# Patient Record
Sex: MALE | Race: WHITE | NOT HISPANIC OR LATINO | Employment: OTHER | RURAL
[De-identification: names, ages, dates, MRNs, and addresses within clinical notes are randomized per-mention and may not be internally consistent; named-entity substitution may affect disease eponyms.]

---

## 2019-06-25 ENCOUNTER — CLINICAL SUPPORT (OUTPATIENT)
Dept: FAMILY MEDICINE CLINIC | Facility: CLINIC | Age: 64
End: 2019-06-25

## 2019-06-25 DIAGNOSIS — J30.2 SEASONAL ALLERGIES: Primary | ICD-10-CM

## 2019-06-25 PROCEDURE — 95117 IMMUNOTHERAPY INJECTIONS: CPT | Performed by: FAMILY MEDICINE

## 2019-07-08 RX ORDER — LISINOPRIL AND HYDROCHLOROTHIAZIDE 20; 12.5 MG/1; MG/1
1 TABLET ORAL DAILY
Qty: 30 TABLET | Refills: 3 | Status: SHIPPED | OUTPATIENT
Start: 2019-07-08 | End: 2019-11-22 | Stop reason: SDUPTHER

## 2019-07-09 ENCOUNTER — CLINICAL SUPPORT (OUTPATIENT)
Dept: FAMILY MEDICINE CLINIC | Facility: CLINIC | Age: 64
End: 2019-07-09

## 2019-07-09 DIAGNOSIS — J30.2 SEASONAL ALLERGIES: Primary | ICD-10-CM

## 2019-07-09 PROCEDURE — 95117 IMMUNOTHERAPY INJECTIONS: CPT | Performed by: FAMILY MEDICINE

## 2019-07-23 ENCOUNTER — CLINICAL SUPPORT (OUTPATIENT)
Dept: FAMILY MEDICINE CLINIC | Facility: CLINIC | Age: 64
End: 2019-07-23

## 2019-07-23 DIAGNOSIS — J30.2 SEASONAL ALLERGIES: Primary | ICD-10-CM

## 2019-07-23 PROCEDURE — 95117 IMMUNOTHERAPY INJECTIONS: CPT | Performed by: FAMILY MEDICINE

## 2019-07-25 RX ORDER — CITALOPRAM 20 MG/1
20 TABLET ORAL DAILY
Qty: 30 TABLET | Refills: 12 | Status: SHIPPED | OUTPATIENT
Start: 2019-07-25 | End: 2020-08-07

## 2019-08-07 ENCOUNTER — CLINICAL SUPPORT (OUTPATIENT)
Dept: FAMILY MEDICINE CLINIC | Facility: CLINIC | Age: 64
End: 2019-08-07

## 2019-08-07 DIAGNOSIS — J30.2 SEASONAL ALLERGIES: Primary | ICD-10-CM

## 2019-08-07 PROCEDURE — 95117 IMMUNOTHERAPY INJECTIONS: CPT | Performed by: FAMILY MEDICINE

## 2019-08-19 PROBLEM — Z82.49 FAMILY HISTORY OF ISCHEMIC HEART DISEASE: Status: ACTIVE | Noted: 2019-08-19

## 2019-08-19 PROBLEM — E66.9 OBESITY: Status: ACTIVE | Noted: 2019-08-19

## 2019-08-19 PROBLEM — Z12.5 ENCOUNTER FOR PROSTATE CANCER SCREENING: Status: ACTIVE | Noted: 2019-08-19

## 2019-08-19 PROBLEM — Z12.12 SCREENING FOR MALIGNANT NEOPLASM OF THE RECTUM: Status: ACTIVE | Noted: 2019-08-19

## 2019-08-19 RX ORDER — AZELASTINE 1 MG/ML
1 SPRAY, METERED NASAL 2 TIMES DAILY
COMMUNITY
Start: 2019-05-29 | End: 2021-03-24

## 2019-08-19 RX ORDER — ASPIRIN 81 MG/1
1 TABLET ORAL DAILY
COMMUNITY
Start: 2019-04-30

## 2019-08-19 RX ORDER — FLUTICASONE PROPIONATE 50 MCG
2 SPRAY, SUSPENSION (ML) NASAL DAILY
COMMUNITY
Start: 2019-04-30

## 2019-08-19 RX ORDER — MONTELUKAST SODIUM 10 MG/1
1 TABLET ORAL DAILY
COMMUNITY
Start: 2019-04-30 | End: 2020-09-16

## 2019-08-19 RX ORDER — FENOFIBRATE 160 MG/1
1 TABLET ORAL DAILY
COMMUNITY
Start: 2019-04-30 | End: 2019-10-14 | Stop reason: SDUPTHER

## 2019-08-19 RX ORDER — OMEGA-3 FATTY ACIDS CAP DELAYED RELEASE 1000 MG 1000 MG
2 CAPSULE DELAYED RELEASE ORAL 2 TIMES DAILY
COMMUNITY

## 2019-08-20 ENCOUNTER — OFFICE VISIT (OUTPATIENT)
Dept: FAMILY MEDICINE CLINIC | Facility: CLINIC | Age: 64
End: 2019-08-20

## 2019-08-20 VITALS
HEIGHT: 66 IN | TEMPERATURE: 97.7 F | OXYGEN SATURATION: 97 % | BODY MASS INDEX: 31.6 KG/M2 | SYSTOLIC BLOOD PRESSURE: 136 MMHG | RESPIRATION RATE: 16 BRPM | HEART RATE: 82 BPM | DIASTOLIC BLOOD PRESSURE: 78 MMHG | WEIGHT: 196.6 LBS

## 2019-08-20 DIAGNOSIS — J30.1 SEASONAL ALLERGIC RHINITIS DUE TO POLLEN: ICD-10-CM

## 2019-08-20 DIAGNOSIS — E66.09 CLASS 1 OBESITY DUE TO EXCESS CALORIES WITH SERIOUS COMORBIDITY AND BODY MASS INDEX (BMI) OF 31.0 TO 31.9 IN ADULT: ICD-10-CM

## 2019-08-20 DIAGNOSIS — E11.9 TYPE 2 DIABETES MELLITUS WITHOUT COMPLICATION, WITHOUT LONG-TERM CURRENT USE OF INSULIN (HCC): Primary | ICD-10-CM

## 2019-08-20 DIAGNOSIS — E78.2 MIXED HYPERLIPIDEMIA: ICD-10-CM

## 2019-08-20 DIAGNOSIS — I10 HYPERTENSION, BENIGN: ICD-10-CM

## 2019-08-20 PROBLEM — G47.33 OBSTRUCTIVE SLEEP APNEA: Status: ACTIVE | Noted: 2019-08-20

## 2019-08-20 LAB
BILIRUB BLD-MCNC: NEGATIVE MG/DL
CLARITY, POC: CLEAR
COLOR UR: YELLOW
GLUCOSE BLDC GLUCOMTR-MCNC: 94 MG/DL (ref 70–130)
GLUCOSE UR STRIP-MCNC: NEGATIVE MG/DL
HBA1C MFR BLD: 6.4 %
KETONES UR QL: NEGATIVE
LEUKOCYTE EST, POC: NEGATIVE
NITRITE UR-MCNC: NEGATIVE MG/ML
PH UR: 5 [PH] (ref 5–8)
PROT UR STRIP-MCNC: NEGATIVE MG/DL
RBC # UR STRIP: NEGATIVE /UL
SP GR UR: 1.01 (ref 1–1.03)
UROBILINOGEN UR QL: NORMAL

## 2019-08-20 PROCEDURE — 83036 HEMOGLOBIN GLYCOSYLATED A1C: CPT | Performed by: FAMILY MEDICINE

## 2019-08-20 PROCEDURE — 99214 OFFICE O/P EST MOD 30 MIN: CPT | Performed by: FAMILY MEDICINE

## 2019-08-20 PROCEDURE — 82962 GLUCOSE BLOOD TEST: CPT | Performed by: FAMILY MEDICINE

## 2019-08-20 PROCEDURE — 95117 IMMUNOTHERAPY INJECTIONS: CPT | Performed by: FAMILY MEDICINE

## 2019-08-20 PROCEDURE — 81002 URINALYSIS NONAUTO W/O SCOPE: CPT | Performed by: FAMILY MEDICINE

## 2019-08-20 NOTE — PROGRESS NOTES
Subjective   Fransico Cuenca is a 64 y.o. male.     Fransico was seen in 06/2019 for evaluation of snoring. He had a sleep study performed and was diagnosed with sleep apnea.       Diabetes   He presents for his follow-up diabetic visit. He has type 2 diabetes mellitus. Pertinent negatives for hypoglycemia include no dizziness or headaches. Pertinent negatives for diabetes include no blurred vision, no chest pain, no fatigue, no polydipsia, no polyuria and no weakness. Risk factors for coronary artery disease include diabetes mellitus, hypertension, dyslipidemia, male sex, obesity and family history. Current diabetic treatment includes oral agent (monotherapy). Meal planning includes avoidance of concentrated sweets. He participates in exercise daily. An ACE inhibitor/angiotensin II receptor blocker is being taken. He does not see a podiatrist.Eye exam is not current.   Hypertension   This is a chronic problem. The current episode started more than 1 year ago. The problem is controlled. Pertinent negatives include no blurred vision, chest pain, headaches, palpitations, peripheral edema or shortness of breath. Risk factors for coronary artery disease include diabetes mellitus, dyslipidemia, family history, male gender and obesity. Current antihypertension treatment includes ACE inhibitors and diuretics. The current treatment provides significant improvement.   Hyperlipidemia   This is a chronic problem. The current episode started more than 1 year ago. The problem is uncontrolled. Recent lipid tests were reviewed and are high. Exacerbating diseases include diabetes and obesity. Pertinent negatives include no chest pain, myalgias or shortness of breath. Current antihyperlipidemic treatment includes statins. The current treatment provides moderate improvement of lipids. Risk factors for coronary artery disease include diabetes mellitus, dyslipidemia, family history, hypertension, male sex and obesity.        The following  portions of the patient's history were reviewed and updated as appropriate: allergies, current medications, past family history, past medical history, past social history, past surgical history and problem list.    Family History   Problem Relation Age of Onset   • Heart disease Mother    • Uterine cancer Mother    • Thyroid disease Mother    • Heart disease Father    • Stroke Father    • COPD Brother    • Diabetes Brother    • Breast cancer Maternal Aunt    • Breast cancer Maternal Grandmother        Social History     Tobacco Use   • Smoking status: Never Smoker   • Smokeless tobacco: Never Used   Substance Use Topics   • Alcohol use: No     Frequency: Never   • Drug use: No       Past Surgical History:   Procedure Laterality Date   • INGUINAL HERNIA REPAIR Left 10/2009   • NOSE SURGERY      x2   • UMBILICAL HERNIA REPAIR  10/2009    Dr. Napier       Patient Active Problem List   Diagnosis   • Anxiety   • Gastroesophageal reflux disease without esophagitis   • Hypertension, benign   • Mixed hyperlipidemia   • Allergic rhinitis due to pollen   • Type 2 diabetes mellitus without complication, without long-term current use of insulin (CMS/[facility])   • Encounter for prostate cancer screening   • Screening for malignant neoplasm of the rectum   • Family history of ischemic heart disease   • Obesity   • Obstructive sleep apnea       Current Outpatient Medications on File Prior to Visit   Medication Sig Dispense Refill   • aspirin (ASPIRIN ADULT LOW DOSE) 81 MG EC tablet Take 1 tablet by mouth Daily.     • azelastine (ASTELIN) 0.1 % nasal spray 1 spray into the nostril(s) as directed by provider Every Night.     • Carboxymethylcellul-Glycerin (LUBRICATING EYE DROPS) 0.5-0.9 % solution Apply 1 drop to eye(s) as directed by provider Daily.     • citalopram (CeleXA) 20 MG tablet Take 1 tablet by mouth Daily. 30 tablet 12   • fenofibrate 160 MG tablet Take 1 tablet by mouth Daily.     • fluticasone (FLONASE ALLERGY RELIEF) 50  "MCG/ACT nasal spray 2 sprays into the nostril(s) as directed by provider Daily.     • lisinopril-hydrochlorothiazide (PRINZIDE,ZESTORETIC) 20-12.5 MG per tablet Take 1 tablet by mouth Daily. 30 tablet 3   • montelukast (SINGULAIR) 10 MG tablet Take 1 tablet by mouth Daily.     • Multiple Vitamins-Minerals (MULTIVITAMIN ADULT PO) Take 1 tablet by mouth Daily.     • Omega-3 Fatty Acids (FISH OIL) 1200 MG capsule capsule Take 2 capsules by mouth 2 (Two) Times a Day.       Current Facility-Administered Medications on File Prior to Visit   Medication Dose Route Frequency Provider Last Rate Last Dose   • Allergy Serum Injection  0.1 mL Subcutaneous Once Brad, Camryn GOLD MD           No Known Allergies    Review of Systems   Constitutional: Negative for appetite change, fatigue and fever.   HENT: Positive for rhinorrhea. Negative for congestion, ear pain, sinus pressure and sore throat.    Eyes: Negative for blurred vision and visual disturbance.   Respiratory: Negative for cough, shortness of breath and wheezing.    Cardiovascular: Negative for chest pain, palpitations and leg swelling.   Gastrointestinal: Negative for abdominal pain, constipation, diarrhea, nausea and vomiting.   Endocrine: Negative.  Negative for cold intolerance, heat intolerance, polydipsia and polyuria.   Genitourinary: Negative for dysuria, frequency and hematuria.   Musculoskeletal: Negative for arthralgias, joint swelling and myalgias.   Skin: Negative for rash and bruise.   Allergic/Immunologic: Positive for environmental allergies ( he is on immunoRx and  improved. ).   Neurological: Negative for dizziness, syncope, weakness and headache.   Hematological: Negative for adenopathy. Does not bruise/bleed easily.   Psychiatric/Behavioral: Negative for depressed mood.       Objective   Visit Vitals  /78 (BP Location: Right arm, Patient Position: Sitting, Cuff Size: Adult)   Pulse 82   Temp 97.7 °F (36.5 °C) (Oral)   Resp 16   Ht 167.6 cm (66\") "   Wt 89.2 kg (196 lb 9.6 oz)   SpO2 97% Comment: Room air   BMI 31.73 kg/m²     Physical Exam   Constitutional: He is oriented to person, place, and time. He appears well-developed and well-nourished. No distress.   HENT:   Right Ear: Tympanic membrane and external ear normal.   Left Ear: Tympanic membrane and external ear normal.   Mouth/Throat: Oropharynx is clear and moist.   Eyes: Conjunctivae are normal.   Neck: Neck supple. No JVD present. No thyromegaly present.   Cardiovascular: Normal rate, regular rhythm, normal heart sounds and intact distal pulses. Exam reveals no gallop and no friction rub.   No murmur heard.  Pulmonary/Chest: Effort normal and breath sounds normal. No respiratory distress. He has no wheezes. He has no rales.   Abdominal: Soft. Bowel sounds are normal. He exhibits no distension and no mass. There is no tenderness.   Musculoskeletal: He exhibits no edema.   Lymphadenopathy:     He has no cervical adenopathy.   Neurological: He is alert and oriented to person, place, and time. He displays normal reflexes. Coordination normal.   Skin: Skin is warm. No rash noted. No erythema.   Psychiatric: He has a normal mood and affect.   Vitals reviewed.        Assessment/Plan .  Problem List Items Addressed This Visit        Cardiovascular and Mediastinum    Hypertension, benign    Overview     Controlled marginally  He will resume low salt diet and exercise wt loss encouraged and follow  He will monitor his pressures at home and return with a log.         Mixed hyperlipidemia    Overview     Stable, compliant with meds         Current Assessment & Plan     Lipid abnormalities are improving with treatment.  Pharmacotherapy as ordered.  Lipids will be reassessed in 3 months.            Respiratory    Allergic rhinitis due to pollen    Overview     on Immuno Rx. Stable         Relevant Medications    Allergy Serum Injection (Completed)    Allergy Serum Injection (Completed)       Digestive    Obesity        Endocrine    Type 2 diabetes mellitus without complication, without long-term current use of insulin (CMS/[facility]) - Primary    Overview     A1c 6.2 08/20/2019, diet controlled and doing well.   New onset 04/29/2019, A1c 7.2  Diet and exercise discussed.   Education continued. HE is doing well BS are running 150 on average  He will continue diet and exercise.         Relevant Orders    POCT urinalysis dipstick, manual (Completed)    POCT Glucose (Completed)    POC Glycosylated Hemoglobin (Hb A1C) (Completed)

## 2019-09-03 ENCOUNTER — CLINICAL SUPPORT (OUTPATIENT)
Dept: FAMILY MEDICINE CLINIC | Facility: CLINIC | Age: 64
End: 2019-09-03

## 2019-09-03 DIAGNOSIS — J30.1 SEASONAL ALLERGIC RHINITIS DUE TO POLLEN: Primary | ICD-10-CM

## 2019-09-03 PROCEDURE — 95117 IMMUNOTHERAPY INJECTIONS: CPT | Performed by: FAMILY MEDICINE

## 2019-09-18 ENCOUNTER — CLINICAL SUPPORT (OUTPATIENT)
Dept: FAMILY MEDICINE CLINIC | Facility: CLINIC | Age: 64
End: 2019-09-18

## 2019-09-18 DIAGNOSIS — J30.2 SEASONAL ALLERGIES: Primary | ICD-10-CM

## 2019-09-18 PROCEDURE — 95117 IMMUNOTHERAPY INJECTIONS: CPT | Performed by: FAMILY MEDICINE

## 2019-10-01 ENCOUNTER — CLINICAL SUPPORT (OUTPATIENT)
Dept: FAMILY MEDICINE CLINIC | Facility: CLINIC | Age: 64
End: 2019-10-01

## 2019-10-01 DIAGNOSIS — J30.1 SEASONAL ALLERGIC RHINITIS DUE TO POLLEN: Primary | ICD-10-CM

## 2019-10-01 PROCEDURE — 95117 IMMUNOTHERAPY INJECTIONS: CPT | Performed by: FAMILY MEDICINE

## 2019-10-08 ENCOUNTER — FLU SHOT (OUTPATIENT)
Dept: FAMILY MEDICINE CLINIC | Facility: CLINIC | Age: 64
End: 2019-10-08

## 2019-10-08 DIAGNOSIS — Z23 IMMUNIZATION, PNEUMOCOCCUS AND INFLUENZA: ICD-10-CM

## 2019-10-08 PROCEDURE — 90653 IIV ADJUVANT VACCINE IM: CPT | Performed by: FAMILY MEDICINE

## 2019-10-08 PROCEDURE — G0008 ADMIN INFLUENZA VIRUS VAC: HCPCS | Performed by: FAMILY MEDICINE

## 2019-10-14 RX ORDER — FENOFIBRATE 160 MG/1
160 TABLET ORAL DAILY
Qty: 30 TABLET | Refills: 12 | Status: SHIPPED | OUTPATIENT
Start: 2019-10-14 | End: 2020-10-20

## 2019-10-15 ENCOUNTER — CLINICAL SUPPORT (OUTPATIENT)
Dept: FAMILY MEDICINE CLINIC | Facility: CLINIC | Age: 64
End: 2019-10-15

## 2019-10-15 DIAGNOSIS — J30.1 SEASONAL ALLERGIC RHINITIS DUE TO POLLEN: Primary | ICD-10-CM

## 2019-10-15 PROCEDURE — 95117 IMMUNOTHERAPY INJECTIONS: CPT | Performed by: FAMILY MEDICINE

## 2019-10-30 ENCOUNTER — CLINICAL SUPPORT (OUTPATIENT)
Dept: FAMILY MEDICINE CLINIC | Facility: CLINIC | Age: 64
End: 2019-10-30

## 2019-10-30 DIAGNOSIS — J30.1 SEASONAL ALLERGIC RHINITIS DUE TO POLLEN: Primary | ICD-10-CM

## 2019-10-30 PROCEDURE — 95117 IMMUNOTHERAPY INJECTIONS: CPT | Performed by: FAMILY MEDICINE

## 2019-11-13 ENCOUNTER — CLINICAL SUPPORT (OUTPATIENT)
Dept: FAMILY MEDICINE CLINIC | Facility: CLINIC | Age: 64
End: 2019-11-13

## 2019-11-13 DIAGNOSIS — J30.1 SEASONAL ALLERGIC RHINITIS DUE TO POLLEN: Primary | ICD-10-CM

## 2019-11-13 PROCEDURE — 95117 IMMUNOTHERAPY INJECTIONS: CPT | Performed by: FAMILY MEDICINE

## 2019-11-22 DIAGNOSIS — I10 HYPERTENSION, BENIGN: Primary | ICD-10-CM

## 2019-11-22 RX ORDER — LISINOPRIL AND HYDROCHLOROTHIAZIDE 20; 12.5 MG/1; MG/1
1 TABLET ORAL DAILY
Qty: 90 TABLET | Refills: 3 | Status: SHIPPED | OUTPATIENT
Start: 2019-11-22 | End: 2021-01-19

## 2019-11-26 ENCOUNTER — OFFICE VISIT (OUTPATIENT)
Dept: FAMILY MEDICINE CLINIC | Facility: CLINIC | Age: 64
End: 2019-11-26

## 2019-11-26 VITALS
HEART RATE: 66 BPM | TEMPERATURE: 98.2 F | RESPIRATION RATE: 18 BRPM | SYSTOLIC BLOOD PRESSURE: 140 MMHG | DIASTOLIC BLOOD PRESSURE: 70 MMHG | WEIGHT: 206.8 LBS | HEIGHT: 66 IN | BODY MASS INDEX: 33.23 KG/M2 | OXYGEN SATURATION: 95 %

## 2019-11-26 DIAGNOSIS — I10 HYPERTENSION, BENIGN: ICD-10-CM

## 2019-11-26 DIAGNOSIS — E78.2 MIXED HYPERLIPIDEMIA: ICD-10-CM

## 2019-11-26 DIAGNOSIS — F41.9 ANXIETY: ICD-10-CM

## 2019-11-26 DIAGNOSIS — E11.9 TYPE 2 DIABETES MELLITUS WITHOUT COMPLICATION, WITHOUT LONG-TERM CURRENT USE OF INSULIN (HCC): Primary | ICD-10-CM

## 2019-11-26 DIAGNOSIS — E66.09 CLASS 1 OBESITY DUE TO EXCESS CALORIES WITH SERIOUS COMORBIDITY AND BODY MASS INDEX (BMI) OF 31.0 TO 31.9 IN ADULT: ICD-10-CM

## 2019-11-26 DIAGNOSIS — J30.1 SEASONAL ALLERGIC RHINITIS DUE TO POLLEN: ICD-10-CM

## 2019-11-26 LAB
BILIRUB BLD-MCNC: NEGATIVE MG/DL
CLARITY, POC: CLEAR
COLOR UR: YELLOW
GLUCOSE BLDC GLUCOMTR-MCNC: 158 MG/DL (ref 70–130)
GLUCOSE UR STRIP-MCNC: NEGATIVE MG/DL
HBA1C MFR BLD: 7.2 %
KETONES UR QL: NEGATIVE
LEUKOCYTE EST, POC: NEGATIVE
NITRITE UR-MCNC: NEGATIVE MG/ML
PH UR: 6 [PH] (ref 5–8)
PROT UR STRIP-MCNC: NEGATIVE MG/DL
RBC # UR STRIP: NEGATIVE /UL
SP GR UR: 1.01 (ref 1–1.03)
UROBILINOGEN UR QL: NORMAL

## 2019-11-26 PROCEDURE — 99214 OFFICE O/P EST MOD 30 MIN: CPT | Performed by: FAMILY MEDICINE

## 2019-11-26 PROCEDURE — 83036 HEMOGLOBIN GLYCOSYLATED A1C: CPT | Performed by: FAMILY MEDICINE

## 2019-11-26 PROCEDURE — 81002 URINALYSIS NONAUTO W/O SCOPE: CPT | Performed by: FAMILY MEDICINE

## 2019-11-26 PROCEDURE — 82962 GLUCOSE BLOOD TEST: CPT | Performed by: FAMILY MEDICINE

## 2019-12-10 ENCOUNTER — CLINICAL SUPPORT (OUTPATIENT)
Dept: FAMILY MEDICINE CLINIC | Facility: CLINIC | Age: 64
End: 2019-12-10

## 2019-12-10 DIAGNOSIS — J30.1 SEASONAL ALLERGIC RHINITIS DUE TO POLLEN: Primary | ICD-10-CM

## 2019-12-10 PROCEDURE — 95117 IMMUNOTHERAPY INJECTIONS: CPT | Performed by: FAMILY MEDICINE

## 2019-12-26 ENCOUNTER — CLINICAL SUPPORT (OUTPATIENT)
Dept: FAMILY MEDICINE CLINIC | Facility: CLINIC | Age: 64
End: 2019-12-26

## 2019-12-26 DIAGNOSIS — J30.1 SEASONAL ALLERGIC RHINITIS DUE TO POLLEN: Primary | ICD-10-CM

## 2019-12-26 PROCEDURE — 95117 IMMUNOTHERAPY INJECTIONS: CPT | Performed by: FAMILY MEDICINE

## 2020-01-07 ENCOUNTER — CLINICAL SUPPORT (OUTPATIENT)
Dept: FAMILY MEDICINE CLINIC | Facility: CLINIC | Age: 65
End: 2020-01-07

## 2020-01-07 DIAGNOSIS — J30.1 SEASONAL ALLERGIC RHINITIS DUE TO POLLEN: Primary | ICD-10-CM

## 2020-01-07 PROCEDURE — 95117 IMMUNOTHERAPY INJECTIONS: CPT | Performed by: FAMILY MEDICINE

## 2020-01-20 ENCOUNTER — CLINICAL SUPPORT (OUTPATIENT)
Dept: FAMILY MEDICINE CLINIC | Facility: CLINIC | Age: 65
End: 2020-01-20

## 2020-01-20 DIAGNOSIS — J30.1 SEASONAL ALLERGIC RHINITIS DUE TO POLLEN: Primary | ICD-10-CM

## 2020-01-20 PROCEDURE — 95117 IMMUNOTHERAPY INJECTIONS: CPT | Performed by: FAMILY MEDICINE

## 2020-01-27 ENCOUNTER — OFFICE VISIT (OUTPATIENT)
Dept: FAMILY MEDICINE CLINIC | Facility: CLINIC | Age: 65
End: 2020-01-27

## 2020-01-27 VITALS
WEIGHT: 205.8 LBS | SYSTOLIC BLOOD PRESSURE: 140 MMHG | DIASTOLIC BLOOD PRESSURE: 80 MMHG | OXYGEN SATURATION: 93 % | RESPIRATION RATE: 18 BRPM | HEIGHT: 66 IN | HEART RATE: 79 BPM | BODY MASS INDEX: 33.07 KG/M2 | TEMPERATURE: 98.8 F

## 2020-01-27 DIAGNOSIS — J30.1 SEASONAL ALLERGIC RHINITIS DUE TO POLLEN: ICD-10-CM

## 2020-01-27 DIAGNOSIS — E66.09 CLASS 1 OBESITY DUE TO EXCESS CALORIES WITH SERIOUS COMORBIDITY AND BODY MASS INDEX (BMI) OF 31.0 TO 31.9 IN ADULT: ICD-10-CM

## 2020-01-27 DIAGNOSIS — I10 HYPERTENSION, BENIGN: ICD-10-CM

## 2020-01-27 DIAGNOSIS — J01.10 ACUTE NON-RECURRENT FRONTAL SINUSITIS: Primary | ICD-10-CM

## 2020-01-27 DIAGNOSIS — E11.9 TYPE 2 DIABETES MELLITUS WITHOUT COMPLICATION, WITHOUT LONG-TERM CURRENT USE OF INSULIN (HCC): ICD-10-CM

## 2020-01-27 PROBLEM — R05.9 COUGH: Status: ACTIVE | Noted: 2020-01-27

## 2020-01-27 PROCEDURE — 99213 OFFICE O/P EST LOW 20 MIN: CPT | Performed by: FAMILY MEDICINE

## 2020-01-27 RX ORDER — PREDNISONE 20 MG/1
1 TABLET ORAL 2 TIMES DAILY
COMMUNITY
Start: 2020-01-24 | End: 2020-03-03

## 2020-01-27 RX ORDER — ALBUTEROL SULFATE 90 UG/1
2 AEROSOL, METERED RESPIRATORY (INHALATION) EVERY 4 HOURS PRN
Qty: 1 INHALER | Refills: 12 | Status: SHIPPED | OUTPATIENT
Start: 2020-01-27 | End: 2020-03-03

## 2020-01-27 RX ORDER — AZITHROMYCIN 500 MG/1
1 TABLET, FILM COATED ORAL DAILY
COMMUNITY
Start: 2020-01-24 | End: 2020-03-03

## 2020-02-20 ENCOUNTER — CLINICAL SUPPORT (OUTPATIENT)
Dept: FAMILY MEDICINE CLINIC | Facility: CLINIC | Age: 65
End: 2020-02-20

## 2020-02-20 DIAGNOSIS — J30.1 SEASONAL ALLERGIC RHINITIS DUE TO POLLEN: Primary | ICD-10-CM

## 2020-02-20 PROCEDURE — 95117 IMMUNOTHERAPY INJECTIONS: CPT | Performed by: FAMILY MEDICINE

## 2020-03-03 ENCOUNTER — OFFICE VISIT (OUTPATIENT)
Dept: FAMILY MEDICINE CLINIC | Facility: CLINIC | Age: 65
End: 2020-03-03

## 2020-03-03 VITALS
BODY MASS INDEX: 33.07 KG/M2 | DIASTOLIC BLOOD PRESSURE: 92 MMHG | HEART RATE: 80 BPM | SYSTOLIC BLOOD PRESSURE: 172 MMHG | RESPIRATION RATE: 18 BRPM | TEMPERATURE: 98.3 F | OXYGEN SATURATION: 96 % | HEIGHT: 66 IN | WEIGHT: 205.8 LBS

## 2020-03-03 DIAGNOSIS — E78.2 MIXED HYPERLIPIDEMIA: ICD-10-CM

## 2020-03-03 DIAGNOSIS — E11.9 TYPE 2 DIABETES MELLITUS WITHOUT COMPLICATION, WITHOUT LONG-TERM CURRENT USE OF INSULIN (HCC): Primary | ICD-10-CM

## 2020-03-03 DIAGNOSIS — J30.1 SEASONAL ALLERGIC RHINITIS DUE TO POLLEN: ICD-10-CM

## 2020-03-03 DIAGNOSIS — R35.0 URINARY FREQUENCY: ICD-10-CM

## 2020-03-03 DIAGNOSIS — I10 HYPERTENSION, BENIGN: ICD-10-CM

## 2020-03-03 DIAGNOSIS — E66.09 CLASS 1 OBESITY DUE TO EXCESS CALORIES WITH SERIOUS COMORBIDITY AND BODY MASS INDEX (BMI) OF 33.0 TO 33.9 IN ADULT: ICD-10-CM

## 2020-03-03 DIAGNOSIS — G47.33 OBSTRUCTIVE SLEEP APNEA: ICD-10-CM

## 2020-03-03 LAB
BILIRUB BLD-MCNC: NEGATIVE MG/DL
CLARITY, POC: CLEAR
COLOR UR: YELLOW
GLUCOSE BLDC GLUCOMTR-MCNC: 147 MG/DL (ref 70–130)
GLUCOSE UR STRIP-MCNC: NEGATIVE MG/DL
HBA1C MFR BLD: 8.4 %
KETONES UR QL: NEGATIVE
LEUKOCYTE EST, POC: NEGATIVE
NITRITE UR-MCNC: NEGATIVE MG/ML
PH UR: 5 [PH] (ref 5–8)
PROT UR STRIP-MCNC: NEGATIVE MG/DL
RBC # UR STRIP: ABNORMAL /UL
SP GR UR: 1.03 (ref 1–1.03)
UROBILINOGEN UR QL: NORMAL

## 2020-03-03 PROCEDURE — 83036 HEMOGLOBIN GLYCOSYLATED A1C: CPT | Performed by: FAMILY MEDICINE

## 2020-03-03 PROCEDURE — 95117 IMMUNOTHERAPY INJECTIONS: CPT | Performed by: FAMILY MEDICINE

## 2020-03-03 PROCEDURE — 99214 OFFICE O/P EST MOD 30 MIN: CPT | Performed by: FAMILY MEDICINE

## 2020-03-03 PROCEDURE — 81002 URINALYSIS NONAUTO W/O SCOPE: CPT | Performed by: FAMILY MEDICINE

## 2020-03-03 PROCEDURE — 82962 GLUCOSE BLOOD TEST: CPT | Performed by: FAMILY MEDICINE

## 2020-03-03 RX ORDER — AMLODIPINE BESYLATE 5 MG/1
5 TABLET ORAL DAILY
Qty: 30 TABLET | Refills: 12 | Status: SHIPPED | OUTPATIENT
Start: 2020-03-03 | End: 2021-03-15

## 2020-03-03 NOTE — ASSESSMENT & PLAN NOTE
Hypertension is worsening.  Continue current treatment regimen.  Weight loss.  Regular aerobic exercise.  Blood pressure will be reassessed in 3 months.

## 2020-03-04 LAB
APPEARANCE UR: CLEAR
BACTERIA #/AREA URNS HPF: ABNORMAL /[HPF]
BILIRUB UR QL STRIP: NEGATIVE
CASTS URNS MICRO: ABNORMAL
CASTS URNS QL MICRO: PRESENT /LPF
COLOR UR: YELLOW
EPI CELLS #/AREA URNS HPF: ABNORMAL /HPF (ref 0–10)
GLUCOSE UR QL: NEGATIVE
HGB UR QL STRIP: NEGATIVE
KETONES UR QL STRIP: NEGATIVE
LEUKOCYTE ESTERASE UR QL STRIP: NEGATIVE
MICRO URNS: ABNORMAL
MUCOUS THREADS URNS QL MICRO: PRESENT
NITRITE UR QL STRIP: POSITIVE
PH UR STRIP: 5 [PH] (ref 5–7.5)
PROT UR QL STRIP: ABNORMAL
RBC #/AREA URNS HPF: ABNORMAL /HPF (ref 0–2)
SP GR UR: >=1.03 (ref 1–1.03)
UROBILINOGEN UR STRIP-MCNC: 0.2 MG/DL (ref 0.2–1)
WBC #/AREA URNS HPF: ABNORMAL /HPF (ref 0–5)

## 2020-03-12 PROBLEM — R05.9 COUGH: Status: RESOLVED | Noted: 2020-01-27 | Resolved: 2020-03-12

## 2020-03-12 PROBLEM — E66.09 CLASS 1 OBESITY DUE TO EXCESS CALORIES WITH SERIOUS COMORBIDITY AND BODY MASS INDEX (BMI) OF 33.0 TO 33.9 IN ADULT: Status: ACTIVE | Noted: 2019-08-19

## 2020-03-12 PROBLEM — E66.811 CLASS 1 OBESITY DUE TO EXCESS CALORIES WITH SERIOUS COMORBIDITY AND BODY MASS INDEX (BMI) OF 33.0 TO 33.9 IN ADULT: Status: ACTIVE | Noted: 2019-08-19

## 2020-03-17 ENCOUNTER — CLINICAL SUPPORT (OUTPATIENT)
Dept: FAMILY MEDICINE CLINIC | Facility: CLINIC | Age: 65
End: 2020-03-17

## 2020-03-17 DIAGNOSIS — J30.1 SEASONAL ALLERGIC RHINITIS DUE TO POLLEN: Primary | ICD-10-CM

## 2020-03-17 PROCEDURE — 95117 IMMUNOTHERAPY INJECTIONS: CPT | Performed by: FAMILY MEDICINE

## 2020-04-14 ENCOUNTER — CLINICAL SUPPORT (OUTPATIENT)
Dept: FAMILY MEDICINE CLINIC | Facility: CLINIC | Age: 65
End: 2020-04-14

## 2020-04-14 DIAGNOSIS — J30.1 SEASONAL ALLERGIC RHINITIS DUE TO POLLEN: Primary | ICD-10-CM

## 2020-04-14 PROCEDURE — 95117 IMMUNOTHERAPY INJECTIONS: CPT | Performed by: FAMILY MEDICINE

## 2020-04-27 ENCOUNTER — CLINICAL SUPPORT (OUTPATIENT)
Dept: FAMILY MEDICINE CLINIC | Facility: CLINIC | Age: 65
End: 2020-04-27

## 2020-04-27 DIAGNOSIS — J30.1 SEASONAL ALLERGIC RHINITIS DUE TO POLLEN: Primary | ICD-10-CM

## 2020-04-27 PROCEDURE — 95117 IMMUNOTHERAPY INJECTIONS: CPT | Performed by: FAMILY MEDICINE

## 2020-05-12 ENCOUNTER — CLINICAL SUPPORT (OUTPATIENT)
Dept: FAMILY MEDICINE CLINIC | Facility: CLINIC | Age: 65
End: 2020-05-12

## 2020-05-12 DIAGNOSIS — J30.1 SEASONAL ALLERGIC RHINITIS DUE TO POLLEN: Primary | ICD-10-CM

## 2020-05-12 PROCEDURE — 95117 IMMUNOTHERAPY INJECTIONS: CPT | Performed by: FAMILY MEDICINE

## 2020-05-26 ENCOUNTER — CLINICAL SUPPORT (OUTPATIENT)
Dept: FAMILY MEDICINE CLINIC | Facility: CLINIC | Age: 65
End: 2020-05-26

## 2020-05-26 DIAGNOSIS — J30.1 SEASONAL ALLERGIC RHINITIS DUE TO POLLEN: Primary | ICD-10-CM

## 2020-05-26 PROCEDURE — 95117 IMMUNOTHERAPY INJECTIONS: CPT | Performed by: FAMILY MEDICINE

## 2020-06-10 ENCOUNTER — OFFICE VISIT (OUTPATIENT)
Dept: FAMILY MEDICINE CLINIC | Facility: CLINIC | Age: 65
End: 2020-06-10

## 2020-06-10 VITALS
TEMPERATURE: 99.5 F | BODY MASS INDEX: 33.68 KG/M2 | WEIGHT: 209.6 LBS | DIASTOLIC BLOOD PRESSURE: 80 MMHG | SYSTOLIC BLOOD PRESSURE: 140 MMHG | OXYGEN SATURATION: 95 % | HEIGHT: 66 IN | HEART RATE: 51 BPM | RESPIRATION RATE: 18 BRPM

## 2020-06-10 DIAGNOSIS — Z00.00 MEDICARE ANNUAL WELLNESS VISIT, INITIAL: Primary | ICD-10-CM

## 2020-06-10 DIAGNOSIS — E78.2 MIXED HYPERLIPIDEMIA: ICD-10-CM

## 2020-06-10 DIAGNOSIS — J30.1 SEASONAL ALLERGIC RHINITIS DUE TO POLLEN: ICD-10-CM

## 2020-06-10 DIAGNOSIS — I10 HYPERTENSION, BENIGN: ICD-10-CM

## 2020-06-10 DIAGNOSIS — Z12.12 SCREENING FOR MALIGNANT NEOPLASM OF THE RECTUM: ICD-10-CM

## 2020-06-10 DIAGNOSIS — G47.33 OBSTRUCTIVE SLEEP APNEA: ICD-10-CM

## 2020-06-10 DIAGNOSIS — Z11.59 NEED FOR HEPATITIS C SCREENING TEST: ICD-10-CM

## 2020-06-10 DIAGNOSIS — K21.9 GASTROESOPHAGEAL REFLUX DISEASE WITHOUT ESOPHAGITIS: ICD-10-CM

## 2020-06-10 DIAGNOSIS — Z12.5 ENCOUNTER FOR PROSTATE CANCER SCREENING: ICD-10-CM

## 2020-06-10 DIAGNOSIS — E11.9 TYPE 2 DIABETES MELLITUS WITHOUT COMPLICATION, WITHOUT LONG-TERM CURRENT USE OF INSULIN (HCC): ICD-10-CM

## 2020-06-10 LAB
BILIRUB BLD-MCNC: NEGATIVE MG/DL
CLARITY, POC: CLEAR
COLOR UR: YELLOW
GLUCOSE BLDC GLUCOMTR-MCNC: 187 MG/DL (ref 70–130)
GLUCOSE UR STRIP-MCNC: NEGATIVE MG/DL
HBA1C MFR BLD: 8.2 %
KETONES UR QL: NEGATIVE
LEUKOCYTE EST, POC: NEGATIVE
NITRITE UR-MCNC: NEGATIVE MG/ML
PH UR: 6 [PH] (ref 5–8)
PROT UR STRIP-MCNC: NEGATIVE MG/DL
RBC # UR STRIP: NEGATIVE /UL
SP GR UR: 1.01 (ref 1–1.03)
UROBILINOGEN UR QL: NORMAL

## 2020-06-10 PROCEDURE — G0438 PPPS, INITIAL VISIT: HCPCS | Performed by: FAMILY MEDICINE

## 2020-06-10 PROCEDURE — 99214 OFFICE O/P EST MOD 30 MIN: CPT | Performed by: FAMILY MEDICINE

## 2020-06-10 PROCEDURE — 95117 IMMUNOTHERAPY INJECTIONS: CPT | Performed by: FAMILY MEDICINE

## 2020-06-10 PROCEDURE — 82962 GLUCOSE BLOOD TEST: CPT | Performed by: FAMILY MEDICINE

## 2020-06-10 PROCEDURE — 83036 HEMOGLOBIN GLYCOSYLATED A1C: CPT | Performed by: FAMILY MEDICINE

## 2020-06-10 PROCEDURE — 81002 URINALYSIS NONAUTO W/O SCOPE: CPT | Performed by: FAMILY MEDICINE

## 2020-06-10 PROCEDURE — 36415 COLL VENOUS BLD VENIPUNCTURE: CPT | Performed by: FAMILY MEDICINE

## 2020-06-10 PROCEDURE — G0444 DEPRESSION SCREEN ANNUAL: HCPCS | Performed by: FAMILY MEDICINE

## 2020-06-10 RX ORDER — DOXAZOSIN MESYLATE 1 MG/1
1 TABLET ORAL DAILY
Qty: 30 TABLET | Refills: 12 | Status: SHIPPED | OUTPATIENT
Start: 2020-06-10 | End: 2021-06-14

## 2020-06-10 NOTE — ASSESSMENT & PLAN NOTE
Diabetes is unchanged.   Dietary recommendations for ADA diet.  Regular aerobic exercise.  Discussed foot care.  Medication changes per orders.  Diabetes will be reassessed in 3 months.

## 2020-06-10 NOTE — PROGRESS NOTES
The ABCs of the Annual Wellness Visit  Initial Medicare Wellness Visit    Chief Complaint   Patient presents with   • Medicare Wellness-Initial Visit   • Diabetes   • Hypertension   • Hyperlipidemia       Subjective   History of Present Illness:  Fransico Cuenca is a 65 y.o. male who presents for an Inital Medicare Wellness Visit. The patient is here: for coordination of medical care to discuss health maintenance and disease prevention to follow up on multiple medical problems. Overall has: moderate activity with work/home activities, exercises 2 - 3 times per week, good appetite, feels well with no complaints, good energy level and is sleeping well. Nutrition: balanced diet. Last tetanus shot was 2007.     Diabetes   He presents for his follow-up diabetic visit. He has type 2 diabetes mellitus. There are no hypoglycemic associated symptoms. Pertinent negatives for hypoglycemia include no dizziness, headaches, nervousness/anxiousness or sweats. Pertinent negatives for diabetes include no blurred vision, no chest pain, no fatigue, no polydipsia, no polyuria and no weakness. There are no hypoglycemic complications. Risk factors for coronary artery disease include diabetes mellitus, dyslipidemia, hypertension, male sex, obesity and family history. Current diabetic treatment includes oral agent (monotherapy). Meal planning includes avoidance of concentrated sweets. He participates in exercise daily. (Does not check his blood sugars) An ACE inhibitor/angiotensin II receptor blocker is being taken. He does not see a podiatrist.Eye exam is current.   Hypertension   This is a chronic problem. The current episode started more than 1 year ago. The problem is uncontrolled. Pertinent negatives include no blurred vision, chest pain, headaches, orthopnea, palpitations, peripheral edema, PND, shortness of breath or sweats. Risk factors for coronary artery disease include diabetes mellitus, dyslipidemia, family history, obesity and  male gender. Current antihypertension treatment includes ACE inhibitors, diuretics and calcium channel blockers. The current treatment provides moderate improvement.   Hyperlipidemia   This is a chronic problem. The current episode started more than 1 year ago. The problem is uncontrolled. Recent lipid tests were reviewed and are high. Exacerbating diseases include diabetes and obesity. Pertinent negatives include no chest pain, myalgias or shortness of breath. Current antihyperlipidemic treatment includes statins. The current treatment provides moderate improvement of lipids. Risk factors for coronary artery disease include diabetes mellitus, dyslipidemia, family history, hypertension, male sex and obesity.       HEALTH RISK ASSESSMENT    Recent Hospitalizations:  No hospitalization(s) within the last year.    Current Medical Providers:  Patient Care Team:  Camryn Salinas MD as PCP - General (Family Medicine)  Camryn Salinas MD as PCP - Claims Attributed    Smoking Status:  Social History     Tobacco Use   Smoking Status Never Smoker   Smokeless Tobacco Never Used       Alcohol Consumption:  Social History     Substance and Sexual Activity   Alcohol Use No   • Frequency: Never       Depression Screen:   PHQ-2/PHQ-9 Depression Screening 6/10/2020   Little interest or pleasure in doing things 0   Feeling down, depressed, or hopeless 1   Trouble falling or staying asleep, or sleeping too much 0   Feeling tired or having little energy 0   Poor appetite or overeating 0   Feeling bad about yourself - or that you are a failure or have let yourself or your family down 0   Trouble concentrating on things, such as reading the newspaper or watching television 0   Moving or speaking so slowly that other people could have noticed. Or the opposite - being so fidgety or restless that you have been moving around a lot more than usual 0   Thoughts that you would be better off dead, or of hurting yourself in some way 0   Total  Score 1   If you checked off any problems, how difficult have these problems made it for you to do your work, take care of things at home, or get along with other people? Not difficult at all     I spent more than 16 minutes asking patient questions, counseling and documenting in the chart    Fall Risk Screen:  [place] Fall Risk Assessment was completed, and patient is at LOW risk for falls.Assessment completed on:6/10/2020    Health Habits and Functional and Cognitive Screening:  Functional & Cognitive Status 6/10/2020   Do you have difficulty preparing food and eating? No   Do you have difficulty bathing yourself, getting dressed or grooming yourself? No   Do you have difficulty using the toilet? No   Do you have difficulty moving around from place to place? No   Do you have trouble with steps or getting out of a bed or a chair? No   Current Diet Well Balanced Diet   Dental Exam Up to date   Eye Exam Up to date   Exercise (times per week) 7 times per week   Current Exercise Activities Include Walking   Do you need help using the phone?  No   Are you deaf or do you have serious difficulty hearing?  No   Do you need help with transportation? No   Do you need help shopping? No   Do you need help preparing meals?  No   Do you need help with housework?  No   Do you need help with laundry? No   Do you need help taking your medications? No   Do you need help managing money? No   Do you ever drive or ride in a car without wearing a seat belt? No   Have you felt unusual stress, anger or loneliness in the last month? No   Who do you live with? Alone   If you need help, do you have trouble finding someone available to you? No   Have you been bothered in the last four weeks by sexual problems? No   Do you have difficulty concentrating, remembering or making decisions? No       Does the patient have evidence of cognitive impairment? No    Asprin use counseling:Taking ASA appropriately as indicated    Recent Lab Results:  Lab  Results   Component Value Date    HGBA1C 8.2 06/10/2020        Age-appropriate Screening Schedule:  Refer to the list below for future screening recommendations based on patient's age, sex and/or medical conditions. Orders for these recommended tests are listed in the plan section. The patient has been provided with a written plan.    Health Maintenance   Topic Date Due   • ZOSTER VACCINE (1 of 2) 03/26/2004   • TDAP/TD VACCINES (2 - Td) 06/25/2017   • COLONOSCOPY  10/26/2019   • LIPID PANEL  04/30/2020   • DIABETIC FOOT EXAM  05/14/2020   • URINE MICROALBUMIN  05/14/2020   • INFLUENZA VACCINE  08/01/2020   • HEMOGLOBIN A1C  09/03/2020   • DIABETIC EYE EXAM  10/01/2020          The following portions of the patient's history were reviewed and updated as appropriate: allergies, current medications, past family history, past medical history, past social history, past surgical history and problem list.    Outpatient Medications Prior to Visit   Medication Sig Dispense Refill   • amLODIPine (NORVASC) 5 MG tablet Take 1 tablet by mouth Daily. 30 tablet 12   • aspirin (ASPIRIN ADULT LOW DOSE) 81 MG EC tablet Take 1 tablet by mouth Daily.     • azelastine (ASTELIN) 0.1 % nasal spray 1 spray into the nostril(s) as directed by provider 2 (Two) Times a Day.     • Carboxymethylcellul-Glycerin (LUBRICATING EYE DROPS) 0.5-0.9 % solution Apply 1 drop to eye(s) as directed by provider Daily.     • citalopram (CeleXA) 20 MG tablet Take 1 tablet by mouth Daily. 30 tablet 12   • fenofibrate 160 MG tablet Take 1 tablet by mouth Daily. 30 tablet 12   • fluticasone (FLONASE ALLERGY RELIEF) 50 MCG/ACT nasal spray 2 sprays into the nostril(s) as directed by provider Daily.     • lisinopril-hydrochlorothiazide (PRINZIDE,ZESTORETIC) 20-12.5 MG per tablet Take 1 tablet by mouth Daily. 90 tablet 3   • montelukast (SINGULAIR) 10 MG tablet Take 1 tablet by mouth Daily.     • Multiple Vitamins-Minerals (MULTIVITAMIN ADULT PO) Take 1 tablet by  mouth Daily.     • Omega-3 Fatty Acids (FISH OIL) 1000 MG capsule delayed-release Take 2 capsules by mouth 2 (Two) Times a Day.     • metFORMIN (GLUCOPHAGE) 500 MG tablet Take 1 tablet by mouth Daily With Breakfast. For diabetes 30 tablet 12     Facility-Administered Medications Prior to Visit   Medication Dose Route Frequency Provider Last Rate Last Dose   • Allergy Serum Injection  0.1 mL Subcutaneous Once Brad, Camryn GOLD MD           Patient Active Problem List   Diagnosis   • Anxiety   • Gastroesophageal reflux disease without esophagitis   • Hypertension, benign   • Mixed hyperlipidemia   • Allergic rhinitis due to pollen   • Type 2 diabetes mellitus without complication, without long-term current use of insulin (CMS/[facility])   • Encounter for prostate cancer screening   • Screening for malignant neoplasm of the rectum   • Family history of ischemic heart disease   • Class 1 obesity due to excess calories with serious comorbidity and body mass index (BMI) of 33.0 to 33.9 in adult   • Obstructive sleep apnea   • Medicare annual wellness visit, initial       Advanced Care Planning:  ACP discussion was held with the patient during this visit. Patient has an advance directive in EMR which is still valid.     A face-to-face visit was completed today with patient.  Counseling explanation, and discussion of advanced directives was performed.   The last advanced care visit was performed in 2019.  In a near life ending situation, from which he is not expected to recover functionally, and he is not able to speak for himself, he does not want life sustaining measures. We discussed feeding tubes, ventilators and cardiac support as well as dialysis.    I spent less than 16 minutes discussing Advanced Care Planning information and documenting in the chart.    Review of Systems   Constitutional: Negative for activity change, appetite change, fatigue, fever and unexpected weight change.   HENT: Negative for congestion, hearing  "loss, rhinorrhea, sinus pain, sore throat, tinnitus, trouble swallowing and voice change.    Eyes: Negative for blurred vision and visual disturbance.   Respiratory: Negative for apnea, cough, shortness of breath and wheezing.    Cardiovascular: Negative for chest pain, palpitations, orthopnea and PND.   Gastrointestinal: Negative for abdominal pain, blood in stool, constipation, diarrhea, nausea and vomiting.   Endocrine: Negative for cold intolerance, heat intolerance, polydipsia and polyuria.   Genitourinary: Negative for difficulty urinating, dysuria, flank pain, frequency and hematuria.   Musculoskeletal: Negative for arthralgias, joint swelling and myalgias.   Skin: Negative for rash.   Allergic/Immunologic: Negative for environmental allergies and immunocompromised state.   Neurological: Negative for dizziness, syncope, weakness, numbness and headaches.   Hematological: Negative for adenopathy. Does not bruise/bleed easily.   Psychiatric/Behavioral: Negative for dysphoric mood and suicidal ideas. The patient is not nervous/anxious.        I have reviewed and confirmed the accuracy of the ROS as documented by the MA/LPN/RN Camryn Salinas MD    Compared to one year ago, the patient feels his physical health is the same.  Compared to one year ago, the patient feels his mental health is the same.    Reviewed chart for potential of high risk medication in the elderly: yes  Reviewed chart for potential of harmful drug interactions in the elderly:yes    Objective      Vitals:    06/10/20 0954 06/10/20 1032   BP: 158/90 140/80   BP Location: Right arm    Patient Position: Sitting    Cuff Size: Large Adult    Pulse: 51    Resp: 18    Temp: 99.5 °F (37.5 °C)    TempSrc: Oral    SpO2: 95%    Weight: 95.1 kg (209 lb 9.6 oz)    Height: 167.6 cm (66\")        Body mass index is 33.83 kg/m².  Discussed the patient's BMI with him. The BMI is above average; BMI management plan is completed.    Physical Exam "   Constitutional: He is oriented to person, place, and time. He appears well-developed and well-nourished. No distress.   HENT:   Head: Normocephalic.   Right Ear: Tympanic membrane and external ear normal.   Left Ear: Tympanic membrane and external ear normal.   Nose: No mucosal edema.   Mouth/Throat: No oral lesions. No oropharyngeal exudate.   Eyes: Pupils are equal, round, and reactive to light. Conjunctivae and EOM are normal. No scleral icterus.   Neck: Normal range of motion. Neck supple. No JVD present. No edema present. No thyromegaly present.   Cardiovascular: Normal rate, regular rhythm, normal heart sounds and intact distal pulses.  No extrasystoles are present. Exam reveals no gallop and no friction rub.   No murmur heard.  Pulses:       Carotid pulses are 2+ on the right side, and 2+ on the left side.       Femoral pulses are 2+ on the right side, and 2+ on the left side.       Dorsalis pedis pulses are 2+ on the right side, and 2+ on the left side.   Pulmonary/Chest: Effort normal. He has no decreased breath sounds.   Abdominal: Soft. Normal appearance and bowel sounds are normal. He exhibits no distension and no mass. There is no hepatosplenomegaly. There is no tenderness. There is no CVA tenderness. No hernia. Hernia confirmed negative in the right inguinal area and confirmed negative in the left inguinal area.   Genitourinary: Testes normal.   Musculoskeletal: Normal range of motion.    Fransico had a diabetic foot exam performed today.   During the foot exam he had a monofilament test performed.  Lymphadenopathy:        Head (right side): No submandibular adenopathy present.        Head (left side): No submandibular adenopathy present.     He has no cervical adenopathy.     He has no axillary adenopathy.        Right: No inguinal and no supraclavicular adenopathy present.        Left: No inguinal and no supraclavicular adenopathy present.   Neurological: He is alert and oriented to person, place, and  time. He has normal strength and normal reflexes. He is not disoriented. No cranial nerve deficit or sensory deficit. He exhibits normal muscle tone. Coordination normal.   Skin: Skin is warm, dry and intact. Turgor is normal. No ecchymosis and no rash noted. No cyanosis or erythema.   Psychiatric: He has a normal mood and affect. His speech is normal. Judgment and thought content normal. Cognition and memory are normal.       Assessment/Plan   Medicare Risks and Personalized Health Plan  CMS Preventative Services Quick Reference  Cardiovascular risk  Obesity/Overweight     The above risks/problems have been discussed with the patient.  Pertinent information has been shared with the patient in the After Visit Summary.  Follow up plans and orders are seen below in the Assessment/Plan Section.    Problem List Items Addressed This Visit        High    Hypertension, benign    Overview     Marginal control, will add, Cardura and follow.          Current Assessment & Plan     Hypertension is unchanged.  Continue current treatment regimen.  Blood pressure will be reassessed in 3 months.         Relevant Medications    doxazosin (CARDURA) 1 MG tablet    Other Relevant Orders    CBC & Differential    Comprehensive Metabolic Panel    TSH    Type 2 diabetes mellitus without complication, without long-term current use of insulin (CMS/[facility])    Overview     A1c 8.2 06/10/2020 improved marginally increase metformin to bid.   A1c 8.4 03/03/2020 begin metformin and follow.   A1c 7.2 10/11/2020  A1c 6.2 08/20/2019,   New onset 04/29/2019, A1c 7.2             Current Assessment & Plan     Diabetes is unchanged.   Dietary recommendations for ADA diet.  Regular aerobic exercise.  Discussed foot care.  Medication changes per orders.  Diabetes will be reassessed in 3 months.         Relevant Medications    metFORMIN (GLUCOPHAGE) 500 MG tablet    Other Relevant Orders    Microalbumin / Creatinine Urine Ratio - Urine, Clean Catch    POCT  Glucose (Completed)    POC Glycosylated Hemoglobin (Hb A1C) (Completed)       Medium    Gastroesophageal reflux disease without esophagitis    Overview     Stable on prn meds, with slight improvement with wt loss         Mixed hyperlipidemia    Overview     Stable, compliant with meds         Current Assessment & Plan     Lipid abnormalities are improving with treatment.  Pharmacotherapy as ordered.  Lipids will be reassessed in 3 months.         Relevant Orders    Lipid Panel With / Chol / HDL Ratio    Allergic rhinitis due to pollen    Overview     on Immuno Rx. No Recent exacerbation  His sxs are chronic and significant   He has an ENT apt for consideration of more sinus surgery.            Relevant Medications    Allergy Serum Injection (Completed)    Allergy Serum Injection (Completed)       Low    Obstructive sleep apnea    Overview     Improved on CPAP            Unprioritized    Encounter for prostate cancer screening    Relevant Orders    PSA Screen    Screening for malignant neoplasm of the rectum    Overview     Last Colonoscopy 10/26/2009. Accepted Cologuard Medicare annual wellness visit, initial - Primary    Overview     Diet exercise and wt loss encouraged. Methods and diets discussed. Seat belts, sunscreens, and general safety discussed.   Previous lab discussed, we will notify him of today's lab.          Relevant Orders    POCT urinalysis dipstick, manual (Completed)      Other Visit Diagnoses     Need for hepatitis C screening test        Relevant Orders    Hepatitis C antibody        Follow Up:  No follow-ups on file.     An After Visit Summary and PPPS were given to the patient.

## 2020-06-11 DIAGNOSIS — E78.2 MIXED HYPERLIPIDEMIA: Primary | ICD-10-CM

## 2020-06-11 LAB
ALBUMIN SERPL-MCNC: 4.3 G/DL (ref 3.8–4.8)
ALBUMIN/CREAT UR: 60 MG/G CREAT (ref 0–29)
ALBUMIN/GLOB SERPL: 1.5 {RATIO} (ref 1.2–2.2)
ALP SERPL-CCNC: 58 IU/L (ref 39–117)
ALT SERPL-CCNC: 52 IU/L (ref 0–44)
AST SERPL-CCNC: 38 IU/L (ref 0–40)
BASOPHILS # BLD AUTO: 0 X10E3/UL (ref 0–0.2)
BASOPHILS NFR BLD AUTO: 1 %
BILIRUB SERPL-MCNC: 0.3 MG/DL (ref 0–1.2)
BUN SERPL-MCNC: 18 MG/DL (ref 8–27)
BUN/CREAT SERPL: 20 (ref 10–24)
CALCIUM SERPL-MCNC: 9.9 MG/DL (ref 8.6–10.2)
CHLORIDE SERPL-SCNC: 102 MMOL/L (ref 96–106)
CHOLEST SERPL-MCNC: 206 MG/DL (ref 100–199)
CHOLEST/HDLC SERPL: 4.8 RATIO (ref 0–5)
CO2 SERPL-SCNC: 23 MMOL/L (ref 20–29)
CREAT SERPL-MCNC: 0.9 MG/DL (ref 0.76–1.27)
CREAT UR-MCNC: 111.5 MG/DL
EOSINOPHIL # BLD AUTO: 0.2 X10E3/UL (ref 0–0.4)
EOSINOPHIL NFR BLD AUTO: 4 %
ERYTHROCYTE [DISTWIDTH] IN BLOOD BY AUTOMATED COUNT: 13.2 % (ref 11.6–15.4)
GLOBULIN SER CALC-MCNC: 2.9 G/DL (ref 1.5–4.5)
GLUCOSE SERPL-MCNC: 178 MG/DL (ref 65–99)
HCT VFR BLD AUTO: 42 % (ref 37.5–51)
HCV AB S/CO SERPL IA: <0.1 S/CO RATIO (ref 0–0.9)
HDLC SERPL-MCNC: 43 MG/DL
HGB BLD-MCNC: 13.7 G/DL (ref 13–17.7)
IMM GRANULOCYTES # BLD AUTO: 0 X10E3/UL (ref 0–0.1)
IMM GRANULOCYTES NFR BLD AUTO: 1 %
LDLC SERPL CALC-MCNC: 126 MG/DL (ref 0–99)
LYMPHOCYTES # BLD AUTO: 1.3 X10E3/UL (ref 0.7–3.1)
LYMPHOCYTES NFR BLD AUTO: 26 %
MCH RBC QN AUTO: 27.2 PG (ref 26.6–33)
MCHC RBC AUTO-ENTMCNC: 32.6 G/DL (ref 31.5–35.7)
MCV RBC AUTO: 84 FL (ref 79–97)
MICROALBUMIN UR-MCNC: 67.4 UG/ML
MONOCYTES # BLD AUTO: 0.5 X10E3/UL (ref 0.1–0.9)
MONOCYTES NFR BLD AUTO: 9 %
NEUTROPHILS # BLD AUTO: 3.1 X10E3/UL (ref 1.4–7)
NEUTROPHILS NFR BLD AUTO: 59 %
PLATELET # BLD AUTO: 243 X10E3/UL (ref 150–450)
POTASSIUM SERPL-SCNC: 4.3 MMOL/L (ref 3.5–5.2)
PROT SERPL-MCNC: 7.2 G/DL (ref 6–8.5)
PSA SERPL-MCNC: 1.2 NG/ML (ref 0–4)
RBC # BLD AUTO: 5.03 X10E6/UL (ref 4.14–5.8)
SODIUM SERPL-SCNC: 138 MMOL/L (ref 134–144)
TRIGL SERPL-MCNC: 184 MG/DL (ref 0–149)
TSH SERPL DL<=0.005 MIU/L-ACNC: 2.5 UIU/ML (ref 0.45–4.5)
VLDLC SERPL CALC-MCNC: 37 MG/DL (ref 5–40)
WBC # BLD AUTO: 5.2 X10E3/UL (ref 3.4–10.8)

## 2020-06-11 RX ORDER — ATORVASTATIN CALCIUM 10 MG/1
10 TABLET, FILM COATED ORAL DAILY
Qty: 30 TABLET | Refills: 0 | Status: SHIPPED | OUTPATIENT
Start: 2020-06-11 | End: 2020-07-13

## 2020-06-23 ENCOUNTER — CLINICAL SUPPORT (OUTPATIENT)
Dept: FAMILY MEDICINE CLINIC | Facility: CLINIC | Age: 65
End: 2020-06-23

## 2020-06-23 DIAGNOSIS — J30.1 SEASONAL ALLERGIC RHINITIS DUE TO POLLEN: Primary | ICD-10-CM

## 2020-06-23 PROCEDURE — 95117 IMMUNOTHERAPY INJECTIONS: CPT | Performed by: FAMILY MEDICINE

## 2020-07-07 ENCOUNTER — CLINICAL SUPPORT (OUTPATIENT)
Dept: FAMILY MEDICINE CLINIC | Facility: CLINIC | Age: 65
End: 2020-07-07

## 2020-07-07 DIAGNOSIS — J30.1 SEASONAL ALLERGIC RHINITIS DUE TO POLLEN: Primary | ICD-10-CM

## 2020-07-07 PROCEDURE — 95117 IMMUNOTHERAPY INJECTIONS: CPT | Performed by: FAMILY MEDICINE

## 2020-07-13 DIAGNOSIS — E78.2 MIXED HYPERLIPIDEMIA: ICD-10-CM

## 2020-07-13 RX ORDER — ATORVASTATIN CALCIUM 10 MG/1
10 TABLET, FILM COATED ORAL DAILY
Qty: 30 TABLET | Refills: 12 | Status: SHIPPED | OUTPATIENT
Start: 2020-07-13 | End: 2021-08-13

## 2020-07-21 ENCOUNTER — CLINICAL SUPPORT (OUTPATIENT)
Dept: FAMILY MEDICINE CLINIC | Facility: CLINIC | Age: 65
End: 2020-07-21

## 2020-07-21 DIAGNOSIS — J30.1 SEASONAL ALLERGIC RHINITIS DUE TO POLLEN: Primary | ICD-10-CM

## 2020-07-21 PROCEDURE — 95117 IMMUNOTHERAPY INJECTIONS: CPT | Performed by: FAMILY MEDICINE

## 2020-07-26 ENCOUNTER — APPOINTMENT (OUTPATIENT)
Dept: CT IMAGING | Facility: HOSPITAL | Age: 65
End: 2020-07-26

## 2020-07-26 ENCOUNTER — HOSPITAL ENCOUNTER (EMERGENCY)
Facility: HOSPITAL | Age: 65
Discharge: HOME | End: 2020-07-26
Admitting: EMERGENCY MEDICINE

## 2020-07-26 VITALS
HEART RATE: 63 BPM | RESPIRATION RATE: 16 BRPM | DIASTOLIC BLOOD PRESSURE: 94 MMHG | HEIGHT: 66 IN | TEMPERATURE: 98.9 F | BODY MASS INDEX: 33.16 KG/M2 | WEIGHT: 206.35 LBS | SYSTOLIC BLOOD PRESSURE: 177 MMHG | OXYGEN SATURATION: 95 %

## 2020-07-26 DIAGNOSIS — N13.2 HYDRONEPHROSIS WITH RENAL CALCULOUS OBSTRUCTION: Primary | ICD-10-CM

## 2020-07-26 LAB
ALBUMIN SERPL-MCNC: 4.2 G/DL (ref 3.5–5.2)
ALBUMIN/GLOB SERPL: 1.6 G/DL
ALP SERPL-CCNC: 49 U/L (ref 39–117)
ALT SERPL W P-5'-P-CCNC: 49 U/L (ref 1–41)
ANION GAP SERPL CALCULATED.3IONS-SCNC: 16 MMOL/L (ref 5–15)
AST SERPL-CCNC: 41 U/L (ref 1–40)
BACTERIA UR QL AUTO: ABNORMAL /HPF
BASOPHILS # BLD AUTO: 0 10*3/MM3 (ref 0–0.2)
BASOPHILS NFR BLD AUTO: 0.4 % (ref 0–1.5)
BILIRUB SERPL-MCNC: 0.3 MG/DL (ref 0–1.2)
BILIRUB UR QL STRIP: NEGATIVE
BUN SERPL-MCNC: 23 MG/DL (ref 8–23)
BUN SERPL-MCNC: ABNORMAL MG/DL
BUN/CREAT SERPL: ABNORMAL
CALCIUM SPEC-SCNC: 9.3 MG/DL (ref 8.6–10.5)
CHLORIDE SERPL-SCNC: 100 MMOL/L (ref 98–107)
CLARITY UR: CLEAR
CO2 SERPL-SCNC: 22 MMOL/L (ref 22–29)
COLOR UR: YELLOW
CREAT SERPL-MCNC: 1.29 MG/DL (ref 0.76–1.27)
DEPRECATED RDW RBC AUTO: 40.3 FL (ref 37–54)
EOSINOPHIL # BLD AUTO: 0 10*3/MM3 (ref 0–0.4)
EOSINOPHIL NFR BLD AUTO: 0.4 % (ref 0.3–6.2)
ERYTHROCYTE [DISTWIDTH] IN BLOOD BY AUTOMATED COUNT: 13.8 % (ref 12.3–15.4)
GFR SERPL CREATININE-BSD FRML MDRD: 56 ML/MIN/1.73
GLOBULIN UR ELPH-MCNC: 2.7 GM/DL
GLUCOSE SERPL-MCNC: 197 MG/DL (ref 65–99)
GLUCOSE UR STRIP-MCNC: NEGATIVE MG/DL
HCT VFR BLD AUTO: 37.9 % (ref 37.5–51)
HGB BLD-MCNC: 12.7 G/DL (ref 13–17.7)
HGB UR QL STRIP.AUTO: ABNORMAL
HOLD SPECIMEN: NORMAL
HYALINE CASTS UR QL AUTO: ABNORMAL /LPF
KETONES UR QL STRIP: NEGATIVE
LEUKOCYTE ESTERASE UR QL STRIP.AUTO: NEGATIVE
LIPASE SERPL-CCNC: 33 U/L (ref 13–60)
LYMPHOCYTES # BLD AUTO: 0.6 10*3/MM3 (ref 0.7–3.1)
LYMPHOCYTES NFR BLD AUTO: 5.6 % (ref 19.6–45.3)
MCH RBC QN AUTO: 27.2 PG (ref 26.6–33)
MCHC RBC AUTO-ENTMCNC: 33.4 G/DL (ref 31.5–35.7)
MCV RBC AUTO: 81.3 FL (ref 79–97)
MONOCYTES # BLD AUTO: 0.7 10*3/MM3 (ref 0.1–0.9)
MONOCYTES NFR BLD AUTO: 7.4 % (ref 5–12)
NEUTROPHILS NFR BLD AUTO: 8.5 10*3/MM3 (ref 1.7–7)
NEUTROPHILS NFR BLD AUTO: 86.2 % (ref 42.7–76)
NITRITE UR QL STRIP: NEGATIVE
NRBC BLD AUTO-RTO: 0 /100 WBC (ref 0–0.2)
PH UR STRIP.AUTO: 6 [PH] (ref 5–8)
PLATELET # BLD AUTO: 241 10*3/MM3 (ref 140–450)
PMV BLD AUTO: 8 FL (ref 6–12)
POTASSIUM SERPL-SCNC: 4.4 MMOL/L (ref 3.5–5.2)
PROT SERPL-MCNC: 6.9 G/DL (ref 6–8.5)
PROT UR QL STRIP: NEGATIVE
RBC # BLD AUTO: 4.66 10*6/MM3 (ref 4.14–5.8)
RBC # UR: ABNORMAL /HPF
REF LAB TEST METHOD: ABNORMAL
SODIUM SERPL-SCNC: 138 MMOL/L (ref 136–145)
SP GR UR STRIP: 1.02 (ref 1–1.03)
SQUAMOUS #/AREA URNS HPF: ABNORMAL /HPF
UROBILINOGEN UR QL STRIP: ABNORMAL
WBC # BLD AUTO: 9.9 10*3/MM3 (ref 3.4–10.8)
WBC UR QL AUTO: ABNORMAL /HPF
WHOLE BLOOD HOLD SPECIMEN: NORMAL

## 2020-07-26 PROCEDURE — 85025 COMPLETE CBC W/AUTO DIFF WBC: CPT | Performed by: NURSE PRACTITIONER

## 2020-07-26 PROCEDURE — 96374 THER/PROPH/DIAG INJ IV PUSH: CPT

## 2020-07-26 PROCEDURE — 99283 EMERGENCY DEPT VISIT LOW MDM: CPT

## 2020-07-26 PROCEDURE — 81001 URINALYSIS AUTO W/SCOPE: CPT | Performed by: NURSE PRACTITIONER

## 2020-07-26 PROCEDURE — 83690 ASSAY OF LIPASE: CPT | Performed by: NURSE PRACTITIONER

## 2020-07-26 PROCEDURE — 80053 COMPREHEN METABOLIC PANEL: CPT | Performed by: NURSE PRACTITIONER

## 2020-07-26 PROCEDURE — 25010000002 KETOROLAC TROMETHAMINE PER 15 MG: Performed by: NURSE PRACTITIONER

## 2020-07-26 PROCEDURE — 74176 CT ABD & PELVIS W/O CONTRAST: CPT

## 2020-07-26 RX ORDER — SODIUM CHLORIDE 0.9 % (FLUSH) 0.9 %
10 SYRINGE (ML) INJECTION AS NEEDED
Status: DISCONTINUED | OUTPATIENT
Start: 2020-07-26 | End: 2020-07-26 | Stop reason: HOSPADM

## 2020-07-26 RX ORDER — HYDROCODONE BITARTRATE AND ACETAMINOPHEN 5; 325 MG/1; MG/1
1 TABLET ORAL EVERY 6 HOURS PRN
Qty: 12 TABLET | Refills: 0 | Status: SHIPPED | OUTPATIENT
Start: 2020-07-26 | End: 2020-07-29

## 2020-07-26 RX ORDER — KETOROLAC TROMETHAMINE 15 MG/ML
15 INJECTION, SOLUTION INTRAMUSCULAR; INTRAVENOUS ONCE
Status: COMPLETED | OUTPATIENT
Start: 2020-07-26 | End: 2020-07-26

## 2020-07-26 RX ORDER — ONDANSETRON HYDROCHLORIDE 8 MG/1
8 TABLET, FILM COATED ORAL EVERY 8 HOURS PRN
Qty: 9 TABLET | Refills: 0 | Status: SHIPPED | OUTPATIENT
Start: 2020-07-26 | End: 2020-07-29

## 2020-07-26 RX ADMIN — SODIUM CHLORIDE 1000 ML: 0.9 INJECTION, SOLUTION INTRAVENOUS at 13:58

## 2020-07-26 RX ADMIN — KETOROLAC TROMETHAMINE 15 MG: 15 INJECTION, SOLUTION INTRAMUSCULAR; INTRAVENOUS at 11:58

## 2020-07-26 NOTE — DISCHARGE INSTRUCTIONS
You may use the Norco and ondansetron for pain and nausea.  Increase your water intake over the next 24 hours.  Call first thing in the morning to Dr. Borrego's office to schedule an appointment for follow-up of your kidney stone.  Return to the emergency department for any new or worsening symptoms.

## 2020-07-26 NOTE — ED PROVIDER NOTES
Subjective   History:  65-year-old male presents emergency department today with complaints of abdominal pain and left flank pain present since last night.  Patient reports he feels like he has to have a bowel movement but cannot, he did have a large bowel movement this morning that was normal and since then has felt like he needed to go.  Patient does have a history of hernia surgery in the remote past.  Denies dysuria, hematuria, nausea vomiting, diarrhea.  Reports he did have some blood in his stool this morning.  Does have a history of hemorrhoids.    Onset: Yesterday  Location: Abdominal, left flank  Duration: Continuous  Character: Sharp  Aggravating/Alleviating factors: Exacerbated with movement, no identified alleviating factors  Radiation: Left flank radiates to the left pelvis  Severity: Moderate            Review of Systems   Constitutional: Negative for appetite change, chills, fatigue and fever.   HENT: Negative for congestion, facial swelling, sinus pain and sore throat.    Eyes: Negative for pain and visual disturbance.   Respiratory: Negative for cough, chest tightness and shortness of breath.    Cardiovascular: Negative for chest pain and palpitations.   Gastrointestinal: Positive for abdominal pain and blood in stool. Negative for constipation, diarrhea, nausea and vomiting.   Genitourinary: Negative for dysuria, flank pain, frequency and urgency.   Musculoskeletal: Negative for arthralgias, joint swelling and neck pain.   Skin: Negative for color change and rash.   Neurological: Negative for dizziness, seizures, syncope, weakness, light-headedness and headaches.       Past Medical History:   Diagnosis Date   • Anxiety    • Essential hypertension, benign    • Gastroesophageal reflux disease without esophagitis    • Mixed hyperlipidemia    • Seasonal allergic rhinitis due to pollen    • Type 2 diabetes mellitus without complication, without long-term current use of insulin (CMS/[facility])        No Known  Allergies    Past Surgical History:   Procedure Laterality Date   • INGUINAL HERNIA REPAIR Left 10/2009   • NOSE SURGERY      x2   • UMBILICAL HERNIA REPAIR  10/2009    Dr. Napier       Family History   Problem Relation Age of Onset   • Heart disease Mother    • Uterine cancer Mother    • Thyroid disease Mother    • Heart disease Father    • Stroke Father    • COPD Brother    • Diabetes Brother    • Breast cancer Maternal Aunt    • Breast cancer Maternal Grandmother        Social History     Socioeconomic History   • Marital status: Single     Spouse name: Not on file   • Number of children: Not on file   • Years of education: Not on file   • Highest education level: Not on file   Tobacco Use   • Smoking status: Never Smoker   • Smokeless tobacco: Never Used   Substance and Sexual Activity   • Alcohol use: No     Frequency: Never   • Drug use: No   • Sexual activity: Defer           Objective   Physical Exam   Constitutional: He is oriented to person, place, and time. He appears well-developed and well-nourished.   HENT:   Head: Normocephalic and atraumatic.   Eyes: Pupils are equal, round, and reactive to light. EOM are normal.   Neck: Normal range of motion. Neck supple.   Cardiovascular: Normal rate, regular rhythm, normal heart sounds and intact distal pulses.   Pulmonary/Chest: Effort normal and breath sounds normal.   Abdominal: Soft. Bowel sounds are normal. He exhibits distension. He exhibits no mass. There is generalized tenderness. There is guarding. There is no rebound. A hernia is present.   Genitourinary: Rectal exam shows external hemorrhoid and guaiac positive stool. Rectal exam shows anal tone normal.   Musculoskeletal: Normal range of motion.   Neurological: He is alert and oriented to person, place, and time.   Skin: Skin is warm and dry. Capillary refill takes less than 2 seconds. No rash noted.   Psychiatric: He has a normal mood and affect. His behavior is normal. Judgment and thought content  normal.       Procedures           ED Course      Medications   sodium chloride 0.9 % flush 10 mL (has no administration in time range)   ketorolac (TORADOL) injection 15 mg (15 mg Intravenous Given 7/26/20 1158)   sodium chloride 0.9 % bolus 1,000 mL (1,000 mL Intravenous New Bag 7/26/20 1358)     Labs Reviewed   COMPREHENSIVE METABOLIC PANEL - Abnormal; Notable for the following components:       Result Value    Glucose 197 (*)     Creatinine 1.29 (*)     ALT (SGPT) 49 (*)     AST (SGOT) 41 (*)     eGFR Non  Amer 56 (*)     Anion Gap 16.0 (*)     All other components within normal limits    Narrative:     GFR Normal >60  Chronic Kidney Disease <60  Kidney Failure <15     URINALYSIS W/ CULTURE IF INDICATED - Abnormal; Notable for the following components:    Blood, UA Small (1+) (*)     All other components within normal limits   CBC WITH AUTO DIFFERENTIAL - Abnormal; Notable for the following components:    Hemoglobin 12.7 (*)     Neutrophil % 86.2 (*)     Lymphocyte % 5.6 (*)     Neutrophils, Absolute 8.50 (*)     Lymphocytes, Absolute 0.60 (*)     All other components within normal limits   URINALYSIS, MICROSCOPIC ONLY - Abnormal; Notable for the following components:    RBC, UA 3-5 (*)     All other components within normal limits   LIPASE - Normal   BUN - Normal   CBC AND DIFFERENTIAL    Narrative:     The following orders were created for panel order CBC & Differential.  Procedure                               Abnormality         Status                     ---------                               -----------         ------                     CBC Auto Differential[[id number]]        Abnormal            Final result                 Please view results for these tests on the individual orders.   EXTRA TUBES    Narrative:     The following orders were created for panel order Extra Tubes.  Procedure                               Abnormality         Status                     ---------                                -----------         ------                     Light Blue Top[[id number]]                                   Final result               Gold Top - [facility][[id number]]                                   Final result                 Please view results for these tests on the individual orders.   LIGHT BLUE TOP   [facility] [place] [facility]     CT Abdomen Pelvis Without Contrast   Final Result       1. 4 mm obstructing stone distal left ureter 1 cm proximal to the left   UVJ. This causes mild to moderate obstructing uropathy.   2. Gallstones without biliary dilatation.   3. Other incidental findings are detailed above.       Electronically Signed By-Hector Ponce DO. On:7/26/2020 1:17 PM   This report was finalized on [id number] by  Hector Ponce DO..                                                MDM  Number of Diagnoses or Management Options  Hydronephrosis with renal calculous obstruction:   Diagnosis management comments: I examined the patient using the appropriate personal protective equipment.      DISPOSITION:   Chart Review:  Comorbidity:  has a past medical history of Anxiety, Essential hypertension, benign, Gastroesophageal reflux disease without esophagitis, Mixed hyperlipidemia, Seasonal allergic rhinitis due to pollen, and Type 2 diabetes mellitus without complication, without long-term current use of insulin (CMS/[facility]).  Labs: UA positive for small amount of blood, creatinine 1.29, hemoglobin 12.7, WBC 9.9    Imaging: Was interpreted by physician and reviewed by myself:  Ct Abdomen Pelvis Without Contrast    Result Date: 7/26/2020   1. 4 mm obstructing stone distal left ureter 1 cm proximal to the left UVJ. This causes mild to moderate obstructing uropathy. 2. Gallstones without biliary dilatation. 3. Other incidental findings are detailed above.  Electronically Signed By-Hector Ponce DO. On:7/26/2020 1:17 PM This report was finalized on [id number] by  Hector Ponce  DO..      Disposition/Treatment:  65-year-old male presented to emergency department today with complaints of flank pain and abdominal pain that has been present since yesterday.  IV was established and labs were obtained and creatinine was found to be mildly elevated, otherwise unremarkable.  Patient did have a small amount of blood in the urine.  CT abdomen pelvis was positive for 4 mm obstructive stone proximal to the left UP junction.  Rectal exam was performed and patient was found to have a bleeding hemorrhoid which likely is accounting for his sensation of need for defecation.  Patient was given Toradol in the emergency department and this largely relieved his pain.  Discussed patient findings with urology and they would like for him to follow-up with them tomorrow.  Patient will be discharged home in stable condition with prescription for pain medication and ondansetron to use as needed and instructions to follow-up with urology tomorrow.  Patient verbalized understanding and is in agreement with plan.  Will return to the emergency department for any new or worsening symptoms.         Amount and/or Complexity of Data Reviewed  Clinical lab tests: reviewed        Final diagnoses:   Hydronephrosis with renal calculous obstruction            Malina Esparza, APRN  07/26/20 1537

## 2020-08-04 ENCOUNTER — CLINICAL SUPPORT (OUTPATIENT)
Dept: FAMILY MEDICINE CLINIC | Facility: CLINIC | Age: 65
End: 2020-08-04

## 2020-08-04 DIAGNOSIS — J30.1 SEASONAL ALLERGIC RHINITIS DUE TO POLLEN: Primary | ICD-10-CM

## 2020-08-04 PROCEDURE — 95117 IMMUNOTHERAPY INJECTIONS: CPT | Performed by: FAMILY MEDICINE

## 2020-08-07 RX ORDER — CITALOPRAM 20 MG/1
20 TABLET ORAL DAILY
Qty: 30 TABLET | Refills: 12 | Status: SHIPPED | OUTPATIENT
Start: 2020-08-07 | End: 2021-08-13

## 2020-08-08 ENCOUNTER — LAB (OUTPATIENT)
Dept: LAB | Facility: HOSPITAL | Age: 65
End: 2020-08-08

## 2020-08-08 ENCOUNTER — TRANSCRIBE ORDERS (OUTPATIENT)
Dept: ADMINISTRATIVE | Facility: HOSPITAL | Age: 65
End: 2020-08-08

## 2020-08-08 ENCOUNTER — HOSPITAL ENCOUNTER (OUTPATIENT)
Dept: CARDIOLOGY | Facility: HOSPITAL | Age: 65
Discharge: HOME | End: 2020-08-08
Admitting: UROLOGY

## 2020-08-08 DIAGNOSIS — N20.0 RENAL STONES: ICD-10-CM

## 2020-08-08 DIAGNOSIS — N20.0 RENAL STONES: Primary | ICD-10-CM

## 2020-08-08 LAB
ANION GAP SERPL CALCULATED.3IONS-SCNC: 11.6 MMOL/L (ref 5–15)
BASOPHILS # BLD AUTO: 0.07 10*3/MM3 (ref 0–0.2)
BASOPHILS NFR BLD AUTO: 1 % (ref 0–1.5)
BUN SERPL-MCNC: 22 MG/DL (ref 8–23)
BUN/CREAT SERPL: 19.6 (ref 7–25)
CALCIUM SPEC-SCNC: 9.8 MG/DL (ref 8.6–10.5)
CHLORIDE SERPL-SCNC: 100 MMOL/L (ref 98–107)
CO2 SERPL-SCNC: 25.4 MMOL/L (ref 22–29)
CREAT SERPL-MCNC: 1.12 MG/DL (ref 0.76–1.27)
DEPRECATED RDW RBC AUTO: 37.6 FL (ref 37–54)
EOSINOPHIL # BLD AUTO: 0.4 10*3/MM3 (ref 0–0.4)
EOSINOPHIL NFR BLD AUTO: 5.7 % (ref 0.3–6.2)
ERYTHROCYTE [DISTWIDTH] IN BLOOD BY AUTOMATED COUNT: 12.6 % (ref 12.3–15.4)
GFR SERPL CREATININE-BSD FRML MDRD: 66 ML/MIN/1.73
GLUCOSE SERPL-MCNC: 151 MG/DL (ref 65–99)
HCT VFR BLD AUTO: 35.7 % (ref 37.5–51)
HGB BLD-MCNC: 11.4 G/DL (ref 13–17.7)
IMM GRANULOCYTES # BLD AUTO: 0.06 10*3/MM3 (ref 0–0.05)
IMM GRANULOCYTES NFR BLD AUTO: 0.9 % (ref 0–0.5)
LYMPHOCYTES # BLD AUTO: 1.2 10*3/MM3 (ref 0.7–3.1)
LYMPHOCYTES NFR BLD AUTO: 17.2 % (ref 19.6–45.3)
MCH RBC QN AUTO: 26.1 PG (ref 26.6–33)
MCHC RBC AUTO-ENTMCNC: 31.9 G/DL (ref 31.5–35.7)
MCV RBC AUTO: 81.9 FL (ref 79–97)
MONOCYTES # BLD AUTO: 0.63 10*3/MM3 (ref 0.1–0.9)
MONOCYTES NFR BLD AUTO: 9 % (ref 5–12)
NEUTROPHILS NFR BLD AUTO: 4.63 10*3/MM3 (ref 1.7–7)
NEUTROPHILS NFR BLD AUTO: 66.2 % (ref 42.7–76)
NRBC BLD AUTO-RTO: 0 /100 WBC (ref 0–0.2)
PLATELET # BLD AUTO: 399 10*3/MM3 (ref 140–450)
PMV BLD AUTO: 10 FL (ref 6–12)
POTASSIUM SERPL-SCNC: 3.8 MMOL/L (ref 3.5–5.2)
RBC # BLD AUTO: 4.36 10*6/MM3 (ref 4.14–5.8)
SODIUM SERPL-SCNC: 137 MMOL/L (ref 136–145)
WBC # BLD AUTO: 6.99 10*3/MM3 (ref 3.4–10.8)

## 2020-08-08 PROCEDURE — 93005 ELECTROCARDIOGRAM TRACING: CPT | Performed by: UROLOGY

## 2020-08-08 PROCEDURE — 80048 BASIC METABOLIC PNL TOTAL CA: CPT

## 2020-08-08 PROCEDURE — 36415 COLL VENOUS BLD VENIPUNCTURE: CPT

## 2020-08-08 PROCEDURE — 85025 COMPLETE CBC W/AUTO DIFF WBC: CPT

## 2020-08-09 PROCEDURE — 93010 ELECTROCARDIOGRAM REPORT: CPT | Performed by: INTERNAL MEDICINE

## 2020-08-18 ENCOUNTER — CLINICAL SUPPORT (OUTPATIENT)
Dept: FAMILY MEDICINE CLINIC | Facility: CLINIC | Age: 65
End: 2020-08-18

## 2020-08-18 DIAGNOSIS — J30.1 SEASONAL ALLERGIC RHINITIS DUE TO POLLEN: Primary | ICD-10-CM

## 2020-08-18 PROCEDURE — 95117 IMMUNOTHERAPY INJECTIONS: CPT | Performed by: FAMILY MEDICINE

## 2020-09-01 ENCOUNTER — CLINICAL SUPPORT (OUTPATIENT)
Dept: FAMILY MEDICINE CLINIC | Facility: CLINIC | Age: 65
End: 2020-09-01

## 2020-09-01 DIAGNOSIS — J30.1 SEASONAL ALLERGIC RHINITIS DUE TO POLLEN: Primary | ICD-10-CM

## 2020-09-01 PROCEDURE — 95117 IMMUNOTHERAPY INJECTIONS: CPT | Performed by: FAMILY MEDICINE

## 2020-09-15 NOTE — PROGRESS NOTES
Subjective   Fransico Cuenca is a 65 y.o. male.     Chief Complaint   Patient presents with   • Diabetes   • Hyperlipidemia   • Hypertension   • skin tags       Lesion:   Fransico Cuenca is here today for a lesion. The lesion appeared gradual and has been occurring for 6 months. The course has been increasing in size and are very pruritic . The lesion is characterized as no sign of infection. The lesions are located over left lateral  neck and right eybrow. . The symptoms have been associated with recent enlargement and recurrent physical trama due to location.     Fransico is scheduled to see Dr. Black- ENT for nasal passageway obstruction on 09/28/2020.    He was hospitalized in 07.31/2020 for a left kidney stone. It required lithotripsy and stone removal. He has done well since.     Diabetes  He presents for his follow-up diabetic visit. He has type 2 diabetes mellitus. His disease course has been stable. There are no hypoglycemic associated symptoms. Pertinent negatives for hypoglycemia include no dizziness, headaches or sweats. Pertinent negatives for diabetes include no chest pain, no fatigue, no polydipsia, no polyuria, no weakness and no weight loss. There are no hypoglycemic complications. Risk factors for coronary artery disease include diabetes mellitus, dyslipidemia, hypertension, male sex, obesity and family history. Current diabetic treatment includes oral agent (monotherapy). Meal planning includes avoidance of concentrated sweets. He participates in exercise daily. (Does not check blood sugars at home.) An ACE inhibitor/angiotensin II receptor blocker is being taken. He does not see a podiatrist.Eye exam is current (08/2020 [facility]).   Hypertension  This is a chronic problem. The current episode started more than 1 year ago. The problem is uncontrolled. Pertinent negatives include no chest pain, headaches, orthopnea, palpitations, peripheral edema, PND, shortness of breath or sweats. Risk factors for  coronary artery disease include diabetes mellitus, dyslipidemia, family history, male gender and obesity. Current antihypertension treatment includes calcium channel blockers, ACE inhibitors, diuretics and alpha 1 blockers. The current treatment provides moderate improvement.   Hyperlipidemia  This is a chronic problem. The current episode started more than 1 year ago. The problem is uncontrolled. Recent lipid tests were reviewed and are high. Exacerbating diseases include diabetes and obesity. Pertinent negatives include no chest pain, myalgias or shortness of breath. Current antihyperlipidemic treatment includes statins. The current treatment provides moderate improvement of lipids. Risk factors for coronary artery disease include diabetes mellitus, dyslipidemia, hypertension, male sex, obesity and family history.        The following portions of the patient's history were reviewed and updated as appropriate: allergies, current medications, past family history, past medical history, past social history, past surgical history and problem list.    Allergies:  No Known Allergies    Social History:  Social History     Socioeconomic History   • Marital status: Single     Spouse name: Not on file   • Number of children: Not on file   • Years of education: Not on file   • Highest education level: Not on file   Tobacco Use   • Smoking status: Never Smoker   • Smokeless tobacco: Never Used   Substance and Sexual Activity   • Alcohol use: No     Frequency: Never   • Drug use: No   • Sexual activity: Defer       Family History:  Family History   Problem Relation Age of Onset   • Heart disease Mother    • Uterine cancer Mother    • Thyroid disease Mother    • Heart disease Father    • Stroke Father    • COPD Brother    • Diabetes Brother    • Breast cancer Maternal Aunt    • Breast cancer Maternal Grandmother        Past Medical History :  Patient Active Problem List   Diagnosis   • Anxiety   • Gastroesophageal reflux disease  without esophagitis   • Hypertension, benign   • Mixed hyperlipidemia   • Allergic rhinitis due to pollen   • Type 2 diabetes mellitus without complication, without long-term current use of insulin (CMS/[facility])   • Encounter for prostate cancer screening   • Screening for malignant neoplasm of the rectum   • Family history of ischemic heart disease   • Class 1 obesity due to excess calories with serious comorbidity and body mass index (BMI) of 34.0 to 34.9 in adult   • Obstructive sleep apnea   • Medicare annual wellness visit, initial   • History of kidney stones       Medication List:  Outpatient Encounter Medications as of 9/16/2020   Medication Sig Dispense Refill   • amLODIPine (NORVASC) 5 MG tablet Take 1 tablet by mouth Daily. 30 tablet 12   • aspirin (ASPIRIN ADULT LOW DOSE) 81 MG EC tablet Take 1 tablet by mouth Daily.     • atorvastatin (LIPITOR) 10 MG tablet Take 1 tablet by mouth Daily. 30 tablet 12   • azelastine (ASTELIN) 0.1 % nasal spray 1 spray into the nostril(s) as directed by provider 2 (Two) Times a Day.     • Carboxymethylcellul-Glycerin (LUBRICATING EYE DROPS) 0.5-0.9 % solution Apply 1 drop to eye(s) as directed by provider Daily.     • citalopram (CeleXA) 20 MG tablet Take 1 tablet by mouth Daily. 30 tablet 12   • doxazosin (CARDURA) 1 MG tablet Take 1 tablet by mouth Daily. 30 tablet 12   • fenofibrate 160 MG tablet Take 1 tablet by mouth Daily. 30 tablet 12   • fluticasone (FLONASE ALLERGY RELIEF) 50 MCG/ACT nasal spray 2 sprays into the nostril(s) as directed by provider Daily.     • lisinopril-hydrochlorothiazide (PRINZIDE,ZESTORETIC) 20-12.5 MG per tablet Take 1 tablet by mouth Daily. 90 tablet 3   • metFORMIN (GLUCOPHAGE) 500 MG tablet Take 1 tablet by mouth 2 (Two) Times a Day With Meals. For diabetes 60 tablet 12   • Multiple Vitamins-Minerals (MULTIVITAMIN ADULT PO) Take 1 tablet by mouth Daily.     • Omega-3 Fatty Acids (FISH OIL) 1000 MG capsule delayed-release Take 2 capsules by  mouth 2 (Two) Times a Day.     • montelukast (SINGULAIR) 10 MG tablet Take 1 tablet by mouth Daily. 30 tablet 12   • [DISCONTINUED] montelukast (SINGULAIR) 10 MG tablet Take 1 tablet by mouth Daily.       Facility-Administered Encounter Medications as of 9/16/2020   Medication Dose Route Frequency Provider Last Rate Last Dose   • Allergy Serum Injection  0.1 mL Subcutaneous Once BradCamryn MD       • [COMPLETED] Allergy Serum Injection  0.5 mL Subcutaneous Once BradCamryn MD   0.5 mL at 09/16/20 0956   • [COMPLETED] Allergy Serum Injection  0.5 mL Subcutaneous Once Brad, Camryn GOLD MD   0.5 mL at 09/16/20 0955       Past Surgical History:  Past Surgical History:   Procedure Laterality Date   • INGUINAL HERNIA REPAIR Left 10/2009   • NOSE SURGERY      x2   • UMBILICAL HERNIA REPAIR  10/2009    Dr. Napier       Review of Systems:  Review of Systems   Constitutional: Negative for appetite change, fatigue, fever, unexpected weight gain and unexpected weight loss.   HENT: Positive for congestion and sinus pressure ( chronic). Negative for ear pain, sore throat and tinnitus.    Eyes: Negative for visual disturbance.   Respiratory: Negative for cough, shortness of breath and wheezing.    Cardiovascular: Negative for chest pain, palpitations, orthopnea, leg swelling and PND.   Gastrointestinal: Negative for abdominal pain, constipation, diarrhea, nausea and vomiting.   Endocrine: Negative.  Negative for cold intolerance, heat intolerance, polydipsia and polyuria.   Genitourinary: Negative for dysuria, frequency and hematuria.        No flank pain.    Musculoskeletal: Negative for arthralgias, joint swelling and myalgias.   Skin: Positive for skin lesions (as above ). Negative for rash and bruise.   Allergic/Immunologic: Negative for environmental allergies.   Neurological: Negative for dizziness, syncope, weakness and headache.   Hematological: Negative for adenopathy. Does not bruise/bleed easily.  "  Psychiatric/Behavioral: Negative for depressed mood.       I have reviewed and confirmed the accuracy of the HPI and ROS as documented by the MA/LPN/RN Camryn Salinas MD    Vital Signs:  Visit Vitals  /80   Pulse 68   Temp 97.5 °F (36.4 °C) (Temporal)   Resp 18   Ht 165.1 cm (65\")   Wt 93.5 kg (206 lb 3.2 oz)   SpO2 98% Comment: room air   BMI 34.31 kg/m²       Physical Exam  Vitals signs reviewed.   Constitutional:       General: He is not in acute distress.     Appearance: He is well-developed.   Eyes:      Conjunctiva/sclera: Conjunctivae normal.   Neck:      Musculoskeletal: Neck supple.      Thyroid: No thyromegaly.      Vascular: No JVD.   Cardiovascular:      Rate and Rhythm: Normal rate and regular rhythm.      Heart sounds: Normal heart sounds. No murmur. No friction rub. No gallop.    Pulmonary:      Effort: Pulmonary effort is normal. No respiratory distress.      Breath sounds: Normal breath sounds. No wheezing or rales.   Lymphadenopathy:      Cervical: No cervical adenopathy.   Skin:     General: Skin is warm.      Findings: Lesion (multiple see procedure note. ) present. No erythema or rash.      Comments: He has a numerous lesions see procedure note.    Neurological:      Mental Status: He is alert and oriented to person, place, and time.      Coordination: Coordination normal.      Deep Tendon Reflexes: Reflexes normal.     Skin Biopsy    Date/Time: 9/16/2020 8:52 PM  Performed by: Camryn Salinas MD  Authorized by: Camryn Salinas MD           Procedure:  A shave biopsy was performed. The total number of sites biopsied: 4.   Head/neck location: There is a 6 mm oval lesion over the distal right eye brow. Brown dry and irregular The lesion was cleansed with betadine anesthesized w2% lidocaine. Under sterile conditions the lesion was shaved. Specimen was sent for pathology. Bleeding was minimal.   Head/neck location: left anterior mid chest wall shows a 6 mm oval irregular dry scaly " lesion consisitent with a SK. The lesion was cleansed with betadine and anesthesized w 2% lidocaine. Understerile conditions the lesion was shaved and sent to pathology. Bleeding was minima.  Head/neck location: right mid anterior chest wall consistent with an SK. Lesion cleansed with betadine and anesthesized with 2% lidocaine. Lesion shaved and sent for pathology. Bleeding minimal.   Head/neck location: The mid back shows a 5 mm black irregular lesion  The lesion was cleased w betadine and anesthesized with 2% lidocaine. Lesion shaved and sent for pathology. Bleeding minimal. Cautery.   The procedure was tolerated well with no immediate complications.       Assessment and Plan:  Problem List Items Addressed This Visit        High    Hypertension, benign    Overview     Marginal control, will add, Cardura and follow.          Current Assessment & Plan     Hypertension is improving with treatment.  Continue current treatment regimen.  Dietary sodium restriction.  Weight loss.  Regular aerobic exercise.  Blood pressure will be reassessed in 3 months.         Type 2 diabetes mellitus without complication, without long-term current use of insulin (CMS/[facility]) - Primary    Overview     A1c 7.4 09/16/2020 improved with increased metformin  A1c 8.2 06/10/2020   A1c 8.4 03/03/2020   A1c 7.2 10/11/2020  A1c 6.2 08/20/2019,   New onset 04/29/2019, A1c 7.2             Current Assessment & Plan     Diabetes is improving with treatment.   Continue current treatment regimen.  Diabetes will be reassessed in 3 months.         Relevant Orders    POCT urinalysis dipstick, manual (Completed)    POCT Glucose (Completed)    POC Glycosylated Hemoglobin (Hb A1C) (Completed)       Medium    Mixed hyperlipidemia    Overview     Stable, compliant with meds         Current Assessment & Plan     Lipid abnormalities are improving with treatment.  Nutritional counseling was provided. and Pharmacotherapy as ordered.  Lipids will be reassessed in 3  months.         Allergic rhinitis due to pollen    Overview     on Immuno Rx.  He has an ENT apt for consideration of more sinus surgery.               Low    Class 1 obesity due to excess calories with serious comorbidity and body mass index (BMI) of 34.0 to 34.9 in adult    Overview     Diet and organized wt loss encouraged.          History of kidney stones    Overview     Hx of lithopripsy left kidney 07/31/2020 he has done well since           Other Visit Diagnoses     Seborrheic keratosis        x 5, right eyebrow, right and left chest wall and mid upper back.     Relevant Orders    Reference Histopathology (Completed)          An After Visit Summary and PPPS were given to the patient.       I wore protective equipment throughout this patient encounter to include mask and eye protection. Hand hygiene was performed before donning protective equipment and after removal when leaving the room.

## 2020-09-16 ENCOUNTER — OFFICE VISIT (OUTPATIENT)
Dept: FAMILY MEDICINE CLINIC | Facility: CLINIC | Age: 65
End: 2020-09-16

## 2020-09-16 VITALS
DIASTOLIC BLOOD PRESSURE: 80 MMHG | BODY MASS INDEX: 34.35 KG/M2 | RESPIRATION RATE: 18 BRPM | TEMPERATURE: 97.5 F | SYSTOLIC BLOOD PRESSURE: 140 MMHG | OXYGEN SATURATION: 98 % | WEIGHT: 206.2 LBS | HEIGHT: 65 IN | HEART RATE: 68 BPM

## 2020-09-16 DIAGNOSIS — L82.1 SEBORRHEIC KERATOSIS: ICD-10-CM

## 2020-09-16 DIAGNOSIS — E78.2 MIXED HYPERLIPIDEMIA: ICD-10-CM

## 2020-09-16 DIAGNOSIS — E11.9 TYPE 2 DIABETES MELLITUS WITHOUT COMPLICATION, WITHOUT LONG-TERM CURRENT USE OF INSULIN (HCC): Primary | ICD-10-CM

## 2020-09-16 DIAGNOSIS — J30.1 SEASONAL ALLERGIC RHINITIS DUE TO POLLEN: ICD-10-CM

## 2020-09-16 DIAGNOSIS — E66.09 CLASS 1 OBESITY DUE TO EXCESS CALORIES WITH SERIOUS COMORBIDITY AND BODY MASS INDEX (BMI) OF 34.0 TO 34.9 IN ADULT: ICD-10-CM

## 2020-09-16 DIAGNOSIS — I10 HYPERTENSION, BENIGN: ICD-10-CM

## 2020-09-16 DIAGNOSIS — Z87.442 HISTORY OF KIDNEY STONES: ICD-10-CM

## 2020-09-16 LAB
BILIRUB BLD-MCNC: NEGATIVE MG/DL
CLARITY, POC: CLEAR
COLOR UR: YELLOW
GLUCOSE BLDC GLUCOMTR-MCNC: 162 MG/DL (ref 70–130)
GLUCOSE UR STRIP-MCNC: NEGATIVE MG/DL
HBA1C MFR BLD: 7.4 %
KETONES UR QL: NEGATIVE
LEUKOCYTE EST, POC: NEGATIVE
NITRITE UR-MCNC: NEGATIVE MG/ML
PH UR: 7 [PH] (ref 5–8)
PROT UR STRIP-MCNC: NEGATIVE MG/DL
RBC # UR STRIP: NEGATIVE /UL
SP GR UR: 1.01 (ref 1–1.03)
UROBILINOGEN UR QL: NORMAL

## 2020-09-16 PROCEDURE — 11103 TANGNTL BX SKIN EA SEP/ADDL: CPT | Performed by: FAMILY MEDICINE

## 2020-09-16 PROCEDURE — 83036 HEMOGLOBIN GLYCOSYLATED A1C: CPT | Performed by: FAMILY MEDICINE

## 2020-09-16 PROCEDURE — 81002 URINALYSIS NONAUTO W/O SCOPE: CPT | Performed by: FAMILY MEDICINE

## 2020-09-16 PROCEDURE — 99214 OFFICE O/P EST MOD 30 MIN: CPT | Performed by: FAMILY MEDICINE

## 2020-09-16 PROCEDURE — 82962 GLUCOSE BLOOD TEST: CPT | Performed by: FAMILY MEDICINE

## 2020-09-16 PROCEDURE — 95117 IMMUNOTHERAPY INJECTIONS: CPT | Performed by: FAMILY MEDICINE

## 2020-09-16 PROCEDURE — 11102 TANGNTL BX SKIN SINGLE LES: CPT | Performed by: FAMILY MEDICINE

## 2020-09-16 RX ORDER — MONTELUKAST SODIUM 10 MG/1
10 TABLET ORAL DAILY
Qty: 30 TABLET | Refills: 12 | Status: SHIPPED | OUTPATIENT
Start: 2020-09-16 | End: 2021-10-07 | Stop reason: SDUPTHER

## 2020-09-18 LAB
DX ICD CODE: NORMAL
PATH REPORT.FINAL DX SPEC: NORMAL
PATH REPORT.GROSS SPEC: NORMAL
PATH REPORT.RELEVANT HX SPEC: NORMAL
PATH REPORT.SITE OF ORIGIN SPEC: NORMAL
PATHOLOGIST NAME: NORMAL
PAYMENT PROCEDURE: NORMAL

## 2020-09-24 ENCOUNTER — FLU SHOT (OUTPATIENT)
Dept: FAMILY MEDICINE CLINIC | Facility: CLINIC | Age: 65
End: 2020-09-24

## 2020-09-24 DIAGNOSIS — Z23 NEED FOR INFLUENZA VACCINATION: Primary | ICD-10-CM

## 2020-09-24 PROCEDURE — 90694 VACC AIIV4 NO PRSRV 0.5ML IM: CPT | Performed by: FAMILY MEDICINE

## 2020-09-24 PROCEDURE — G0008 ADMIN INFLUENZA VIRUS VAC: HCPCS | Performed by: FAMILY MEDICINE

## 2020-09-29 ENCOUNTER — CLINICAL SUPPORT (OUTPATIENT)
Dept: FAMILY MEDICINE CLINIC | Facility: CLINIC | Age: 65
End: 2020-09-29

## 2020-09-29 DIAGNOSIS — J30.1 SEASONAL ALLERGIC RHINITIS DUE TO POLLEN: Primary | ICD-10-CM

## 2020-09-29 PROCEDURE — 95117 IMMUNOTHERAPY INJECTIONS: CPT | Performed by: FAMILY MEDICINE

## 2020-10-14 ENCOUNTER — CLINICAL SUPPORT (OUTPATIENT)
Dept: FAMILY MEDICINE CLINIC | Facility: CLINIC | Age: 65
End: 2020-10-14

## 2020-10-14 DIAGNOSIS — J30.1 SEASONAL ALLERGIC RHINITIS DUE TO POLLEN: Primary | ICD-10-CM

## 2020-10-14 PROCEDURE — 95117 IMMUNOTHERAPY INJECTIONS: CPT | Performed by: FAMILY MEDICINE

## 2020-10-20 RX ORDER — FENOFIBRATE 160 MG/1
TABLET ORAL
Qty: 30 TABLET | Refills: 12 | Status: SHIPPED | OUTPATIENT
Start: 2020-10-20 | End: 2021-11-08 | Stop reason: SDUPTHER

## 2020-10-27 ENCOUNTER — CLINICAL SUPPORT (OUTPATIENT)
Dept: FAMILY MEDICINE CLINIC | Facility: CLINIC | Age: 65
End: 2020-10-27

## 2020-10-27 DIAGNOSIS — J30.1 SEASONAL ALLERGIC RHINITIS DUE TO POLLEN: Primary | ICD-10-CM

## 2020-10-27 PROCEDURE — 95117 IMMUNOTHERAPY INJECTIONS: CPT | Performed by: FAMILY MEDICINE

## 2020-11-10 ENCOUNTER — CLINICAL SUPPORT (OUTPATIENT)
Dept: FAMILY MEDICINE CLINIC | Facility: CLINIC | Age: 65
End: 2020-11-10

## 2020-11-10 DIAGNOSIS — J30.1 SEASONAL ALLERGIC RHINITIS DUE TO POLLEN: Primary | ICD-10-CM

## 2020-11-10 PROCEDURE — 95117 IMMUNOTHERAPY INJECTIONS: CPT | Performed by: FAMILY MEDICINE

## 2020-11-24 ENCOUNTER — CLINICAL SUPPORT (OUTPATIENT)
Dept: FAMILY MEDICINE CLINIC | Facility: CLINIC | Age: 65
End: 2020-11-24

## 2020-11-24 DIAGNOSIS — J30.1 SEASONAL ALLERGIC RHINITIS DUE TO POLLEN: Primary | ICD-10-CM

## 2020-11-24 PROCEDURE — 95117 IMMUNOTHERAPY INJECTIONS: CPT | Performed by: FAMILY MEDICINE

## 2020-12-21 ENCOUNTER — OFFICE VISIT (OUTPATIENT)
Dept: FAMILY MEDICINE CLINIC | Facility: CLINIC | Age: 65
End: 2020-12-21

## 2020-12-21 VITALS
TEMPERATURE: 98.6 F | RESPIRATION RATE: 18 BRPM | BODY MASS INDEX: 33.88 KG/M2 | WEIGHT: 210.8 LBS | SYSTOLIC BLOOD PRESSURE: 149 MMHG | OXYGEN SATURATION: 97 % | HEIGHT: 66 IN | DIASTOLIC BLOOD PRESSURE: 80 MMHG | HEART RATE: 89 BPM

## 2020-12-21 DIAGNOSIS — I10 HYPERTENSION, BENIGN: ICD-10-CM

## 2020-12-21 DIAGNOSIS — J30.1 SEASONAL ALLERGIC RHINITIS DUE TO POLLEN: ICD-10-CM

## 2020-12-21 DIAGNOSIS — G47.33 OBSTRUCTIVE SLEEP APNEA: ICD-10-CM

## 2020-12-21 DIAGNOSIS — E66.09 CLASS 1 OBESITY DUE TO EXCESS CALORIES WITH SERIOUS COMORBIDITY AND BODY MASS INDEX (BMI) OF 34.0 TO 34.9 IN ADULT: ICD-10-CM

## 2020-12-21 DIAGNOSIS — E11.9 TYPE 2 DIABETES MELLITUS WITHOUT COMPLICATION, WITHOUT LONG-TERM CURRENT USE OF INSULIN (HCC): Primary | ICD-10-CM

## 2020-12-21 DIAGNOSIS — E78.2 MIXED HYPERLIPIDEMIA: ICD-10-CM

## 2020-12-21 LAB
BILIRUB BLD-MCNC: NEGATIVE MG/DL
CLARITY, POC: CLEAR
COLOR UR: YELLOW
GLUCOSE BLDC GLUCOMTR-MCNC: 159 MG/DL (ref 70–130)
GLUCOSE UR STRIP-MCNC: NEGATIVE MG/DL
HBA1C MFR BLD: 7.5 %
KETONES UR QL: NEGATIVE
LEUKOCYTE EST, POC: NEGATIVE
NITRITE UR-MCNC: NEGATIVE MG/ML
PH UR: 5 [PH] (ref 5–8)
PROT UR STRIP-MCNC: NEGATIVE MG/DL
RBC # UR STRIP: NEGATIVE /UL
SP GR UR: 1.01 (ref 1–1.03)
UROBILINOGEN UR QL: NORMAL

## 2020-12-21 PROCEDURE — 81002 URINALYSIS NONAUTO W/O SCOPE: CPT | Performed by: FAMILY MEDICINE

## 2020-12-21 PROCEDURE — 82962 GLUCOSE BLOOD TEST: CPT | Performed by: FAMILY MEDICINE

## 2020-12-21 PROCEDURE — 99214 OFFICE O/P EST MOD 30 MIN: CPT | Performed by: FAMILY MEDICINE

## 2020-12-21 PROCEDURE — 83036 HEMOGLOBIN GLYCOSYLATED A1C: CPT | Performed by: FAMILY MEDICINE

## 2020-12-21 PROCEDURE — 95117 IMMUNOTHERAPY INJECTIONS: CPT | Performed by: FAMILY MEDICINE

## 2020-12-28 ENCOUNTER — OFFICE VISIT (OUTPATIENT)
Dept: FAMILY MEDICINE CLINIC | Facility: CLINIC | Age: 65
End: 2020-12-28

## 2020-12-28 VITALS
TEMPERATURE: 96.8 F | DIASTOLIC BLOOD PRESSURE: 68 MMHG | OXYGEN SATURATION: 98 % | WEIGHT: 204.8 LBS | BODY MASS INDEX: 32.92 KG/M2 | RESPIRATION RATE: 18 BRPM | SYSTOLIC BLOOD PRESSURE: 120 MMHG | HEART RATE: 92 BPM | HEIGHT: 66 IN

## 2020-12-28 DIAGNOSIS — B34.9 VIRAL SYNDROME: ICD-10-CM

## 2020-12-28 DIAGNOSIS — I10 HYPERTENSION, BENIGN: ICD-10-CM

## 2020-12-28 DIAGNOSIS — E66.09 CLASS 1 OBESITY DUE TO EXCESS CALORIES WITH SERIOUS COMORBIDITY AND BODY MASS INDEX (BMI) OF 34.0 TO 34.9 IN ADULT: ICD-10-CM

## 2020-12-28 DIAGNOSIS — E11.9 TYPE 2 DIABETES MELLITUS WITHOUT COMPLICATION, WITHOUT LONG-TERM CURRENT USE OF INSULIN (HCC): ICD-10-CM

## 2020-12-28 DIAGNOSIS — J20.9 ACUTE BRONCHITIS, UNSPECIFIED ORGANISM: Primary | ICD-10-CM

## 2020-12-28 LAB — GLUCOSE BLDC GLUCOMTR-MCNC: 165 MG/DL (ref 70–130)

## 2020-12-28 PROCEDURE — 99213 OFFICE O/P EST LOW 20 MIN: CPT | Performed by: FAMILY MEDICINE

## 2020-12-28 PROCEDURE — 82962 GLUCOSE BLOOD TEST: CPT | Performed by: FAMILY MEDICINE

## 2020-12-28 RX ORDER — AZITHROMYCIN 250 MG/1
TABLET, FILM COATED ORAL
Qty: 6 TABLET | Refills: 0 | Status: SHIPPED | OUTPATIENT
Start: 2020-12-28 | End: 2021-03-24

## 2021-01-04 DIAGNOSIS — U07.1 REAL TIME REVERSE TRANSCRIPTASE PCR POSITIVE FOR COVID-19 VIRUS: Primary | ICD-10-CM

## 2021-01-04 RX ORDER — PREDNISONE 10 MG/1
10 TABLET ORAL TAKE AS DIRECTED
Qty: 35 TABLET | Refills: 0 | Status: SHIPPED | OUTPATIENT
Start: 2021-01-04 | End: 2021-01-19

## 2021-01-04 NOTE — TELEPHONE ENCOUNTER
Fransico, called report he was notified he is covid positive on 12/28/2020. Fransico reports he is feeling bettter, except continue with cough and fatigue, he  has completes his z pack. Fransico requested that the steroids be sent to pharmacy, as discussed with Dr Salinas at visit. He will continue ASA, zinc, vitamin D as discussed,

## 2021-01-19 ENCOUNTER — CLINICAL SUPPORT (OUTPATIENT)
Dept: FAMILY MEDICINE CLINIC | Facility: CLINIC | Age: 66
End: 2021-01-19

## 2021-01-19 DIAGNOSIS — J30.1 SEASONAL ALLERGIC RHINITIS DUE TO POLLEN: Primary | ICD-10-CM

## 2021-01-19 DIAGNOSIS — I10 HYPERTENSION, BENIGN: ICD-10-CM

## 2021-01-19 PROCEDURE — 95117 IMMUNOTHERAPY INJECTIONS: CPT | Performed by: FAMILY MEDICINE

## 2021-01-19 RX ORDER — LISINOPRIL AND HYDROCHLOROTHIAZIDE 20; 12.5 MG/1; MG/1
TABLET ORAL
Qty: 90 TABLET | Refills: 3 | Status: SHIPPED | OUTPATIENT
Start: 2021-01-19 | End: 2021-12-06

## 2021-02-02 ENCOUNTER — CLINICAL SUPPORT (OUTPATIENT)
Dept: FAMILY MEDICINE CLINIC | Facility: CLINIC | Age: 66
End: 2021-02-02

## 2021-02-02 DIAGNOSIS — J30.1 SEASONAL ALLERGIC RHINITIS DUE TO POLLEN: Primary | ICD-10-CM

## 2021-02-02 PROCEDURE — 95117 IMMUNOTHERAPY INJECTIONS: CPT | Performed by: FAMILY MEDICINE

## 2021-02-18 ENCOUNTER — CLINICAL SUPPORT (OUTPATIENT)
Dept: FAMILY MEDICINE CLINIC | Facility: CLINIC | Age: 66
End: 2021-02-18

## 2021-02-18 DIAGNOSIS — J30.1 SEASONAL ALLERGIC RHINITIS DUE TO POLLEN: Primary | ICD-10-CM

## 2021-02-18 PROCEDURE — 95117 IMMUNOTHERAPY INJECTIONS: CPT | Performed by: FAMILY MEDICINE

## 2021-03-02 ENCOUNTER — CLINICAL SUPPORT (OUTPATIENT)
Dept: FAMILY MEDICINE CLINIC | Facility: CLINIC | Age: 66
End: 2021-03-02

## 2021-03-02 DIAGNOSIS — J30.1 SEASONAL ALLERGIC RHINITIS DUE TO POLLEN: Primary | ICD-10-CM

## 2021-03-02 PROCEDURE — 95117 IMMUNOTHERAPY INJECTIONS: CPT | Performed by: FAMILY MEDICINE

## 2021-03-15 DIAGNOSIS — I10 HYPERTENSION, BENIGN: ICD-10-CM

## 2021-03-15 RX ORDER — AMLODIPINE BESYLATE 5 MG/1
TABLET ORAL
Qty: 30 TABLET | Refills: 12 | Status: SHIPPED | OUTPATIENT
Start: 2021-03-15 | End: 2022-03-21

## 2021-03-16 ENCOUNTER — CLINICAL SUPPORT (OUTPATIENT)
Dept: FAMILY MEDICINE CLINIC | Facility: CLINIC | Age: 66
End: 2021-03-16

## 2021-03-16 DIAGNOSIS — J30.1 SEASONAL ALLERGIC RHINITIS DUE TO POLLEN: Primary | ICD-10-CM

## 2021-03-16 PROCEDURE — 95117 IMMUNOTHERAPY INJECTIONS: CPT | Performed by: FAMILY MEDICINE

## 2021-03-24 ENCOUNTER — OFFICE VISIT (OUTPATIENT)
Dept: FAMILY MEDICINE CLINIC | Facility: CLINIC | Age: 66
End: 2021-03-24

## 2021-03-24 VITALS
TEMPERATURE: 98.2 F | HEART RATE: 81 BPM | DIASTOLIC BLOOD PRESSURE: 88 MMHG | OXYGEN SATURATION: 99 % | WEIGHT: 210 LBS | SYSTOLIC BLOOD PRESSURE: 140 MMHG | HEIGHT: 66 IN | RESPIRATION RATE: 18 BRPM | BODY MASS INDEX: 33.75 KG/M2

## 2021-03-24 DIAGNOSIS — E66.09 CLASS 1 OBESITY DUE TO EXCESS CALORIES WITH SERIOUS COMORBIDITY AND BODY MASS INDEX (BMI) OF 33.0 TO 33.9 IN ADULT: ICD-10-CM

## 2021-03-24 DIAGNOSIS — E11.9 TYPE 2 DIABETES MELLITUS WITHOUT COMPLICATION, WITHOUT LONG-TERM CURRENT USE OF INSULIN (HCC): Primary | ICD-10-CM

## 2021-03-24 DIAGNOSIS — J30.1 SEASONAL ALLERGIC RHINITIS DUE TO POLLEN: ICD-10-CM

## 2021-03-24 DIAGNOSIS — E78.2 MIXED HYPERLIPIDEMIA: ICD-10-CM

## 2021-03-24 DIAGNOSIS — I10 HYPERTENSION, BENIGN: ICD-10-CM

## 2021-03-24 PROBLEM — Z86.16 HISTORY OF COVID-19: Status: ACTIVE | Noted: 2021-03-24

## 2021-03-24 LAB
BILIRUB BLD-MCNC: NEGATIVE MG/DL
CLARITY, POC: CLEAR
COLOR UR: YELLOW
GLUCOSE BLDC GLUCOMTR-MCNC: 194 MG/DL (ref 70–130)
GLUCOSE UR STRIP-MCNC: NEGATIVE MG/DL
HBA1C MFR BLD: 7.6 %
KETONES UR QL: NEGATIVE
LEUKOCYTE EST, POC: NEGATIVE
NITRITE UR-MCNC: NEGATIVE MG/ML
PH UR: 5 [PH] (ref 5–8)
PROT UR STRIP-MCNC: NEGATIVE MG/DL
RBC # UR STRIP: NEGATIVE /UL
SP GR UR: 1.01 (ref 1–1.03)
UROBILINOGEN UR QL: NORMAL

## 2021-03-24 PROCEDURE — 95117 IMMUNOTHERAPY INJECTIONS: CPT | Performed by: FAMILY MEDICINE

## 2021-03-24 PROCEDURE — 81002 URINALYSIS NONAUTO W/O SCOPE: CPT | Performed by: FAMILY MEDICINE

## 2021-03-24 PROCEDURE — 36415 COLL VENOUS BLD VENIPUNCTURE: CPT | Performed by: FAMILY MEDICINE

## 2021-03-24 PROCEDURE — 82962 GLUCOSE BLOOD TEST: CPT | Performed by: FAMILY MEDICINE

## 2021-03-24 PROCEDURE — 99214 OFFICE O/P EST MOD 30 MIN: CPT | Performed by: FAMILY MEDICINE

## 2021-03-24 PROCEDURE — 83036 HEMOGLOBIN GLYCOSYLATED A1C: CPT | Performed by: FAMILY MEDICINE

## 2021-03-24 NOTE — ASSESSMENT & PLAN NOTE
Patient's (Body mass index is 33.89 kg/m².) indicates that they are obese (BMI >30) with obesity-related health conditions that include hypertension, diabetes mellitus and dyslipidemias . Obesity is unchanged. BMI is is above average; BMI management plan is completed. We discussed portion control and increasing exercise.

## 2021-03-24 NOTE — PROGRESS NOTES
Chief Complaint  Diabetes, Hypertension, and Hyperlipidemia    Subjective     CC  Problem List  Visit Diagnosis   Encounters  Notes  Medications  Labs  Result Review Imaging  Media    Fransico Cuenca presents to [facility] for   Diabetes  He presents for his follow-up diabetic visit. He has type 2 diabetes mellitus. Pertinent negatives for hypoglycemia include no dizziness, headaches or sweats. Pertinent negatives for diabetes include no chest pain, no fatigue, no polydipsia, no polyuria and no weakness. There are no hypoglycemic complications. There are no diabetic complications. Risk factors for coronary artery disease include diabetes mellitus, dyslipidemia, hypertension, male sex, obesity and family history. Current diabetic treatment includes oral agent (monotherapy). Meal planning includes avoidance of concentrated sweets. (Does not check blood sugar at home) An ACE inhibitor/angiotensin II receptor blocker is being taken. He does not see a podiatrist.Eye exam is current ([facility] [employer] ).   Hypertension  This is a chronic problem. The current episode started more than 1 year ago. The problem is uncontrolled. Pertinent negatives include no chest pain, headaches, orthopnea, palpitations, peripheral edema, PND, shortness of breath or sweats. Risk factors for coronary artery disease include diabetes mellitus, dyslipidemia, family history, male gender and obesity. Current antihypertension treatment includes ACE inhibitors, calcium channel blockers and diuretics. The current treatment provides moderate improvement.   Hyperlipidemia  This is a chronic problem. The current episode started more than 1 year ago. The problem is uncontrolled. Recent lipid tests were reviewed and are high. Exacerbating diseases include diabetes and obesity. Pertinent negatives include no chest pain, myalgias or shortness of breath. Current antihyperlipidemic treatment includes statins. Risk factors  "for coronary artery disease include diabetes mellitus, dyslipidemia, family history, hypertension, male sex and obesity.       Review of Systems   Constitutional: Negative for appetite change, fatigue and fever.   HENT: Negative for congestion, sore throat and tinnitus.    Eyes: Negative for visual disturbance.   Respiratory: Negative for cough, shortness of breath and wheezing.    Cardiovascular: Negative for chest pain, palpitations, orthopnea, leg swelling and PND.   Gastrointestinal: Negative for abdominal pain, constipation, diarrhea, nausea and vomiting.   Endocrine: Negative.  Negative for cold intolerance, heat intolerance, polydipsia and polyuria.   Genitourinary: Negative for dysuria, frequency and hematuria.   Musculoskeletal: Negative for arthralgias, joint swelling and myalgias.   Skin: Negative for rash and bruise.   Neurological: Negative for dizziness, syncope, weakness and headache.   Hematological: Negative for adenopathy. Does not bruise/bleed easily.        Objective   Vital Signs:   /88 (BP Location: Right arm, Patient Position: Sitting, Cuff Size: Adult)   Pulse 81   Temp 98.2 °F (36.8 °C) (Temporal)   Resp 18   Ht 167.6 cm (66\")   Wt 95.3 kg (210 lb)   SpO2 99% Comment: room air  BMI 33.89 kg/m²     Physical Exam  Constitutional:       General: He is not in acute distress.     Appearance: He is well-developed.   Neck:      Thyroid: No thyromegaly.      Vascular: No JVD.   Cardiovascular:      Rate and Rhythm: Normal rate and regular rhythm.      Heart sounds: Normal heart sounds. No murmur heard.   No friction rub. No gallop.    Pulmonary:      Effort: Pulmonary effort is normal. No respiratory distress.      Breath sounds: Normal breath sounds. No wheezing or rales.   Musculoskeletal:      Cervical back: Neck supple.   Lymphadenopathy:      Cervical: No cervical adenopathy.   Skin:     Findings: No rash.   Neurological:      Mental Status: He is alert and oriented to person, " place, and time.      Coordination: Coordination normal.        Result Review :Labs  Result Review  Imaging  Med Tab  Media                 Assessment and Plan CC Problem List  Visit Diagnosis  ROS  Review (Popup)  Health Maintenance  Quality  BestPractice  Medications  SmartSets  SnapShot Encounters  Media  Problem List Items Addressed This Visit        High    Hypertension, benign    Overview     Controlled compliant.         Current Assessment & Plan     Hypertension is improving with treatment.  Continue current treatment regimen.  Dietary sodium restriction.  Weight loss.  Regular aerobic exercise.  Blood pressure will be reassessed in 3 months.         Relevant Orders    CBC & Differential    Comprehensive Metabolic Panel    TSH    Type 2 diabetes mellitus without complication, without long-term current use of insulin (CMS/[facility]) - Primary    Overview     A1c 7.6 03/24/2020 increase metformin to 1000 bid f.u 3 mos improve diet and exercise. k  A1c 7.5 12/21/2020 stable,    A1c 7.4 09/16/2020    A1c 7.2 10/11/2020  A1c 6.2 08/20/2019,   New onset 04/29/2019, A1c 7.2             Current Assessment & Plan     Diabetes is worsening.   Medication changes per orders.  Diabetes will be reassessed in 3 months.         Relevant Medications    metFORMIN (GLUCOPHAGE) 1000 MG tablet    Other Relevant Orders    POCT urinalysis dipstick, manual (Completed)    POCT Glucose (Completed)    POC Glycosylated Hemoglobin (Hb A1C) (Completed)       Medium    Mixed hyperlipidemia    Overview     Stable, compliant with meds.         Current Assessment & Plan     Lipid abnormalities are improving with treatment.  Pharmacotherapy as ordered.  Lipids will be reassessed in 3 months.         Relevant Orders    Lipid Panel With / Chol / HDL Ratio    Allergic rhinitis due to pollen    Overview     on Immuno Rx.  He has an ENT apt for consideration of more sinus surgery.   Mild exacerbation, continue meds           Relevant  Medications    Allergy Serum Injection (Completed)    Allergy Serum Injection (Completed)       Low    Class 1 obesity due to excess calories with serious comorbidity and body mass index (BMI) of 33.0 to 33.9 in adult    Overview     Diet and organized wt loss encouraged.          Current Assessment & Plan     Patient's (Body mass index is 33.89 kg/m².) indicates that they are obese (BMI >30) with obesity-related health conditions that include hypertension, diabetes mellitus and dyslipidemias . Obesity is unchanged. BMI is is above average; BMI management plan is completed. We discussed portion control and increasing exercise.                Follow Up Instructions Charge Capture  Follow-up Communications  No follow-ups on file.  Patient was given instructions and counseling regarding his condition or for health maintenance advice. Please see specific information pulled into the AVS if appropriate.      Site care done- cleaned with alcohol swab, procedure tolerated well, dressing applied. Venipuncture was obtained after 1 time(s). 2 tubes were drawn. Needle gauge used was 22.

## 2021-03-25 LAB
ALBUMIN SERPL-MCNC: 4.4 G/DL (ref 3.8–4.8)
ALBUMIN/GLOB SERPL: 1.6 {RATIO} (ref 1.2–2.2)
ALP SERPL-CCNC: 49 IU/L (ref 39–117)
ALT SERPL-CCNC: 40 IU/L (ref 0–44)
AST SERPL-CCNC: 34 IU/L (ref 0–40)
BASOPHILS # BLD AUTO: 0 X10E3/UL (ref 0–0.2)
BASOPHILS NFR BLD AUTO: 1 %
BILIRUB SERPL-MCNC: 0.3 MG/DL (ref 0–1.2)
BUN SERPL-MCNC: 21 MG/DL (ref 8–27)
BUN/CREAT SERPL: 22 (ref 10–24)
CALCIUM SERPL-MCNC: 9.8 MG/DL (ref 8.6–10.2)
CHLORIDE SERPL-SCNC: 104 MMOL/L (ref 96–106)
CHOLEST SERPL-MCNC: 143 MG/DL (ref 100–199)
CHOLEST/HDLC SERPL: 3.6 RATIO (ref 0–5)
CO2 SERPL-SCNC: 20 MMOL/L (ref 20–29)
CREAT SERPL-MCNC: 0.94 MG/DL (ref 0.76–1.27)
EOSINOPHIL # BLD AUTO: 0.2 X10E3/UL (ref 0–0.4)
EOSINOPHIL NFR BLD AUTO: 3 %
ERYTHROCYTE [DISTWIDTH] IN BLOOD BY AUTOMATED COUNT: 13.6 % (ref 11.6–15.4)
GLOBULIN SER CALC-MCNC: 2.7 G/DL (ref 1.5–4.5)
GLUCOSE SERPL-MCNC: 156 MG/DL (ref 65–99)
HCT VFR BLD AUTO: 38.6 % (ref 37.5–51)
HDLC SERPL-MCNC: 40 MG/DL
HGB BLD-MCNC: 12.5 G/DL (ref 13–17.7)
IMM GRANULOCYTES # BLD AUTO: 0 X10E3/UL (ref 0–0.1)
IMM GRANULOCYTES NFR BLD AUTO: 1 %
LDLC SERPL CALC-MCNC: 83 MG/DL (ref 0–99)
LYMPHOCYTES # BLD AUTO: 0.9 X10E3/UL (ref 0.7–3.1)
LYMPHOCYTES NFR BLD AUTO: 14 %
MCH RBC QN AUTO: 26.7 PG (ref 26.6–33)
MCHC RBC AUTO-ENTMCNC: 32.4 G/DL (ref 31.5–35.7)
MCV RBC AUTO: 82 FL (ref 79–97)
MONOCYTES # BLD AUTO: 0.5 X10E3/UL (ref 0.1–0.9)
MONOCYTES NFR BLD AUTO: 8 %
NEUTROPHILS # BLD AUTO: 4.6 X10E3/UL (ref 1.4–7)
NEUTROPHILS NFR BLD AUTO: 73 %
PLATELET # BLD AUTO: 269 X10E3/UL (ref 150–450)
POTASSIUM SERPL-SCNC: 4.4 MMOL/L (ref 3.5–5.2)
PROT SERPL-MCNC: 7.1 G/DL (ref 6–8.5)
RBC # BLD AUTO: 4.69 X10E6/UL (ref 4.14–5.8)
SODIUM SERPL-SCNC: 139 MMOL/L (ref 134–144)
TRIGL SERPL-MCNC: 112 MG/DL (ref 0–149)
TSH SERPL DL<=0.005 MIU/L-ACNC: 1.64 UIU/ML (ref 0.45–4.5)
VLDLC SERPL CALC-MCNC: 20 MG/DL (ref 5–40)
WBC # BLD AUTO: 6.2 X10E3/UL (ref 3.4–10.8)

## 2021-03-25 NOTE — PROGRESS NOTES
Cholesterol  143, triglycerides 112, HDL 40, LDL 83, and   cardiac ratio  3.6. Continue atorvastatin 10 mg daily and omega 3   two pills daily, with diet exercise and weight control, will  repeat in one year.

## 2021-03-30 ENCOUNTER — CLINICAL SUPPORT (OUTPATIENT)
Dept: FAMILY MEDICINE CLINIC | Facility: CLINIC | Age: 66
End: 2021-03-30

## 2021-03-30 DIAGNOSIS — J30.1 SEASONAL ALLERGIC RHINITIS DUE TO POLLEN: Primary | ICD-10-CM

## 2021-03-30 PROCEDURE — 95117 IMMUNOTHERAPY INJECTIONS: CPT | Performed by: FAMILY MEDICINE

## 2021-04-13 ENCOUNTER — CLINICAL SUPPORT (OUTPATIENT)
Dept: FAMILY MEDICINE CLINIC | Facility: CLINIC | Age: 66
End: 2021-04-13

## 2021-04-13 DIAGNOSIS — J30.1 SEASONAL ALLERGIC RHINITIS DUE TO POLLEN: Primary | ICD-10-CM

## 2021-04-13 PROCEDURE — 95117 IMMUNOTHERAPY INJECTIONS: CPT | Performed by: FAMILY MEDICINE

## 2021-04-26 ENCOUNTER — CLINICAL SUPPORT (OUTPATIENT)
Dept: FAMILY MEDICINE CLINIC | Facility: CLINIC | Age: 66
End: 2021-04-26

## 2021-04-26 DIAGNOSIS — J30.1 SEASONAL ALLERGIC RHINITIS DUE TO POLLEN: Primary | ICD-10-CM

## 2021-05-11 ENCOUNTER — CLINICAL SUPPORT (OUTPATIENT)
Dept: FAMILY MEDICINE CLINIC | Facility: CLINIC | Age: 66
End: 2021-05-11

## 2021-05-11 DIAGNOSIS — J30.1 SEASONAL ALLERGIC RHINITIS DUE TO POLLEN: Primary | ICD-10-CM

## 2021-05-11 PROCEDURE — 95117 IMMUNOTHERAPY INJECTIONS: CPT | Performed by: FAMILY MEDICINE

## 2021-05-26 ENCOUNTER — CLINICAL SUPPORT (OUTPATIENT)
Dept: FAMILY MEDICINE CLINIC | Facility: CLINIC | Age: 66
End: 2021-05-26

## 2021-05-26 DIAGNOSIS — J30.1 SEASONAL ALLERGIC RHINITIS DUE TO POLLEN: Primary | ICD-10-CM

## 2021-05-26 PROCEDURE — 95117 IMMUNOTHERAPY INJECTIONS: CPT | Performed by: FAMILY MEDICINE

## 2021-06-08 ENCOUNTER — CLINICAL SUPPORT (OUTPATIENT)
Dept: FAMILY MEDICINE CLINIC | Facility: CLINIC | Age: 66
End: 2021-06-08

## 2021-06-08 DIAGNOSIS — J30.1 SEASONAL ALLERGIC RHINITIS DUE TO POLLEN: Primary | ICD-10-CM

## 2021-06-08 PROCEDURE — 95117 IMMUNOTHERAPY INJECTIONS: CPT | Performed by: FAMILY MEDICINE

## 2021-06-11 DIAGNOSIS — I10 HYPERTENSION, BENIGN: ICD-10-CM

## 2021-06-14 RX ORDER — DOXAZOSIN MESYLATE 1 MG/1
TABLET ORAL
Qty: 30 TABLET | Refills: 12 | Status: SHIPPED | OUTPATIENT
Start: 2021-06-14 | End: 2022-05-03 | Stop reason: SDUPTHER

## 2021-06-22 ENCOUNTER — CLINICAL SUPPORT (OUTPATIENT)
Dept: FAMILY MEDICINE CLINIC | Facility: CLINIC | Age: 66
End: 2021-06-22

## 2021-06-22 DIAGNOSIS — J30.1 SEASONAL ALLERGIC RHINITIS DUE TO POLLEN: Primary | ICD-10-CM

## 2021-06-22 PROCEDURE — 95117 IMMUNOTHERAPY INJECTIONS: CPT | Performed by: FAMILY MEDICINE

## 2021-07-14 ENCOUNTER — OFFICE VISIT (OUTPATIENT)
Dept: FAMILY MEDICINE CLINIC | Facility: CLINIC | Age: 66
End: 2021-07-14

## 2021-07-14 VITALS
RESPIRATION RATE: 18 BRPM | WEIGHT: 205.4 LBS | TEMPERATURE: 98 F | SYSTOLIC BLOOD PRESSURE: 134 MMHG | BODY MASS INDEX: 34.22 KG/M2 | OXYGEN SATURATION: 95 % | DIASTOLIC BLOOD PRESSURE: 78 MMHG | HEIGHT: 65 IN | HEART RATE: 74 BPM

## 2021-07-14 DIAGNOSIS — E11.9 TYPE 2 DIABETES MELLITUS WITHOUT COMPLICATION, WITHOUT LONG-TERM CURRENT USE OF INSULIN (HCC): Primary | ICD-10-CM

## 2021-07-14 DIAGNOSIS — I10 HYPERTENSION, BENIGN: ICD-10-CM

## 2021-07-14 DIAGNOSIS — E66.09 CLASS 1 OBESITY DUE TO EXCESS CALORIES WITH SERIOUS COMORBIDITY AND BODY MASS INDEX (BMI) OF 33.0 TO 33.9 IN ADULT: ICD-10-CM

## 2021-07-14 DIAGNOSIS — E78.2 MIXED HYPERLIPIDEMIA: ICD-10-CM

## 2021-07-14 DIAGNOSIS — J30.1 SEASONAL ALLERGIC RHINITIS DUE TO POLLEN: ICD-10-CM

## 2021-07-14 LAB
BILIRUB BLD-MCNC: NEGATIVE MG/DL
CLARITY, POC: CLEAR
COLOR UR: YELLOW
GLUCOSE BLDC GLUCOMTR-MCNC: 137 MG/DL (ref 70–130)
GLUCOSE UR STRIP-MCNC: NEGATIVE MG/DL
HBA1C MFR BLD: 7.5 %
KETONES UR QL: NEGATIVE
LEUKOCYTE EST, POC: NEGATIVE
NITRITE UR-MCNC: NEGATIVE MG/ML
PH UR: 5 [PH] (ref 5–8)
PROT UR STRIP-MCNC: NEGATIVE MG/DL
RBC # UR STRIP: NEGATIVE /UL
SP GR UR: 1.01 (ref 1–1.03)
UROBILINOGEN UR QL: NORMAL

## 2021-07-14 PROCEDURE — 95117 IMMUNOTHERAPY INJECTIONS: CPT | Performed by: FAMILY MEDICINE

## 2021-07-14 PROCEDURE — 99214 OFFICE O/P EST MOD 30 MIN: CPT | Performed by: FAMILY MEDICINE

## 2021-07-14 PROCEDURE — 81002 URINALYSIS NONAUTO W/O SCOPE: CPT | Performed by: FAMILY MEDICINE

## 2021-07-14 PROCEDURE — 83036 HEMOGLOBIN GLYCOSYLATED A1C: CPT | Performed by: FAMILY MEDICINE

## 2021-07-14 NOTE — PROGRESS NOTES
Chief Complaint  Diabetes, Hypertension, and Hyperlipidemia    Subjective     CC  Problem List  Visit Diagnosis   Encounters  Notes  Medications  Labs  Result Review Imaging  Media    Fransico Cuenca presents to [facility] for   Diabetes  He presents for his follow-up diabetic visit. He has type 2 diabetes mellitus. Pertinent negatives for hypoglycemia include no headaches or sweats. Associated symptoms include weight loss (5 lbs). Pertinent negatives for diabetes include no chest pain, no polydipsia, no polyuria and no weakness. Risk factors for coronary artery disease include diabetes mellitus, dyslipidemia, hypertension, male sex, obesity and family history. Meal planning includes avoidance of concentrated sweets. He participates in exercise daily. (Do not check blood sugar at home.) An ACE inhibitor/angiotensin II receptor blocker is being taken. He does not see a podiatrist.Eye exam is current.   Hypertension  This is a chronic problem. The current episode started more than 1 year ago. The problem is controlled. Pertinent negatives include no chest pain, headaches, orthopnea, palpitations, peripheral edema, PND, shortness of breath or sweats. Risk factors for coronary artery disease include diabetes mellitus, dyslipidemia, male gender, obesity and family history. Current antihypertension treatment includes ACE inhibitors and calcium channel blockers. The current treatment provides moderate improvement.   Hyperlipidemia  This is a chronic problem. The current episode started more than 1 year ago. The problem is controlled. Recent lipid tests were reviewed and are normal. Exacerbating diseases include diabetes and obesity. Pertinent negatives include no chest pain or shortness of breath. Current antihyperlipidemic treatment includes herbal therapy. The current treatment provides moderate improvement of lipids. Risk factors for coronary artery disease include diabetes  "mellitus, dyslipidemia, family history, obesity, hypertension and male sex.       Review of Systems   Constitutional: Positive for unexpected weight loss (5 lbs). Negative for appetite change and unexpected weight gain.   HENT: Negative for congestion and swollen glands.    Respiratory: Negative for shortness of breath.    Cardiovascular: Negative for chest pain, palpitations, orthopnea, leg swelling and PND.   Gastrointestinal: Negative for abdominal pain, constipation, diarrhea, nausea and vomiting.   Endocrine: Negative for cold intolerance, heat intolerance, polydipsia and polyuria.   Skin: Negative for rash.   Neurological: Negative for weakness.   Hematological: Negative for adenopathy. Does not bruise/bleed easily.        Objective   Vital Signs:   /78 (BP Location: Right arm, Patient Position: Sitting, Cuff Size: Adult)   Pulse 74   Temp 98 °F (36.7 °C) (Temporal)   Resp 18   Ht 163.8 cm (64.5\")   Wt 93.2 kg (205 lb 6.4 oz)   SpO2 95% Comment: room air  BMI 34.71 kg/m²     Physical Exam  Constitutional:       General: He is not in acute distress.     Appearance: He is well-developed.   Eyes:      Conjunctiva/sclera: Conjunctivae normal.   Neck:      Thyroid: No thyromegaly.      Vascular: No JVD.   Cardiovascular:      Rate and Rhythm: Normal rate and regular rhythm.      Heart sounds: Normal heart sounds. No murmur heard.   No friction rub. No gallop.    Pulmonary:      Effort: Pulmonary effort is normal. No respiratory distress.      Breath sounds: Normal breath sounds. No wheezing or rales.   Musculoskeletal:      Cervical back: Neck supple.   Lymphadenopathy:      Cervical: No cervical adenopathy.   Skin:     General: Skin is warm.      Findings: No erythema or rash.   Neurological:      Mental Status: He is alert and oriented to person, place, and time.      Coordination: Coordination normal.        Result Review :Labs  Result Review  Imaging  Med Tab  Media                 Assessment " and Plan CC Problem List  Visit Diagnosis  ROS  Review (Popup)  [facility] Maintenance  Quality  BestPractice  Medications  SmartSets  SnapShot Encounters  Media  Problem List Items Addressed This Visit        High    Hypertension, benign    Overview     Controlled compliant         Current Assessment & Plan     Hypertension is improving with treatment.  Continue current treatment regimen.  Dietary sodium restriction.  Weight loss.  Regular aerobic exercise.  Blood pressure will be reassessed in 3 months.         Type 2 diabetes mellitus without complication, without long-term current use of insulin (CMS/[facility]) - Primary    Overview     A1c 7.5  07/13/2021 stable after increasing metformin. He will tighten diet more and increase exercise. Add meds next visit if no improvement  A1c 7.6 03/24/2020   A1c 7.5 12/21/2020 stable,    A1c 7.4 09/16/2020    A1c 7.2 10/11/2020  A1c 6.2 08/20/2019,   New onset 04/29/2019, A1c 7.2             Current Assessment & Plan     Diabetes is improving with treatment.   Continue current treatment regimen.  Reminded to bring in blood sugar diary at next visit.  Dietary recommendations for ADA diet.  Regular aerobic exercise.  Diabetes will be reassessed in 3 months.         Relevant Orders    Microalbumin / Creatinine Urine Ratio - Urine, Clean Catch    POC Glycosylated Hemoglobin (Hb A1C) (Completed)    POCT Glucose (Completed)    POCT urinalysis dipstick, manual (Completed)       Medium    Mixed hyperlipidemia    Overview     Stable, compliant with meds         Current Assessment & Plan     Lipid abnormalities are improving with treatment.  Pharmacotherapy as ordered.  Lipids will be reassessed in 3 months.         Allergic rhinitis due to pollen    Overview     Sxs stable on Immuno Rx. Injection given.              Relevant Medications    Allergy Serum Injection (Completed) (Start on 7/14/2021 11:37 AM)    Allergy Serum Injection (Completed) (Start on 7/14/2021 11:37 AM)        Low    Class 1 obesity due to excess calories with serious comorbidity and body mass index (BMI) of 33.0 to 33.9 in adult    Overview     Diet and organized wt loss encouraged.                Follow Up Instructions Charge Capture  Follow-up Communications  Return in about 3 months (around 10/14/2021).  Patient was given instructions and counseling regarding his condition or for health maintenance advice. Please see specific information pulled into the AVS if appropriate.

## 2021-07-15 LAB
ALBUMIN/CREAT UR: 6 MG/G CREAT (ref 0–29)
CREAT UR-MCNC: 92.3 MG/DL
MICROALBUMIN UR-MCNC: 5.2 UG/ML

## 2021-07-27 ENCOUNTER — CLINICAL SUPPORT (OUTPATIENT)
Dept: FAMILY MEDICINE CLINIC | Facility: CLINIC | Age: 66
End: 2021-07-27

## 2021-07-27 DIAGNOSIS — J30.1 ALLERGIC RHINITIS DUE TO POLLEN, UNSPECIFIED SEASONALITY: Primary | ICD-10-CM

## 2021-07-27 PROCEDURE — 95117 IMMUNOTHERAPY INJECTIONS: CPT | Performed by: FAMILY MEDICINE

## 2021-08-10 ENCOUNTER — CLINICAL SUPPORT (OUTPATIENT)
Dept: FAMILY MEDICINE CLINIC | Facility: CLINIC | Age: 66
End: 2021-08-10

## 2021-08-10 DIAGNOSIS — J30.1 SEASONAL ALLERGIC RHINITIS DUE TO POLLEN: Primary | ICD-10-CM

## 2021-08-10 PROCEDURE — 95117 IMMUNOTHERAPY INJECTIONS: CPT | Performed by: FAMILY MEDICINE

## 2021-08-12 DIAGNOSIS — E78.2 MIXED HYPERLIPIDEMIA: ICD-10-CM

## 2021-08-13 RX ORDER — ATORVASTATIN CALCIUM 10 MG/1
TABLET, FILM COATED ORAL
Qty: 30 TABLET | Refills: 12 | Status: SHIPPED | OUTPATIENT
Start: 2021-08-13 | End: 2022-04-01 | Stop reason: SDUPTHER

## 2021-08-13 RX ORDER — CITALOPRAM 20 MG/1
TABLET ORAL
Qty: 30 TABLET | Refills: 12 | Status: SHIPPED | OUTPATIENT
Start: 2021-08-13 | End: 2022-05-03 | Stop reason: SDUPTHER

## 2021-08-24 ENCOUNTER — CLINICAL SUPPORT (OUTPATIENT)
Dept: FAMILY MEDICINE CLINIC | Facility: CLINIC | Age: 66
End: 2021-08-24

## 2021-08-24 DIAGNOSIS — J30.1 SEASONAL ALLERGIC RHINITIS DUE TO POLLEN: Primary | ICD-10-CM

## 2021-08-24 PROCEDURE — 95117 IMMUNOTHERAPY INJECTIONS: CPT | Performed by: FAMILY MEDICINE

## 2021-09-07 ENCOUNTER — CLINICAL SUPPORT (OUTPATIENT)
Dept: FAMILY MEDICINE CLINIC | Facility: CLINIC | Age: 66
End: 2021-09-07

## 2021-09-07 DIAGNOSIS — J30.1 NON-SEASONAL ALLERGIC RHINITIS DUE TO POLLEN: Primary | ICD-10-CM

## 2021-09-07 PROCEDURE — 95117 IMMUNOTHERAPY INJECTIONS: CPT | Performed by: FAMILY MEDICINE

## 2021-09-21 ENCOUNTER — CLINICAL SUPPORT (OUTPATIENT)
Dept: FAMILY MEDICINE CLINIC | Facility: CLINIC | Age: 66
End: 2021-09-21

## 2021-09-21 DIAGNOSIS — J30.1 SEASONAL ALLERGIC RHINITIS DUE TO POLLEN: Primary | ICD-10-CM

## 2021-09-21 PROCEDURE — 95117 IMMUNOTHERAPY INJECTIONS: CPT | Performed by: FAMILY MEDICINE

## 2021-10-05 ENCOUNTER — CLINICAL SUPPORT (OUTPATIENT)
Dept: FAMILY MEDICINE CLINIC | Facility: CLINIC | Age: 66
End: 2021-10-05

## 2021-10-05 DIAGNOSIS — J30.1 ALLERGIC RHINITIS DUE TO POLLEN, UNSPECIFIED SEASONALITY: Primary | ICD-10-CM

## 2021-10-05 PROCEDURE — 95117 IMMUNOTHERAPY INJECTIONS: CPT | Performed by: FAMILY MEDICINE

## 2021-10-07 DIAGNOSIS — J30.1 ALLERGIC RHINITIS DUE TO POLLEN, UNSPECIFIED SEASONALITY: Primary | ICD-10-CM

## 2021-10-07 RX ORDER — MONTELUKAST SODIUM 10 MG/1
10 TABLET ORAL DAILY
Qty: 30 TABLET | Refills: 12 | Status: SHIPPED | OUTPATIENT
Start: 2021-10-07 | End: 2022-10-31

## 2021-10-19 ENCOUNTER — CLINICAL SUPPORT (OUTPATIENT)
Dept: FAMILY MEDICINE CLINIC | Facility: CLINIC | Age: 66
End: 2021-10-19

## 2021-10-19 DIAGNOSIS — J30.1 ALLERGIC RHINITIS DUE TO POLLEN, UNSPECIFIED SEASONALITY: Primary | ICD-10-CM

## 2021-10-19 PROCEDURE — 95117 IMMUNOTHERAPY INJECTIONS: CPT | Performed by: FAMILY MEDICINE

## 2021-10-22 ENCOUNTER — OFFICE VISIT (OUTPATIENT)
Dept: FAMILY MEDICINE CLINIC | Facility: CLINIC | Age: 66
End: 2021-10-22

## 2021-10-22 VITALS
RESPIRATION RATE: 18 BRPM | OXYGEN SATURATION: 97 % | HEIGHT: 65 IN | HEART RATE: 80 BPM | TEMPERATURE: 97.8 F | SYSTOLIC BLOOD PRESSURE: 132 MMHG | BODY MASS INDEX: 34.35 KG/M2 | WEIGHT: 206.2 LBS | DIASTOLIC BLOOD PRESSURE: 70 MMHG

## 2021-10-22 DIAGNOSIS — Z12.5 ENCOUNTER FOR PROSTATE CANCER SCREENING: ICD-10-CM

## 2021-10-22 DIAGNOSIS — E78.2 MIXED HYPERLIPIDEMIA: ICD-10-CM

## 2021-10-22 DIAGNOSIS — Z00.00 MEDICARE ANNUAL WELLNESS VISIT, SUBSEQUENT: Primary | ICD-10-CM

## 2021-10-22 DIAGNOSIS — E11.9 TYPE 2 DIABETES MELLITUS WITHOUT COMPLICATION, WITHOUT LONG-TERM CURRENT USE OF INSULIN (HCC): ICD-10-CM

## 2021-10-22 DIAGNOSIS — Z12.12 SCREENING FOR MALIGNANT NEOPLASM OF THE RECTUM: ICD-10-CM

## 2021-10-22 DIAGNOSIS — L84 PRE-ULCERATIVE CORN OR CALLOUS: ICD-10-CM

## 2021-10-22 DIAGNOSIS — J30.1 SEASONAL ALLERGIC RHINITIS DUE TO POLLEN: ICD-10-CM

## 2021-10-22 DIAGNOSIS — E66.09 CLASS 1 OBESITY DUE TO EXCESS CALORIES WITH SERIOUS COMORBIDITY AND BODY MASS INDEX (BMI) OF 34.0 TO 34.9 IN ADULT: ICD-10-CM

## 2021-10-22 DIAGNOSIS — Z23 NEED FOR INFLUENZA VACCINATION: ICD-10-CM

## 2021-10-22 DIAGNOSIS — K21.9 GASTROESOPHAGEAL REFLUX DISEASE WITHOUT ESOPHAGITIS: ICD-10-CM

## 2021-10-22 DIAGNOSIS — G47.33 OBSTRUCTIVE SLEEP APNEA: ICD-10-CM

## 2021-10-22 DIAGNOSIS — R35.0 URINARY FREQUENCY: ICD-10-CM

## 2021-10-22 DIAGNOSIS — F41.9 ANXIETY: ICD-10-CM

## 2021-10-22 DIAGNOSIS — I10 HYPERTENSION, BENIGN: ICD-10-CM

## 2021-10-22 LAB
BILIRUB BLD-MCNC: NEGATIVE MG/DL
CLARITY, POC: CLEAR
COLOR UR: YELLOW
EXPIRATION DATE: NORMAL
GLUCOSE BLDC GLUCOMTR-MCNC: 111 MG/DL (ref 70–130)
GLUCOSE UR STRIP-MCNC: NEGATIVE MG/DL
HBA1C MFR BLD: 7.2 %
KETONES UR QL: NEGATIVE
LEUKOCYTE EST, POC: NEGATIVE
Lab: NORMAL
NITRITE UR-MCNC: NEGATIVE MG/ML
PH UR: 5 [PH] (ref 5–8)
PROT UR STRIP-MCNC: NEGATIVE MG/DL
RBC # UR STRIP: ABNORMAL /UL
SP GR UR: 1.01 (ref 1–1.03)
UROBILINOGEN UR QL: NORMAL

## 2021-10-22 PROCEDURE — 99214 OFFICE O/P EST MOD 30 MIN: CPT | Performed by: FAMILY MEDICINE

## 2021-10-22 PROCEDURE — 1170F FXNL STATUS ASSESSED: CPT | Performed by: FAMILY MEDICINE

## 2021-10-22 PROCEDURE — 1160F RVW MEDS BY RX/DR IN RCRD: CPT | Performed by: FAMILY MEDICINE

## 2021-10-22 PROCEDURE — 3051F HG A1C>EQUAL 7.0%<8.0%: CPT | Performed by: FAMILY MEDICINE

## 2021-10-22 PROCEDURE — 83036 HEMOGLOBIN GLYCOSYLATED A1C: CPT | Performed by: FAMILY MEDICINE

## 2021-10-22 PROCEDURE — 82962 GLUCOSE BLOOD TEST: CPT | Performed by: FAMILY MEDICINE

## 2021-10-22 PROCEDURE — 90662 IIV NO PRSV INCREASED AG IM: CPT | Performed by: FAMILY MEDICINE

## 2021-10-22 PROCEDURE — 1126F AMNT PAIN NOTED NONE PRSNT: CPT | Performed by: FAMILY MEDICINE

## 2021-10-22 PROCEDURE — G0439 PPPS, SUBSEQ VISIT: HCPCS | Performed by: FAMILY MEDICINE

## 2021-10-22 PROCEDURE — G0008 ADMIN INFLUENZA VIRUS VAC: HCPCS | Performed by: FAMILY MEDICINE

## 2021-10-22 PROCEDURE — 36415 COLL VENOUS BLD VENIPUNCTURE: CPT | Performed by: FAMILY MEDICINE

## 2021-10-22 PROCEDURE — 81002 URINALYSIS NONAUTO W/O SCOPE: CPT | Performed by: FAMILY MEDICINE

## 2021-10-23 LAB
ALBUMIN SERPL-MCNC: 4.3 G/DL (ref 3.8–4.8)
ALBUMIN/GLOB SERPL: 1.7 {RATIO} (ref 1.2–2.2)
ALP SERPL-CCNC: 50 IU/L (ref 44–121)
ALT SERPL-CCNC: 42 IU/L (ref 0–44)
APPEARANCE UR: CLEAR
AST SERPL-CCNC: 29 IU/L (ref 0–40)
BACTERIA #/AREA URNS HPF: NORMAL /[HPF]
BASOPHILS # BLD AUTO: 0 X10E3/UL (ref 0–0.2)
BASOPHILS NFR BLD AUTO: 1 %
BILIRUB SERPL-MCNC: 0.3 MG/DL (ref 0–1.2)
BILIRUB UR QL STRIP: NEGATIVE
BUN SERPL-MCNC: 20 MG/DL (ref 8–27)
BUN/CREAT SERPL: 22 (ref 10–24)
CALCIUM SERPL-MCNC: 9.4 MG/DL (ref 8.6–10.2)
CASTS URNS QL MICRO: NORMAL /LPF
CHLORIDE SERPL-SCNC: 103 MMOL/L (ref 96–106)
CHOLEST SERPL-MCNC: 128 MG/DL (ref 100–199)
CHOLEST/HDLC SERPL: 3.4 RATIO (ref 0–5)
CO2 SERPL-SCNC: 20 MMOL/L (ref 20–29)
COLOR UR: YELLOW
CREAT SERPL-MCNC: 0.89 MG/DL (ref 0.76–1.27)
EOSINOPHIL # BLD AUTO: 0.2 X10E3/UL (ref 0–0.4)
EOSINOPHIL NFR BLD AUTO: 2 %
EPI CELLS #/AREA URNS HPF: NORMAL /HPF (ref 0–10)
ERYTHROCYTE [DISTWIDTH] IN BLOOD BY AUTOMATED COUNT: 13.1 % (ref 11.6–15.4)
GLOBULIN SER CALC-MCNC: 2.5 G/DL (ref 1.5–4.5)
GLUCOSE SERPL-MCNC: 123 MG/DL (ref 65–99)
GLUCOSE UR QL: NEGATIVE
HCT VFR BLD AUTO: 40.3 % (ref 37.5–51)
HDLC SERPL-MCNC: 38 MG/DL
HGB BLD-MCNC: 13.2 G/DL (ref 13–17.7)
HGB UR QL STRIP: NEGATIVE
IMM GRANULOCYTES # BLD AUTO: 0 X10E3/UL (ref 0–0.1)
IMM GRANULOCYTES NFR BLD AUTO: 1 %
KETONES UR QL STRIP: NEGATIVE
LDLC SERPL CALC-MCNC: 74 MG/DL (ref 0–99)
LEUKOCYTE ESTERASE UR QL STRIP: NEGATIVE
LYMPHOCYTES # BLD AUTO: 0.8 X10E3/UL (ref 0.7–3.1)
LYMPHOCYTES NFR BLD AUTO: 11 %
MCH RBC QN AUTO: 26.9 PG (ref 26.6–33)
MCHC RBC AUTO-ENTMCNC: 32.8 G/DL (ref 31.5–35.7)
MCV RBC AUTO: 82 FL (ref 79–97)
MICRO URNS: NORMAL
MICRO URNS: NORMAL
MONOCYTES # BLD AUTO: 0.5 X10E3/UL (ref 0.1–0.9)
MONOCYTES NFR BLD AUTO: 7 %
NEUTROPHILS # BLD AUTO: 5.2 X10E3/UL (ref 1.4–7)
NEUTROPHILS NFR BLD AUTO: 78 %
NITRITE UR QL STRIP: NEGATIVE
PH UR STRIP: 5.5 [PH] (ref 5–7.5)
PLATELET # BLD AUTO: 269 X10E3/UL (ref 150–450)
POTASSIUM SERPL-SCNC: 4.2 MMOL/L (ref 3.5–5.2)
PROT SERPL-MCNC: 6.8 G/DL (ref 6–8.5)
PROT UR QL STRIP: NORMAL
PSA SERPL-MCNC: 1.4 NG/ML (ref 0–4)
RBC # BLD AUTO: 4.9 X10E6/UL (ref 4.14–5.8)
RBC #/AREA URNS HPF: NORMAL /HPF (ref 0–2)
SODIUM SERPL-SCNC: 139 MMOL/L (ref 134–144)
SP GR UR: 1.02 (ref 1–1.03)
TRIGL SERPL-MCNC: 78 MG/DL (ref 0–149)
TSH SERPL DL<=0.005 MIU/L-ACNC: 2.17 UIU/ML (ref 0.45–4.5)
UROBILINOGEN UR STRIP-MCNC: 0.2 MG/DL (ref 0.2–1)
VLDLC SERPL CALC-MCNC: 16 MG/DL (ref 5–40)
WBC # BLD AUTO: 6.7 X10E3/UL (ref 3.4–10.8)
WBC #/AREA URNS HPF: NORMAL /HPF (ref 0–5)

## 2021-10-26 ENCOUNTER — TELEPHONE (OUTPATIENT)
Dept: FAMILY MEDICINE CLINIC | Facility: CLINIC | Age: 66
End: 2021-10-26

## 2021-10-26 NOTE — TELEPHONE ENCOUNTER
Spoke with Fransico, per Dr Salinas CBC shows no anemia, white count is normal. Glucose 123, continue medication as ordered, monitor glucose , keep log and follow up in 3 months with log. Kidneys, electrolytes, liver thyroid and PSA are normal. Cholesterol 128, triglycerides 78, HDL 38, LDL 74, cardiac ratio 3.4. Continue all medications as ordered, with diet, exercise and weight control. We will repeat lab I 6 months. Urinalysis was normal.

## 2021-11-07 NOTE — ASSESSMENT & PLAN NOTE
Patient's (Body mass index is 34.85 kg/m².) indicates that they are obese (BMI >30) with health conditions that include hypertension, diabetes mellitus, dyslipidemias and GERD . Weight is worsening. BMI is is above average; BMI management plan is completed. We discussed portion control and increasing exercise.

## 2021-11-08 RX ORDER — FENOFIBRATE 160 MG/1
160 TABLET ORAL DAILY
Qty: 30 TABLET | Refills: 12 | Status: SHIPPED | OUTPATIENT
Start: 2021-11-08 | End: 2022-11-30

## 2021-11-10 ENCOUNTER — PROCEDURE VISIT (OUTPATIENT)
Dept: FAMILY MEDICINE CLINIC | Facility: CLINIC | Age: 66
End: 2021-11-10

## 2021-11-10 VITALS
HEART RATE: 81 BPM | DIASTOLIC BLOOD PRESSURE: 70 MMHG | BODY MASS INDEX: 34.45 KG/M2 | TEMPERATURE: 97.8 F | SYSTOLIC BLOOD PRESSURE: 138 MMHG | RESPIRATION RATE: 18 BRPM | OXYGEN SATURATION: 98 % | WEIGHT: 206.8 LBS | HEIGHT: 65 IN

## 2021-11-10 DIAGNOSIS — D18.01 HEMANGIOMA OF SKIN: Primary | ICD-10-CM

## 2021-11-10 DIAGNOSIS — L91.8 SKIN TAG: ICD-10-CM

## 2021-11-10 DIAGNOSIS — L82.1 SEBORRHEIC KERATOSIS: ICD-10-CM

## 2021-11-10 PROCEDURE — 11200 RMVL SKIN TAGS UP TO&INC 15: CPT | Performed by: FAMILY MEDICINE

## 2021-11-10 PROCEDURE — 17110 DESTRUCTION B9 LES UP TO 14: CPT | Performed by: FAMILY MEDICINE

## 2021-11-10 NOTE — PROGRESS NOTES
"Chief Complaint  Lesion    Subjective     CC  Problem List  Visit Diagnosis   Encounters  Notes  Medications  Labs  Result Review Imaging  Media    Fransico Cuenca presents to [facility] for   Lesion:   Fransico Cuenca is here today for multiple lesions. The lesion appeared gradual and has been occurring for month(s) ago.. The course has been increasing in size. The lesion is characterized as no sign of infection. The lesions are located over back, face, neck. The symptoms have been associated with recent enlargement.         Review of Systems   Skin: Positive for skin lesions ( mutliple over back elbows and neck. ).        Objective   Vital Signs:   /70 (BP Location: Left arm, Patient Position: Sitting, Cuff Size: Adult)   Pulse 81   Temp 97.8 °F (36.6 °C) (Temporal)   Resp 18   Ht 163.8 cm (64.5\")   Wt 93.8 kg (206 lb 12.8 oz)   SpO2 98% Comment: Room air  BMI 34.95 kg/m²     Physical Exam  Skin:     Comments: There is a 4 mm round red lesion over the left flank consistent with a hemangioma There is a 3 mm lesion over the left lateral elbow consistent with a hemangioma.   There are 4 lesions across the back that are dark brown dry scaly superficial and consistent with SK   There are numerous skin tags over the neck bilaterally the largest measuring 2 mm.         Result Review :Labs  Result Review  Imaging  Med Tab  Media          Destruction of Lesion    Date/Time: 11/10/2021 9:45 AM  Performed by: Camryn Salinas MD  Authorized by: Camryn Salinas MD   Preparation: Patient was prepped and draped in the usual sterile fashion.  Local anesthesia used: yes  Anesthesia: local infiltration    Anesthesia:  Local anesthesia used: yes  Local Anesthetic: lidocaine 2% with epinephrine  Anesthetic total: 9 mL    Sedation:  Patient sedated: no    Patient tolerance: patient tolerated the procedure well with no immediate complications  Comments: Hemangioma on left flank " and left medial elbow. 4 seborrheic keratosis across posterior upper back.     All lesions were cleansed with betadine anesthestized with 2 % lidocaine with epinephrine. The lesions were all shaved and cauterized and destroyed. There was felt to be no need for pathology. He tolerated the procedures well. Bleeding was minimal. He left the office in stable condition. Wound care precautions were given.      Destruction of Lesion    Date/Time: 11/10/2021 4:35 PM  Performed by: aCmryn Salinas MD  Authorized by: Camryn Salinas MD   Preparation: Patient was prepped and draped in the usual sterile fashion.  Local anesthesia used: yes  Anesthesia: local infiltration    Anesthesia:  Local anesthesia used: yes  Local Anesthetic: lidocaine 2% with epinephrine    Sedation:  Patient sedated: no    Patient tolerance: patient tolerated the procedure well with no immediate complications  Comments: There are 10 scattered skin tags over the right and left lateral neck. They were cleansed with betadine anesthestized with 2 % lidocaine with epinephrine. The lesions were shaved and then cauterized and destroyed. Bleeding was minimal. He received wound care precautions and will notify us prn problems.             Assessment and Plan CC Problem List  Visit Diagnosis  ROS  Review (Popup)  Health Maintenance  Quality  BestPractice  Medications  SmartSets  SnapShot Encounters  Media  Problem List Items Addressed This Visit     None      Visit Diagnoses     Hemangioma of skin    -  Primary    Left flank, left medal elbow  Wound care precations notify us prn of healing problems or bleeding.     Relevant Orders    Destruction of Lesion    Seborrheic keratosis        4 across the posterior shoulders  Wound care precations notify us prn of healing problems or bleeding.     Relevant Orders    Destruction of Lesion    Skin tag        right medial elbow, anterior midline neck  Wound care precations notify us prn of healing problems  or bleeding.     Relevant Orders    Destruction of Lesion          Follow Up Instructions Charge Capture  Follow-up Communications  No follow-ups on file.  Patient was given instructions and counseling regarding his condition or for health maintenance advice. Please see specific information pulled into the AVS if appropriate.

## 2021-11-16 ENCOUNTER — CLINICAL SUPPORT (OUTPATIENT)
Dept: FAMILY MEDICINE CLINIC | Facility: CLINIC | Age: 66
End: 2021-11-16

## 2021-11-16 DIAGNOSIS — J30.1 SEASONAL ALLERGIC RHINITIS DUE TO POLLEN: Primary | ICD-10-CM

## 2021-11-16 PROCEDURE — 95117 IMMUNOTHERAPY INJECTIONS: CPT | Performed by: FAMILY MEDICINE

## 2021-11-30 ENCOUNTER — CLINICAL SUPPORT (OUTPATIENT)
Dept: FAMILY MEDICINE CLINIC | Facility: CLINIC | Age: 66
End: 2021-11-30

## 2021-11-30 DIAGNOSIS — J30.1 ALLERGIC RHINITIS DUE TO POLLEN, UNSPECIFIED SEASONALITY: Primary | ICD-10-CM

## 2021-11-30 PROCEDURE — 95117 IMMUNOTHERAPY INJECTIONS: CPT | Performed by: FAMILY MEDICINE

## 2021-12-06 DIAGNOSIS — I10 HYPERTENSION, BENIGN: ICD-10-CM

## 2021-12-06 RX ORDER — LISINOPRIL AND HYDROCHLOROTHIAZIDE 20; 12.5 MG/1; MG/1
TABLET ORAL
Qty: 90 TABLET | Refills: 3 | Status: SHIPPED | OUTPATIENT
Start: 2021-12-06 | End: 2022-09-28

## 2021-12-14 ENCOUNTER — CLINICAL SUPPORT (OUTPATIENT)
Dept: FAMILY MEDICINE CLINIC | Facility: CLINIC | Age: 66
End: 2021-12-14

## 2021-12-14 DIAGNOSIS — J30.1 NON-SEASONAL ALLERGIC RHINITIS DUE TO POLLEN: Primary | ICD-10-CM

## 2021-12-14 PROCEDURE — 95117 IMMUNOTHERAPY INJECTIONS: CPT | Performed by: FAMILY MEDICINE

## 2021-12-29 ENCOUNTER — CLINICAL SUPPORT (OUTPATIENT)
Dept: FAMILY MEDICINE CLINIC | Facility: CLINIC | Age: 66
End: 2021-12-29

## 2021-12-29 DIAGNOSIS — J30.1 ALLERGIC RHINITIS DUE TO POLLEN, UNSPECIFIED SEASONALITY: Primary | ICD-10-CM

## 2021-12-29 PROCEDURE — 95117 IMMUNOTHERAPY INJECTIONS: CPT | Performed by: FAMILY MEDICINE

## 2022-01-11 ENCOUNTER — CLINICAL SUPPORT (OUTPATIENT)
Dept: FAMILY MEDICINE CLINIC | Facility: CLINIC | Age: 67
End: 2022-01-11

## 2022-01-11 DIAGNOSIS — J30.1 ALLERGIC RHINITIS DUE TO POLLEN, UNSPECIFIED SEASONALITY: Primary | ICD-10-CM

## 2022-01-11 PROCEDURE — 95117 IMMUNOTHERAPY INJECTIONS: CPT | Performed by: FAMILY MEDICINE

## 2022-01-24 ENCOUNTER — OFFICE VISIT (OUTPATIENT)
Dept: FAMILY MEDICINE CLINIC | Facility: CLINIC | Age: 67
End: 2022-01-24

## 2022-01-24 VITALS
RESPIRATION RATE: 16 BRPM | OXYGEN SATURATION: 96 % | HEIGHT: 65 IN | SYSTOLIC BLOOD PRESSURE: 138 MMHG | WEIGHT: 206.6 LBS | HEART RATE: 77 BPM | DIASTOLIC BLOOD PRESSURE: 86 MMHG | TEMPERATURE: 97.7 F | BODY MASS INDEX: 34.42 KG/M2

## 2022-01-24 DIAGNOSIS — E11.9 TYPE 2 DIABETES MELLITUS WITHOUT COMPLICATION, WITHOUT LONG-TERM CURRENT USE OF INSULIN: Primary | ICD-10-CM

## 2022-01-24 DIAGNOSIS — E78.2 MIXED HYPERLIPIDEMIA: ICD-10-CM

## 2022-01-24 DIAGNOSIS — E66.09 CLASS 1 OBESITY DUE TO EXCESS CALORIES WITH SERIOUS COMORBIDITY AND BODY MASS INDEX (BMI) OF 34.0 TO 34.9 IN ADULT: ICD-10-CM

## 2022-01-24 DIAGNOSIS — J30.1 SEASONAL ALLERGIC RHINITIS DUE TO POLLEN: ICD-10-CM

## 2022-01-24 DIAGNOSIS — I10 HYPERTENSION, BENIGN: ICD-10-CM

## 2022-01-24 DIAGNOSIS — L82.1 SEBORRHEIC KERATOSES: ICD-10-CM

## 2022-01-24 LAB
BILIRUB BLD-MCNC: NEGATIVE MG/DL
CLARITY, POC: CLEAR
COLOR UR: YELLOW
EXPIRATION DATE: NORMAL
GLUCOSE BLDC GLUCOMTR-MCNC: 168 MG/DL (ref 70–130)
GLUCOSE UR STRIP-MCNC: NEGATIVE MG/DL
HBA1C MFR BLD: 7.1 %
KETONES UR QL: NEGATIVE
LEUKOCYTE EST, POC: NEGATIVE
Lab: NORMAL
NITRITE UR-MCNC: NEGATIVE MG/ML
PH UR: 5 [PH] (ref 5–8)
PROT UR STRIP-MCNC: NEGATIVE MG/DL
RBC # UR STRIP: NEGATIVE /UL
SP GR UR: 1.01 (ref 1–1.03)
UROBILINOGEN UR QL: NORMAL

## 2022-01-24 PROCEDURE — 11305 SHAVE SKIN LESION 0.5 CM/<: CPT | Performed by: FAMILY MEDICINE

## 2022-01-24 PROCEDURE — 95117 IMMUNOTHERAPY INJECTIONS: CPT | Performed by: FAMILY MEDICINE

## 2022-01-24 PROCEDURE — 82962 GLUCOSE BLOOD TEST: CPT | Performed by: FAMILY MEDICINE

## 2022-01-24 PROCEDURE — 36416 COLLJ CAPILLARY BLOOD SPEC: CPT | Performed by: FAMILY MEDICINE

## 2022-01-24 PROCEDURE — 83036 HEMOGLOBIN GLYCOSYLATED A1C: CPT | Performed by: FAMILY MEDICINE

## 2022-01-24 PROCEDURE — 81002 URINALYSIS NONAUTO W/O SCOPE: CPT | Performed by: FAMILY MEDICINE

## 2022-01-24 PROCEDURE — 99214 OFFICE O/P EST MOD 30 MIN: CPT | Performed by: FAMILY MEDICINE

## 2022-01-24 PROCEDURE — 3051F HG A1C>EQUAL 7.0%<8.0%: CPT | Performed by: FAMILY MEDICINE

## 2022-01-26 LAB
DX ICD CODE: NORMAL
DX ICD CODE: NORMAL
PATH REPORT.FINAL DX SPEC: NORMAL
PATH REPORT.GROSS SPEC: NORMAL
PATH REPORT.SITE OF ORIGIN SPEC: NORMAL
PATHOLOGIST NAME: NORMAL
PAYMENT PROCEDURE: NORMAL

## 2022-02-08 ENCOUNTER — CLINICAL SUPPORT (OUTPATIENT)
Dept: FAMILY MEDICINE CLINIC | Facility: CLINIC | Age: 67
End: 2022-02-08

## 2022-02-08 DIAGNOSIS — J30.1 SEASONAL ALLERGIC RHINITIS DUE TO POLLEN: Primary | ICD-10-CM

## 2022-02-08 PROCEDURE — 95117 IMMUNOTHERAPY INJECTIONS: CPT | Performed by: FAMILY MEDICINE

## 2022-02-22 ENCOUNTER — CLINICAL SUPPORT (OUTPATIENT)
Dept: FAMILY MEDICINE CLINIC | Facility: CLINIC | Age: 67
End: 2022-02-22

## 2022-02-22 DIAGNOSIS — J30.1 SEASONAL ALLERGIC RHINITIS DUE TO POLLEN: Primary | ICD-10-CM

## 2022-02-22 PROCEDURE — 95117 IMMUNOTHERAPY INJECTIONS: CPT | Performed by: FAMILY MEDICINE

## 2022-02-23 DIAGNOSIS — E11.9 TYPE 2 DIABETES MELLITUS WITHOUT COMPLICATION, WITHOUT LONG-TERM CURRENT USE OF INSULIN: ICD-10-CM

## 2022-03-08 ENCOUNTER — CLINICAL SUPPORT (OUTPATIENT)
Dept: FAMILY MEDICINE CLINIC | Facility: CLINIC | Age: 67
End: 2022-03-08

## 2022-03-08 DIAGNOSIS — J30.1 SEASONAL ALLERGIC RHINITIS DUE TO POLLEN: Primary | ICD-10-CM

## 2022-03-08 PROCEDURE — 95117 IMMUNOTHERAPY INJECTIONS: CPT | Performed by: FAMILY MEDICINE

## 2022-03-15 ENCOUNTER — CLINICAL SUPPORT (OUTPATIENT)
Dept: FAMILY MEDICINE CLINIC | Facility: CLINIC | Age: 67
End: 2022-03-15

## 2022-03-15 DIAGNOSIS — J30.1 ALLERGIC RHINITIS DUE TO POLLEN, UNSPECIFIED SEASONALITY: Primary | ICD-10-CM

## 2022-03-15 PROCEDURE — 95117 IMMUNOTHERAPY INJECTIONS: CPT | Performed by: FAMILY MEDICINE

## 2022-03-19 DIAGNOSIS — I10 HYPERTENSION, BENIGN: ICD-10-CM

## 2022-03-21 RX ORDER — AMLODIPINE BESYLATE 5 MG/1
TABLET ORAL
Qty: 30 TABLET | Refills: 12 | Status: SHIPPED | OUTPATIENT
Start: 2022-03-21 | End: 2023-03-31

## 2022-03-22 ENCOUNTER — CLINICAL SUPPORT (OUTPATIENT)
Dept: FAMILY MEDICINE CLINIC | Facility: CLINIC | Age: 67
End: 2022-03-22

## 2022-03-22 DIAGNOSIS — J30.1 SEASONAL ALLERGIC RHINITIS DUE TO POLLEN: Primary | ICD-10-CM

## 2022-03-22 PROCEDURE — 95117 IMMUNOTHERAPY INJECTIONS: CPT | Performed by: FAMILY MEDICINE

## 2022-04-01 DIAGNOSIS — E78.2 MIXED HYPERLIPIDEMIA: ICD-10-CM

## 2022-04-01 DIAGNOSIS — E11.9 TYPE 2 DIABETES MELLITUS WITHOUT COMPLICATION, WITHOUT LONG-TERM CURRENT USE OF INSULIN: ICD-10-CM

## 2022-04-01 RX ORDER — ATORVASTATIN CALCIUM 10 MG/1
10 TABLET, FILM COATED ORAL DAILY
Qty: 30 TABLET | Refills: 12 | Status: SHIPPED | OUTPATIENT
Start: 2022-04-01 | End: 2022-09-14

## 2022-04-01 NOTE — TELEPHONE ENCOUNTER
Fransico came into the office stating for his Atorvastatin 10 mg he is needing this refilled and updated signature.  Please have this sent to [facility] in [place].  Please call and update patient when completed.

## 2022-04-05 ENCOUNTER — CLINICAL SUPPORT (OUTPATIENT)
Dept: FAMILY MEDICINE CLINIC | Facility: CLINIC | Age: 67
End: 2022-04-05

## 2022-04-05 DIAGNOSIS — J30.1 SEASONAL ALLERGIC RHINITIS DUE TO POLLEN: Primary | ICD-10-CM

## 2022-04-05 PROCEDURE — 95117 IMMUNOTHERAPY INJECTIONS: CPT | Performed by: FAMILY MEDICINE

## 2022-04-19 ENCOUNTER — CLINICAL SUPPORT (OUTPATIENT)
Dept: FAMILY MEDICINE CLINIC | Facility: CLINIC | Age: 67
End: 2022-04-19

## 2022-04-19 DIAGNOSIS — J30.1 SEASONAL ALLERGIC RHINITIS DUE TO POLLEN: Primary | ICD-10-CM

## 2022-04-19 PROCEDURE — 95117 IMMUNOTHERAPY INJECTIONS: CPT | Performed by: FAMILY MEDICINE

## 2022-05-03 ENCOUNTER — OFFICE VISIT (OUTPATIENT)
Dept: FAMILY MEDICINE CLINIC | Facility: CLINIC | Age: 67
End: 2022-05-03

## 2022-05-03 VITALS
SYSTOLIC BLOOD PRESSURE: 130 MMHG | BODY MASS INDEX: 34.72 KG/M2 | HEIGHT: 65 IN | TEMPERATURE: 97.8 F | HEART RATE: 73 BPM | WEIGHT: 208.4 LBS | OXYGEN SATURATION: 96 % | RESPIRATION RATE: 20 BRPM | DIASTOLIC BLOOD PRESSURE: 80 MMHG

## 2022-05-03 DIAGNOSIS — J30.1 SEASONAL ALLERGIC RHINITIS DUE TO POLLEN: ICD-10-CM

## 2022-05-03 DIAGNOSIS — I10 HYPERTENSION, BENIGN: ICD-10-CM

## 2022-05-03 DIAGNOSIS — E78.2 MIXED HYPERLIPIDEMIA: ICD-10-CM

## 2022-05-03 DIAGNOSIS — L82.0 SEBORRHEIC KERATOSIS, INFLAMED: ICD-10-CM

## 2022-05-03 DIAGNOSIS — E11.9 TYPE 2 DIABETES MELLITUS WITHOUT COMPLICATION, WITHOUT LONG-TERM CURRENT USE OF INSULIN: Primary | ICD-10-CM

## 2022-05-03 DIAGNOSIS — L29.9 PRURITUS: ICD-10-CM

## 2022-05-03 DIAGNOSIS — E66.09 CLASS 1 OBESITY DUE TO EXCESS CALORIES WITH SERIOUS COMORBIDITY AND BODY MASS INDEX (BMI) OF 34.0 TO 34.9 IN ADULT: ICD-10-CM

## 2022-05-03 LAB
BILIRUB BLD-MCNC: NEGATIVE MG/DL
CLARITY, POC: CLEAR
COLOR UR: YELLOW
EXPIRATION DATE: NORMAL
GLUCOSE BLDC GLUCOMTR-MCNC: 144 MG/DL (ref 70–130)
GLUCOSE UR STRIP-MCNC: NEGATIVE MG/DL
HBA1C MFR BLD: 7.8 %
KETONES UR QL: NEGATIVE
LEUKOCYTE EST, POC: NEGATIVE
Lab: NORMAL
NITRITE UR-MCNC: NEGATIVE MG/ML
PH UR: 7.5 [PH] (ref 5–8)
PROT UR STRIP-MCNC: ABNORMAL MG/DL
RBC # UR STRIP: NEGATIVE /UL
SP GR UR: 1.01 (ref 1–1.03)
UROBILINOGEN UR QL: NORMAL

## 2022-05-03 PROCEDURE — 11310 SHAVE SKIN LESION 0.5 CM/<: CPT | Performed by: FAMILY MEDICINE

## 2022-05-03 PROCEDURE — 81002 URINALYSIS NONAUTO W/O SCOPE: CPT | Performed by: FAMILY MEDICINE

## 2022-05-03 PROCEDURE — 3051F HG A1C>EQUAL 7.0%<8.0%: CPT | Performed by: FAMILY MEDICINE

## 2022-05-03 PROCEDURE — 82947 ASSAY GLUCOSE BLOOD QUANT: CPT | Performed by: FAMILY MEDICINE

## 2022-05-03 PROCEDURE — 95117 IMMUNOTHERAPY INJECTIONS: CPT | Performed by: FAMILY MEDICINE

## 2022-05-03 PROCEDURE — 83036 HEMOGLOBIN GLYCOSYLATED A1C: CPT | Performed by: FAMILY MEDICINE

## 2022-05-03 PROCEDURE — 99214 OFFICE O/P EST MOD 30 MIN: CPT | Performed by: FAMILY MEDICINE

## 2022-05-03 RX ORDER — DOXAZOSIN MESYLATE 1 MG/1
1 TABLET ORAL DAILY
Qty: 30 TABLET | Refills: 12 | Status: SHIPPED | OUTPATIENT
Start: 2022-05-03

## 2022-05-03 RX ORDER — CITALOPRAM 20 MG/1
20 TABLET ORAL DAILY
Qty: 30 TABLET | Refills: 12 | Status: SHIPPED | OUTPATIENT
Start: 2022-05-03

## 2022-05-03 RX ORDER — DULAGLUTIDE 0.75 MG/.5ML
0.75 INJECTION, SOLUTION SUBCUTANEOUS WEEKLY
Qty: 4 PEN | Refills: 12 | Status: SHIPPED | OUTPATIENT
Start: 2022-05-03 | End: 2022-05-17

## 2022-05-03 NOTE — PROGRESS NOTES
Chief Complaint  No chief complaint on file.    Subjective     CC  Problem List  Visit Diagnosis   Encounters  Notes  Medications  Labs  Result Review Imaging  Media    Fransico Cuenca presents to [facility] for   Lesion:   Fransico Cuenca is here today for a lesion. The lesion appeared gradual and has been occurring for 1 month(s) ago.. The course has been increasing in size. The lesion is characterized as no sign of infection. The lesion is located over the right mid cheek. The symptoms have been associated with recent enlargement and itching.      Diabetes  He presents for his follow-up diabetic visit. He has type 2 diabetes mellitus. Pertinent negatives for hypoglycemia include no confusion, dizziness, headaches or sweats. Pertinent negatives for diabetes include no blurred vision, no chest pain, no fatigue, no polydipsia, no polyuria, no weakness and no weight loss. There are no hypoglycemic complications. There are no diabetic complications. Risk factors for coronary artery disease include dyslipidemia, hypertension, obesity, family history, male sex and diabetes mellitus. He is following a generally healthy diet. Meal planning includes avoidance of concentrated sweets. He participates in exercise daily. (Does not check regularly at home) An ACE inhibitor/angiotensin II receptor blocker is being taken. He does not see a podiatrist.Eye exam is current ([facility]).   Hypertension  This is a chronic problem. The current episode started more than 1 year ago. The problem is controlled. Pertinent negatives include no blurred vision, chest pain, headaches, palpitations, shortness of breath or sweats. Risk factors for coronary artery disease include diabetes mellitus, dyslipidemia, obesity, male gender and family history. Current antihypertension treatment includes calcium channel blockers and ACE inhibitors. The current treatment provides moderate improvement.  "  Hyperlipidemia  This is a chronic problem. The current episode started more than 1 year ago. The problem is uncontrolled. Recent lipid tests were reviewed and are low. Exacerbating diseases include diabetes and obesity. He has no history of hypothyroidism. Pertinent negatives include no chest pain or shortness of breath. Current antihyperlipidemic treatment includes statins and herbal therapy. The current treatment provides moderate improvement of lipids. Risk factors for coronary artery disease include family history, dyslipidemia, diabetes mellitus, hypertension, male sex and obesity.       Review of Systems   Constitutional: Negative for fatigue, unexpected weight gain and unexpected weight loss.   Eyes: Negative for blurred vision.   Respiratory: Negative for shortness of breath and wheezing.    Cardiovascular: Negative for chest pain, palpitations and leg swelling.   Gastrointestinal: Negative for constipation and diarrhea.   Endocrine: Negative for cold intolerance, heat intolerance, polydipsia and polyuria.   Genitourinary: Negative for dysuria.   Skin: Positive for skin lesions.   Neurological: Negative for dizziness, weakness, numbness and confusion.        Objective   Vital Signs:   /80 (BP Location: Right arm, Patient Position: Sitting, Cuff Size: Adult)   Pulse 73   Temp 97.8 °F (36.6 °C) (Temporal)   Resp 20   Ht 163.8 cm (64.5\")   Wt 94.5 kg (208 lb 6.4 oz)   SpO2 96%   BMI 35.22 kg/m²     Physical Exam  Constitutional:       General: He is not in acute distress.  Cardiovascular:      Rate and Rhythm: Normal rate and regular rhythm.      Pulses: Normal pulses.      Heart sounds: Normal heart sounds. No murmur heard.  Pulmonary:      Effort: Pulmonary effort is normal.      Breath sounds: Normal breath sounds.   Musculoskeletal:      Cervical back: Neck supple.   Lymphadenopathy:      Cervical: No cervical adenopathy.   Skin:     Findings: Lesion (There is a 5 mm irregular dry scaly brown " lesion over the right proximal center cheek consistent with a SK ) present.   Neurological:      Mental Status: He is alert and oriented to person, place, and time.      Coordination: Coordination normal.        Result Review :Labs  Result Review  Imaging  Med Tab  Media          Skin Excision    Date/Time: 5/3/2022 5:41 PM  Performed by: Camryn Salinas MD  Authorized by: Camryn Salinas MD     Consent:     Consent obtained:  Written    Consent given by:  Patient    Risks discussed:  Bleeding, infection and poor cosmetic result    Alternatives discussed:  Referral  Pre-procedure details:     Preparation: Patient was prepped and draped in usual sterile fashion    Anesthesia:     Anesthesia method:  Local infiltration    Local anesthetic:  Lidocaine 2% WITH epi  Procedure details:     Body Area:  Face (right mid proximal cheek)    Malignancy: benign lesion      Initial Size:  5    Final defect size (mm):  5  Post-procedure details:     Patient tolerance of procedure:  Tolerated well, no immediate complications  Comments:      The 5 mm dry scaly appearing lesion was excised completely under sterile conditions by shaving. The specimen was sent for pathology. Bleeding was minimal and controlled with cautery. He received wound care precautions. We will notify him of pathology results.           Assessment and Plan CC Problem List  Visit Diagnosis  ROS  Review (Popup)  Health Maintenance  Quality  BestPractice  Medications  SmartSets  SnapShot Encounters  Media  Problem List Items Addressed This Visit        High    Hypertension, benign    Overview     Controlled compliant.           Current Assessment & Plan     Hypertension is improving with treatment.  Continue current treatment regimen.  Dietary sodium restriction.  Weight loss.  Regular aerobic exercise.  Blood pressure will be reassessed in 3 months.           Relevant Medications    doxazosin (CARDURA) 1 MG tablet    Type 2 diabetes mellitus  without complication, without long-term current use of insulin ([facility]) - Primary    Overview     A1c 7.8 05/03/2022 marginal control begin low dose trulicity   A1c 7.1 01/24/2022 improved  A1c 7.2 10/22/201    A1c 7.5  07/13/2021   A1c 7.6 03/24/2020   A1c 7.5 12/21/2020 stable,    A1c 7.4 09/16/2020    A1c 7.2 10/11/2020  A1c 6.2 08/20/2019,   New onset 04/29/2019, A1c 7.2               Current Assessment & Plan     Diabetes is worsening.   Dietary recommendations for ADA diet.  Regular aerobic exercise.  Medication changes per orders.  Diabetes will be reassessed in 3 months.           Relevant Medications    Dulaglutide (Trulicity) 0.75 MG/0.5ML solution pen-injector    Other Relevant Orders    POCT urinalysis dipstick, manual (Completed)    POCT Glucose (Completed)    POC Glycosylated Hemoglobin (Hb A1C) (Completed)       Medium    Mixed hyperlipidemia    Overview     Stable, compliant           Current Assessment & Plan     Lipid abnormalities are unchanged.  Nutritional counseling was provided. and Pharmacotherapy as ordered.  Lipids will be reassessed in 3 months.           Allergic rhinitis due to pollen    Overview     Sxs stable on Immuno Rx .                   Low    Class 1 obesity due to excess calories with serious comorbidity and body mass index (BMI) of 34.0 to 34.9 in adult    Overview     Diet and organized wt loss encouraged           Current Assessment & Plan     Patient's (Body mass index is 35.22 kg/m².) indicates that they are obese (BMI >30) with health conditions that include hypertension, diabetes mellitus and dyslipidemias . Weight is worsening. BMI is is above average; BMI management plan is completed. We discussed portion control and increasing exercise.              Other Visit Diagnoses     Seborrheic keratosis, inflamed        Local wound care, notify him of pathology     Relevant Orders    Reference Histopathology (Completed)    Skin Excision    Pruritus              Follow Up  Instructions Charge Capture  Follow-up Communications  Return in about 3 months (around 8/3/2022).  Patient was given instructions and counseling regarding his condition or for health maintenance advice. Please see specific information pulled into the AVS if appropriate.

## 2022-05-15 PROBLEM — G47.33 OBSTRUCTIVE SLEEP APNEA: Status: RESOLVED | Noted: 2019-08-20 | Resolved: 2022-05-15

## 2022-05-15 NOTE — ASSESSMENT & PLAN NOTE
Patient's (Body mass index is 35.22 kg/m².) indicates that they are obese (BMI >30) with health conditions that include hypertension, diabetes mellitus and dyslipidemias . Weight is worsening. BMI is is above average; BMI management plan is completed. We discussed portion control and increasing exercise.

## 2022-05-15 NOTE — ASSESSMENT & PLAN NOTE
Diabetes is worsening.   Dietary recommendations for ADA diet.  Regular aerobic exercise.  Medication changes per orders.  Diabetes will be reassessed in 3 months.

## 2022-05-17 ENCOUNTER — TELEPHONE (OUTPATIENT)
Dept: FAMILY MEDICINE CLINIC | Facility: CLINIC | Age: 67
End: 2022-05-17

## 2022-05-17 ENCOUNTER — CLINICAL SUPPORT (OUTPATIENT)
Dept: FAMILY MEDICINE CLINIC | Facility: CLINIC | Age: 67
End: 2022-05-17

## 2022-05-17 DIAGNOSIS — E11.9 TYPE 2 DIABETES MELLITUS WITHOUT COMPLICATION, WITHOUT LONG-TERM CURRENT USE OF INSULIN: Primary | ICD-10-CM

## 2022-05-17 DIAGNOSIS — J30.1 ALLERGIC RHINITIS DUE TO POLLEN, UNSPECIFIED SEASONALITY: Primary | ICD-10-CM

## 2022-05-17 PROCEDURE — 95117 IMMUNOTHERAPY INJECTIONS: CPT | Performed by: FAMILY MEDICINE

## 2022-05-17 RX ORDER — PIOGLITAZONEHYDROCHLORIDE 30 MG/1
30 TABLET ORAL DAILY
Qty: 30 TABLET | Refills: 12 | Status: SHIPPED | OUTPATIENT
Start: 2022-05-17

## 2022-05-17 NOTE — TELEPHONE ENCOUNTER
Fransico called will be unable to  Get trulicity due to cost, question if any thing else he may try..        Per Dr Brad garcia 30 mg daily was sent to the pharmacy.        .

## 2022-05-31 ENCOUNTER — CLINICAL SUPPORT (OUTPATIENT)
Dept: FAMILY MEDICINE CLINIC | Facility: CLINIC | Age: 67
End: 2022-05-31

## 2022-05-31 DIAGNOSIS — J30.1 ALLERGIC RHINITIS DUE TO POLLEN, UNSPECIFIED SEASONALITY: Primary | ICD-10-CM

## 2022-05-31 PROCEDURE — 95117 IMMUNOTHERAPY INJECTIONS: CPT | Performed by: FAMILY MEDICINE

## 2022-06-14 ENCOUNTER — CLINICAL SUPPORT (OUTPATIENT)
Dept: FAMILY MEDICINE CLINIC | Facility: CLINIC | Age: 67
End: 2022-06-14

## 2022-06-14 DIAGNOSIS — J30.1 SEASONAL ALLERGIC RHINITIS DUE TO POLLEN: Primary | ICD-10-CM

## 2022-06-14 PROCEDURE — 95117 IMMUNOTHERAPY INJECTIONS: CPT | Performed by: FAMILY MEDICINE

## 2022-06-28 ENCOUNTER — CLINICAL SUPPORT (OUTPATIENT)
Dept: FAMILY MEDICINE CLINIC | Facility: CLINIC | Age: 67
End: 2022-06-28

## 2022-06-28 DIAGNOSIS — J30.1 ALLERGIC RHINITIS DUE TO POLLEN, UNSPECIFIED SEASONALITY: Primary | ICD-10-CM

## 2022-06-28 PROCEDURE — 95117 IMMUNOTHERAPY INJECTIONS: CPT | Performed by: FAMILY MEDICINE

## 2022-07-12 ENCOUNTER — CLINICAL SUPPORT (OUTPATIENT)
Dept: FAMILY MEDICINE CLINIC | Facility: CLINIC | Age: 67
End: 2022-07-12

## 2022-07-12 DIAGNOSIS — J30.1 SEASONAL ALLERGIC RHINITIS DUE TO POLLEN: Primary | ICD-10-CM

## 2022-07-12 PROCEDURE — 95117 IMMUNOTHERAPY INJECTIONS: CPT | Performed by: FAMILY MEDICINE

## 2022-07-27 ENCOUNTER — CLINICAL SUPPORT (OUTPATIENT)
Dept: FAMILY MEDICINE CLINIC | Facility: CLINIC | Age: 67
End: 2022-07-27

## 2022-07-27 DIAGNOSIS — J30.1 SEASONAL ALLERGIC RHINITIS DUE TO POLLEN: Primary | ICD-10-CM

## 2022-07-27 PROCEDURE — 95117 IMMUNOTHERAPY INJECTIONS: CPT | Performed by: FAMILY MEDICINE

## 2022-08-02 ENCOUNTER — CLINICAL SUPPORT (OUTPATIENT)
Dept: FAMILY MEDICINE CLINIC | Facility: CLINIC | Age: 67
End: 2022-08-02

## 2022-08-02 DIAGNOSIS — J30.1 SEASONAL ALLERGIC RHINITIS DUE TO POLLEN: Primary | ICD-10-CM

## 2022-08-02 PROCEDURE — 95117 IMMUNOTHERAPY INJECTIONS: CPT | Performed by: FAMILY MEDICINE

## 2022-08-09 ENCOUNTER — OFFICE VISIT (OUTPATIENT)
Dept: FAMILY MEDICINE CLINIC | Facility: CLINIC | Age: 67
End: 2022-08-09

## 2022-08-09 VITALS
OXYGEN SATURATION: 97 % | DIASTOLIC BLOOD PRESSURE: 84 MMHG | HEART RATE: 65 BPM | WEIGHT: 206.2 LBS | SYSTOLIC BLOOD PRESSURE: 138 MMHG | RESPIRATION RATE: 18 BRPM | HEIGHT: 65 IN | BODY MASS INDEX: 34.35 KG/M2 | TEMPERATURE: 97.5 F

## 2022-08-09 DIAGNOSIS — L23.7 ALLERGIC CONTACT DERMATITIS DUE TO PLANTS, EXCEPT FOOD: ICD-10-CM

## 2022-08-09 DIAGNOSIS — E66.09 CLASS 1 OBESITY DUE TO EXCESS CALORIES WITH SERIOUS COMORBIDITY AND BODY MASS INDEX (BMI) OF 34.0 TO 34.9 IN ADULT: ICD-10-CM

## 2022-08-09 DIAGNOSIS — E11.9 TYPE 2 DIABETES MELLITUS WITHOUT COMPLICATION, WITHOUT LONG-TERM CURRENT USE OF INSULIN: Primary | ICD-10-CM

## 2022-08-09 DIAGNOSIS — E78.2 MIXED HYPERLIPIDEMIA: ICD-10-CM

## 2022-08-09 DIAGNOSIS — I10 HYPERTENSION, BENIGN: ICD-10-CM

## 2022-08-09 DIAGNOSIS — J30.1 SEASONAL ALLERGIC RHINITIS DUE TO POLLEN: ICD-10-CM

## 2022-08-09 LAB
BILIRUB BLD-MCNC: NEGATIVE MG/DL
CLARITY, POC: CLEAR
COLOR UR: YELLOW
EXPIRATION DATE: NORMAL
GLUCOSE BLDC GLUCOMTR-MCNC: 129 MG/DL (ref 70–130)
GLUCOSE UR STRIP-MCNC: NEGATIVE MG/DL
HBA1C MFR BLD: 6.4 %
KETONES UR QL: NEGATIVE
LEUKOCYTE EST, POC: NEGATIVE
Lab: NORMAL
NITRITE UR-MCNC: NEGATIVE MG/ML
PH UR: 6 [PH] (ref 5–8)
PROT UR STRIP-MCNC: NEGATIVE MG/DL
RBC # UR STRIP: NEGATIVE /UL
SP GR UR: 1.01 (ref 1–1.03)
UROBILINOGEN UR QL: NORMAL

## 2022-08-09 PROCEDURE — 99214 OFFICE O/P EST MOD 30 MIN: CPT | Performed by: FAMILY MEDICINE

## 2022-08-09 PROCEDURE — 82962 GLUCOSE BLOOD TEST: CPT | Performed by: FAMILY MEDICINE

## 2022-08-09 PROCEDURE — 3044F HG A1C LEVEL LT 7.0%: CPT | Performed by: FAMILY MEDICINE

## 2022-08-09 PROCEDURE — 83036 HEMOGLOBIN GLYCOSYLATED A1C: CPT | Performed by: FAMILY MEDICINE

## 2022-08-09 PROCEDURE — 81002 URINALYSIS NONAUTO W/O SCOPE: CPT | Performed by: FAMILY MEDICINE

## 2022-08-09 PROCEDURE — 95117 IMMUNOTHERAPY INJECTIONS: CPT | Performed by: FAMILY MEDICINE

## 2022-08-09 RX ORDER — PREDNISONE 10 MG/1
10 TABLET ORAL TAKE AS DIRECTED
Qty: 35 TABLET | Refills: 0 | Status: SHIPPED | OUTPATIENT
Start: 2022-08-09 | End: 2022-08-24

## 2022-08-09 NOTE — PROGRESS NOTES
Chief Complaint  Diabetes, Hypertension, and Hyperlipidemia    Subjective     CC  Problem List  Visit Diagnosis   Encounters  Notes  Medications  Labs  Result Review Imaging  Media    Fransico Cuenca presents to [facility] for   Diabetes  He presents for his follow-up diabetic visit. He has type 2 diabetes mellitus. Pertinent negatives for hypoglycemia include no confusion, dizziness or headaches. Pertinent negatives for diabetes include no blurred vision, no chest pain, no fatigue, no polydipsia, no polyuria, no weakness and no weight loss. There are no hypoglycemic complications. Symptoms are stable. There are no diabetic complications. Risk factors for coronary artery disease include dyslipidemia, diabetes mellitus, hypertension, male sex, obesity and family history. He is following a diabetic and generally healthy diet. Meal planning includes avoidance of concentrated sweets. He has not had a previous visit with a dietitian. He participates in exercise daily. An ACE inhibitor/angiotensin II receptor blocker is being taken. He does not see a podiatrist.Eye exam is current ([facility]).   Hypertension  This is a chronic problem. The current episode started more than 1 year ago. The problem has been waxing and waning since onset. The problem is controlled. Pertinent negatives include no blurred vision, chest pain, headaches, orthopnea, palpitations, PND or shortness of breath. Risk factors for coronary artery disease include dyslipidemia, diabetes mellitus, male gender, obesity and family history. Current antihypertension treatment includes ACE inhibitors and diuretics. The current treatment provides mild improvement. There are no compliance problems.    Hyperlipidemia  This is a chronic problem. The current episode started more than 1 year ago. The problem is controlled. Recent lipid tests were reviewed and are normal. Exacerbating diseases include diabetes and  "obesity. There are no known factors aggravating his hyperlipidemia. Pertinent negatives include no chest pain or shortness of breath. Current antihyperlipidemic treatment includes statins. The current treatment provides moderate improvement of lipids. There are no compliance problems.  Risk factors for coronary artery disease include dyslipidemia, diabetes mellitus, hypertension, family history, obesity and male sex.   Rash  This is a chronic problem. The current episode started in the past 7 days. The problem has been gradually worsening since onset. Location: arms and legs. The rash is characterized by blistering. Pertinent negatives include no diarrhea, fatigue, fever, shortness of breath or vomiting.       Review of Systems   Constitutional: Negative for fatigue, fever, unexpected weight gain and unexpected weight loss.   Eyes: Negative for blurred vision and visual disturbance.   Respiratory: Negative for shortness of breath and wheezing.    Cardiovascular: Negative for chest pain, palpitations, orthopnea, leg swelling and PND.   Gastrointestinal: Negative for abdominal pain, constipation, diarrhea, nausea and vomiting.   Endocrine: Negative for cold intolerance, heat intolerance, polydipsia and polyuria.   Genitourinary: Negative for dysuria.   Skin: Positive for rash.   Neurological: Negative for dizziness, weakness, numbness and confusion.   Hematological: Negative for adenopathy. Does not bruise/bleed easily.        Objective   Vital Signs:   /84 (BP Location: Right arm, Patient Position: Sitting, Cuff Size: Adult)   Pulse 65   Temp 97.5 °F (36.4 °C) (Temporal)   Resp 18   Ht 163.8 cm (64.5\")   Wt 93.5 kg (206 lb 3.2 oz)   SpO2 97% Comment: room air  BMI 34.85 kg/m²     Physical Exam  Constitutional:       General: He is not in acute distress.     Appearance: He is well-developed.   Neck:      Thyroid: No thyromegaly.      Vascular: No JVD.   Cardiovascular:      Rate and Rhythm: Normal rate " and regular rhythm.      Heart sounds: Normal heart sounds. No murmur heard.    No friction rub. No gallop.   Pulmonary:      Effort: Pulmonary effort is normal. No respiratory distress.      Breath sounds: Normal breath sounds. No wheezing or rales.   Musculoskeletal:      Cervical back: Neck supple.   Lymphadenopathy:      Cervical: No cervical adenopathy.   Skin:     Findings: Rash (There is a pink vesicular rash with tracks over the arms and legs consistent with contact dermitits ) present.   Neurological:      Mental Status: He is alert and oriented to person, place, and time.      Coordination: Coordination normal.        Result Review :Labs  Result Review  Imaging  Med Tab  Media                 Assessment and Plan CC Problem List  Visit Diagnosis  ROS  Review (Popup)  Health Maintenance  Quality  BestPractice  Medications  SmartSets  SnapShot Encounters  Media  Problem List Items Addressed This Visit        High    Hypertension, benign    Overview     Controlled compliant.         Current Assessment & Plan     Hypertension is improving with treatment.  Continue current treatment regimen.  Dietary sodium restriction.  Weight loss.  Regular aerobic exercise.  Blood pressure will be reassessed in 3 months.         Type 2 diabetes mellitus without complication, without long-term current use of insulin ([facility]) - Primary    Overview     A1c 6.4 08/09/2022, improved.   A1c 7.8 05/03/2022 marginal control Actos begin for insurance  A1c 7.1 01/24/2022 improved  A1c 7.2 10/22/201    A1c 7.5  07/13/2021   A1c 7.6 03/24/2020   A1c 7.5 12/21/2020 stable,    A1c 7.4 09/16/2020    A1c 7.2 10/11/2020  A1c 6.2 08/20/2019,   New onset 04/29/2019, A1c 7.2             Current Assessment & Plan     Diabetes is improving with treatment.   Continue current treatment regimen.  Diabetes will be reassessed in 3 months.         Relevant Orders    POCT urinalysis dipstick, manual (Completed)    POCT Glucose (Completed)     POC Glycosylated Hemoglobin (Hb A1C) (Completed)       Medium    Mixed hyperlipidemia    Overview     Stable, compliant         Current Assessment & Plan     Lipid abnormalities are improving with treatment.  Pharmacotherapy as ordered.  Lipids will be reassessed in 3 months.         Allergic rhinitis due to pollen    Overview     Sxs stable on Immuno Rx continue injection today             Relevant Medications    predniSONE (DELTASONE) 10 MG tablet       Low    Class 1 obesity due to excess calories with serious comorbidity and body mass index (BMI) of 34.0 to 34.9 in adult    Overview     Diet and organized wt loss encouraged         Current Assessment & Plan     Patient's (Body mass index is 34.85 kg/m².) indicates that they are obese (BMI >30) with health conditions that include hypertension, coronary heart disease, diabetes mellitus and dyslipidemias . Weight is improving with treatment. BMI is is above average; BMI management plan is completed. We discussed low calorie, low carb based diet program, portion control and increasing exercise.            Other Visit Diagnoses     Allergic contact dermatitis due to plants, except food        Relevant Medications    predniSONE (DELTASONE) 10 MG tablet    triamcinolone (KENALOG) 0.1 % ointment          Follow Up Instructions Charge Capture  Follow-up Communications  Return in about 3 months (around 11/9/2022).  Patient was given instructions and counseling regarding his condition or for health maintenance advice. Please see specific information pulled into the AVS if appropriate.

## 2022-08-09 NOTE — ASSESSMENT & PLAN NOTE
Patient's (Body mass index is 34.85 kg/m².) indicates that they are obese (BMI >30) with health conditions that include hypertension, coronary heart disease, diabetes mellitus and dyslipidemias . Weight is improving with treatment. BMI is is above average; BMI management plan is completed. We discussed low calorie, low carb based diet program, portion control and increasing exercise.

## 2022-08-24 ENCOUNTER — CLINICAL SUPPORT (OUTPATIENT)
Dept: FAMILY MEDICINE CLINIC | Facility: CLINIC | Age: 67
End: 2022-08-24

## 2022-08-24 DIAGNOSIS — J30.1 ALLERGIC RHINITIS DUE TO POLLEN, UNSPECIFIED SEASONALITY: Primary | ICD-10-CM

## 2022-08-24 PROCEDURE — 95117 IMMUNOTHERAPY INJECTIONS: CPT | Performed by: FAMILY MEDICINE

## 2022-09-06 ENCOUNTER — CLINICAL SUPPORT (OUTPATIENT)
Dept: FAMILY MEDICINE CLINIC | Facility: CLINIC | Age: 67
End: 2022-09-06

## 2022-09-06 DIAGNOSIS — J30.1 ALLERGIC RHINITIS DUE TO POLLEN, UNSPECIFIED SEASONALITY: Primary | ICD-10-CM

## 2022-09-06 PROCEDURE — 95117 IMMUNOTHERAPY INJECTIONS: CPT | Performed by: FAMILY MEDICINE

## 2022-09-13 DIAGNOSIS — E78.2 MIXED HYPERLIPIDEMIA: ICD-10-CM

## 2022-09-14 RX ORDER — ATORVASTATIN CALCIUM 10 MG/1
TABLET, FILM COATED ORAL
Qty: 90 TABLET | Refills: 2 | Status: SHIPPED | OUTPATIENT
Start: 2022-09-14 | End: 2022-09-19

## 2022-09-19 DIAGNOSIS — E78.2 MIXED HYPERLIPIDEMIA: ICD-10-CM

## 2022-09-19 RX ORDER — ATORVASTATIN CALCIUM 10 MG/1
TABLET, FILM COATED ORAL
Qty: 90 TABLET | Refills: 0 | Status: SHIPPED | OUTPATIENT
Start: 2022-09-19

## 2022-09-20 ENCOUNTER — CLINICAL SUPPORT (OUTPATIENT)
Dept: FAMILY MEDICINE CLINIC | Facility: CLINIC | Age: 67
End: 2022-09-20

## 2022-09-20 DIAGNOSIS — J30.1 SEASONAL ALLERGIC RHINITIS DUE TO POLLEN: Primary | ICD-10-CM

## 2022-09-20 PROCEDURE — 95117 IMMUNOTHERAPY INJECTIONS: CPT | Performed by: FAMILY MEDICINE

## 2022-09-26 ENCOUNTER — OFFICE VISIT (OUTPATIENT)
Dept: FAMILY MEDICINE CLINIC | Facility: CLINIC | Age: 67
End: 2022-09-26

## 2022-09-26 VITALS
SYSTOLIC BLOOD PRESSURE: 138 MMHG | BODY MASS INDEX: 34.85 KG/M2 | HEART RATE: 67 BPM | RESPIRATION RATE: 16 BRPM | HEIGHT: 65 IN | OXYGEN SATURATION: 97 % | TEMPERATURE: 97.1 F | DIASTOLIC BLOOD PRESSURE: 76 MMHG

## 2022-09-26 DIAGNOSIS — E66.09 CLASS 1 OBESITY DUE TO EXCESS CALORIES WITH SERIOUS COMORBIDITY AND BODY MASS INDEX (BMI) OF 34.0 TO 34.9 IN ADULT: ICD-10-CM

## 2022-09-26 DIAGNOSIS — J06.9 UPPER RESPIRATORY TRACT INFECTION, UNSPECIFIED TYPE: Primary | ICD-10-CM

## 2022-09-26 PROCEDURE — 99212 OFFICE O/P EST SF 10 MIN: CPT | Performed by: STUDENT IN AN ORGANIZED HEALTH CARE EDUCATION/TRAINING PROGRAM

## 2022-09-26 NOTE — PROGRESS NOTES
Subjective   Fransico Cuenca is a 67 y.o. male.   Chief Complaint   Patient presents with   • URI             URI:  -Started 1 week ago - 10 days ago.  Patient a little concerned he may have something that needed to be treated or have COVID  -Congested  - Cough no productive sputum  -Sore throat - resolved  -Upset stomach - resolved  -Pt denies any fever, chills  -Treatment includes OTC cough drops and cold/flu medication with improvement  - is retired doesn't need work excuse    - pt wanted to get checked for covid to follow precautions to make others around him safe         The following portions of the patient's history were reviewed and updated as appropriate: allergies, current medications, past family history, past medical history, past social history, past surgical history and problem list.    Patient Active Problem List   Diagnosis   • Anxiety   • Gastroesophageal reflux disease without esophagitis   • Hypertension, benign   • Mixed hyperlipidemia   • Allergic rhinitis due to pollen   • Type 2 diabetes mellitus without complication, without long-term current use of insulin ([facility])   • Encounter for prostate cancer screening   • Screening for malignant neoplasm of the rectum   • Family history of ischemic heart disease   • Class 1 obesity due to excess calories with serious comorbidity and body mass index (BMI) of 34.0 to 34.9 in adult   • Medicare annual wellness visit, subsequent   • History of kidney stones   • History of COVID-19       Current Outpatient Medications on File Prior to Visit   Medication Sig Dispense Refill   • amLODIPine (NORVASC) 5 MG tablet Take 1 tablet by mouth once daily 30 tablet 12   • aspirin (aspirin) 81 MG EC tablet Take 1 tablet by mouth Daily.     • atorvastatin (LIPITOR) 10 MG tablet Take 1 tablet by mouth once daily 90 tablet 0   • citalopram (CeleXA) 20 MG tablet Take 1 tablet by mouth Daily. 30 tablet 12   • doxazosin (CARDURA) 1 MG tablet Take 1 tablet by mouth Daily. 30 tablet  "12   • fenofibrate 160 MG tablet Take 1 tablet by mouth Daily. 30 tablet 12   • fluticasone (FLONASE) 50 MCG/ACT nasal spray 2 sprays into the nostril(s) as directed by provider Daily.     • lisinopril-hydrochlorothiazide (PRINZIDE,ZESTORETIC) 20-12.5 MG per tablet TAKE ONE TABLET BY MOUTH ONCE DAILY FOR BLOOD PRESSURE 90 tablet 3   • metFORMIN (GLUCOPHAGE) 1000 MG tablet TAKE 1 TABLET BY MOUTH TWICE DAILY WITH MEALS 60 tablet 12   • montelukast (SINGULAIR) 10 MG tablet Take 1 tablet by mouth Daily. 30 tablet 12   • Multiple Vitamins-Minerals (MULTIVITAMIN ADULT PO) Take 1 tablet by mouth Daily.     • Omega-3 Fatty Acids (FISH OIL) 1000 MG capsule delayed-release Take 2 capsules by mouth 2 (Two) Times a Day.     • pioglitazone (ACTOS) 30 MG tablet Take 1 tablet by mouth Daily. 30 tablet 12   • triamcinolone (KENALOG) 0.1 % ointment Apply 1 application topically to the appropriate area as directed 2 (Two) Times a Day. 80 g 2     Current Facility-Administered Medications on File Prior to Visit   Medication Dose Route Frequency Provider Last Rate Last Admin   • Allergy Serum Injection  0.1 mL Subcutaneous Once Brad, Camryn GOLD MD         Current outpatient and discharge medications have been reconciled for the patient.  Reviewed by: Bobbi Mae DO      No Known Allergies      Objective   Visit Vitals  /76 (BP Location: Right arm, Patient Position: Sitting, Cuff Size: Large Adult)   Pulse 67   Temp 97.1 °F (36.2 °C) (Skin)   Resp 16   Ht 163.8 cm (64.5\")   SpO2 97%   BMI 34.85 kg/m²       Physical Exam  HENT:      Head: Normocephalic.      Right Ear: Tympanic membrane normal.      Ears:      Comments: - left tympanic membrane had a scab on it     Mouth/Throat:      Mouth: Mucous membranes are moist.      Pharynx: Oropharynx is clear. No oropharyngeal exudate or posterior oropharyngeal erythema.      Comments: - no swollen tonsils  Eyes:      Conjunctiva/sclera: Conjunctivae normal.   Cardiovascular:      Rate " and Rhythm: Normal rate and regular rhythm.      Heart sounds: Normal heart sounds.   Pulmonary:      Effort: Pulmonary effort is normal. No respiratory distress.      Breath sounds: Normal breath sounds. No wheezing, rhonchi or rales.   Neurological:      Mental Status: He is alert.       Physical Exam   EENT     Ear comments: - left tympanic membrane had a scab on it      Diagnoses and all orders for this visit:    1. Upper respiratory tract infection, unspecified type (Primary)  -Patient's symptoms are resolving and patient feels much better  -Reassured patient that 7 to 10 days is common for many patients to start feeling better and that he is right on track for recovering from a viral infection  -Test for strep as patient does not fit [facility] criteria  -Patient negative for COVID and flu in office.  Patient alerted and sent home    -Patient states that he uses Q-tips to clean earwax out of his ears.  Notified him of the scab on his left tympanic membrane and recommended to use Debrox over-the-counter (added to after visit summary)      3. Class 1 obesity due to excess calories with serious comorbidity and body mass index (BMI) of 34.0 to 34.9 in adult  Overview:  Diet and organized wt loss encouraged         Expected course, medications, and adverse effects discussed as appropriate.  Call or return if worsening or persistent symptoms.  I wore protective equipment throughout this patient encounter to include mask and eye protection. Hand hygiene was performed before donning protective equipment and after removal when leaving the room.       This document is intended for medical expert use only. Reading of this document by patients and/or patient's family without participating medical staff guidance may result in misinterpretation and unintended morbidity. Any interpretation of such data is the responsibility of the patient and/or family member responsible for the patient in concert with their primary or  specialist providers, not to be left for sources of online searches such as [employer], [employer] or similar queries. Relying on these approaches to knowledge may result in misinterpretation, misguided goals of care and even death should patients or family members try recommendations outside of the realm of professional medical care.

## 2022-09-28 DIAGNOSIS — I10 HYPERTENSION, BENIGN: ICD-10-CM

## 2022-09-28 RX ORDER — LISINOPRIL AND HYDROCHLOROTHIAZIDE 20; 12.5 MG/1; MG/1
TABLET ORAL
Qty: 30 TABLET | Refills: 12 | Status: SHIPPED | OUTPATIENT
Start: 2022-09-28

## 2022-10-04 ENCOUNTER — CLINICAL SUPPORT (OUTPATIENT)
Dept: FAMILY MEDICINE CLINIC | Facility: CLINIC | Age: 67
End: 2022-10-04

## 2022-10-04 DIAGNOSIS — J30.1 SEASONAL ALLERGIC RHINITIS DUE TO POLLEN: Primary | ICD-10-CM

## 2022-10-04 PROCEDURE — 95117 IMMUNOTHERAPY INJECTIONS: CPT | Performed by: FAMILY MEDICINE

## 2022-10-18 ENCOUNTER — CLINICAL SUPPORT (OUTPATIENT)
Dept: FAMILY MEDICINE CLINIC | Facility: CLINIC | Age: 67
End: 2022-10-18

## 2022-10-18 DIAGNOSIS — J30.1 NON-SEASONAL ALLERGIC RHINITIS DUE TO POLLEN: Primary | ICD-10-CM

## 2022-10-18 PROCEDURE — 95117 IMMUNOTHERAPY INJECTIONS: CPT | Performed by: FAMILY MEDICINE

## 2022-10-29 DIAGNOSIS — J30.1 ALLERGIC RHINITIS DUE TO POLLEN, UNSPECIFIED SEASONALITY: ICD-10-CM

## 2022-10-31 RX ORDER — MONTELUKAST SODIUM 10 MG/1
TABLET ORAL
Qty: 30 TABLET | Refills: 12 | Status: SHIPPED | OUTPATIENT
Start: 2022-10-31

## 2022-11-08 ENCOUNTER — OFFICE VISIT (OUTPATIENT)
Dept: FAMILY MEDICINE CLINIC | Facility: CLINIC | Age: 67
End: 2022-11-08

## 2022-11-08 VITALS
RESPIRATION RATE: 18 BRPM | SYSTOLIC BLOOD PRESSURE: 142 MMHG | HEART RATE: 65 BPM | BODY MASS INDEX: 34.92 KG/M2 | TEMPERATURE: 97.7 F | WEIGHT: 209.6 LBS | OXYGEN SATURATION: 98 % | DIASTOLIC BLOOD PRESSURE: 84 MMHG | HEIGHT: 65 IN

## 2022-11-08 DIAGNOSIS — R35.0 FREQUENCY OF MICTURITION: ICD-10-CM

## 2022-11-08 DIAGNOSIS — I10 HYPERTENSION, BENIGN: ICD-10-CM

## 2022-11-08 DIAGNOSIS — Z23 NEED FOR INFLUENZA VACCINATION: ICD-10-CM

## 2022-11-08 DIAGNOSIS — E78.2 MIXED HYPERLIPIDEMIA: ICD-10-CM

## 2022-11-08 DIAGNOSIS — E66.01 CLASS 2 SEVERE OBESITY DUE TO EXCESS CALORIES WITH SERIOUS COMORBIDITY AND BODY MASS INDEX (BMI) OF 35.0 TO 35.9 IN ADULT: ICD-10-CM

## 2022-11-08 DIAGNOSIS — E11.9 TYPE 2 DIABETES MELLITUS WITHOUT COMPLICATION, WITHOUT LONG-TERM CURRENT USE OF INSULIN: Primary | ICD-10-CM

## 2022-11-08 DIAGNOSIS — Z23 NEED FOR PNEUMOCOCCAL VACCINATION: ICD-10-CM

## 2022-11-08 PROBLEM — E66.812 CLASS 2 SEVERE OBESITY DUE TO EXCESS CALORIES WITH SERIOUS COMORBIDITY AND BODY MASS INDEX (BMI) OF 35.0 TO 35.9 IN ADULT: Status: ACTIVE | Noted: 2019-08-19

## 2022-11-08 LAB
BILIRUB BLD-MCNC: NEGATIVE MG/DL
CLARITY, POC: CLEAR
COLOR UR: YELLOW
EXPIRATION DATE: NORMAL
GLUCOSE BLDC GLUCOMTR-MCNC: 124 MG/DL (ref 70–130)
GLUCOSE UR STRIP-MCNC: NEGATIVE MG/DL
HBA1C MFR BLD: 5.96 %
KETONES UR QL: NEGATIVE
LEUKOCYTE EST, POC: NEGATIVE
Lab: NORMAL
NITRITE UR-MCNC: NEGATIVE MG/ML
PH UR: 6 [PH] (ref 5–8)
PROT UR STRIP-MCNC: ABNORMAL MG/DL
RBC # UR STRIP: ABNORMAL /UL
SP GR UR: 1.02 (ref 1–1.03)
UROBILINOGEN UR QL: NORMAL

## 2022-11-08 PROCEDURE — 83036 HEMOGLOBIN GLYCOSYLATED A1C: CPT | Performed by: FAMILY MEDICINE

## 2022-11-08 PROCEDURE — G0009 ADMIN PNEUMOCOCCAL VACCINE: HCPCS | Performed by: FAMILY MEDICINE

## 2022-11-08 PROCEDURE — 81002 URINALYSIS NONAUTO W/O SCOPE: CPT | Performed by: FAMILY MEDICINE

## 2022-11-08 PROCEDURE — 3044F HG A1C LEVEL LT 7.0%: CPT | Performed by: FAMILY MEDICINE

## 2022-11-08 PROCEDURE — 99214 OFFICE O/P EST MOD 30 MIN: CPT | Performed by: FAMILY MEDICINE

## 2022-11-08 PROCEDURE — 90662 IIV NO PRSV INCREASED AG IM: CPT | Performed by: FAMILY MEDICINE

## 2022-11-08 PROCEDURE — G0008 ADMIN INFLUENZA VIRUS VAC: HCPCS | Performed by: FAMILY MEDICINE

## 2022-11-08 PROCEDURE — 90677 PCV20 VACCINE IM: CPT | Performed by: FAMILY MEDICINE

## 2022-11-08 PROCEDURE — 82962 GLUCOSE BLOOD TEST: CPT | Performed by: FAMILY MEDICINE

## 2022-11-08 NOTE — ASSESSMENT & PLAN NOTE
Patient's (Body mass index is 35.42 kg/m².) indicates that they are obese (BMI >30) with health conditions that include hypertension, diabetes mellitus and dyslipidemias . Weight is unchanged. BMI is is above average; BMI management plan is completed. We discussed portion control and increasing exercise.

## 2022-11-08 NOTE — PROGRESS NOTES
Chief Complaint  Diabetes, Hypertension, Hyperlipidemia, and allergy injections    Subjective     CC  Problem List  Visit Diagnosis   Encounters  Notes  Medications  Labs  Result Review Imaging  Media    Fransico Cuenca presents to [facility] for   History of Present Illness  Diabetic Foot Exam done today.  Diabetes  He presents for his follow-up diabetic visit. He has type 2 diabetes mellitus. Pertinent negatives for hypoglycemia include no confusion, dizziness or headaches. Pertinent negatives for diabetes include no blurred vision, no chest pain, no fatigue, no polydipsia, no polyuria, no weakness and no weight loss. There are no hypoglycemic complications. Symptoms are stable. There are no diabetic complications. Risk factors for coronary artery disease include dyslipidemia, diabetes mellitus, hypertension, male sex, obesity and family history. He is following a diabetic and generally healthy diet. Meal planning includes avoidance of concentrated sweets. He has not had a previous visit with a dietitian. He participates in exercise daily. (Patient's blood sugar was 124 in office. He does not check his sugars at home.) An ACE inhibitor/angiotensin II receptor blocker is being taken. He does not see a podiatrist.Eye exam is current ([facility]).   Hypertension  This is a chronic problem. The current episode started more than 1 year ago. The problem has been waxing and waning since onset. The problem is controlled. Pertinent negatives include no blurred vision, chest pain, headaches, orthopnea, palpitations, PND or shortness of breath. Risk factors for coronary artery disease include dyslipidemia, diabetes mellitus, male gender, obesity and family history. Current antihypertension treatment includes ACE inhibitors and diuretics. The current treatment provides mild improvement. There are no compliance problems.    Hyperlipidemia  This is a chronic problem. The current  "episode started more than 1 year ago. The problem is controlled. Recent lipid tests were reviewed and are normal. Exacerbating diseases include diabetes and obesity. There are no known factors aggravating his hyperlipidemia. Pertinent negatives include no chest pain or shortness of breath. Current antihyperlipidemic treatment includes statins. The current treatment provides moderate improvement of lipids. There are no compliance problems.  Risk factors for coronary artery disease include dyslipidemia, diabetes mellitus, hypertension, family history, obesity and male sex.       Review of Systems   Constitutional: Negative for fatigue, fever, unexpected weight gain and unexpected weight loss.   Eyes: Negative for blurred vision and visual disturbance.   Respiratory: Negative for shortness of breath and wheezing.    Cardiovascular: Negative for chest pain, palpitations, orthopnea, leg swelling and PND.   Gastrointestinal: Negative for abdominal pain, constipation, diarrhea, nausea and vomiting.   Endocrine: Negative for cold intolerance, heat intolerance, polydipsia and polyuria.   Genitourinary: Negative for dysuria.   Skin: Negative for rash.   Neurological: Negative for dizziness, weakness, numbness and confusion.   Hematological: Negative for adenopathy. Does not bruise/bleed easily.        Objective   Vital Signs:   /84 (BP Location: Right arm, Patient Position: Sitting, Cuff Size: Adult)   Pulse 65   Temp 97.7 °F (36.5 °C)   Resp 18   Ht 163.8 cm (64.5\")   Wt 95.1 kg (209 lb 9.6 oz)   SpO2 98%   BMI 35.42 kg/m²     Physical Exam  Constitutional:       General: He is not in acute distress.     Appearance: He is well-developed.   Neck:      Thyroid: No thyromegaly.      Vascular: No JVD.   Cardiovascular:      Rate and Rhythm: Normal rate and regular rhythm.      Heart sounds: Normal heart sounds. No murmur heard.    No friction rub. No gallop.   Pulmonary:      Effort: Pulmonary effort is normal. No " respiratory distress.      Breath sounds: Normal breath sounds. No wheezing or rales.   Musculoskeletal:      Cervical back: Neck supple.   Feet:      Right foot:      Protective Sensation: 10 sites tested. 10 sites sensed.      Skin integrity: Skin integrity normal.      Toenail Condition: Right toenails are normal.      Left foot:      Protective Sensation: 10 sites tested. 10 sites sensed.      Skin integrity: Skin integrity normal.      Toenail Condition: Left toenails are normal.   Lymphadenopathy:      Cervical: No cervical adenopathy.   Skin:     Findings: No rash (  ).   Neurological:      Mental Status: He is alert and oriented to person, place, and time.      Coordination: Coordination normal.        Result Review :Labs  Result Review  Imaging  Med Tab  Media                 Assessment and Plan CC Problem List  Visit Diagnosis  ROS  Review (Popup)  Health Maintenance  Quality  BestPractice  Medications  SmartSets  SnapShot Encounters  Media  Problem List Items Addressed This Visit        High    Hypertension, benign    Overview     Controlled compliant.         Current Assessment & Plan     Hypertension is improving with treatment.  Continue current treatment regimen.  Dietary sodium restriction.  Regular aerobic exercise.  Blood pressure will be reassessed in 3 months.         Type 2 diabetes mellitus without complication, without long-term current use of insulin ([facility]) - Primary    Overview     A1c 5.9 11/08/2022 improved  A1c 6.4 08/09/2022, improved.   A1c 7.8 05/03/2022 marginal control Actos begin for insurance  A1c 7.1 01/24/2022 improved  A1c 7.2 10/22/201    A1c 7.5  07/13/2021   A1c 7.6 03/24/2020   A1c 7.5 12/21/2020 stable,    A1c 7.4 09/16/2020    A1c 7.2 10/11/2020  A1c 6.2 08/20/2019,   New onset 04/29/2019, A1c 7.2             Current Assessment & Plan     Diabetes is improving with treatment.   Continue current treatment regimen.  Dietary recommendations for ADA  diet.  Regular aerobic exercise.  Diabetes will be reassessed in 3 months.            Medium    Mixed hyperlipidemia    Overview     Stable, compliant         Current Assessment & Plan     Lipid abnormalities are improving with treatment.  Pharmacotherapy as ordered.  Lipids will be reassessed in 3 months.            Low    Class 2 severe obesity due to excess calories with serious comorbidity and body mass index (BMI) of 35.0 to 35.9 in adult (HCC)    Overview     Diet and organized wt loss encouraged         Current Assessment & Plan     Patient's (Body mass index is 35.42 kg/m².) indicates that they are obese (BMI >30) with health conditions that include hypertension, diabetes mellitus and dyslipidemias . Weight is unchanged. BMI is is above average; BMI management plan is completed. We discussed portion control and increasing exercise.         Other Visit Diagnoses     Frequency of micturition        mally u/a notify him of results.     Relevant Orders    Urinalysis With Microscopic - Urine, Clean Catch    Need for influenza vaccination        Need for pneumococcal vaccination              Follow Up Instructions Charge Capture  Follow-up Communications  Return in about 3 months (around 2/8/2023).  Patient was given instructions and counseling regarding his condition or for health maintenance advice. Please see specific information pulled into the AVS if appropriate.

## 2022-11-10 LAB
BACTERIA UR CULT: NORMAL
BACTERIA UR CULT: NORMAL

## 2022-11-30 RX ORDER — FENOFIBRATE 160 MG/1
TABLET ORAL
Qty: 30 TABLET | Refills: 3 | Status: SHIPPED | OUTPATIENT
Start: 2022-11-30 | End: 2023-03-31

## 2023-02-14 ENCOUNTER — CLINICAL SUPPORT (OUTPATIENT)
Dept: FAMILY MEDICINE CLINIC | Facility: CLINIC | Age: 68
End: 2023-02-14
Payer: MEDICARE

## 2023-02-14 DIAGNOSIS — J30.1 SEASONAL ALLERGIC RHINITIS DUE TO POLLEN: Primary | ICD-10-CM

## 2023-02-14 PROCEDURE — 95117 IMMUNOTHERAPY INJECTIONS: CPT | Performed by: FAMILY MEDICINE

## 2023-02-14 NOTE — PROGRESS NOTES
Allergy Injection only   Patient supplied serum    Okayed per Lesley at Dr. Gastelum's office to give injection on 02/14/2023, then every 2 weeks following.

## 2023-02-24 ENCOUNTER — OFFICE VISIT (OUTPATIENT)
Dept: FAMILY MEDICINE CLINIC | Facility: CLINIC | Age: 68
End: 2023-02-24
Payer: MEDICARE

## 2023-02-24 VITALS
RESPIRATION RATE: 18 BRPM | SYSTOLIC BLOOD PRESSURE: 120 MMHG | WEIGHT: 210.6 LBS | OXYGEN SATURATION: 97 % | BODY MASS INDEX: 35.09 KG/M2 | HEART RATE: 72 BPM | DIASTOLIC BLOOD PRESSURE: 82 MMHG | TEMPERATURE: 97.1 F | HEIGHT: 65 IN

## 2023-02-24 DIAGNOSIS — Z12.5 ENCOUNTER FOR PROSTATE CANCER SCREENING: ICD-10-CM

## 2023-02-24 DIAGNOSIS — F41.9 ANXIETY: ICD-10-CM

## 2023-02-24 DIAGNOSIS — G47.33 OBSTRUCTIVE SLEEP APNEA SYNDROME: ICD-10-CM

## 2023-02-24 DIAGNOSIS — Z82.49 FAMILY HISTORY OF ISCHEMIC HEART DISEASE: ICD-10-CM

## 2023-02-24 DIAGNOSIS — J30.1 SEASONAL ALLERGIC RHINITIS DUE TO POLLEN: ICD-10-CM

## 2023-02-24 DIAGNOSIS — E66.01 CLASS 2 SEVERE OBESITY DUE TO EXCESS CALORIES WITH SERIOUS COMORBIDITY AND BODY MASS INDEX (BMI) OF 35.0 TO 35.9 IN ADULT: ICD-10-CM

## 2023-02-24 DIAGNOSIS — E78.2 MIXED HYPERLIPIDEMIA: ICD-10-CM

## 2023-02-24 DIAGNOSIS — Z12.12 SCREENING FOR MALIGNANT NEOPLASM OF THE RECTUM: ICD-10-CM

## 2023-02-24 DIAGNOSIS — Z00.00 MEDICARE ANNUAL WELLNESS VISIT, SUBSEQUENT: Primary | ICD-10-CM

## 2023-02-24 DIAGNOSIS — I10 HYPERTENSION, BENIGN: ICD-10-CM

## 2023-02-24 DIAGNOSIS — E11.9 TYPE 2 DIABETES MELLITUS WITHOUT COMPLICATION, WITHOUT LONG-TERM CURRENT USE OF INSULIN: ICD-10-CM

## 2023-02-24 PROBLEM — G47.30 SLEEP APNEA: Status: ACTIVE | Noted: 2019-08-20

## 2023-02-24 LAB
BILIRUB BLD-MCNC: NEGATIVE MG/DL
CLARITY, POC: CLEAR
COLOR UR: YELLOW
EXPIRATION DATE: NORMAL
GLUCOSE BLDC GLUCOMTR-MCNC: 138 MG/DL (ref 70–130)
GLUCOSE UR STRIP-MCNC: NEGATIVE MG/DL
HBA1C MFR BLD: 6.3 %
KETONES UR QL: NEGATIVE
LEUKOCYTE EST, POC: NEGATIVE
Lab: NORMAL
NITRITE UR-MCNC: NEGATIVE MG/ML
PH UR: 6 [PH] (ref 5–8)
PROT UR STRIP-MCNC: NEGATIVE MG/DL
RBC # UR STRIP: NEGATIVE /UL
SP GR UR: 1.01 (ref 1–1.03)
UROBILINOGEN UR QL: NORMAL

## 2023-02-24 PROCEDURE — 82962 GLUCOSE BLOOD TEST: CPT | Performed by: FAMILY MEDICINE

## 2023-02-24 PROCEDURE — 81002 URINALYSIS NONAUTO W/O SCOPE: CPT | Performed by: FAMILY MEDICINE

## 2023-02-24 PROCEDURE — 83036 HEMOGLOBIN GLYCOSYLATED A1C: CPT | Performed by: FAMILY MEDICINE

## 2023-02-24 PROCEDURE — 3044F HG A1C LEVEL LT 7.0%: CPT | Performed by: FAMILY MEDICINE

## 2023-02-24 PROCEDURE — 36415 COLL VENOUS BLD VENIPUNCTURE: CPT | Performed by: FAMILY MEDICINE

## 2023-02-24 PROCEDURE — 1159F MED LIST DOCD IN RCRD: CPT | Performed by: FAMILY MEDICINE

## 2023-02-24 PROCEDURE — G0444 DEPRESSION SCREEN ANNUAL: HCPCS | Performed by: FAMILY MEDICINE

## 2023-02-24 PROCEDURE — 99214 OFFICE O/P EST MOD 30 MIN: CPT | Performed by: FAMILY MEDICINE

## 2023-02-24 PROCEDURE — 1158F ADVNC CARE PLAN TLK DOCD: CPT | Performed by: FAMILY MEDICINE

## 2023-02-24 PROCEDURE — G0439 PPPS, SUBSEQ VISIT: HCPCS | Performed by: FAMILY MEDICINE

## 2023-02-24 NOTE — PROGRESS NOTES
Chief Complaint  Annual Exam, Diabetes, Hypertension, and Hyperlipidemia    Subjective     CC  Problem List  Visit Diagnosis   Encounters  Notes  Medications  Labs  Result Review Imaging  Media    Fransico Cuenca presents to [facility] for   History of Present Illness  Diabetic Foot Exam done today.  Diabetes  He presents for his follow-up diabetic visit. He has type 2 diabetes mellitus. Pertinent negatives for hypoglycemia include no confusion, dizziness, headaches or nervousness/anxiousness. Pertinent negatives for diabetes include no blurred vision, no chest pain, no fatigue, no polydipsia, no polyphagia, no polyuria, no weakness and no weight loss. There are no hypoglycemic complications. Symptoms are stable. There are no diabetic complications. Risk factors for coronary artery disease include dyslipidemia, diabetes mellitus, hypertension, male sex, obesity and family history. Current diabetic treatment includes oral agent (monotherapy). He is compliant with treatment most of the time. He is following a diabetic and generally healthy diet. Meal planning includes avoidance of concentrated sweets. He has not had a previous visit with a dietitian. He rarely participates in exercise. An ACE inhibitor/angiotensin II receptor blocker is being taken. He does not see a podiatrist.Eye exam is current ([facility]).   Hypertension  This is a chronic problem. The current episode started more than 1 year ago. The problem has been waxing and waning since onset. The problem is controlled. Pertinent negatives include no blurred vision, chest pain, headaches, orthopnea, palpitations, PND or shortness of breath. Risk factors for coronary artery disease include dyslipidemia, diabetes mellitus, male gender, obesity and family history. Current antihypertension treatment includes ACE inhibitors, diuretics and calcium channel blockers. The current treatment provides mild improvement.  There are no compliance problems.    Hyperlipidemia  This is a chronic problem. The current episode started more than 1 year ago. The problem is controlled. Recent lipid tests were reviewed and are normal. Exacerbating diseases include diabetes and obesity. There are no known factors aggravating his hyperlipidemia. Pertinent negatives include no chest pain, myalgias or shortness of breath. Current antihyperlipidemic treatment includes statins. The current treatment provides moderate improvement of lipids. There are no compliance problems.  Risk factors for coronary artery disease include dyslipidemia, diabetes mellitus, hypertension, family history, obesity and male sex.       Review of Systems   Constitutional: Negative for activity change, fatigue, fever, unexpected weight gain and unexpected weight loss.   HENT: Negative for congestion, ear pain, hearing loss, rhinorrhea, sinus pressure, sore throat, swollen glands, trouble swallowing and voice change.    Eyes: Negative for blurred vision and visual disturbance.   Respiratory: Negative for apnea, shortness of breath and wheezing.    Cardiovascular: Negative for chest pain, palpitations, orthopnea, leg swelling and PND.   Gastrointestinal: Negative for abdominal pain, blood in stool, constipation, diarrhea, nausea, vomiting and indigestion.   Endocrine: Negative for cold intolerance, heat intolerance, polydipsia, polyphagia and polyuria.   Genitourinary: Negative for dysuria, flank pain, frequency, hematuria, testicular pain, urgency and urinary incontinence.   Musculoskeletal: Negative for arthralgias, joint swelling and myalgias.   Skin: Negative for rash, skin lesions and wound.   Allergic/Immunologic: Negative for environmental allergies and immunocompromised state.   Neurological: Negative for dizziness, syncope, weakness, numbness, headache, memory problem and confusion.   Hematological: Negative for adenopathy. Does not bruise/bleed easily.  "  Psychiatric/Behavioral: Negative for suicidal ideas and depressed mood. The patient is not nervous/anxious.         Objective   Vital Signs:   /82 (BP Location: Right arm, Patient Position: Sitting, Cuff Size: Adult)   Pulse 72   Temp 97.1 °F (36.2 °C) (Temporal)   Resp 18   Ht 163.8 cm (64.5\")   Wt 95.5 kg (210 lb 9.6 oz)   SpO2 97% Comment: room air  BMI 35.59 kg/m²     Physical Exam  Constitutional:       General: He is not in acute distress.     Appearance: Normal appearance. He is well-developed.   HENT:      Head: Normocephalic.      Right Ear: Tympanic membrane and external ear normal.      Left Ear: Tympanic membrane and external ear normal.      Nose: No mucosal edema.      Mouth/Throat:      Mouth: No oral lesions.      Pharynx: No oropharyngeal exudate.   Eyes:      General: No scleral icterus.     Conjunctiva/sclera: Conjunctivae normal.      Pupils: Pupils are equal, round, and reactive to light.   Neck:      Thyroid: No thyromegaly.      Vascular: No JVD.   Cardiovascular:      Rate and Rhythm: Normal rate and regular rhythm.  No extrasystoles are present.     Pulses:           Carotid pulses are 2+ on the right side and 2+ on the left side.       Femoral pulses are 2+ on the right side and 2+ on the left side.       Dorsalis pedis pulses are 2+ on the right side and 2+ on the left side.      Heart sounds: Normal heart sounds. No murmur heard.    No friction rub. No gallop.   Pulmonary:      Effort: Pulmonary effort is normal.      Breath sounds: Normal breath sounds. No decreased breath sounds.   Abdominal:      General: Bowel sounds are normal. There is no distension.      Palpations: Abdomen is soft. There is no mass.      Tenderness: There is no abdominal tenderness.      Hernia: No hernia is present. There is no hernia in the left inguinal area.   Genitourinary:     Testes: Normal.   Musculoskeletal:         General: Normal range of motion.      Cervical back: Normal range of motion " and neck supple. No edema.   Lymphadenopathy:      Head:      Right side of head: No submandibular adenopathy.      Left side of head: No submandibular adenopathy.      Cervical: No cervical adenopathy.      Upper Body:      Right upper body: No supraclavicular adenopathy.      Left upper body: No supraclavicular adenopathy.   Skin:     General: Skin is warm and dry.      Findings: No ecchymosis, erythema or rash.   Neurological:      Mental Status: He is alert and oriented to person, place, and time. He is not disoriented.      Cranial Nerves: No cranial nerve deficit.      Sensory: No sensory deficit.      Motor: No abnormal muscle tone.      Coordination: Coordination normal.      Deep Tendon Reflexes: Reflexes are normal and symmetric.   Psychiatric:         Speech: Speech normal.         Thought Content: Thought content normal.         Judgment: Judgment normal.        Result Review :Labs  Result Review  Imaging  Med Tab  Media                 Assessment and Plan CC Problem List  Visit Diagnosis  ROS  Review (Popup)  Health Maintenance  Quality  BestPractice  Medications  SmartSets  SnapShot Encounters  Media  Problem List Items Addressed This Visit        High    Hypertension, benign    Overview     Controlled compliant.         Current Assessment & Plan     Hypertension is improving with treatment.  Continue current treatment regimen.  Dietary sodium restriction.  Weight loss.  Regular aerobic exercise.  Blood pressure will be reassessed in 3 months.         Relevant Orders    CBC & Differential    Comprehensive Metabolic Panel    TSH    Type 2 diabetes mellitus without complication, without long-term current use of insulin ([facility])    Overview     A1c 6.3 02/24/2023 slightly worse. But controlled.   A1c 5.9 11/08/2022 improved  A1c 6.4 08/09/2022, improved.   A1c 7.8 05/03/2022 marginal control Actos begin for insurance  A1c 7.1 01/24/2022 improved  A1c 7.2 10/22/201    A1c 7.5  07/13/2021   A1c  7.6 03/24/2020   A1c 7.5 12/21/2020 stable,    A1c 7.4 09/16/2020    A1c 7.2 10/11/2020  A1c 6.2 08/20/2019,   New onset 04/29/2019, A1c 7.2             Current Assessment & Plan     Diabetes is improving with treatment.   Continue current treatment regimen.  Dietary recommendations for ADA diet.  Regular aerobic exercise.  Diabetes will be reassessed in 3 months.         Relevant Orders    POC Glycosylated Hemoglobin (Hb A1C) (Completed)    POCT Glucose (Completed)    Microalbumin / Creatinine Urine Ratio - Urine, Clean Catch    POCT urinalysis dipstick, manual (Completed)       Medium    Mixed hyperlipidemia    Overview     Stable, compliant         Current Assessment & Plan     Lipid abnormalities are improving with treatment.  Pharmacotherapy as ordered.  Lipids will be reassessed in 3 months.         Relevant Orders    Lipid Panel With / Chol / HDL Ratio    Allergic rhinitis due to pollen    Overview     Sxs stable on Immuno Rx continue                 Low    Anxiety    Overview     Stable on meds which are continued.          Class 2 severe obesity due to excess calories with serious comorbidity and body mass index (BMI) of 35.0 to 35.9 in adult (HCC)    Overview     Diet and organized wt loss encouraged         Current Assessment & Plan     Patient's (Body mass index is 35.59 kg/m².) indicates that they are obese (BMI >30) with health conditions that include obstructive sleep apnea, coronary heart disease, diabetes mellitus and dyslipidemias . Weight is improving with treatment. BMI  is above average; BMI management plan is completed. We discussed low calorie, low carb based diet program, portion control and increasing exercise.          Obstructive sleep apnea syndrome    Overview     Improved on CPAP he is compliant.            Unprioritized    Encounter for prostate cancer screening    Overview     Lab Results   Component Value Date    PSA 1.4 10/22/2021    PSA 1.2 06/10/2020    PSA 0.8 04/30/2019               Relevant Orders    PSA Screen    Screening for malignant neoplasm of the rectum    Overview     Accepted Cologuard,   Cologuard 2020 negative .Last Colonoscopy 10/26/2009.           Relevant Orders    Cologuard - Stool, Per Rectum    Family history of ischemic heart disease    Overview     Mother CABG  at 57, Brother has had an MI at age 58  Risk factors modified.          Medicare annual wellness visit, subsequent - Primary    Overview     Diet exercise and wt loss encouraged. Seat belts, sunscreens, fall prevention and general safety discussed.   Previous lab discussed, we will notify him of today's lab            Follow Up Instructions Charge Capture  Follow-up Communications  Return in about 3 months (around 2023).  Patient was given instructions and counseling regarding his condition or for health maintenance advice. Please see specific information pulled into the AVS if appropriate.

## 2023-02-24 NOTE — ASSESSMENT & PLAN NOTE
Patient's (Body mass index is 35.59 kg/m².) indicates that they are obese (BMI >30) with health conditions that include obstructive sleep apnea, coronary heart disease, diabetes mellitus and dyslipidemias . Weight is improving with treatment. BMI  is above average; BMI management plan is completed. We discussed low calorie, low carb based diet program, portion control and increasing exercise.

## 2023-02-24 NOTE — PROGRESS NOTES
The ABCs of the Annual Wellness Visit  Subsequent Medicare Wellness Visit    Chief Complaint:  Medicare Wellness  Subjective      Fransico Cuenca is a 67 y.o. male who presents for a Subsequent Medicare Wellness Visit. The patient is here: for coordination of medical care to discuss health maintenance and disease prevention to follow up on multiple medical problems. Overall has: minimal activity with work/home activities, good appetite, feels well with minor complaints, good energy level and is sleeping well. Nutrition: appropriate balanced diet and balanced diet. Last tetanus shot was 2020.    The following portions of the patient's history were reviewed and   updated as appropriate: allergies, current medications, past family history, past medical history, past social history, past surgical history and problem list.     Compared to one year ago, the patient feels his physical   health is the same.    Compared to one year ago, the patient feels his mental   health is better.    Recent Hospitalizations:  He was not admitted to the hospital during the last year.       Current Medical Providers:  Patient Care Team:  Camryn Salinas MD as PCP - General (Family Medicine)    Outpatient Medications Prior to Visit   Medication Sig Dispense Refill   • amLODIPine (NORVASC) 5 MG tablet Take 1 tablet by mouth once daily 30 tablet 12   • aspirin (aspirin) 81 MG EC tablet Take 1 tablet by mouth Daily.     • atorvastatin (LIPITOR) 10 MG tablet Take 1 tablet by mouth once daily 90 tablet 0   • citalopram (CeleXA) 20 MG tablet Take 1 tablet by mouth Daily. 30 tablet 12   • doxazosin (CARDURA) 1 MG tablet Take 1 tablet by mouth Daily. 30 tablet 12   • fenofibrate 160 MG tablet Take 1 tablet by mouth once daily 30 tablet 3   • fluticasone (FLONASE) 50 MCG/ACT nasal spray 2 sprays into the nostril(s) as directed by provider Daily.     • lisinopril-hydrochlorothiazide (PRINZIDE,ZESTORETIC) 20-12.5 MG per tablet Take 1 tablet by mouth  once daily 30 tablet 12   • metFORMIN (GLUCOPHAGE) 1000 MG tablet TAKE 1 TABLET BY MOUTH TWICE DAILY WITH MEALS 60 tablet 12   • montelukast (SINGULAIR) 10 MG tablet Take 1 tablet by mouth once daily 30 tablet 12   • Multiple Vitamins-Minerals (MULTIVITAMIN ADULT PO) Take 1 tablet by mouth Daily.     • Omega-3 Fatty Acids (FISH OIL) 1000 MG capsule delayed-release Take 2 capsules by mouth 2 (Two) Times a Day.     • pioglitazone (ACTOS) 30 MG tablet Take 1 tablet by mouth Daily. 30 tablet 12   • triamcinolone (KENALOG) 0.1 % ointment Apply 1 application topically to the appropriate area as directed 2 (Two) Times a Day. 80 g 2     Facility-Administered Medications Prior to Visit   Medication Dose Route Frequency Provider Last Rate Last Admin   • Allergy Serum Injection  0.1 mL Subcutaneous Once Brad, Camryn GOLD MD           No opioid medication identified on active medication list. I have reviewed chart for other potential  high risk medication/s and harmful drug interactions in the elderly.          Aspirin is on active medication list. Aspirin use is indicated based on review of current medical condition/s. Pros and cons of this therapy have been discussed today. Benefits of this medication outweigh potential harm.  Patient has been encouraged to continue taking this medication.  .      Family History   Problem Relation Age of Onset   • Heart disease Mother    • Uterine cancer Mother    • Thyroid disease Mother    • Heart disease Father    • Stroke Father    • COPD Brother    • Diabetes Brother    • Breast cancer Maternal Aunt    • Breast cancer Maternal Grandmother    • Diabetes Brother        Advance Care Planning   Advance Directive is on file.  ACP discussion was held with the patient during this visit. Patient has an advance directive in EMR which is still valid.     A face-to-face visit was completed today with patient.  Counseling explanation, and discussion of advanced directives was performed.   The last  "advanced care visit was performed in 2022.  In a near life ending situation, from which he is not expected to recover functionally, and he is not able to speak for himself, he does not want life sustaining measures. We discussed feeding tubes, ventilators and cardiac support as well as dialysis.    I spent more than 16 minutes discussing Advanced Care Planning information and documenting in the chart.      I have reviewed and confirmed the accuracy of the HPI and ROS as documented by the MA/LPN/RN Camryn Salinas MD    Reviewed chart for potential of high risk medication in the elderly: yes  Reviewed chart for potential of harmful drug interactions in the elderly:yes       Objective       Vitals:    02/24/23 0827   BP: 120/82   BP Location: Right arm   Patient Position: Sitting   Cuff Size: Adult   Pulse: 72   Resp: 18   Temp: 97.1 °F (36.2 °C)   TempSrc: Temporal   SpO2: 97%  Comment: room air   Weight: 95.5 kg (210 lb 9.6 oz)   Height: 163.8 cm (64.5\")   PainSc: 0-No pain     BMI Readings from Last 1 Encounters:   02/24/23 35.59 kg/m²   BMI is above normal parameters. Recommendations include: exercise counseling and nutrition counseling    Does the patient have evidence of cognitive impairment? No    Lab Results   Component Value Date    HGBA1C 6.3 02/24/2023            HEALTH RISK ASSESSMENT    Smoking Status:  Social History     Tobacco Use   Smoking Status Never   Smokeless Tobacco Never     Alcohol Consumption:  Social History     Substance and Sexual Activity   Alcohol Use No     Fall Risk Screen:    STEADI Fall Risk Assessment was completed, and patient is at LOW risk for falls.Assessment completed on:2/24/2023    Depression Screening:  PHQ-2/PHQ-9 Depression Screening 2/24/2023   Retired PHQ-9 Total Score -   Retired Total Score -   Little Interest or Pleasure in Doing Things 0-->not at all   Feeling Down, Depressed or Hopeless 0-->not at all   PHQ-9: Brief Depression Severity Measure Score 0       Health " Habits and Functional and Cognitive Screening:  Functional & Cognitive Status 2/24/2023   Do you have difficulty preparing food and eating? No   Do you have difficulty bathing yourself, getting dressed or grooming yourself? No   Do you have difficulty using the toilet? No   Do you have difficulty moving around from place to place? No   Do you have trouble with steps or getting out of a bed or a chair? No   Current Diet Well Balanced Diet   Dental Exam Up to date        Dental Exam Comment -   Eye Exam Up to date        Eye Exam Comment -   Exercise (times per week) 0 times per week   Current Exercises Include No Regular Exercise   Current Exercise Activities Include -   Do you need help using the phone?  No   Are you deaf or do you have serious difficulty hearing?  No   Do you need help with transportation? No   Do you need help shopping? No   Do you need help preparing meals?  No   Do you need help with housework?  No   Do you need help with laundry? No   Do you need help taking your medications? No   Do you need help managing money? No   Do you ever drive or ride in a car without wearing a seat belt? No   Have you felt unusual stress, anger or loneliness in the last month? No   Who do you live with? Alone   If you need help, do you have trouble finding someone available to you? No   Have you been bothered in the last four weeks by sexual problems? No   Do you have difficulty concentrating, remembering or making decisions? No       Age-appropriate Screening Schedule:  Refer to the list below for future screening recommendations based on patient's age, sex and/or medical conditions. Orders for these recommended tests are listed in the plan section. The patient has been provided with a written plan.    Health Maintenance   Topic Date Due   • COVID-19 Vaccine (4 - Booster for Moderna series) 12/23/2021   • URINE MICROALBUMIN  07/14/2022   • LIPID PANEL  10/22/2022   • DIABETIC EYE EXAM  01/17/2023   • COLORECTAL  CANCER SCREENING  06/29/2023   • HEMOGLOBIN A1C  08/24/2023   • DIABETIC FOOT EXAM  11/08/2023   • ANNUAL WELLNESS VISIT  02/24/2024   • TDAP/TD VACCINES (3 - Td or Tdap) 08/03/2030   • HEPATITIS C SCREENING  Completed   • INFLUENZA VACCINE  Completed   • Pneumococcal Vaccine 65+  Completed   • ZOSTER VACCINE  Completed              Assessment & Plan     Medically necessary, significant, and separately identifiable medical problems identified during this visit are addressed on a separate visit note.    CMS Preventative Services Quick Reference  Risk Factors Identified During Encounter  Vision Screening Recommended  Cardiac risk his risk factors are modified.   The above risks/problems have been discussed with the patient.  Follow up actions/plans if indicated are seen below in the Assessment/Plan Section.  Pertinent information has been shared with the patient in the After Visit Summary.    Follow Up:   Return in about 3 months (around 5/24/2023).     An After Visit Summary and PPPS were given to the patient.    I spent 30 minutes caring for Fransico on this date of service. This time includes time spent by me in the following activities:preparing for the visit, obtaining and/or reviewing a separately obtained history, performing a medically appropriate examination and/or evaluation , counseling and educating the patient/family/caregiver, ordering medications, tests, or procedures, documenting information in the medical record and care coordination      Site care done- cleaned with alcohol swab, procedure tolerated well, dressing applied. Venipuncture was obtained after 1 time(s). 3 tubes were drawn. Needle gauge used was 23-butterfly.

## 2023-02-25 LAB
ALBUMIN SERPL-MCNC: 4.3 G/DL (ref 3.8–4.8)
ALBUMIN/CREAT UR: 18 MG/G CREAT (ref 0–29)
ALBUMIN/GLOB SERPL: 1.6 {RATIO} (ref 1.2–2.2)
ALP SERPL-CCNC: 43 IU/L (ref 44–121)
ALT SERPL-CCNC: 20 IU/L (ref 0–44)
AST SERPL-CCNC: 17 IU/L (ref 0–40)
BASOPHILS # BLD AUTO: 0 X10E3/UL (ref 0–0.2)
BASOPHILS NFR BLD AUTO: 1 %
BILIRUB SERPL-MCNC: <0.2 MG/DL (ref 0–1.2)
BUN SERPL-MCNC: 24 MG/DL (ref 8–27)
BUN/CREAT SERPL: 24 (ref 10–24)
CALCIUM SERPL-MCNC: 9.7 MG/DL (ref 8.6–10.2)
CHLORIDE SERPL-SCNC: 105 MMOL/L (ref 96–106)
CHOLEST SERPL-MCNC: 137 MG/DL (ref 100–199)
CHOLEST/HDLC SERPL: 3.3 RATIO (ref 0–5)
CO2 SERPL-SCNC: 24 MMOL/L (ref 20–29)
CREAT SERPL-MCNC: 0.98 MG/DL (ref 0.76–1.27)
CREAT UR-MCNC: 65.2 MG/DL
EGFRCR SERPLBLD CKD-EPI 2021: 84 ML/MIN/1.73
EOSINOPHIL # BLD AUTO: 0.2 X10E3/UL (ref 0–0.4)
EOSINOPHIL NFR BLD AUTO: 4 %
ERYTHROCYTE [DISTWIDTH] IN BLOOD BY AUTOMATED COUNT: 14 % (ref 11.6–15.4)
GLOBULIN SER CALC-MCNC: 2.7 G/DL (ref 1.5–4.5)
GLUCOSE SERPL-MCNC: 103 MG/DL (ref 70–99)
HCT VFR BLD AUTO: 38.4 % (ref 37.5–51)
HDLC SERPL-MCNC: 42 MG/DL
HGB BLD-MCNC: 12.2 G/DL (ref 13–17.7)
IMM GRANULOCYTES # BLD AUTO: 0 X10E3/UL (ref 0–0.1)
IMM GRANULOCYTES NFR BLD AUTO: 0 %
LDLC SERPL CALC-MCNC: 81 MG/DL (ref 0–99)
LYMPHOCYTES # BLD AUTO: 1 X10E3/UL (ref 0.7–3.1)
LYMPHOCYTES NFR BLD AUTO: 18 %
MCH RBC QN AUTO: 26.1 PG (ref 26.6–33)
MCHC RBC AUTO-ENTMCNC: 31.8 G/DL (ref 31.5–35.7)
MCV RBC AUTO: 82 FL (ref 79–97)
MICROALBUMIN UR-MCNC: 12 UG/ML
MONOCYTES # BLD AUTO: 0.5 X10E3/UL (ref 0.1–0.9)
MONOCYTES NFR BLD AUTO: 9 %
NEUTROPHILS # BLD AUTO: 3.5 X10E3/UL (ref 1.4–7)
NEUTROPHILS NFR BLD AUTO: 68 %
PLATELET # BLD AUTO: 287 X10E3/UL (ref 150–450)
POTASSIUM SERPL-SCNC: 4.5 MMOL/L (ref 3.5–5.2)
PROT SERPL-MCNC: 7 G/DL (ref 6–8.5)
PSA SERPL-MCNC: 1.7 NG/ML (ref 0–4)
RBC # BLD AUTO: 4.68 X10E6/UL (ref 4.14–5.8)
SODIUM SERPL-SCNC: 143 MMOL/L (ref 134–144)
TRIGL SERPL-MCNC: 66 MG/DL (ref 0–149)
TSH SERPL DL<=0.005 MIU/L-ACNC: 1.95 UIU/ML (ref 0.45–4.5)
VLDLC SERPL CALC-MCNC: 14 MG/DL (ref 5–40)
WBC # BLD AUTO: 5.2 X10E3/UL (ref 3.4–10.8)

## 2023-02-28 ENCOUNTER — CLINICAL SUPPORT (OUTPATIENT)
Dept: FAMILY MEDICINE CLINIC | Facility: CLINIC | Age: 68
End: 2023-02-28
Payer: MEDICARE

## 2023-02-28 DIAGNOSIS — J30.1 SEASONAL ALLERGIC RHINITIS DUE TO POLLEN: Primary | ICD-10-CM

## 2023-02-28 PROCEDURE — 95117 IMMUNOTHERAPY INJECTIONS: CPT | Performed by: FAMILY MEDICINE

## 2023-03-14 ENCOUNTER — CLINICAL SUPPORT (OUTPATIENT)
Dept: FAMILY MEDICINE CLINIC | Facility: CLINIC | Age: 68
End: 2023-03-14
Payer: MEDICARE

## 2023-03-14 DIAGNOSIS — J30.1 SEASONAL ALLERGIC RHINITIS DUE TO POLLEN: Primary | ICD-10-CM

## 2023-03-14 PROCEDURE — 95117 IMMUNOTHERAPY INJECTIONS: CPT | Performed by: FAMILY MEDICINE

## 2023-03-15 DIAGNOSIS — E11.9 TYPE 2 DIABETES MELLITUS WITHOUT COMPLICATION, WITHOUT LONG-TERM CURRENT USE OF INSULIN: ICD-10-CM

## 2023-03-28 ENCOUNTER — CLINICAL SUPPORT (OUTPATIENT)
Dept: FAMILY MEDICINE CLINIC | Facility: CLINIC | Age: 68
End: 2023-03-28
Payer: MEDICARE

## 2023-03-28 DIAGNOSIS — J30.1 SEASONAL ALLERGIC RHINITIS DUE TO POLLEN: Primary | ICD-10-CM

## 2023-03-28 PROCEDURE — 95117 IMMUNOTHERAPY INJECTIONS: CPT | Performed by: FAMILY MEDICINE

## 2023-03-30 DIAGNOSIS — I10 HYPERTENSION, BENIGN: ICD-10-CM

## 2023-03-31 RX ORDER — FENOFIBRATE 160 MG/1
TABLET ORAL
Qty: 90 TABLET | Refills: 3 | Status: SHIPPED | OUTPATIENT
Start: 2023-03-31

## 2023-03-31 RX ORDER — AMLODIPINE BESYLATE 5 MG/1
TABLET ORAL
Qty: 90 TABLET | Refills: 3 | Status: SHIPPED | OUTPATIENT
Start: 2023-03-31

## 2023-04-11 ENCOUNTER — CLINICAL SUPPORT (OUTPATIENT)
Dept: FAMILY MEDICINE CLINIC | Facility: CLINIC | Age: 68
End: 2023-04-11
Payer: MEDICARE

## 2023-04-11 DIAGNOSIS — J30.1 SEASONAL ALLERGIC RHINITIS DUE TO POLLEN: Primary | ICD-10-CM

## 2023-04-11 PROCEDURE — 95117 IMMUNOTHERAPY INJECTIONS: CPT | Performed by: FAMILY MEDICINE

## 2023-04-25 ENCOUNTER — CLINICAL SUPPORT (OUTPATIENT)
Dept: FAMILY MEDICINE CLINIC | Facility: CLINIC | Age: 68
End: 2023-04-25
Payer: MEDICARE

## 2023-04-25 DIAGNOSIS — J30.1 SEASONAL ALLERGIC RHINITIS DUE TO POLLEN: Primary | ICD-10-CM

## 2023-04-25 PROCEDURE — 95117 IMMUNOTHERAPY INJECTIONS: CPT | Performed by: FAMILY MEDICINE

## 2023-05-05 DIAGNOSIS — E11.9 TYPE 2 DIABETES MELLITUS WITHOUT COMPLICATION, WITHOUT LONG-TERM CURRENT USE OF INSULIN: ICD-10-CM

## 2023-05-05 RX ORDER — PIOGLITAZONEHYDROCHLORIDE 30 MG/1
30 TABLET ORAL DAILY
Qty: 30 TABLET | Refills: 12 | Status: SHIPPED | OUTPATIENT
Start: 2023-05-05

## 2023-05-09 ENCOUNTER — CLINICAL SUPPORT (OUTPATIENT)
Dept: FAMILY MEDICINE CLINIC | Facility: CLINIC | Age: 68
End: 2023-05-09
Payer: MEDICARE

## 2023-05-09 DIAGNOSIS — J30.1 SEASONAL ALLERGIC RHINITIS DUE TO POLLEN: Primary | ICD-10-CM

## 2023-05-09 PROCEDURE — 95117 IMMUNOTHERAPY INJECTIONS: CPT | Performed by: FAMILY MEDICINE

## 2023-05-25 DIAGNOSIS — I10 HYPERTENSION, BENIGN: ICD-10-CM

## 2023-05-25 NOTE — PROGRESS NOTES
Chief Complaint  Diabetes and Hyperlipidemia    Subjective     CC  Problem List  Visit Diagnosis   Encounters  Notes  Medications  Labs  Result Review Imaging  Media    Fransico Cuenca presents to [facility] for   Diabetes  He presents for his follow-up diabetic visit. He has type 2 diabetes mellitus. The initial diagnosis of diabetes was made  years ago. His disease course has been stable. Pertinent negatives for hypoglycemia include no headaches. Pertinent negatives for diabetes include no blurred vision, no chest pain, no fatigue, no foot ulcerations, no polydipsia, no polyuria, no visual change and no weakness. Symptoms are stable. Risk factors for coronary artery disease include diabetes mellitus, dyslipidemia, hypertension, male sex, obesity and family history. Current diabetic treatment includes oral agent (monotherapy). He is compliant with treatment most of the time. His weight is fluctuating minimally. He is following a generally healthy diet. Meal planning includes avoidance of concentrated sweets. He has not had a previous visit with a dietitian. He participates in exercise daily. (Patient reports he does not check blood sugars at home) An ACE inhibitor/angiotensin II receptor blocker is being taken. He does not see a podiatrist.Eye exam is current ([facility] in [place]).   Hypertension  This is a chronic problem. The current episode started more than 1 year ago. The problem has been gradually improving since onset. The problem is controlled. Pertinent negatives include no blurred vision, chest pain, headaches, orthopnea, palpitations, PND or shortness of breath. Risk factors for coronary artery disease include diabetes mellitus, family history, obesity, male gender, stress and dyslipidemia. Past treatments include ACE inhibitors. Current antihypertension treatment includes ACE inhibitors and diuretics. The current treatment provides mild improvement.  "There are no compliance problems.    Hyperlipidemia  This is a chronic problem. The current episode started more than 1 year ago. The problem is controlled. Recent lipid tests were reviewed and are variable. Exacerbating diseases include diabetes and obesity. He has no history of hypothyroidism, liver disease or nephrotic syndrome. Pertinent negatives include no chest pain or shortness of breath. Current antihyperlipidemic treatment includes herbal therapy. The current treatment provides mild improvement of lipids. There are no compliance problems.  Risk factors for coronary artery disease include family history, male sex, hypertension, diabetes mellitus, dyslipidemia and obesity.     Review of Systems   Constitutional:  Negative for fatigue.   HENT:  Positive for congestion.    Eyes:  Negative for blurred vision.   Respiratory:  Negative for shortness of breath and wheezing.    Cardiovascular:  Negative for chest pain, palpitations, orthopnea, leg swelling and PND.   Gastrointestinal:  Negative for abdominal pain, constipation, diarrhea, nausea and vomiting.   Endocrine: Negative for cold intolerance, heat intolerance, polydipsia and polyuria.   Skin:  Negative for rash.   Allergic/Immunologic: Positive for environmental allergies.   Neurological:  Negative for weakness and numbness.   Hematological:  Negative for adenopathy. Does not bruise/bleed easily.      Objective   Vital Signs:   /80 (BP Location: Right arm, Patient Position: Sitting, Cuff Size: Adult)   Pulse 61   Temp 97.3 °F (36.3 °C) (Temporal)   Resp 18   Ht 163.8 cm (64.5\")   Wt 94.1 kg (207 lb 6.4 oz)   SpO2 95% Comment: room air  BMI 35.05 kg/m²     Physical Exam  Constitutional:       General: He is not in acute distress.     Appearance: He is well-developed.   Neck:      Thyroid: No thyromegaly.      Vascular: No JVD.   Cardiovascular:      Rate and Rhythm: Normal rate and regular rhythm.      Heart sounds: Normal heart sounds. No " murmur heard.    No friction rub. No gallop.   Pulmonary:      Effort: Pulmonary effort is normal. No respiratory distress.      Breath sounds: Normal breath sounds. No wheezing or rales.   Musculoskeletal:      Cervical back: Neck supple.   Feet:      Right foot:      Protective Sensation: 10 sites tested.  10 sites sensed.      Skin integrity: Skin integrity normal.      Toenail Condition: Right toenails are normal.      Left foot:      Protective Sensation: 10 sites tested.  10 sites sensed.      Skin integrity: Skin integrity normal.      Toenail Condition: Left toenails are normal.   Lymphadenopathy:      Cervical: No cervical adenopathy.   Skin:     Findings: No rash (  ).   Neurological:      Mental Status: He is alert and oriented to person, place, and time.      Coordination: Coordination normal.      Result Review :Labs  Result Review  Imaging  Med Tab  Media                 Assessment and Plan CC Problem List  Visit Diagnosis  ROS  Review (Popup)  Health Maintenance  Quality  BestPractice  Medications  SmartSets  SnapShot Encounters  Media  Problem List Items Addressed This Visit          High    Hypertension, benign    Overview     Controlled compliant.         Current Assessment & Plan     Hypertension is improving with treatment.  Continue current treatment regimen.  Blood pressure will be reassessed in 3 months.         Relevant Medications    amLODIPine (NORVASC) 5 MG tablet    Type 2 diabetes mellitus without complication, without long-term current use of insulin ([facility]) - Primary    Overview     A1c 6.3 05/26/2026 stable  A1c 6.3 02/24/2023 slightly worse. But controlled.   A1c 5.9 11/08/2022 improved  A1c 6.4 08/09/2022, improved.   A1c 7.8 05/03/2022 marginal control Actos begin for insurance  A1c 7.1 01/24/2022 improved  A1c 7.2 10/22/201    A1c 7.5  07/13/2021   A1c 7.6 03/24/2020   A1c 7.5 12/21/2020 stable,    A1c 7.4 09/16/2020    A1c 7.2 10/11/2020  A1c 6.2 08/20/2019,   New  onset 04/29/2019, A1c 7.2             Current Assessment & Plan     Diabetes is unchanged.   Continue current treatment regimen.  Diabetes will be reassessed in 3 months.         Relevant Orders    POCT urinalysis dipstick, manual (Completed)    POCT Glucose (Completed)    Hemoglobin A1c (Completed)       Medium    Mixed hyperlipidemia    Overview     Stable, compliant         Allergic rhinitis due to pollen    Overview     Mild exacerbation continue Immuno Rx           Relevant Medications    azelastine (ASTELIN) 0.1 % nasal spray       Low    Class 2 severe obesity due to excess calories with serious comorbidity and body mass index (BMI) of 35.0 to 35.9 in adult (HCC)    Overview     Diet and organized wt loss encouraged         Current Assessment & Plan     Patient's (Body mass index is 35.05 kg/m².) indicates that they are obese (BMI >30) with health conditions that include hypertension, diabetes mellitus and dyslipidemias . Weight is improving with lifestyle modifications. BMI  is above average; BMI management plan is completed. We discussed portion control and increasing exercise.             Unprioritized    Screening for malignant neoplasm of the rectum    Overview     Accepted Cologuamendoza,   Cologuard 06/29/2020 negative .Last Colonoscopy 10/26/2009.           Relevant Orders    Cologuard - Stool, Per Rectum       Follow Up Instructions Charge Capture  Follow-up Communications  No follow-ups on file.  Patient was given instructions and counseling regarding his condition or for health maintenance advice. Please see specific information pulled into the AVS if appropriate.   Answers for HPI/ROS submitted by the patient on 5/19/2023  What is the primary reason for your visit?: Other  Please describe your symptoms.: Wellness check up  Have you had these symptoms before?: Yes  How long have you been having these symptoms?: Greater than 2 weeks  Please list any medications you are currently taking for this  condition.: Have 8 meds  Please describe any probable cause for these symptoms. : Diabetes

## 2023-05-26 ENCOUNTER — OFFICE VISIT (OUTPATIENT)
Dept: FAMILY MEDICINE CLINIC | Facility: CLINIC | Age: 68
End: 2023-05-26
Payer: MEDICARE

## 2023-05-26 VITALS
OXYGEN SATURATION: 95 % | RESPIRATION RATE: 18 BRPM | BODY MASS INDEX: 34.55 KG/M2 | WEIGHT: 207.4 LBS | DIASTOLIC BLOOD PRESSURE: 80 MMHG | SYSTOLIC BLOOD PRESSURE: 126 MMHG | TEMPERATURE: 97.3 F | HEART RATE: 61 BPM | HEIGHT: 65 IN

## 2023-05-26 DIAGNOSIS — I10 HYPERTENSION, BENIGN: ICD-10-CM

## 2023-05-26 DIAGNOSIS — J30.1 SEASONAL ALLERGIC RHINITIS DUE TO POLLEN: ICD-10-CM

## 2023-05-26 DIAGNOSIS — E66.01 CLASS 2 SEVERE OBESITY DUE TO EXCESS CALORIES WITH SERIOUS COMORBIDITY AND BODY MASS INDEX (BMI) OF 35.0 TO 35.9 IN ADULT: ICD-10-CM

## 2023-05-26 DIAGNOSIS — Z12.12 SCREENING FOR MALIGNANT NEOPLASM OF THE RECTUM: ICD-10-CM

## 2023-05-26 DIAGNOSIS — E78.2 MIXED HYPERLIPIDEMIA: ICD-10-CM

## 2023-05-26 DIAGNOSIS — E11.9 TYPE 2 DIABETES MELLITUS WITHOUT COMPLICATION, WITHOUT LONG-TERM CURRENT USE OF INSULIN: Primary | ICD-10-CM

## 2023-05-26 LAB
BILIRUB BLD-MCNC: NEGATIVE MG/DL
CLARITY, POC: CLEAR
COLOR UR: YELLOW
GLUCOSE BLDC GLUCOMTR-MCNC: 118 MG/DL (ref 70–130)
GLUCOSE UR STRIP-MCNC: NEGATIVE MG/DL
KETONES UR QL: NEGATIVE
LEUKOCYTE EST, POC: NEGATIVE
NITRITE UR-MCNC: NEGATIVE MG/ML
PH UR: 5 [PH] (ref 5–8)
PROT UR STRIP-MCNC: NEGATIVE MG/DL
RBC # UR STRIP: NEGATIVE /UL
SP GR UR: 1.01 (ref 1–1.03)
UROBILINOGEN UR QL: NORMAL

## 2023-05-26 RX ORDER — AMLODIPINE BESYLATE 5 MG/1
5 TABLET ORAL DAILY
Qty: 90 TABLET | Refills: 3 | Status: SHIPPED | OUTPATIENT
Start: 2023-05-26

## 2023-05-26 RX ORDER — AZELASTINE 1 MG/ML
2 SPRAY, METERED NASAL 2 TIMES DAILY
Qty: 30 ML | Refills: 5 | Status: SHIPPED | OUTPATIENT
Start: 2023-05-26

## 2023-05-26 RX ORDER — AZELASTINE 1 MG/ML
2 SPRAY, METERED NASAL 2 TIMES DAILY
COMMUNITY
End: 2023-05-26 | Stop reason: SDUPTHER

## 2023-05-26 RX ORDER — CITALOPRAM 20 MG/1
TABLET ORAL
Qty: 90 TABLET | Refills: 3 | Status: SHIPPED | OUTPATIENT
Start: 2023-05-26

## 2023-05-26 RX ORDER — DOXAZOSIN MESYLATE 1 MG/1
TABLET ORAL
Qty: 90 TABLET | Refills: 3 | Status: SHIPPED | OUTPATIENT
Start: 2023-05-26

## 2023-05-26 NOTE — ASSESSMENT & PLAN NOTE
Patient's (Body mass index is 35.05 kg/m².) indicates that they are obese (BMI >30) with health conditions that include hypertension, diabetes mellitus and dyslipidemias . Weight is improving with lifestyle modifications. BMI  is above average; BMI management plan is completed. We discussed portion control and increasing exercise.

## 2023-05-27 LAB — HBA1C MFR BLD: 6.3 % (ref 4.8–5.6)

## 2023-06-06 ENCOUNTER — CLINICAL SUPPORT (OUTPATIENT)
Dept: FAMILY MEDICINE CLINIC | Facility: CLINIC | Age: 68
End: 2023-06-06
Payer: MEDICARE

## 2023-06-06 DIAGNOSIS — J30.1 SEASONAL ALLERGIC RHINITIS DUE TO POLLEN: Primary | ICD-10-CM

## 2023-06-06 PROCEDURE — 95117 IMMUNOTHERAPY INJECTIONS: CPT | Performed by: FAMILY MEDICINE

## 2023-08-04 ENCOUNTER — HOSPITAL ENCOUNTER (OUTPATIENT)
Dept: GENERAL RADIOLOGY | Facility: HOSPITAL | Age: 68
Discharge: HOME | End: 2023-08-04
Admitting: FAMILY MEDICINE
Payer: MEDICARE

## 2023-08-04 ENCOUNTER — OFFICE VISIT (OUTPATIENT)
Dept: FAMILY MEDICINE CLINIC | Facility: CLINIC | Age: 68
End: 2023-08-04
Payer: MEDICARE

## 2023-08-04 VITALS
TEMPERATURE: 97.5 F | WEIGHT: 213.2 LBS | RESPIRATION RATE: 18 BRPM | BODY MASS INDEX: 35.52 KG/M2 | HEIGHT: 65 IN | OXYGEN SATURATION: 97 % | DIASTOLIC BLOOD PRESSURE: 76 MMHG | HEART RATE: 78 BPM | SYSTOLIC BLOOD PRESSURE: 138 MMHG

## 2023-08-04 DIAGNOSIS — M79.642 LEFT HAND PAIN: Primary | ICD-10-CM

## 2023-08-04 DIAGNOSIS — E66.01 CLASS 2 SEVERE OBESITY DUE TO EXCESS CALORIES WITH SERIOUS COMORBIDITY AND BODY MASS INDEX (BMI) OF 35.0 TO 35.9 IN ADULT: ICD-10-CM

## 2023-08-04 DIAGNOSIS — E11.9 TYPE 2 DIABETES MELLITUS WITHOUT COMPLICATION, WITHOUT LONG-TERM CURRENT USE OF INSULIN: ICD-10-CM

## 2023-08-04 DIAGNOSIS — S63.642A SPRAIN OF METACARPOPHALANGEAL (MCP) JOINT OF LEFT THUMB, INITIAL ENCOUNTER: ICD-10-CM

## 2023-08-04 DIAGNOSIS — I10 HYPERTENSION, BENIGN: ICD-10-CM

## 2023-08-04 DIAGNOSIS — M19.042 PRIMARY OSTEOARTHRITIS OF LEFT HAND: ICD-10-CM

## 2023-08-04 PROCEDURE — 73130 X-RAY EXAM OF HAND: CPT

## 2023-08-04 RX ORDER — ATORVASTATIN CALCIUM 10 MG/1
10 TABLET, FILM COATED ORAL DAILY
COMMUNITY
Start: 2023-06-06

## 2023-08-08 ENCOUNTER — CLINICAL SUPPORT (OUTPATIENT)
Dept: FAMILY MEDICINE CLINIC | Facility: CLINIC | Age: 68
End: 2023-08-08
Payer: MEDICARE

## 2023-08-08 DIAGNOSIS — J30.1 SEASONAL ALLERGIC RHINITIS DUE TO POLLEN: Primary | ICD-10-CM

## 2023-08-08 PROCEDURE — 95117 IMMUNOTHERAPY INJECTIONS: CPT | Performed by: FAMILY MEDICINE

## 2023-08-22 ENCOUNTER — CLINICAL SUPPORT (OUTPATIENT)
Dept: FAMILY MEDICINE CLINIC | Facility: CLINIC | Age: 68
End: 2023-08-22
Payer: MEDICARE

## 2023-08-22 DIAGNOSIS — J30.1 SEASONAL ALLERGIC RHINITIS DUE TO POLLEN: Primary | ICD-10-CM

## 2023-08-22 PROCEDURE — 95117 IMMUNOTHERAPY INJECTIONS: CPT | Performed by: FAMILY MEDICINE

## 2023-09-05 RX ORDER — ATORVASTATIN CALCIUM 10 MG/1
TABLET, FILM COATED ORAL
Qty: 90 TABLET | Refills: 3 | Status: SHIPPED | OUTPATIENT
Start: 2023-09-05

## 2023-09-11 ENCOUNTER — OFFICE VISIT (OUTPATIENT)
Dept: FAMILY MEDICINE CLINIC | Facility: CLINIC | Age: 68
End: 2023-09-11
Payer: MEDICARE

## 2023-09-11 VITALS
DIASTOLIC BLOOD PRESSURE: 80 MMHG | BODY MASS INDEX: 35.72 KG/M2 | SYSTOLIC BLOOD PRESSURE: 138 MMHG | HEIGHT: 65 IN | OXYGEN SATURATION: 97 % | HEART RATE: 80 BPM | WEIGHT: 214.4 LBS | TEMPERATURE: 97.3 F | RESPIRATION RATE: 17 BRPM

## 2023-09-11 DIAGNOSIS — Z23 NEED FOR INFLUENZA VACCINATION: ICD-10-CM

## 2023-09-11 DIAGNOSIS — I10 HYPERTENSION, BENIGN: ICD-10-CM

## 2023-09-11 DIAGNOSIS — E66.01 CLASS 2 SEVERE OBESITY DUE TO EXCESS CALORIES WITH SERIOUS COMORBIDITY AND BODY MASS INDEX (BMI) OF 35.0 TO 35.9 IN ADULT: ICD-10-CM

## 2023-09-11 DIAGNOSIS — E78.2 MIXED HYPERLIPIDEMIA: ICD-10-CM

## 2023-09-11 DIAGNOSIS — R35.0 URINARY FREQUENCY: ICD-10-CM

## 2023-09-11 DIAGNOSIS — E11.9 TYPE 2 DIABETES MELLITUS WITHOUT COMPLICATION, WITHOUT LONG-TERM CURRENT USE OF INSULIN: Primary | ICD-10-CM

## 2023-09-11 LAB
BILIRUB BLD-MCNC: NEGATIVE MG/DL
CLARITY, POC: CLEAR
COLOR UR: YELLOW
EXPIRATION DATE: NORMAL
GLUCOSE BLDC GLUCOMTR-MCNC: 146 MG/DL (ref 70–130)
GLUCOSE UR STRIP-MCNC: NEGATIVE MG/DL
HBA1C MFR BLD: 6.1 %
KETONES UR QL: ABNORMAL
LEUKOCYTE EST, POC: NEGATIVE
Lab: NORMAL
NITRITE UR-MCNC: NEGATIVE MG/ML
PH UR: 5 [PH] (ref 5–8)
PROT UR STRIP-MCNC: ABNORMAL MG/DL
RBC # UR STRIP: ABNORMAL /UL
SP GR UR: 1.01 (ref 1–1.03)
UROBILINOGEN UR QL: NORMAL

## 2023-09-11 NOTE — PROGRESS NOTES
Chief Complaint  Diabetes, Hyperlipidemia, and Hypertension    Subjective     CC  Problem List  Visit Diagnosis   Encounters  Notes  Medications  Labs  Result Review Imaging  Media    Fransico Cuenca presents to [facility] for   History of Present Illness  Mike is here today for his diabetes check.   Diabetes  He presents for his follow-up diabetic visit. He has type 2 diabetes mellitus. His disease course has been stable. There are no hypoglycemic associated symptoms. Pertinent negatives for hypoglycemia include no headaches or sweats. Pertinent negatives for diabetes include no chest pain, no fatigue, no foot ulcerations, no polydipsia, no polyuria, no visual change, no weakness and no weight loss. Symptoms are stable. Risk factors for coronary artery disease include diabetes mellitus, dyslipidemia, hypertension, male sex, obesity and family history. Current diabetic treatment includes oral agent (monotherapy). He is compliant with treatment most of the time. His weight is fluctuating minimally. He is following a generally healthy diet. Meal planning includes avoidance of concentrated sweets. He has not had a previous visit with a dietitian. He participates in exercise daily. (Patient reports he does not check blood sugars at home) An ACE inhibitor/angiotensin II receptor blocker is being taken. He does not see a podiatrist.Eye exam is current ([facility] in [place]).   Hypertension  This is a chronic problem. The current episode started more than 1 year ago. The problem has been gradually improving since onset. The problem is controlled. Pertinent negatives include no anxiety, chest pain, headaches, malaise/fatigue, orthopnea, palpitations, peripheral edema, PND, shortness of breath or sweats. Risk factors for coronary artery disease include diabetes mellitus, family history, obesity, male gender, stress and dyslipidemia. Past treatments include ACE inhibitors.  "Current antihypertension treatment includes ACE inhibitors and diuretics. The current treatment provides mild improvement. There are no compliance problems.    Hyperlipidemia  This is a chronic problem. The current episode started more than 1 year ago. The problem is controlled. Recent lipid tests were reviewed and are variable. Exacerbating diseases include diabetes and obesity. He has no history of hypothyroidism, liver disease or nephrotic syndrome. Pertinent negatives include no chest pain, focal sensory loss, focal weakness, leg pain or shortness of breath. Current antihyperlipidemic treatment includes herbal therapy. The current treatment provides mild improvement of lipids. There are no compliance problems.  Risk factors for coronary artery disease include family history, male sex, hypertension, diabetes mellitus, dyslipidemia and obesity.     Review of Systems   Constitutional:  Negative for fatigue, malaise/fatigue and unexpected weight loss.   Eyes:  Negative for visual disturbance.   Respiratory:  Negative for shortness of breath and wheezing.    Cardiovascular:  Negative for chest pain, palpitations, orthopnea, leg swelling and PND.   Gastrointestinal:  Negative for abdominal pain, constipation, diarrhea, nausea and vomiting.   Endocrine: Negative for cold intolerance, heat intolerance, polydipsia and polyuria.   Genitourinary:  Positive for frequency. Negative for dysuria.   Skin:  Negative for rash.   Neurological:  Negative for focal weakness and weakness.   Hematological:  Negative for adenopathy. Does not bruise/bleed easily.      Objective   Vital Signs:   /80   Pulse 80   Temp 97.3 °F (36.3 °C) (Infrared)   Resp 17   Ht 163.8 cm (64.5\")   Wt 97.3 kg (214 lb 6.4 oz)   SpO2 97% Comment: room air  BMI 36.23 kg/m²     Physical Exam  Constitutional:       General: He is not in acute distress.     Appearance: He is well-developed.   Neck:      Thyroid: No thyromegaly.      Vascular: No JVD. "   Cardiovascular:      Rate and Rhythm: Normal rate and regular rhythm.      Heart sounds: Normal heart sounds. No murmur heard.    No friction rub. No gallop.   Pulmonary:      Effort: Pulmonary effort is normal. No respiratory distress.      Breath sounds: Normal breath sounds. No wheezing or rales.   Musculoskeletal:      Cervical back: Neck supple.   Feet:      Right foot:      Protective Sensation: 10 sites tested.  10 sites sensed.      Skin integrity: Skin integrity normal.      Toenail Condition: Right toenails are normal.      Left foot:      Protective Sensation: 10 sites tested.  10 sites sensed.      Skin integrity: Skin integrity normal.      Toenail Condition: Left toenails are normal.   Lymphadenopathy:      Cervical: No cervical adenopathy.   Skin:     Findings: No rash (  ).   Neurological:      Mental Status: He is alert and oriented to person, place, and time.      Coordination: Coordination normal.      Result Review :Labs  Result Review  Imaging  Med Tab  Media                 Assessment and Plan CC Problem List  Visit Diagnosis  ROS  Review (Popup)  Health Maintenance  Quality  BestPractice  Medications  SmartSets  SnapShot Encounters  Media  Problem List Items Addressed This Visit          High    Hypertension, benign    Overview     Controlled compliant continue meds.          Current Assessment & Plan     Hypertension is improving with treatment.  Continue current treatment regimen.  Dietary sodium restriction.  Weight loss.  Regular aerobic exercise.  Blood pressure will be reassessed in 3 months.         Type 2 diabetes mellitus without complication, without long-term current use of insulin ([facility]) - Primary    Overview     A1c 6.1 09/11/2023 improved.   A1c 6.3 05/26/2026 stable he reports BS of 150  A1c 6.3 02/24/2023 slightly worse. But controlled.   A1c 5.9 11/08/2022 improved  A1c 7.8 05/03/2022 marginal control Actos begin for insurance  A1c 7.1 01/24/2022 [id number]  7.5  07/13/2021   A1c 7.5 12/21/2020 stable,    A1c 6.2 08/20/2019,   New onset 04/29/2019, A1c 7.2             Current Assessment & Plan     Diabetes is improving with treatment.   Continue current treatment regimen.  Diabetes will be reassessed in 3 months.         Relevant Orders    POCT Glucose (Completed)    POC Glycosylated Hemoglobin (Hb A1C) (Completed)    POCT urinalysis dipstick, manual (Completed)       Medium    Mixed hyperlipidemia    Overview     Stable, compliant         Current Assessment & Plan     Lipid abnormalities are improving with treatment.  Pharmacotherapy as ordered.  Lipids will be reassessed in 3 months.            Low    Class 2 severe obesity due to excess calories with serious comorbidity and body mass index (BMI) of 35.0 to 35.9 in adult (HCC)    Overview     Diet and organized wt loss encouraged         Current Assessment & Plan     Patient's (Body mass index is 36.23 kg/m².) indicates that they are obese (BMI >30) with health conditions that include diabetes mellitus . Weight is improving with lifestyle modifications. BMI  is above average; BMI management plan is completed. We discussed low calorie, low carb based diet program, portion control, and increasing exercise.           Other Visit Diagnoses       Urinary frequency        Relevant Orders    Urinalysis With Microscopic - Urine, Clean Catch    Need for influenza vaccination        Relevant Orders    Fluzone High-Dose 65+yrs (Completed)            Follow Up Instructions Charge Capture  Follow-up Communications  Return in about 3 months (around 12/11/2023).  Patient was given instructions and counseling regarding his condition or for health maintenance advice. Please see specific information pulled into the AVS if appropriate.

## 2023-09-11 NOTE — ASSESSMENT & PLAN NOTE
Patient's (Body mass index is 36.23 kg/m².) indicates that they are obese (BMI >30) with health conditions that include diabetes mellitus . Weight is improving with lifestyle modifications. BMI  is above average; BMI management plan is completed. We discussed low calorie, low carb based diet program, portion control, and increasing exercise.

## 2023-09-12 LAB
APPEARANCE UR: CLEAR
BACTERIA #/AREA URNS HPF: NORMAL /[HPF]
BILIRUB UR QL STRIP: NEGATIVE
CASTS URNS QL MICRO: NORMAL /LPF
COLOR UR: YELLOW
EPI CELLS #/AREA URNS HPF: NORMAL /HPF (ref 0–10)
GLUCOSE UR QL STRIP: NEGATIVE
HGB UR QL STRIP: NEGATIVE
KETONES UR QL STRIP: NEGATIVE
LEUKOCYTE ESTERASE UR QL STRIP: NEGATIVE
MICRO URNS: ABNORMAL
NITRITE UR QL STRIP: NEGATIVE
PH UR STRIP: 6 [PH] (ref 5–7.5)
PROT UR QL STRIP: ABNORMAL
RBC #/AREA URNS HPF: NORMAL /HPF (ref 0–2)
SP GR UR STRIP: 1.02 (ref 1–1.03)
UROBILINOGEN UR STRIP-MCNC: 0.2 MG/DL (ref 0.2–1)
WBC #/AREA URNS HPF: NORMAL /HPF (ref 0–5)

## 2023-09-12 NOTE — PROGRESS NOTES
Called patient about his lab   Good morning we have your urine microscopic back and per Dr. Salinas   Your urine microscopic exam has come back negative. This is good. It is more accurate than the dip that we do as to why we sent this lab our for confirmation.

## 2023-10-10 ENCOUNTER — CLINICAL SUPPORT (OUTPATIENT)
Dept: FAMILY MEDICINE CLINIC | Facility: CLINIC | Age: 68
End: 2023-10-10
Payer: MEDICARE

## 2023-10-10 DIAGNOSIS — J30.1 SEASONAL ALLERGIC RHINITIS DUE TO POLLEN: Primary | ICD-10-CM

## 2023-10-10 PROCEDURE — 95117 IMMUNOTHERAPY INJECTIONS: CPT | Performed by: FAMILY MEDICINE

## 2023-10-12 DIAGNOSIS — I10 HYPERTENSION, BENIGN: ICD-10-CM

## 2023-10-13 RX ORDER — LISINOPRIL AND HYDROCHLOROTHIAZIDE 20; 12.5 MG/1; MG/1
TABLET ORAL
Qty: 90 TABLET | Refills: 3 | Status: SHIPPED | OUTPATIENT
Start: 2023-10-13

## 2023-10-24 ENCOUNTER — CLINICAL SUPPORT (OUTPATIENT)
Dept: FAMILY MEDICINE CLINIC | Facility: CLINIC | Age: 68
End: 2023-10-24
Payer: MEDICARE

## 2023-10-24 DIAGNOSIS — J30.1 SEASONAL ALLERGIC RHINITIS DUE TO POLLEN: Primary | ICD-10-CM

## 2023-10-24 PROCEDURE — 95117 IMMUNOTHERAPY INJECTIONS: CPT | Performed by: FAMILY MEDICINE

## 2023-11-03 DIAGNOSIS — J30.1 ALLERGIC RHINITIS DUE TO POLLEN, UNSPECIFIED SEASONALITY: ICD-10-CM

## 2023-11-03 RX ORDER — MONTELUKAST SODIUM 10 MG/1
10 TABLET ORAL DAILY
Qty: 90 TABLET | Refills: 3 | Status: SHIPPED | OUTPATIENT
Start: 2023-11-03

## 2023-11-03 RX ORDER — MONTELUKAST SODIUM 10 MG/1
TABLET ORAL
Qty: 30 TABLET | Refills: 0 | Status: SHIPPED | OUTPATIENT
Start: 2023-11-03 | End: 2023-11-03 | Stop reason: SDUPTHER

## 2023-11-15 ENCOUNTER — CLINICAL SUPPORT (OUTPATIENT)
Dept: FAMILY MEDICINE CLINIC | Facility: CLINIC | Age: 68
End: 2023-11-15
Payer: MEDICARE

## 2023-11-15 DIAGNOSIS — J30.1 ALLERGIC RHINITIS DUE TO POLLEN, UNSPECIFIED SEASONALITY: Primary | ICD-10-CM

## 2023-11-15 PROCEDURE — 95117 IMMUNOTHERAPY INJECTIONS: CPT | Performed by: FAMILY MEDICINE

## 2023-11-29 ENCOUNTER — CLINICAL SUPPORT (OUTPATIENT)
Dept: FAMILY MEDICINE CLINIC | Facility: CLINIC | Age: 68
End: 2023-11-29
Payer: MEDICARE

## 2023-11-29 DIAGNOSIS — J30.1 ALLERGIC RHINITIS DUE TO POLLEN, UNSPECIFIED SEASONALITY: Primary | ICD-10-CM

## 2023-11-29 PROCEDURE — 95117 IMMUNOTHERAPY INJECTIONS: CPT | Performed by: FAMILY MEDICINE

## 2023-12-11 NOTE — PROGRESS NOTES
Chief Complaint  Diabetes, Hyperlipidemia, Hypertension, and Obesity    Subjective     CC  Problem List  Visit Diagnosis   Encounters  Notes  Medications  Labs  Result Review Imaging  Media    Fransico Cuenca presents to [facility] for   Diabetes  He presents for his follow-up diabetic visit. He has type 2 diabetes mellitus. His disease course has been stable. There are no hypoglycemic associated symptoms. Pertinent negatives for hypoglycemia include no headaches or sweats. Pertinent negatives for diabetes include no blurred vision, no chest pain, no fatigue, no foot ulcerations, no polydipsia, no polyuria, no visual change, no weakness and no weight loss. Symptoms are stable. Risk factors for coronary artery disease include diabetes mellitus, dyslipidemia, hypertension, male sex, obesity and family history. Current diabetic treatment includes oral agent (monotherapy). He is compliant with treatment most of the time. His weight is fluctuating minimally. He is following a generally healthy diet. Meal planning includes avoidance of concentrated sweets. He has not had a previous visit with a dietitian. He participates in exercise daily. (Patient reports he does not check blood sugars at home) An ACE inhibitor/angiotensin II receptor blocker is being taken. He does not see a podiatrist.Eye exam is current ([facility] in [place]).   Hypertension  This is a chronic problem. The current episode started more than 1 year ago. The problem has been gradually improving since onset. The problem is controlled. Pertinent negatives include no anxiety, blurred vision, chest pain, headaches, malaise/fatigue, neck pain, orthopnea, palpitations, peripheral edema, PND, shortness of breath or sweats. Risk factors for coronary artery disease include diabetes mellitus, family history, obesity, male gender, stress and dyslipidemia. Past treatments include ACE inhibitors. Current  "antihypertension treatment includes ACE inhibitors and diuretics. The current treatment provides mild improvement. There are no compliance problems.    Hyperlipidemia  This is a chronic problem. The current episode started more than 1 year ago. The problem is controlled. Recent lipid tests were reviewed and are variable. Exacerbating diseases include diabetes and obesity. He has no history of hypothyroidism, liver disease or nephrotic syndrome. Pertinent negatives include no chest pain, focal sensory loss, focal weakness, leg pain, myalgias or shortness of breath. Current antihyperlipidemic treatment includes herbal therapy. The current treatment provides mild improvement of lipids. There are no compliance problems.  Risk factors for coronary artery disease include family history, male sex, hypertension, diabetes mellitus, dyslipidemia and obesity.       Review of Systems   Constitutional:  Negative for fatigue, malaise/fatigue and unexpected weight loss.   Eyes:  Negative for blurred vision.   Respiratory:  Negative for shortness of breath.    Cardiovascular:  Negative for chest pain, palpitations, orthopnea, leg swelling and PND.   Endocrine: Negative for cold intolerance, heat intolerance, polydipsia and polyuria.   Genitourinary:  Negative for dysuria.   Musculoskeletal:  Negative for myalgias and neck pain.   Skin:  Positive for rash.        No foot lesions.    Neurological:  Negative for focal weakness, weakness and numbness.   Hematological:  Negative for adenopathy. Does not bruise/bleed easily.        Objective   Vital Signs:   /72 (BP Location: Left arm, Patient Position: Sitting, Cuff Size: Adult)   Pulse 73   Temp 97.7 °F (36.5 °C) (Infrared)   Resp 18   Ht 163.8 cm (64.5\")   Wt 97.4 kg (214 lb 12.8 oz)   SpO2 95% Comment: room air  BMI 36.30 kg/m²     Physical Exam  Constitutional:       General: He is not in acute distress.     Appearance: He is well-developed.   Neck:      Thyroid: No " thyromegaly.      Vascular: No JVD.   Cardiovascular:      Rate and Rhythm: Normal rate and regular rhythm.      Heart sounds: Normal heart sounds. No murmur heard.     No friction rub. No gallop.   Pulmonary:      Effort: Pulmonary effort is normal. No respiratory distress.      Breath sounds: Normal breath sounds. No wheezing or rales.   Musculoskeletal:      Cervical back: Neck supple.   Feet:      Right foot:      Protective Sensation: 10 sites tested.  10 sites sensed.      Skin integrity: Skin integrity normal.      Toenail Condition: Right toenails are normal.      Left foot:      Protective Sensation: 10 sites tested.  10 sites sensed.      Skin integrity: Skin integrity normal.      Toenail Condition: Left toenails are normal.   Lymphadenopathy:      Cervical: No cervical adenopathy.   Skin:     Findings: No rash (  ).   Neurological:      Mental Status: He is alert and oriented to person, place, and time.      Coordination: Coordination normal.        Result Review :Labs  Result Review  Imaging  Med Tab  Media                 Assessment and Plan CC Problem List  Visit Diagnosis  ROS  Review (Popup)  Health Maintenance  Quality  BestPractice  Medications  SmartSets  SnapShot Encounters  Media  Problem List Items Addressed This Visit          High    Hypertension, benign    Overview     Controlled compliant continue meds.          Current Assessment & Plan     Hypertension is improving with treatment.  Continue current treatment regimen.  Blood pressure will be reassessed in 3 months.         Type 2 diabetes mellitus without complication, without long-term current use of insulin ([facility]) - Primary    Overview     A1c 6.8 12.12.2023 worse less exercise.   A1c 6.1 09/11/2023 improved.   A1c 6.3 05/26/2026 stable he reports BS of 150  A1c 6.3 02/24/2023 slightly worse. But controlled.   A1c 5.9 11/08/2022 improved  A1c 7.8 05/03/2022 marginal control Actos begin for insurance  A1c 7.1 01/24/2022  [id number] 7.5  07/13/2021   A1c 7.5 12/21/2020 stable,    A1c 6.2 08/20/2019,   New onset 04/29/2019, A1c 7.2             Current Assessment & Plan     Diabetes is worsening.   Continue current treatment regimen.  Diabetes will be reassessed in 3 months.         Relevant Orders    POC Glycosylated Hemoglobin (Hb A1C) (Completed)    POCT Glucose (Completed)    POCT urinalysis dipstick, manual (Completed)       Medium    Mixed hyperlipidemia    Overview     Stable, compliant         Current Assessment & Plan     Lipid abnormalities are unchanged.  Nutritional counseling was provided. and Pharmacotherapy as ordered.  Lipids will be reassessed in 3 months.         Allergic rhinitis due to pollen    Overview     Sxs fairly well controlled. continue Immuno Rx           Relevant Medications    patient supplied allergy injection (Completed)    patient supplied allergy injection (Completed)       Low    Class 2 severe obesity due to excess calories with serious comorbidity and body mass index (BMI) of 36.0 to 36.9 in adult    Overview     Diet and organized wt loss encouraged         Current Assessment & Plan     Patient's (Body mass index is 36.3 kg/m².) indicates that they are obese (BMI >30) with health conditions that include hypertension, diabetes mellitus, and dyslipidemias . Weight is worsening. BMI  is above average; BMI management plan is completed. We discussed portion control and increasing exercise.             Follow Up Instructions Charge Capture  Follow-up Communications  Return in about 3 months (around 3/12/2024).  Patient was given instructions and counseling regarding his condition or for health maintenance advice. Please see specific information pulled into the AVS if appropriate.

## 2023-12-12 ENCOUNTER — OFFICE VISIT (OUTPATIENT)
Dept: FAMILY MEDICINE CLINIC | Facility: CLINIC | Age: 68
End: 2023-12-12
Payer: MEDICARE

## 2023-12-12 VITALS
TEMPERATURE: 97.7 F | OXYGEN SATURATION: 95 % | HEIGHT: 65 IN | HEART RATE: 73 BPM | RESPIRATION RATE: 18 BRPM | BODY MASS INDEX: 35.79 KG/M2 | SYSTOLIC BLOOD PRESSURE: 130 MMHG | DIASTOLIC BLOOD PRESSURE: 72 MMHG | WEIGHT: 214.8 LBS

## 2023-12-12 DIAGNOSIS — I10 HYPERTENSION, BENIGN: ICD-10-CM

## 2023-12-12 DIAGNOSIS — J30.1 SEASONAL ALLERGIC RHINITIS DUE TO POLLEN: ICD-10-CM

## 2023-12-12 DIAGNOSIS — E66.01 CLASS 2 SEVERE OBESITY DUE TO EXCESS CALORIES WITH SERIOUS COMORBIDITY AND BODY MASS INDEX (BMI) OF 36.0 TO 36.9 IN ADULT: ICD-10-CM

## 2023-12-12 DIAGNOSIS — E78.2 MIXED HYPERLIPIDEMIA: ICD-10-CM

## 2023-12-12 DIAGNOSIS — E11.9 TYPE 2 DIABETES MELLITUS WITHOUT COMPLICATION, WITHOUT LONG-TERM CURRENT USE OF INSULIN: Primary | ICD-10-CM

## 2023-12-12 LAB
BILIRUB BLD-MCNC: NEGATIVE MG/DL
CLARITY, POC: CLEAR
COLOR UR: YELLOW
EXPIRATION DATE: ABNORMAL
GLUCOSE BLDC GLUCOMTR-MCNC: 127 MG/DL (ref 70–130)
GLUCOSE UR STRIP-MCNC: NEGATIVE MG/DL
HBA1C MFR BLD: 6.8 % (ref 4.5–5.7)
KETONES UR QL: NEGATIVE
LEUKOCYTE EST, POC: ABNORMAL
Lab: ABNORMAL
NITRITE UR-MCNC: NEGATIVE MG/ML
PH UR: 5 [PH] (ref 5–8)
PROT UR STRIP-MCNC: ABNORMAL MG/DL
RBC # UR STRIP: ABNORMAL /UL
SP GR UR: 1.02 (ref 1–1.03)
UROBILINOGEN UR QL: NORMAL

## 2023-12-13 NOTE — ASSESSMENT & PLAN NOTE
Patient's (Body mass index is 36.3 kg/m².) indicates that they are obese (BMI >30) with health conditions that include hypertension, diabetes mellitus, and dyslipidemias . Weight is worsening. BMI  is above average; BMI management plan is completed. We discussed portion control and increasing exercise.

## 2023-12-26 ENCOUNTER — CLINICAL SUPPORT (OUTPATIENT)
Dept: FAMILY MEDICINE CLINIC | Facility: CLINIC | Age: 68
End: 2023-12-26
Payer: MEDICARE

## 2023-12-26 DIAGNOSIS — J30.1 SEASONAL ALLERGIC RHINITIS DUE TO POLLEN: ICD-10-CM

## 2023-12-26 DIAGNOSIS — E11.9 TYPE 2 DIABETES MELLITUS WITHOUT COMPLICATION, WITHOUT LONG-TERM CURRENT USE OF INSULIN: Primary | ICD-10-CM

## 2024-01-09 ENCOUNTER — CLINICAL SUPPORT (OUTPATIENT)
Dept: FAMILY MEDICINE CLINIC | Facility: CLINIC | Age: 69
End: 2024-01-09
Payer: MEDICARE

## 2024-01-09 DIAGNOSIS — J30.1 SEASONAL ALLERGIC RHINITIS DUE TO POLLEN: Primary | ICD-10-CM

## 2024-01-09 PROCEDURE — 95117 IMMUNOTHERAPY INJECTIONS: CPT | Performed by: FAMILY MEDICINE

## 2024-01-23 ENCOUNTER — CLINICAL SUPPORT (OUTPATIENT)
Dept: FAMILY MEDICINE CLINIC | Facility: CLINIC | Age: 69
End: 2024-01-23
Payer: MEDICARE

## 2024-01-23 DIAGNOSIS — J30.1 SEASONAL ALLERGIC RHINITIS DUE TO POLLEN: Primary | ICD-10-CM

## 2024-01-23 PROCEDURE — 95117 IMMUNOTHERAPY INJECTIONS: CPT | Performed by: FAMILY MEDICINE

## 2024-02-06 ENCOUNTER — CLINICAL SUPPORT (OUTPATIENT)
Dept: FAMILY MEDICINE CLINIC | Facility: CLINIC | Age: 69
End: 2024-02-06
Payer: MEDICARE

## 2024-02-06 DIAGNOSIS — J30.1 SEASONAL ALLERGIC RHINITIS DUE TO POLLEN: Primary | ICD-10-CM

## 2024-02-06 PROCEDURE — 95117 IMMUNOTHERAPY INJECTIONS: CPT | Performed by: FAMILY MEDICINE

## 2024-02-20 ENCOUNTER — CLINICAL SUPPORT (OUTPATIENT)
Dept: FAMILY MEDICINE CLINIC | Facility: CLINIC | Age: 69
End: 2024-02-20
Payer: MEDICARE

## 2024-02-20 DIAGNOSIS — J30.1 SEASONAL ALLERGIC RHINITIS DUE TO POLLEN: Primary | ICD-10-CM

## 2024-02-20 PROCEDURE — 95117 IMMUNOTHERAPY INJECTIONS: CPT | Performed by: FAMILY MEDICINE

## 2024-02-23 ENCOUNTER — OFFICE VISIT (OUTPATIENT)
Dept: ORTHOPEDIC SURGERY | Facility: CLINIC | Age: 69
End: 2024-02-23
Payer: MEDICARE

## 2024-02-23 VITALS — BODY MASS INDEX: 34.99 KG/M2 | WEIGHT: 210 LBS | OXYGEN SATURATION: 96 % | HEART RATE: 68 BPM | HEIGHT: 65 IN

## 2024-02-23 DIAGNOSIS — M19.011 PRIMARY OSTEOARTHRITIS OF RIGHT SHOULDER: ICD-10-CM

## 2024-02-23 DIAGNOSIS — M25.511 RIGHT SHOULDER PAIN, UNSPECIFIED CHRONICITY: Primary | ICD-10-CM

## 2024-02-23 RX ORDER — DIPHENOXYLATE HYDROCHLORIDE AND ATROPINE SULFATE 2.5; .025 MG/1; MG/1
1 TABLET ORAL DAILY
COMMUNITY

## 2024-02-23 RX ORDER — TRIAMCINOLONE ACETONIDE 40 MG/ML
80 INJECTION, SUSPENSION INTRA-ARTICULAR; INTRAMUSCULAR
Status: COMPLETED | OUTPATIENT
Start: 2024-02-23 | End: 2024-02-23

## 2024-02-23 RX ADMIN — TRIAMCINOLONE ACETONIDE 80 MG: 40 INJECTION, SUSPENSION INTRA-ARTICULAR; INTRAMUSCULAR at 16:04

## 2024-02-23 NOTE — PROGRESS NOTES
Procedure   Large Joint Arthrocentesis: R glenohumeral  Date/Time: 2/23/2024 4:04 PM  Consent given by: patient  Site marked: site marked  Timeout: Immediately prior to procedure a time out was called to verify the correct patient, procedure, equipment, support staff and site/side marked as required   Supporting Documentation  Indications: pain   Procedure Details  Location: shoulder - R glenohumeral  Preparation: Patient was prepped and draped in the usual sterile fashion  Needle size: 25 G  Approach: posterior (Ultrasound guidance was used to ensure intrarticular needle placement. Please see ultrasound machine for saved images.)  Medications administered: 80 mg triamcinolone acetonide 40 MG/ML  Patient tolerance: patient tolerated the procedure well with no immediate complications

## 2024-02-23 NOTE — PROGRESS NOTES
Primary Care Sports Medicine Office Visit Note     Patient ID: Fransico Cuenca is a 68 y.o. male.    Chief Complaint:  Chief Complaint   Patient presents with    Right Shoulder - Pain, Initial Evaluation     6 months on going      HPI:    Mr. Fransico Cuenca is a 68 y.o. male. The patient presents to the clinic today for evaluation of right shoulder injury.    His shoulder hurts. He tore his rotator cuff 7 to 8 years ago. He did not have surgery and just had physical therapy. He had 10 sessions of physical therapy. His family doctor is Dr. Jayleen Motley. He is involved with sports, and his , Estefany Meza, thought he needed a cortisone injection. It started bothering him again in the last 6 months. It comes and goes, especially with chilly weather. His  thought it might be arthritis. He denies any weakness. He had x-rays. He has stinging and tingling along with popping and cracking.      Past Medical History:   Diagnosis Date    Anxiety     Essential hypertension, benign     Gastroesophageal reflux disease without esophagitis     Kidney stones 05/31/2020    Dr. Guillory [facility]    Mixed hyperlipidemia     Rotator cuff syndrome     Torn rotator about seven years ago    Seasonal allergic rhinitis due to pollen     Type 2 diabetes mellitus without complication, without long-term current use of insulin        Past Surgical History:   Procedure Laterality Date    CATARACT EXTRACTION Left 08/24/2023    Dr Melendez    CATARACT EXTRACTION Right 09/07/2023    Dr Melendez    CYSTOSCOPY W/ LASER LITHOTRIPSY  01/2021    INGUINAL HERNIA REPAIR Left 10/2009    NOSE SURGERY      x2    UMBILICAL HERNIA REPAIR  10/2009    Dr. Napier       Family History   Problem Relation Age of Onset    Heart disease Mother     Uterine cancer Mother     Thyroid disease Mother     Cancer Mother         Uterus?    Heart disease Father     Stroke Father     COPD Brother     Diabetes Brother     Breast cancer Maternal Aunt     Breast cancer  "Maternal Grandmother     Diabetes Brother      Social History     Occupational History    Not on file   Tobacco Use    Smoking status: Never     Passive exposure: Never    Smokeless tobacco: Never    Tobacco comments:     Family members smoked   Vaping Use    Vaping Use: Never used   Substance and Sexual Activity    Alcohol use: Never    Drug use: Never    Sexual activity: Not Currently      Review of Systems   Constitutional:  Negative for activity change, fatigue and fever.   Musculoskeletal:  Positive for arthralgias.   Skin:  Negative for color change and rash.   Neurological:  Negative for numbness.     Objective:    Pulse 68   Ht 163.8 cm (64.5\")   Wt 95.3 kg (210 lb)   SpO2 96%   BMI 35.49 kg/m²     Physical Examination:  Physical Exam  Vitals and nursing note reviewed.   Constitutional:       General: He is not in acute distress.     Appearance: He is well-developed. He is not diaphoretic.   HENT:      Head: Normocephalic and atraumatic.   Eyes:      Conjunctiva/sclera: Conjunctivae normal.   Pulmonary:      Effort: Pulmonary effort is normal. No respiratory distress.   Skin:     General: Skin is warm.      Capillary Refill: Capillary refill takes less than 2 seconds.   Neurological:      Mental Status: He is alert.       Right Shoulder Exam     Tenderness   The patient is experiencing no tenderness.    Range of Motion   Active abduction:  normal   Passive abduction:  normal   Extension:  normal   External rotation:  normal   Forward flexion:  normal   Internal rotation 0 degrees:  Mid thoracic normal     Muscle Strength   Abduction: 5/5   Internal rotation: 5/5   External rotation: 5/5   Supraspinatus: 5/5   Subscapularis: 5/5   Biceps: 5/5     Tests   Apprehension: negative  Mirza test: positive  Impingement: negative  Sulcus: absent    Other   Erythema: absent  Sensation: normal  Pulse: present    Comments:  Negative Kristofer, negative resisted external rotation, negative liftoff.  Negative Vigo's, " negative scarf.  Positive Neer.  Negative speeds/Yergason's. There is mild crepitus in all ranges of motion.      Cervical range of motion is full with no tenderness to palpation to the bony midline or paraspinal cervical musculature.  Spurling's maneuver to the right is negative.        Imaging and other tests:  Three-view x-rays right shoulder shows moderate acromioclavicular and glenohumeral osteoarthritis.    Assessment and Plan:    1. Right shoulder pain, unspecified chronicity  - XR Shoulder 2+ View Right    2.  Primary osteoarthritis of the right shoulder    Though he did have previous rotator cuff pathology, this does not seem to be bothering him today. He has mild crepitation on examination consistent with his moderate osteoarthritic disease on XR. I discussed pathology and treatment options with the patient today. For that reason, he elected to proceed with intra-articular glenohumeral joint injection with ultrasound guidance. He tolerated this well without complaint or problem. The patient will follow up in 2 to 3 months or sooner if symptoms persist or worsen.    Transcribed from ambient dictation for Tiago Meade II,  by Padmini Kellogg.  02/23/24   13:27 EST    Patient or patient representative verbalized consent to the visit recording.  I have personally performed the services described in this document as transcribed by the above individual, and it is both accurate and complete.    Disclaimer: Please note that areas of this note were completed with computer voice recognition software.  Quite often unanticipated grammatical, syntax, homophones, and other interpretive errors are inadvertently transcribed by the computer software. Please excuse any errors that have escaped final proofreading.

## 2024-02-25 DIAGNOSIS — E11.9 TYPE 2 DIABETES MELLITUS WITHOUT COMPLICATION, WITHOUT LONG-TERM CURRENT USE OF INSULIN: ICD-10-CM

## 2024-03-11 NOTE — PROGRESS NOTES
The ABCs of the Annual Wellness Visit  Subsequent Medicare Wellness Visit    Chief Complaint   Patient presents with    Medicare Wellness-subsequent    Diabetes    Hypertension    Hyperlipidemia       Subjective   History of Present Illness:  Fransico Cuenca is a 68 y.o. male who presents for a Subsequent Medicare Wellness Visit. The patient is here: for coordination of medical care to discuss health maintenance and disease prevention to follow up on multiple medical problems. Overall has: minimal activity with work/home activities, exercises 2 - 3 times per week, good appetite, feels well with minor complaints, good energy level, and is sleeping well. Nutrition: balanced diet and eating a variety of foods. Last tetanus shot was  2020 .    HPI    HEALTH RISK ASSESSMENT    Recent Hospitalizations:  No hospitalization(s) within the last year.    Current Medical Providers:  Patient Care Team:  Camryn Salinas MD as PCP - General (Family Medicine)    Smoking Status:  Social History     Tobacco Use   Smoking Status Never    Passive exposure: Never   Smokeless Tobacco Never   Tobacco Comments    Family members smoked       Alcohol Consumption:  Social History     Substance and Sexual Activity   Alcohol Use Never       Depression Screen:       3/12/2024     9:11 AM   PHQ-2/PHQ-9 Depression Screening   Little Interest or Pleasure in Doing Things 0-->not at all   Feeling Down, Depressed or Hopeless 0-->not at all   PHQ-9: Brief Depression Severity Measure Score 0       We spent less than 5 minutes with the screen and discussion of depression and option    Fall Risk Screen:  STEADI Fall Risk Assessment was completed, and patient is at MODERATE risk for falls. Assessment completed on:3/12/2024    Health Habits and Functional and Cognitive Screening:      3/12/2024     9:00 AM   Functional & Cognitive Status   Do you have difficulty preparing food and eating? No   Do you have difficulty bathing yourself, getting dressed or  grooming yourself? No   Do you have difficulty using the toilet? No   Do you have difficulty moving around from place to place? No   Do you have trouble with steps or getting out of a bed or a chair? No   Current Diet Low Fat Diet   Dental Exam Unknown   Eye Exam Up to date   Exercise (times per week) 2 times per week   Current Exercises Include Walking   Do you need help using the phone?  No   Are you deaf or do you have serious difficulty hearing?  No   Do you need help to go to places out of walking distance? No   Do you need help shopping? No   Do you need help preparing meals?  No   Do you need help with housework?  No   Do you need help with laundry? No   Do you need help taking your medications? No   Do you need help managing money? No   Do you ever drive or ride in a car without wearing a seat belt? No   Have you felt unusual stress, anger or loneliness in the last month? Yes   Who do you live with? Alone   If you need help, do you have trouble finding someone available to you? No   Have you been bothered in the last four weeks by sexual problems? No   Do you have difficulty concentrating, remembering or making decisions? No       Does the patient have evidence of cognitive impairment? No    Asprin use counseling:Taking ASA appropriately as indicated    Recent Lab Results:  Lab Results   Component Value Date    CHLPL 136 03/12/2024    TRIG 52 03/12/2024    HDL 51 03/12/2024    LDL 74 03/12/2024    VLDL 11 03/12/2024    HGBA1C 6.2 (A) 03/12/2024        Age-appropriate Screening Schedule:  Refer to the list below for future screening recommendations based on patient's age, sex and/or medical conditions. Orders for these recommended tests are listed in the plan section. The patient has been provided with a written plan.    Health Maintenance   Topic Date Due    RSV Vaccine - Adults (1 - 1-dose 60+ series) Never done    COVID-19 Vaccine (4 - 2023-24 season) 09/01/2023    DIABETIC EYE EXAM  08/17/2024     HEMOGLOBIN A1C  09/12/2024    ANNUAL WELLNESS VISIT  03/12/2025    DIABETIC FOOT EXAM  03/12/2025    LIPID PANEL  03/12/2025    URINE MICROALBUMIN  03/12/2025    BMI FOLLOWUP  03/12/2025    COLORECTAL CANCER SCREENING  07/05/2026    TDAP/TD VACCINES (3 - Td or Tdap) 08/03/2030    HEPATITIS C SCREENING  Completed    INFLUENZA VACCINE  Completed    Pneumococcal Vaccine 65+  Completed    ZOSTER VACCINE  Completed          The following portions of the patient's history were reviewed and updated as appropriate: allergies, current medications, past family history, past medical history, past social history, past surgical history, and problem list.    Outpatient Medications Prior to Visit   Medication Sig Dispense Refill    amLODIPine (NORVASC) 5 MG tablet Take 1 tablet by mouth Daily. 90 tablet 3    aspirin (aspirin) 81 MG EC tablet Take 1 tablet by mouth Daily.      atorvastatin (LIPITOR) 10 MG tablet Take 1 tablet by mouth once daily 90 tablet 3    azelastine (ASTELIN) 0.1 % nasal spray 2 sprays into the nostril(s) as directed by provider 2 (Two) Times a Day. Use in each nostril as directed 30 mL 5    citalopram (CeleXA) 20 MG tablet Take 1 tablet by mouth once daily 90 tablet 3    doxazosin (CARDURA) 1 MG tablet Take 1 tablet by mouth once daily 90 tablet 3    fenofibrate 160 MG tablet Take 1 tablet by mouth once daily 90 tablet 3    lisinopril-hydrochlorothiazide (PRINZIDE,ZESTORETIC) 20-12.5 MG per tablet Take 1 tablet by mouth once daily 90 tablet 3    metFORMIN (GLUCOPHAGE) 1000 MG tablet TAKE 1 TABLET BY MOUTH TWICE DAILY WITH MEALS 60 tablet 12    montelukast (SINGULAIR) 10 MG tablet Take 1 tablet by mouth Daily. 90 tablet 3    Multiple Vitamins-Minerals (MULTIVITAMIN ADULT PO) Take 1 tablet by mouth Daily.      multivitamin (THERAGRAN) tablet tablet Take 1 tablet by mouth Daily.      Omega-3 Fatty Acids (FISH OIL) 1000 MG capsule delayed-release Take 2 capsules by mouth 2 (Two) Times a Day.      pioglitazone  (ACTOS) 30 MG tablet Take 1 tablet by mouth Daily. 30 tablet 12     Facility-Administered Medications Prior to Visit   Medication Dose Route Frequency Provider Last Rate Last Admin    Allergy Serum Injection  0.1 mL Subcutaneous Once Brad, Camryn GOLD MD           Patient Active Problem List   Diagnosis    Anxiety    Hypertension, benign    Mixed hyperlipidemia    Allergic rhinitis due to pollen    Type 2 diabetes mellitus without complication, without long-term current use of insulin    Encounter for prostate cancer screening    Screening for colon cancer    Family history of ischemic heart disease    Class 2 severe obesity due to excess calories with serious comorbidity and body mass index (BMI) of 35.0 to 35.9 in adult    Obstructive sleep apnea syndrome    Medicare annual wellness visit, subsequent    History of kidney stones    Primary osteoarthritis of left hand    Iron deficiency anemia due to chronic blood loss       Advanced Care Planning:  ACP discussion was held with the patient during this visit. Patient has an advance directive in EMR which is still valid.     A face-to-face visit was completed today with patient.  Counseling explanation, and discussion of advanced directives was performed.   The last advanced care visit was performed in 2023.  In a near life ending situation, from which he is not expected to recover functionally, and he is not able to speak for himself, he does not want life sustaining measures. We discussed feeding tubes, ventilators and cardiac support as well as dialysis.    I spent more than 16 minutes discussing Advanced Care Planning information and documenting in the chart.    Review of Systems    I have reviewed and confirmed the accuracy of the HPI and ROS as documented by the MA/LPN/RN Camryn Salinas MD    Compared to one year ago, the patient feels his physical health is better.  Compared to one year ago, the patient feels his mental health is better.    Reviewed chart  "for potential of high risk medication in the elderly: yes  Reviewed chart for potential of harmful drug interactions in the elderly:yes    Objective      Vitals:    03/12/24 0906   BP: 130/78   BP Location: Right arm   Patient Position: Sitting   Cuff Size: Adult   Pulse: 72   Resp: 18   Temp: 97.1 °F (36.2 °C)   TempSrc: Temporal   SpO2: 97%   Weight: 95.4 kg (210 lb 6.4 oz)   Height: 163.8 cm (64.5\")   PainSc: 0-No pain       Body mass index is 35.56 kg/m².  Discussed the patient's BMI with him. The BMI is above average; BMI management plan is completed.    Physical Exam  Feet:      Right foot:      Protective Sensation: 10 sites tested.  10 sites sensed.      Skin integrity: Callus present.      Left foot:      Protective Sensation: 10 sites tested.  10 sites sensed.      Skin integrity: Callus present.     Diabetic Foot Exam Performed and Monofilament Test Performed    Assessment    Medicare Risks and Personalized Health Plan  CMS Preventative Services Quick Reference  Cardiovascular risk    The above risks/problems have been discussed with the patient.  Pertinent information has been shared with the patient in the After Visit Summary.  Follow up plans and orders are seen below in the Assessment/Plan Section.    Problem List Items Addressed This Visit          High    Hypertension, benign    Overview     Controlled compliant continue meds.          Current Assessment & Plan     Hypertension is stable and controlled  Continue current treatment regimen.  Blood pressure will be reassessed in 3 months.         Relevant Orders    CBC & Differential (Completed)    Comprehensive Metabolic Panel (Completed)    Type 2 diabetes mellitus without complication, without long-term current use of insulin    Overview     A1c 6.2 03/12/2024 improved with lifestyle changes.   A1c 6.8 12.12.2023 worse less exercise.   A1c 6.1 09/11/2023 improved.   A1c 6.3 05/26/2026 stable he reports BS of 150  A1c 6.3 02/24/2023 slightly worse. " But controlled.   A1c 5.9 11/08/2022 improved  A1c 7.8 05/03/2022 marginal control Actos begin for insurance  A1c 7.1 01/24/2022 [id number] 7.5  07/13/2021   A1c 7.5 12/21/2020 stable,    A1c 6.2 08/20/2019,   New onset 04/29/2019, A1c 7.2             Current Assessment & Plan     Diabetes is stable.   Continue current treatment regimen.  Diabetes will be reassessed in 3 months         Relevant Orders    POCT urinalysis dipstick, manual (Completed)    Microalbumin / Creatinine Urine Ratio - Urine, Clean Catch (Completed)    POC Glycosylated Hemoglobin (Hb A1C) (Completed)       Medium    Mixed hyperlipidemia    Overview     Stable, compliant         Current Assessment & Plan      Lipid abnormalities are stable    Plan:  Continue same medication/s without change.      Discussed medication dosage, use, side effects, and goals of treatment in detail.    Counseled patient on lifestyle modifications to help control hyperlipidemia.     Patient Treatment Goals:   LDL goal is less than 70    Followup in 3 months.         Relevant Orders    Lipid Panel With / Chol / HDL Ratio (Completed)    TSH (Completed)    Allergic rhinitis due to pollen    Overview     Sxs fairly well controlled. continue Immuno Rx.              Low    Anxiety    Overview     Stable on meds which are continued. Pros and cons of Rx doscussed         Class 2 severe obesity due to excess calories with serious comorbidity and body mass index (BMI) of 35.0 to 35.9 in adult    Overview     Diet and organized wt loss encouraged         Current Assessment & Plan     Patient's (Body mass index is 35.56 kg/m².) indicates that they are obese (BMI >30) with health conditions that include obstructive sleep apnea, hypertension, diabetes mellitus, and dyslipidemias . Weight is improving with lifestyle modifications. BMI  is above average; BMI management plan is completed. We discussed portion control and increasing exercise.          Obstructive sleep apnea syndrome     Overview     Improved on CPAP he is compliant         History of kidney stones    Overview     Hx of lithopripsy left kidney 07/31/2020 he has done well since            Unprioritized    Encounter for prostate cancer screening    Overview     Lab Results   Component Value Date    PSA 1.4 10/22/2021    PSA 1.2 06/10/2020    PSA 0.8 04/30/2019              Relevant Orders    PSA Screen (Completed)    Screening for colon cancer    Overview     Cologuard negative 7/5/2023  Cologuard 06/29/2020 negative .Last Colonoscopy 10/26/2009.           Medicare annual wellness visit, subsequent - Primary    Overview     Diet exercise and wt loss encouraged. Seat belts, sunscreens, fall prevention and general safety discussed.   Previous lab discussed, we will notify him of today's lab         Iron deficiency anemia due to chronic blood loss    Overview     Ordered a GI referral for colonoscopy and EGD          Follow Up:  Return in about 3 months (around 6/12/2024).     An After Visit Summary and PPPS were given to the patient.     Site care done- cleaned with alcohol swab, procedure tolerated well, dressing applied. Venipuncture was obtained after 2 time(s). 3 tubes were drawn. Needle gauge used was 23-butterfly.

## 2024-03-11 NOTE — PROGRESS NOTES
Chief Complaint  Medicare Wellness-subsequent, Diabetes, Hypertension, and Hyperlipidemia    Subjective     CC  Problem List  Visit Diagnosis   Encounters  Notes  Medications  Labs  Result Review Imaging  Media    Fransico Cuenca presents to [facility] for   Diabetes  He presents for his follow-up diabetic visit. He has type 2 diabetes mellitus. His disease course has been stable. There are no hypoglycemic associated symptoms. Pertinent negatives for hypoglycemia include no confusion, dizziness, headaches, nervousness/anxiousness or sweats. Pertinent negatives for diabetes include no blurred vision, no chest pain, no fatigue, no foot ulcerations, no polydipsia, no polyuria, no visual change, no weakness and no weight loss. Symptoms are stable. Risk factors for coronary artery disease include diabetes mellitus, dyslipidemia, hypertension, male sex, obesity and family history. Current diabetic treatment includes oral agent (monotherapy). He is compliant with treatment most of the time. His weight is fluctuating minimally. He is following a generally healthy diet. Meal planning includes avoidance of concentrated sweets. He has not had a previous visit with a dietitian. He participates in exercise weekly. (Patient reports he does not check blood sugars at home) An ACE inhibitor/angiotensin II receptor blocker is being taken. He does not see a podiatrist.Eye exam is current ([facility] in [place]).   Hypertension  This is a chronic problem. The current episode started more than 1 year ago. The problem has been gradually improving since onset. The problem is controlled. Pertinent negatives include no anxiety, blurred vision, chest pain, headaches, malaise/fatigue, neck pain, orthopnea, palpitations, peripheral edema, PND, shortness of breath or sweats. Risk factors for coronary artery disease include diabetes mellitus, family history, obesity, male gender, stress and  dyslipidemia. Past treatments include ACE inhibitors. Current antihypertension treatment includes ACE inhibitors and diuretics. The current treatment provides mild improvement. There are no compliance problems.    Hyperlipidemia  This is a chronic problem. The current episode started more than 1 year ago. The problem is controlled. Recent lipid tests were reviewed and are variable. Exacerbating diseases include diabetes and obesity. He has no history of hypothyroidism, liver disease or nephrotic syndrome. Pertinent negatives include no chest pain, focal sensory loss, focal weakness, leg pain, myalgias or shortness of breath. Current antihyperlipidemic treatment includes herbal therapy. The current treatment provides mild improvement of lipids. There are no compliance problems.  Risk factors for coronary artery disease include family history, male sex, hypertension, diabetes mellitus, dyslipidemia and obesity.       Review of Systems   Constitutional:  Negative for appetite change, fatigue, malaise/fatigue and unexpected weight loss.   HENT:  Negative for congestion, sinus pressure, swollen glands and trouble swallowing.    Eyes:  Negative for blurred vision.   Respiratory:  Negative for shortness of breath.    Cardiovascular:  Negative for chest pain, palpitations, orthopnea, leg swelling and PND.   Gastrointestinal:  Negative for abdominal pain, constipation, diarrhea, nausea and vomiting.   Endocrine: Negative for cold intolerance, heat intolerance, polydipsia and polyuria.   Genitourinary:  Negative for dysuria and urgency.   Musculoskeletal:  Positive for arthralgias (multiple). Negative for myalgias and neck pain.   Skin:  Negative for rash.   Allergic/Immunologic: Negative for environmental allergies.   Neurological:  Negative for dizziness, focal weakness, weakness, headache and confusion.   Hematological:  Negative for adenopathy. Does not bruise/bleed easily.   Psychiatric/Behavioral:  Negative for suicidal  "ideas and depressed mood. The patient is not nervous/anxious.         Objective   Vital Signs:   /78 (BP Location: Right arm, Patient Position: Sitting, Cuff Size: Adult)   Pulse 72   Temp 97.1 °F (36.2 °C) (Temporal)   Resp 18   Ht 163.8 cm (64.5\")   Wt 95.4 kg (210 lb 6.4 oz)   SpO2 97% Comment: room air  BMI 35.56 kg/m²     Physical Exam  Constitutional:       General: He is not in acute distress.     Appearance: Normal appearance.   HENT:      Right Ear: Tympanic membrane normal.      Left Ear: Tympanic membrane normal.      Mouth/Throat:      Pharynx: No oropharyngeal exudate or posterior oropharyngeal erythema.   Eyes:      Extraocular Movements: Extraocular movements intact.      Conjunctiva/sclera: Conjunctivae normal.      Pupils: Pupils are equal, round, and reactive to light.      Comments: Fundi benign   Cardiovascular:      Rate and Rhythm: Normal rate and regular rhythm.      Heart sounds: No murmur heard.  Pulmonary:      Effort: Pulmonary effort is normal.      Breath sounds: Normal breath sounds. No wheezing.   Abdominal:      General: Abdomen is flat.      Palpations: Abdomen is soft. There is no mass.      Tenderness: There is no abdominal tenderness. There is no right CVA tenderness or left CVA tenderness.   Musculoskeletal:      Cervical back: Neck supple.      Right lower leg: No edema.      Left lower leg: No edema.   Lymphadenopathy:      Cervical: No cervical adenopathy.   Skin:     Findings: No rash.   Neurological:      General: No focal deficit present.      Mental Status: He is alert and oriented to person, place, and time.      Sensory: No sensory deficit.      Motor: No weakness.      Gait: Gait normal.      Deep Tendon Reflexes: Reflexes normal.   Psychiatric:         Mood and Affect: Mood normal.        Result Review :Labs  Result Review  Imaging  Med Tab  Media                 Assessment and Plan CC Problem List  Visit Diagnosis  ROS  Review (Popup)  Health " Maintenance  Quality  BestPractice  Medications  SmartSets  SnapShot Encounters  Media  Problem List Items Addressed This Visit          High    Hypertension, benign    Overview     Controlled compliant continue meds.          Current Assessment & Plan     Hypertension is stable and controlled  Continue current treatment regimen.  Blood pressure will be reassessed in 3 months.         Relevant Orders    CBC & Differential (Completed)    Comprehensive Metabolic Panel (Completed)    Type 2 diabetes mellitus without complication, without long-term current use of insulin    Overview     A1c 6.2 03/12/2024 improved with lifestyle changes.   A1c 6.8 12.12.2023 worse less exercise.   A1c 6.1 09/11/2023 improved.   A1c 6.3 05/26/2026 stable he reports BS of 150  A1c 6.3 02/24/2023 slightly worse. But controlled.   A1c 5.9 11/08/2022 improved  A1c 7.8 05/03/2022 marginal control Actos begin for insurance  A1c 7.1 01/24/2022 [id number] 7.5  07/13/2021   A1c 7.5 12/21/2020 stable,    A1c 6.2 08/20/2019,   New onset 04/29/2019, A1c 7.2             Current Assessment & Plan     Diabetes is stable.   Continue current treatment regimen.  Diabetes will be reassessed in 3 months         Relevant Orders    POCT urinalysis dipstick, manual (Completed)    Microalbumin / Creatinine Urine Ratio - Urine, Clean Catch (Completed)    POC Glycosylated Hemoglobin (Hb A1C) (Completed)       Medium    Mixed hyperlipidemia    Overview     Stable, compliant         Current Assessment & Plan      Lipid abnormalities are stable    Plan:  Continue same medication/s without change.      Discussed medication dosage, use, side effects, and goals of treatment in detail.    Counseled patient on lifestyle modifications to help control hyperlipidemia.     Patient Treatment Goals:   LDL goal is less than 70    Followup in 3 months.         Relevant Orders    Lipid Panel With / Chol / HDL Ratio (Completed)    TSH (Completed)    Allergic rhinitis due  to pollen    Overview     Sxs fairly well controlled. continue Immuno Rx.              Low    Anxiety    Overview     Stable on meds which are continued. Pros and cons of Rx doscussed         Class 2 severe obesity due to excess calories with serious comorbidity and body mass index (BMI) of 35.0 to 35.9 in adult    Overview     Diet and organized wt loss encouraged         Current Assessment & Plan     Patient's (Body mass index is 35.56 kg/m².) indicates that they are obese (BMI >30) with health conditions that include obstructive sleep apnea, hypertension, diabetes mellitus, and dyslipidemias . Weight is improving with lifestyle modifications. BMI  is above average; BMI management plan is completed. We discussed portion control and increasing exercise.          Obstructive sleep apnea syndrome    Overview     Improved on CPAP he is compliant         History of kidney stones    Overview     Hx of lithopripsy left kidney 07/31/2020 he has done well since            Unprioritized    Encounter for prostate cancer screening    Overview     Lab Results   Component Value Date    PSA 1.4 10/22/2021    PSA 1.2 06/10/2020    PSA 0.8 04/30/2019              Relevant Orders    PSA Screen (Completed)    Screening for colon cancer    Overview     Cologuard negative 7/5/2023  Cologuard 06/29/2020 negative .Last Colonoscopy 10/26/2009.           Medicare annual wellness visit, subsequent - Primary    Overview     Diet exercise and wt loss encouraged. Seat belts, sunscreens, fall prevention and general safety discussed.   Previous lab discussed, we will notify him of today's lab         Iron deficiency anemia due to chronic blood loss    Overview     Ordered a GI referral for colonoscopy and EGD            Follow Up Instructions Charge Capture  Follow-up Communications  Return in about 3 months (around 6/12/2024).  Patient was given instructions and counseling regarding his condition or for health maintenance advice. Please see  specific information pulled into the AVS if appropriate.

## 2024-03-12 ENCOUNTER — OFFICE VISIT (OUTPATIENT)
Dept: FAMILY MEDICINE CLINIC | Facility: CLINIC | Age: 69
End: 2024-03-12
Payer: MEDICARE

## 2024-03-12 VITALS
SYSTOLIC BLOOD PRESSURE: 130 MMHG | HEART RATE: 72 BPM | BODY MASS INDEX: 35.06 KG/M2 | TEMPERATURE: 97.1 F | OXYGEN SATURATION: 97 % | DIASTOLIC BLOOD PRESSURE: 78 MMHG | WEIGHT: 210.4 LBS | HEIGHT: 65 IN | RESPIRATION RATE: 18 BRPM

## 2024-03-12 DIAGNOSIS — D50.0 IRON DEFICIENCY ANEMIA DUE TO CHRONIC BLOOD LOSS: ICD-10-CM

## 2024-03-12 DIAGNOSIS — Z00.00 MEDICARE ANNUAL WELLNESS VISIT, SUBSEQUENT: Primary | ICD-10-CM

## 2024-03-12 DIAGNOSIS — J30.1 SEASONAL ALLERGIC RHINITIS DUE TO POLLEN: ICD-10-CM

## 2024-03-12 DIAGNOSIS — Z87.442 HISTORY OF KIDNEY STONES: ICD-10-CM

## 2024-03-12 DIAGNOSIS — E66.01 CLASS 2 SEVERE OBESITY DUE TO EXCESS CALORIES WITH SERIOUS COMORBIDITY AND BODY MASS INDEX (BMI) OF 35.0 TO 35.9 IN ADULT: ICD-10-CM

## 2024-03-12 DIAGNOSIS — Z12.11 SCREENING FOR COLON CANCER: ICD-10-CM

## 2024-03-12 DIAGNOSIS — Z12.5 ENCOUNTER FOR PROSTATE CANCER SCREENING: ICD-10-CM

## 2024-03-12 DIAGNOSIS — E11.9 TYPE 2 DIABETES MELLITUS WITHOUT COMPLICATION, WITHOUT LONG-TERM CURRENT USE OF INSULIN: ICD-10-CM

## 2024-03-12 DIAGNOSIS — I10 HYPERTENSION, BENIGN: ICD-10-CM

## 2024-03-12 DIAGNOSIS — F41.9 ANXIETY: ICD-10-CM

## 2024-03-12 DIAGNOSIS — E78.2 MIXED HYPERLIPIDEMIA: ICD-10-CM

## 2024-03-12 DIAGNOSIS — G47.33 OBSTRUCTIVE SLEEP APNEA SYNDROME: ICD-10-CM

## 2024-03-12 LAB
BILIRUB BLD-MCNC: NEGATIVE MG/DL
CLARITY, POC: ABNORMAL
COLOR UR: YELLOW
EXPIRATION DATE: ABNORMAL
GLUCOSE UR STRIP-MCNC: NEGATIVE MG/DL
HBA1C MFR BLD: 6.2 % (ref 4.5–5.7)
KETONES UR QL: ABNORMAL
LEUKOCYTE EST, POC: NEGATIVE
Lab: ABNORMAL
NITRITE UR-MCNC: NEGATIVE MG/ML
PH UR: 5 [PH] (ref 5–8)
PROT UR STRIP-MCNC: NEGATIVE MG/DL
RBC # UR STRIP: ABNORMAL /UL
SP GR UR: 1.01 (ref 1–1.03)
UROBILINOGEN UR QL: NORMAL

## 2024-03-12 NOTE — ASSESSMENT & PLAN NOTE
Lipid abnormalities are stable    Plan:  Continue same medication/s without change.      Discussed medication dosage, use, side effects, and goals of treatment in detail.    Counseled patient on lifestyle modifications to help control hyperlipidemia.     Patient Treatment Goals:   LDL goal is less than 70    Followup in 3 months.

## 2024-03-13 PROBLEM — D50.0 IRON DEFICIENCY ANEMIA DUE TO CHRONIC BLOOD LOSS: Status: ACTIVE | Noted: 2024-03-13

## 2024-03-13 LAB
ALBUMIN SERPL-MCNC: 4 G/DL (ref 3.9–4.9)
ALBUMIN/CREAT UR: 20 MG/G CREAT (ref 0–29)
ALBUMIN/GLOB SERPL: 1.5 {RATIO} (ref 1.2–2.2)
ALP SERPL-CCNC: 39 IU/L (ref 44–121)
ALT SERPL-CCNC: 20 IU/L (ref 0–44)
AST SERPL-CCNC: 17 IU/L (ref 0–40)
BASOPHILS # BLD AUTO: 0.1 X10E3/UL (ref 0–0.2)
BASOPHILS NFR BLD AUTO: 1 %
BILIRUB SERPL-MCNC: 0.3 MG/DL (ref 0–1.2)
BUN SERPL-MCNC: 25 MG/DL (ref 8–27)
BUN/CREAT SERPL: 27 (ref 10–24)
CALCIUM SERPL-MCNC: 9.6 MG/DL (ref 8.6–10.2)
CHLORIDE SERPL-SCNC: 102 MMOL/L (ref 96–106)
CHOLEST SERPL-MCNC: 136 MG/DL (ref 100–199)
CHOLEST/HDLC SERPL: 2.7 RATIO (ref 0–5)
CO2 SERPL-SCNC: 24 MMOL/L (ref 20–29)
CREAT SERPL-MCNC: 0.94 MG/DL (ref 0.76–1.27)
CREAT UR-MCNC: 111 MG/DL
EGFRCR SERPLBLD CKD-EPI 2021: 88 ML/MIN/1.73
EOSINOPHIL # BLD AUTO: 0.1 X10E3/UL (ref 0–0.4)
EOSINOPHIL NFR BLD AUTO: 2 %
ERYTHROCYTE [DISTWIDTH] IN BLOOD BY AUTOMATED COUNT: 14.1 % (ref 11.6–15.4)
GLOBULIN SER CALC-MCNC: 2.7 G/DL (ref 1.5–4.5)
GLUCOSE SERPL-MCNC: 81 MG/DL (ref 70–99)
HCT VFR BLD AUTO: 37.3 % (ref 37.5–51)
HDLC SERPL-MCNC: 51 MG/DL
HGB BLD-MCNC: 11.9 G/DL (ref 13–17.7)
IMM GRANULOCYTES # BLD AUTO: 0 X10E3/UL (ref 0–0.1)
IMM GRANULOCYTES NFR BLD AUTO: 0 %
LDLC SERPL CALC-MCNC: 74 MG/DL (ref 0–99)
LYMPHOCYTES # BLD AUTO: 0.7 X10E3/UL (ref 0.7–3.1)
LYMPHOCYTES NFR BLD AUTO: 14 %
MCH RBC QN AUTO: 25.8 PG (ref 26.6–33)
MCHC RBC AUTO-ENTMCNC: 31.9 G/DL (ref 31.5–35.7)
MCV RBC AUTO: 81 FL (ref 79–97)
MICROALBUMIN UR-MCNC: 22.7 UG/ML
MONOCYTES # BLD AUTO: 0.5 X10E3/UL (ref 0.1–0.9)
MONOCYTES NFR BLD AUTO: 8 %
NEUTROPHILS # BLD AUTO: 4 X10E3/UL (ref 1.4–7)
NEUTROPHILS NFR BLD AUTO: 75 %
PLATELET # BLD AUTO: 297 X10E3/UL (ref 150–450)
POTASSIUM SERPL-SCNC: 4.4 MMOL/L (ref 3.5–5.2)
PROT SERPL-MCNC: 6.7 G/DL (ref 6–8.5)
PSA SERPL-MCNC: 1.8 NG/ML (ref 0–4)
RBC # BLD AUTO: 4.61 X10E6/UL (ref 4.14–5.8)
SODIUM SERPL-SCNC: 139 MMOL/L (ref 134–144)
TRIGL SERPL-MCNC: 52 MG/DL (ref 0–149)
TSH SERPL DL<=0.005 MIU/L-ACNC: 2.29 UIU/ML (ref 0.45–4.5)
VLDLC SERPL CALC-MCNC: 11 MG/DL (ref 5–40)
WBC # BLD AUTO: 5.3 X10E3/UL (ref 3.4–10.8)

## 2024-03-13 NOTE — PROGRESS NOTES
Called patient about labs   Good afternoon we have your labs back and per Dr. Salinas   You have a normal white blood cell count. Your hemoglobin was low at 11.9 and hematocrit was at 37.3 and your MCH was at 25.8. this means there has been some blood loss from somewhere and this has made you anemic. We are going to send you to see the Gastroenterologist as we want to find out where it has come from and we are also going to send over a medication to start known as Omeprazole 40 mg and we want to check this again in 2-3 weeks to see if it has worsened and or remained the same or improved. We will order the gastro appointment and once it is scheduled someone will call you with the appointment.   You have normal liver, kidney and thyroid function, your electrolyte function is normal as well. Your glucose was 81 .Your total cholesterol was 136 this has decreased from the last time it was checked as it was 137, your triglycerides was at 52 this has decreased from the last time it was checked as it was 66, your HDL (healthy cholesterol) was 51 this has increased from the last time it was checked as it was 42( we want this to be as high as we can get it and we do this by exercise, exercise), your LDL ( lousy cholesterol) was 74 this has decreased from the last time it was checked as it was 81. Your total cholesterol/cardiac ratio was 2.7 this has decreased from the last time as it was 3.3.  Continue to work on diet along with exercise and taking your medications as prescribed, and we will check labs again in a year.  Your PSA has come back normal. Your yearly diabetic urine has come back normal as well and we will check this again in 1 year.

## 2024-03-15 DIAGNOSIS — K21.00 GASTROESOPHAGEAL REFLUX DISEASE WITH ESOPHAGITIS WITHOUT HEMORRHAGE: ICD-10-CM

## 2024-03-15 DIAGNOSIS — D50.0 IRON DEFICIENCY ANEMIA DUE TO CHRONIC BLOOD LOSS: Primary | ICD-10-CM

## 2024-03-15 RX ORDER — OMEPRAZOLE 40 MG/1
40 CAPSULE, DELAYED RELEASE ORAL DAILY
Qty: 30 CAPSULE | Refills: 12 | Status: SHIPPED | OUTPATIENT
Start: 2024-03-15

## 2024-03-17 PROBLEM — Z12.11 SCREENING FOR COLON CANCER: Status: ACTIVE | Noted: 2019-08-19

## 2024-03-17 PROBLEM — Z86.16 HISTORY OF COVID-19: Status: RESOLVED | Noted: 2021-03-24 | Resolved: 2024-03-17

## 2024-03-18 NOTE — ASSESSMENT & PLAN NOTE
Patient's (Body mass index is 35.56 kg/m².) indicates that they are obese (BMI >30) with health conditions that include obstructive sleep apnea, hypertension, diabetes mellitus, and dyslipidemias . Weight is improving with lifestyle modifications. BMI  is above average; BMI management plan is completed. We discussed portion control and increasing exercise.

## 2024-03-22 RX ORDER — FENOFIBRATE 160 MG/1
TABLET ORAL
Qty: 90 TABLET | Refills: 0 | Status: SHIPPED | OUTPATIENT
Start: 2024-03-22

## 2024-03-28 ENCOUNTER — CLINICAL SUPPORT (OUTPATIENT)
Dept: FAMILY MEDICINE CLINIC | Facility: CLINIC | Age: 69
End: 2024-03-28
Payer: MEDICARE

## 2024-03-28 DIAGNOSIS — J30.1 SEASONAL ALLERGIC RHINITIS DUE TO POLLEN: Primary | ICD-10-CM

## 2024-03-28 PROCEDURE — 95117 IMMUNOTHERAPY INJECTIONS: CPT | Performed by: FAMILY MEDICINE

## 2024-04-09 ENCOUNTER — CLINICAL SUPPORT (OUTPATIENT)
Dept: FAMILY MEDICINE CLINIC | Facility: CLINIC | Age: 69
End: 2024-04-09
Payer: MEDICARE

## 2024-04-09 DIAGNOSIS — J30.1 SEASONAL ALLERGIC RHINITIS DUE TO POLLEN: Primary | ICD-10-CM

## 2024-04-09 PROCEDURE — 95117 IMMUNOTHERAPY INJECTIONS: CPT | Performed by: FAMILY MEDICINE

## 2024-04-18 ENCOUNTER — APPOINTMENT (OUTPATIENT)
Dept: ULTRASOUND IMAGING | Facility: HOSPITAL | Age: 69
End: 2024-04-18
Payer: MEDICARE

## 2024-04-18 ENCOUNTER — HOSPITAL ENCOUNTER (INPATIENT)
Facility: HOSPITAL | Age: 69
LOS: 6 days | Discharge: HOME | End: 2024-04-24
Attending: EMERGENCY MEDICINE | Admitting: INTERNAL MEDICINE
Payer: MEDICARE

## 2024-04-18 ENCOUNTER — APPOINTMENT (OUTPATIENT)
Dept: GENERAL RADIOLOGY | Facility: HOSPITAL | Age: 69
End: 2024-04-18
Payer: MEDICARE

## 2024-04-18 ENCOUNTER — APPOINTMENT (OUTPATIENT)
Dept: CARDIOLOGY | Facility: HOSPITAL | Age: 69
End: 2024-04-18
Payer: MEDICARE

## 2024-04-18 DIAGNOSIS — I21.4 NSTEMI (NON-ST ELEVATED MYOCARDIAL INFARCTION): Primary | ICD-10-CM

## 2024-04-18 DIAGNOSIS — N18.30 STAGE 3 CHRONIC KIDNEY DISEASE, UNSPECIFIED WHETHER STAGE 3A OR 3B CKD: ICD-10-CM

## 2024-04-18 LAB
ANION GAP SERPL CALCULATED.3IONS-SCNC: 16 MMOL/L (ref 5–15)
APTT PPP: 32.7 SECONDS (ref 61–76.5)
APTT PPP: 80.4 SECONDS (ref 61–76.5)
APTT PPP: >139 SECONDS (ref 61–76.5)
ARTERIAL PATENCY WRIST A: POSITIVE
ATMOSPHERIC PRESS: ABNORMAL MM[HG]
B PARAPERT DNA SPEC QL NAA+PROBE: NOT DETECTED
B PERT DNA SPEC QL NAA+PROBE: NOT DETECTED
BACTERIA UR QL AUTO: ABNORMAL /HPF
BASE EXCESS BLDA CALC-SCNC: -4 MMOL/L (ref 0–3)
BASOPHILS # BLD AUTO: 0.01 10*3/MM3 (ref 0–0.2)
BASOPHILS NFR BLD AUTO: 0.1 % (ref 0–1.5)
BDY SITE: ABNORMAL
BH CV ECHO MEAS - ACS: 2 CM
BH CV ECHO MEAS - AI P1/2T: 321.1 MSEC
BH CV ECHO MEAS - AO MAX PG: 10.5 MMHG
BH CV ECHO MEAS - AO MEAN PG: 6 MMHG
BH CV ECHO MEAS - AO V2 MAX: 162 CM/SEC
BH CV ECHO MEAS - AO V2 VTI: 31 CM
BH CV ECHO MEAS - AVA(I,D): 3 CM2
BH CV ECHO MEAS - EDV(CUBED): 110.6 ML
BH CV ECHO MEAS - EDV(MOD-SP4): 128 ML
BH CV ECHO MEAS - EF(MOD-BP): 50 %
BH CV ECHO MEAS - EF(MOD-SP4): 50.2 %
BH CV ECHO MEAS - ESV(CUBED): 32.8 ML
BH CV ECHO MEAS - ESV(MOD-SP4): 63.8 ML
BH CV ECHO MEAS - FS: 33.3 %
BH CV ECHO MEAS - IVS/LVPW: 1 CM
BH CV ECHO MEAS - IVSD: 1.2 CM
BH CV ECHO MEAS - LA DIMENSION: 3.4 CM
BH CV ECHO MEAS - LV DIASTOLIC VOL/BSA (35-75): 62.7 CM2
BH CV ECHO MEAS - LV MASS(C)D: 219.1 GRAMS
BH CV ECHO MEAS - LV MAX PG: 5.7 MMHG
BH CV ECHO MEAS - LV MEAN PG: 3 MMHG
BH CV ECHO MEAS - LV SYSTOLIC VOL/BSA (12-30): 31.2 CM2
BH CV ECHO MEAS - LV V1 MAX: 119 CM/SEC
BH CV ECHO MEAS - LV V1 VTI: 26.6 CM
BH CV ECHO MEAS - LVIDD: 4.8 CM
BH CV ECHO MEAS - LVIDS: 3.2 CM
BH CV ECHO MEAS - LVOT AREA: 3.5 CM2
BH CV ECHO MEAS - LVOT DIAM: 2.1 CM
BH CV ECHO MEAS - LVPWD: 1.2 CM
BH CV ECHO MEAS - MV A MAX VEL: 113 CM/SEC
BH CV ECHO MEAS - MV DEC SLOPE: 361 CM/SEC2
BH CV ECHO MEAS - MV DEC TIME: 0.19 SEC
BH CV ECHO MEAS - MV E MAX VEL: 114 CM/SEC
BH CV ECHO MEAS - MV E/A: 1.01
BH CV ECHO MEAS - MV MAX PG: 5.6 MMHG
BH CV ECHO MEAS - MV MEAN PG: 2 MMHG
BH CV ECHO MEAS - MV P1/2T: 90.9 MSEC
BH CV ECHO MEAS - MV V2 VTI: 31.1 CM
BH CV ECHO MEAS - MVA(P1/2T): 2.42 CM2
BH CV ECHO MEAS - MVA(VTI): 3 CM2
BH CV ECHO MEAS - PA ACC TIME: 0.14 SEC
BH CV ECHO MEAS - PA V2 MAX: 124 CM/SEC
BH CV ECHO MEAS - RAP SYSTOLE: 3 MMHG
BH CV ECHO MEAS - RV MAX PG: 3.9 MMHG
BH CV ECHO MEAS - RV V1 MAX: 99 CM/SEC
BH CV ECHO MEAS - RV V1 VTI: 20.3 CM
BH CV ECHO MEAS - RVDD: 4.5 CM
BH CV ECHO MEAS - RVSP: 33.5 MMHG
BH CV ECHO MEAS - SV(LVOT): 92.1 ML
BH CV ECHO MEAS - SV(MOD-SP4): 64.2 ML
BH CV ECHO MEAS - SVI(MOD-SP4): 31.4 ML/M2
BH CV ECHO MEAS - TAPSE (>1.6): 2.24 CM
BH CV ECHO MEAS - TR MAX PG: 30.5 MMHG
BH CV ECHO MEAS - TR MAX VEL: 276 CM/SEC
BILIRUB UR QL STRIP: NEGATIVE
BUN SERPL-MCNC: 65 MG/DL (ref 8–23)
BUN/CREAT SERPL: 38.7 (ref 7–25)
C PNEUM DNA NPH QL NAA+NON-PROBE: NOT DETECTED
CALCIUM SPEC-SCNC: 9.4 MG/DL (ref 8.6–10.5)
CHLORIDE SERPL-SCNC: 107 MMOL/L (ref 98–107)
CLARITY UR: ABNORMAL
CO2 BLDA-SCNC: 22.1 MMOL/L (ref 22–29)
CO2 SERPL-SCNC: 20 MMOL/L (ref 22–29)
COLOR UR: YELLOW
CREAT SERPL-MCNC: 1.68 MG/DL (ref 0.76–1.27)
DEPRECATED RDW RBC AUTO: 49.6 FL (ref 37–54)
EGFRCR SERPLBLD CKD-EPI 2021: 43.4 ML/MIN/1.73
EOSINOPHIL # BLD AUTO: 0.01 10*3/MM3 (ref 0–0.4)
EOSINOPHIL NFR BLD AUTO: 0.1 % (ref 0.3–6.2)
ERYTHROCYTE [DISTWIDTH] IN BLOOD BY AUTOMATED COUNT: 15.9 % (ref 12.3–15.4)
FLUAV SUBTYP SPEC NAA+PROBE: NOT DETECTED
FLUBV RNA ISLT QL NAA+PROBE: NOT DETECTED
GLUCOSE BLDC GLUCOMTR-MCNC: 188 MG/DL (ref 70–105)
GLUCOSE BLDC GLUCOMTR-MCNC: 212 MG/DL (ref 70–105)
GLUCOSE SERPL-MCNC: 131 MG/DL (ref 65–99)
GLUCOSE UR STRIP-MCNC: NEGATIVE MG/DL
HADV DNA SPEC NAA+PROBE: NOT DETECTED
HCO3 BLDA-SCNC: 20.9 MMOL/L (ref 21–28)
HCOV 229E RNA SPEC QL NAA+PROBE: NOT DETECTED
HCOV HKU1 RNA SPEC QL NAA+PROBE: NOT DETECTED
HCOV NL63 RNA SPEC QL NAA+PROBE: NOT DETECTED
HCOV OC43 RNA SPEC QL NAA+PROBE: NOT DETECTED
HCT VFR BLD AUTO: 34 % (ref 37.5–51)
HEMODILUTION: NO
HGB BLD-MCNC: 10.4 G/DL (ref 13–17.7)
HGB UR QL STRIP.AUTO: ABNORMAL
HMPV RNA NPH QL NAA+NON-PROBE: DETECTED
HOLD SPECIMEN: NORMAL
HPIV1 RNA ISLT QL NAA+PROBE: NOT DETECTED
HPIV2 RNA SPEC QL NAA+PROBE: NOT DETECTED
HPIV3 RNA NPH QL NAA+PROBE: NOT DETECTED
HPIV4 P GENE NPH QL NAA+PROBE: NOT DETECTED
HYALINE CASTS UR QL AUTO: ABNORMAL /LPF
IMM GRANULOCYTES # BLD AUTO: 0.1 10*3/MM3 (ref 0–0.05)
IMM GRANULOCYTES NFR BLD AUTO: 1.3 % (ref 0–0.5)
INHALED O2 CONCENTRATION: 100 %
INR PPP: 1.1 (ref 0.93–1.1)
KETONES UR QL STRIP: NEGATIVE
LEFT ATRIUM VOLUME INDEX: 31.3 ML/M2
LEUKOCYTE ESTERASE UR QL STRIP.AUTO: ABNORMAL
LYMPHOCYTES # BLD AUTO: 0.36 10*3/MM3 (ref 0.7–3.1)
LYMPHOCYTES NFR BLD AUTO: 4.7 % (ref 19.6–45.3)
M PNEUMO IGG SER IA-ACNC: NOT DETECTED
MAGNESIUM SERPL-MCNC: 2 MG/DL (ref 1.6–2.4)
MCH RBC QN AUTO: 26.1 PG (ref 26.6–33)
MCHC RBC AUTO-ENTMCNC: 30.6 G/DL (ref 31.5–35.7)
MCV RBC AUTO: 85.4 FL (ref 79–97)
MODALITY: ABNORMAL
MONOCYTES # BLD AUTO: 0.22 10*3/MM3 (ref 0.1–0.9)
MONOCYTES NFR BLD AUTO: 2.9 % (ref 5–12)
NEUTROPHILS NFR BLD AUTO: 6.97 10*3/MM3 (ref 1.7–7)
NEUTROPHILS NFR BLD AUTO: 90.9 % (ref 42.7–76)
NITRITE UR QL STRIP: NEGATIVE
NRBC BLD AUTO-RTO: 0 /100 WBC (ref 0–0.2)
NT-PROBNP SERPL-MCNC: ABNORMAL PG/ML (ref 0–900)
PCO2 BLDA: 36.9 MM HG (ref 35–48)
PH BLDA: 7.36 PH UNITS (ref 7.35–7.45)
PH UR STRIP.AUTO: <=5 [PH] (ref 5–8)
PLATELET # BLD AUTO: 256 10*3/MM3 (ref 140–450)
PMV BLD AUTO: 11.7 FL (ref 6–12)
PO2 BLDA: 121.5 MM HG (ref 83–108)
POTASSIUM SERPL-SCNC: 4 MMOL/L (ref 3.5–5.2)
PROT UR QL STRIP: ABNORMAL
PROTHROMBIN TIME: 11.9 SECONDS (ref 9.6–11.7)
RBC # BLD AUTO: 3.98 10*6/MM3 (ref 4.14–5.8)
RBC # UR STRIP: ABNORMAL /HPF
REF LAB TEST METHOD: ABNORMAL
RHINOVIRUS RNA SPEC NAA+PROBE: NOT DETECTED
RSV RNA NPH QL NAA+NON-PROBE: NOT DETECTED
SAO2 % BLDCOA: 98.6 % (ref 94–98)
SARS-COV-2 RNA NPH QL NAA+NON-PROBE: NOT DETECTED
SINUS: 3.2 CM
SODIUM SERPL-SCNC: 143 MMOL/L (ref 136–145)
SP GR UR STRIP: 1.01 (ref 1–1.03)
SQUAMOUS #/AREA URNS HPF: ABNORMAL /HPF
STJ: 2.4 CM
TROPONIN T SERPL HS-MCNC: 503 NG/L
TROPONIN T SERPL HS-MCNC: 525 NG/L
UROBILINOGEN UR QL STRIP: ABNORMAL
WBC # UR STRIP: ABNORMAL /HPF
WBC NRBC COR # BLD AUTO: 7.67 10*3/MM3 (ref 3.4–10.8)

## 2024-04-18 PROCEDURE — 25010000002 HEPARIN (PORCINE) 25000-0.45 UT/250ML-% SOLUTION: Performed by: INTERNAL MEDICINE

## 2024-04-18 PROCEDURE — 93010 ELECTROCARDIOGRAM REPORT: CPT | Performed by: INTERNAL MEDICINE

## 2024-04-18 PROCEDURE — 85025 COMPLETE CBC W/AUTO DIFF WBC: CPT | Performed by: INTERNAL MEDICINE

## 2024-04-18 PROCEDURE — 63710000001 INSULIN LISPRO (HUMAN) PER 5 UNITS: Performed by: INTERNAL MEDICINE

## 2024-04-18 PROCEDURE — 84484 ASSAY OF TROPONIN QUANT: CPT | Performed by: NURSE PRACTITIONER

## 2024-04-18 PROCEDURE — 93005 ELECTROCARDIOGRAM TRACING: CPT | Performed by: INTERNAL MEDICINE

## 2024-04-18 PROCEDURE — 85730 THROMBOPLASTIN TIME PARTIAL: CPT | Performed by: INTERNAL MEDICINE

## 2024-04-18 PROCEDURE — 82948 REAGENT STRIP/BLOOD GLUCOSE: CPT

## 2024-04-18 PROCEDURE — 71045 X-RAY EXAM CHEST 1 VIEW: CPT

## 2024-04-18 PROCEDURE — 83880 ASSAY OF NATRIURETIC PEPTIDE: CPT | Performed by: NURSE PRACTITIONER

## 2024-04-18 PROCEDURE — 93306 TTE W/DOPPLER COMPLETE: CPT | Performed by: INTERNAL MEDICINE

## 2024-04-18 PROCEDURE — 94799 UNLISTED PULMONARY SVC/PX: CPT

## 2024-04-18 PROCEDURE — 84484 ASSAY OF TROPONIN QUANT: CPT | Performed by: INTERNAL MEDICINE

## 2024-04-18 PROCEDURE — 25010000002 SULFUR HEXAFLUORIDE MICROSPH 60.7-25 MG RECONSTITUTED SUSPENSION: Performed by: INTERNAL MEDICINE

## 2024-04-18 PROCEDURE — 81001 URINALYSIS AUTO W/SCOPE: CPT | Performed by: INTERNAL MEDICINE

## 2024-04-18 PROCEDURE — 93306 TTE W/DOPPLER COMPLETE: CPT

## 2024-04-18 PROCEDURE — 85610 PROTHROMBIN TIME: CPT | Performed by: INTERNAL MEDICINE

## 2024-04-18 PROCEDURE — 25010000002 FUROSEMIDE PER 20 MG: Performed by: INTERNAL MEDICINE

## 2024-04-18 PROCEDURE — 83735 ASSAY OF MAGNESIUM: CPT | Performed by: INTERNAL MEDICINE

## 2024-04-18 PROCEDURE — 76775 US EXAM ABDO BACK WALL LIM: CPT

## 2024-04-18 PROCEDURE — 80048 BASIC METABOLIC PNL TOTAL CA: CPT | Performed by: INTERNAL MEDICINE

## 2024-04-18 PROCEDURE — 94664 DEMO&/EVAL PT USE INHALER: CPT

## 2024-04-18 PROCEDURE — 94761 N-INVAS EAR/PLS OXIMETRY MLT: CPT

## 2024-04-18 PROCEDURE — 99222 1ST HOSP IP/OBS MODERATE 55: CPT | Performed by: INTERNAL MEDICINE

## 2024-04-18 PROCEDURE — 0202U NFCT DS 22 TRGT SARS-COV-2: CPT | Performed by: INTERNAL MEDICINE

## 2024-04-18 PROCEDURE — 36600 WITHDRAWAL OF ARTERIAL BLOOD: CPT | Performed by: INTERNAL MEDICINE

## 2024-04-18 PROCEDURE — 82803 BLOOD GASES ANY COMBINATION: CPT | Performed by: INTERNAL MEDICINE

## 2024-04-18 PROCEDURE — 25010000002 METHYLPREDNISOLONE PER 125 MG: Performed by: INTERNAL MEDICINE

## 2024-04-18 PROCEDURE — 94640 AIRWAY INHALATION TREATMENT: CPT

## 2024-04-18 RX ORDER — BISACODYL 5 MG/1
5 TABLET, DELAYED RELEASE ORAL DAILY PRN
Status: DISCONTINUED | OUTPATIENT
Start: 2024-04-18 | End: 2024-04-24 | Stop reason: HOSPADM

## 2024-04-18 RX ORDER — LOSARTAN POTASSIUM 50 MG/1
50 TABLET ORAL DAILY
Status: DISCONTINUED | OUTPATIENT
Start: 2024-04-18 | End: 2024-04-18 | Stop reason: ALTCHOICE

## 2024-04-18 RX ORDER — NITROGLYCERIN 0.4 MG/1
0.4 TABLET SUBLINGUAL
Status: DISCONTINUED | OUTPATIENT
Start: 2024-04-18 | End: 2024-04-24 | Stop reason: HOSPADM

## 2024-04-18 RX ORDER — FUROSEMIDE 10 MG/ML
40 INJECTION INTRAMUSCULAR; INTRAVENOUS EVERY 12 HOURS
Status: DISCONTINUED | OUTPATIENT
Start: 2024-04-18 | End: 2024-04-18

## 2024-04-18 RX ORDER — BUDESONIDE 0.5 MG/2ML
0.5 INHALANT ORAL
Status: DISCONTINUED | OUTPATIENT
Start: 2024-04-18 | End: 2024-04-24 | Stop reason: HOSPADM

## 2024-04-18 RX ORDER — LISINOPRIL 20 MG/1
20 TABLET ORAL
Status: DISCONTINUED | OUTPATIENT
Start: 2024-04-19 | End: 2024-04-18

## 2024-04-18 RX ORDER — ACETAMINOPHEN 325 MG/1
650 TABLET ORAL EVERY 4 HOURS PRN
Status: DISCONTINUED | OUTPATIENT
Start: 2024-04-18 | End: 2024-04-24 | Stop reason: HOSPADM

## 2024-04-18 RX ORDER — POLYETHYLENE GLYCOL 3350 17 G/17G
17 POWDER, FOR SOLUTION ORAL DAILY PRN
Status: DISCONTINUED | OUTPATIENT
Start: 2024-04-18 | End: 2024-04-24 | Stop reason: HOSPADM

## 2024-04-18 RX ORDER — CLOPIDOGREL BISULFATE 75 MG/1
75 TABLET ORAL DAILY
Status: DISCONTINUED | OUTPATIENT
Start: 2024-04-18 | End: 2024-04-24 | Stop reason: HOSPADM

## 2024-04-18 RX ORDER — METHYLPREDNISOLONE SODIUM SUCCINATE 125 MG/2ML
60 INJECTION, POWDER, LYOPHILIZED, FOR SOLUTION INTRAMUSCULAR; INTRAVENOUS EVERY 12 HOURS
Status: DISCONTINUED | OUTPATIENT
Start: 2024-04-18 | End: 2024-04-22

## 2024-04-18 RX ORDER — NICOTINE POLACRILEX 4 MG
15 LOZENGE BUCCAL
Status: DISCONTINUED | OUTPATIENT
Start: 2024-04-18 | End: 2024-04-24 | Stop reason: HOSPADM

## 2024-04-18 RX ORDER — ATORVASTATIN CALCIUM 40 MG/1
40 TABLET, FILM COATED ORAL NIGHTLY
Status: DISCONTINUED | OUTPATIENT
Start: 2024-04-18 | End: 2024-04-24 | Stop reason: HOSPADM

## 2024-04-18 RX ORDER — MONTELUKAST SODIUM 10 MG/1
10 TABLET ORAL NIGHTLY
Status: DISCONTINUED | OUTPATIENT
Start: 2024-04-18 | End: 2024-04-24 | Stop reason: HOSPADM

## 2024-04-18 RX ORDER — DEXTROSE MONOHYDRATE 25 G/50ML
25 INJECTION, SOLUTION INTRAVENOUS
Status: DISCONTINUED | OUTPATIENT
Start: 2024-04-18 | End: 2024-04-24 | Stop reason: HOSPADM

## 2024-04-18 RX ORDER — ECHINACEA PURPUREA EXTRACT 125 MG
1 TABLET ORAL AS NEEDED
Status: DISCONTINUED | OUTPATIENT
Start: 2024-04-18 | End: 2024-04-24 | Stop reason: HOSPADM

## 2024-04-18 RX ORDER — AMOXICILLIN 250 MG
2 CAPSULE ORAL 2 TIMES DAILY
Status: DISCONTINUED | OUTPATIENT
Start: 2024-04-18 | End: 2024-04-24 | Stop reason: HOSPADM

## 2024-04-18 RX ORDER — HYDROCHLOROTHIAZIDE 12.5 MG/1
12.5 TABLET ORAL DAILY
Status: DISCONTINUED | OUTPATIENT
Start: 2024-04-19 | End: 2024-04-18

## 2024-04-18 RX ORDER — AMLODIPINE BESYLATE 5 MG/1
5 TABLET ORAL
Status: DISCONTINUED | OUTPATIENT
Start: 2024-04-19 | End: 2024-04-24

## 2024-04-18 RX ORDER — SODIUM CHLORIDE 9 MG/ML
40 INJECTION, SOLUTION INTRAVENOUS AS NEEDED
Status: DISCONTINUED | OUTPATIENT
Start: 2024-04-18 | End: 2024-04-24 | Stop reason: HOSPADM

## 2024-04-18 RX ORDER — IPRATROPIUM BROMIDE AND ALBUTEROL SULFATE 2.5; .5 MG/3ML; MG/3ML
3 SOLUTION RESPIRATORY (INHALATION)
Status: DISCONTINUED | OUTPATIENT
Start: 2024-04-18 | End: 2024-04-24 | Stop reason: HOSPADM

## 2024-04-18 RX ORDER — FUROSEMIDE 10 MG/ML
40 INJECTION INTRAMUSCULAR; INTRAVENOUS EVERY 12 HOURS
Status: DISCONTINUED | OUTPATIENT
Start: 2024-04-19 | End: 2024-04-19

## 2024-04-18 RX ORDER — HYDROCODONE BITARTRATE AND ACETAMINOPHEN 5; 325 MG/1; MG/1
1 TABLET ORAL EVERY 4 HOURS PRN
Status: ACTIVE | OUTPATIENT
Start: 2024-04-18 | End: 2024-04-23

## 2024-04-18 RX ORDER — BISACODYL 10 MG
10 SUPPOSITORY, RECTAL RECTAL DAILY PRN
Status: DISCONTINUED | OUTPATIENT
Start: 2024-04-18 | End: 2024-04-24 | Stop reason: HOSPADM

## 2024-04-18 RX ORDER — CARVEDILOL 3.12 MG/1
3.12 TABLET ORAL 2 TIMES DAILY WITH MEALS
Status: DISCONTINUED | OUTPATIENT
Start: 2024-04-18 | End: 2024-04-23

## 2024-04-18 RX ORDER — IBUPROFEN 600 MG/1
1 TABLET ORAL
Status: DISCONTINUED | OUTPATIENT
Start: 2024-04-18 | End: 2024-04-24 | Stop reason: HOSPADM

## 2024-04-18 RX ORDER — SODIUM CHLORIDE 0.9 % (FLUSH) 0.9 %
10 SYRINGE (ML) INJECTION AS NEEDED
Status: DISCONTINUED | OUTPATIENT
Start: 2024-04-18 | End: 2024-04-24 | Stop reason: HOSPADM

## 2024-04-18 RX ORDER — INSULIN LISPRO 100 [IU]/ML
2-7 INJECTION, SOLUTION INTRAVENOUS; SUBCUTANEOUS
Status: DISCONTINUED | OUTPATIENT
Start: 2024-04-18 | End: 2024-04-24 | Stop reason: HOSPADM

## 2024-04-18 RX ORDER — SODIUM CHLORIDE 0.9 % (FLUSH) 0.9 %
10 SYRINGE (ML) INJECTION EVERY 12 HOURS SCHEDULED
Status: DISCONTINUED | OUTPATIENT
Start: 2024-04-18 | End: 2024-04-24 | Stop reason: HOSPADM

## 2024-04-18 RX ORDER — FUROSEMIDE 10 MG/ML
40 INJECTION INTRAMUSCULAR; INTRAVENOUS DAILY
Status: DISCONTINUED | OUTPATIENT
Start: 2024-04-19 | End: 2024-04-18

## 2024-04-18 RX ORDER — HEPARIN SODIUM 10000 [USP'U]/100ML
10.5 INJECTION, SOLUTION INTRAVENOUS
Status: DISCONTINUED | OUTPATIENT
Start: 2024-04-18 | End: 2024-04-18

## 2024-04-18 RX ORDER — HEPARIN SODIUM 10000 [USP'U]/100ML
12 INJECTION, SOLUTION INTRAVENOUS
Status: DISCONTINUED | OUTPATIENT
Start: 2024-04-18 | End: 2024-04-23

## 2024-04-18 RX ORDER — PANTOPRAZOLE SODIUM 40 MG/1
40 TABLET, DELAYED RELEASE ORAL
Status: DISCONTINUED | OUTPATIENT
Start: 2024-04-19 | End: 2024-04-24 | Stop reason: HOSPADM

## 2024-04-18 RX ADMIN — IPRATROPIUM BROMIDE AND ALBUTEROL SULFATE 3 ML: .5; 3 SOLUTION RESPIRATORY (INHALATION) at 16:03

## 2024-04-18 RX ADMIN — BUDESONIDE 0.5 MG: 0.5 INHALANT RESPIRATORY (INHALATION) at 18:50

## 2024-04-18 RX ADMIN — Medication 10 ML: at 21:38

## 2024-04-18 RX ADMIN — INSULIN LISPRO 3 UNITS: 100 INJECTION, SOLUTION INTRAVENOUS; SUBCUTANEOUS at 18:22

## 2024-04-18 RX ADMIN — DOXYCYCLINE 100 MG: 100 INJECTION, POWDER, LYOPHILIZED, FOR SOLUTION INTRAVENOUS at 18:21

## 2024-04-18 RX ADMIN — ATORVASTATIN CALCIUM 40 MG: 40 TABLET, FILM COATED ORAL at 21:25

## 2024-04-18 RX ADMIN — HEPARIN SODIUM 12 UNITS/KG/HR: 10000 INJECTION, SOLUTION INTRAVENOUS at 13:48

## 2024-04-18 RX ADMIN — INSULIN LISPRO 2 UNITS: 100 INJECTION, SOLUTION INTRAVENOUS; SUBCUTANEOUS at 21:25

## 2024-04-18 RX ADMIN — METHYLPREDNISOLONE SODIUM SUCCINATE 60 MG: 125 INJECTION, POWDER, FOR SOLUTION INTRAMUSCULAR; INTRAVENOUS at 18:22

## 2024-04-18 RX ADMIN — MONTELUKAST 10 MG: 10 TABLET, FILM COATED ORAL at 21:25

## 2024-04-18 RX ADMIN — SULFUR HEXAFLUORIDE 2 ML: KIT at 14:43

## 2024-04-18 RX ADMIN — IPRATROPIUM BROMIDE AND ALBUTEROL SULFATE 3 ML: .5; 3 SOLUTION RESPIRATORY (INHALATION) at 18:45

## 2024-04-18 RX ADMIN — DOCUSATE SODIUM AND SENNOSIDES 2 TABLET: 8.6; 5 TABLET, FILM COATED ORAL at 21:25

## 2024-04-18 RX ADMIN — FUROSEMIDE 40 MG: 10 INJECTION, SOLUTION INTRAMUSCULAR; INTRAVENOUS at 18:24

## 2024-04-18 RX ADMIN — CARVEDILOL 3.12 MG: 3.12 TABLET, FILM COATED ORAL at 18:23

## 2024-04-18 RX ADMIN — CLOPIDOGREL BISULFATE 75 MG: 75 TABLET ORAL at 18:23

## 2024-04-18 NOTE — CASE MANAGEMENT/SOCIAL WORK
Discharge Planning Assessment   Serge     Patient Name: Fransico Cuenca  MRN: [id number]  Today's Date: 4/18/2024    Admit Date: 4/18/2024    Plan: Anticipate routine home alone.   Discharge Needs Assessment       Row Name 04/18/24 1424       Living Environment    People in Home alone    Current Living Arrangements home    Potentially Unsafe Housing Conditions none    In the past 12 months has the electric, gas, oil, or water company threatened to shut off services in your home? No    Primary Care Provided by self    Provides Primary Care For no one    Family Caregiver if Needed friend(s)    Quality of Family Relationships helpful    Able to Return to Prior Arrangements yes       Resource/Environmental Concerns    Resource/Environmental Concerns none    Environment Concerns none    Transportation Concerns none       Transportation Needs    In the past 12 months, has lack of transportation kept you from medical appointments or from getting medications? no    In the past 12 months, has lack of transportation kept you from meetings, work, or from getting things needed for daily living? No       Food Insecurity    Within the past 12 months, you worried that your food would run out before you got the money to buy more. Never true    Within the past 12 months, the food you bought just didn't last and you didn't have money to get more. Never true       Transition Planning    Patient/Family Anticipates Transition to home    Patient/Family Anticipated Services at Transition none    Transportation Anticipated family or friend will provide       Discharge Needs Assessment    Readmission Within the Last 30 Days no previous admission in last 30 days    Equipment Currently Used at Home bipap    Concerns to be Addressed denies needs/concerns at this time    Anticipated Changes Related to Illness none    Equipment Needed After Discharge none                   Discharge Plan       Row Name 04/18/24 1421       Plan    Plan Anticipate  routine home alone.    Patient/Family in Agreement with Plan yes    Plan Comments CM met with patient and friends at bedside. Patient lives alone, is independent with ADLs and drives. PCP and pharmacy verified-denies any difficulty affording meds. [facility] letter reviewed, consent obtained and copy left at bedside. Patient and friends deny any d/c needs at this time and friends will transport at d/c. Barrier to D/C: cardiology and nephrology consults, heparin gtt, 6L O2.                      Expected Discharge Date and Time       Expected Discharge Date Expected Discharge Time    Apr 20, 2024            Demographic Summary       Row Name 04/18/24 1424       General Information    Admission Type inpatient    Arrived From emergency department    Referral Source admission list    Reason for Consult discharge planning    Preferred Language English       Contact Information    Permission Granted to Share Info With                    Functional Status       Row Name 04/18/24 1424       Functional Status    Usual Activity Tolerance good    Current Activity Tolerance good       Functional Status, IADL    Medications independent    Meal Preparation independent    Housekeeping independent    Laundry independent    Shopping independent       Mental Status    General Appearance WDL WDL       Mental Status Summary    Recent Changes in Mental Status/Cognitive Functioning no changes                       Patient Forms       Row Name 04/18/24 1427       Patient Forms    Important Message from Medicare ([facility]) Delivered  4/18/24    Delivered to Support person  friend Jayla    Method of delivery In person                  Met with patient in room.  Maintained distance greater than six feet and spent less than 15 minutes in the room.     CECILIA MarroquinN, RN    [address] [address]    Office: [phone]  Fax: [phone]

## 2024-04-18 NOTE — CONSULTS
PULMONARY CRITICAL CARE CONSULT NOTE      PATIENT IDENTIFICATION:  Name: Fransico Cuenca  MRN: [id number]  :  1955     Age: 69 y.o.  Sex: male        DATE OF CONSULTATION:  2024  PRIMARY CARE PHYSICIAN    Camryn Salinas MD                  CHIEF COMPLAINT: SOB    History of Present Illness:   Fransico Cuenca is a 69 y.o. male with a history of COPD, DM II, HTN, GERD, and Hyperlipidemia who came to the ER with complaints of chest pain and shortness of breath.  The patient is a transfer from [facility].  The patient presented with chest pain, wheezing and shortness of breath and he was found to have elevated BNP, troponin and D-dimer.  CTA chest was not done due to increased creatinine and BUN.  The patient was started on heparin drip.  Dr. Lorenzo cardiology was contacted and recommended transfer and to continue heparin drip and to give aspirin. The patient was admitted for further treatment.       Review of Systems:   Constitutional: As above   Eyes: negative   ENT/oropharynx: negative   Cardiovascular: As above    Respiratory: As above    Gastrointestinal: negative   Genitourinary: negative   Neurological: negative   Musculoskeletal: negative   Integument/breast: negative   Endocrine: negative   Allergic/Immunologic: negative     Past Medical History:  Past Medical History:   Diagnosis Date    Anxiety     Essential hypertension, benign     Gastroesophageal reflux disease without esophagitis     Kidney stones 2020    Dr. Kahlil Terrell    Mixed hyperlipidemia     Rotator cuff syndrome     Torn rotator about seven years ago    Seasonal allergic rhinitis due to pollen     Type 2 diabetes mellitus without complication, without long-term current use of insulin        Past Surgical History:  Past Surgical History:   Procedure Laterality Date    CATARACT EXTRACTION Left 2023    Dr Melendez    CATARACT EXTRACTION Right 2023    Dr Melendez    CYSTOSCOPY W/ LASER LITHOTRIPSY  2021    INGUINAL  HERNIA REPAIR Left 10/2009    NOSE SURGERY      x2    UMBILICAL HERNIA REPAIR  10/2009    Dr. Napier        Family History:  Family History   Problem Relation Age of Onset    Heart disease Mother     Uterine cancer Mother     Thyroid disease Mother     Cancer Mother         Uterus?    Heart disease Father     Stroke Father     COPD Brother     Diabetes Brother     Breast cancer Maternal Aunt     Breast cancer Maternal Grandmother     Diabetes Brother         Social History:   Social History     Tobacco Use    Smoking status: Never     Passive exposure: Never    Smokeless tobacco: Never    Tobacco comments:     Family members smoked   Substance Use Topics    Alcohol use: Never        Allergies:  No Known Allergies    Home Meds:  Facility-Administered Medications Prior to Admission   Medication Dose Route Frequency Provider Last Rate Last Admin    Allergy Serum Injection  0.1 mL Subcutaneous Once Brad, Camryn GOLD MD         Medications Prior to Admission   Medication Sig Dispense Refill Last Dose    amLODIPine (NORVASC) 5 MG tablet Take 1 tablet by mouth Daily. 90 tablet 3 4/18/2024    aspirin (aspirin) 81 MG EC tablet Take 1 tablet by mouth Daily.   4/18/2024    atorvastatin (LIPITOR) 10 MG tablet Take 1 tablet by mouth once daily 90 tablet 3 4/18/2024    citalopram (CeleXA) 20 MG tablet Take 1 tablet by mouth once daily 90 tablet 3 4/18/2024    doxazosin (CARDURA) 1 MG tablet Take 1 tablet by mouth once daily 90 tablet 3 4/18/2024    fenofibrate 160 MG tablet Take 1 tablet by mouth once daily 90 tablet 0 4/18/2024    lisinopril-hydrochlorothiazide (PRINZIDE,ZESTORETIC) 20-12.5 MG per tablet Take 1 tablet by mouth once daily 90 tablet 3 4/18/2024    metFORMIN (GLUCOPHAGE) 1000 MG tablet TAKE 1 TABLET BY MOUTH TWICE DAILY WITH MEALS 60 tablet 12 4/18/2024    montelukast (SINGULAIR) 10 MG tablet Take 1 tablet by mouth Daily. 90 tablet 3 4/18/2024    Multiple Vitamins-Minerals (MULTIVITAMIN ADULT PO) Take 1 tablet by  "mouth Daily.   2024    multivitamin (THERAGRAN) tablet tablet Take 1 tablet by mouth Daily.   2024    Omega-3 Fatty Acids (FISH OIL) 1000 MG capsule delayed-release Take 2 capsules by mouth 2 (Two) Times a Day.   2024    omeprazole (priLOSEC) 40 MG capsule Take 1 capsule by mouth Daily. 30 capsule 12 2024    pioglitazone (ACTOS) 30 MG tablet Take 1 tablet by mouth Daily. 30 tablet 12 2024    azelastine (ASTELIN) 0.1 % nasal spray 2 sprays into the nostril(s) as directed by provider 2 (Two) Times a Day. Use in each nostril as directed 30 mL 5 Unknown       Objective:  tMax 24 hrs: Temp (24hrs), Av.5 °F (36.4 °C), Min:97.5 °F (36.4 °C), Max:97.5 °F (36.4 °C)      Vitals Ranges:   Temp:  [97.5 °F (36.4 °C)] 97.5 °F (36.4 °C)  Heart Rate:  [88] 88  Resp:  [27] 27  BP: (158)/(86) 158/86    Intake and Output Last 3 Shifts:   No intake/output data recorded.    Exam:  /86 (BP Location: Left arm, Patient Position: Lying)   Pulse 88   Temp 97.5 °F (36.4 °C) (Axillary)   Resp 27   Ht 167.6 cm (66\")   Wt 95.3 kg (210 lb 1.6 oz)   SpO2 98%   BMI 33.91 kg/m²       24  1301   Weight: 95.3 kg (210 lb 1.6 oz)     General Appearance:      HEENT:  Normocephalic, without obvious abnormality, atraumatic. Conjunctivae/corneas clear.  Normal external ear canals. Nares normal, no drainage.  Neck:  Supple, symmetrical, trachea midline. No JVD.  Lungs /Chest wall:   Bilateral basal rhonchi, respirations unlabored, symmetrical wall movement.     Heart:  Regular rate and rhythm, systolic murmur PMI left sternal border  Abdomen: Soft, nontender, no masses, no organomegaly.    Extremities: Trace edema, no clubbing or cyanosis        Data Review:  All labs (24hrs):   Recent Results (from the past 24 hour(s))   Blood Gas, Arterial -    Collection Time: 24  1:29 PM    Specimen: Arterial Blood   Result Value Ref Range    Site Right Radial     Festus's Test Positive     pH, Arterial 7.362 7.350 - " 7.450 pH units    pCO2, Arterial 36.9 35.0 - 48.0 mm Hg    pO2, Arterial 121.5 (H) 83.0 - 108.0 mm Hg    HCO3, Arterial 20.9 (L) 21.0 - 28.0 mmol/L    Base Excess, Arterial -4.0 (L) 0.0 - 3.0 mmol/L    O2 Saturation, Arterial 98.6 (H) 94.0 - 98.0 %    CO2 Content 22.1 22 - 29 mmol/L    Barometric Pressure for Blood Gas      Modality NRB     FIO2 100 %    Hemodilution No    ECG 12 Lead Chest Pain    Collection Time: 04/18/24  1:40 PM   Result Value Ref Range    QT Interval 387 ms    QTC Interval 421 ms   CBC Auto Differential    Collection Time: 04/18/24  1:43 PM    Specimen: Blood   Result Value Ref Range    WBC 7.67 3.40 - 10.80 10*3/mm3    RBC 3.98 (L) 4.14 - 5.80 10*6/mm3    Hemoglobin 10.4 (L) 13.0 - 17.7 g/dL    Hematocrit 34.0 (L) 37.5 - 51.0 %    MCV 85.4 79.0 - 97.0 fL    MCH 26.1 (L) 26.6 - 33.0 pg    MCHC 30.6 (L) 31.5 - 35.7 g/dL    RDW 15.9 (H) 12.3 - 15.4 %    RDW-SD 49.6 37.0 - 54.0 fl    MPV 11.7 6.0 - 12.0 fL    Platelets 256 140 - 450 10*3/mm3    Neutrophil % 90.9 (H) 42.7 - 76.0 %    Lymphocyte % 4.7 (L) 19.6 - 45.3 %    Monocyte % 2.9 (L) 5.0 - 12.0 %    Eosinophil % 0.1 (L) 0.3 - 6.2 %    Basophil % 0.1 0.0 - 1.5 %    Immature Grans % 1.3 (H) 0.0 - 0.5 %    Neutrophils, Absolute 6.97 1.70 - 7.00 10*3/mm3    Lymphocytes, Absolute 0.36 (L) 0.70 - 3.10 10*3/mm3    Monocytes, Absolute 0.22 0.10 - 0.90 10*3/mm3    Eosinophils, Absolute 0.01 0.00 - 0.40 10*3/mm3    Basophils, Absolute 0.01 0.00 - 0.20 10*3/mm3    Immature Grans, Absolute 0.10 (H) 0.00 - 0.05 10*3/mm3    nRBC 0.0 0.0 - 0.2 /100 WBC   Protime-INR    Collection Time: 04/18/24  1:51 PM    Specimen: Blood   Result Value Ref Range    Protime 11.9 (H) 9.6 - 11.7 Seconds    INR 1.10 0.93 - 1.10   aPTT    Collection Time: 04/18/24  1:51 PM    Specimen: Blood   Result Value Ref Range    PTT 32.7 (L) 61.0 - 76.5 seconds   Respiratory Panel PCR w/COVID-19(SARS-CoV-2) BRIANNE/PATITO/JAMESON/PAD/COR/RONNY In-House, NP Swab in UTM/VTM, 2 HR TAT - Swab,  Nasopharynx    Collection Time: 04/18/24  2:12 PM    Specimen: Nasopharynx; Swab   Result Value Ref Range    ADENOVIRUS, PCR Not Detected Not Detected    Coronavirus 229E Not Detected Not Detected    Coronavirus HKU1 Not Detected Not Detected    Coronavirus NL63 Not Detected Not Detected    Coronavirus OC43 Not Detected Not Detected    COVID19 Not Detected Not Detected - Ref. Range    Human Metapneumovirus Detected (A) Not Detected    Human Rhinovirus/Enterovirus Not Detected Not Detected    Influenza A PCR Not Detected Not Detected    Influenza B PCR Not Detected Not Detected    Parainfluenza Virus 1 Not Detected Not Detected    Parainfluenza Virus 2 Not Detected Not Detected    Parainfluenza Virus 3 Not Detected Not Detected    Parainfluenza Virus 4 Not Detected Not Detected    RSV, PCR Not Detected Not Detected    Bordetella pertussis pcr Not Detected Not Detected    Bordetella parapertussis PCR Not Detected Not Detected    Chlamydophila pneumoniae PCR Not Detected Not Detected    Mycoplasma pneumo by PCR Not Detected Not Detected        Imaging:  XR Chest 1 View    Result Date: 4/18/2024  XR CHEST 1 VW Date of Exam: 4/18/2024 1:30 PM EDT Indication: Chest pain Comparison: AP chest x-ray 4/18/2024 at 9:00 a.m. Findings: Compared to today's earlier chest x-ray, there are lower lung volumes with increased airspace opacities in both lower lungs. No pneumothorax is seen. Cardiac silhouette is enlarged, but is exaggerated by portable AP technique.     Impression: Lower lung volumes with increased airspace opacities in both lower lungs, which may be due to atelectasis, pulmonary edema, or less likely pneumonia. Electronically Signed: Lisa Lan  4/18/2024 1:49 PM EDT  Workstation ID: [id number]       Current:  amLODIPine, 5 mg, Oral, Q24H  atorvastatin, 40 mg, Oral, Nightly  budesonide, 0.5 mg, Nebulization, BID - RT  carvedilol, 3.125 mg, Oral, BID With Meals  clopidogrel, 75 mg, Oral, Daily  furosemide, 40 mg,  Intravenous, Q12H  insulin lispro, 2-7 Units, Subcutaneous, 4x Daily AC & at Bedtime  ipratropium-albuterol, 3 mL, Nebulization, 4x Daily - RT  losartan, 50 mg, Oral, Daily  methylPREDNISolone sodium succinate, 60 mg, Intravenous, Q12H  montelukast, 10 mg, Oral, Nightly  [START ON 4/19/2024] pantoprazole, 40 mg, Oral, Q AM  senna-docusate sodium, 2 tablet, Oral, BID  sodium chloride, 10 mL, Intravenous, Q12H        Infusion:  heparin, 12 Units/kg/hr, Last Rate: 15 Units/kg/hr (04/18/24 1428)         PRN:    acetaminophen    senna-docusate sodium **AND** polyethylene glycol **AND** bisacodyl **AND** bisacodyl    Calcium Replacement - Follow Nurse / BPA Driven Protocol    dextrose    dextrose    glucagon (human recombinant)    heparin    heparin    HYDROcodone-acetaminophen    Magnesium Standard Dose Replacement - Follow Nurse / BPA Driven Protocol    nitroglycerin    Phosphorus Replacement - Follow Nurse / BPA Driven Protocol    Potassium Replacement - Follow Nurse / BPA Driven Protocol    sodium chloride    sodium chloride    ASSESSMENT:  COPD  NSTEMI  DM II  HTN  GERD  Hyperlipidemia  EN       PLAN:  Antibiotics  Bronchodilators  Inhaled corticosteroids  Incentive spirometer  Heparin drip per Cardiology   Echo ordered  Cardiology/Nephrology consulted   Electrolytes/ glycemic control  DVT and GI prophylaxis    Discussed with JAX Covington  4/18/2024  15:20 EDT      I personally have examined  and interviewed the patient. I have reviewed the history, data, problems, assessment and plan with our NP.  Total Critical care time in direct medical management (   ) minutes, This time specifically excludes time spent performing procedures.    Danay Velez MD   4/18/2024  22:27 EDT

## 2024-04-18 NOTE — CONSULTS
Referring Provider: Kush Sesay MD    Reason for Consultation:      Shortness of breath  Chest tightness  Elevated troponin      Patient Care Team:  Camryn Salinas MD as PCP - General (Family Medicine)      SUBJECTIVE     Chief Complaint: Shortness of breath, chest tightness    History of present illness:  Fransico Cuenca is a 69 y.o. male with a history of hypertension and dyslipidemia who presented to [facility] with complaint of shortness of breath and chest tightness.    Patient reports over the last week he has had progressive shortness of breath and activity intolerance.  He reports things he normally could do without difficulty he was having issues.  He went to a ball game last night and was having problems walking from the stands back to his car.  He said he felt so short of breath that he thought he was going to pass out.    The patient has continued to have progressive dyspnea associate with periods of chest tightness productive cough he denies fevers but does report some chills.  He tried to see his primary care physician and was directed to the emergency room and went to [facility].  There there he was evaluated.  His troponin was 0.33 proBNP 1300 chest x-ray was consistent with pulmonary edema or possibly multifocal pneumonia D-dimer was elevated at 1.6 patient was started on IV heparin drip.  He was given DuoNebs and given IV Lasix.    Patient was transferred to [facility] for further evaluation and treatment.    Respiratory virus panel is positive for human metapneumovirus      EKG shows sinus rhythm with right bundle branch block        Review of systems:    Constitutional: C/o weakness, fatigue,chills   Eyes: No vision changes, eye pain   ENT/oropharynx: No difficulty swallowing, sore throat, epistaxis, changes in hearing   Cardiovascular: C/o  chest pain, chest tightness, palpitations, paroxysmal nocturnal dyspnea, orthopnea, diaphoresis, dizziness /  syncopal episode   Respiratory: C/o  shortness of breath, dyspnea on exertion, cough, wheezing,    Gastrointestinal: No abdominal pain, nausea, vomiting, diarrhea, bloody stools   Genitourinary: No hematuria, dysuria   Neurological: No headache, tremors, numbness, one-sided weakness    Musculoskeletal: No cramps, myalgias, joint pain, joint swelling   Integument: No rash, edema        Personal History:      Past Medical History:   Diagnosis Date    Anxiety     Essential hypertension, benign     Gastroesophageal reflux disease without esophagitis     Kidney stones 05/31/2020    Dr. Kahlil Terrell    Mixed hyperlipidemia     Rotator cuff syndrome     Torn rotator about seven years ago    Seasonal allergic rhinitis due to pollen     Type 2 diabetes mellitus without complication, without long-term current use of insulin        Past Surgical History:   Procedure Laterality Date    CATARACT EXTRACTION Left 08/24/2023    Dr Melendez    CATARACT EXTRACTION Right 09/07/2023    Dr Melendez    CYSTOSCOPY W/ LASER LITHOTRIPSY  01/2021    INGUINAL HERNIA REPAIR Left 10/2009    NOSE SURGERY      x2    UMBILICAL HERNIA REPAIR  10/2009    Dr. Napier       Family History   Problem Relation Age of Onset    Heart disease Mother     Uterine cancer Mother     Thyroid disease Mother     Cancer Mother         Uterus?    Heart disease Father     Stroke Father     COPD Brother     Diabetes Brother     Breast cancer Maternal Aunt     Breast cancer Maternal Grandmother     Diabetes Brother        Social History     Tobacco Use    Smoking status: Never     Passive exposure: Never    Smokeless tobacco: Never    Tobacco comments:     Family members smoked   Vaping Use    Vaping status: Never Used   Substance Use Topics    Alcohol use: Never    Drug use: Never        Home meds:  Prior to Admission medications    Medication Sig Start Date End Date Taking? Authorizing Provider   amLODIPine (NORVASC) 5 MG tablet Take 1 tablet by mouth Daily. 5/26/23    Camryn Salinas MD   aspirin (aspirin) 81 MG EC tablet Take 1 tablet by mouth Daily. 4/30/19   Milly Harris MD   atorvastatin (LIPITOR) 10 MG tablet Take 1 tablet by mouth once daily 9/5/23   Camryn Salinas MD   azelastine (ASTELIN) 0.1 % nasal spray 2 sprays into the nostril(s) as directed by provider 2 (Two) Times a Day. Use in each nostril as directed 5/26/23   Camryn Salinas MD   citalopram (CeleXA) 20 MG tablet Take 1 tablet by mouth once daily 5/26/23   Camryn Salinas MD   doxazosin (CARDURA) 1 MG tablet Take 1 tablet by mouth once daily 5/26/23   Camryn Salinas MD   fenofibrate 160 MG tablet Take 1 tablet by mouth once daily 3/22/24   Camryn Salinas MD   lisinopril-hydrochlorothiazide (PRINZIDE,ZESTORETIC) 20-12.5 MG per tablet Take 1 tablet by mouth once daily 10/13/23   Camryn Salinas MD   metFORMIN (GLUCOPHAGE) 1000 MG tablet TAKE 1 TABLET BY MOUTH TWICE DAILY WITH MEALS 2/26/24   Camryn Salinas MD   montelukast (SINGULAIR) 10 MG tablet Take 1 tablet by mouth Daily. 11/3/23   Camryn Salinas MD   Multiple Vitamins-Minerals (MULTIVITAMIN ADULT PO) Take 1 tablet by mouth Daily.    Milly Harris MD   multivitamin (THERAGRAN) tablet tablet Take 1 tablet by mouth Daily.    Milly Harris MD   Omega-3 Fatty Acids (FISH OIL) 1000 MG capsule delayed-release Take 2 capsules by mouth 2 (Two) Times a Day.    Milly Harris MD   omeprazole (priLOSEC) 40 MG capsule Take 1 capsule by mouth Daily. 3/15/24   Camryn Salinas MD   pioglitazone (ACTOS) 30 MG tablet Take 1 tablet by mouth Daily. 5/5/23   Camryn Salinas MD       Allergies:     Patient has no known allergies.    Scheduled Meds:amLODIPine, 5 mg, Oral, Q24H  atorvastatin, 40 mg, Oral, Nightly  carvedilol, 3.125 mg, Oral, BID With Meals  clopidogrel, 75 mg, Oral, Daily  furosemide, 40 mg, Intravenous, Q12H  insulin lispro, 2-7 Units, Subcutaneous, 4x Daily AC & at Bedtime  ipratropium-albuterol, 3 mL,  "Nebulization, 4x Daily - RT  losartan, 50 mg, Oral, Daily  methylPREDNISolone sodium succinate, 60 mg, Intravenous, Q12H  montelukast, 10 mg, Oral, Nightly  [START ON 4/19/2024] pantoprazole, 40 mg, Oral, Q AM  senna-docusate sodium, 2 tablet, Oral, BID  sodium chloride, 10 mL, Intravenous, Q12H      Continuous Infusions:heparin, 12 Units/kg/hr, Last Rate: 12 Units/kg/hr (04/18/24 1352)      PRN Meds:  acetaminophen    senna-docusate sodium **AND** polyethylene glycol **AND** bisacodyl **AND** bisacodyl    Calcium Replacement - Follow Nurse / BPA Driven Protocol    dextrose    dextrose    glucagon (human recombinant)    heparin    heparin    HYDROcodone-acetaminophen    Magnesium Standard Dose Replacement - Follow Nurse / BPA Driven Protocol    nitroglycerin    Phosphorus Replacement - Follow Nurse / BPA Driven Protocol    Potassium Replacement - Follow Nurse / BPA Driven Protocol    sodium chloride    sodium chloride      OBJECTIVE    Vital Signs  Vitals:    04/18/24 1301   BP: 158/86   BP Location: Left arm   Patient Position: Lying   Pulse: 88   Resp: 27   Temp: 97.5 °F (36.4 °C)   TempSrc: Axillary   SpO2: 98%   Weight: 95.3 kg (210 lb 1.6 oz)   Height: 167.6 cm (66\")       Flowsheet Rows      Flowsheet Row First Filed Value   Admission Height 167.6 cm (66\") Documented at 04/18/2024 1301   Admission Weight 95.3 kg (210 lb 1.6 oz) Documented at 04/18/2024 1301            No intake or output data in the 24 hours ending 04/18/24 1401     Telemetry:  SR    Physical Exam:  The patient is alert, oriented and in no distress.  Vital signs as noted above.  Head and neck revealed no carotid bruits or jugular venous distention.  No thyromegaly or lymphadenopathy is present  Lungs clear. Diffuse wheezing  Heart: Normal first and second heart sounds. No murmur.  No precordial rub is present.  No gallop is present.  Abdomen: Soft and nontender.  No organomegaly is present.  Extremities with good peripheral pulses with trace " edema  Skin: Warm and dry.  Musculoskeletal system is grossly normal.  CNS grossly normal.       Results Review:  I have personally reviewed the results from the time of this admission to 4/18/2024 14:01 EDT and agree with these findings:  []  Laboratory  []  Microbiology  []  Radiology  []  EKG/Telemetry   []  Cardiology/Vascular   []  Pathology  []  Old records  []  Other:    Most notable findings include:     Lab Results (last 24 hours)       Procedure Component Value Units Date/Time    Magnesium [[id number]] Updated: 04/18/24 1359    Specimen: Blood     High Sensitivity Troponin T [[id number]] Updated: 04/18/24 1359    Specimen: Blood     Basic Metabolic Panel [[id number]] Updated: 04/18/24 1358    Specimen: Blood     CBC & Differential [[id number]]  (Abnormal) Collected: 04/18/24 1343    Specimen: Blood Updated: 04/18/24 1354    Narrative:      The following orders were created for panel order CBC & Differential.  Procedure                               Abnormality         Status                     ---------                               -----------         ------                     CBC Auto Differential[[id number]]        Abnormal            Final result                 Please view results for these tests on the individual orders.    CBC Auto Differential [[id number]]  (Abnormal) Collected: 04/18/24 1343    Specimen: Blood Updated: 04/18/24 1354     WBC 7.67 10*3/mm3      RBC 3.98 10*6/mm3      Hemoglobin 10.4 g/dL      Hematocrit 34.0 %      MCV 85.4 fL      MCH 26.1 pg      MCHC 30.6 g/dL      RDW 15.9 %      RDW-SD 49.6 fl      MPV 11.7 fL      Platelets 256 10*3/mm3      Neutrophil % 90.9 %      Lymphocyte % 4.7 %      Monocyte % 2.9 %      Eosinophil % 0.1 %      Basophil % 0.1 %      Immature Grans % 1.3 %      Neutrophils, Absolute 6.97 10*3/mm3      Lymphocytes, Absolute 0.36 10*3/mm3      Monocytes, Absolute 0.22 10*3/mm3      Eosinophils, Absolute 0.01 10*3/mm3      Basophils, Absolute 0.01 10*3/mm3       Immature Grans, Absolute 0.10 10*3/mm3      nRBC 0.0 /100 WBC     Protime-INR [[id number]] Collected: 04/18/24 1351    Specimen: Blood Updated: 04/18/24 1353    aPTT [[id number]] Collected: 04/18/24 1351    Specimen: Blood Updated: 04/18/24 1353    Blood Gas, Arterial - [[id number]]  (Abnormal) Collected: 04/18/24 1329    Specimen: Arterial Blood Updated: 04/18/24 1332     Site Right Radial     Festus's Test Positive     pH, Arterial 7.362 pH units      pCO2, Arterial 36.9 mm Hg      pO2, Arterial 121.5 mm Hg      HCO3, Arterial 20.9 mmol/L      Base Excess, Arterial -4.0 mmol/L      Comment: Serial Number: [id number]:  [id number]        O2 Saturation, Arterial 98.6 %      CO2 Content 22.1 mmol/L      Barometric Pressure for Blood Gas --     Comment: N/A        Modality NRB     FIO2 100 %      Hemodilution No            Imaging Results (Last 24 Hours)       Procedure Component Value Units Date/Time    XR Chest 1 View [[id number]] Collected: 04/18/24 1346     Updated: 04/18/24 1351    Narrative:      XR CHEST 1 VW    Date of Exam: 4/18/2024 1:30 PM EDT    Indication: Chest pain    Comparison: AP chest x-ray 4/18/2024 at 9:00 a.m.    Findings:  Compared to today's earlier chest x-ray, there are lower lung volumes with increased airspace opacities in both lower lungs. No pneumothorax is seen. Cardiac silhouette is enlarged, but is exaggerated by portable AP technique.      Impression:      Impression:  Lower lung volumes with increased airspace opacities in both lower lungs, which may be due to atelectasis, pulmonary edema, or less likely pneumonia.      Electronically Signed: Lisa Lan    4/18/2024 1:49 PM EDT    Workstation ID: [id number]            LAB RESULTS (LAST 7 DAYS)    CBC  Results from last 7 days   Lab Units 04/18/24  1343   WBC 10*3/mm3 7.67   RBC 10*6/mm3 3.98*   HEMOGLOBIN g/dL 10.4*   HEMATOCRIT % 34.0*   MCV fL 85.4   PLATELETS 10*3/mm3 256       BMP        CMP         BNP        TROPONIN        CoAg         Creatinine Clearance  CrCl cannot be calculated (Patient's most recent lab result is older than the maximum 30 days allowed.).    ABG  Results from last 7 days   Lab Units 04/18/24  1329   PH, ARTERIAL pH units 7.362   PCO2, ARTERIAL mm Hg 36.9   PO2 ART mm Hg 121.5*   O2 SATURATION ART % 98.6*   BASE EXCESS ART mmol/L -4.0*         Radiology  XR Chest 1 View    Result Date: 4/18/2024  Impression: Lower lung volumes with increased airspace opacities in both lower lungs, which may be due to atelectasis, pulmonary edema, or less likely pneumonia. Electronically Signed: Lisa Lan  4/18/2024 1:49 PM EDT  Workstation ID: [id number]       EKG  I personally viewed and interpreted the patient's EKG/Telemetry data:  ECG 12 Lead Chest Pain   Preliminary Result   HEART RATE= 71  bpm   RR Interval= 844  ms   MD Interval= 255  ms   P Horizontal Axis= -27  deg   P Front Axis= 61  deg   QRSD Interval= 100  ms   QT Interval= 387  ms   QTcB= 421  ms   QRS Axis= -42  deg   T Wave Axis= 107  deg   - ABNORMAL ECG -   Sinus arrhythmia   Prolonged MD interval   Incomplete RBBB and LAFB   Anterior infarct, age indeterminate   When compared with ECG of 08-Aug-2020 12:07:23,   New or worsened ischemia or infarction   Electronically Signed By:    Date and Time of Study: 2024-04-18 13:40:52            Echocardiogram:          Stress Test:        Cardiac Catheterization:  No results found for this or any previous visit.        Other:      ASSESSMENT & PLAN:    Principal Problem:    NSTEMI (non-ST elevated myocardial infarction)    Indeterminate troponin elevation  Periods of chest tightness/mostly with cough  Repeat troponin pending  Serial troponins to be obtained  Started on IV heparin at outlying  facility  Currently pain free    SOA  Likely multifactorial  Respiratory Insufficiency/AECOPD  Respiratory virus panel positive for human metapneumovirus  Pulmonary consulted  Continue Antbiotics, steroids, bronchodilators  CXR with some  pulmonary edema  Elevated pro BNP  Echo pending  IV diuretics ordered    HTN  Continue home medications  Will monitor    Dyslipidemia  Continue statin    Type 2 DM  Continue Accu checks SSI    EN  Cr 1.6  Nephrology consulted          Dr. Lorenzo to review echocardiogram  Recommend aggressive treatment for AECOPD  Consider ischemic evaluation in future when respiratory status stabilizes  Additional recommendations per Dr. Lorenzo     Patient is seen and examined and findings are verified.  All data is reviewed by me personally.  Assessment and plan formulated by APC was done after discussion with attending.  I spent more than 50% of time in taking care of the patient.    Patient is presented with shortness of breath and bilateral leg edema.  Patient also had cough and expectoration    Hemodynamics are stable    Normal S1 and S2.  No pericardial rub or murmur.  Expiratory rhonchi noted bilateral leg edema present    MDM:    1.  Shortness of breath:    Patient has shortness of breath.  I suspect this is probably due to primarily upper respiratory tract infection and bronchitis and possible pneumonia.  However patient is definitely volume overloaded.  I would recommend to continue gentle diuresis.    2.  Congestive heart failure acute diastolic:    Patient definitely has volume overload and congestive heart failure I would continue diuresis.  I would increase Lasix to 40 mg twice daily.    3.  Chest discomfort:    Patient did have some chest discomfort but no significant pain prior to this illness.  Patient echocardiogram showed wall motion abnormality.  Patient would need cardiac catheterization.  However his creatinine is 1.6.  I would recommend that patient should have renal consultation.  Mucomyst can be started    4.  Elevated troponin:    Patient will need cardiac catheterization once patient is stabilized.    5.  Cardiomyopathy:    This may be ischemic.  I would start carvedilol.  I would consider vasodilator  subsequently.  Patient will need cardiac catheterization.    Electronically signed by Santiago Lorenzo MD, 04/18/24, 7:03 PM EDT.            JAX العراقي  04/18/24  14:01 EDT
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
## 2024-04-18 NOTE — SIGNIFICANT NOTE
04/18/24 1549   OTHER   Discipline physical therapist   Rehab Time/Intention   Session Not Performed other (see comments)  (RN requests to hold therapy eval, cardiolgy consult pending and possible heart cath tomorrow)   Recommendation   PT - Next Appointment 04/19/24

## 2024-04-18 NOTE — CONSULTS
NEPHROLOGY CONSULTATION-----[facility]    [facility]  [phone]  Ashwin Butcher MD    Patient Care Team:  Camryn Salinas MD as PCP - General (Family Medicine)  Ashwin Butcher MD as Consulting Physician (Nephrology)    CC/REASON FOR CONSULTATION: Elevated creatinine    PHYSICIAN REQUESTING CONSULTATION: Dr. Sesay    History of Present Illness  69-year-old male with past medical history of COPD, hypertension, nephrolithiasis, hyperlipidemia, diabetes presented to hospital on 4/18/2024 with complaints of shortness of breath.  He was transferred from [facility].  Patient had chest pain, wheezing and shortness of breath.  His creatinine is elevated 1.6.  Previously creatinine normal.  He had left-sided obstructing kidney stone several years ago.  No vomiting or diarrhea.  Currently has a Rasmussen catheter in place.  He was on lisinopril/hydrochlorothiazide/omeprazole prior to admission.  He was also on metformin prior to admission.  BNP elevated at 10,000.  He received IV diuretics.    Review of Systems   As noted above otherwise 10 systems reviewed and were negative.    Past Medical History:   Diagnosis Date    Anxiety     Essential hypertension, benign     Gastroesophageal reflux disease without esophagitis     Kidney stones 05/31/2020    Dr. Kahlil Terrell    Mixed hyperlipidemia     Rotator cuff syndrome     Torn rotator about seven years ago    Seasonal allergic rhinitis due to pollen     Type 2 diabetes mellitus without complication, without long-term current use of insulin        Past Surgical History:   Procedure Laterality Date    CATARACT EXTRACTION Left 08/24/2023    Dr Melendez    CATARACT EXTRACTION Right 09/07/2023    Dr Melendez    CYSTOSCOPY W/ LASER LITHOTRIPSY  01/2021    INGUINAL HERNIA REPAIR Left 10/2009    NOSE SURGERY      x2    UMBILICAL HERNIA REPAIR  10/2009    Dr. Napier       Family History   Problem Relation Age of  Onset    Heart disease Mother     Uterine cancer Mother     Thyroid disease Mother     Cancer Mother         Uterus?    Heart disease Father     Stroke Father     COPD Brother     Diabetes Brother     Breast cancer Maternal Aunt     Breast cancer Maternal Grandmother     Diabetes Brother        Social History     Tobacco Use    Smoking status: Never     Passive exposure: Never    Smokeless tobacco: Never    Tobacco comments:     Family members smoked   Vaping Use    Vaping status: Never Used   Substance Use Topics    Alcohol use: Never    Drug use: Never       Home Meds:   Facility-Administered Medications Prior to Admission   Medication Dose Route Frequency Provider Last Rate Last Admin    Allergy Serum Injection  0.1 mL Subcutaneous Once Brad, Camryn GOLD MD         Medications Prior to Admission   Medication Sig Dispense Refill Last Dose    amLODIPine (NORVASC) 5 MG tablet Take 1 tablet by mouth Daily. 90 tablet 3 4/18/2024    aspirin (aspirin) 81 MG EC tablet Take 1 tablet by mouth Daily.   4/18/2024    atorvastatin (LIPITOR) 10 MG tablet Take 1 tablet by mouth once daily 90 tablet 3 4/18/2024    citalopram (CeleXA) 20 MG tablet Take 1 tablet by mouth once daily 90 tablet 3 4/18/2024    doxazosin (CARDURA) 1 MG tablet Take 1 tablet by mouth once daily 90 tablet 3 4/18/2024    fenofibrate 160 MG tablet Take 1 tablet by mouth once daily 90 tablet 0 4/18/2024    lisinopril-hydrochlorothiazide (PRINZIDE,ZESTORETIC) 20-12.5 MG per tablet Take 1 tablet by mouth once daily 90 tablet 3 4/18/2024    metFORMIN (GLUCOPHAGE) 1000 MG tablet TAKE 1 TABLET BY MOUTH TWICE DAILY WITH MEALS 60 tablet 12 4/18/2024    montelukast (SINGULAIR) 10 MG tablet Take 1 tablet by mouth Daily. 90 tablet 3 4/18/2024    Multiple Vitamins-Minerals (MULTIVITAMIN ADULT PO) Take 1 tablet by mouth Daily.   4/18/2024    multivitamin (THERAGRAN) tablet tablet Take 1 tablet by mouth Daily.   4/18/2024    Omega-3 Fatty Acids (FISH OIL) 1000 MG capsule  delayed-release Take 2 capsules by mouth 2 (Two) Times a Day.   4/18/2024    omeprazole (priLOSEC) 40 MG capsule Take 1 capsule by mouth Daily. 30 capsule 12 4/18/2024    pioglitazone (ACTOS) 30 MG tablet Take 1 tablet by mouth Daily. 30 tablet 12 4/18/2024    azelastine (ASTELIN) 0.1 % nasal spray 2 sprays into the nostril(s) as directed by provider 2 (Two) Times a Day. Use in each nostril as directed 30 mL 5 Unknown       Scheduled Meds:  [START ON 4/19/2024] amLODIPine, 5 mg, Oral, Q24H  atorvastatin, 40 mg, Oral, Nightly  budesonide, 0.5 mg, Nebulization, BID - RT  carvedilol, 3.125 mg, Oral, BID With Meals  clopidogrel, 75 mg, Oral, Daily  doxycycline, 100 mg, Intravenous, Q12H  furosemide, 40 mg, Intravenous, Q12H  [START ON 4/19/2024] lisinopril, 20 mg, Oral, Q24H   And  [START ON 4/19/2024] hydroCHLOROthiazide, 12.5 mg, Oral, Daily  insulin lispro, 2-7 Units, Subcutaneous, 4x Daily AC & at Bedtime  ipratropium-albuterol, 3 mL, Nebulization, 4x Daily - RT  methylPREDNISolone sodium succinate, 60 mg, Intravenous, Q12H  montelukast, 10 mg, Oral, Nightly  [START ON 4/19/2024] pantoprazole, 40 mg, Oral, Q AM  senna-docusate sodium, 2 tablet, Oral, BID  sodium chloride, 10 mL, Intravenous, Q12H        Continuous Infusions:  heparin, 12 Units/kg/hr, Last Rate: 15 Units/kg/hr (04/18/24 4508)        PRN Meds:    acetaminophen    senna-docusate sodium **AND** polyethylene glycol **AND** bisacodyl **AND** bisacodyl    Calcium Replacement - Follow Nurse / BPA Driven Protocol    dextrose    dextrose    glucagon (human recombinant)    heparin    heparin    HYDROcodone-acetaminophen    Magnesium Standard Dose Replacement - Follow Nurse / BPA Driven Protocol    nitroglycerin    Phosphorus Replacement - Follow Nurse / BPA Driven Protocol    Potassium Replacement - Follow Nurse / BPA Driven Protocol    sodium chloride    sodium chloride    Allergies:  Patient has no known allergies.    OBJECTIVE    Vital Signs  Temp:  [97.5  "°F (36.4 °C)-98.2 °F (36.8 °C)] 98.2 °F (36.8 °C)  Heart Rate:  [74-88] 74  Resp:  [22-27] 24  BP: (140-158)/(75-86) 140/75    I/O this shift:  In: 20 [Other:20]  Out: -   No intake/output data recorded.    Physical Exam:  General Appearance: alert, appears stated age and cooperative  Head: normocephalic, without obvious abnormality and atraumatic  Eyes: conjunctivae and sclerae normal and no icterus  Neck: supple and no JVD  Lungs: Bilateral expiratory wheezing present.  Heart: regular rhythm & normal rate and normal S1, S2  Chest Wall: no abnormalities observed  Abdomen: normal bowel sounds and soft, nontender  Extremities: moves extremities well, no edema, no cyanosis  Skin: no bleeding, bruising or rash  Neurologic: Alert, and oriented. No focal deficits    Results Review:    I reviewed the patient's new clinical results.    WBC WBC   Date Value Ref Range Status   04/18/2024 7.67 3.40 - 10.80 10*3/mm3 Final      HGB Hemoglobin   Date Value Ref Range Status   04/18/2024 10.4 (L) 13.0 - 17.7 g/dL Final      HCT Hematocrit   Date Value Ref Range Status   04/18/2024 34.0 (L) 37.5 - 51.0 % Final      Platelets No results found for: \"LABPLAT\"   MCV MCV   Date Value Ref Range Status   04/18/2024 85.4 79.0 - 97.0 fL Final          Sodium Sodium   Date Value Ref Range Status   04/18/2024 143 136 - 145 mmol/L Final      Potassium Potassium   Date Value Ref Range Status   04/18/2024 4.0 3.5 - 5.2 mmol/L Final      Chloride Chloride   Date Value Ref Range Status   04/18/2024 107 98 - 107 mmol/L Final      CO2 CO2   Date Value Ref Range Status   04/18/2024 20.0 (L) 22.0 - 29.0 mmol/L Final      BUN BUN   Date Value Ref Range Status   04/18/2024 65 (H) 8 - 23 mg/dL Final      Creatinine Creatinine   Date Value Ref Range Status   04/18/2024 1.68 (H) 0.76 - 1.27 mg/dL Final      Calcium Calcium   Date Value Ref Range Status   04/18/2024 9.4 8.6 - 10.5 mg/dL Final      PO4 No results found for: \"CAPO4\"   Albumin No results found " "for: \"ALBUMIN\"   Magnesium Magnesium   Date Value Ref Range Status   04/18/2024 2.0 1.6 - 2.4 mg/dL Final      Uric Acid No results found for: \"URICACID\"       Imaging Results (Last 72 Hours)       Procedure Component Value Units Date/Time    XR Chest 1 View [[id number]] Collected: 04/18/24 1346     Updated: 04/18/24 1351    Narrative:      XR CHEST 1 VW    Date of Exam: 4/18/2024 1:30 PM EDT    Indication: Chest pain    Comparison: AP chest x-ray 4/18/2024 at 9:00 a.m.    Findings:  Compared to today's earlier chest x-ray, there are lower lung volumes with increased airspace opacities in both lower lungs. No pneumothorax is seen. Cardiac silhouette is enlarged, but is exaggerated by portable AP technique.      Impression:      Impression:  Lower lung volumes with increased airspace opacities in both lower lungs, which may be due to atelectasis, pulmonary edema, or less likely pneumonia.      Electronically Signed: Lisa Lan    4/18/2024 1:49 PM EDT    Workstation ID: [id number]              Results for orders placed during the hospital encounter of 04/18/24    XR Chest 1 View    Narrative  XR CHEST 1 VW    Date of Exam: 4/18/2024 1:30 PM EDT    Indication: Chest pain    Comparison: AP chest x-ray 4/18/2024 at 9:00 a.m.    Findings:  Compared to today's earlier chest x-ray, there are lower lung volumes with increased airspace opacities in both lower lungs. No pneumothorax is seen. Cardiac silhouette is enlarged, but is exaggerated by portable AP technique.    Impression  Impression:  Lower lung volumes with increased airspace opacities in both lower lungs, which may be due to atelectasis, pulmonary edema, or less likely pneumonia.      Electronically Signed: Lisa Lan  4/18/2024 1:49 PM EDT  Workstation ID: [id number]      Results for orders placed in visit on 02/23/24    XR Shoulder 2+ View Right    Narrative  DATE OF EXAM:  2/23/2024    PROCEDURE:  XR shoulder 2+ view right    INDICATIONS:  Chronic right " shoulder pain    COMPARISON:  No Comparisons Available    FINDINGS:  No acute fracture is seen. Mineralization is within normal limits.  The humeral head appears to be well-seated within the glenoid fossa.  There is mild glenohumeral and acromioclavicular joint space narrowing consistent with mild/early osteoarthritic change.  No radiopaque foreign bodies.  Lung spaces are clear.    Impression  Mild acromioclavicular and glenohumeral osteoarthritic disease, otherwise no acute bony abnormality of the right shoulder.      Results for orders placed in visit on 08/04/23    XR Hand 3+ View Left    Narrative  XR HAND 3+ VW LEFT    Date of Exam: 8/4/2023 1:57 PM EDT    Indication: Left hand pain    Comparison: None available.    Findings:  There is no acute fracture. There is marked narrowing with joint space loss and bone-on-bone contact of the first metacarpal/carpal joint. There is subchondral sclerosis and spurring and slight radial subluxation of the base of the first metacarpal bone  with respect to the adjacent trapezium. There is narrowing and spurring of the IP joint of the left thumb, the first MCP joint, and several of the inner-phalangeal joints of the digits consistent with moderate and severe osteoarthritis. There are no  periarticular erosions. There is soft tissue swelling involving the digits in the area of prominent degenerative changes.    Impression  Impression:    1. No acute fracture.  2. Moderate and severe osteoarthritis as described.  3. Areas of soft tissue swelling involving the digits where the patient has prominent degenerative changes.      Electronically Signed: Lucio Pedraza  8/4/2023 3:11 PM EDT  Workstation ID: [id number]            ASSESSMENT / PLAN      NSTEMI (non-ST elevated myocardial infarction)      EN-undetermined etiology.  This might be cardiorenal in setting of fluid overload/ACE inhibitor and thiazide use.  Will check UA, renal ultrasound to exclude obstructive uropathy.  History  of nephrolithiasis-CT several years ago showed left-sided obstructive kidney stone  Hypertension  Hyperlipidemia  Type 2 diabetes  Hold metformin  COPD with exacerbation  Hypervolemia-on IV Lasix  NSTEMI-Per cardiology.    Check UA/renal ultrasound  Check urine eosinophils  Reduce Lasix to once a day  Hold metformin/lisinopril/thiazides  Further renal workup as deemed necessary      I discussed the patient's findings and my recommendations with patient, family, and consulting provider    Will follow along closely. Thank you for allowing us to see this patient in renal consultation.    [facility]  [phone]  MD Ashwin Horn MD  04/18/24  18:09 EDT

## 2024-04-18 NOTE — H&P
[facility]  History & Physical    Patient Name: Fransico Cuenca  : 1955  MRN: [id number]  Primary Care Physician:  Camryn Salinas MD  Date of admission: 2024  Date and Time of Service: 2024 at 1: 30 PM    Subjective      Chief Complaint: Chest pain and shortness of breath    History of Present Illness: Fransico Cuenca is a 69 y.o. male with a CMH of COPD, diabetes who presented to [facility] on 2024 with chest pain and shortness of breath.  The patient is a transfer from [facility].  The patient presented with chest pain, wheezing and shortness of breath and he was found to have elevated BNP, troponin and D-dimer.  CTA chest was not done due to increased creatinine and BUN.  The patient was started on heparin drip.  Dr. Lorenzo cardiology was contacted and recommended transfer and to continue heparin drip and to give aspirin  The patient was seen and evaluated today at bedside.  The patient said that his chest pain much improved.  The patient was on a nonrebreather.  Vitals were stable.  Family at bedside.  I answered all the patient's and the family questions    Review of Systems  Ten point ROS: reviewed negative, unless as noted in HPI.    Personal History     Past Medical History:   Diagnosis Date    Anxiety     Essential hypertension, benign     Gastroesophageal reflux disease without esophagitis     Kidney stones 2020    Dr. Kahlil Terrell    Mixed hyperlipidemia     Rotator cuff syndrome     Torn rotator about seven years ago    Seasonal allergic rhinitis due to pollen     Type 2 diabetes mellitus without complication, without long-term current use of insulin        Past Surgical History:   Procedure Laterality Date    CATARACT EXTRACTION Left 2023    Dr Melendez    CATARACT EXTRACTION Right 2023    Dr Melendez    CYSTOSCOPY W/ LASER LITHOTRIPSY  2021    INGUINAL HERNIA REPAIR Left 10/2009    NOSE SURGERY      x2    UMBILICAL HERNIA  REPAIR  10/2009    Dr. Napier       Family History: family history includes Breast cancer in his maternal aunt and maternal grandmother; COPD in his brother; Cancer in his mother; Diabetes in his brother and brother; Heart disease in his father and mother; Stroke in his father; Thyroid disease in his mother; Uterine cancer in his mother. Otherwise pertinent FHx was reviewed and not pertinent to current issue.    Social History:  reports that he has never smoked. He has never been exposed to tobacco smoke. He has never used smokeless tobacco. He reports that he does not drink alcohol and does not use drugs.    Home Medications:  Prior to Admission Medications       Prescriptions Last Dose Informant Patient Reported? Taking?    Allergy Serum Injection   No No    amLODIPine (NORVASC) 5 MG tablet   No No    Take 1 tablet by mouth Daily.    aspirin (aspirin) 81 MG EC tablet   Yes No    Take 1 tablet by mouth Daily.    atorvastatin (LIPITOR) 10 MG tablet   No No    Take 1 tablet by mouth once daily    azelastine (ASTELIN) 0.1 % nasal spray   No No    2 sprays into the nostril(s) as directed by provider 2 (Two) Times a Day. Use in each nostril as directed    citalopram (CeleXA) 20 MG tablet   No No    Take 1 tablet by mouth once daily    doxazosin (CARDURA) 1 MG tablet   No No    Take 1 tablet by mouth once daily    fenofibrate 160 MG tablet   No No    Take 1 tablet by mouth once daily    lisinopril-hydrochlorothiazide (PRINZIDE,ZESTORETIC) 20-12.5 MG per tablet   No No    Take 1 tablet by mouth once daily    metFORMIN (GLUCOPHAGE) 1000 MG tablet   No No    TAKE 1 TABLET BY MOUTH TWICE DAILY WITH MEALS    montelukast (SINGULAIR) 10 MG tablet   No No    Take 1 tablet by mouth Daily.    Multiple Vitamins-Minerals (MULTIVITAMIN ADULT PO)   Yes No    Take 1 tablet by mouth Daily.    multivitamin (THERAGRAN) tablet tablet   Yes No    Take 1 tablet by mouth Daily.    Omega-3 Fatty Acids (FISH OIL) 1000 MG capsule delayed-release    Yes No    Take 2 capsules by mouth 2 (Two) Times a Day.    omeprazole (priLOSEC) 40 MG capsule   No No    Take 1 capsule by mouth Daily.    pioglitazone (ACTOS) 30 MG tablet   No No    Take 1 tablet by mouth Daily.              Allergies:  No Known Allergies    Objective      Vitals:   Temp:  [97.5 °F (36.4 °C)] 97.5 °F (36.4 °C)  Heart Rate:  [88] 88  Resp:  [27] 27  BP: (158)/(86) 158/86  Body mass index is 33.91 kg/m².  Physical Exam    General: Well appearing, and in no distress..  Cardiovascular:  normal rate, , normal S1, S2, no murmurs.    +1 peripheral edema  Respiratory:  dimished breath sounds,  no wheezes, rales or rhonchi  Gastrointestinal: Soft, nontender, nondistended   Skin: Warm, Dry,  Neurologic: No focal findings or movement disorder noted.  Mental status: Alert, Oriented x3, Coherent,   Appropriate Affect, No agitation.     Diagnostic Data:  Lab Results (last 24 hours)       Procedure Component Value Units Date/Time    Magnesium [[id number]] Updated: 04/18/24 1359    Specimen: Blood     High Sensitivity Troponin T [[id number]] Updated: 04/18/24 1359    Specimen: Blood     Basic Metabolic Panel [[id number]] Updated: 04/18/24 1358    Specimen: Blood     CBC & Differential [[id number]]  (Abnormal) Collected: 04/18/24 1343    Specimen: Blood Updated: 04/18/24 1354    Narrative:      The following orders were created for panel order CBC & Differential.  Procedure                               Abnormality         Status                     ---------                               -----------         ------                     CBC Auto Differential[[id number]]        Abnormal            Final result                 Please view results for these tests on the individual orders.    CBC Auto Differential [[id number]]  (Abnormal) Collected: 04/18/24 1343    Specimen: Blood Updated: 04/18/24 1354     WBC 7.67 10*3/mm3      RBC 3.98 10*6/mm3      Hemoglobin 10.4 g/dL      Hematocrit 34.0 %      MCV 85.4 fL      MCH  26.1 pg      MCHC 30.6 g/dL      RDW 15.9 %      RDW-SD 49.6 fl      MPV 11.7 fL      Platelets 256 10*3/mm3      Neutrophil % 90.9 %      Lymphocyte % 4.7 %      Monocyte % 2.9 %      Eosinophil % 0.1 %      Basophil % 0.1 %      Immature Grans % 1.3 %      Neutrophils, Absolute 6.97 10*3/mm3      Lymphocytes, Absolute 0.36 10*3/mm3      Monocytes, Absolute 0.22 10*3/mm3      Eosinophils, Absolute 0.01 10*3/mm3      Basophils, Absolute 0.01 10*3/mm3      Immature Grans, Absolute 0.10 10*3/mm3      nRBC 0.0 /100 WBC     Protime-INR [[id number]] Collected: 04/18/24 1351    Specimen: Blood Updated: 04/18/24 1353    aPTT [[id number]] Collected: 04/18/24 1351    Specimen: Blood Updated: 04/18/24 1353    Blood Gas, Arterial - [[id number]]  (Abnormal) Collected: 04/18/24 1329    Specimen: Arterial Blood Updated: 04/18/24 1332     Site Right Radial     Festus's Test Positive     pH, Arterial 7.362 pH units      pCO2, Arterial 36.9 mm Hg      pO2, Arterial 121.5 mm Hg      HCO3, Arterial 20.9 mmol/L      Base Excess, Arterial -4.0 mmol/L      Comment: Serial Number: [id number]:  [id number]        O2 Saturation, Arterial 98.6 %      CO2 Content 22.1 mmol/L      Barometric Pressure for Blood Gas --     Comment: N/A        Modality NRB     FIO2 100 %      Hemodilution No             Imaging Results (Last 24 Hours)       Procedure Component Value Units Date/Time    XR Chest 1 View [[id number]] Collected: 04/18/24 1346     Updated: 04/18/24 1351    Narrative:      XR CHEST 1 VW    Date of Exam: 4/18/2024 1:30 PM EDT    Indication: Chest pain    Comparison: AP chest x-ray 4/18/2024 at 9:00 a.m.    Findings:  Compared to today's earlier chest x-ray, there are lower lung volumes with increased airspace opacities in both lower lungs. No pneumothorax is seen. Cardiac silhouette is enlarged, but is exaggerated by portable AP technique.      Impression:      Impression:  Lower lung volumes with increased airspace opacities in both lower  lungs, which may be due to atelectasis, pulmonary edema, or less likely pneumonia.      Electronically Signed: Lisa Lan    4/18/2024 1:49 PM EDT    Workstation ID: [id number]              Assessment & Plan        This is a 69 y.o. male with:    Active and Resolved Problems  Active Hospital Problems    Diagnosis  POA    **NSTEMI (non-ST elevated myocardial infarction) [I21.4]  Yes      Resolved Hospital Problems   No resolved problems to display.       NSTEMI  Initial troponin 0.33.  Echo was ordered  Patient was started on heparin drip   Telemetry monitoring    Serial troponin level and repeat EKG    Cardiology consult. Follows with Dr. Lorenzo   Pending labs for further risk stratification    Antiplatelet/BB/Statin/sl Nitro on medication regimen.    Shortness of breath  Elevated D-dimer  CTA could not be done due to worsening kidney function  Chest x-ray with cardiomegaly and diffuse interstitial thickening and scattered low-grade airspace opacity suggestive of mild to moderate pulmonary edema, multifocal pneumonia is possible  The patient is on nonrebreather  Solu-Medrol, DuoNeb  Oxygen as needed  Pulmonary consulted    EN  Creatinine 1.6  BUN 62  Unknown baseline  IV hydration and nephrology consult    HTN  continue to monitor  continue home medication    Hyperlipidemia  continue home meds    DMII    Monitor FSBS and cover with  SSI  Hold PO medications while inpatient          DVT prophylaxis:  Medical DVT prophylaxis orders are present.        The patient desires to be as follows:    CODE STATUS:    Level Of Support Discussed With: Patient  Code Status (Patient has no pulse and is not breathing): CPR (Attempt to Resuscitate)  Medical Interventions (Patient has pulse or is breathing): Full Support            Admission Status:  I believe this patient meets inpatient status.    Expected Length of Stay: 2 to 3 days    PDMP and Medication Dispenses via Sidebar reviewed and consistent with patient reported  medications.    I discussed the patient's findings and my recommendations with patient and family.      Signature:     This document has been electronically signed by Kush Sesay MD on April 18, 2024 14:05 EDT   [facility] Team

## 2024-04-19 LAB
ALBUMIN SERPL-MCNC: 3.2 G/DL (ref 3.5–5.2)
ANION GAP SERPL CALCULATED.3IONS-SCNC: 13 MMOL/L (ref 5–15)
APTT PPP: 32.4 SECONDS (ref 61–76.5)
APTT PPP: 35 SECONDS (ref 61–76.5)
ARTERIAL PATENCY WRIST A: POSITIVE
ATMOSPHERIC PRESS: ABNORMAL MM[HG]
BASE EXCESS BLDA CALC-SCNC: -3.9 MMOL/L (ref 0–3)
BDY SITE: ABNORMAL
BUN SERPL-MCNC: 72 MG/DL (ref 8–23)
BUN/CREAT SERPL: 39.3 (ref 7–25)
CALCIUM SPEC-SCNC: 8 MG/DL (ref 8.6–10.5)
CHLORIDE SERPL-SCNC: 110 MMOL/L (ref 98–107)
CK SERPL-CCNC: 277 U/L (ref 20–200)
CO2 BLDA-SCNC: 21.6 MMOL/L (ref 22–29)
CO2 SERPL-SCNC: 20 MMOL/L (ref 22–29)
CREAT SERPL-MCNC: 1.83 MG/DL (ref 0.76–1.27)
EGFRCR SERPLBLD CKD-EPI 2021: 39.2 ML/MIN/1.73
GLUCOSE BLDC GLUCOMTR-MCNC: 135 MG/DL (ref 70–105)
GLUCOSE BLDC GLUCOMTR-MCNC: 177 MG/DL (ref 70–105)
GLUCOSE BLDC GLUCOMTR-MCNC: 184 MG/DL (ref 70–105)
GLUCOSE BLDC GLUCOMTR-MCNC: 209 MG/DL (ref 70–105)
GLUCOSE SERPL-MCNC: 158 MG/DL (ref 65–99)
HCO3 BLDA-SCNC: 20.6 MMOL/L (ref 21–28)
HEMODILUTION: NO
INHALED O2 CONCENTRATION: 36 %
MAGNESIUM SERPL-MCNC: 2.3 MG/DL (ref 1.6–2.4)
MODALITY: ABNORMAL
PCO2 BLDA: 34.4 MM HG (ref 35–48)
PH BLDA: 7.38 PH UNITS (ref 7.35–7.45)
PHOSPHATE SERPL-MCNC: 3.8 MG/DL (ref 2.5–4.5)
PO2 BLDA: 74.7 MM HG (ref 83–108)
POTASSIUM SERPL-SCNC: 3.9 MMOL/L (ref 3.5–5.2)
SAO2 % BLDCOA: 94.8 % (ref 94–98)
SODIUM SERPL-SCNC: 143 MMOL/L (ref 136–145)
URATE SERPL-MCNC: 11.4 MG/DL (ref 3.4–7)

## 2024-04-19 PROCEDURE — 94799 UNLISTED PULMONARY SVC/PX: CPT

## 2024-04-19 PROCEDURE — 36600 WITHDRAWAL OF ARTERIAL BLOOD: CPT

## 2024-04-19 PROCEDURE — 85730 THROMBOPLASTIN TIME PARTIAL: CPT | Performed by: INTERNAL MEDICINE

## 2024-04-19 PROCEDURE — 82803 BLOOD GASES ANY COMBINATION: CPT

## 2024-04-19 PROCEDURE — 94664 DEMO&/EVAL PT USE INHALER: CPT

## 2024-04-19 PROCEDURE — 63710000001 INSULIN LISPRO (HUMAN) PER 5 UNITS: Performed by: INTERNAL MEDICINE

## 2024-04-19 PROCEDURE — 82948 REAGENT STRIP/BLOOD GLUCOSE: CPT

## 2024-04-19 PROCEDURE — 84550 ASSAY OF BLOOD/URIC ACID: CPT | Performed by: INTERNAL MEDICINE

## 2024-04-19 PROCEDURE — 82550 ASSAY OF CK (CPK): CPT | Performed by: INTERNAL MEDICINE

## 2024-04-19 PROCEDURE — 82948 REAGENT STRIP/BLOOD GLUCOSE: CPT | Performed by: INTERNAL MEDICINE

## 2024-04-19 PROCEDURE — 99232 SBSQ HOSP IP/OBS MODERATE 35: CPT | Performed by: NURSE PRACTITIONER

## 2024-04-19 PROCEDURE — 25010000002 METHYLPREDNISOLONE PER 125 MG: Performed by: INTERNAL MEDICINE

## 2024-04-19 PROCEDURE — 83735 ASSAY OF MAGNESIUM: CPT | Performed by: INTERNAL MEDICINE

## 2024-04-19 PROCEDURE — 25010000002 HEPARIN (PORCINE) 25000-0.45 UT/250ML-% SOLUTION: Performed by: INTERNAL MEDICINE

## 2024-04-19 PROCEDURE — 25010000002 FUROSEMIDE PER 20 MG: Performed by: INTERNAL MEDICINE

## 2024-04-19 PROCEDURE — 80069 RENAL FUNCTION PANEL: CPT | Performed by: INTERNAL MEDICINE

## 2024-04-19 PROCEDURE — 94761 N-INVAS EAR/PLS OXIMETRY MLT: CPT

## 2024-04-19 RX ADMIN — HEPARIN SODIUM 12 UNITS/KG/HR: 10000 INJECTION, SOLUTION INTRAVENOUS at 06:23

## 2024-04-19 RX ADMIN — Medication 10 ML: at 20:55

## 2024-04-19 RX ADMIN — IPRATROPIUM BROMIDE AND ALBUTEROL SULFATE 3 ML: .5; 3 SOLUTION RESPIRATORY (INHALATION) at 19:52

## 2024-04-19 RX ADMIN — INSULIN LISPRO 3 UNITS: 100 INJECTION, SOLUTION INTRAVENOUS; SUBCUTANEOUS at 18:01

## 2024-04-19 RX ADMIN — IPRATROPIUM BROMIDE AND ALBUTEROL SULFATE 3 ML: .5; 3 SOLUTION RESPIRATORY (INHALATION) at 11:43

## 2024-04-19 RX ADMIN — IPRATROPIUM BROMIDE AND ALBUTEROL SULFATE 3 ML: .5; 3 SOLUTION RESPIRATORY (INHALATION) at 08:02

## 2024-04-19 RX ADMIN — PANTOPRAZOLE SODIUM 40 MG: 40 TABLET, DELAYED RELEASE ORAL at 05:40

## 2024-04-19 RX ADMIN — INSULIN LISPRO 2 UNITS: 100 INJECTION, SOLUTION INTRAVENOUS; SUBCUTANEOUS at 20:54

## 2024-04-19 RX ADMIN — SALINE NASAL SPRAY 1 SPRAY: 1.5 SOLUTION NASAL at 01:22

## 2024-04-19 RX ADMIN — CARVEDILOL 3.12 MG: 3.12 TABLET, FILM COATED ORAL at 18:01

## 2024-04-19 RX ADMIN — DOXYCYCLINE 100 MG: 100 INJECTION, POWDER, LYOPHILIZED, FOR SOLUTION INTRAVENOUS at 05:41

## 2024-04-19 RX ADMIN — METHYLPREDNISOLONE SODIUM SUCCINATE 60 MG: 125 INJECTION, POWDER, FOR SOLUTION INTRAMUSCULAR; INTRAVENOUS at 18:02

## 2024-04-19 RX ADMIN — HEPARIN SODIUM 18 UNITS/KG/HR: 10000 INJECTION, SOLUTION INTRAVENOUS at 17:43

## 2024-04-19 RX ADMIN — Medication 10 ML: at 09:06

## 2024-04-19 RX ADMIN — CARVEDILOL 3.12 MG: 3.12 TABLET, FILM COATED ORAL at 09:05

## 2024-04-19 RX ADMIN — DOCUSATE SODIUM AND SENNOSIDES 2 TABLET: 8.6; 5 TABLET, FILM COATED ORAL at 20:54

## 2024-04-19 RX ADMIN — BUDESONIDE 0.5 MG: 0.5 INHALANT RESPIRATORY (INHALATION) at 19:57

## 2024-04-19 RX ADMIN — BUDESONIDE 0.5 MG: 0.5 INHALANT RESPIRATORY (INHALATION) at 08:12

## 2024-04-19 RX ADMIN — SALINE NASAL SPRAY 1 SPRAY: 1.5 SOLUTION NASAL at 20:54

## 2024-04-19 RX ADMIN — ATORVASTATIN CALCIUM 40 MG: 40 TABLET, FILM COATED ORAL at 20:54

## 2024-04-19 RX ADMIN — CLOPIDOGREL BISULFATE 75 MG: 75 TABLET ORAL at 09:06

## 2024-04-19 RX ADMIN — METHYLPREDNISOLONE SODIUM SUCCINATE 60 MG: 125 INJECTION, POWDER, FOR SOLUTION INTRAMUSCULAR; INTRAVENOUS at 05:40

## 2024-04-19 RX ADMIN — DOCUSATE SODIUM AND SENNOSIDES 2 TABLET: 8.6; 5 TABLET, FILM COATED ORAL at 09:06

## 2024-04-19 RX ADMIN — AMLODIPINE BESYLATE 5 MG: 5 TABLET ORAL at 09:06

## 2024-04-19 RX ADMIN — FUROSEMIDE 40 MG: 10 INJECTION, SOLUTION INTRAMUSCULAR; INTRAVENOUS at 05:40

## 2024-04-19 RX ADMIN — MONTELUKAST 10 MG: 10 TABLET, FILM COATED ORAL at 20:54

## 2024-04-19 NOTE — SIGNIFICANT NOTE
Nursing requests PT hold at this time - pt on heparin drip and awaiting further cardio consult. Will follow-up at later time/date as able/appropriate.

## 2024-04-19 NOTE — PROGRESS NOTES
[facility] MEDICINE SERVICE  DAILY PROGRESS NOTE    NAME: Fransico Cuenca  : 1955  MRN: [id number]      LOS: 1 day     PROVIDER OF SERVICE: Aristeo German MD    Chief Complaint: NSTEMI (non-ST elevated myocardial infarction)    Subjective:     Interval History: Patient doing better today, states his breathing is improved.  He does have some cough with mild congestion.  Denies any further chest pain.    Review of Systems:   Review of Systems    Objective:     Vital Signs  Temp:  [97.2 °F (36.2 °C)-98.2 °F (36.8 °C)] 97.2 °F (36.2 °C)  Heart Rate:  [53-84] 84  Resp:  [18-25] 23  BP: (121-150)/(70-79) 140/70  Flow (L/min):  [1.5-6] 1.5   Body mass index is 35.05 kg/m².    Physical Exam  Physical Exam  General Appearance:  Alert, cooperative, no distress, appears stated age  Head:  Normocephalic, without obvious abnormality, atraumatic  Eyes:  PERRL, conjunctiva/corneas clear, EOM's intact, fundi benign, both eyes  Ears:  Normal TM's and external ear canals, both ears  Nose: Nares normal, septum midline, mucosa normal, no drainage or sinus tenderness  Throat: Lips, mucosa, and tongue normal; teeth and gums normal  Neck: Supple, symmetrical, trachea midline, no adenopathy, thyroid: not enlarged, symmetric, no tenderness/mass/nodules, no carotid bruit or JVD  Lungs:   Clear to auscultation bilaterally, respirations unlabored  Heart:  Regular rate and rhythm, S1, S2 normal, no murmur, rub or gallop  Abdomen:  Soft, non-tender, bowel sounds active all four quadrants,  no masses, no organomegaly  Extremities: Extremities normal, atraumatic, no cyanosis or edema  Pulses: 2+ and symmetric  Skin: Skin color, texture, turgor normal, no rashes or lesions  Neurologic: Normal      Scheduled Meds   amLODIPine, 5 mg, Oral, Q24H  atorvastatin, 40 mg, Oral, Nightly  budesonide, 0.5 mg, Nebulization, BID - RT  carvedilol, 3.125 mg, Oral, BID With Meals  clopidogrel, 75 mg, Oral, Daily  insulin lispro, 2-7 Units,  Subcutaneous, 4x Daily AC & at Bedtime  ipratropium-albuterol, 3 mL, Nebulization, 4x Daily - RT  methylPREDNISolone sodium succinate, 60 mg, Intravenous, Q12H  montelukast, 10 mg, Oral, Nightly  pantoprazole, 40 mg, Oral, Q AM  senna-docusate sodium, 2 tablet, Oral, BID  sodium chloride, 10 mL, Intravenous, Q12H       PRN Meds     acetaminophen    senna-docusate sodium **AND** polyethylene glycol **AND** bisacodyl **AND** bisacodyl    Calcium Replacement - Follow Nurse / BPA Driven Protocol    dextrose    dextrose    glucagon (human recombinant)    heparin    heparin    HYDROcodone-acetaminophen    Magnesium Standard Dose Replacement - Follow Nurse / BPA Driven Protocol    nitroglycerin    Phosphorus Replacement - Follow Nurse / BPA Driven Protocol    Potassium Replacement - Follow Nurse / BPA Driven Protocol    sodium chloride    sodium chloride    sodium chloride   Infusions  heparin, 12 Units/kg/hr, Last Rate: 15 Units/kg/hr (04/19/24 0700)          Diagnostic Data    Results from last 7 days   Lab Units 04/19/24  0137 04/18/24  1513 04/18/24  1343   WBC 10*3/mm3  --   --  7.67   HEMOGLOBIN g/dL  --   --  10.4*   HEMATOCRIT %  --   --  34.0*   PLATELETS 10*3/mm3  --   --  256   GLUCOSE mg/dL 158*   < >  --    CREATININE mg/dL 1.83*   < >  --    BUN mg/dL 72*   < >  --    SODIUM mmol/L 143   < >  --    POTASSIUM mmol/L 3.9   < >  --    ANION GAP mmol/L 13.0   < >  --     < > = values in this interval not displayed.       US Renal Bilateral    Result Date: 4/19/2024  Impression: Unremarkable bilateral renal ultrasound. Electronically Signed: Sujatha Montoya MD  4/19/2024 7:23 AM EDT  Workstation ID: [id number]    Adult Transthoracic Echo Complete w/ Color, Spectral and Contrast if necessary per protocol    Addendum Date: 4/18/2024      Left ventricular systolic function is mildly decreased. Left ventricular ejection fraction appears to be 51 - 55%.   Left ventricular diastolic function was normal.   Estimated right  ventricular systolic pressure from tricuspid regurgitation is normal (<35 mmHg).   Hypokinesis of distal septum and apex noted     XR Chest 1 View    Result Date: 4/18/2024  Impression: Lower lung volumes with increased airspace opacities in both lower lungs, which may be due to atelectasis, pulmonary edema, or less likely pneumonia. Electronically Signed: Lisa Lan  4/18/2024 1:49 PM EDT  Workstation ID: [id number]       I reviewed the patient's new clinical results.    Assessment/Plan:     Active and Resolved Problems  Active Hospital Problems    Diagnosis  POA    **NSTEMI (non-ST elevated myocardial infarction) [I21.4]  Yes      Resolved Hospital Problems   No resolved problems to display.       NSTEMI  -Troponin is elevated over 500  -Continue IV heparin  -Plan for C likely after the weekend once patient is more hemodynamically stable and improved from a heart failure standpoint    Probable acute decompensated heart failure  -Patient had elevated BNP with pulmonary edema on imaging  -Continue diuresis  -RVP positive for human metapneumovirus which is likely also contributing to his dyspnea and may have also triggered heart failure exacerbation  -Echo pending  -Continue steroids, inhalers but discontinue doxycycline    Hypertension  -Continue home medications for BP control    Dyslipidemia  Continue statin therapy    DM type II, controlled  -Continue sliding scale insulin    EN  -Creatinine worse today  -Monitor in the setting of diuresis  -Nephrology consultation appreciated      DVT prophylaxis:  Medical DVT prophylaxis orders are present.         Code status is   Code Status and Medical Interventions:   Ordered at: 04/18/24 4681     Level Of Support Discussed With:    Patient     Code Status (Patient has no pulse and is not breathing):    CPR (Attempt to Resuscitate)     Medical Interventions (Patient has pulse or is breathing):    Full Support       Plan for disposition: Probable discharge on  4/23    Time: 30 minutes    Signature: Electronically signed by Aristeo German MD, 04/19/24, 13:39 EDT.  [facility] Hospitalist Team

## 2024-04-19 NOTE — PROGRESS NOTES
"PULMONARY CRITICAL CARE PROGRESS  NOTE      PATIENT IDENTIFICATION:  Name: Fransico Cuenca  MRN: [id number]  :  1955     Age: 69 y.o.  Sex: male    DATE OF Note:  2024   Referring Physician: Kush Sesay MD                  Subjective:   Feeling better, on O2, no SOB, no chest or abd pain, no bowel or bladder issues     Objective:  tMax 24 hrs: Temp (24hrs), Av.7 °F (36.5 °C), Min:97.2 °F (36.2 °C), Max:98.1 °F (36.7 °C)      Vitals Ranges:   Temp:  [97.2 °F (36.2 °C)-98.1 °F (36.7 °C)] 97.2 °F (36.2 °C)  Heart Rate:  [53-84] 84  Resp:  [18-25] 23  BP: (121-150)/(70-79) 140/70    Intake and Output Last 3 Shifts:   I/O last 3 completed shifts:  In: 260 [P.O.:240; Other:20]  Out: -     Exam:  /70 (BP Location: Left arm, Patient Position: Sitting)   Pulse 84   Temp 97.2 °F (36.2 °C) (Oral)   Resp 23   Ht 167.6 cm (66\")   Wt 98.5 kg (217 lb 2.5 oz) Comment: bed zeroed prior to weighing pt  SpO2 98%   BMI 35.05 kg/m²     General Appearance:     HEENT:  Normocephalic, without obvious abnormality. Conjunctivae/corneas clear.  Normal external ear canals. Nares normal, no drainage     Neck:  Supple, symmetrical, trachea midline. No JVD.  Lungs /Chest wall:   Bilateral basal rhonchi, respirations unlabored, symmetrical wall movement.     Heart:  Regular rate and rhythm, systolic murmur PMI left sternal border  Abdomen: Soft, nontender, no masses, no organomegaly.    Extremities: Trace edema, no clubbing or cyanosis        Medications:  amLODIPine, 5 mg, Oral, Q24H  atorvastatin, 40 mg, Oral, Nightly  budesonide, 0.5 mg, Nebulization, BID - RT  carvedilol, 3.125 mg, Oral, BID With Meals  clopidogrel, 75 mg, Oral, Daily  insulin lispro, 2-7 Units, Subcutaneous, 4x Daily AC & at Bedtime  ipratropium-albuterol, 3 mL, Nebulization, 4x Daily - RT  methylPREDNISolone sodium succinate, 60 mg, Intravenous, Q12H  montelukast, 10 mg, Oral, Nightly  pantoprazole, 40 mg, Oral, Q AM  senna-docusate sodium, " 2 tablet, Oral, BID  sodium chloride, 10 mL, Intravenous, Q12H        Infusion:  heparin, 12 Units/kg/hr, Last Rate: 18 Units/kg/hr (04/19/24 5778)         PRN:    acetaminophen    senna-docusate sodium **AND** polyethylene glycol **AND** bisacodyl **AND** bisacodyl    Calcium Replacement - Follow Nurse / BPA Driven Protocol    dextrose    dextrose    glucagon (human recombinant)    heparin    heparin    HYDROcodone-acetaminophen    Magnesium Standard Dose Replacement - Follow Nurse / BPA Driven Protocol    nitroglycerin    Phosphorus Replacement - Follow Nurse / BPA Driven Protocol    Potassium Replacement - Follow Nurse / BPA Driven Protocol    sodium chloride    sodium chloride    sodium chloride  Data Review:  All labs (24hrs):   Recent Results (from the past 24 hour(s))   Urinalysis With Microscopic If Indicated (No Culture) - Indwelling Urethral Catheter    Collection Time: 04/18/24  6:39 PM    Specimen: Indwelling Urethral Catheter; Urine   Result Value Ref Range    Color, UA Yellow Yellow, Straw    Appearance, UA Cloudy (A) Clear    pH, UA <=5.0 5.0 - 8.0    Specific Gravity, UA 1.011 1.005 - 1.030    Glucose, UA Negative Negative    Ketones, UA Negative Negative    Bilirubin, UA Negative Negative    Blood, UA Large (3+) (A) Negative    Protein,  mg/dL (2+) (A) Negative    Leuk Esterase, UA Small (1+) (A) Negative    Nitrite, UA Negative Negative    Urobilinogen, UA 0.2 E.U./dL 0.2 - 1.0 E.U./dL   Urinalysis, Microscopic Only - Indwelling Urethral Catheter    Collection Time: 04/18/24  6:39 PM    Specimen: Indwelling Urethral Catheter; Urine   Result Value Ref Range    RBC, UA Too Numerous to Count (A) None Seen, 0-2 /HPF    WBC, UA 6-10 (A) None Seen, 0-2 /HPF    Bacteria, UA 2+ (A) None Seen /HPF    Squamous Epithelial Cells, UA 3-6 (A) None Seen, 0-2 /HPF    Hyaline Casts, UA None Seen None Seen /LPF    Methodology Manual Light Microscopy    POC Glucose Once    Collection Time: 04/18/24  8:08 PM     Specimen: Blood   Result Value Ref Range    Glucose 188 (H) 70 - 105 mg/dL   High Sensitivity Troponin T    Collection Time: 04/18/24  8:48 PM    Specimen: Blood   Result Value Ref Range    HS Troponin T 503 (C) <22 ng/L   aPTT    Collection Time: 04/18/24 10:32 PM    Specimen: Arm, Left; Blood   Result Value Ref Range    PTT >139.0 (C) 61.0 - 76.5 seconds   Magnesium    Collection Time: 04/19/24  1:37 AM    Specimen: Arm, Left; Blood   Result Value Ref Range    Magnesium 2.3 1.6 - 2.4 mg/dL   Uric Acid    Collection Time: 04/19/24  1:37 AM    Specimen: Arm, Left; Blood   Result Value Ref Range    Uric Acid 11.4 (H) 3.4 - 7.0 mg/dL   Renal Function Panel    Collection Time: 04/19/24  1:37 AM    Specimen: Arm, Left; Blood   Result Value Ref Range    Glucose 158 (H) 65 - 99 mg/dL    BUN 72 (H) 8 - 23 mg/dL    Creatinine 1.83 (H) 0.76 - 1.27 mg/dL    Sodium 143 136 - 145 mmol/L    Potassium 3.9 3.5 - 5.2 mmol/L    Chloride 110 (H) 98 - 107 mmol/L    CO2 20.0 (L) 22.0 - 29.0 mmol/L    Calcium 8.0 (L) 8.6 - 10.5 mg/dL    Albumin 3.2 (L) 3.5 - 5.2 g/dL    Phosphorus 3.8 2.5 - 4.5 mg/dL    Anion Gap 13.0 5.0 - 15.0 mmol/L    BUN/Creatinine Ratio 39.3 (H) 7.0 - 25.0    eGFR 39.2 (L) >60.0 mL/min/1.73   CK    Collection Time: 04/19/24  1:37 AM    Specimen: Arm, Left; Blood   Result Value Ref Range    Creatine Kinase 277 (H) 20 - 200 U/L   Blood Gas, Arterial -    Collection Time: 04/19/24  4:14 AM    Specimen: Arterial Blood   Result Value Ref Range    Site Left Radial     Festus's Test Positive     pH, Arterial 7.384 7.350 - 7.450 pH units    pCO2, Arterial 34.4 (L) 35.0 - 48.0 mm Hg    pO2, Arterial 74.7 (L) 83.0 - 108.0 mm Hg    HCO3, Arterial 20.6 (L) 21.0 - 28.0 mmol/L    Base Excess, Arterial -3.9 (L) 0.0 - 3.0 mmol/L    O2 Saturation, Arterial 94.8 94.0 - 98.0 %    CO2 Content 21.6 (L) 22 - 29 mmol/L    Barometric Pressure for Blood Gas      Modality Cannula     FIO2 36 %    Hemodilution No    aPTT    Collection Time:  04/19/24  6:19 AM    Specimen: Blood   Result Value Ref Range    PTT 32.4 (L) 61.0 - 76.5 seconds   POC Glucose 4x Daily Before Meals & at Bedtime    Collection Time: 04/19/24  7:02 AM    Specimen: Blood   Result Value Ref Range    Glucose 135 (H) 70 - 105 mg/dL   POC Glucose 4x Daily Before Meals & at Bedtime    Collection Time: 04/19/24 11:00 AM    Specimen: Blood   Result Value Ref Range    Glucose 184 (H) 70 - 105 mg/dL   aPTT    Collection Time: 04/19/24  3:13 PM    Specimen: Blood   Result Value Ref Range    PTT 35.0 (L) 61.0 - 76.5 seconds   POC Glucose Once    Collection Time: 04/19/24  4:21 PM    Specimen: Blood   Result Value Ref Range    Glucose 209 (H) 70 - 105 mg/dL        Imaging:  US Renal Bilateral  Narrative: US RENAL BILATERAL    Date of Exam: 4/18/2024 10:12 PM EDT    Indication: Kidney failure, acute  EN.    Comparison: CT abdomen and pelvis 7/26/2020    Technique: Grayscale and color Doppler ultrasound evaluation of the kidneys and urinary bladder was performed.    Findings:  RIGHT kidney measures 12.2 cm.  Kidney echogenicity, size, and vascularity appear within normal limits. There is no solid kidney mass.  No echogenic shadowing stone.  No hydronephrosis.    LEFT kidney measures 12.6 cm. Kidney echogenicity, size, and vascularity appear within normal limits. There is no solid kidney mass.  No echogenic shadowing stone.  No hydronephrosis.    Limited visualization of the urinary bladder is unremarkable. Total volume of 44 mL.  Impression: Impression:  Unremarkable bilateral renal ultrasound.    Electronically Signed: Sujatha Montoya MD    4/19/2024 7:23 AM EDT    Workstation ID: [id number]       ASSESSMENT:  COPD  NSTEMI  DM II  HTN  GERD  Hyperlipidemia  EN        PLAN:  Encourage OOB daily   PT/OT  Antibiotics  Bronchodilators  Inhaled corticosteroids  Incentive spirometer  Heparin drip per Cardiology   Echo ordered  Cardiology/Nephrology consulted   Electrolytes/ glycemic control  DVT and  GI prophylaxis     Discussed with Dr Agustin Grubbs, [facility]   4/19/2024  17:42 EDT    I personally have examined  and interviewed the patient. I have reviewed the history, data, problems, assessment and plan with our NP.  Total Critical care time in direct medical management (   ) minutes, This time specifically excludes time spent performing procedures.    Danay Velez MD   4/19/2024  22:09 EDT

## 2024-04-19 NOTE — PLAN OF CARE
Goal Outcome Evaluation:              Outcome Evaluation: Pt is currently on a heparin drip, pts PTT drawn at 2200 on 4/18 was greater than 139, heparin drip stopped at 2330 for an hour and decreased by 3, repeat PTT will be collected at 0630 this am to consider changing the rate of the heparin, pts tolerated drip well. Pt has been sinus jodie while resting with a heartrate in the low 40's. Pts on call provider notified and stated to hold am coreg until cardiology can see the pt. Pts trop trended down. Pt complained of nasal congestion, on call provider notified, saline nasal spray PRN ordered. Pt had no complaints of pain, rested between care, and has been pleasent.

## 2024-04-19 NOTE — PROGRESS NOTES
CARDIOLOGY FOLLOW-UP PROGRESS NOTE      Reason for follow-up:    SOA  Chest Pain  Elevated Troponin     Attending: Aristeo German MD      Subjective .     No chest pain overnight     ROS  Pertinent items are noted in HPI, all other systems reviewed and negative  Allergies: Patient has no known allergies.    Scheduled Meds:amLODIPine, 5 mg, Oral, Q24H  atorvastatin, 40 mg, Oral, Nightly  budesonide, 0.5 mg, Nebulization, BID - RT  carvedilol, 3.125 mg, Oral, BID With Meals  clopidogrel, 75 mg, Oral, Daily  doxycycline, 100 mg, Intravenous, Q12H  insulin lispro, 2-7 Units, Subcutaneous, 4x Daily AC & at Bedtime  ipratropium-albuterol, 3 mL, Nebulization, 4x Daily - RT  methylPREDNISolone sodium succinate, 60 mg, Intravenous, Q12H  montelukast, 10 mg, Oral, Nightly  pantoprazole, 40 mg, Oral, Q AM  senna-docusate sodium, 2 tablet, Oral, BID  sodium chloride, 10 mL, Intravenous, Q12H        Continuous Infusions:heparin, 12 Units/kg/hr, Last Rate: 15 Units/kg/hr (04/19/24 0700)        PRN Meds:.  acetaminophen    senna-docusate sodium **AND** polyethylene glycol **AND** bisacodyl **AND** bisacodyl    Calcium Replacement - Follow Nurse / BPA Driven Protocol    dextrose    dextrose    glucagon (human recombinant)    heparin    heparin    HYDROcodone-acetaminophen    Magnesium Standard Dose Replacement - Follow Nurse / BPA Driven Protocol    nitroglycerin    Phosphorus Replacement - Follow Nurse / BPA Driven Protocol    Potassium Replacement - Follow Nurse / BPA Driven Protocol    sodium chloride    sodium chloride    sodium chloride    Objective     VITAL SIGNS  Patient Vitals for the past 24 hrs:   BP Temp Temp src Pulse Resp SpO2 Height Weight   04/19/24 1012 -- -- -- 74 -- 95 % -- --   04/19/24 0905 -- 98 °F (36.7 °C) -- -- -- -- -- --   04/19/24 0900 150/74 -- -- -- 20 -- -- --   04/19/24 0815 -- -- -- 76 25 92 % -- --   04/19/24 0812 -- -- -- 53 20 91 % -- --   04/19/24 0805 -- -- -- 75 22 93 % -- --   04/19/24 0802  "-- -- -- 56 18 92 % -- --   04/19/24 0548 137/79 97.4 °F (36.3 °C) Oral 68 20 92 % -- 98.5 kg (217 lb 2.5 oz)   04/19/24 0125 121/76 97.6 °F (36.4 °C) Oral 58 20 92 % -- --   04/18/24 2126 123/78 98.1 °F (36.7 °C) Oral 64 19 93 % -- --   04/18/24 1855 -- -- -- 65 20 93 % -- --   04/18/24 1850 -- -- -- 63 20 95 % -- --   04/18/24 1849 -- -- -- 63 20 91 % -- --   04/18/24 1845 -- -- -- 65 20 94 % -- --   04/18/24 1710 140/75 98.2 °F (36.8 °C) Oral 74 24 94 % -- --   04/18/24 1609 -- -- -- -- 22 -- -- --   04/18/24 1603 -- -- -- -- 22 -- -- --   04/18/24 1301 158/86 97.5 °F (36.4 °C) Axillary 88 27 98 % 167.6 cm (66\") 95.3 kg (210 lb 1.6 oz)        Flowsheet Rows      Flowsheet Row First Filed Value   Admission Height 167.6 cm (66\") Documented at 04/18/2024 1301   Admission Weight 95.3 kg (210 lb 1.6 oz) Documented at 04/18/2024 1301            Body mass index is 35.05 kg/m².      Intake/Output Summary (Last 24 hours) at 4/19/2024 1054  Last data filed at 4/19/2024 1012  Gross per 24 hour   Intake 700 ml   Output --   Net 700 ml        TELEMETRY:     SR    Physical Exam:  Vitals reviewed.   Constitutional:       General: Not in acute distress.     Appearance: Normal appearance. Well-developed.   Eyes:      Pupils: Pupils are equal, round, and reactive to light.   HENT:      Head: Normocephalic and atraumatic.   Neck:      Vascular: No JVD.   Pulmonary:      Effort: Pulmonary effort is normal.      Breath sounds: Wheezing present.   Cardiovascular:      Normal rate. Regular rhythm.   Pulses:     Intact distal pulses.   Edema:     Peripheral edema absent.   Abdominal:      General: There is no distension.      Palpations: Abdomen is soft.      Tenderness: There is no abdominal tenderness.   Musculoskeletal: Normal range of motion.      Cervical back: Normal range of motion and neck supple. Skin:     General: Skin is warm and dry.   Neurological:      Mental Status: Alert and oriented to person, place, and time.      "       Results Review:   I reviewed the patient's new clinical results.    CBC    Results from last 7 days   Lab Units 04/18/24  1343   WBC 10*3/mm3 7.67   HEMOGLOBIN g/dL 10.4*   PLATELETS 10*3/mm3 256     BMP   Results from last 7 days   Lab Units 04/19/24  0137 04/18/24  1513   SODIUM mmol/L 143 143   POTASSIUM mmol/L 3.9 4.0   CHLORIDE mmol/L 110* 107   CO2 mmol/L 20.0* 20.0*   BUN mg/dL 72* 65*   CREATININE mg/dL 1.83* 1.68*   GLUCOSE mg/dL 158* 131*   MAGNESIUM mg/dL 2.3 2.0   PHOSPHORUS mg/dL 3.8  --      Cr Clearance Estimated Creatinine Clearance: 41.3 mL/min (A) (by C-G formula based on SCr of 1.83 mg/dL (H)).  Coag   Results from last 7 days   Lab Units 04/19/24  0619 04/18/24  2232 04/18/24  1513 04/18/24  1351   INR   --   --   --  1.10   APTT seconds 32.4* >139.0* 80.4* 32.7*     HbA1C   Lab Results   Component Value Date    HGBA1C 6.2 (A) 03/12/2024    HGBA1C 6.8 (A) 12/12/2023    HGBA1C 6.1 09/11/2023     Blood Glucose   Glucose   Date/Time Value Ref Range Status   04/19/2024 0702 135 (H) 70 - 105 mg/dL Final     Comment:     Serial Number: [id number]:  [id number]   04/18/2024 2008 188 (H) 70 - 105 mg/dL Final     Comment:     Serial Number: [id number]:  [id number]   04/18/2024 1643 212 (H) 70 - 105 mg/dL Final     Comment:     Serial Number: [id number]:  [id number]     Infection     CMP   Results from last 7 days   Lab Units 04/19/24  0137 04/18/24  1513   SODIUM mmol/L 143 143   POTASSIUM mmol/L 3.9 4.0   CHLORIDE mmol/L 110* 107   CO2 mmol/L 20.0* 20.0*   BUN mg/dL 72* 65*   CREATININE mg/dL 1.83* 1.68*   GLUCOSE mg/dL 158* 131*   ALBUMIN g/dL 3.2*  --      ABG    Results from last 7 days   Lab Units 04/19/24  0414 04/18/24  1329   PH, ARTERIAL pH units 7.384 7.362   PCO2, ARTERIAL mm Hg 34.4* 36.9   PO2 ART mm Hg 74.7* 121.5*   O2 SATURATION ART % 94.8 98.6*   BASE EXCESS ART mmol/L -3.9* -4.0*     UA    Results from last 7 days   Lab Units 04/18/24  1839   NITRITE UA  Negative  "  WBC UA /HPF 6-10*   BACTERIA UA /HPF 2+*   SQUAM EPITHEL UA /HPF 3-6*     RYAN  No results found for: \"POCMETH\", \"POCAMPHET\", \"POCBARBITUR\", \"POCBENZO\", \"POCCOCAINE\", \"POCOPIATES\", \"POCOXYCODO\", \"POCPHENCYC\", \"POCPROPOXY\", \"POCTHC\", \"POCTRICYC\"  Lysis Labs   Results from last 7 days   Lab Units 04/19/24  0619 04/19/24  0137 04/18/24  2232 04/18/24  1513 04/18/24  1351 04/18/24  1343   INR   --   --   --   --  1.10  --    APTT seconds 32.4*  --  >139.0* 80.4* 32.7*  --    HEMOGLOBIN g/dL  --   --   --   --   --  10.4*   PLATELETS 10*3/mm3  --   --   --   --   --  256   CREATININE mg/dL  --  1.83*  --  1.68*  --   --      Radiology(recent) US Renal Bilateral    Result Date: 4/19/2024  Impression: Unremarkable bilateral renal ultrasound. Electronically Signed: Sujatha Montoya MD  4/19/2024 7:23 AM EDT  Workstation ID: [id number]    Adult Transthoracic Echo Complete w/ Color, Spectral and Contrast if necessary per protocol    Addendum Date: 4/18/2024      Left ventricular systolic function is mildly decreased. Left ventricular ejection fraction appears to be 51 - 55%.   Left ventricular diastolic function was normal.   Estimated right ventricular systolic pressure from tricuspid regurgitation is normal (<35 mmHg).   Hypokinesis of distal septum and apex noted     XR Chest 1 View    Result Date: 4/18/2024  Impression: Lower lung volumes with increased airspace opacities in both lower lungs, which may be due to atelectasis, pulmonary edema, or less likely pneumonia. Electronically Signed: Lisa Lan  4/18/2024 1:49 PM EDT  Workstation ID: [id number]     Imaging Results (Last 24 Hours)       Procedure Component Value Units Date/Time    US Renal Bilateral [[id number]] Collected: 04/19/24 0722     Updated: 04/19/24 0725    Narrative:      US RENAL BILATERAL    Date of Exam: 4/18/2024 10:12 PM EDT    Indication: Kidney failure, acute  EN.    Comparison: CT abdomen and pelvis 7/26/2020    Technique: Grayscale and color " Doppler ultrasound evaluation of the kidneys and urinary bladder was performed.      Findings:  RIGHT kidney measures 12.2 cm.  Kidney echogenicity, size, and vascularity appear within normal limits. There is no solid kidney mass.  No echogenic shadowing stone.  No hydronephrosis.    LEFT kidney measures 12.6 cm. Kidney echogenicity, size, and vascularity appear within normal limits. There is no solid kidney mass.  No echogenic shadowing stone.  No hydronephrosis.    Limited visualization of the urinary bladder is unremarkable. Total volume of 44 mL.        Impression:      Impression:  Unremarkable bilateral renal ultrasound.        Electronically Signed: Sujatha Montoya MD    4/19/2024 7:23 AM EDT    Workstation ID: [id number]    XR Chest 1 View [[id number]] Collected: 04/18/24 1346     Updated: 04/18/24 1351    Narrative:      XR CHEST 1 VW    Date of Exam: 4/18/2024 1:30 PM EDT    Indication: Chest pain    Comparison: AP chest x-ray 4/18/2024 at 9:00 a.m.    Findings:  Compared to today's earlier chest x-ray, there are lower lung volumes with increased airspace opacities in both lower lungs. No pneumothorax is seen. Cardiac silhouette is enlarged, but is exaggerated by portable AP technique.      Impression:      Impression:  Lower lung volumes with increased airspace opacities in both lower lungs, which may be due to atelectasis, pulmonary edema, or less likely pneumonia.      Electronically Signed: Lisa Lan    4/18/2024 1:49 PM EDT    Workstation ID: [id number]            Results from last 7 days   Lab Units 04/19/24  0137 04/18/24 2048   CK TOTAL U/L 277*  --    HSTROP T ng/L  --  503*       EKG          I personally viewed and interpreted the patient's EKG/Telemetry data:        ECHOCARDIOGRAM:     Results for orders placed during the hospital encounter of 04/18/24    Adult Transthoracic Echo Complete w/ Color, Spectral and Contrast if necessary per protocol    Interpretation Summary    Left ventricular  systolic function is mildly decreased. Left ventricular ejection fraction appears to be 51 - 55%.    Left ventricular diastolic function was normal.    Estimated right ventricular systolic pressure from tricuspid regurgitation is normal (<35 mmHg).    Hypokinesis of distal septum and apex noted        STRESS MYOVIEW:      CARDIAC CATHETERIZATION:      OTHER:         Assessment & Plan            NSTEMI (non-ST elevated myocardial infarction)        ASSESSMENT:    Indeterminate troponin elevation  Periods of chest tightness/mostly with cough  Repeat troponin pending  Serial troponins to be obtained  Started on IV heparin at outlying  facility  Currently pain free  [phone]     SOA  Likely multifactorial  Respiratory Insufficiency/AECOPD  Respiratory virus panel positive for human metapneumovirus  Pulmonary consulted  Continue Antbiotics, steroids, bronchodilators  CXR with some pulmonary edema  Elevated pro BNP  Positive Human Metapneumovirus     HTN  Continue home medications  Will monitor     Dyslipidemia  Continue statin     Type 2 DM  Continue Accu checks SSI     EN  Cr 1.6  Nephrology consulted          PLAN:    Will discuss diuretics with nephrology  No chest pain ovenight  Will need cardiac catheterization when overall condition stabilizes        Additional recommendations per Dr. Lorenzo     I discussed the patient's findings and my recommendations with patient and RN                  Electronically signed by JAX العراقي, 04/19/24, 10:54 AM EDT.          JAX العراقي  04/19/24  10:54 EDT

## 2024-04-19 NOTE — PROGRESS NOTES
"NEPHROLOGY PROGRESS NOTE------[facility]    [facility]  [phone]  Ashwin Butcher MD      Patient Care Team:  Camryn Salinas MD as PCP - General (Family Medicine)  Ashwin Butcher MD as Consulting Physician (Nephrology)      Provider:  Ashwin Butcher MD  Patient Name: Fransico Cuenca  :  1955    SUBJECTIVE:  Follow-up EN  No chest pain or shortness of breath  Up in chair, feeling much better      Medication:  amLODIPine, 5 mg, Oral, Q24H  atorvastatin, 40 mg, Oral, Nightly  budesonide, 0.5 mg, Nebulization, BID - RT  carvedilol, 3.125 mg, Oral, BID With Meals  clopidogrel, 75 mg, Oral, Daily  doxycycline, 100 mg, Intravenous, Q12H  insulin lispro, 2-7 Units, Subcutaneous, 4x Daily AC & at Bedtime  ipratropium-albuterol, 3 mL, Nebulization, 4x Daily - RT  methylPREDNISolone sodium succinate, 60 mg, Intravenous, Q12H  montelukast, 10 mg, Oral, Nightly  pantoprazole, 40 mg, Oral, Q AM  senna-docusate sodium, 2 tablet, Oral, BID  sodium chloride, 10 mL, Intravenous, Q12H      heparin, 12 Units/kg/hr, Last Rate: 15 Units/kg/hr (24 0700)        OBJECTIVE    Vital Sign Min/Max for last 24 hours  Temp  Min: 97.4 °F (36.3 °C)  Max: 98.2 °F (36.8 °C)   BP  Min: 121/76  Max: 158/86   Pulse  Min: 58  Max: 88   Resp  Min: 19  Max: 27   SpO2  Min: 91 %  Max: 98 %   No data recorded   Weight  Min: 95.3 kg (210 lb 1.6 oz)  Max: 98.5 kg (217 lb 2.5 oz)     Flowsheet Rows      Flowsheet Row First Filed Value   Admission Height 167.6 cm (66\") Documented at 2024 1301   Admission Weight 95.3 kg (210 lb 1.6 oz) Documented at 2024 1301            No intake/output data recorded.  I/O last 3 completed shifts:  In: 260 [P.O.:240; Other:20]  Out: -     Physical Exam:  General Appearance: alert, appears stated age and cooperative  Head: normocephalic, without obvious abnormality and atraumatic  Eyes: conjunctivae and sclerae normal and no icterus  Neck: " "supple and no JVD  Lungs: Scattered rhonchi  Heart: regular rhythm & normal rate and normal S1, S2  Chest: Wall no abnormalities observed  Abdomen: normal bowel sounds and soft, nontender  Extremities: moves extremities well, trace edema, no cyanosis and no redness  Skin: no bleeding, bruising or rash, turgor normal, color normal and no lesions noted  Neurologic: Alert, and oriented. No focal deficits    Labs:    WBC WBC   Date Value Ref Range Status   04/18/2024 7.67 3.40 - 10.80 10*3/mm3 Final      HGB Hemoglobin   Date Value Ref Range Status   04/18/2024 10.4 (L) 13.0 - 17.7 g/dL Final      HCT Hematocrit   Date Value Ref Range Status   04/18/2024 34.0 (L) 37.5 - 51.0 % Final      Platelets No results found for: \"LABPLAT\"   MCV MCV   Date Value Ref Range Status   04/18/2024 85.4 79.0 - 97.0 fL Final          Sodium Sodium   Date Value Ref Range Status   04/19/2024 143 136 - 145 mmol/L Final   04/18/2024 143 136 - 145 mmol/L Final      Potassium Potassium   Date Value Ref Range Status   04/19/2024 3.9 3.5 - 5.2 mmol/L Final     Comment:     Slight hemolysis detected by analyzer. Result may be falsely elevated.   04/18/2024 4.0 3.5 - 5.2 mmol/L Final      Chloride Chloride   Date Value Ref Range Status   04/19/2024 110 (H) 98 - 107 mmol/L Final   04/18/2024 107 98 - 107 mmol/L Final      CO2 CO2   Date Value Ref Range Status   04/19/2024 20.0 (L) 22.0 - 29.0 mmol/L Final   04/18/2024 20.0 (L) 22.0 - 29.0 mmol/L Final      BUN BUN   Date Value Ref Range Status   04/19/2024 72 (H) 8 - 23 mg/dL Final   04/18/2024 65 (H) 8 - 23 mg/dL Final      Creatinine Creatinine   Date Value Ref Range Status   04/19/2024 1.83 (H) 0.76 - 1.27 mg/dL Final   04/18/2024 1.68 (H) 0.76 - 1.27 mg/dL Final      Calcium Calcium   Date Value Ref Range Status   04/19/2024 8.0 (L) 8.6 - 10.5 mg/dL Final   04/18/2024 9.4 8.6 - 10.5 mg/dL Final      PO4 No components found for: \"PO4\"   Albumin Albumin   Date Value Ref Range Status   04/19/2024 " "3.2 (L) 3.5 - 5.2 g/dL Final      Magnesium Magnesium   Date Value Ref Range Status   04/19/2024 2.3 1.6 - 2.4 mg/dL Final   04/18/2024 2.0 1.6 - 2.4 mg/dL Final      Uric Acid No components found for: \"URIC ACID\"     Imaging Results (Last 72 Hours)       Procedure Component Value Units Date/Time    US Renal Bilateral [[id number]] Collected: 04/19/24 0722     Updated: 04/19/24 0725    Narrative:      US RENAL BILATERAL    Date of Exam: 4/18/2024 10:12 PM EDT    Indication: Kidney failure, acute  EN.    Comparison: CT abdomen and pelvis 7/26/2020    Technique: Grayscale and color Doppler ultrasound evaluation of the kidneys and urinary bladder was performed.      Findings:  RIGHT kidney measures 12.2 cm.  Kidney echogenicity, size, and vascularity appear within normal limits. There is no solid kidney mass.  No echogenic shadowing stone.  No hydronephrosis.    LEFT kidney measures 12.6 cm. Kidney echogenicity, size, and vascularity appear within normal limits. There is no solid kidney mass.  No echogenic shadowing stone.  No hydronephrosis.    Limited visualization of the urinary bladder is unremarkable. Total volume of 44 mL.        Impression:      Impression:  Unremarkable bilateral renal ultrasound.        Electronically Signed: Sujatha Montoya MD    4/19/2024 7:23 AM EDT    Workstation ID: [id number]    XR Chest 1 View [[id number]] Collected: 04/18/24 1346     Updated: 04/18/24 1351    Narrative:      XR CHEST 1 VW    Date of Exam: 4/18/2024 1:30 PM EDT    Indication: Chest pain    Comparison: AP chest x-ray 4/18/2024 at 9:00 a.m.    Findings:  Compared to today's earlier chest x-ray, there are lower lung volumes with increased airspace opacities in both lower lungs. No pneumothorax is seen. Cardiac silhouette is enlarged, but is exaggerated by portable AP technique.      Impression:      Impression:  Lower lung volumes with increased airspace opacities in both lower lungs, which may be due to atelectasis, " pulmonary edema, or less likely pneumonia.      Electronically Signed: Lisa Edyta    4/18/2024 1:49 PM EDT    Workstation ID: [id number]            Results for orders placed during the hospital encounter of 04/18/24    XR Chest 1 View    Narrative  XR CHEST 1 VW    Date of Exam: 4/18/2024 1:30 PM EDT    Indication: Chest pain    Comparison: AP chest x-ray 4/18/2024 at 9:00 a.m.    Findings:  Compared to today's earlier chest x-ray, there are lower lung volumes with increased airspace opacities in both lower lungs. No pneumothorax is seen. Cardiac silhouette is enlarged, but is exaggerated by portable AP technique.    Impression  Impression:  Lower lung volumes with increased airspace opacities in both lower lungs, which may be due to atelectasis, pulmonary edema, or less likely pneumonia.      Electronically Signed: Lisa Lan  4/18/2024 1:49 PM EDT  Workstation ID: [id number]      Results for orders placed in visit on 02/23/24    XR Shoulder 2+ View Right    Narrative  DATE OF EXAM:  2/23/2024    PROCEDURE:  XR shoulder 2+ view right    INDICATIONS:  Chronic right shoulder pain    COMPARISON:  No Comparisons Available    FINDINGS:  No acute fracture is seen. Mineralization is within normal limits.  The humeral head appears to be well-seated within the glenoid fossa.  There is mild glenohumeral and acromioclavicular joint space narrowing consistent with mild/early osteoarthritic change.  No radiopaque foreign bodies.  Lung spaces are clear.    Impression  Mild acromioclavicular and glenohumeral osteoarthritic disease, otherwise no acute bony abnormality of the right shoulder.      Results for orders placed in visit on 08/04/23    XR Hand 3+ View Left    Narrative  XR HAND 3+ VW LEFT    Date of Exam: 8/4/2023 1:57 PM EDT    Indication: Left hand pain    Comparison: None available.    Findings:  There is no acute fracture. There is marked narrowing with joint space loss and bone-on-bone contact of the first  metacarpal/carpal joint. There is subchondral sclerosis and spurring and slight radial subluxation of the base of the first metacarpal bone  with respect to the adjacent trapezium. There is narrowing and spurring of the IP joint of the left thumb, the first MCP joint, and several of the inner-phalangeal joints of the digits consistent with moderate and severe osteoarthritis. There are no  periarticular erosions. There is soft tissue swelling involving the digits in the area of prominent degenerative changes.    Impression  Impression:    1. No acute fracture.  2. Moderate and severe osteoarthritis as described.  3. Areas of soft tissue swelling involving the digits where the patient has prominent degenerative changes.      Electronically Signed: Lucio Pedraza  8/4/2023 3:11 PM EDT  Workstation ID: [id number]            ASSESSMENT / PLAN      NSTEMI (non-ST elevated myocardial infarction)    EN-undetermined etiology.  This might be cardiorenal in setting of fluid overload/ACE inhibitor and thiazide use.  History of nephrolithiasis-CT several years ago showed left-sided obstructive kidney stone  Hypertension  Hyperlipidemia  Type 2 diabetes  Hold metformin  COPD with exacerbation  Hypervolemia-better.  Diuresed  NSTEMI-Per cardiology.     CR still rising, volume better, uric acid high at 11  Hold Lasix today  Renal US normal, UA with microhemtauria.  Holding metformin/lisinopril/thiazides  Heart cath planned for Monday  As needed diuretics        Ashwin Butcher MD  [facility]  [phone]  04/19/24  07:26 EDT

## 2024-04-19 NOTE — CASE MANAGEMENT/SOCIAL WORK
Continued Stay Note  [facility]     Patient Name: Fransico Cuenca  MRN: [id number]  Today's Date: 4/19/2024    Admit Date: 4/18/2024    Plan: Anticipate routine home alone.   Discharge Plan       Row Name 04/19/24 1327       Plan    Plan Anticipate routine home alone.    Plan Comments CM discussed HF clinic with MD in rounds, MD to place order. Barrier to D/C: heparin gtt, IV steroids, possible cardiac cath.                      Expected Discharge Date and Time       Expected Discharge Date Expected Discharge Time    Apr 22, 2024           Phone communication or documentation only - no physical contact with patient or family.     CECILIA MarroquinN, RN    [place] [id number]    Office: [phone]  Fax: [phone]

## 2024-04-19 NOTE — CONSULTS
Spoke to pt and his friend Suri Jimenes re: diet,exercise,phase 2 rehab,will come here for cardiac rehab

## 2024-04-20 LAB
ALBUMIN SERPL-MCNC: 3.6 G/DL (ref 3.5–5.2)
AMORPH URATE CRY URNS QL MICRO: ABNORMAL /HPF
ANION GAP SERPL CALCULATED.3IONS-SCNC: 11 MMOL/L (ref 5–15)
APTT PPP: 41.1 SECONDS (ref 61–76.5)
APTT PPP: 43.2 SECONDS (ref 61–76.5)
APTT PPP: 48.3 SECONDS (ref 61–76.5)
APTT PPP: 55 SECONDS (ref 61–76.5)
BACTERIA UR QL AUTO: ABNORMAL /HPF
BASOPHILS # BLD AUTO: 0.01 10*3/MM3 (ref 0–0.2)
BASOPHILS # BLD AUTO: 0.01 10*3/MM3 (ref 0–0.2)
BASOPHILS NFR BLD AUTO: 0.1 % (ref 0–1.5)
BASOPHILS NFR BLD AUTO: 0.1 % (ref 0–1.5)
BILIRUB UR QL STRIP: NEGATIVE
BUN SERPL-MCNC: 91 MG/DL (ref 8–23)
BUN/CREAT SERPL: 43.5 (ref 7–25)
CALCIUM SPEC-SCNC: 8.7 MG/DL (ref 8.6–10.5)
CHLORIDE SERPL-SCNC: 107 MMOL/L (ref 98–107)
CLARITY UR: ABNORMAL
CO2 SERPL-SCNC: 24 MMOL/L (ref 22–29)
COLOR UR: YELLOW
CREAT SERPL-MCNC: 2.09 MG/DL (ref 0.76–1.27)
CREAT UR-MCNC: 76.6 MG/DL
DEPRECATED RDW RBC AUTO: 46.5 FL (ref 37–54)
DEPRECATED RDW RBC AUTO: 47.4 FL (ref 37–54)
EGFRCR SERPLBLD CKD-EPI 2021: 33.4 ML/MIN/1.73
EOSINOPHIL # BLD AUTO: 0 10*3/MM3 (ref 0–0.4)
EOSINOPHIL # BLD AUTO: 0.01 10*3/MM3 (ref 0–0.4)
EOSINOPHIL NFR BLD AUTO: 0 % (ref 0.3–6.2)
EOSINOPHIL NFR BLD AUTO: 0.1 % (ref 0.3–6.2)
EOSINOPHIL SPEC QL MICRO: 0 % EOS/100 CELLS (ref 0–0)
ERYTHROCYTE [DISTWIDTH] IN BLOOD BY AUTOMATED COUNT: 15.5 % (ref 12.3–15.4)
ERYTHROCYTE [DISTWIDTH] IN BLOOD BY AUTOMATED COUNT: 15.7 % (ref 12.3–15.4)
GLUCOSE BLDC GLUCOMTR-MCNC: 126 MG/DL (ref 70–105)
GLUCOSE BLDC GLUCOMTR-MCNC: 129 MG/DL (ref 70–105)
GLUCOSE BLDC GLUCOMTR-MCNC: 132 MG/DL (ref 70–105)
GLUCOSE BLDC GLUCOMTR-MCNC: 177 MG/DL (ref 70–105)
GLUCOSE SERPL-MCNC: 160 MG/DL (ref 65–99)
GLUCOSE UR STRIP-MCNC: NEGATIVE MG/DL
HCT VFR BLD AUTO: 29.2 % (ref 37.5–51)
HCT VFR BLD AUTO: 30.9 % (ref 37.5–51)
HGB BLD-MCNC: 9.3 G/DL (ref 13–17.7)
HGB BLD-MCNC: 9.7 G/DL (ref 13–17.7)
HGB UR QL STRIP.AUTO: ABNORMAL
HYALINE CASTS UR QL AUTO: ABNORMAL /LPF
IMM GRANULOCYTES # BLD AUTO: 0.09 10*3/MM3 (ref 0–0.05)
IMM GRANULOCYTES # BLD AUTO: 0.1 10*3/MM3 (ref 0–0.05)
IMM GRANULOCYTES NFR BLD AUTO: 1.1 % (ref 0–0.5)
IMM GRANULOCYTES NFR BLD AUTO: 1.3 % (ref 0–0.5)
KETONES UR QL STRIP: NEGATIVE
LEUKOCYTE ESTERASE UR QL STRIP.AUTO: ABNORMAL
LYMPHOCYTES # BLD AUTO: 0.37 10*3/MM3 (ref 0.7–3.1)
LYMPHOCYTES # BLD AUTO: 0.5 10*3/MM3 (ref 0.7–3.1)
LYMPHOCYTES NFR BLD AUTO: 4.6 % (ref 19.6–45.3)
LYMPHOCYTES NFR BLD AUTO: 6.4 % (ref 19.6–45.3)
MAGNESIUM SERPL-MCNC: 2.2 MG/DL (ref 1.6–2.4)
MCH RBC QN AUTO: 26.2 PG (ref 26.6–33)
MCH RBC QN AUTO: 26.2 PG (ref 26.6–33)
MCHC RBC AUTO-ENTMCNC: 31.4 G/DL (ref 31.5–35.7)
MCHC RBC AUTO-ENTMCNC: 31.8 G/DL (ref 31.5–35.7)
MCV RBC AUTO: 82.3 FL (ref 79–97)
MCV RBC AUTO: 83.5 FL (ref 79–97)
MONOCYTES # BLD AUTO: 0.33 10*3/MM3 (ref 0.1–0.9)
MONOCYTES # BLD AUTO: 0.45 10*3/MM3 (ref 0.1–0.9)
MONOCYTES NFR BLD AUTO: 4.1 % (ref 5–12)
MONOCYTES NFR BLD AUTO: 5.7 % (ref 5–12)
NEUTROPHILS NFR BLD AUTO: 6.8 10*3/MM3 (ref 1.7–7)
NEUTROPHILS NFR BLD AUTO: 7.24 10*3/MM3 (ref 1.7–7)
NEUTROPHILS NFR BLD AUTO: 86.5 % (ref 42.7–76)
NEUTROPHILS NFR BLD AUTO: 90 % (ref 42.7–76)
NITRITE UR QL STRIP: NEGATIVE
NRBC BLD AUTO-RTO: 0 /100 WBC (ref 0–0.2)
NRBC BLD AUTO-RTO: 0 /100 WBC (ref 0–0.2)
PH UR STRIP.AUTO: <=5 [PH] (ref 5–8)
PHOSPHATE SERPL-MCNC: 4.4 MG/DL (ref 2.5–4.5)
PLATELET # BLD AUTO: 220 10*3/MM3 (ref 140–450)
PLATELET # BLD AUTO: 240 10*3/MM3 (ref 140–450)
PMV BLD AUTO: 10.4 FL (ref 6–12)
PMV BLD AUTO: 10.5 FL (ref 6–12)
POTASSIUM SERPL-SCNC: 3.9 MMOL/L (ref 3.5–5.2)
PROT ?TM UR-MCNC: 33.1 MG/DL
PROT UR QL STRIP: ABNORMAL
PROT/CREAT UR: 432.1 MG/G CREA (ref 0–200)
RBC # BLD AUTO: 3.55 10*6/MM3 (ref 4.14–5.8)
RBC # BLD AUTO: 3.7 10*6/MM3 (ref 4.14–5.8)
RBC # UR STRIP: ABNORMAL /HPF
REF LAB TEST METHOD: ABNORMAL
SODIUM SERPL-SCNC: 142 MMOL/L (ref 136–145)
SP GR UR STRIP: 1.02 (ref 1–1.03)
SQUAMOUS #/AREA URNS HPF: ABNORMAL /HPF
UROBILINOGEN UR QL STRIP: ABNORMAL
WBC # UR STRIP: ABNORMAL /HPF
WBC NRBC COR # BLD AUTO: 7.86 10*3/MM3 (ref 3.4–10.8)
WBC NRBC COR # BLD AUTO: 8.05 10*3/MM3 (ref 3.4–10.8)

## 2024-04-20 PROCEDURE — 94799 UNLISTED PULMONARY SVC/PX: CPT

## 2024-04-20 PROCEDURE — 83516 IMMUNOASSAY NONANTIBODY: CPT | Performed by: INTERNAL MEDICINE

## 2024-04-20 PROCEDURE — 80069 RENAL FUNCTION PANEL: CPT | Performed by: INTERNAL MEDICINE

## 2024-04-20 PROCEDURE — 83735 ASSAY OF MAGNESIUM: CPT | Performed by: INTERNAL MEDICINE

## 2024-04-20 PROCEDURE — 85730 THROMBOPLASTIN TIME PARTIAL: CPT | Performed by: INTERNAL MEDICINE

## 2024-04-20 PROCEDURE — 99232 SBSQ HOSP IP/OBS MODERATE 35: CPT | Performed by: INTERNAL MEDICINE

## 2024-04-20 PROCEDURE — 85025 COMPLETE CBC W/AUTO DIFF WBC: CPT | Performed by: INTERNAL MEDICINE

## 2024-04-20 PROCEDURE — 25010000002 METHYLPREDNISOLONE PER 125 MG: Performed by: INTERNAL MEDICINE

## 2024-04-20 PROCEDURE — 25810000003 SODIUM CHLORIDE 0.9 % SOLUTION: Performed by: INTERNAL MEDICINE

## 2024-04-20 PROCEDURE — 63710000001 INSULIN LISPRO (HUMAN) PER 5 UNITS: Performed by: INTERNAL MEDICINE

## 2024-04-20 PROCEDURE — 94664 DEMO&/EVAL PT USE INHALER: CPT

## 2024-04-20 PROCEDURE — 97162 PT EVAL MOD COMPLEX 30 MIN: CPT

## 2024-04-20 PROCEDURE — 86037 ANCA TITER EACH ANTIBODY: CPT | Performed by: INTERNAL MEDICINE

## 2024-04-20 PROCEDURE — 84550 ASSAY OF BLOOD/URIC ACID: CPT | Performed by: INTERNAL MEDICINE

## 2024-04-20 PROCEDURE — 81001 URINALYSIS AUTO W/SCOPE: CPT | Performed by: INTERNAL MEDICINE

## 2024-04-20 PROCEDURE — 25010000002 HEPARIN (PORCINE) 25000-0.45 UT/250ML-% SOLUTION: Performed by: INTERNAL MEDICINE

## 2024-04-20 PROCEDURE — 82948 REAGENT STRIP/BLOOD GLUCOSE: CPT

## 2024-04-20 PROCEDURE — 87205 SMEAR GRAM STAIN: CPT | Performed by: INTERNAL MEDICINE

## 2024-04-20 PROCEDURE — 85730 THROMBOPLASTIN TIME PARTIAL: CPT | Performed by: HOSPITALIST

## 2024-04-20 PROCEDURE — 84156 ASSAY OF PROTEIN URINE: CPT | Performed by: INTERNAL MEDICINE

## 2024-04-20 PROCEDURE — 86038 ANTINUCLEAR ANTIBODIES: CPT | Performed by: INTERNAL MEDICINE

## 2024-04-20 PROCEDURE — 85025 COMPLETE CBC W/AUTO DIFF WBC: CPT | Performed by: HOSPITALIST

## 2024-04-20 PROCEDURE — 82948 REAGENT STRIP/BLOOD GLUCOSE: CPT | Performed by: INTERNAL MEDICINE

## 2024-04-20 PROCEDURE — 94761 N-INVAS EAR/PLS OXIMETRY MLT: CPT

## 2024-04-20 PROCEDURE — 82570 ASSAY OF URINE CREATININE: CPT | Performed by: INTERNAL MEDICINE

## 2024-04-20 RX ORDER — SODIUM CHLORIDE 9 MG/ML
75 INJECTION, SOLUTION INTRAVENOUS CONTINUOUS
Status: DISCONTINUED | OUTPATIENT
Start: 2024-04-20 | End: 2024-04-24

## 2024-04-20 RX ADMIN — Medication 10 ML: at 09:36

## 2024-04-20 RX ADMIN — MONTELUKAST 10 MG: 10 TABLET, FILM COATED ORAL at 20:43

## 2024-04-20 RX ADMIN — SODIUM CHLORIDE 75 ML/HR: 9 INJECTION, SOLUTION INTRAVENOUS at 10:16

## 2024-04-20 RX ADMIN — BUDESONIDE 0.5 MG: 0.5 INHALANT RESPIRATORY (INHALATION) at 18:55

## 2024-04-20 RX ADMIN — HEPARIN SODIUM 21 UNITS/KG/HR: 10000 INJECTION, SOLUTION INTRAVENOUS at 18:02

## 2024-04-20 RX ADMIN — METHYLPREDNISOLONE SODIUM SUCCINATE 60 MG: 125 INJECTION, POWDER, FOR SOLUTION INTRAMUSCULAR; INTRAVENOUS at 17:12

## 2024-04-20 RX ADMIN — SALINE NASAL SPRAY 1 SPRAY: 1.5 SOLUTION NASAL at 20:44

## 2024-04-20 RX ADMIN — AMLODIPINE BESYLATE 5 MG: 5 TABLET ORAL at 09:36

## 2024-04-20 RX ADMIN — SODIUM CHLORIDE 75 ML/HR: 9 INJECTION, SOLUTION INTRAVENOUS at 23:38

## 2024-04-20 RX ADMIN — IPRATROPIUM BROMIDE AND ALBUTEROL SULFATE 3 ML: .5; 3 SOLUTION RESPIRATORY (INHALATION) at 18:50

## 2024-04-20 RX ADMIN — HEPARIN SODIUM 19 UNITS/KG/HR: 10000 INJECTION, SOLUTION INTRAVENOUS at 07:38

## 2024-04-20 RX ADMIN — INSULIN LISPRO 2 UNITS: 100 INJECTION, SOLUTION INTRAVENOUS; SUBCUTANEOUS at 17:12

## 2024-04-20 RX ADMIN — BUDESONIDE 0.5 MG: 0.5 INHALANT RESPIRATORY (INHALATION) at 07:22

## 2024-04-20 RX ADMIN — METHYLPREDNISOLONE SODIUM SUCCINATE 60 MG: 125 INJECTION, POWDER, FOR SOLUTION INTRAMUSCULAR; INTRAVENOUS at 05:51

## 2024-04-20 RX ADMIN — IPRATROPIUM BROMIDE AND ALBUTEROL SULFATE 3 ML: .5; 3 SOLUTION RESPIRATORY (INHALATION) at 11:22

## 2024-04-20 RX ADMIN — CARVEDILOL 3.12 MG: 3.12 TABLET, FILM COATED ORAL at 17:12

## 2024-04-20 RX ADMIN — PANTOPRAZOLE SODIUM 40 MG: 40 TABLET, DELAYED RELEASE ORAL at 05:51

## 2024-04-20 RX ADMIN — SALINE NASAL SPRAY 1 SPRAY: 1.5 SOLUTION NASAL at 09:36

## 2024-04-20 RX ADMIN — ATORVASTATIN CALCIUM 40 MG: 40 TABLET, FILM COATED ORAL at 20:43

## 2024-04-20 RX ADMIN — DOCUSATE SODIUM AND SENNOSIDES 2 TABLET: 8.6; 5 TABLET, FILM COATED ORAL at 20:43

## 2024-04-20 RX ADMIN — IPRATROPIUM BROMIDE AND ALBUTEROL SULFATE 3 ML: .5; 3 SOLUTION RESPIRATORY (INHALATION) at 07:22

## 2024-04-20 RX ADMIN — CARVEDILOL 3.12 MG: 3.12 TABLET, FILM COATED ORAL at 09:36

## 2024-04-20 RX ADMIN — Medication 10 ML: at 20:11

## 2024-04-20 RX ADMIN — CLOPIDOGREL BISULFATE 75 MG: 75 TABLET ORAL at 09:36

## 2024-04-20 NOTE — PLAN OF CARE
Problem: Adult Inpatient Plan of Care  Goal: Plan of Care Review  Outcome: Ongoing, Progressing  Flowsheets (Taken 4/20/2024 5897)  Outcome Evaluation: Pt up to chair today.  Pt started on NS at 75.  cont on heparin gtt. urine also sent and pending.  Pt to have cath on monday. denies pain and soa today. will cont to monitor   Goal Outcome Evaluation:              Outcome Evaluation: Pt up to chair today.  Pt started on NS at 75.  cont on heparin gtt. urine also sent and pending.  Pt to have cath on monday. denies pain and soa today. will cont to monitor

## 2024-04-20 NOTE — PROGRESS NOTES
[facility]     Progress Note    Patient Name: Fransico Cuenca  : 1955  MRN: [id number]  Primary Care Physician:  Camryn Salinas MD  Date of admission: 2024  Service date and time: 24 11:41 EDT  Subjective   Subjective     Chief Complaint: chest pain    HPI:  Patient Reports feeling better      Objective   Objective     Vitals:   Temp:  [97.2 °F (36.2 °C)-98.3 °F (36.8 °C)] 98 °F (36.7 °C)  Heart Rate:  [50-88] 81  Resp:  [16-23] 20  BP: (133-147)/() 145/86  Flow (L/min):  [1.5-2] 2  Physical Exam    Constitutional: Awake, alert   Eyes: PERRLA, sclerae anicteric, no conjunctival injection   HENT: NCAT, mucous membranes moist   Neck: Supple, no thyromegaly, no lymphadenopathy, trachea midline   Respiratory: + rhonchi   Cardiovascular: RRR, no murmurs, rubs, or gallops, palpable pedal pulses bilaterally   Gastrointestinal: Positive bowel sounds, soft, nontender, nondistended   Musculoskeletal: No bilateral ankle edema, no clubbing or cyanosis to extremities   Psychiatric: Appropriate affect, cooperative   Neurologic: Oriented x 3, strength symmetric in all extremities, Cranial Nerves grossly intact to confrontation, speech clear   Skin: No rashes     Result Review    Result Review:  I have personally reviewed the results from the time of this admission to 2024 11:41 EDT and agree with these findings:  [x]  Laboratory list / accordion  [x]  Microbiology  [x]  Radiology  [x]  EKG/Telemetry   [x]  Cardiology/Vascular   []  Pathology  []  Old records  []  Other:        Assessment & Plan   Assessment / Plan       Active Hospital Problems:  Active Hospital Problems    Diagnosis     **NSTEMI (non-ST elevated myocardial infarction)      Plan:      NSTEMI  -Troponin is elevated over 500  -Continue IV heparin gtt  -Plan for C likely on monday once patient is more hemodynamically stable and improved from a heart failure standpoint     Probable acute decompensated heart failure  -Patient had  elevated BNP with pulmonary edema on imaging  -Continue diuresis  -RVP positive for human metapneumovirus which is likely also contributing to his dyspnea and may have also triggered heart failure exacerbation  -Echo pending, f/u results  -Continue steroids, inhalers but discontinue doxycycline     COPD exacerbation  - pulm following, bronchodilators, abx  - supplemental oxygen    Hypertension  -Continue home medications for BP control     Dyslipidemia  Continue statin therapy     DM type II, controlled  -Continue sliding scale insulin     EN  -trend labs daily, avoid nephrotoxic meds  -Monitor in the setting of diuresis  -Nephrology following and managing    DVT prophylaxis:  Medical DVT prophylaxis orders are present.        CODE STATUS:   Level Of Support Discussed With: Patient  Code Status (Patient has no pulse and is not breathing): CPR (Attempt to Resuscitate)  Medical Interventions (Patient has pulse or is breathing): Full Support    Disposition:  I expect patient to be discharged 3-4 days.    Norman Grayson MD

## 2024-04-20 NOTE — PROGRESS NOTES
CC--- non-STEMI    Sub  Patient denies any active angina today        Past Medical History:   Diagnosis Date    Anxiety     Essential hypertension, benign     Gastroesophageal reflux disease without esophagitis     Kidney stones 05/31/2020    Dr. Kahlil Terrell    Mixed hyperlipidemia     Rotator cuff syndrome     Torn rotator about seven years ago    Seasonal allergic rhinitis due to pollen     Type 2 diabetes mellitus without complication, without long-term current use of insulin      Past Surgical History:   Procedure Laterality Date    CATARACT EXTRACTION Left 08/24/2023    Dr Melendez    CATARACT EXTRACTION Right 09/07/2023    Dr Melendez    CYSTOSCOPY W/ LASER LITHOTRIPSY  01/2021    INGUINAL HERNIA REPAIR Left 10/2009    NOSE SURGERY      x2    UMBILICAL HERNIA REPAIR  10/2009    Dr. Napier       Physical Exam    General:      well developed, well nourished, in no acute distress.    Head:      normocephalic and atraumatic.    Eyes:      PERRL/EOM intact, conjunctivae and sclerae clear without nystagmus.    Neck:      no  thyromegaly, trachea central with normal respiratory effort  Lungs:      clear bilaterally to auscultation.    Heart:       regular rate and rhythm, S1, S2 without murmurs, rubs, or gallops  Skin:      intact without lesions or rashes.    Psych:      alert and cooperative; normal mood and affect; normal attention span and concentration.            CBC    Results from last 7 days   Lab Units 04/20/24  0214 04/18/24  1343   WBC 10*3/mm3 7.86 7.67   HEMOGLOBIN g/dL 9.3* 10.4*   PLATELETS 10*3/mm3 220 256     BMP   Results from last 7 days   Lab Units 04/20/24  0214 04/19/24  0137 04/18/24  1513   SODIUM mmol/L 142 143 143   POTASSIUM mmol/L 3.9 3.9 4.0   CHLORIDE mmol/L 107 110* 107   CO2 mmol/L 24.0 20.0* 20.0*   BUN mg/dL 91* 72* 65*   CREATININE mg/dL 2.09* 1.83* 1.68*   GLUCOSE mg/dL 160* 158* 131*   MAGNESIUM mg/dL 2.2 2.3 2.0   PHOSPHORUS mg/dL 4.4 3.8  --      Infection     CMP   Results from  last 7 days   Lab Units 04/20/24  0214 04/19/24  0137 04/18/24  1513   SODIUM mmol/L 142 143 143   POTASSIUM mmol/L 3.9 3.9 4.0   CHLORIDE mmol/L 107 110* 107   CO2 mmol/L 24.0 20.0* 20.0*   BUN mg/dL 91* 72* 65*   CREATININE mg/dL 2.09* 1.83* 1.68*   GLUCOSE mg/dL 160* 158* 131*   ALBUMIN g/dL 3.6 3.2*  --      Radiology(recent) US Renal Bilateral    Result Date: 4/19/2024  Impression: Unremarkable bilateral renal ultrasound. Electronically Signed: Sujatha Montoya MD  4/19/2024 7:23 AM EDT  Workstation ID: [id number]       Assessment plan  Non-STEMI on IV heparin and plan for cardiac catheterization early next week  Respiratory virus positive for human metapneumovirus followed by pulmonary  Acute renal insufficiency and nephrology following the patient  Hypertension, dyslipidemia and diabetes    Continue current medications including aspirin and cardiac catheterization to be planned once renal functions improve      Electronically signed by Dimas Mendoza MD, 04/20/24, 3:03 PM EDT.

## 2024-04-20 NOTE — PLAN OF CARE
Goal Outcome Evaluation:              Outcome Evaluation: Pt is a 70 YO M admitted with chest pain, SOA, elevated d-dimer, found to have acute DVT. Pt has been on heparin and is approrpriate for mobility. Pt reports living home alone, typically is indepent with all ADL, ambulation without AD and no recent falls. Pt this date demosntrates good mobility, with slightly impaired strength and balance. Pt ambulated without AD requiring CGA with one minor LOB but able to self recover. Pt with ability to self recover. Pt likely safe to return home after d/c. PT to follow while admitted.      Anticipated Discharge Disposition (PT): home

## 2024-04-20 NOTE — PROGRESS NOTES
"PULMONARY CRITICAL CARE PROGRESS  NOTE      PATIENT IDENTIFICATION:  Name: Fransico Cuenca  MRN: [id number]  :  1955     Age: 69 y.o.  Sex: male    DATE OF Note:  2024   Referring Physician: Kush Sesay MD                  Subjective:   Feeling better, no SOB, no chest or abd pain, no bowel or bladder issues reported    Objective:  tMax 24 hrs: Temp (24hrs), Av.8 °F (36.6 °C), Min:97.2 °F (36.2 °C), Max:98.3 °F (36.8 °C)      Vitals Ranges:   Temp:  [97.2 °F (36.2 °C)-98.3 °F (36.8 °C)] 98 °F (36.7 °C)  Heart Rate:  [50-88] 81  Resp:  [16-23] 20  BP: (133-147)/() 145/86    Intake and Output Last 3 Shifts:   I/O last 3 completed shifts:  In: 920 [P.O.:920]  Out: -     Exam:  /86 (BP Location: Left arm, Patient Position: Sitting)   Pulse 81   Temp 98 °F (36.7 °C) (Oral)   Resp 20   Ht 167.6 cm (66\")   Wt 98.5 kg (217 lb 2.5 oz) Comment: bed zeroed prior to weighing pt  SpO2 93%   BMI 35.05 kg/m²     General Appearance: Alert  HEENT:  Normocephalic, without obvious abnormality. Conjunctivae/corneas clear.  Normal external ear canals. Nares normal, no drainage     Neck:  Supple, symmetrical, trachea midline. No JVD.  Lungs /Chest wall:   Bilateral basal rhonchi, respirations unlabored, symmetrical wall movement.     Heart:  Regular rate and rhythm, systolic murmur PMI left sternal border  Abdomen: Soft, nontender, no masses, no organomegaly.    Extremities: Trace edema, no clubbing or cyanosis        Medications:  amLODIPine, 5 mg, Oral, Q24H  atorvastatin, 40 mg, Oral, Nightly  budesonide, 0.5 mg, Nebulization, BID - RT  carvedilol, 3.125 mg, Oral, BID With Meals  clopidogrel, 75 mg, Oral, Daily  insulin lispro, 2-7 Units, Subcutaneous, 4x Daily AC & at Bedtime  ipratropium-albuterol, 3 mL, Nebulization, 4x Daily - RT  methylPREDNISolone sodium succinate, 60 mg, Intravenous, Q12H  montelukast, 10 mg, Oral, Nightly  pantoprazole, 40 mg, Oral, Q AM  senna-docusate sodium, 2 " tablet, Oral, BID  sodium chloride, 10 mL, Intravenous, Q12H        Infusion:  heparin, 12 Units/kg/hr, Last Rate: 20 Units/kg/hr (04/20/24 1057)  sodium chloride, 75 mL/hr, Last Rate: 75 mL/hr (04/20/24 1016)         PRN:    acetaminophen    senna-docusate sodium **AND** polyethylene glycol **AND** bisacodyl **AND** bisacodyl    Calcium Replacement - Follow Nurse / BPA Driven Protocol    dextrose    dextrose    glucagon (human recombinant)    heparin    heparin    HYDROcodone-acetaminophen    Magnesium Standard Dose Replacement - Follow Nurse / BPA Driven Protocol    nitroglycerin    Phosphorus Replacement - Follow Nurse / BPA Driven Protocol    Potassium Replacement - Follow Nurse / BPA Driven Protocol    sodium chloride    sodium chloride    sodium chloride  Data Review:  All labs (24hrs):   Recent Results (from the past 24 hour(s))   aPTT    Collection Time: 04/19/24  3:13 PM    Specimen: Blood   Result Value Ref Range    PTT 35.0 (L) 61.0 - 76.5 seconds   POC Glucose Once    Collection Time: 04/19/24  4:21 PM    Specimen: Blood   Result Value Ref Range    Glucose 209 (H) 70 - 105 mg/dL   POC Glucose Once    Collection Time: 04/19/24  8:41 PM    Specimen: Blood   Result Value Ref Range    Glucose 177 (H) 70 - 105 mg/dL   CBC Auto Differential    Collection Time: 04/20/24  2:14 AM    Specimen: Arm, Right; Blood   Result Value Ref Range    WBC 7.86 3.40 - 10.80 10*3/mm3    RBC 3.55 (L) 4.14 - 5.80 10*6/mm3    Hemoglobin 9.3 (L) 13.0 - 17.7 g/dL    Hematocrit 29.2 (L) 37.5 - 51.0 %    MCV 82.3 79.0 - 97.0 fL    MCH 26.2 (L) 26.6 - 33.0 pg    MCHC 31.8 31.5 - 35.7 g/dL    RDW 15.5 (H) 12.3 - 15.4 %    RDW-SD 46.5 37.0 - 54.0 fl    MPV 10.5 6.0 - 12.0 fL    Platelets 220 140 - 450 10*3/mm3    Neutrophil % 86.5 (H) 42.7 - 76.0 %    Lymphocyte % 6.4 (L) 19.6 - 45.3 %    Monocyte % 5.7 5.0 - 12.0 %    Eosinophil % 0.0 (L) 0.3 - 6.2 %    Basophil % 0.1 0.0 - 1.5 %    Immature Grans % 1.3 (H) 0.0 - 0.5 %    Neutrophils,  Absolute 6.80 1.70 - 7.00 10*3/mm3    Lymphocytes, Absolute 0.50 (L) 0.70 - 3.10 10*3/mm3    Monocytes, Absolute 0.45 0.10 - 0.90 10*3/mm3    Eosinophils, Absolute 0.00 0.00 - 0.40 10*3/mm3    Basophils, Absolute 0.01 0.00 - 0.20 10*3/mm3    Immature Grans, Absolute 0.10 (H) 0.00 - 0.05 10*3/mm3    nRBC 0.0 0.0 - 0.2 /100 WBC   Magnesium    Collection Time: 04/20/24  2:14 AM    Specimen: Arm, Right; Blood   Result Value Ref Range    Magnesium 2.2 1.6 - 2.4 mg/dL   aPTT    Collection Time: 04/20/24  2:14 AM    Specimen: Arm, Right; Blood   Result Value Ref Range    PTT 41.1 (L) 61.0 - 76.5 seconds   Renal Function Panel    Collection Time: 04/20/24  2:14 AM    Specimen: Arm, Right; Blood   Result Value Ref Range    Glucose 160 (H) 65 - 99 mg/dL    BUN 91 (H) 8 - 23 mg/dL    Creatinine 2.09 (H) 0.76 - 1.27 mg/dL    Sodium 142 136 - 145 mmol/L    Potassium 3.9 3.5 - 5.2 mmol/L    Chloride 107 98 - 107 mmol/L    CO2 24.0 22.0 - 29.0 mmol/L    Calcium 8.7 8.6 - 10.5 mg/dL    Albumin 3.6 3.5 - 5.2 g/dL    Phosphorus 4.4 2.5 - 4.5 mg/dL    Anion Gap 11.0 5.0 - 15.0 mmol/L    BUN/Creatinine Ratio 43.5 (H) 7.0 - 25.0    eGFR 33.4 (L) >60.0 mL/min/1.73   POC Glucose Once    Collection Time: 04/20/24  7:44 AM    Specimen: Blood   Result Value Ref Range    Glucose 129 (H) 70 - 105 mg/dL   aPTT    Collection Time: 04/20/24  9:56 AM    Specimen: Arm, Left; Blood   Result Value Ref Range    PTT 43.2 (L) 61.0 - 76.5 seconds   Urinalysis With Microscopic If Indicated (No Culture) - Urine, Clean Catch    Collection Time: 04/20/24 10:53 AM    Specimen: Urine, Clean Catch   Result Value Ref Range    Color, UA Yellow Yellow, Straw    Appearance, UA Hazy (A) Clear    pH, UA <=5.0 5.0 - 8.0    Specific Gravity, UA 1.016 1.005 - 1.030    Glucose, UA Negative Negative    Ketones, UA Negative Negative    Bilirubin, UA Negative Negative    Blood, UA Large (3+) (A) Negative    Protein, UA 30 mg/dL (1+) (A) Negative    Leuk Esterase, UA Trace  (A) Negative    Nitrite, UA Negative Negative    Urobilinogen, UA 0.2 E.U./dL 0.2 - 1.0 E.U./dL   Urinalysis, Microscopic Only - Urine, Clean Catch    Collection Time: 04/20/24 10:53 AM    Specimen: Urine, Clean Catch   Result Value Ref Range    RBC, UA Too Numerous to Count (A) None Seen, 0-2 /HPF    WBC, UA 0-2 None Seen, 0-2 /HPF    Bacteria, UA None Seen None Seen /HPF    Squamous Epithelial Cells, UA 0-2 None Seen, 0-2 /HPF    Hyaline Casts, UA None Seen None Seen /LPF    Amorphous Crystals, UA Small/1+ None Seen /HPF    Methodology Manual Light Microscopy    POC Glucose 4x Daily Before Meals & at Bedtime    Collection Time: 04/20/24 11:09 AM    Specimen: Blood   Result Value Ref Range    Glucose 132 (H) 70 - 105 mg/dL        Imaging:  US Renal Bilateral  Narrative: US RENAL BILATERAL    Date of Exam: 4/18/2024 10:12 PM EDT    Indication: Kidney failure, acute  EN.    Comparison: CT abdomen and pelvis 7/26/2020    Technique: Grayscale and color Doppler ultrasound evaluation of the kidneys and urinary bladder was performed.    Findings:  RIGHT kidney measures 12.2 cm.  Kidney echogenicity, size, and vascularity appear within normal limits. There is no solid kidney mass.  No echogenic shadowing stone.  No hydronephrosis.    LEFT kidney measures 12.6 cm. Kidney echogenicity, size, and vascularity appear within normal limits. There is no solid kidney mass.  No echogenic shadowing stone.  No hydronephrosis.    Limited visualization of the urinary bladder is unremarkable. Total volume of 44 mL.  Impression: Impression:  Unremarkable bilateral renal ultrasound.    Electronically Signed: Sujatha Montoya MD    4/19/2024 7:23 AM EDT    Workstation ID: [id number]       ASSESSMENT:  COPD  NSTEMI  DM II  HTN  GERD  Hyperlipidemia  EN        PLAN:  Encourage admit daily  Antibiotics  Bronchodilators  Inhaled corticosteroids  Incentive spirometer  Heparin drip per Cardiology   Echo ordered  Cardiology/Nephrology consulted    Electrolytes/ glycemic control  DVT and GI prophylaxis     Discussed with Dr Agustin Grubbs, [facility]   4/20/2024  11:34 EDT    I personally have examined  and interviewed the patient. I have reviewed the history, data, problems, assessment and plan with our NP.  Total Critical care time in direct medical management (   ) minutes, This time specifically excludes time spent performing procedures.    Danay Velez MD   4/20/2024  12:41 EDT

## 2024-04-20 NOTE — THERAPY EVALUATION
Patient Name: Fransico Cuenca  : 1955    MRN: [id number]                              Today's Date: 2024       Admit Date: 2024    Visit Dx:     ICD-10-CM ICD-9-CM   1. NSTEMI (non-ST elevated myocardial infarction)  I21.4 410.70     Patient Active Problem List   Diagnosis    Anxiety    Hypertension, benign    Mixed hyperlipidemia    Allergic rhinitis due to pollen    Type 2 diabetes mellitus without complication, without long-term current use of insulin    Encounter for prostate cancer screening    Screening for colon cancer    Family history of ischemic heart disease    Class 2 severe obesity due to excess calories with serious comorbidity and body mass index (BMI) of 35.0 to 35.9 in adult    Obstructive sleep apnea syndrome    Medicare annual wellness visit, subsequent    History of kidney stones    Primary osteoarthritis of left hand    Iron deficiency anemia due to chronic blood loss    NSTEMI (non-ST elevated myocardial infarction)     Past Medical History:   Diagnosis Date    Anxiety     Essential hypertension, benign     Gastroesophageal reflux disease without esophagitis     Kidney stones 2020    Dr. [facility]    Mixed hyperlipidemia     Rotator cuff syndrome     Torn rotator about seven years ago    Seasonal allergic rhinitis due to pollen     Type 2 diabetes mellitus without complication, without long-term current use of insulin      Past Surgical History:   Procedure Laterality Date    CATARACT EXTRACTION Left 2023    Dr Melendez    CATARACT EXTRACTION Right 2023    Dr Melendez    CYSTOSCOPY W/ LASER LITHOTRIPSY  2021    INGUINAL HERNIA REPAIR Left 10/2009    NOSE SURGERY      x2    UMBILICAL HERNIA REPAIR  10/2009    Dr. Napier      General Information       Row Name 24 1297          Physical Therapy Time and Intention    Document Type evaluation  -EL     Mode of Treatment individual therapy;physical therapy  -EL       Row Name 24 3887          General  Information    Prior Level of Function independent:;all household mobility  -EL       Row Name 04/20/24 1713          Living Environment    People in Home alone  -EL       Row Name 04/20/24 1713          Home Main Entrance    Number of Stairs, Main Entrance one  -EL       Row Name 04/20/24 1713          Cognition    Orientation Status (Cognition) oriented x 4  -EL       Row Name 04/20/24 1713          Safety Issues, Functional Mobility    Impairments Affecting Function (Mobility) balance;endurance/activity tolerance  -EL               User Key  (r) = Recorded By, (t) = Taken By, (c) = Cosigned By      Initials Name Provider Type    Rigoberto Rider, PT Physical Therapist                   Mobility       Row Name 04/20/24 1716          Bed Mobility    Comment, (Bed Mobility) in chair when PT entered  -EL       Row Name 04/20/24 1716          Gait/Stairs (Locomotion)    [place] Level (Gait) contact guard  -EL     Deviations/Abnormal Patterns (Gait) stride length decreased  -EL     Comment, (Gait/Stairs) one minor LOB but able to self correct  -EL               User Key  (r) = Recorded By, (t) = Taken By, (c) = Cosigned By      Initials Name Provider Type    Rigoberto Rider, PT Physical Therapist                   Obj/Interventions       Row Name 04/20/24 1716          Range of Motion Comprehensive    General Range of Motion bilateral lower extremity ROM WFL  -EL       Row Name 04/20/24 1716          Strength Comprehensive (MMT)    General Manual Muscle Testing (MMT) Assessment lower extremity strength deficits identified  -EL     Comment, General Manual Muscle Testing (MMT) Assessment BLE 4-/5 gross  -EL       Row Name 04/20/24 1716          Balance    Balance Assessment sitting static balance;standing static balance;sit to stand dynamic balance;standing dynamic balance  -EL     Static Sitting Balance independent  -EL     Static Standing Balance contact guard  -EL     Dynamic Standing Balance contact guard  -EL                User Key  (r) = Recorded By, (t) = Taken By, (c) = Cosigned By      Initials Name Provider Type    Rigoberto Rider, PT Physical Therapist                   Goals/Plan       Row Name 04/20/24 1724          Bed Mobility Goal 1 (PT)    Activity/Assistive Device (Bed Mobility Goal 1, PT) bed mobility activities, all  -EL     [place] Level/Cues Needed (Bed Mobility Goal 1, PT) modified independence  -EL     Time Frame (Bed Mobility Goal 1, PT) long term goal (LTG);2 weeks  -EL       [facility] Name 04/20/24 1724          Transfer Goal 1 (PT)    Activity/Assistive Device (Transfer Goal 1, PT) transfers, all  -EL     [place] Level/Cues Needed (Transfer Goal 1, PT) independent  -EL     Time Frame (Transfer Goal 1, PT) long term goal (LTG);2 weeks  -EL       Row Name 04/20/24 1724          Gait Training Goal 1 (PT)    Activity/Assistive Device (Gait Training Goal 1, PT) gait (walking locomotion)  -EL     [place] Level (Gait Training Goal 1, PT) independent  -EL     Distance (Gait Training Goal 1, PT) 300  -EL     Time Frame (Gait Training Goal 1, PT) long term goal (LTG);2 weeks  -EL       Row Name 04/20/24 1724          Therapy Assessment/Plan (PT)    Planned Therapy Interventions (PT) neuromuscular re-education;balance training;bed mobility training;transfer training;gait training;patient/family education;strengthening  -EL               User Key  (r) = Recorded By, (t) = Taken By, (c) = Cosigned By      Initials Name Provider Type    Rigoberto Rider, PT Physical Therapist                   Clinical Impression       Row Name 04/20/24 1717          Pain    Pretreatment Pain Rating 0/10 - no pain  -EL     Posttreatment Pain Rating 0/10 - no pain  -EL     Additional Documentation Pain Scale: FACES Pre/Post-Treatment (Group)  -EL       Row Name 04/20/24 1717          Pain Scale: FACES Pre/Post-Treatment    Pain: FACES Scale, Pretreatment 0-->no hurt  -EL     Posttreatment Pain Rating 0-->no hurt  -EL       Row Name  04/20/24 1717          Plan of Care Review    Outcome Evaluation Pt is a 68 YO M admitted with chest pain, SOA, elevated d-dimer, found to have acute DVT. Pt has been on heparin and is approrpriate for mobility. Pt reports living home alone, typically is indepent with all ADL, ambulation without AD and no recent falls. Pt this date demosntrates good mobility, with slightly impaired strength and balance. Pt ambulated without AD requiring CGA with one minor LOB but able to self recover. Pt with ability to self recover. Pt likely safe to return home after d/c. PT to follow while admitted.  -EL       Row Name 04/20/24 1717          Therapy Assessment/Plan (PT)    Rehab Potential (PT) good, to achieve stated therapy goals  -EL     Criteria for Skilled Interventions Met (PT) yes;meets criteria;skilled treatment is necessary  -EL     Therapy Frequency (PT) 3 times/wk  -EL     Predicted Duration of Therapy Intervention (PT) until dc  -EL       Row Name 04/20/24 1717          Vital Signs    O2 Delivery Pre Treatment room air  -EL     O2 Delivery Intra Treatment room air  -EL     O2 Delivery Post Treatment room air  -EL     Pre Patient Position Sitting  -EL     Intra Patient Position Standing  -EL     Post Patient Position Sitting  -EL       Row Name 04/20/24 1717          Positioning and Restraints    Pre-Treatment Position sitting in chair/recliner  -EL     Post Treatment Position bathroom  -EL     Bathroom sitting;call light within reach;encouraged to call for assist  -EL               User Key  (r) = Recorded By, (t) = Taken By, (c) = Cosigned By      Initials Name Provider Type    EL Rigoberto Berg, PT Physical Therapist                   Outcome Measures       Row Name 04/20/24 1735 04/20/24 0800       How much help from another person do you currently need...    Turning from your back to your side while in flat bed without using bedrails? 4  -EL 4  -SM    Moving from lying on back to sitting on the side of a flat bed  without bedrails? 4  -EL 4  -SM    Moving to and from a bed to a chair (including a wheelchair)? 4  -EL 4  -SM    Standing up from a chair using your arms (e.g., wheelchair, bedside chair)? 3  -EL 4  -SM    Climbing 3-5 steps with a railing? 3  -EL 4  -SM    To walk in hospital room? 3  -EL 4  -SM    AM-PAC 6 Clicks Score (PT) 21  -EL 24  -SM    Highest Level of Mobility Goal 6 --> Walk 10 steps or more  -EL 8 --> Walked 250 feet or more  -      Row Name 04/20/24 1735          Functional Assessment    Outcome Measure Options AM-PAC 6 Clicks Basic Mobility (PT)  -               User Key  (r) = Recorded By, (t) = Taken By, (c) = Cosigned By      Initials Name Provider Type    Rigoberto Rider, CLARKE Physical Therapist     Imelda Romero, RN Registered Nurse                                 Physical Therapy Education       Title: PT OT SLP Therapies (Done)       Topic: Physical Therapy (Done)       Point: Mobility training (Done)       Learning Progress Summary             Patient Acceptance, E,TB, VU by  at 4/20/2024 1743                         Point: Precautions (Done)       Learning Progress Summary             Patient Acceptance, E,TB, VU by  at 4/20/2024 1743                                         User Key       Initials Effective Dates Name Provider Type Discipline     06/23/20 -  Rigoberto Berg, PT Physical Therapist PT                  PT Recommendation and Plan  Planned Therapy Interventions (PT): neuromuscular re-education, balance training, bed mobility training, transfer training, gait training, patient/family education, strengthening  Outcome Evaluation: Pt is a 68 YO M admitted with chest pain, SOA, elevated d-dimer, found to have acute DVT. Pt has been on heparin and is approrpriate for mobility. Pt reports living home alone, typically is indepent with all ADL, ambulation without AD and no recent falls. Pt this date demosntrates good mobility, with slightly impaired strength and balance. Pt ambulated  without AD requiring CGA with one minor LOB but able to self recover. Pt with ability to self recover. Pt likely safe to return home after d/c. PT to follow while admitted.     Time Calculation:   PT Evaluation Complexity  History, PT Evaluation Complexity: 1-2 personal factors and/or comorbidities  Examination of Body Systems (PT Eval Complexity): total of 3 or more elements  Clinical Presentation (PT Evaluation Complexity): evolving  Clinical Decision Making (PT Evaluation Complexity): moderate complexity  Overall Complexity (PT Evaluation Complexity): moderate complexity     PT Charges       Row Name 04/20/24 1745             Time Calculation    Start Time 1316  -EL      Stop Time 1329  -EL      Time Calculation (min) 13 min  -EL      PT Received On 04/20/24  -EL      PT - Next Appointment 04/22/24  -EL      PT Goal Re-Cert Due Date 05/04/24  -EL                User Key  (r) = Recorded By, (t) = Taken By, (c) = Cosigned By      Initials Name Provider Type    EL Rigoberto Berg, PT Physical Therapist                  Therapy Charges for Today       Code Description Service Date Service Provider Modifiers Qty    [id number]  PT EVAL MOD COMPLEXITY 3 4/20/2024 Rigoberto Berg, PT GP 1            PT G-Codes  Outcome Measure Options: AM-PAC 6 Clicks Basic Mobility (PT)  AM-PAC 6 Clicks Score (PT): 21  PT Discharge Summary  Anticipated Discharge Disposition (PT): home    Rigoberto Berg PT  4/20/2024

## 2024-04-20 NOTE — PROGRESS NOTES
"NEPHROLOGY PROGRESS NOTE------[facility]    [facility]  [phone]  Ashwin Butcher MD      Patient Care Team:  Camryn Salinas MD as PCP - General (Family Medicine)  Ashwin Butcher MD as Consulting Physician (Nephrology)      Provider:  Ashwin Butcher MD  Patient Name: Fransico Cuenca  :  1955    SUBJECTIVE:  Follow-up EN  No chest pain or shortness of breath  Up in chair    Medication:  amLODIPine, 5 mg, Oral, Q24H  atorvastatin, 40 mg, Oral, Nightly  budesonide, 0.5 mg, Nebulization, BID - RT  carvedilol, 3.125 mg, Oral, BID With Meals  clopidogrel, 75 mg, Oral, Daily  insulin lispro, 2-7 Units, Subcutaneous, 4x Daily AC & at Bedtime  ipratropium-albuterol, 3 mL, Nebulization, 4x Daily - RT  methylPREDNISolone sodium succinate, 60 mg, Intravenous, Q12H  montelukast, 10 mg, Oral, Nightly  pantoprazole, 40 mg, Oral, Q AM  senna-docusate sodium, 2 tablet, Oral, BID  sodium chloride, 10 mL, Intravenous, Q12H      heparin, 12 Units/kg/hr, Last Rate: 19 Units/kg/hr (24 0738)        OBJECTIVE    Vital Sign Min/Max for last 24 hours  Temp  Min: 97.2 °F (36.2 °C)  Max: 98.3 °F (36.8 °C)   BP  Min: 133/116  Max: 147/88   Pulse  Min: 50  Max: 88   Resp  Min: 16  Max: 23   SpO2  Min: 90 %  Max: 100 %   No data recorded   No data recorded     Flowsheet Rows      Flowsheet Row First Filed Value   Admission Height 167.6 cm (66\") Documented at 2024 1301   Admission Weight 95.3 kg (210 lb 1.6 oz) Documented at 2024 1301            I/O this shift:  In: 400 [P.O.:400]  Out: -   I/O last 3 completed shifts:  In: 920 [P.O.:920]  Out: -     Physical Exam:  General Appearance: alert, appears stated age and cooperative  Head: normocephalic, without obvious abnormality and atraumatic  Eyes: conjunctivae and sclerae normal and no icterus  Neck: supple and no JVD  Lungs: Clear to auscultation bilaterally  Heart: regular rhythm & normal rate and normal " "S1, S2  Chest: Wall no abnormalities observed  Abdomen: normal bowel sounds and soft, nontender  Extremities: moves extremities well, trace edema, no cyanosis and no redness  Skin: no bleeding, bruising or rash, turgor normal, color normal and no lesions noted  Neurologic: Alert, and oriented. No focal deficits    Labs:    WBC WBC   Date Value Ref Range Status   04/20/2024 7.86 3.40 - 10.80 10*3/mm3 Final   04/18/2024 7.67 3.40 - 10.80 10*3/mm3 Final      HGB Hemoglobin   Date Value Ref Range Status   04/20/2024 9.3 (L) 13.0 - 17.7 g/dL Final   04/18/2024 10.4 (L) 13.0 - 17.7 g/dL Final      HCT Hematocrit   Date Value Ref Range Status   04/20/2024 29.2 (L) 37.5 - 51.0 % Final   04/18/2024 34.0 (L) 37.5 - 51.0 % Final      Platelets No results found for: \"LABPLAT\"   MCV MCV   Date Value Ref Range Status   04/20/2024 82.3 79.0 - 97.0 fL Final   04/18/2024 85.4 79.0 - 97.0 fL Final          Sodium Sodium   Date Value Ref Range Status   04/20/2024 142 136 - 145 mmol/L Final   04/19/2024 143 136 - 145 mmol/L Final   04/18/2024 143 136 - 145 mmol/L Final      Potassium Potassium   Date Value Ref Range Status   04/20/2024 3.9 3.5 - 5.2 mmol/L Final   04/19/2024 3.9 3.5 - 5.2 mmol/L Final     Comment:     Slight hemolysis detected by analyzer. Result may be falsely elevated.   04/18/2024 4.0 3.5 - 5.2 mmol/L Final      Chloride Chloride   Date Value Ref Range Status   04/20/2024 107 98 - 107 mmol/L Final   04/19/2024 110 (H) 98 - 107 mmol/L Final   04/18/2024 107 98 - 107 mmol/L Final      CO2 CO2   Date Value Ref Range Status   04/20/2024 24.0 22.0 - 29.0 mmol/L Final   04/19/2024 20.0 (L) 22.0 - 29.0 mmol/L Final   04/18/2024 20.0 (L) 22.0 - 29.0 mmol/L Final      BUN BUN   Date Value Ref Range Status   04/20/2024 91 (H) 8 - 23 mg/dL Final   04/19/2024 72 (H) 8 - 23 mg/dL Final   04/18/2024 65 (H) 8 - 23 mg/dL Final      Creatinine Creatinine   Date Value Ref Range Status   04/20/2024 2.09 (H) 0.76 - 1.27 mg/dL Final " "  04/19/2024 1.83 (H) 0.76 - 1.27 mg/dL Final   04/18/2024 1.68 (H) 0.76 - 1.27 mg/dL Final      Calcium Calcium   Date Value Ref Range Status   04/20/2024 8.7 8.6 - 10.5 mg/dL Final   04/19/2024 8.0 (L) 8.6 - 10.5 mg/dL Final   04/18/2024 9.4 8.6 - 10.5 mg/dL Final      PO4 No components found for: \"PO4\"   Albumin Albumin   Date Value Ref Range Status   04/20/2024 3.6 3.5 - 5.2 g/dL Final   04/19/2024 3.2 (L) 3.5 - 5.2 g/dL Final      Magnesium Magnesium   Date Value Ref Range Status   04/20/2024 2.2 1.6 - 2.4 mg/dL Final   04/19/2024 2.3 1.6 - 2.4 mg/dL Final   04/18/2024 2.0 1.6 - 2.4 mg/dL Final      Uric Acid No components found for: \"URIC ACID\"     Imaging Results (Last 72 Hours)       Procedure Component Value Units Date/Time    US Renal Bilateral [[id number]] Collected: 04/19/24 0722     Updated: 04/19/24 0725    Narrative:      US RENAL BILATERAL    Date of Exam: 4/18/2024 10:12 PM EDT    Indication: Kidney failure, acute  EN.    Comparison: CT abdomen and pelvis 7/26/2020    Technique: Grayscale and color Doppler ultrasound evaluation of the kidneys and urinary bladder was performed.      Findings:  RIGHT kidney measures 12.2 cm.  Kidney echogenicity, size, and vascularity appear within normal limits. There is no solid kidney mass.  No echogenic shadowing stone.  No hydronephrosis.    LEFT kidney measures 12.6 cm. Kidney echogenicity, size, and vascularity appear within normal limits. There is no solid kidney mass.  No echogenic shadowing stone.  No hydronephrosis.    Limited visualization of the urinary bladder is unremarkable. Total volume of 44 mL.        Impression:      Impression:  Unremarkable bilateral renal ultrasound.        Electronically Signed: Sujatha Montoya MD    4/19/2024 7:23 AM EDT    Workstation ID: [id number]    XR Chest 1 View [[id number]] Collected: 04/18/24 1346     Updated: 04/18/24 1351    Narrative:      XR CHEST 1 VW    Date of Exam: 4/18/2024 1:30 PM EDT    Indication: Chest " pain    Comparison: AP chest x-ray 4/18/2024 at 9:00 a.m.    Findings:  Compared to today's earlier chest x-ray, there are lower lung volumes with increased airspace opacities in both lower lungs. No pneumothorax is seen. Cardiac silhouette is enlarged, but is exaggerated by portable AP technique.      Impression:      Impression:  Lower lung volumes with increased airspace opacities in both lower lungs, which may be due to atelectasis, pulmonary edema, or less likely pneumonia.      Electronically Signed: Lisa Lan    4/18/2024 1:49 PM EDT    Workstation ID: [id number]            Results for orders placed during the hospital encounter of 04/18/24    XR Chest 1 View    Narrative  XR CHEST 1 VW    Date of Exam: 4/18/2024 1:30 PM EDT    Indication: Chest pain    Comparison: AP chest x-ray 4/18/2024 at 9:00 a.m.    Findings:  Compared to today's earlier chest x-ray, there are lower lung volumes with increased airspace opacities in both lower lungs. No pneumothorax is seen. Cardiac silhouette is enlarged, but is exaggerated by portable AP technique.    Impression  Impression:  Lower lung volumes with increased airspace opacities in both lower lungs, which may be due to atelectasis, pulmonary edema, or less likely pneumonia.      Electronically Signed: Lisa Lan  4/18/2024 1:49 PM EDT  Workstation ID: [id number]      Results for orders placed in visit on 02/23/24    XR Shoulder 2+ View Right    Narrative  DATE OF EXAM:  2/23/2024    PROCEDURE:  XR shoulder 2+ view right    INDICATIONS:  Chronic right shoulder pain    COMPARISON:  No Comparisons Available    FINDINGS:  No acute fracture is seen. Mineralization is within normal limits.  The humeral head appears to be well-seated within the glenoid fossa.  There is mild glenohumeral and acromioclavicular joint space narrowing consistent with mild/early osteoarthritic change.  No radiopaque foreign bodies.  Lung spaces are clear.    Impression  Mild acromioclavicular and  glenohumeral osteoarthritic disease, otherwise no acute bony abnormality of the right shoulder.      Results for orders placed in visit on 08/04/23    XR Hand 3+ View Left    Narrative  XR HAND 3+ VW LEFT    Date of Exam: 8/4/2023 1:57 PM EDT    Indication: Left hand pain    Comparison: None available.    Findings:  There is no acute fracture. There is marked narrowing with joint space loss and bone-on-bone contact of the first metacarpal/carpal joint. There is subchondral sclerosis and spurring and slight radial subluxation of the base of the first metacarpal bone  with respect to the adjacent trapezium. There is narrowing and spurring of the IP joint of the left thumb, the first MCP joint, and several of the inner-phalangeal joints of the digits consistent with moderate and severe osteoarthritis. There are no  periarticular erosions. There is soft tissue swelling involving the digits in the area of prominent degenerative changes.    Impression  Impression:    1. No acute fracture.  2. Moderate and severe osteoarthritis as described.  3. Areas of soft tissue swelling involving the digits where the patient has prominent degenerative changes.      Electronically Signed: Lucio Pedraza  8/4/2023 3:11 PM EDT  Workstation ID: [id number]            ASSESSMENT / PLAN      NSTEMI (non-ST elevated myocardial infarction)    EN-undetermined etiology.  This might be cardiorenal in setting of fluid overload/ACE inhibitor and thiazide use.  History of nephrolithiasis-CT several years ago showed left-sided obstructive kidney stone  Hypertension  Hyperlipidemia  Type 2 diabetes  Hold metformin  COPD with exacerbation  Hypervolemia-better.  Diuresed  NSTEMI-Per cardiology.  Hyperuricemia-likely overdiuresis.  Will monitor     CR still rising, volume better, uric acid high at 11  Holding lasix  Voiding by self.  Will give a little fluids  UA with large blood, will check serology to exclude vasculitis  Heart cath planned for  Monday        Ashwin Butcher MD  [facility]  [phone]  04/20/24  09:40 EDT

## 2024-04-21 LAB
ALBUMIN SERPL-MCNC: 3.5 G/DL (ref 3.5–5.2)
ANION GAP SERPL CALCULATED.3IONS-SCNC: 11 MMOL/L (ref 5–15)
APTT PPP: 59.3 SECONDS (ref 61–76.5)
APTT PPP: 66.9 SECONDS (ref 61–76.5)
BUN SERPL-MCNC: 80 MG/DL (ref 8–23)
BUN/CREAT SERPL: 40 (ref 7–25)
CALCIUM SPEC-SCNC: 8.7 MG/DL (ref 8.6–10.5)
CHLORIDE SERPL-SCNC: 108 MMOL/L (ref 98–107)
CO2 SERPL-SCNC: 22 MMOL/L (ref 22–29)
CREAT SERPL-MCNC: 2 MG/DL (ref 0.76–1.27)
EGFRCR SERPLBLD CKD-EPI 2021: 35.2 ML/MIN/1.73
GLUCOSE BLDC GLUCOMTR-MCNC: 120 MG/DL (ref 70–105)
GLUCOSE BLDC GLUCOMTR-MCNC: 124 MG/DL (ref 70–105)
GLUCOSE BLDC GLUCOMTR-MCNC: 139 MG/DL (ref 70–105)
GLUCOSE BLDC GLUCOMTR-MCNC: 152 MG/DL (ref 70–105)
GLUCOSE SERPL-MCNC: 188 MG/DL (ref 65–99)
PHOSPHATE SERPL-MCNC: 3.9 MG/DL (ref 2.5–4.5)
POTASSIUM SERPL-SCNC: 4.2 MMOL/L (ref 3.5–5.2)
SODIUM SERPL-SCNC: 141 MMOL/L (ref 136–145)
URATE SERPL-MCNC: 12.5 MG/DL (ref 3.4–7)

## 2024-04-21 PROCEDURE — 94799 UNLISTED PULMONARY SVC/PX: CPT

## 2024-04-21 PROCEDURE — 99232 SBSQ HOSP IP/OBS MODERATE 35: CPT | Performed by: INTERNAL MEDICINE

## 2024-04-21 PROCEDURE — 85730 THROMBOPLASTIN TIME PARTIAL: CPT | Performed by: HOSPITALIST

## 2024-04-21 PROCEDURE — 25010000002 HEPARIN (PORCINE) 25000-0.45 UT/250ML-% SOLUTION: Performed by: INTERNAL MEDICINE

## 2024-04-21 PROCEDURE — 63710000001 INSULIN LISPRO (HUMAN) PER 5 UNITS: Performed by: INTERNAL MEDICINE

## 2024-04-21 PROCEDURE — 25810000003 SODIUM CHLORIDE 0.9 % SOLUTION: Performed by: INTERNAL MEDICINE

## 2024-04-21 PROCEDURE — 25010000002 METHYLPREDNISOLONE PER 125 MG: Performed by: INTERNAL MEDICINE

## 2024-04-21 PROCEDURE — 94761 N-INVAS EAR/PLS OXIMETRY MLT: CPT

## 2024-04-21 PROCEDURE — 94664 DEMO&/EVAL PT USE INHALER: CPT

## 2024-04-21 PROCEDURE — 82948 REAGENT STRIP/BLOOD GLUCOSE: CPT

## 2024-04-21 PROCEDURE — 82948 REAGENT STRIP/BLOOD GLUCOSE: CPT | Performed by: INTERNAL MEDICINE

## 2024-04-21 RX ORDER — ALLOPURINOL 100 MG/1
100 TABLET ORAL DAILY
Status: DISCONTINUED | OUTPATIENT
Start: 2024-04-21 | End: 2024-04-24 | Stop reason: HOSPADM

## 2024-04-21 RX ADMIN — IPRATROPIUM BROMIDE AND ALBUTEROL SULFATE 3 ML: .5; 3 SOLUTION RESPIRATORY (INHALATION) at 08:12

## 2024-04-21 RX ADMIN — METHYLPREDNISOLONE SODIUM SUCCINATE 60 MG: 125 INJECTION, POWDER, FOR SOLUTION INTRAMUSCULAR; INTRAVENOUS at 05:55

## 2024-04-21 RX ADMIN — SODIUM CHLORIDE 75 ML/HR: 9 INJECTION, SOLUTION INTRAVENOUS at 14:18

## 2024-04-21 RX ADMIN — INSULIN LISPRO 2 UNITS: 100 INJECTION, SOLUTION INTRAVENOUS; SUBCUTANEOUS at 11:55

## 2024-04-21 RX ADMIN — Medication 600 MG: at 21:38

## 2024-04-21 RX ADMIN — BUDESONIDE 0.5 MG: 0.5 INHALANT RESPIRATORY (INHALATION) at 08:12

## 2024-04-21 RX ADMIN — HEPARIN SODIUM 12 UNITS/KG/HR: 10000 INJECTION, SOLUTION INTRAVENOUS at 06:18

## 2024-04-21 RX ADMIN — SALINE NASAL SPRAY 1 SPRAY: 1.5 SOLUTION NASAL at 08:10

## 2024-04-21 RX ADMIN — PANTOPRAZOLE SODIUM 40 MG: 40 TABLET, DELAYED RELEASE ORAL at 05:55

## 2024-04-21 RX ADMIN — BUDESONIDE 0.5 MG: 0.5 INHALANT RESPIRATORY (INHALATION) at 15:22

## 2024-04-21 RX ADMIN — CARVEDILOL 3.12 MG: 3.12 TABLET, FILM COATED ORAL at 08:08

## 2024-04-21 RX ADMIN — CARVEDILOL 3.12 MG: 3.12 TABLET, FILM COATED ORAL at 18:10

## 2024-04-21 RX ADMIN — ATORVASTATIN CALCIUM 40 MG: 40 TABLET, FILM COATED ORAL at 21:38

## 2024-04-21 RX ADMIN — METHYLPREDNISOLONE SODIUM SUCCINATE 60 MG: 125 INJECTION, POWDER, FOR SOLUTION INTRAMUSCULAR; INTRAVENOUS at 18:10

## 2024-04-21 RX ADMIN — Medication 10 ML: at 21:40

## 2024-04-21 RX ADMIN — ALLOPURINOL 100 MG: 100 TABLET ORAL at 18:10

## 2024-04-21 RX ADMIN — CLOPIDOGREL BISULFATE 75 MG: 75 TABLET ORAL at 08:08

## 2024-04-21 RX ADMIN — AMLODIPINE BESYLATE 5 MG: 5 TABLET ORAL at 08:08

## 2024-04-21 RX ADMIN — IPRATROPIUM BROMIDE AND ALBUTEROL SULFATE 3 ML: .5; 3 SOLUTION RESPIRATORY (INHALATION) at 18:47

## 2024-04-21 RX ADMIN — MONTELUKAST 10 MG: 10 TABLET, FILM COATED ORAL at 21:38

## 2024-04-21 RX ADMIN — Medication 10 ML: at 08:09

## 2024-04-21 RX ADMIN — HEPARIN SODIUM 23 UNITS/KG/HR: 10000 INJECTION, SOLUTION INTRAVENOUS at 18:13

## 2024-04-21 RX ADMIN — IPRATROPIUM BROMIDE AND ALBUTEROL SULFATE 3 ML: .5; 3 SOLUTION RESPIRATORY (INHALATION) at 15:22

## 2024-04-21 NOTE — PLAN OF CARE
Goal Outcome Evaluation:              Outcome Evaluation: Nightly blood glucose was 126, held insulin. At midnight the PTT was 55, raised heparin drip 1 unit to 22. Pt wore 2 liters NC when up and CPAP during rest and his O2 occasionally went into the upper 80's when resting, aroused him and sats ashia. Pts heart rate was jodie during rest in the 40's, like O2 this came back up with activity. Pt did not complain of pain. Pts been pleasent. Kidney labs showed improvement. Next PTT is scheduled to be obtained at 0600 and will adjust heparin drip accordingly.    Pts sbp has been elevated in the 160s, pt denied dizziness and headache that could be due to this.

## 2024-04-21 NOTE — PROGRESS NOTES
"NEPHROLOGY PROGRESS NOTE------[facility]    [facility]  [phone]  Ashwin Butcher MD      Patient Care Team:  Camryn Salinas MD as PCP - General (Family Medicine)  Ashwin Butcher MD as Consulting Physician (Nephrology)      Provider:  Ashwin Butcher MD  Patient Name: Fransico Cuenca  :  1955    SUBJECTIVE:  Follow-up EN  No chest pain or shortness of breath  Up in chair  Voiding by self    Medication:  amLODIPine, 5 mg, Oral, Q24H  atorvastatin, 40 mg, Oral, Nightly  budesonide, 0.5 mg, Nebulization, BID - RT  carvedilol, 3.125 mg, Oral, BID With Meals  clopidogrel, 75 mg, Oral, Daily  insulin lispro, 2-7 Units, Subcutaneous, 4x Daily AC & at Bedtime  ipratropium-albuterol, 3 mL, Nebulization, 4x Daily - RT  methylPREDNISolone sodium succinate, 60 mg, Intravenous, Q12H  montelukast, 10 mg, Oral, Nightly  pantoprazole, 40 mg, Oral, Q AM  senna-docusate sodium, 2 tablet, Oral, BID  sodium chloride, 10 mL, Intravenous, Q12H      heparin, 12 Units/kg/hr, Last Rate: 12 Units/kg/hr (24 0618)  sodium chloride, 75 mL/hr, Last Rate: 75 mL/hr (24 2338)        OBJECTIVE    Vital Sign Min/Max for last 24 hours  Temp  Min: 97.2 °F (36.2 °C)  Max: 99.1 °F (37.3 °C)   BP  Min: 145/86  Max: 179/100   Pulse  Min: 50  Max: 95   Resp  Min: 12  Max: 26   SpO2  Min: 90 %  Max: 100 %   No data recorded   Weight  Min: 99.8 kg (220 lb 0.3 oz)  Max: 99.8 kg (220 lb 0.3 oz)     Flowsheet Rows      Flowsheet Row First Filed Value   Admission Height 167.6 cm (66\") Documented at 2024 1301   Admission Weight 95.3 kg (210 lb 1.6 oz) Documented at 2024 1301            I/O this shift:  In: 240 [P.O.:240]  Out: 175 [Urine:175]  I/O last 3 completed shifts:  In: 880 [P.O.:880]  Out: 400 [Urine:400]    Physical Exam:  General Appearance: alert, appears stated age and cooperative  Head: normocephalic, without obvious abnormality and atraumatic  Eyes: " "conjunctivae and sclerae normal and no icterus  Neck: supple and no JVD  Lungs: Clear to auscultation bilaterally  Heart: regular rhythm & normal rate and normal S1, S2  Chest: Wall no abnormalities observed  Abdomen: normal bowel sounds and soft, nontender  Extremities: moves extremities well, trace edema, no cyanosis and no redness  Skin: no bleeding, bruising or rash, turgor normal, color normal and no lesions noted  Neurologic: Alert, and oriented. No focal deficits    Labs:    WBC WBC   Date Value Ref Range Status   04/20/2024 8.05 3.40 - 10.80 10*3/mm3 Final   04/20/2024 7.86 3.40 - 10.80 10*3/mm3 Final   04/18/2024 7.67 3.40 - 10.80 10*3/mm3 Final      HGB Hemoglobin   Date Value Ref Range Status   04/20/2024 9.7 (L) 13.0 - 17.7 g/dL Final   04/20/2024 9.3 (L) 13.0 - 17.7 g/dL Final   04/18/2024 10.4 (L) 13.0 - 17.7 g/dL Final      HCT Hematocrit   Date Value Ref Range Status   04/20/2024 30.9 (L) 37.5 - 51.0 % Final   04/20/2024 29.2 (L) 37.5 - 51.0 % Final   04/18/2024 34.0 (L) 37.5 - 51.0 % Final      Platelets No results found for: \"LABPLAT\"   MCV MCV   Date Value Ref Range Status   04/20/2024 83.5 79.0 - 97.0 fL Final   04/20/2024 82.3 79.0 - 97.0 fL Final   04/18/2024 85.4 79.0 - 97.0 fL Final          Sodium Sodium   Date Value Ref Range Status   04/20/2024 141 136 - 145 mmol/L Final   04/20/2024 142 136 - 145 mmol/L Final   04/19/2024 143 136 - 145 mmol/L Final   04/18/2024 143 136 - 145 mmol/L Final      Potassium Potassium   Date Value Ref Range Status   04/20/2024 4.2 3.5 - 5.2 mmol/L Final     Comment:     Slight hemolysis detected by analyzer. Result may be falsely elevated.   04/20/2024 3.9 3.5 - 5.2 mmol/L Final   04/19/2024 3.9 3.5 - 5.2 mmol/L Final     Comment:     Slight hemolysis detected by analyzer. Result may be falsely elevated.   04/18/2024 4.0 3.5 - 5.2 mmol/L Final      Chloride Chloride   Date Value Ref Range Status   04/20/2024 108 (H) 98 - 107 mmol/L Final   04/20/2024 107 98 - " "107 mmol/L Final   04/19/2024 110 (H) 98 - 107 mmol/L Final   04/18/2024 107 98 - 107 mmol/L Final      CO2 CO2   Date Value Ref Range Status   04/20/2024 22.0 22.0 - 29.0 mmol/L Final   04/20/2024 24.0 22.0 - 29.0 mmol/L Final   04/19/2024 20.0 (L) 22.0 - 29.0 mmol/L Final   04/18/2024 20.0 (L) 22.0 - 29.0 mmol/L Final      BUN BUN   Date Value Ref Range Status   04/20/2024 80 (H) 8 - 23 mg/dL Final   04/20/2024 91 (H) 8 - 23 mg/dL Final   04/19/2024 72 (H) 8 - 23 mg/dL Final   04/18/2024 65 (H) 8 - 23 mg/dL Final      Creatinine Creatinine   Date Value Ref Range Status   04/20/2024 2.00 (H) 0.76 - 1.27 mg/dL Final   04/20/2024 2.09 (H) 0.76 - 1.27 mg/dL Final   04/19/2024 1.83 (H) 0.76 - 1.27 mg/dL Final   04/18/2024 1.68 (H) 0.76 - 1.27 mg/dL Final      Calcium Calcium   Date Value Ref Range Status   04/20/2024 8.7 8.6 - 10.5 mg/dL Final   04/20/2024 8.7 8.6 - 10.5 mg/dL Final   04/19/2024 8.0 (L) 8.6 - 10.5 mg/dL Final   04/18/2024 9.4 8.6 - 10.5 mg/dL Final      PO4 No components found for: \"PO4\"   Albumin Albumin   Date Value Ref Range Status   04/20/2024 3.5 3.5 - 5.2 g/dL Final   04/20/2024 3.6 3.5 - 5.2 g/dL Final   04/19/2024 3.2 (L) 3.5 - 5.2 g/dL Final      Magnesium Magnesium   Date Value Ref Range Status   04/20/2024 2.2 1.6 - 2.4 mg/dL Final   04/19/2024 2.3 1.6 - 2.4 mg/dL Final   04/18/2024 2.0 1.6 - 2.4 mg/dL Final      Uric Acid No components found for: \"URIC ACID\"     Imaging Results (Last 72 Hours)       Procedure Component Value Units Date/Time    US Renal Bilateral [[id number]] Collected: 04/19/24 0722     Updated: 04/19/24 0725    Narrative:      US RENAL BILATERAL    Date of Exam: 4/18/2024 10:12 PM EDT    Indication: Kidney failure, acute  EN.    Comparison: CT abdomen and pelvis 7/26/2020    Technique: Grayscale and color Doppler ultrasound evaluation of the kidneys and urinary bladder was performed.      Findings:  RIGHT kidney measures 12.2 cm.  Kidney echogenicity, size, and vascularity " appear within normal limits. There is no solid kidney mass.  No echogenic shadowing stone.  No hydronephrosis.    LEFT kidney measures 12.6 cm. Kidney echogenicity, size, and vascularity appear within normal limits. There is no solid kidney mass.  No echogenic shadowing stone.  No hydronephrosis.    Limited visualization of the urinary bladder is unremarkable. Total volume of 44 mL.        Impression:      Impression:  Unremarkable bilateral renal ultrasound.        Electronically Signed: Sujatha Montoya MD    4/19/2024 7:23 AM EDT    Workstation ID: [id number]    XR Chest 1 View [[id number]] Collected: 04/18/24 1346     Updated: 04/18/24 1351    Narrative:      XR CHEST 1 VW    Date of Exam: 4/18/2024 1:30 PM EDT    Indication: Chest pain    Comparison: AP chest x-ray 4/18/2024 at 9:00 a.m.    Findings:  Compared to today's earlier chest x-ray, there are lower lung volumes with increased airspace opacities in both lower lungs. No pneumothorax is seen. Cardiac silhouette is enlarged, but is exaggerated by portable AP technique.      Impression:      Impression:  Lower lung volumes with increased airspace opacities in both lower lungs, which may be due to atelectasis, pulmonary edema, or less likely pneumonia.      Electronically Signed: Lisa Lan    4/18/2024 1:49 PM EDT    Workstation ID: [id number]            Results for orders placed during the hospital encounter of 04/18/24    XR Chest 1 View    Narrative  XR CHEST 1 VW    Date of Exam: 4/18/2024 1:30 PM EDT    Indication: Chest pain    Comparison: AP chest x-ray 4/18/2024 at 9:00 a.m.    Findings:  Compared to today's earlier chest x-ray, there are lower lung volumes with increased airspace opacities in both lower lungs. No pneumothorax is seen. Cardiac silhouette is enlarged, but is exaggerated by portable AP technique.    Impression  Impression:  Lower lung volumes with increased airspace opacities in both lower lungs, which may be due to atelectasis,  pulmonary edema, or less likely pneumonia.      Electronically Signed: Lisa Lan  4/18/2024 1:49 PM EDT  Workstation ID: [id number]      Results for orders placed in visit on 02/23/24    XR Shoulder 2+ View Right    Narrative  DATE OF EXAM:  2/23/2024    PROCEDURE:  XR shoulder 2+ view right    INDICATIONS:  Chronic right shoulder pain    COMPARISON:  No Comparisons Available    FINDINGS:  No acute fracture is seen. Mineralization is within normal limits.  The humeral head appears to be well-seated within the glenoid fossa.  There is mild glenohumeral and acromioclavicular joint space narrowing consistent with mild/early osteoarthritic change.  No radiopaque foreign bodies.  Lung spaces are clear.    Impression  Mild acromioclavicular and glenohumeral osteoarthritic disease, otherwise no acute bony abnormality of the right shoulder.      Results for orders placed in visit on 08/04/23    XR Hand 3+ View Left    Narrative  XR HAND 3+ VW LEFT    Date of Exam: 8/4/2023 1:57 PM EDT    Indication: Left hand pain    Comparison: None available.    Findings:  There is no acute fracture. There is marked narrowing with joint space loss and bone-on-bone contact of the first metacarpal/carpal joint. There is subchondral sclerosis and spurring and slight radial subluxation of the base of the first metacarpal bone  with respect to the adjacent trapezium. There is narrowing and spurring of the IP joint of the left thumb, the first MCP joint, and several of the inner-phalangeal joints of the digits consistent with moderate and severe osteoarthritis. There are no  periarticular erosions. There is soft tissue swelling involving the digits in the area of prominent degenerative changes.    Impression  Impression:    1. No acute fracture.  2. Moderate and severe osteoarthritis as described.  3. Areas of soft tissue swelling involving the digits where the patient has prominent degenerative changes.      Electronically Signed: Lucio  Worm  8/4/2023 3:11 PM EDT  Workstation ID: [id number]            ASSESSMENT / PLAN      NSTEMI (non-ST elevated myocardial infarction)    EN-undetermined etiology.  This might be cardiorenal in setting of fluid overload/ACE inhibitor and thiazide use.  History of nephrolithiasis-CT several years ago showed left-sided obstructive kidney stone  Hypertension  Hyperlipidemia  Type 2 diabetes  Hold metformin  COPD with exacerbation  Hypervolemia-better.  Diuresed  NSTEMI-Per cardiology.  Hyperuricemia-likely overdiuresis.  Will monitor     CR still up, but stable  Uric acid increasing to 12, add allopurinol, continue IV fluid  Holding lasix  UA with large blood, will check serology to exclude vasculitis  Heart cath planned for Monday/will hold if creatinine still up        Ashwin Butcher MD  [facility]  [phone]  04/21/24  08:46 EDT

## 2024-04-21 NOTE — PROGRESS NOTES
"PULMONARY CRITICAL CARE PROGRESS  NOTE      PATIENT IDENTIFICATION:  Name: Fransico Cuenca  MRN: [id number]  :  1955     Age: 69 y.o.  Sex: male    DATE OF Note:  2024   Referring Physician: Kush Sesay MD                  Subjective:   Feeling better, on 2 L O2, no SOB, no chest or abd pain, no bowel or bladder issues reported    Objective:  tMax 24 hrs: Temp (24hrs), Av.9 °F (36.6 °C), Min:97.2 °F (36.2 °C), Max:99.1 °F (37.3 °C)      Vitals Ranges:   Temp:  [97.2 °F (36.2 °C)-99.1 °F (37.3 °C)] 98.1 °F (36.7 °C)  Heart Rate:  [] 100  Resp:  [12-26] 21  BP: (147-179)/() 169/95    Intake and Output Last 3 Shifts:   I/O last 3 completed shifts:  In: 880 [P.O.:880]  Out: 400 [Urine:400]    Exam:  /95 (BP Location: Right arm, Patient Position: Sitting)   Pulse 100   Temp 98.1 °F (36.7 °C) (Oral)   Resp 21   Ht 167.6 cm (66\")   Wt 99.8 kg (220 lb 0.3 oz)   SpO2 93%   BMI 35.51 kg/m²     General Appearance: Alert  HEENT:  Normocephalic, without obvious abnormality. Conjunctivae/corneas clear.  Normal external ear canals. Nares normal, no drainage     Neck:  Supple, symmetrical, trachea midline. No JVD.  Lungs /Chest wall:   Bilateral basal rhonchi, respirations unlabored, symmetrical wall movement.     Heart:  Regular rate and rhythm, systolic murmur PMI left sternal border  Abdomen: Soft, nontender, no masses, no organomegaly.    Extremities: Trace edema, no clubbing or cyanosis        Medications:  amLODIPine, 5 mg, Oral, Q24H  atorvastatin, 40 mg, Oral, Nightly  budesonide, 0.5 mg, Nebulization, BID - RT  carvedilol, 3.125 mg, Oral, BID With Meals  clopidogrel, 75 mg, Oral, Daily  insulin lispro, 2-7 Units, Subcutaneous, 4x Daily AC & at Bedtime  ipratropium-albuterol, 3 mL, Nebulization, 4x Daily - RT  methylPREDNISolone sodium succinate, 60 mg, Intravenous, Q12H  montelukast, 10 mg, Oral, Nightly  pantoprazole, 40 mg, Oral, Q AM  senna-docusate sodium, 2 tablet, Oral, " BID  sodium chloride, 10 mL, Intravenous, Q12H        Infusion:  heparin, 12 Units/kg/hr, Last Rate: 12 Units/kg/hr (04/21/24 0618)  sodium chloride, 75 mL/hr, Last Rate: 75 mL/hr (04/20/24 5518)         PRN:    acetaminophen    senna-docusate sodium **AND** polyethylene glycol **AND** bisacodyl **AND** bisacodyl    Calcium Replacement - Follow Nurse / BPA Driven Protocol    dextrose    dextrose    glucagon (human recombinant)    heparin    heparin    HYDROcodone-acetaminophen    Magnesium Standard Dose Replacement - Follow Nurse / BPA Driven Protocol    nitroglycerin    Phosphorus Replacement - Follow Nurse / BPA Driven Protocol    Potassium Replacement - Follow Nurse / BPA Driven Protocol    sodium chloride    sodium chloride    sodium chloride  Data Review:  All labs (24hrs):   Recent Results (from the past 24 hour(s))   Urinalysis With Microscopic If Indicated (No Culture) - Urine, Clean Catch    Collection Time: 04/20/24 10:53 AM    Specimen: Urine, Clean Catch   Result Value Ref Range    Color, UA Yellow Yellow, Straw    Appearance, UA Hazy (A) Clear    pH, UA <=5.0 5.0 - 8.0    Specific Gravity, UA 1.016 1.005 - 1.030    Glucose, UA Negative Negative    Ketones, UA Negative Negative    Bilirubin, UA Negative Negative    Blood, UA Large (3+) (A) Negative    Protein, UA 30 mg/dL (1+) (A) Negative    Leuk Esterase, UA Trace (A) Negative    Nitrite, UA Negative Negative    Urobilinogen, UA 0.2 E.U./dL 0.2 - 1.0 E.U./dL   Eosinophil Smear - Urine, Urine, Clean Catch    Collection Time: 04/20/24 10:53 AM    Specimen: Urine, Clean Catch   Result Value Ref Range    Eosinophil Smear 0 0 - 0 % EOS/100 Cells   Protein / Creatinine Ratio, Urine - Urine, Clean Catch    Collection Time: 04/20/24 10:53 AM    Specimen: Urine, Clean Catch   Result Value Ref Range    Protein/Creatinine Ratio, Urine 432.1 (H) 0.0 - 200.0 mg/G Crea    Creatinine, Urine 76.6 mg/dL    Total Protein, Urine 33.1 mg/dL   Urinalysis, Microscopic Only  - Urine, Clean Catch    Collection Time: 04/20/24 10:53 AM    Specimen: Urine, Clean Catch   Result Value Ref Range    RBC, UA Too Numerous to Count (A) None Seen, 0-2 /HPF    WBC, UA 0-2 None Seen, 0-2 /HPF    Bacteria, UA None Seen None Seen /HPF    Squamous Epithelial Cells, UA 0-2 None Seen, 0-2 /HPF    Hyaline Casts, UA None Seen None Seen /LPF    Amorphous Crystals, UA Small/1+ None Seen /HPF    Methodology Manual Light Microscopy    POC Glucose 4x Daily Before Meals & at Bedtime    Collection Time: 04/20/24 11:09 AM    Specimen: Blood   Result Value Ref Range    Glucose 132 (H) 70 - 105 mg/dL   POC Glucose Once    Collection Time: 04/20/24  4:10 PM    Specimen: Blood   Result Value Ref Range    Glucose 177 (H) 70 - 105 mg/dL   aPTT    Collection Time: 04/20/24  5:32 PM    Specimen: Arm, Left; Blood   Result Value Ref Range    PTT 48.3 (L) 61.0 - 76.5 seconds   POC Glucose Once    Collection Time: 04/20/24  8:02 PM    Specimen: Blood   Result Value Ref Range    Glucose 126 (H) 70 - 105 mg/dL   aPTT    Collection Time: 04/20/24 11:35 PM    Specimen: Blood   Result Value Ref Range    PTT 55.0 (L) 61.0 - 76.5 seconds   Renal Function Panel    Collection Time: 04/20/24 11:35 PM    Specimen: Blood   Result Value Ref Range    Glucose 188 (H) 65 - 99 mg/dL    BUN 80 (H) 8 - 23 mg/dL    Creatinine 2.00 (H) 0.76 - 1.27 mg/dL    Sodium 141 136 - 145 mmol/L    Potassium 4.2 3.5 - 5.2 mmol/L    Chloride 108 (H) 98 - 107 mmol/L    CO2 22.0 22.0 - 29.0 mmol/L    Calcium 8.7 8.6 - 10.5 mg/dL    Albumin 3.5 3.5 - 5.2 g/dL    Phosphorus 3.9 2.5 - 4.5 mg/dL    Anion Gap 11.0 5.0 - 15.0 mmol/L    BUN/Creatinine Ratio 40.0 (H) 7.0 - 25.0    eGFR 35.2 (L) >60.0 mL/min/1.73   CBC Auto Differential    Collection Time: 04/20/24 11:35 PM    Specimen: Blood   Result Value Ref Range    WBC 8.05 3.40 - 10.80 10*3/mm3    RBC 3.70 (L) 4.14 - 5.80 10*6/mm3    Hemoglobin 9.7 (L) 13.0 - 17.7 g/dL    Hematocrit 30.9 (L) 37.5 - 51.0 %    MCV  83.5 79.0 - 97.0 fL    MCH 26.2 (L) 26.6 - 33.0 pg    MCHC 31.4 (L) 31.5 - 35.7 g/dL    RDW 15.7 (H) 12.3 - 15.4 %    RDW-SD 47.4 37.0 - 54.0 fl    MPV 10.4 6.0 - 12.0 fL    Platelets 240 140 - 450 10*3/mm3    Neutrophil % 90.0 (H) 42.7 - 76.0 %    Lymphocyte % 4.6 (L) 19.6 - 45.3 %    Monocyte % 4.1 (L) 5.0 - 12.0 %    Eosinophil % 0.1 (L) 0.3 - 6.2 %    Basophil % 0.1 0.0 - 1.5 %    Immature Grans % 1.1 (H) 0.0 - 0.5 %    Neutrophils, Absolute 7.24 (H) 1.70 - 7.00 10*3/mm3    Lymphocytes, Absolute 0.37 (L) 0.70 - 3.10 10*3/mm3    Monocytes, Absolute 0.33 0.10 - 0.90 10*3/mm3    Eosinophils, Absolute 0.01 0.00 - 0.40 10*3/mm3    Basophils, Absolute 0.01 0.00 - 0.20 10*3/mm3    Immature Grans, Absolute 0.09 (H) 0.00 - 0.05 10*3/mm3    nRBC 0.0 0.0 - 0.2 /100 WBC   Uric Acid    Collection Time: 04/20/24 11:35 PM    Specimen: Blood   Result Value Ref Range    Uric Acid 12.5 (H) 3.4 - 7.0 mg/dL   aPTT    Collection Time: 04/21/24  5:54 AM    Specimen: Blood   Result Value Ref Range    PTT 66.9 61.0 - 76.5 seconds   POC Glucose 4x Daily Before Meals & at Bedtime    Collection Time: 04/21/24  7:25 AM    Specimen: Blood   Result Value Ref Range    Glucose 120 (H) 70 - 105 mg/dL        Imaging:  US Renal Bilateral  Narrative: US RENAL BILATERAL    Date of Exam: 4/18/2024 10:12 PM EDT    Indication: Kidney failure, acute  EN.    Comparison: CT abdomen and pelvis 7/26/2020    Technique: Grayscale and color Doppler ultrasound evaluation of the kidneys and urinary bladder was performed.    Findings:  RIGHT kidney measures 12.2 cm.  Kidney echogenicity, size, and vascularity appear within normal limits. There is no solid kidney mass.  No echogenic shadowing stone.  No hydronephrosis.    LEFT kidney measures 12.6 cm. Kidney echogenicity, size, and vascularity appear within normal limits. There is no solid kidney mass.  No echogenic shadowing stone.  No hydronephrosis.    Limited visualization of the urinary bladder is  unremarkable. Total volume of 44 mL.  Impression: Impression:  Unremarkable bilateral renal ultrasound.    Electronically Signed: Sujatha Montoya MD    4/19/2024 7:23 AM EDT    Workstation ID: [id number]       ASSESSMENT:  COPD  NSTEMI  DM II  HTN  GERD  Hyperlipidemia  EN        PLAN:  Wean O2 to keep sats > 88%  Encourage admit daily  Antibiotics  Bronchodilators  Inhaled corticosteroids  Incentive spirometer  Heparin drip per Cardiology   Echo ordered  Cardiology/Nephrology consulted   Electrolytes/ glycemic control  DVT and GI prophylaxis     Discussed with JAX Covington   4/21/2024  10:45 EDT    I personally have examined  and interviewed the patient. I have reviewed the history, data, problems, assessment and plan with our NP.  Total Critical care time in direct medical management (   ) minutes, This time specifically excludes time spent performing procedures.    Danay Velez MD   4/21/2024  10:56 EDT

## 2024-04-21 NOTE — PROGRESS NOTES
CC--- non-STEMI    Sub  Patient denies any active angina today  Sitting in chair and comfortable        Past Medical History:   Diagnosis Date    Anxiety     Essential hypertension, benign     Gastroesophageal reflux disease without esophagitis     Kidney stones 05/31/2020    Dr. Kahlil Terrell    Mixed hyperlipidemia     Rotator cuff syndrome     Torn rotator about seven years ago    Seasonal allergic rhinitis due to pollen     Type 2 diabetes mellitus without complication, without long-term current use of insulin      Past Surgical History:   Procedure Laterality Date    CATARACT EXTRACTION Left 08/24/2023    Dr Melendez    CATARACT EXTRACTION Right 09/07/2023    Dr Melendez    CYSTOSCOPY W/ LASER LITHOTRIPSY  01/2021    INGUINAL HERNIA REPAIR Left 10/2009    NOSE SURGERY      x2    UMBILICAL HERNIA REPAIR  10/2009    Dr. Napier         Physical Exam    General:      well developed, well nourished, in no acute distress.    Head:      normocephalic and atraumatic.    Eyes:      PERRL/EOM intact, conjunctivae and sclerae clear without nystagmus.    Neck:      no  thyromegaly, trachea central with normal respiratory effort  Lungs:      clear bilaterally to auscultation.    Heart:       regular rate and rhythm, S1, S2 without murmurs, rubs, or gallops  Skin:      intact without lesions or rashes.    Psych:      alert and cooperative; normal mood and affect; normal attention span and concentration.              CBC    Results from last 7 days   Lab Units 04/20/24  2335 04/20/24  0214 04/18/24  1343   WBC 10*3/mm3 8.05 7.86 7.67   HEMOGLOBIN g/dL 9.7* 9.3* 10.4*   PLATELETS 10*3/mm3 240 220 256     BMP   Results from last 7 days   Lab Units 04/20/24  2335 04/20/24  0214 04/19/24  0137 04/18/24  1513   SODIUM mmol/L 141 142 143 143   POTASSIUM mmol/L 4.2 3.9 3.9 4.0   CHLORIDE mmol/L 108* 107 110* 107   CO2 mmol/L 22.0 24.0 20.0* 20.0*   BUN mg/dL 80* 91* 72* 65*   CREATININE mg/dL 2.00* 2.09* 1.83* 1.68*   GLUCOSE mg/dL 188*  160* 158* 131*   MAGNESIUM mg/dL  --  2.2 2.3 2.0   PHOSPHORUS mg/dL 3.9 4.4 3.8  --      Infection     CMP   Results from last 7 days   Lab Units 04/20/24  2335 04/20/24  0214 04/19/24  0137 04/18/24  1513   SODIUM mmol/L 141 142 143 143   POTASSIUM mmol/L 4.2 3.9 3.9 4.0   CHLORIDE mmol/L 108* 107 110* 107   CO2 mmol/L 22.0 24.0 20.0* 20.0*   BUN mg/dL 80* 91* 72* 65*   CREATININE mg/dL 2.00* 2.09* 1.83* 1.68*   GLUCOSE mg/dL 188* 160* 158* 131*   ALBUMIN g/dL 3.5 3.6 3.2*  --      Assessment plan  Non-STEMI on IV heparin and plan for cardiac catheterization early next week  Respiratory virus positive for human metapneumovirus followed by pulmonary  Acute renal insufficiency and nephrology following the patient  Hypertension, dyslipidemia and diabetes  Continue current medications including aspirin and cardiac catheterization to be planned once renal functions improve  Patient is clinically stable         Electronically signed by Dimas Mendoza MD, 04/21/24, 9:21 AM EDT.

## 2024-04-21 NOTE — PLAN OF CARE
Problem: Adult Inpatient Plan of Care  Goal: Plan of Care Review  Outcome: Ongoing, Progressing  Flowsheets (Taken 4/21/2024 1605)  Outcome Evaluation: pt denies/soa.  up to chair today.  cont on hepain. Pt to have cath on moday.  consent signed.  educated on procedure.  will cont to rell   Goal Outcome Evaluation:              Outcome Evaluation: pt denies/soa.  up to chair today.  cont on hepain. Pt to have cath on moday.  consent signed.  educated on procedure.  will cont to rell

## 2024-04-21 NOTE — PROGRESS NOTES
[facility]     Progress Note    Patient Name: Fransico Cuenca  : 1955  MRN: [id number]  Primary Care Physician:  Camryn Salinas MD  Date of admission: 2024  Service date and time: 24 11:24 EDT  Subjective   Subjective     Chief Complaint: chest pain    HPI:  Patient Reports feeling fine      Objective   Objective     Vitals:   Temp:  [97.2 °F (36.2 °C)-99.1 °F (37.3 °C)] 98.1 °F (36.7 °C)  Heart Rate:  [] 100  Resp:  [12-26] 21  BP: (147-179)/() 169/95  Flow (L/min):  [2] 2  Physical Exam    Constitutional: Awake, alert   Eyes: PERRLA, sclerae anicteric, no conjunctival injection   HENT: NCAT, mucous membranes moist   Neck: Supple, no thyromegaly, no lymphadenopathy, trachea midline   Respiratory: + rhonchi   Cardiovascular: RRR, no murmurs, rubs, or gallops, palpable pedal pulses bilaterally   Gastrointestinal: Positive bowel sounds, soft, nontender, nondistended   Musculoskeletal: No bilateral ankle edema, no clubbing or cyanosis to extremities   Psychiatric: Appropriate affect, cooperative   Neurologic: Oriented x 3, strength symmetric in all extremities, Cranial Nerves grossly intact to confrontation, speech clear   Skin: No rashes     Result Review    Result Review:  I have personally reviewed the results from the time of this admission to 2024 11:24 EDT and agree with these findings:  [x]  Laboratory list / accordion  [x]  Microbiology  [x]  Radiology  [x]  EKG/Telemetry   [x]  Cardiology/Vascular   []  Pathology  []  Old records  []  Other:        Assessment & Plan   Assessment / Plan       Active Hospital Problems:  Active Hospital Problems    Diagnosis     **NSTEMI (non-ST elevated myocardial infarction)      Plan:      NSTEMI  -Troponin is elevated over 500  -Continue IV heparin gtt  -Plan for [facility] tmrw, monitor kidney function     Probable acute decompensated heart failure  -Patient had elevated BNP with pulmonary edema on imaging  -Continue diuresis  -RVP positive  for human metapneumovirus which is likely also contributing to his dyspnea and may have also triggered heart failure exacerbation  -Echo pending, f/u results  -Continue steroids, inhalers but discontinue doxycycline     COPD exacerbation  - pulm following, bronchodilators, abx  - supplemental oxygen    Hypertension  -Continue home medications for BP control     Dyslipidemia  Continue statin therapy     DM type II, controlled  -Continue sliding scale insulin     EN  -trend labs daily, avoid nephrotoxic meds  -getting IVF  -Nephrology following and managing    DVT prophylaxis:  Medical DVT prophylaxis orders are present.        CODE STATUS:   Level Of Support Discussed With: Patient  Code Status (Patient has no pulse and is not breathing): CPR (Attempt to Resuscitate)  Medical Interventions (Patient has pulse or is breathing): Full Support    Disposition:  I expect patient to be discharged 2-3 days.    Norman Grayson MD

## 2024-04-22 LAB
ALBUMIN SERPL-MCNC: 3.4 G/DL (ref 3.5–5.2)
ANION GAP SERPL CALCULATED.3IONS-SCNC: 10 MMOL/L (ref 5–15)
APTT PPP: 67.5 SECONDS (ref 61–76.5)
APTT PPP: 74.1 SECONDS (ref 61–76.5)
BASOPHILS # BLD AUTO: 0.01 10*3/MM3 (ref 0–0.2)
BASOPHILS NFR BLD AUTO: 0.1 % (ref 0–1.5)
BUN SERPL-MCNC: 63 MG/DL (ref 8–23)
BUN/CREAT SERPL: 42.3 (ref 7–25)
CALCIUM SPEC-SCNC: 8.6 MG/DL (ref 8.6–10.5)
CHLORIDE SERPL-SCNC: 112 MMOL/L (ref 98–107)
CO2 SERPL-SCNC: 22 MMOL/L (ref 22–29)
CREAT SERPL-MCNC: 1.49 MG/DL (ref 0.76–1.27)
DEPRECATED RDW RBC AUTO: 47.3 FL (ref 37–54)
EGFRCR SERPLBLD CKD-EPI 2021: 50.2 ML/MIN/1.73
EOSINOPHIL # BLD AUTO: 0 10*3/MM3 (ref 0–0.4)
EOSINOPHIL NFR BLD AUTO: 0 % (ref 0.3–6.2)
ERYTHROCYTE [DISTWIDTH] IN BLOOD BY AUTOMATED COUNT: 15.5 % (ref 12.3–15.4)
GLUCOSE BLDC GLUCOMTR-MCNC: 115 MG/DL (ref 70–105)
GLUCOSE BLDC GLUCOMTR-MCNC: 150 MG/DL (ref 70–105)
GLUCOSE BLDC GLUCOMTR-MCNC: 195 MG/DL (ref 70–105)
GLUCOSE BLDC GLUCOMTR-MCNC: 212 MG/DL (ref 70–105)
GLUCOSE SERPL-MCNC: 156 MG/DL (ref 65–99)
HCT VFR BLD AUTO: 30.4 % (ref 37.5–51)
HGB BLD-MCNC: 9.5 G/DL (ref 13–17.7)
IMM GRANULOCYTES # BLD AUTO: 0.21 10*3/MM3 (ref 0–0.05)
IMM GRANULOCYTES NFR BLD AUTO: 3 % (ref 0–0.5)
LYMPHOCYTES # BLD AUTO: 0.42 10*3/MM3 (ref 0.7–3.1)
LYMPHOCYTES NFR BLD AUTO: 6 % (ref 19.6–45.3)
MCH RBC QN AUTO: 25.9 PG (ref 26.6–33)
MCHC RBC AUTO-ENTMCNC: 31.3 G/DL (ref 31.5–35.7)
MCV RBC AUTO: 82.8 FL (ref 79–97)
MONOCYTES # BLD AUTO: 0.4 10*3/MM3 (ref 0.1–0.9)
MONOCYTES NFR BLD AUTO: 5.7 % (ref 5–12)
NEUTROPHILS NFR BLD AUTO: 5.94 10*3/MM3 (ref 1.7–7)
NEUTROPHILS NFR BLD AUTO: 85.2 % (ref 42.7–76)
NRBC BLD AUTO-RTO: 0 /100 WBC (ref 0–0.2)
PHOSPHATE SERPL-MCNC: 3.1 MG/DL (ref 2.5–4.5)
PLATELET # BLD AUTO: 243 10*3/MM3 (ref 140–450)
PMV BLD AUTO: 10.3 FL (ref 6–12)
POTASSIUM SERPL-SCNC: 4.3 MMOL/L (ref 3.5–5.2)
RBC # BLD AUTO: 3.67 10*6/MM3 (ref 4.14–5.8)
SODIUM SERPL-SCNC: 144 MMOL/L (ref 136–145)
URATE SERPL-MCNC: 11.2 MG/DL (ref 3.4–7)
WBC NRBC COR # BLD AUTO: 6.98 10*3/MM3 (ref 3.4–10.8)

## 2024-04-22 PROCEDURE — 25010000002 METHYLPREDNISOLONE PER 125 MG: Performed by: INTERNAL MEDICINE

## 2024-04-22 PROCEDURE — 82948 REAGENT STRIP/BLOOD GLUCOSE: CPT

## 2024-04-22 PROCEDURE — 85025 COMPLETE CBC W/AUTO DIFF WBC: CPT | Performed by: HOSPITALIST

## 2024-04-22 PROCEDURE — 25810000003 SODIUM CHLORIDE 0.9 % SOLUTION: Performed by: INTERNAL MEDICINE

## 2024-04-22 PROCEDURE — 94799 UNLISTED PULMONARY SVC/PX: CPT

## 2024-04-22 PROCEDURE — 85730 THROMBOPLASTIN TIME PARTIAL: CPT | Performed by: INTERNAL MEDICINE

## 2024-04-22 PROCEDURE — 25010000002 HEPARIN (PORCINE) 25000-0.45 UT/250ML-% SOLUTION: Performed by: INTERNAL MEDICINE

## 2024-04-22 PROCEDURE — 84550 ASSAY OF BLOOD/URIC ACID: CPT | Performed by: INTERNAL MEDICINE

## 2024-04-22 PROCEDURE — 82948 REAGENT STRIP/BLOOD GLUCOSE: CPT | Performed by: INTERNAL MEDICINE

## 2024-04-22 PROCEDURE — 97116 GAIT TRAINING THERAPY: CPT

## 2024-04-22 PROCEDURE — 99232 SBSQ HOSP IP/OBS MODERATE 35: CPT | Performed by: INTERNAL MEDICINE

## 2024-04-22 PROCEDURE — 85730 THROMBOPLASTIN TIME PARTIAL: CPT | Performed by: HOSPITALIST

## 2024-04-22 PROCEDURE — 63710000001 INSULIN LISPRO (HUMAN) PER 5 UNITS: Performed by: INTERNAL MEDICINE

## 2024-04-22 PROCEDURE — 94761 N-INVAS EAR/PLS OXIMETRY MLT: CPT

## 2024-04-22 PROCEDURE — 80069 RENAL FUNCTION PANEL: CPT | Performed by: INTERNAL MEDICINE

## 2024-04-22 PROCEDURE — 94664 DEMO&/EVAL PT USE INHALER: CPT

## 2024-04-22 RX ORDER — ASPIRIN 81 MG/1
81 TABLET ORAL DAILY
Status: DISCONTINUED | OUTPATIENT
Start: 2024-04-22 | End: 2024-04-24 | Stop reason: HOSPADM

## 2024-04-22 RX ORDER — METHYLPREDNISOLONE SODIUM SUCCINATE 40 MG/ML
40 INJECTION, POWDER, LYOPHILIZED, FOR SOLUTION INTRAMUSCULAR; INTRAVENOUS EVERY 12 HOURS
Status: DISCONTINUED | OUTPATIENT
Start: 2024-04-22 | End: 2024-04-22

## 2024-04-22 RX ORDER — AZELASTINE 1 MG/ML
1 SPRAY, METERED NASAL 2 TIMES DAILY
Status: DISCONTINUED | OUTPATIENT
Start: 2024-04-22 | End: 2024-04-24 | Stop reason: HOSPADM

## 2024-04-22 RX ORDER — FENOFIBRATE 145 MG/1
145 TABLET, COATED ORAL DAILY
Status: DISCONTINUED | OUTPATIENT
Start: 2024-04-22 | End: 2024-04-24

## 2024-04-22 RX ORDER — TERAZOSIN 1 MG/1
1 CAPSULE ORAL NIGHTLY
Status: DISCONTINUED | OUTPATIENT
Start: 2024-04-22 | End: 2024-04-24 | Stop reason: HOSPADM

## 2024-04-22 RX ORDER — PREDNISONE 20 MG/1
20 TABLET ORAL
Status: DISCONTINUED | OUTPATIENT
Start: 2024-04-23 | End: 2024-04-24 | Stop reason: HOSPADM

## 2024-04-22 RX ORDER — CITALOPRAM 20 MG/1
20 TABLET ORAL DAILY
Status: DISCONTINUED | OUTPATIENT
Start: 2024-04-22 | End: 2024-04-24 | Stop reason: HOSPADM

## 2024-04-22 RX ADMIN — INSULIN LISPRO 2 UNITS: 100 INJECTION, SOLUTION INTRAVENOUS; SUBCUTANEOUS at 17:21

## 2024-04-22 RX ADMIN — Medication 600 MG: at 08:23

## 2024-04-22 RX ADMIN — Medication 10 ML: at 20:47

## 2024-04-22 RX ADMIN — IPRATROPIUM BROMIDE AND ALBUTEROL SULFATE 3 ML: .5; 3 SOLUTION RESPIRATORY (INHALATION) at 07:23

## 2024-04-22 RX ADMIN — BUDESONIDE 0.5 MG: 0.5 INHALANT RESPIRATORY (INHALATION) at 07:23

## 2024-04-22 RX ADMIN — Medication 600 MG: at 20:50

## 2024-04-22 RX ADMIN — SODIUM CHLORIDE 75 ML/HR: 9 INJECTION, SOLUTION INTRAVENOUS at 06:54

## 2024-04-22 RX ADMIN — CITALOPRAM HYDROBROMIDE 20 MG: 20 TABLET ORAL at 17:21

## 2024-04-22 RX ADMIN — PANTOPRAZOLE SODIUM 40 MG: 40 TABLET, DELAYED RELEASE ORAL at 05:05

## 2024-04-22 RX ADMIN — CARVEDILOL 3.12 MG: 3.12 TABLET, FILM COATED ORAL at 08:23

## 2024-04-22 RX ADMIN — ATORVASTATIN CALCIUM 40 MG: 40 TABLET, FILM COATED ORAL at 20:50

## 2024-04-22 RX ADMIN — AZELASTINE HYDROCHLORIDE 1 SPRAY: 137 SPRAY, METERED NASAL at 12:43

## 2024-04-22 RX ADMIN — FENOFIBRATE 145 MG: 145 TABLET ORAL at 17:21

## 2024-04-22 RX ADMIN — TERAZOSIN HYDROCHLORIDE 1 MG: 1 CAPSULE ORAL at 20:50

## 2024-04-22 RX ADMIN — IPRATROPIUM BROMIDE AND ALBUTEROL SULFATE 3 ML: .5; 3 SOLUTION RESPIRATORY (INHALATION) at 10:45

## 2024-04-22 RX ADMIN — ASPIRIN 81 MG: 81 TABLET, COATED ORAL at 17:21

## 2024-04-22 RX ADMIN — ALLOPURINOL 100 MG: 100 TABLET ORAL at 08:23

## 2024-04-22 RX ADMIN — CLOPIDOGREL BISULFATE 75 MG: 75 TABLET ORAL at 08:23

## 2024-04-22 RX ADMIN — AMLODIPINE BESYLATE 5 MG: 5 TABLET ORAL at 08:23

## 2024-04-22 RX ADMIN — INSULIN LISPRO 3 UNITS: 100 INJECTION, SOLUTION INTRAVENOUS; SUBCUTANEOUS at 20:51

## 2024-04-22 RX ADMIN — Medication 10 ML: at 08:34

## 2024-04-22 RX ADMIN — HEPARIN SODIUM 23 UNITS/KG/HR: 10000 INJECTION, SOLUTION INTRAVENOUS at 17:17

## 2024-04-22 RX ADMIN — HEPARIN SODIUM 23 UNITS/KG/HR: 10000 INJECTION, SOLUTION INTRAVENOUS at 05:04

## 2024-04-22 RX ADMIN — CARVEDILOL 3.12 MG: 3.12 TABLET, FILM COATED ORAL at 17:21

## 2024-04-22 RX ADMIN — IPRATROPIUM BROMIDE AND ALBUTEROL SULFATE 3 ML: .5; 3 SOLUTION RESPIRATORY (INHALATION) at 15:05

## 2024-04-22 RX ADMIN — SODIUM CHLORIDE 75 ML/HR: 9 INJECTION, SOLUTION INTRAVENOUS at 20:46

## 2024-04-22 RX ADMIN — MONTELUKAST 10 MG: 10 TABLET, FILM COATED ORAL at 20:51

## 2024-04-22 RX ADMIN — METHYLPREDNISOLONE SODIUM SUCCINATE 60 MG: 125 INJECTION, POWDER, FOR SOLUTION INTRAMUSCULAR; INTRAVENOUS at 05:05

## 2024-04-22 RX ADMIN — BUDESONIDE 0.5 MG: 0.5 INHALANT RESPIRATORY (INHALATION) at 18:40

## 2024-04-22 RX ADMIN — AZELASTINE HYDROCHLORIDE 1 SPRAY: 137 SPRAY, METERED NASAL at 20:51

## 2024-04-22 RX ADMIN — IPRATROPIUM BROMIDE AND ALBUTEROL SULFATE 3 ML: .5; 3 SOLUTION RESPIRATORY (INHALATION) at 18:46

## 2024-04-22 NOTE — PLAN OF CARE
Goal Outcome Evaluation:      Fransico Cuenca presents with functional mobility impairments which indicate the need for skilled intervention. Tolerating session today without incident. Pt progressed to gait training bout 250 ft with no AD requiring only supervision with no LOB/unsteadiness, however, with SOA and desaturated to 85% Sp02 on RA, requiring seated rest break to recover to 93% Sp02. Pt still below baseline and will require ongoing PT while hospitalized and anticipate safe return home at d/c. Will continue to follow and progress as tolerated.       Vanesa Obregon, PT, DPT

## 2024-04-22 NOTE — PLAN OF CARE
Goal Outcome Evaluation:         Pt continues on heparin gtt.  He has been therapeutic x 2 and has q AM PTT ordered per protocol.  Continues with NS at 75mL/hr.     Pt was scheduled for heart cath today at 17:30, but because of cath lab load. Has been rescheduled for noon tomorrow.  Pt allowed to eat healthy heart and will be NPO after midnight.

## 2024-04-22 NOTE — CASE MANAGEMENT/SOCIAL WORK
Continued Stay Note  ERMIAS Valentine     Patient Name: Fransico Cuenca  MRN: [id number]  Today's Date: 4/22/2024    Admit Date: 4/18/2024    Plan: Anticipate routine home alone. Watch O2 needs.   Discharge Plan       Row Name 04/22/24 1245       Plan    Plan Anticipate routine home alone. Watch O2 needs.    Plan Comments DC Barriers: Scheduled to have cardiac catheterization performed. Cardiology, nephrology, and pulmonology following. Currently requiring 2L O2 with no home O2 noted. May require walking oximetry to be performed 24-48 hrs prior to d/c.                  Expected Discharge Date and Time       Expected Discharge Date Expected Discharge Time    Apr 23, 2024           Elisa Yan RN     Office Phone: [phone]  Office Cell: [phone]

## 2024-04-22 NOTE — PROGRESS NOTES
[facility]        LOS:  LOS: 4 days   Patient Name: Fransico Cuenca  Age/Sex: 69 y.o. male  : 1955  MRN: [id number]    Day of Service: 24   Length of Stay: 4  Encounter Provider: JAX Schroeder  Place of Service: [facility] CARDIOLOGY  Patient Care Team:  Camryn Salinas MD as PCP - General (Family Medicine)  Ashwin Butcher MD as Consulting Physician (Nephrology)    Subjective:     Chief Complaint: f/u heart failure, CP    Subjective: patient awaiting [facility] today, has mild lower extremity edema, on supplemental O2    Current Medications:   Scheduled Meds:Acetylcysteine, 600 mg, Oral, BID  allopurinol, 100 mg, Oral, Daily  amLODIPine, 5 mg, Oral, Q24H  atorvastatin, 40 mg, Oral, Nightly  azelastine, 1 spray, Each Nare, BID  budesonide, 0.5 mg, Nebulization, BID - RT  carvedilol, 3.125 mg, Oral, BID With Meals  clopidogrel, 75 mg, Oral, Daily  insulin lispro, 2-7 Units, Subcutaneous, 4x Daily AC & at Bedtime  ipratropium-albuterol, 3 mL, Nebulization, 4x Daily - RT  montelukast, 10 mg, Oral, Nightly  pantoprazole, 40 mg, Oral, Q AM  [START ON 2024] predniSONE, 20 mg, Oral, Daily With Breakfast  senna-docusate sodium, 2 tablet, Oral, BID  sodium chloride, 10 mL, Intravenous, Q12H      Continuous Infusions:heparin, 12 Units/kg/hr, Last Rate: 23 Units/kg/hr (24 0504)  sodium chloride, 75 mL/hr, Last Rate: 75 mL/hr (24 0654)        Allergies:  No Known Allergies    Review of Systems   Constitutional: Negative for chills, diaphoresis and malaise/fatigue.   Cardiovascular:  Positive for leg swelling. Negative for chest pain, dyspnea on exertion, irregular heartbeat, near-syncope, orthopnea, palpitations, paroxysmal nocturnal dyspnea and syncope.   Respiratory:  Negative for cough, shortness of breath, sleep disturbances due to breathing and sputum production.    Gastrointestinal:  Negative for change in bowel habit.   Genitourinary:  Negative for  urgency.   Neurological:  Negative for dizziness and headaches.   Psychiatric/Behavioral:  Negative for altered mental status.          Objective:     Temp:  [97.5 °F (36.4 °C)-98.3 °F (36.8 °C)] 97.6 °F (36.4 °C)  Heart Rate:  [] 66  Resp:  [18-24] 24  BP: (151-178)/() 178/94   No intake or output data in the 24 hours ending 04/22/24 1411  Body mass index is 35.51 kg/m².      04/18/24  1301 04/19/24  0548 04/21/24  0500   Weight: 95.3 kg (210 lb 1.6 oz) 98.5 kg (217 lb 2.5 oz) (bed zeroed prior to weighing pt) 99.8 kg (220 lb 0.3 oz)         General Appearance:    Alert, cooperative, in no acute distress                                Head: Atraumatic, normocephalic, PERRLA               Neck:   supple, trachea midline, no thyromegaly, no carotid bruit, no JVD   Lungs:     Clear to auscultation, respirations regular, even and               unlabored    Heart:    Regular rhythm and normal rate, normal S1 and S2, no       murmur, no gallop, no rub, no click   Abdomen:     Normal bowel sounds, no masses, no organomegaly, soft  nontender, nondistended, no guarding, no rebound  tenderness   Extremities:   Moves all extremities well, no edema, no cyanosis, no  redness   Pulses:   Pulses palpable and equal bilaterally   Skin:   No bleeding, bruising or rash   Neurologic:   Awake, alert, oriented x3         Lab Review:   Results from last 7 days   Lab Units 04/22/24  0120 04/20/24  2335   SODIUM mmol/L 144 141   POTASSIUM mmol/L 4.3 4.2   CHLORIDE mmol/L 112* 108*   CO2 mmol/L 22.0 22.0   BUN mg/dL 63* 80*   CREATININE mg/dL 1.49* 2.00*   GLUCOSE mg/dL 156* 188*   CALCIUM mg/dL 8.6 8.7     Results from last 7 days   Lab Units 04/19/24  0137 04/18/24  2048 04/18/24  1513   CK TOTAL U/L 277*  --   --    HSTROP T ng/L  --  503* 525*     Results from last 7 days   Lab Units 04/22/24  0120 04/20/24  2335   WBC 10*3/mm3 6.98 8.05   HEMOGLOBIN g/dL 9.5* 9.7*   HEMATOCRIT % 30.4* 30.9*   PLATELETS 10*3/mm3 243 240  "    Results from last 7 days   Lab Units 04/22/24  0832 04/22/24  0120 04/18/24  1513 04/18/24  1351   INR   --   --   --  1.10   APTT seconds 67.5 74.1   < > 32.7*    < > = values in this interval not displayed.     Results from last 7 days   Lab Units 04/20/24  0214 04/19/24  0137   MAGNESIUM mg/dL 2.2 2.3           Invalid input(s): \"LDLCALC\"  Results from last 7 days   Lab Units 04/18/24  1513   PROBNP pg/mL 10,362.0*           Recent Radiology:  Imaging Results (Most Recent)       Procedure Component Value Units Date/Time    US Renal Bilateral [[id number]] Collected: 04/19/24 0722     Updated: 04/19/24 0725    Narrative:      US RENAL BILATERAL    Date of Exam: 4/18/2024 10:12 PM EDT    Indication: Kidney failure, acute  EN.    Comparison: CT abdomen and pelvis 7/26/2020    Technique: Grayscale and color Doppler ultrasound evaluation of the kidneys and urinary bladder was performed.      Findings:  RIGHT kidney measures 12.2 cm.  Kidney echogenicity, size, and vascularity appear within normal limits. There is no solid kidney mass.  No echogenic shadowing stone.  No hydronephrosis.    LEFT kidney measures 12.6 cm. Kidney echogenicity, size, and vascularity appear within normal limits. There is no solid kidney mass.  No echogenic shadowing stone.  No hydronephrosis.    Limited visualization of the urinary bladder is unremarkable. Total volume of 44 mL.        Impression:      Impression:  Unremarkable bilateral renal ultrasound.        Electronically Signed: Sujatha Montoya MD    4/19/2024 7:23 AM EDT    Workstation ID: [id number]    XR Chest 1 View [[id number]] Collected: 04/18/24 1346     Updated: 04/18/24 1351    Narrative:      XR CHEST 1 VW    Date of Exam: 4/18/2024 1:30 PM EDT    Indication: Chest pain    Comparison: AP chest x-ray 4/18/2024 at 9:00 a.m.    Findings:  Compared to today's earlier chest x-ray, there are lower lung volumes with increased airspace opacities in both lower lungs. No pneumothorax " is seen. Cardiac silhouette is enlarged, but is exaggerated by portable AP technique.      Impression:      Impression:  Lower lung volumes with increased airspace opacities in both lower lungs, which may be due to atelectasis, pulmonary edema, or less likely pneumonia.      Electronically Signed: Lisa Lan    4/18/2024 1:49 PM EDT    Workstation ID: [id number]            ECHOCARDIOGRAM:    Results for orders placed during the hospital encounter of 04/18/24    Adult Transthoracic Echo Complete w/ Color, Spectral and Contrast if necessary per protocol    Interpretation Summary    Left ventricular systolic function is mildly decreased. Left ventricular ejection fraction appears to be 51 - 55%.    Left ventricular diastolic function was normal.    Estimated right ventricular systolic pressure from tricuspid regurgitation is normal (<35 mmHg).    Hypokinesis of distal septum and apex noted        I reviewed the patient's new clinical results.    EKG:      Assessment:       NSTEMI (non-ST elevated myocardial infarction)    Troponin elevation / normal LVEF  Shortness of breath / HFpEF - initially diuresed  Human metapneumovirus / shortness of breath  AE COPD  HTN  HLD  DM II  EN    Plan:   Plan for [facility] today  Off diuresis   Steroids bronchodilators for AE COPD    Patient is seen and examined and findings are verified.  All data is reviewed by me personally.  Assessment and plan formulated by APC was done after discussion with attending.  I spent more than 50% of time in taking care of the patient.    Patient is sitting up in the chair.  Patient denies any symptom of chest pain.  Leg edema noted.    Normal S1 and S2.  No pericardial rub or murmur abdomen exam benign leg edema.    MDM:    1.  Non-ST elevation myocardial infarction:    Patient needs cardiac catheterization creatinine is improved but unfortunately there is no time available in the Cath Lab until 7:00 PM.  After discussion with the patient we proceeded with  cardiac catheterization tomorrow    2.  Hypertension:    Recommend better control of blood pressure.    3.  Bilateral leg edema:    Diuretics are on hold patient would need gentle diuresis subsequently    4.  Hyperlipidemia:    Patient is on Lipitor    Electronically signed by Santiago Lorenzo MD, 04/24/24, 5:20 PM EDT.            JAX Schroeder  04/22/24  14:11 EDT

## 2024-04-22 NOTE — PROGRESS NOTES
Daily Progress Note          Assessment    COPD  Human metapneumovirus infection  Bibasilar airspace disease on chest x-ray 4/18/2024  Elevated proBNP on admission 10,362  NSTEMI  DM II  HTN  GERD  Hyperlipidemia  EN  Anemia  Chronic allergic rhinitis/sinusitis    Echo 4/18/2024: LVEF 51-55%, normal RV pressure, hypokinesis of distal septum and apex              PLAN:  Oxygen supplement and titration to maintain saturation 90-95%: Currently requiring 2 L per nasal cannula, CPAP at bedtime and as needed  IV steroids: Change to p.o. prednisone  Nasal azelastine  Bronchodilators  Inhaled corticosteroids  Incentive spirometer  Heparin drip per Cardiology with plans for cardiac catheter  Fluid/electrolyte management as per nephrology  Plavix, atorvastatin    BP/glucose controlled  Electrolytes/ glycemic control  DVT and GI prophylaxis    I personally reviewed the radiological images             LOS: 4 days     Subjective     Patient reports nasal congestion, no chest pain    Objective     Vital signs for last 24 hours:  Vitals:    04/22/24 0723 04/22/24 0726 04/22/24 0727 04/22/24 0730   BP:       BP Location:       Patient Position:       Pulse: 61 94 100 60   Resp: 20 20 20 20   Temp:       TempSrc:       SpO2: (!) 88% 91% 95% 97%   Weight:       Height:           Intake/Output last 3 shifts:  I/O last 3 completed shifts:  In: 480 [P.O.:480]  Out: 575 [Urine:575]  Intake/Output this shift:  No intake/output data recorded.      Radiology  Imaging Results (Last 24 Hours)       ** No results found for the last 24 hours. **            Labs:  Results from last 7 days   Lab Units 04/22/24  0120   WBC 10*3/mm3 6.98   HEMOGLOBIN g/dL 9.5*   HEMATOCRIT % 30.4*   PLATELETS 10*3/mm3 243     Results from last 7 days   Lab Units 04/22/24  0120   SODIUM mmol/L 144   POTASSIUM mmol/L 4.3   CHLORIDE mmol/L 112*   CO2 mmol/L 22.0   BUN mg/dL 63*   CREATININE mg/dL 1.49*   CALCIUM mg/dL 8.6   GLUCOSE mg/dL 156*     Results from last 7  days   Lab Units 04/19/24  0414   PH, ARTERIAL pH units 7.384   PO2 ART mm Hg 74.7*   PCO2, ARTERIAL mm Hg 34.4*   HCO3 ART mmol/L 20.6*     Results from last 7 days   Lab Units 04/22/24  0120 04/20/24  2335 04/20/24  0214   ALBUMIN g/dL 3.4* 3.5 3.6     Results from last 7 days   Lab Units 04/19/24  0137 04/18/24  2048 04/18/24  1513   CK TOTAL U/L 277*  --   --    HSTROP T ng/L  --  503* 525*         Results from last 7 days   Lab Units 04/20/24  0214   MAGNESIUM mg/dL 2.2     Results from last 7 days   Lab Units 04/22/24  0120 04/21/24  1229 04/21/24  0554 04/18/24  1513 04/18/24  1351   INR   --   --   --   --  1.10   APTT seconds 74.1 59.3* 66.9   < > 32.7*    < > = values in this interval not displayed.               Meds:   SCHEDULE  Acetylcysteine, 600 mg, Oral, BID  allopurinol, 100 mg, Oral, Daily  amLODIPine, 5 mg, Oral, Q24H  atorvastatin, 40 mg, Oral, Nightly  budesonide, 0.5 mg, Nebulization, BID - RT  carvedilol, 3.125 mg, Oral, BID With Meals  clopidogrel, 75 mg, Oral, Daily  insulin lispro, 2-7 Units, Subcutaneous, 4x Daily AC & at Bedtime  ipratropium-albuterol, 3 mL, Nebulization, 4x Daily - RT  methylPREDNISolone sodium succinate, 60 mg, Intravenous, Q12H  montelukast, 10 mg, Oral, Nightly  pantoprazole, 40 mg, Oral, Q AM  senna-docusate sodium, 2 tablet, Oral, BID  sodium chloride, 10 mL, Intravenous, Q12H      Infusions  heparin, 12 Units/kg/hr, Last Rate: 23 Units/kg/hr (04/22/24 0504)  sodium chloride, 75 mL/hr, Last Rate: 75 mL/hr (04/22/24 0654)      PRNs    acetaminophen    senna-docusate sodium **AND** polyethylene glycol **AND** bisacodyl **AND** bisacodyl    Calcium Replacement - Follow Nurse / BPA Driven Protocol    dextrose    dextrose    glucagon (human recombinant)    heparin    heparin    HYDROcodone-acetaminophen    Magnesium Standard Dose Replacement - Follow Nurse / BPA Driven Protocol    nitroglycerin    Phosphorus Replacement - Follow Nurse / BPA Driven Protocol    Potassium  Replacement - Follow Nurse / BPA Driven Protocol    sodium chloride    sodium chloride    sodium chloride    Physical Exam:  General Appearance:  Alert   HEENT:  Normocephalic, without obvious abnormality, Conjunctiva/corneas clear,.   Nares normal, no drainage     Neck:  Supple, symmetrical, trachea midline.   Lungs /Chest wall: Good air entry without wheezing or rhonchi, respirations unlabored, symmetrical wall movement.     Heart:  Regular rate and rhythm, S1 S2 normal  Abdomen: Soft, non-tender, no masses, no organomegaly.    Extremities: No edema, no clubbing or cyanosis     ROS  Constitutional: Negative for chills, fever and malaise/fatigue.   HENT: Chronic nasal congestion  Eyes: Negative.    Cardiovascular: Negative.    Respiratory: Positive for cough and shortness of breath.    Skin: Negative.    Musculoskeletal: Negative.    Gastrointestinal: Negative.    Genitourinary: Negative.    Neurological: Negative.    Psychiatric/Behavioral: Negative.      I reviewed the recent clinical results  I personally reviewed the latest radiological studies    Part of this note may be an electronic transcription/translation of spoken language to printed text using the Dragon Dictation System.

## 2024-04-22 NOTE — PROGRESS NOTES
"NEPHROLOGY PROGRESS NOTE------[facility]    [facility]  [phone]  Ashwin Butcher MD      Patient Care Team:  Camryn Salinas MD as PCP - General (Family Medicine)  Ashwin Butcher MD as Consulting Physician (Nephrology)      Provider:  Ashwin Butcher MD  Patient Name: Fransico Cuenca  :  1955    SUBJECTIVE:  Follow-up EN  No chest pain or shortness of breath  Voiding by self    Medication:  Acetylcysteine, 600 mg, Oral, BID  allopurinol, 100 mg, Oral, Daily  amLODIPine, 5 mg, Oral, Q24H  atorvastatin, 40 mg, Oral, Nightly  azelastine, 1 spray, Each Nare, BID  budesonide, 0.5 mg, Nebulization, BID - RT  carvedilol, 3.125 mg, Oral, BID With Meals  clopidogrel, 75 mg, Oral, Daily  insulin lispro, 2-7 Units, Subcutaneous, 4x Daily AC & at Bedtime  ipratropium-albuterol, 3 mL, Nebulization, 4x Daily - RT  montelukast, 10 mg, Oral, Nightly  pantoprazole, 40 mg, Oral, Q AM  [START ON 2024] predniSONE, 20 mg, Oral, Daily With Breakfast  senna-docusate sodium, 2 tablet, Oral, BID  sodium chloride, 10 mL, Intravenous, Q12H      heparin, 12 Units/kg/hr, Last Rate: 23 Units/kg/hr (24 0504)  sodium chloride, 75 mL/hr, Last Rate: 75 mL/hr (24 0654)        OBJECTIVE    Vital Sign Min/Max for last 24 hours  Temp  Min: 97.5 °F (36.4 °C)  Max: 98.3 °F (36.8 °C)   BP  Min: 141/82  Max: 175/101   Pulse  Min: 53  Max: 104   Resp  Min: 18  Max: 23   SpO2  Min: 88 %  Max: 99 %   No data recorded   No data recorded     Flowsheet Rows      Flowsheet Row First Filed Value   Admission Height 167.6 cm (66\") Documented at 2024 1301   Admission Weight 95.3 kg (210 lb 1.6 oz) Documented at 2024 1301            No intake/output data recorded.  I/O last 3 completed shifts:  In: 480 [P.O.:480]  Out: 575 [Urine:575]    Physical Exam:  General Appearance: alert, appears stated age and cooperative  Head: normocephalic, without obvious abnormality and " "atraumatic  Eyes: conjunctivae and sclerae normal and no icterus  Neck: supple and no JVD  Lungs: Clear to auscultation bilaterally  Heart: regular rhythm & normal rate and normal S1, S2  Chest: Wall no abnormalities observed  Abdomen: normal bowel sounds and soft, nontender  Extremities: moves extremities well, trace edema, no cyanosis and no redness  Skin: no bleeding, bruising or rash, turgor normal, color normal and no lesions noted  Neurologic: Alert, and oriented. No focal deficits    Labs:    WBC WBC   Date Value Ref Range Status   04/22/2024 6.98 3.40 - 10.80 10*3/mm3 Final   04/20/2024 8.05 3.40 - 10.80 10*3/mm3 Final   04/20/2024 7.86 3.40 - 10.80 10*3/mm3 Final      HGB Hemoglobin   Date Value Ref Range Status   04/22/2024 9.5 (L) 13.0 - 17.7 g/dL Final   04/20/2024 9.7 (L) 13.0 - 17.7 g/dL Final   04/20/2024 9.3 (L) 13.0 - 17.7 g/dL Final      HCT Hematocrit   Date Value Ref Range Status   04/22/2024 30.4 (L) 37.5 - 51.0 % Final   04/20/2024 30.9 (L) 37.5 - 51.0 % Final   04/20/2024 29.2 (L) 37.5 - 51.0 % Final      Platelets No results found for: \"LABPLAT\"   MCV MCV   Date Value Ref Range Status   04/22/2024 82.8 79.0 - 97.0 fL Final   04/20/2024 83.5 79.0 - 97.0 fL Final   04/20/2024 82.3 79.0 - 97.0 fL Final          Sodium Sodium   Date Value Ref Range Status   04/22/2024 144 136 - 145 mmol/L Final   04/20/2024 141 136 - 145 mmol/L Final   04/20/2024 142 136 - 145 mmol/L Final      Potassium Potassium   Date Value Ref Range Status   04/22/2024 4.3 3.5 - 5.2 mmol/L Final   04/20/2024 4.2 3.5 - 5.2 mmol/L Final     Comment:     Slight hemolysis detected by analyzer. Result may be falsely elevated.   04/20/2024 3.9 3.5 - 5.2 mmol/L Final      Chloride Chloride   Date Value Ref Range Status   04/22/2024 112 (H) 98 - 107 mmol/L Final   04/20/2024 108 (H) 98 - 107 mmol/L Final   04/20/2024 107 98 - 107 mmol/L Final      CO2 CO2   Date Value Ref Range Status   04/22/2024 22.0 22.0 - 29.0 mmol/L Final " "  04/20/2024 22.0 22.0 - 29.0 mmol/L Final   04/20/2024 24.0 22.0 - 29.0 mmol/L Final      BUN BUN   Date Value Ref Range Status   04/22/2024 63 (H) 8 - 23 mg/dL Final   04/20/2024 80 (H) 8 - 23 mg/dL Final   04/20/2024 91 (H) 8 - 23 mg/dL Final      Creatinine Creatinine   Date Value Ref Range Status   04/22/2024 1.49 (H) 0.76 - 1.27 mg/dL Final   04/20/2024 2.00 (H) 0.76 - 1.27 mg/dL Final   04/20/2024 2.09 (H) 0.76 - 1.27 mg/dL Final      Calcium Calcium   Date Value Ref Range Status   04/22/2024 8.6 8.6 - 10.5 mg/dL Final   04/20/2024 8.7 8.6 - 10.5 mg/dL Final   04/20/2024 8.7 8.6 - 10.5 mg/dL Final      PO4 No components found for: \"PO4\"   Albumin Albumin   Date Value Ref Range Status   04/22/2024 3.4 (L) 3.5 - 5.2 g/dL Final   04/20/2024 3.5 3.5 - 5.2 g/dL Final   04/20/2024 3.6 3.5 - 5.2 g/dL Final      Magnesium Magnesium   Date Value Ref Range Status   04/20/2024 2.2 1.6 - 2.4 mg/dL Final      Uric Acid No components found for: \"URIC ACID\"     Imaging Results (Last 72 Hours)       ** No results found for the last 72 hours. **            Results for orders placed during the hospital encounter of 04/18/24    XR Chest 1 View    Narrative  XR CHEST 1 VW    Date of Exam: 4/18/2024 1:30 PM EDT    Indication: Chest pain    Comparison: AP chest x-ray 4/18/2024 at 9:00 a.m.    Findings:  Compared to today's earlier chest x-ray, there are lower lung volumes with increased airspace opacities in both lower lungs. No pneumothorax is seen. Cardiac silhouette is enlarged, but is exaggerated by portable AP technique.    Impression  Impression:  Lower lung volumes with increased airspace opacities in both lower lungs, which may be due to atelectasis, pulmonary edema, or less likely pneumonia.      Electronically Signed: Lisa Lan  4/18/2024 1:49 PM EDT  Workstation ID: [id number]      Results for orders placed in visit on 02/23/24    XR Shoulder 2+ View Right    Narrative  DATE OF EXAM:  2/23/2024    PROCEDURE:  XR " shoulder 2+ view right    INDICATIONS:  Chronic right shoulder pain    COMPARISON:  No Comparisons Available    FINDINGS:  No acute fracture is seen. Mineralization is within normal limits.  The humeral head appears to be well-seated within the glenoid fossa.  There is mild glenohumeral and acromioclavicular joint space narrowing consistent with mild/early osteoarthritic change.  No radiopaque foreign bodies.  Lung spaces are clear.    Impression  Mild acromioclavicular and glenohumeral osteoarthritic disease, otherwise no acute bony abnormality of the right shoulder.      Results for orders placed in visit on 08/04/23    XR Hand 3+ View Left    Narrative  XR HAND 3+ VW LEFT    Date of Exam: 8/4/2023 1:57 PM EDT    Indication: Left hand pain    Comparison: None available.    Findings:  There is no acute fracture. There is marked narrowing with joint space loss and bone-on-bone contact of the first metacarpal/carpal joint. There is subchondral sclerosis and spurring and slight radial subluxation of the base of the first metacarpal bone  with respect to the adjacent trapezium. There is narrowing and spurring of the IP joint of the left thumb, the first MCP joint, and several of the inner-phalangeal joints of the digits consistent with moderate and severe osteoarthritis. There are no  periarticular erosions. There is soft tissue swelling involving the digits in the area of prominent degenerative changes.    Impression  Impression:    1. No acute fracture.  2. Moderate and severe osteoarthritis as described.  3. Areas of soft tissue swelling involving the digits where the patient has prominent degenerative changes.      Electronically Signed: Lucio Pedraza  8/4/2023 3:11 PM EDT  Workstation ID: [id number]            ASSESSMENT / PLAN      NSTEMI (non-ST elevated myocardial infarction)    EN-undetermined etiology.  This might be cardiorenal in setting of fluid overload/ACE inhibitor and thiazide use.  History of  nephrolithiasis-CT several years ago showed left-sided obstructive kidney stone  Hypertension  Hyperlipidemia  Type 2 diabetes  Hold metformin  COPD with exacerbation  Hypervolemia-better.  Diuresed  NSTEMI-Per cardiology.  Hyperuricemia-likely overdiuresis.  Will monitor     CR better  OK for heart cath today  Stop fluids post cath  Serologies pending        Ashwin Btucher MD  [facility]  [phone]  04/22/24  11:43 EDT

## 2024-04-22 NOTE — THERAPY TREATMENT NOTE
Subjective: Pt agreeable to therapeutic plan of care. Pt to have cardiac cath tomorrow at 12pm.    Objective:     Bed mobility - N/A or Not attempted. (Up in recliner at start and end of session)  Transfers - Supervision  Ambulation - 250 feet Supervision (no AD)    Vitals: Desaturates to 85% Sp02 on RA, recovers to 93% Sp02 on RA    Pain: 0 VAS       Education: Provided education on the importance of mobility in the acute care setting, Verbal/Tactile Cues, Transfer Training, and Gait Training    Assessment: Fransico Cuenca presents with functional mobility impairments which indicate the need for skilled intervention. Tolerating session today without incident. Pt progressed to gait training bout 250 ft with no AD requiring only supervision with no LOB/unsteadiness, however, with SOA and desaturated to 85% Sp02 on RA, requiring seated rest break to recover to 93% Sp02. Pt still below baseline and will require ongoing PT while hospitalized and anticipate safe return home at d/c. Will continue to follow and progress as tolerated.     Plan/Recommendations:   If medically appropriate, No ongoing therapy recommended post-acute care. No therapy needs. Pt requires no DME at discharge.     Pt desires Home at discharge. Pt cooperative; agreeable to therapeutic recommendations and plan of care.         Basic Mobility 6-click:  Rollin = Total, A lot = 2, A little = 3; 4 = None  Supine>Sit:   1 = Total, A lot = 2, A little = 3; 4 = None   Sit>Stand with arms:  1 = Total, A lot = 2, A little = 3; 4 = None  Bed>Chair:   1 = Total, A lot = 2, A little = 3; 4 = None  Ambulate in room:  1 = Total, A lot = 2, A little = 3; 4 = None  3-5 Steps with railin = Total, A lot = 2, A little = 3; 4 = None  Score: 22    Post-Tx Position: Up in Chair and Call light and personal items within reach  PPE: gloves and gown

## 2024-04-22 NOTE — PLAN OF CARE
Goal Outcome Evaluation:  Plan of Care Reviewed With: patient     Progress: improving  Outcome Evaluation: Patient alert, oriented x4. Vitals stable. Up with SBA to BRP. Falls precautions in place. Good UOP noted. Denies pain. POC AC HS, no SSI needed. Remains on Heparin gtt, IVF. NPO after midnight for procedure. Anticipating heart cath today. Patient agreeable to plan of care. Continue to monitor.    Problem: Adult Inpatient Plan of Care  Goal: Plan of Care Review  Outcome: Ongoing, Progressing  Flowsheets (Taken 4/22/2024 0330)  Progress: improving  Plan of Care Reviewed With: patient  Outcome Evaluation: Patient alert, oriented x4. Vitals stable. Up with SBA to BRP. Falls precautions in place. Good UOP noted. Denies pain. POC AC HS, no SSI needed. Remains on Heparin gtt, IVF. NPO after midnight for procedure. Anticipating heart cath today. Patient agreeable to plan of care. Continue to monitor.  Goal: Patient-Specific Goal (Individualized)  Outcome: Ongoing, Progressing  Goal: Absence of Hospital-Acquired Illness or Injury  Outcome: Ongoing, Progressing  Intervention: Identify and Manage Fall Risk  Flowsheets  Taken 4/22/2024 0214  Safety Promotion/Fall Prevention: safety round/check completed  Taken 4/22/2024 0019  Safety Promotion/Fall Prevention: safety round/check completed  Taken 4/21/2024 2219  Safety Promotion/Fall Prevention: safety round/check completed  Taken 4/21/2024 2036  Safety Promotion/Fall Prevention: safety round/check completed  Intervention: Prevent Skin Injury  Flowsheets  Taken 4/22/2024 0214 by Krys Jackson RN  Body Position: position changed independently  Taken 4/22/2024 0019 by Krys Jackson RN  Body Position: position changed independently  Taken 4/21/2024 2219 by Krys Jackson RN  Body Position: position changed independently  Taken 4/21/2024 2036 by Krys Jackson RN  Body Position: position changed independently  Taken 4/21/2024 0800 by Imelda Romero RN  Skin Protection:  adhesive use limited  Intervention: Prevent and Manage VTE (Venous Thromboembolism) Risk  Flowsheets  Taken 4/22/2024 0214 by Krys Jackson, RN  Activity Management: back to bed  Taken 4/21/2024 2036 by Krys Jackson, RN  Activity Management: up in chair  Taken 4/21/2024 1600 by Imelda Romero RN  VTE Prevention/Management:   bilateral   sequential compression devices off  Intervention: Prevent Infection  Flowsheets  Taken 4/22/2024 0214  Infection Prevention: hand hygiene promoted  Taken 4/22/2024 0019  Infection Prevention: personal protective equipment utilized  Taken 4/21/2024 2219  Infection Prevention:   hand hygiene promoted   personal protective equipment utilized  Taken 4/21/2024 2036  Infection Prevention:   personal protective equipment utilized   hand hygiene promoted  Goal: Optimal Comfort and Wellbeing  Outcome: Ongoing, Progressing  Intervention: Provide Person-Centered Care  Flowsheets (Taken 4/21/2024 2036)  Trust Relationship/Rapport:   care explained   thoughts/feelings acknowledged   reassurance provided  Goal: Readiness for Transition of Care  Outcome: Ongoing, Progressing  Goal: Plan of Care Review  Outcome: Ongoing, Progressing  Flowsheets (Taken 4/22/2024 0330)  Progress: improving  Plan of Care Reviewed With: patient  Outcome Evaluation: Patient alert, oriented x4. Vitals stable. Up with SBA to BRP. Falls precautions in place. Good UOP noted. Denies pain. POC AC HS, no SSI needed. Remains on Heparin gtt, IVF. NPO after midnight for procedure. Anticipating heart cath today. Patient agreeable to plan of care. Continue to monitor.  Goal: Patient-Specific Goal (Individualized)  Outcome: Ongoing, Progressing  Goal: Absence of Hospital-Acquired Illness or Injury  Outcome: Ongoing, Progressing  Intervention: Identify and Manage Fall Risk  Recent Flowsheet Documentation  Taken 4/22/2024 0214 by Krys Jackson, RN  Safety Promotion/Fall Prevention: safety round/check completed  Taken 4/22/2024  0019 by Jackson, Krys, RN  Safety Promotion/Fall Prevention: safety round/check completed  Taken 4/21/2024 2219 by Krys Jackson RN  Safety Promotion/Fall Prevention: safety round/check completed  Taken 4/21/2024 2036 by Krys Jackson RN  Safety Promotion/Fall Prevention: safety round/check completed  Intervention: Prevent Skin Injury  Recent Flowsheet Documentation  Taken 4/22/2024 0214 by Krys Jackson RN  Body Position: position changed independently  Taken 4/22/2024 0019 by Krys Jackson RN  Body Position: position changed independently  Taken 4/21/2024 2219 by Krys Jackson RN  Body Position: position changed independently  Taken 4/21/2024 2036 by Krys Jackson RN  Body Position: position changed independently  Intervention: Prevent and Manage VTE (Venous Thromboembolism) Risk  Recent Flowsheet Documentation  Taken 4/22/2024 0214 by Krys Jackson RN  Activity Management: back to bed  Taken 4/21/2024 2036 by Krys Jackson RN  Activity Management: up in chair  Intervention: Prevent Infection  Recent Flowsheet Documentation  Taken 4/22/2024 0214 by Krys Jackson RN  Infection Prevention: hand hygiene promoted  Taken 4/22/2024 0019 by Krys Jackson RN  Infection Prevention: personal protective equipment utilized  Taken 4/21/2024 2219 by Krys Jackson RN  Infection Prevention:   hand hygiene promoted   personal protective equipment utilized  Taken 4/21/2024 2036 by Krys Jackson RN  Infection Prevention:   personal protective equipment utilized   hand hygiene promoted  Goal: Optimal Comfort and Wellbeing  Outcome: Ongoing, Progressing  Intervention: Provide Person-Centered Care  Recent Flowsheet Documentation  Taken 4/21/2024 2036 by Krys Jackson RN  Trust Relationship/Rapport:   care explained   thoughts/feelings acknowledged   reassurance provided  Goal: Readiness for Transition of Care  Outcome: Ongoing, Progressing     Problem: Chest Pain  Goal: Resolution of Chest Pain  Symptoms  Outcome: Ongoing, Progressing     Problem: Fall Injury Risk  Goal: Absence of Fall and Fall-Related Injury  Outcome: Ongoing, Progressing  Intervention: Identify and Manage Contributors  Flowsheets  Taken 4/22/2024 0214  Medication Review/Management: medications reviewed  Taken 4/21/2024 2219  Medication Review/Management: medications reviewed  Taken 4/21/2024 2036  Medication Review/Management: medications reviewed  Self-Care Promotion: independence encouraged  Intervention: Promote Injury-Free Environment  Flowsheets  Taken 4/22/2024 0214  Safety Promotion/Fall Prevention: safety round/check completed  Taken 4/22/2024 0019  Safety Promotion/Fall Prevention: safety round/check completed  Taken 4/21/2024 2219  Safety Promotion/Fall Prevention: safety round/check completed  Taken 4/21/2024 2036  Safety Promotion/Fall Prevention: safety round/check completed

## 2024-04-23 ENCOUNTER — APPOINTMENT (OUTPATIENT)
Dept: CARDIOLOGY | Facility: HOSPITAL | Age: 69
End: 2024-04-23
Payer: MEDICARE

## 2024-04-23 ENCOUNTER — APPOINTMENT (OUTPATIENT)
Dept: GENERAL RADIOLOGY | Facility: HOSPITAL | Age: 69
End: 2024-04-23
Payer: MEDICARE

## 2024-04-23 LAB
ALBUMIN SERPL-MCNC: 3.3 G/DL (ref 3.5–5.2)
ANA SER QL IF: NEGATIVE
ANION GAP SERPL CALCULATED.3IONS-SCNC: 8 MMOL/L (ref 5–15)
APTT PPP: 58.6 SECONDS (ref 61–76.5)
APTT PPP: 75.4 SECONDS (ref 61–76.5)
BASOPHILS # BLD AUTO: 0.01 10*3/MM3 (ref 0–0.2)
BASOPHILS NFR BLD AUTO: 0.1 % (ref 0–1.5)
BH CV XLRA MEAS - DIST GSV CALF DIST LEFT: 0.3 CM
BH CV XLRA MEAS - DIST GSV CALF DIST RIGHT: 0.23 CM
BH CV XLRA MEAS - DIST GSV THIGH DIST LEFT: 0.27 CM
BH CV XLRA MEAS - DIST GSV THIGH DIST RIGHT: 0.24 CM
BH CV XLRA MEAS - MID GSV CALF LEFT: 0.21 CM
BH CV XLRA MEAS - MID GSV CALF RIGHT: 0.2 CM
BH CV XLRA MEAS - MID GSV THIGH  LEFT: 0.3 CM
BH CV XLRA MEAS - MID GSV THIGH  RIGHT: 0.29 CM
BH CV XLRA MEAS - PROX GSV CALF DIST LEFT: 0.23 CM
BH CV XLRA MEAS - PROX GSV CALF DIST RIGHT: 0.26 CM
BH CV XLRA MEAS - PROX GSV THIGH  LEFT: 0.42 CM
BH CV XLRA MEAS - PROX GSV THIGH  RIGHT: 0.38 CM
BH CV XLRA MEAS LEFT DIST CCA EDV: -16.5 CM/SEC
BH CV XLRA MEAS LEFT DIST CCA PSV: -81.5 CM/SEC
BH CV XLRA MEAS LEFT DIST ICA EDV: -33.7 CM/SEC
BH CV XLRA MEAS LEFT DIST ICA PSV: -94.9 CM/SEC
BH CV XLRA MEAS LEFT ICA/CCA RATIO: 0.69
BH CV XLRA MEAS LEFT PROX CCA EDV: -29.6 CM/SEC
BH CV XLRA MEAS LEFT PROX CCA PSV: -137 CM/SEC
BH CV XLRA MEAS LEFT PROX ECA PSV: 174 CM/SEC
BH CV XLRA MEAS LEFT PROX ICA EDV: -27.4 CM/SEC
BH CV XLRA MEAS LEFT PROX ICA PSV: -87 CM/SEC
BH CV XLRA MEAS LEFT PROX SCLA PSV: 174 CM/SEC
BH CV XLRA MEAS LEFT VERTEBRAL A PSV: -68.2 CM/SEC
BH CV XLRA MEAS RIGHT DIST CCA EDV: -20.5 CM/SEC
BH CV XLRA MEAS RIGHT DIST CCA PSV: -82 CM/SEC
BH CV XLRA MEAS RIGHT DIST ICA EDV: -37.3 CM/SEC
BH CV XLRA MEAS RIGHT DIST ICA PSV: -98.8 CM/SEC
BH CV XLRA MEAS RIGHT ICA/CCA RATIO: 1.95
BH CV XLRA MEAS RIGHT PROX CCA EDV: 24.2 CM/SEC
BH CV XLRA MEAS RIGHT PROX CCA PSV: 78.3 CM/SEC
BH CV XLRA MEAS RIGHT PROX ECA PSV: 164 CM/SEC
BH CV XLRA MEAS RIGHT PROX ICA EDV: -45 CM/SEC
BH CV XLRA MEAS RIGHT PROX ICA PSV: -160 CM/SEC
BH CV XLRA MEAS RIGHT PROX SCLA PSV: 125 CM/SEC
BH CV XLRA MEAS RIGHT VERTEBRAL A PSV: -70.2 CM/SEC
BUN SERPL-MCNC: 55 MG/DL (ref 8–23)
BUN/CREAT SERPL: 39.9 (ref 7–25)
C-ANCA TITR SER IF: NORMAL TITER
CALCIUM SPEC-SCNC: 8.6 MG/DL (ref 8.6–10.5)
CHLORIDE SERPL-SCNC: 113 MMOL/L (ref 98–107)
CO2 SERPL-SCNC: 25 MMOL/L (ref 22–29)
CREAT SERPL-MCNC: 1.38 MG/DL (ref 0.76–1.27)
DEPRECATED RDW RBC AUTO: 47.8 FL (ref 37–54)
EGFRCR SERPLBLD CKD-EPI 2021: 55 ML/MIN/1.73
EOSINOPHIL # BLD AUTO: 0.03 10*3/MM3 (ref 0–0.4)
EOSINOPHIL NFR BLD AUTO: 0.4 % (ref 0.3–6.2)
ERYTHROCYTE [DISTWIDTH] IN BLOOD BY AUTOMATED COUNT: 15.8 % (ref 12.3–15.4)
GLUCOSE BLDC GLUCOMTR-MCNC: 103 MG/DL (ref 70–105)
GLUCOSE BLDC GLUCOMTR-MCNC: 106 MG/DL (ref 70–105)
GLUCOSE BLDC GLUCOMTR-MCNC: 121 MG/DL (ref 70–105)
GLUCOSE BLDC GLUCOMTR-MCNC: 155 MG/DL (ref 70–105)
GLUCOSE SERPL-MCNC: 118 MG/DL (ref 65–99)
HCT VFR BLD AUTO: 30.6 % (ref 37.5–51)
HGB BLD-MCNC: 9.4 G/DL (ref 13–17.7)
IMM GRANULOCYTES # BLD AUTO: 0.34 10*3/MM3 (ref 0–0.05)
IMM GRANULOCYTES NFR BLD AUTO: 4.2 % (ref 0–0.5)
LABORATORY COMMENT REPORT: NORMAL
LYMPHOCYTES # BLD AUTO: 0.96 10*3/MM3 (ref 0.7–3.1)
LYMPHOCYTES NFR BLD AUTO: 11.8 % (ref 19.6–45.3)
MCH RBC QN AUTO: 25.8 PG (ref 26.6–33)
MCHC RBC AUTO-ENTMCNC: 30.7 G/DL (ref 31.5–35.7)
MCV RBC AUTO: 83.8 FL (ref 79–97)
MONOCYTES # BLD AUTO: 0.81 10*3/MM3 (ref 0.1–0.9)
MONOCYTES NFR BLD AUTO: 9.9 % (ref 5–12)
MYELOPEROXIDASE AB SER IA-ACNC: <0.2 UNITS (ref 0–0.9)
NEUTROPHILS NFR BLD AUTO: 6.02 10*3/MM3 (ref 1.7–7)
NEUTROPHILS NFR BLD AUTO: 73.6 % (ref 42.7–76)
NRBC BLD AUTO-RTO: 0 /100 WBC (ref 0–0.2)
P-ANCA ATYPICAL TITR SER IF: NORMAL TITER
P-ANCA TITR SER IF: NORMAL TITER
PHOSPHATE SERPL-MCNC: 2.9 MG/DL (ref 2.5–4.5)
PLATELET # BLD AUTO: 252 10*3/MM3 (ref 140–450)
PMV BLD AUTO: 10.7 FL (ref 6–12)
POTASSIUM SERPL-SCNC: 4 MMOL/L (ref 3.5–5.2)
PROTEINASE3 AB SER IA-ACNC: <0.2 UNITS (ref 0–0.9)
QT INTERVAL: 387 MS
QTC INTERVAL: 421 MS
RBC # BLD AUTO: 3.65 10*6/MM3 (ref 4.14–5.8)
SODIUM SERPL-SCNC: 146 MMOL/L (ref 136–145)
WBC NRBC COR # BLD AUTO: 8.17 10*3/MM3 (ref 3.4–10.8)

## 2024-04-23 PROCEDURE — B2111ZZ FLUOROSCOPY OF MULTIPLE CORONARY ARTERIES USING LOW OSMOLAR CONTRAST: ICD-10-PCS | Performed by: INTERNAL MEDICINE

## 2024-04-23 PROCEDURE — 93880 EXTRACRANIAL BILAT STUDY: CPT

## 2024-04-23 PROCEDURE — C1894 INTRO/SHEATH, NON-LASER: HCPCS | Performed by: INTERNAL MEDICINE

## 2024-04-23 PROCEDURE — 63710000001 INSULIN LISPRO (HUMAN) PER 5 UNITS: Performed by: INTERNAL MEDICINE

## 2024-04-23 PROCEDURE — 94799 UNLISTED PULMONARY SVC/PX: CPT

## 2024-04-23 PROCEDURE — 85025 COMPLETE CBC W/AUTO DIFF WBC: CPT | Performed by: HOSPITALIST

## 2024-04-23 PROCEDURE — 4A023N7 MEASUREMENT OF CARDIAC SAMPLING AND PRESSURE, LEFT HEART, PERCUTANEOUS APPROACH: ICD-10-PCS | Performed by: INTERNAL MEDICINE

## 2024-04-23 PROCEDURE — B2151ZZ FLUOROSCOPY OF LEFT HEART USING LOW OSMOLAR CONTRAST: ICD-10-PCS | Performed by: INTERNAL MEDICINE

## 2024-04-23 PROCEDURE — 80069 RENAL FUNCTION PANEL: CPT | Performed by: INTERNAL MEDICINE

## 2024-04-23 PROCEDURE — 99232 SBSQ HOSP IP/OBS MODERATE 35: CPT | Performed by: INTERNAL MEDICINE

## 2024-04-23 PROCEDURE — 85730 THROMBOPLASTIN TIME PARTIAL: CPT | Performed by: INTERNAL MEDICINE

## 2024-04-23 PROCEDURE — 25010000002 HEPARIN (PORCINE) 25000-0.45 UT/250ML-% SOLUTION: Performed by: INTERNAL MEDICINE

## 2024-04-23 PROCEDURE — 71045 X-RAY EXAM CHEST 1 VIEW: CPT

## 2024-04-23 PROCEDURE — 93880 EXTRACRANIAL BILAT STUDY: CPT | Performed by: SURGERY

## 2024-04-23 PROCEDURE — 99223 1ST HOSP IP/OBS HIGH 75: CPT | Performed by: THORACIC SURGERY (CARDIOTHORACIC VASCULAR SURGERY)

## 2024-04-23 PROCEDURE — 93970 EXTREMITY STUDY: CPT | Performed by: SURGERY

## 2024-04-23 PROCEDURE — 63710000001 PREDNISONE PER 1 MG: Performed by: INTERNAL MEDICINE

## 2024-04-23 PROCEDURE — 94664 DEMO&/EVAL PT USE INHALER: CPT

## 2024-04-23 PROCEDURE — 25010000002 MIDAZOLAM PER 1 MG: Performed by: INTERNAL MEDICINE

## 2024-04-23 PROCEDURE — 94761 N-INVAS EAR/PLS OXIMETRY MLT: CPT

## 2024-04-23 PROCEDURE — 93970 EXTREMITY STUDY: CPT

## 2024-04-23 PROCEDURE — 25010000002 FENTANYL CITRATE (PF) 100 MCG/2ML SOLUTION: Performed by: INTERNAL MEDICINE

## 2024-04-23 PROCEDURE — 93458 L HRT ARTERY/VENTRICLE ANGIO: CPT | Performed by: INTERNAL MEDICINE

## 2024-04-23 PROCEDURE — 99152 MOD SED SAME PHYS/QHP 5/>YRS: CPT | Performed by: INTERNAL MEDICINE

## 2024-04-23 PROCEDURE — C1769 GUIDE WIRE: HCPCS | Performed by: INTERNAL MEDICINE

## 2024-04-23 PROCEDURE — 82948 REAGENT STRIP/BLOOD GLUCOSE: CPT | Performed by: INTERNAL MEDICINE

## 2024-04-23 PROCEDURE — 82948 REAGENT STRIP/BLOOD GLUCOSE: CPT

## 2024-04-23 PROCEDURE — 25010000002 HYDRALAZINE PER 20 MG: Performed by: INTERNAL MEDICINE

## 2024-04-23 PROCEDURE — 25510000001 IOPAMIDOL PER 1 ML: Performed by: INTERNAL MEDICINE

## 2024-04-23 PROCEDURE — 25810000003 SODIUM CHLORIDE 0.9 % SOLUTION: Performed by: INTERNAL MEDICINE

## 2024-04-23 RX ORDER — SODIUM CHLORIDE 9 MG/ML
250 INJECTION, SOLUTION INTRAVENOUS ONCE AS NEEDED
Status: DISCONTINUED | OUTPATIENT
Start: 2024-04-23 | End: 2024-04-24 | Stop reason: HOSPADM

## 2024-04-23 RX ORDER — SODIUM CHLORIDE 9 MG/ML
INJECTION, SOLUTION INTRAVENOUS
Status: COMPLETED | OUTPATIENT
Start: 2024-04-23 | End: 2024-04-23

## 2024-04-23 RX ORDER — LIDOCAINE HYDROCHLORIDE 20 MG/ML
INJECTION, SOLUTION INFILTRATION; PERINEURAL
Status: DISCONTINUED | OUTPATIENT
Start: 2024-04-23 | End: 2024-04-23 | Stop reason: HOSPADM

## 2024-04-23 RX ORDER — HYDRALAZINE HYDROCHLORIDE 25 MG/1
25 TABLET, FILM COATED ORAL EVERY 12 HOURS SCHEDULED
Status: DISCONTINUED | OUTPATIENT
Start: 2024-04-23 | End: 2024-04-24

## 2024-04-23 RX ORDER — CARVEDILOL 6.25 MG/1
12.5 TABLET ORAL 2 TIMES DAILY WITH MEALS
Status: DISCONTINUED | OUTPATIENT
Start: 2024-04-23 | End: 2024-04-24 | Stop reason: HOSPADM

## 2024-04-23 RX ORDER — MIDAZOLAM HYDROCHLORIDE 1 MG/ML
INJECTION INTRAMUSCULAR; INTRAVENOUS
Status: DISCONTINUED | OUTPATIENT
Start: 2024-04-23 | End: 2024-04-23 | Stop reason: HOSPADM

## 2024-04-23 RX ORDER — FENTANYL CITRATE 50 UG/ML
INJECTION, SOLUTION INTRAMUSCULAR; INTRAVENOUS
Status: DISCONTINUED | OUTPATIENT
Start: 2024-04-23 | End: 2024-04-23 | Stop reason: HOSPADM

## 2024-04-23 RX ORDER — HYDRALAZINE HYDROCHLORIDE 20 MG/ML
INJECTION INTRAMUSCULAR; INTRAVENOUS
Status: DISCONTINUED | OUTPATIENT
Start: 2024-04-23 | End: 2024-04-23 | Stop reason: HOSPADM

## 2024-04-23 RX ADMIN — TERAZOSIN HYDROCHLORIDE 1 MG: 1 CAPSULE ORAL at 21:56

## 2024-04-23 RX ADMIN — Medication 10 ML: at 09:06

## 2024-04-23 RX ADMIN — CARVEDILOL 3.12 MG: 3.12 TABLET, FILM COATED ORAL at 09:05

## 2024-04-23 RX ADMIN — HYDRALAZINE HYDROCHLORIDE 25 MG: 25 TABLET ORAL at 22:14

## 2024-04-23 RX ADMIN — Medication 10 ML: at 21:57

## 2024-04-23 RX ADMIN — IPRATROPIUM BROMIDE AND ALBUTEROL SULFATE 3 ML: .5; 3 SOLUTION RESPIRATORY (INHALATION) at 14:29

## 2024-04-23 RX ADMIN — CARVEDILOL 12.5 MG: 6.25 TABLET, FILM COATED ORAL at 17:42

## 2024-04-23 RX ADMIN — CITALOPRAM HYDROBROMIDE 20 MG: 20 TABLET ORAL at 09:06

## 2024-04-23 RX ADMIN — FENOFIBRATE 145 MG: 145 TABLET ORAL at 09:05

## 2024-04-23 RX ADMIN — IPRATROPIUM BROMIDE AND ALBUTEROL SULFATE 3 ML: .5; 3 SOLUTION RESPIRATORY (INHALATION) at 11:03

## 2024-04-23 RX ADMIN — CLOPIDOGREL BISULFATE 75 MG: 75 TABLET ORAL at 09:06

## 2024-04-23 RX ADMIN — BUDESONIDE 0.5 MG: 0.5 INHALANT RESPIRATORY (INHALATION) at 07:05

## 2024-04-23 RX ADMIN — ALLOPURINOL 100 MG: 100 TABLET ORAL at 09:05

## 2024-04-23 RX ADMIN — PREDNISONE 20 MG: 20 TABLET ORAL at 09:06

## 2024-04-23 RX ADMIN — AMLODIPINE BESYLATE 5 MG: 5 TABLET ORAL at 09:06

## 2024-04-23 RX ADMIN — PANTOPRAZOLE SODIUM 40 MG: 40 TABLET, DELAYED RELEASE ORAL at 05:51

## 2024-04-23 RX ADMIN — HEPARIN SODIUM 24 UNITS/KG/HR: 10000 INJECTION, SOLUTION INTRAVENOUS at 03:46

## 2024-04-23 RX ADMIN — BUDESONIDE 0.5 MG: 0.5 INHALANT RESPIRATORY (INHALATION) at 19:27

## 2024-04-23 RX ADMIN — IPRATROPIUM BROMIDE AND ALBUTEROL SULFATE 3 ML: .5; 3 SOLUTION RESPIRATORY (INHALATION) at 19:34

## 2024-04-23 RX ADMIN — ASPIRIN 81 MG: 81 TABLET, COATED ORAL at 09:05

## 2024-04-23 RX ADMIN — AZELASTINE HYDROCHLORIDE 1 SPRAY: 137 SPRAY, METERED NASAL at 09:06

## 2024-04-23 RX ADMIN — INSULIN LISPRO 2 UNITS: 100 INJECTION, SOLUTION INTRAVENOUS; SUBCUTANEOUS at 21:56

## 2024-04-23 RX ADMIN — AZELASTINE HYDROCHLORIDE 1 SPRAY: 137 SPRAY, METERED NASAL at 21:57

## 2024-04-23 RX ADMIN — Medication 600 MG: at 09:05

## 2024-04-23 RX ADMIN — IPRATROPIUM BROMIDE AND ALBUTEROL SULFATE 3 ML: .5; 3 SOLUTION RESPIRATORY (INHALATION) at 07:05

## 2024-04-23 RX ADMIN — ATORVASTATIN CALCIUM 40 MG: 40 TABLET, FILM COATED ORAL at 21:56

## 2024-04-23 RX ADMIN — HYDRALAZINE HYDROCHLORIDE 25 MG: 25 TABLET ORAL at 14:16

## 2024-04-23 RX ADMIN — MONTELUKAST 10 MG: 10 TABLET, FILM COATED ORAL at 21:56

## 2024-04-23 NOTE — CONSULTS
Patient Care Team:  Camryn Salinas MD as PCP - General (Family Medicine)  Ashwin Butcher MD as Consulting Physician (Nephrology)    Chief complaint: Shortness of breath and chest pain    Subjective     History of Present Illness    Patient is a 69 year old male with PMH of HTN, HLD, DM II, COPD, ZHANG, anxiety, CKD III, and hx of kidney stones who presented to ED in [facility] on 4/18 with complaints of chest tightness and associated shortness of breath. Patient had noticed he had limitation in activity ongoing for a few weeks and this has gradually worsened. He also noticed weight change and peripheral edema. Patient was ruled in for NSTEMI, placed on Heparin gtt, and was transferred to [facility] for Cardiology evaluation. CXR was also concerning for pulmonary edema vs. Pneumonia and Pulmonology was also consulted. Respiratory cultures came back positive for human metapneumovirus. BNP was elevated and patient with acute on chronic HF. Patient was diuresed but eventually developed EN. Patient was medically optimized prior to cardiac catheterization which was completed today by Dr. Lorenzo. Cardiac catheterization showed patient to have severe coronary artery disease. Cardiac surgery was consulted for evaluation of patient and recommendation regarding surgical intervention. Patient denies previous cardiac stenting or arhythmia. Patient does have hx of kidney and pulmonary disease. Patient has never smoked and does not drink alcohol. Patient denies taking anticoagulant at home. Patient reports strong family history of coronary disease, stating that his mother, father, and brothers have all had CAD.     Review of Systems   Constitutional:  Positive for activity change, fatigue and unexpected weight change.   Respiratory:  Positive for chest tightness, shortness of breath and wheezing.    Cardiovascular:  Positive for chest pain and leg swelling.   All other systems reviewed and are negative.       Past Medical  History:   Diagnosis Date    Anxiety     Essential hypertension, benign     Gastroesophageal reflux disease without esophagitis     Kidney stones 05/31/2020    Dr. Kahlil Terrell    Mixed hyperlipidemia     Rotator cuff syndrome     Torn rotator about seven years ago    Seasonal allergic rhinitis due to pollen     Type 2 diabetes mellitus without complication, without long-term current use of insulin      Past Surgical History:   Procedure Laterality Date    CATARACT EXTRACTION Left 08/24/2023    Dr Melendez    CATARACT EXTRACTION Right 09/07/2023    Dr Melendez    CYSTOSCOPY W/ LASER LITHOTRIPSY  01/2021    INGUINAL HERNIA REPAIR Left 10/2009    NOSE SURGERY      x2    UMBILICAL HERNIA REPAIR  10/2009    Dr. Napier     Family History   Problem Relation Age of Onset    Heart disease Mother     Uterine cancer Mother     Thyroid disease Mother     Cancer Mother         Uterus?    Heart disease Father     Stroke Father     COPD Brother     Diabetes Brother     Breast cancer Maternal Aunt     Breast cancer Maternal Grandmother     Diabetes Brother      Social History     Tobacco Use    Smoking status: Never     Passive exposure: Never    Smokeless tobacco: Never    Tobacco comments:     Family members smoked   Vaping Use    Vaping status: Never Used   Substance Use Topics    Alcohol use: Never    Drug use: Never     Facility-Administered Medications Prior to Admission   Medication Dose Route Frequency Provider Last Rate Last Admin    [DISCONTINUED] Allergy Serum Injection  0.1 mL Subcutaneous Once Brad, Camryn GOLD MD         Medications Prior to Admission   Medication Sig Dispense Refill Last Dose    amLODIPine (NORVASC) 5 MG tablet Take 1 tablet by mouth Daily. 90 tablet 3 4/18/2024    aspirin (aspirin) 81 MG EC tablet Take 1 tablet by mouth Daily.   4/18/2024    atorvastatin (LIPITOR) 10 MG tablet Take 1 tablet by mouth once daily 90 tablet 3 4/18/2024    citalopram (CeleXA) 20 MG tablet Take 1 tablet by mouth once daily 90  tablet 3 4/18/2024    doxazosin (CARDURA) 1 MG tablet Take 1 tablet by mouth once daily 90 tablet 3 4/18/2024    fenofibrate 160 MG tablet Take 1 tablet by mouth once daily 90 tablet 0 4/18/2024    lisinopril-hydrochlorothiazide (PRINZIDE,ZESTORETIC) 20-12.5 MG per tablet Take 1 tablet by mouth once daily 90 tablet 3 4/18/2024    metFORMIN (GLUCOPHAGE) 1000 MG tablet TAKE 1 TABLET BY MOUTH TWICE DAILY WITH MEALS 60 tablet 12 4/18/2024    montelukast (SINGULAIR) 10 MG tablet Take 1 tablet by mouth Daily. 90 tablet 3 4/18/2024    Multiple Vitamins-Minerals (MULTIVITAMIN ADULT PO) Take 1 tablet by mouth Daily.   4/18/2024    multivitamin (THERAGRAN) tablet tablet Take 1 tablet by mouth Daily.   4/18/2024    Omega-3 Fatty Acids (FISH OIL) 1000 MG capsule delayed-release Take 2 capsules by mouth 2 (Two) Times a Day.   4/18/2024    omeprazole (priLOSEC) 40 MG capsule Take 1 capsule by mouth Daily. 30 capsule 12 4/18/2024    pioglitazone (ACTOS) 30 MG tablet Take 1 tablet by mouth Daily. 30 tablet 12 4/18/2024     allopurinol, 100 mg, Oral, Daily  amLODIPine, 5 mg, Oral, Q24H  aspirin, 81 mg, Oral, Daily  atorvastatin, 40 mg, Oral, Nightly  azelastine, 1 spray, Each Nare, BID  budesonide, 0.5 mg, Nebulization, BID - RT  carvedilol, 12.5 mg, Oral, BID With Meals  citalopram, 20 mg, Oral, Daily  clopidogrel, 75 mg, Oral, Daily  fenofibrate, 145 mg, Oral, Daily  hydrALAZINE, 25 mg, Oral, Q12H  insulin lispro, 2-7 Units, Subcutaneous, 4x Daily AC & at Bedtime  ipratropium-albuterol, 3 mL, Nebulization, 4x Daily - RT  montelukast, 10 mg, Oral, Nightly  pantoprazole, 40 mg, Oral, Q AM  predniSONE, 20 mg, Oral, Daily With Breakfast  senna-docusate sodium, 2 tablet, Oral, BID  sodium chloride, 10 mL, Intravenous, Q12H  terazosin, 1 mg, Oral, Nightly      Allergies:  Patient has no known allergies.    Objective      Vital Signs  Temp:  [97 °F (36.1 °C)-97.7 °F (36.5 °C)] 97.6 °F (36.4 °C)  Heart Rate:  [55-86] 68  Resp:  [11-23]  "17  BP: (148-191)/() 165/96    Flowsheet Rows      Flowsheet Row First Filed Value   Admission Height 167.6 cm (66\") Documented at 04/18/2024 1301   Admission Weight 95.3 kg (210 lb 1.6 oz) Documented at 04/18/2024 1301          167.6 cm (66\")    Physical Exam  Vitals reviewed.   Constitutional:       General: He is not in acute distress.     Appearance: He is not toxic-appearing.   HENT:      Head: Normocephalic and atraumatic.      Nose: Nose normal. No congestion or rhinorrhea.      Mouth/Throat:      Mouth: Mucous membranes are moist.      Pharynx: Oropharynx is clear.   Eyes:      General: No scleral icterus.     Extraocular Movements: Extraocular movements intact.      Conjunctiva/sclera: Conjunctivae normal.      Pupils: Pupils are equal, round, and reactive to light.   Neck:      Vascular: No carotid bruit.   Cardiovascular:      Rate and Rhythm: Normal rate and regular rhythm.      Heart sounds: No murmur heard.  Pulmonary:      Effort: Pulmonary effort is normal.      Breath sounds: Wheezing and rales present.   Abdominal:      General: Bowel sounds are normal. There is distension.   Musculoskeletal:         General: Normal range of motion.      Cervical back: Normal range of motion and neck supple.      Right lower leg: Edema present.      Left lower leg: Edema present.   Skin:     General: Skin is warm and dry.      Findings: No rash.   Neurological:      General: No focal deficit present.      Mental Status: He is alert and oriented to person, place, and time.   Psychiatric:         Mood and Affect: Mood normal.         Behavior: Behavior normal.         Thought Content: Thought content normal.         Judgment: Judgment normal.         Results Review:   Lab Results (last 24 hours)       Procedure Component Value Units Date/Time    POC Glucose 4x Daily Before Meals & at Bedtime [[id number]]  (Abnormal) Collected: 04/23/24 1139    Specimen: Blood Updated: 04/23/24 1141     Glucose 106 mg/dL      " Comment: Serial Number: [id number]:  [id number]       POC Glucose Once [[id number]]  (Normal) Collected: 04/23/24 0654    Specimen: Blood Updated: 04/23/24 1001     Glucose 103 mg/dL      Comment: Serial Number: [id number]:  [id number]       aPTT [[id number]]  (Normal) Collected: 04/23/24 0911    Specimen: Blood Updated: 04/23/24 0943     PTT 75.4 seconds     Renal Function Panel [[id number]]  (Abnormal) Collected: 04/23/24 0158    Specimen: Blood Updated: 04/23/24 0228     Glucose 118 mg/dL      BUN 55 mg/dL      Creatinine 1.38 mg/dL      Sodium 146 mmol/L      Potassium 4.0 mmol/L      Comment: Slight hemolysis detected by analyzer. Result may be falsely elevated.        Chloride 113 mmol/L      CO2 25.0 mmol/L      Calcium 8.6 mg/dL      Albumin 3.3 g/dL      Phosphorus 2.9 mg/dL      Anion Gap 8.0 mmol/L      BUN/Creatinine Ratio 39.9     eGFR 55.0 mL/min/1.73     Narrative:      GFR Normal >60  Chronic Kidney Disease <60  Kidney Failure <15      aPTT [[id number]]  (Abnormal) Collected: 04/23/24 0158    Specimen: Blood Updated: 04/23/24 0223     PTT 58.6 seconds     CBC & Differential [[id number]]  (Abnormal) Collected: 04/23/24 0158    Specimen: Blood Updated: 04/23/24 0210    Narrative:      The following orders were created for panel order CBC & Differential.  Procedure                               Abnormality         Status                     ---------                               -----------         ------                     CBC Auto Differential[[id number]]        Abnormal            Final result                 Please view results for these tests on the individual orders.    CBC Auto Differential [[id number]]  (Abnormal) Collected: 04/23/24 0158    Specimen: Blood Updated: 04/23/24 0210     WBC 8.17 10*3/mm3      RBC 3.65 10*6/mm3      Hemoglobin 9.4 g/dL      Hematocrit 30.6 %      MCV 83.8 fL      MCH 25.8 pg      MCHC 30.7 g/dL      RDW 15.8 %      RDW-SD 47.8 fl      MPV 10.7 fL       Platelets 252 10*3/mm3      Neutrophil % 73.6 %      Lymphocyte % 11.8 %      Monocyte % 9.9 %      Eosinophil % 0.4 %      Basophil % 0.1 %      Immature Grans % 4.2 %      Neutrophils, Absolute 6.02 10*3/mm3      Lymphocytes, Absolute 0.96 10*3/mm3      Monocytes, Absolute 0.81 10*3/mm3      Eosinophils, Absolute 0.03 10*3/mm3      Basophils, Absolute 0.01 10*3/mm3      Immature Grans, Absolute 0.34 10*3/mm3      nRBC 0.0 /100 WBC     POC Glucose Once [[id number]]  (Abnormal) Collected: 04/22/24 2031    Specimen: Blood Updated: 04/22/24 2034     Glucose 212 mg/dL      Comment: Serial Number: [id number]:  [id number]       POC Glucose Once [[id number]]  (Abnormal) Collected: 04/22/24 1650    Specimen: Blood Updated: 04/22/24 1651     Glucose 195 mg/dL      Comment: Serial Number: [id number]:  [id number]                   Assessment & Plan       NSTEMI (non-ST elevated myocardial infarction)      Assessment & Plan    - Severe CAD involving the LAD, diagonals, and left circ  - NSTEMI -- Plavix on HOLD  - Human metapneumovirus infection, requiring supplemental O2  - COPD  - Acute on chronic HF  - EN with CKD III -- creatinine back to baseline   - HTN  - HLD  - DM II -- A1c 6.2  - Anxiety  - Hx of kidney stones    Dr. Hirsch has reviewed films, recommends surgical revascularization   Hold Plavix, will monitor platelet aggregation   Allow patient to recover from a pulmonary standpoint  Vein mapping and Carotid duplex US ordered   Surgical timing TBD, possible discharge home and schedule for elective CABG  Will see patient in the morning, further recommendations to follow     Nataliya Calero PA-C  04/23/24  15:17 EDT   Addendum  Patient was seen and examined by me, agree with the findings above.  I have reviewed the cardiac cath and interpreted results myself.  He has multivessel coronary artery disease and non-ST elevation MI.  Of concern is his viral disease with pneumonia.  He is presently on steroids.  I  discussed with patient my recommendation of surgery to prolong survival and symptomatic relief as well as to prevent adverse events however I am concerned about surgery in this admission due to active viral pulmonary disease.  I discussed with Dr. Auguste from cardiology the possibility of discharge home for improvement in the pulmonary status and then readmission for surgery within the next 7 to 10 days.  Jean Hirsch MD

## 2024-04-23 NOTE — SIGNIFICANT NOTE
04/23/24 1342   OTHER   Discipline physical therapist   Rehab Time/Intention   Session Not Performed patient unavailable for treatment  (in cath lab)   Therapy Assessment/Plan (PT)   Criteria for Skilled Interventions Met (PT) yes;skilled treatment is necessary   Recommendation   PT - Next Appointment 04/24/24

## 2024-04-23 NOTE — PROGRESS NOTES
Daily Progress Note          Assessment    COPD  Human metapneumovirus infection  Bibasilar airspace disease on chest x-ray 4/18/2024  Elevated proBNP on admission 10,362  NSTEMI  DM II  HTN  GERD  Hyperlipidemia  EN  Anemia  Chronic allergic rhinitis/sinusitis    Echo 4/18/2024: LVEF 51-55%, normal RV pressure, hypokinesis of distal septum and apex              PLAN:  Oxygen supplement and titration to maintain saturation 90-95%: Currently requiring 2 L per nasal cannula, CPAP at bedtime and as needed  p.o. prednisone  Nasal azelastine  Bronchodilators  Inhaled corticosteroids  Incentive spirometer  Heparin drip per Cardiology  Fluid/electrolyte management as per nephrology  Plavix, atorvastatin    BP/glucose controlled  Electrolytes/ glycemic control  DVT and GI prophylaxis    I personally reviewed the radiological images             LOS: 5 days     Subjective     Patient reports nasal congestion, no chest pain    Objective     Vital signs for last 24 hours:  Vitals:    04/23/24 0708 04/23/24 0709 04/23/24 0712 04/23/24 0907   BP:    167/97   BP Location:    Right arm   Patient Position:    Lying   Pulse: 72 66 56 77   Resp: 16 16 16    Temp:       TempSrc:       SpO2: 90% 90% 98% 93%   Weight:       Height:           Intake/Output last 3 shifts:  I/O last 3 completed shifts:  In: 120 [P.O.:120]  Out: -   Intake/Output this shift:  No intake/output data recorded.      Radiology  Imaging Results (Last 24 Hours)       Procedure Component Value Units Date/Time    XR Chest 1 View [[id number]] Collected: 04/23/24 0834     Updated: 04/23/24 0838    Narrative:      XR CHEST 1 VW    Date of Exam: 4/23/2024 8:25 AM EDT    Indication: hypoxia    Comparison: 4/18/2024.    Findings: There is stable mild cardiomegaly. There is infiltrate in the left lung base. The right lung is clear. There are no definite effusions.    Impression:      Impression:  Left lower lobe infiltrate, atelectasis versus pneumonia.      Electronically  Signed: Talita Bear MD    4/23/2024 8:36 AM EDT    Workstation ID: [id number]            Labs:  Results from last 7 days   Lab Units 04/23/24  0158   WBC 10*3/mm3 8.17   HEMOGLOBIN g/dL 9.4*   HEMATOCRIT % 30.6*   PLATELETS 10*3/mm3 252     Results from last 7 days   Lab Units 04/23/24  0158   SODIUM mmol/L 146*   POTASSIUM mmol/L 4.0   CHLORIDE mmol/L 113*   CO2 mmol/L 25.0   BUN mg/dL 55*   CREATININE mg/dL 1.38*   CALCIUM mg/dL 8.6   GLUCOSE mg/dL 118*     Results from last 7 days   Lab Units 04/19/24  0414   PH, ARTERIAL pH units 7.384   PO2 ART mm Hg 74.7*   PCO2, ARTERIAL mm Hg 34.4*   HCO3 ART mmol/L 20.6*     Results from last 7 days   Lab Units 04/23/24  0158 04/22/24  0120 04/20/24  2335   ALBUMIN g/dL 3.3* 3.4* 3.5     Results from last 7 days   Lab Units 04/19/24  0137 04/18/24  2048 04/18/24  1513   CK TOTAL U/L 277*  --   --    HSTROP T ng/L  --  503* 525*         Results from last 7 days   Lab Units 04/20/24  0214   MAGNESIUM mg/dL 2.2     Results from last 7 days   Lab Units 04/23/24  0158 04/22/24  0832 04/22/24  0120 04/18/24  1513 04/18/24  1351   INR   --   --   --   --  1.10   APTT seconds 58.6* 67.5 74.1   < > 32.7*    < > = values in this interval not displayed.               Meds:   SCHEDULE  allopurinol, 100 mg, Oral, Daily  amLODIPine, 5 mg, Oral, Q24H  aspirin, 81 mg, Oral, Daily  atorvastatin, 40 mg, Oral, Nightly  azelastine, 1 spray, Each Nare, BID  budesonide, 0.5 mg, Nebulization, BID - RT  carvedilol, 3.125 mg, Oral, BID With Meals  citalopram, 20 mg, Oral, Daily  clopidogrel, 75 mg, Oral, Daily  fenofibrate, 145 mg, Oral, Daily  insulin lispro, 2-7 Units, Subcutaneous, 4x Daily AC & at Bedtime  ipratropium-albuterol, 3 mL, Nebulization, 4x Daily - RT  montelukast, 10 mg, Oral, Nightly  pantoprazole, 40 mg, Oral, Q AM  predniSONE, 20 mg, Oral, Daily With Breakfast  senna-docusate sodium, 2 tablet, Oral, BID  sodium chloride, 10 mL, Intravenous, Q12H  terazosin, 1 mg, Oral,  Nightly      Infusions  heparin, 12 Units/kg/hr, Last Rate: 24 Units/kg/hr (04/23/24 0346)  [Held by provider] sodium chloride, 75 mL/hr, Last Rate: Stopped (04/23/24 0151)      PRNs    acetaminophen    senna-docusate sodium **AND** polyethylene glycol **AND** bisacodyl **AND** bisacodyl    Calcium Replacement - Follow Nurse / BPA Driven Protocol    dextrose    dextrose    glucagon (human recombinant)    heparin    heparin    HYDROcodone-acetaminophen    Magnesium Standard Dose Replacement - Follow Nurse / BPA Driven Protocol    nitroglycerin    Phosphorus Replacement - Follow Nurse / BPA Driven Protocol    Potassium Replacement - Follow Nurse / BPA Driven Protocol    sodium chloride    sodium chloride    sodium chloride    Physical Exam:  General Appearance:  Alert   HEENT:  Normocephalic, without obvious abnormality, Conjunctiva/corneas clear,.   Nares normal, no drainage     Neck:  Supple, symmetrical, trachea midline.   Lungs /Chest wall: Good air entry without wheezing or rhonchi, respirations unlabored, symmetrical wall movement.     Heart:  Regular rate and rhythm, S1 S2 normal  Abdomen: Soft, non-tender, no masses, no organomegaly.    Extremities: No edema, no clubbing or cyanosis     ROS  Constitutional: Negative for chills, fever and malaise/fatigue.   HENT: Chronic nasal congestion  Eyes: Negative.    Cardiovascular: Negative.    Respiratory: Positive for cough and shortness of breath.    Skin: Negative.    Musculoskeletal: Negative.    Gastrointestinal: Negative.    Genitourinary: Negative.    Neurological: Negative.    Psychiatric/Behavioral: Negative.      I reviewed the recent clinical results  I personally reviewed the latest radiological studies    Part of this note may be an electronic transcription/translation of spoken language to printed text using the Dragon Dictation System.

## 2024-04-23 NOTE — PROGRESS NOTES
[facility]     Progress Note    Patient Name: Fransico Cuenca  : 1955  MRN: [id number]  Primary Care Physician:  Camryn Salinas MD  Date of admission: 2024  Service date and time: 24 13:58 EDT  Subjective   Subjective     Chief Complaint: chest pain    HPI:  Patient states that he feels overall better today.  He states that his breathing continues to improve and that he has been doing well on his I-S, coughing up some more thick dark sputum.  Patient underwent LHC today.      Objective   Objective     Vitals:   Temp:  [97 °F (36.1 °C)-97.7 °F (36.5 °C)] 97.7 °F (36.5 °C)  Heart Rate:  [55-95] 73  Resp:  [11-22] 18  BP: (148-191)/() 169/104  Flow (L/min):  [2] 2  Physical Exam   General Appearance:  Alert, cooperative, no distress, appears stated age  Head:  Normocephalic, without obvious abnormality, atraumatic  Eyes:  PERRL, conjunctiva/corneas clear, EOM's intact, fundi benign, both eyes  Ears:  Normal TM's and external ear canals, both ears  Nose: Nares normal, septum midline, mucosa normal, no drainage or sinus tenderness  Throat: Lips, mucosa, and tongue normal; teeth and gums normal  Neck: Supple, symmetrical, trachea midline, no adenopathy, thyroid: not enlarged, symmetric, no tenderness/mass/nodules, no carotid bruit or JVD  Lungs:   Mild bilateral rhonchi  Heart:  Regular rate and rhythm, S1, S2 normal, no murmur, rub or gallop  Abdomen:  Soft, non-tender, bowel sounds active all four quadrants,  no masses, no organomegaly  Extremities: Extremities normal, atraumatic, no cyanosis or edema  Pulses: 2+ and symmetric  Skin: Skin color, texture, turgor normal, no rashes or lesions  Neurologic: Normal      Result Review    Result Review:  I have personally reviewed the results from the time of this admission to 2024 13:58 EDT and agree with these findings:  [x]  Laboratory list / accordion  [x]  Microbiology  [x]  Radiology  [x]  EKG/Telemetry   [x]  Cardiology/Vascular   []   Pathology  []  Old records  []  Other:        Assessment & Plan   Assessment / Plan       Active Hospital Problems:  Active Hospital Problems    Diagnosis     **NSTEMI (non-ST elevated myocardial infarction)      Plan:      NSTEMI  -Troponin is elevated over 500  -Patient underwent LHC on 4/23 which showed significant disease in the proximal and mid LAD as well as the proximal left circumflex artery and diagonal branches concerning for multivessel disease  -CT surgery consult for possible CABG     Probable acute on chronic decompensated heart failure  -Patient had elevated BNP with pulmonary edema on imaging  -Patient was initially diuresed but then developed EN and now is back on IV fluids with renal function at baseline  -RVP positive for human metapneumovirus which is likely also contributing to his dyspnea and may have also triggered heart failure exacerbation and NSTEMI  -Echo with preserved EF of 51 to 55%  -Continue steroid taper, off antibiotics     COPD exacerbation with acute respiratory failure with hypoxia  -Secondary to human metapneumovirus infection  -Continue steroids, bronchodilators  -Wean O2 as tolerated  -Plan for walking oximetry prior to discharge    Hypertension  -Continue home medications for BP control     Dyslipidemia  -Continue statin therapy     DM type II, controlled  -Continue sliding scale insulin     EN on CKD stage III  -Trend labs daily, avoid nephrotoxic meds  -getting IVF; can stop IV fluids after LHC  -Stopped diuretics  -Nephrology following and managing    DVT prophylaxis:  No DVT prophylaxis order currently exists.        CODE STATUS:   Level Of Support Discussed With: Patient  Code Status (Patient has no pulse and is not breathing): CPR (Attempt to Resuscitate)  Medical Interventions (Patient has pulse or is breathing): Full Support    Disposition: Pending clinical progress as patient will likely now need CABG.    Aristeo German MD  [facility] Hospitalist Team  04/23/24  15:49  EDT

## 2024-04-23 NOTE — PROGRESS NOTES
NEPHROLOGY PROGRESS NOTE------[facility]    [facility]  [phone]  Ashwin Butcher MD      Patient Care Team:  Camryn Salinas MD as PCP - General (Family Medicine)  Ashwin Butcher MD as Consulting Physician (Nephrology)      Provider:  Ashwin Butcher MD  Patient Name: Fransico Cuenca  :  1955    SUBJECTIVE:  Follow-up EN  No chest pain or shortness of breath  Voiding by self  Did not have cath yesterday    Medication:  [Transfer Hold] allopurinol, 100 mg, Oral, Daily  amLODIPine, 5 mg, Oral, Q24H  [Transfer Hold] aspirin, 81 mg, Oral, Daily  [Transfer Hold] atorvastatin, 40 mg, Oral, Nightly  [Transfer Hold] azelastine, 1 spray, Each Nare, BID  [Transfer Hold] budesonide, 0.5 mg, Nebulization, BID - RT  carvedilol, 3.125 mg, Oral, BID With Meals  [Transfer Hold] citalopram, 20 mg, Oral, Daily  [Transfer Hold] clopidogrel, 75 mg, Oral, Daily  [Transfer Hold] fenofibrate, 145 mg, Oral, Daily  [Transfer Hold] insulin lispro, 2-7 Units, Subcutaneous, 4x Daily AC & at Bedtime  [Transfer Hold] ipratropium-albuterol, 3 mL, Nebulization, 4x Daily - RT  [Transfer Hold] montelukast, 10 mg, Oral, Nightly  [Transfer Hold] pantoprazole, 40 mg, Oral, Q AM  [Transfer Hold] predniSONE, 20 mg, Oral, Daily With Breakfast  [Transfer Hold] senna-docusate sodium, 2 tablet, Oral, BID  [Transfer Hold] sodium chloride, 10 mL, Intravenous, Q12H  terazosin, 1 mg, Oral, Nightly      heparin, 12 Units/kg/hr, Last Rate: 24 Units/kg/hr (24 0346)  [Held by provider] sodium chloride, 75 mL/hr, Last Rate: Stopped (24 0151)        OBJECTIVE    Vital Sign Min/Max for last 24 hours  Temp  Min: 97 °F (36.1 °C)  Max: 97.7 °F (36.5 °C)   BP  Min: 148/83  Max: 182/100   Pulse  Min: 55  Max: 95   Resp  Min: 13  Max: 24   SpO2  Min: 85 %  Max: 99 %   No data recorded   Weight  Min: 103 kg (227 lb 15.3 oz)  Max: 103 kg (227 lb 15.3 oz)     Flowsheet Rows      Flowsheet Row  "First Filed Value   Admission Height 167.6 cm (66\") Documented at 04/18/2024 1301   Admission Weight 95.3 kg (210 lb 1.6 oz) Documented at 04/18/2024 1301            No intake/output data recorded.  I/O last 3 completed shifts:  In: 120 [P.O.:120]  Out: -     Physical Exam:  General Appearance: alert, appears stated age and cooperative  Head: normocephalic, without obvious abnormality and atraumatic  Eyes: conjunctivae and sclerae normal and no icterus  Neck: supple and no JVD  Lungs: Clear to auscultation bilaterally  Heart: regular rhythm & normal rate and normal S1, S2  Chest: Wall no abnormalities observed  Abdomen: normal bowel sounds and soft, nontender  Extremities: moves extremities well, trace edema, no cyanosis and no redness  Skin: no bleeding, bruising or rash, turgor normal, color normal and no lesions noted  Neurologic: Alert, and oriented. No focal deficits    Labs:    WBC WBC   Date Value Ref Range Status   04/23/2024 8.17 3.40 - 10.80 10*3/mm3 Final   04/22/2024 6.98 3.40 - 10.80 10*3/mm3 Final   04/20/2024 8.05 3.40 - 10.80 10*3/mm3 Final      HGB Hemoglobin   Date Value Ref Range Status   04/23/2024 9.4 (L) 13.0 - 17.7 g/dL Final   04/22/2024 9.5 (L) 13.0 - 17.7 g/dL Final   04/20/2024 9.7 (L) 13.0 - 17.7 g/dL Final      HCT Hematocrit   Date Value Ref Range Status   04/23/2024 30.6 (L) 37.5 - 51.0 % Final   04/22/2024 30.4 (L) 37.5 - 51.0 % Final   04/20/2024 30.9 (L) 37.5 - 51.0 % Final      Platelets No results found for: \"LABPLAT\"   MCV MCV   Date Value Ref Range Status   04/23/2024 83.8 79.0 - 97.0 fL Final   04/22/2024 82.8 79.0 - 97.0 fL Final   04/20/2024 83.5 79.0 - 97.0 fL Final          Sodium Sodium   Date Value Ref Range Status   04/23/2024 146 (H) 136 - 145 mmol/L Final   04/22/2024 144 136 - 145 mmol/L Final   04/20/2024 141 136 - 145 mmol/L Final      Potassium Potassium   Date Value Ref Range Status   04/23/2024 4.0 3.5 - 5.2 mmol/L Final     Comment:     Slight hemolysis detected " "by analyzer. Result may be falsely elevated.   04/22/2024 4.3 3.5 - 5.2 mmol/L Final   04/20/2024 4.2 3.5 - 5.2 mmol/L Final     Comment:     Slight hemolysis detected by analyzer. Result may be falsely elevated.      Chloride Chloride   Date Value Ref Range Status   04/23/2024 113 (H) 98 - 107 mmol/L Final   04/22/2024 112 (H) 98 - 107 mmol/L Final   04/20/2024 108 (H) 98 - 107 mmol/L Final      CO2 CO2   Date Value Ref Range Status   04/23/2024 25.0 22.0 - 29.0 mmol/L Final   04/22/2024 22.0 22.0 - 29.0 mmol/L Final   04/20/2024 22.0 22.0 - 29.0 mmol/L Final      BUN BUN   Date Value Ref Range Status   04/23/2024 55 (H) 8 - 23 mg/dL Final   04/22/2024 63 (H) 8 - 23 mg/dL Final   04/20/2024 80 (H) 8 - 23 mg/dL Final      Creatinine Creatinine   Date Value Ref Range Status   04/23/2024 1.38 (H) 0.76 - 1.27 mg/dL Final   04/22/2024 1.49 (H) 0.76 - 1.27 mg/dL Final   04/20/2024 2.00 (H) 0.76 - 1.27 mg/dL Final      Calcium Calcium   Date Value Ref Range Status   04/23/2024 8.6 8.6 - 10.5 mg/dL Final   04/22/2024 8.6 8.6 - 10.5 mg/dL Final   04/20/2024 8.7 8.6 - 10.5 mg/dL Final      PO4 No components found for: \"PO4\"   Albumin Albumin   Date Value Ref Range Status   04/23/2024 3.3 (L) 3.5 - 5.2 g/dL Final   04/22/2024 3.4 (L) 3.5 - 5.2 g/dL Final   04/20/2024 3.5 3.5 - 5.2 g/dL Final      Magnesium No results found for: \"MG\"     Uric Acid No components found for: \"URIC ACID\"     Imaging Results (Last 72 Hours)       Procedure Component Value Units Date/Time    XR Chest 1 View [[id number]] Collected: 04/23/24 0834     Updated: 04/23/24 0838    Narrative:      XR CHEST 1 VW    Date of Exam: 4/23/2024 8:25 AM EDT    Indication: hypoxia    Comparison: 4/18/2024.    Findings: There is stable mild cardiomegaly. There is infiltrate in the left lung base. The right lung is clear. There are no definite effusions.    Impression:      Impression:  Left lower lobe infiltrate, atelectasis versus pneumonia.      Electronically " Signed: Talita Bear MD    4/23/2024 8:36 AM EDT    Workstation ID: [id number]            Results for orders placed during the hospital encounter of 04/18/24    XR Chest 1 View    Narrative  XR CHEST 1 VW    Date of Exam: 4/23/2024 8:25 AM EDT    Indication: hypoxia    Comparison: 4/18/2024.    Findings: There is stable mild cardiomegaly. There is infiltrate in the left lung base. The right lung is clear. There are no definite effusions.    Impression  Impression:  Left lower lobe infiltrate, atelectasis versus pneumonia.      Electronically Signed: Talita Bear MD  4/23/2024 8:36 AM EDT  Workstation ID: [id number]      XR Chest 1 View    Narrative  XR CHEST 1 VW    Date of Exam: 4/18/2024 1:30 PM EDT    Indication: Chest pain    Comparison: AP chest x-ray 4/18/2024 at 9:00 a.m.    Findings:  Compared to today's earlier chest x-ray, there are lower lung volumes with increased airspace opacities in both lower lungs. No pneumothorax is seen. Cardiac silhouette is enlarged, but is exaggerated by portable AP technique.    Impression  Impression:  Lower lung volumes with increased airspace opacities in both lower lungs, which may be due to atelectasis, pulmonary edema, or less likely pneumonia.      Electronically Signed: Lisa Lan  4/18/2024 1:49 PM EDT  Workstation ID: [id number]      Results for orders placed in visit on 02/23/24    XR Shoulder 2+ View Right    Narrative  DATE OF EXAM:  2/23/2024    PROCEDURE:  XR shoulder 2+ view right    INDICATIONS:  Chronic right shoulder pain    COMPARISON:  No Comparisons Available    FINDINGS:  No acute fracture is seen. Mineralization is within normal limits.  The humeral head appears to be well-seated within the glenoid fossa.  There is mild glenohumeral and acromioclavicular joint space narrowing consistent with mild/early osteoarthritic change.  No radiopaque foreign bodies.  Lung spaces are clear.    Impression  Mild acromioclavicular and glenohumeral osteoarthritic  disease, otherwise no acute bony abnormality of the right shoulder.            ASSESSMENT / PLAN      NSTEMI (non-ST elevated myocardial infarction)    EN-undetermined etiology.  This might be cardiorenal in setting of fluid overload/ACE inhibitor and thiazide use.  History of nephrolithiasis-CT several years ago showed left-sided obstructive kidney stone  Hypertension  Hyperlipidemia  Type 2 diabetes--Hold metformin  COPD with exacerbation  Hypervolemia-better.  Diuresed  NSTEMI-Per cardiology.  Hyperuricemia-likely overdiuresis.  Will monitor     Creatinine stable  Heart cath today, gentle hydration  Serologies pending        Ashwin Butcher MD  [facility]  [phone]  04/23/24  11:28 EDT

## 2024-04-23 NOTE — PLAN OF CARE
Goal Outcome Evaluation:      Pt had heart cath performed today which revealed 3 vessel disease, CV sx consulted. Duplex completed and plavix on hold until surgery. Sheath pulled with no complications. Pt's BP was elevated post heart cath but is now stable. Family at bedside.    Problem: Adult Inpatient Plan of Care  Goal: Plan of Care Review  Outcome: Ongoing, Progressing

## 2024-04-23 NOTE — CASE MANAGEMENT/SOCIAL WORK
Continued Stay Note  ERMIAS Valentine     Patient Name: Fransico Cuenca  MRN: [id number]  Today's Date: 4/23/2024    Admit Date: 4/18/2024    Plan: Anticipate routine home alone. Watch O2 needs.   Discharge Plan       Row Name 04/23/24 0948       Plan    Plan Comments DC Barriers: Cardiac catheterization not performed yesterday, 4/22 due to cath lab schedule. Scheduled to have cardiac cath performed today, 4/23. Avoidable day placed on delay. Continues to be noted on 2L O2 with no home O2. May require walking oximetry to be performed 24-48 hrs prior to d/c.                  Elisa Yan RN     Office Phone: [phone]  Office Cell: [phone]

## 2024-04-23 NOTE — PLAN OF CARE
Goal Outcome Evaluation:      Pt has been resting through the night on 2L NC with home cpap on as well. Pt remains on heparin gtt at this time, had to be titrated up due to ptt no longer being therapeutic. Pt has been NPO since midnight in preparation for heart cath today around noon. Fluids held at this time until dayshift provider sees patient due to increase weight gain.

## 2024-04-24 ENCOUNTER — READMISSION MANAGEMENT (OUTPATIENT)
Dept: CALL CENTER | Facility: HOSPITAL | Age: 69
End: 2024-04-24
Payer: MEDICARE

## 2024-04-24 VITALS
DIASTOLIC BLOOD PRESSURE: 73 MMHG | SYSTOLIC BLOOD PRESSURE: 132 MMHG | HEART RATE: 75 BPM | WEIGHT: 226.85 LBS | BODY MASS INDEX: 36.46 KG/M2 | TEMPERATURE: 97.9 F | HEIGHT: 66 IN | RESPIRATION RATE: 18 BRPM | OXYGEN SATURATION: 93 %

## 2024-04-24 LAB
ALBUMIN SERPL-MCNC: 3.4 G/DL (ref 3.5–5.2)
ANION GAP SERPL CALCULATED.3IONS-SCNC: 9 MMOL/L (ref 5–15)
BASOPHILS # BLD AUTO: 0.02 10*3/MM3 (ref 0–0.2)
BASOPHILS NFR BLD AUTO: 0.2 % (ref 0–1.5)
BUN SERPL-MCNC: 42 MG/DL (ref 8–23)
BUN/CREAT SERPL: 33.9 (ref 7–25)
CALCIUM SPEC-SCNC: 9.3 MG/DL (ref 8.6–10.5)
CHLORIDE SERPL-SCNC: 114 MMOL/L (ref 98–107)
CHOLEST SERPL-MCNC: 137 MG/DL (ref 0–200)
CLOSE TME COLL+ADP + EPINEP PNL BLD: 94 % (ref 86–100)
CO2 SERPL-SCNC: 25 MMOL/L (ref 22–29)
CREAT SERPL-MCNC: 1.24 MG/DL (ref 0.76–1.27)
DEPRECATED RDW RBC AUTO: 47.1 FL (ref 37–54)
EGFRCR SERPLBLD CKD-EPI 2021: 62.5 ML/MIN/1.73
EOSINOPHIL # BLD AUTO: 0.11 10*3/MM3 (ref 0–0.4)
EOSINOPHIL NFR BLD AUTO: 1.2 % (ref 0.3–6.2)
ERYTHROCYTE [DISTWIDTH] IN BLOOD BY AUTOMATED COUNT: 15.8 % (ref 12.3–15.4)
GLUCOSE BLDC GLUCOMTR-MCNC: 138 MG/DL (ref 70–105)
GLUCOSE BLDC GLUCOMTR-MCNC: 89 MG/DL (ref 70–105)
GLUCOSE SERPL-MCNC: 85 MG/DL (ref 65–99)
HCT VFR BLD AUTO: 33.1 % (ref 37.5–51)
HDLC SERPL-MCNC: 55 MG/DL (ref 40–60)
HGB BLD-MCNC: 10 G/DL (ref 13–17.7)
IMM GRANULOCYTES # BLD AUTO: 0.36 10*3/MM3 (ref 0–0.05)
IMM GRANULOCYTES NFR BLD AUTO: 4.1 % (ref 0–0.5)
LDLC SERPL CALC-MCNC: 65 MG/DL (ref 0–100)
LDLC/HDLC SERPL: 1.16 {RATIO}
LYMPHOCYTES # BLD AUTO: 1.05 10*3/MM3 (ref 0.7–3.1)
LYMPHOCYTES NFR BLD AUTO: 11.8 % (ref 19.6–45.3)
MCH RBC QN AUTO: 25.3 PG (ref 26.6–33)
MCHC RBC AUTO-ENTMCNC: 30.2 G/DL (ref 31.5–35.7)
MCV RBC AUTO: 83.6 FL (ref 79–97)
MONOCYTES # BLD AUTO: 0.9 10*3/MM3 (ref 0.1–0.9)
MONOCYTES NFR BLD AUTO: 10.1 % (ref 5–12)
MRSA DNA SPEC QL NAA+PROBE: NORMAL
NEUTROPHILS NFR BLD AUTO: 6.44 10*3/MM3 (ref 1.7–7)
NEUTROPHILS NFR BLD AUTO: 72.6 % (ref 42.7–76)
NRBC BLD AUTO-RTO: 0 /100 WBC (ref 0–0.2)
PHOSPHATE SERPL-MCNC: 3.3 MG/DL (ref 2.5–4.5)
PLATELET # BLD AUTO: 261 10*3/MM3 (ref 140–450)
PMV BLD AUTO: 10.3 FL (ref 6–12)
POTASSIUM SERPL-SCNC: 4.1 MMOL/L (ref 3.5–5.2)
RBC # BLD AUTO: 3.96 10*6/MM3 (ref 4.14–5.8)
SODIUM SERPL-SCNC: 148 MMOL/L (ref 136–145)
TRIGL SERPL-MCNC: 92 MG/DL (ref 0–150)
TROPONIN T SERPL HS-MCNC: 320 NG/L
VLDLC SERPL-MCNC: 17 MG/DL (ref 5–40)
WBC NRBC COR # BLD AUTO: 8.88 10*3/MM3 (ref 3.4–10.8)

## 2024-04-24 PROCEDURE — 94799 UNLISTED PULMONARY SVC/PX: CPT

## 2024-04-24 PROCEDURE — 80069 RENAL FUNCTION PANEL: CPT | Performed by: INTERNAL MEDICINE

## 2024-04-24 PROCEDURE — 93010 ELECTROCARDIOGRAM REPORT: CPT | Performed by: INTERNAL MEDICINE

## 2024-04-24 PROCEDURE — 99232 SBSQ HOSP IP/OBS MODERATE 35: CPT | Performed by: NURSE PRACTITIONER

## 2024-04-24 PROCEDURE — 99232 SBSQ HOSP IP/OBS MODERATE 35: CPT | Performed by: INTERNAL MEDICINE

## 2024-04-24 PROCEDURE — 97530 THERAPEUTIC ACTIVITIES: CPT

## 2024-04-24 PROCEDURE — 94761 N-INVAS EAR/PLS OXIMETRY MLT: CPT

## 2024-04-24 PROCEDURE — 97110 THERAPEUTIC EXERCISES: CPT

## 2024-04-24 PROCEDURE — 82948 REAGENT STRIP/BLOOD GLUCOSE: CPT | Performed by: INTERNAL MEDICINE

## 2024-04-24 PROCEDURE — 85025 COMPLETE CBC W/AUTO DIFF WBC: CPT | Performed by: INTERNAL MEDICINE

## 2024-04-24 PROCEDURE — 94618 PULMONARY STRESS TESTING: CPT

## 2024-04-24 PROCEDURE — 63710000001 PREDNISONE PER 1 MG: Performed by: INTERNAL MEDICINE

## 2024-04-24 PROCEDURE — 80061 LIPID PANEL: CPT | Performed by: INTERNAL MEDICINE

## 2024-04-24 PROCEDURE — 85576 BLOOD PLATELET AGGREGATION: CPT

## 2024-04-24 PROCEDURE — 84484 ASSAY OF TROPONIN QUANT: CPT | Performed by: NURSE PRACTITIONER

## 2024-04-24 PROCEDURE — 87641 MR-STAPH DNA AMP PROBE: CPT | Performed by: NURSE PRACTITIONER

## 2024-04-24 PROCEDURE — 94664 DEMO&/EVAL PT USE INHALER: CPT

## 2024-04-24 PROCEDURE — 93005 ELECTROCARDIOGRAM TRACING: CPT | Performed by: INTERNAL MEDICINE

## 2024-04-24 PROCEDURE — 97116 GAIT TRAINING THERAPY: CPT

## 2024-04-24 RX ORDER — AMLODIPINE BESYLATE 5 MG/1
10 TABLET ORAL
Status: DISCONTINUED | OUTPATIENT
Start: 2024-04-24 | End: 2024-04-24 | Stop reason: HOSPADM

## 2024-04-24 RX ORDER — CARVEDILOL 12.5 MG/1
12.5 TABLET ORAL 2 TIMES DAILY WITH MEALS
Qty: 60 TABLET | Refills: 3 | Status: SHIPPED | OUTPATIENT
Start: 2024-04-24

## 2024-04-24 RX ORDER — PREDNISONE 20 MG/1
20 TABLET ORAL
Qty: 3 TABLET | Refills: 0 | Status: SHIPPED | OUTPATIENT
Start: 2024-04-25 | End: 2024-04-29

## 2024-04-24 RX ORDER — HYDRALAZINE HYDROCHLORIDE 25 MG/1
50 TABLET, FILM COATED ORAL EVERY 12 HOURS SCHEDULED
Status: DISCONTINUED | OUTPATIENT
Start: 2024-04-24 | End: 2024-04-24 | Stop reason: HOSPADM

## 2024-04-24 RX ORDER — BUMETANIDE 1 MG/1
1 TABLET ORAL DAILY
Qty: 90 TABLET | Refills: 0 | Status: SHIPPED | OUTPATIENT
Start: 2024-04-25 | End: 2024-05-06

## 2024-04-24 RX ORDER — LISINOPRIL 10 MG/1
10 TABLET ORAL DAILY
Qty: 90 TABLET | Refills: 0 | Status: SHIPPED | OUTPATIENT
Start: 2024-04-24

## 2024-04-24 RX ORDER — BUMETANIDE 1 MG/1
1 TABLET ORAL DAILY
Status: DISCONTINUED | OUTPATIENT
Start: 2024-04-24 | End: 2024-04-24 | Stop reason: HOSPADM

## 2024-04-24 RX ORDER — HYDRALAZINE HYDROCHLORIDE 50 MG/1
50 TABLET, FILM COATED ORAL EVERY 12 HOURS SCHEDULED
Qty: 60 TABLET | Refills: 3 | Status: SHIPPED | OUTPATIENT
Start: 2024-04-24

## 2024-04-24 RX ADMIN — SALINE NASAL SPRAY 1 SPRAY: 1.5 SOLUTION NASAL at 08:31

## 2024-04-24 RX ADMIN — IPRATROPIUM BROMIDE AND ALBUTEROL SULFATE 3 ML: .5; 3 SOLUTION RESPIRATORY (INHALATION) at 11:21

## 2024-04-24 RX ADMIN — CITALOPRAM HYDROBROMIDE 20 MG: 20 TABLET ORAL at 08:24

## 2024-04-24 RX ADMIN — PREDNISONE 20 MG: 20 TABLET ORAL at 08:24

## 2024-04-24 RX ADMIN — IPRATROPIUM BROMIDE AND ALBUTEROL SULFATE 3 ML: .5; 3 SOLUTION RESPIRATORY (INHALATION) at 14:50

## 2024-04-24 RX ADMIN — BUDESONIDE 0.5 MG: 0.5 INHALANT RESPIRATORY (INHALATION) at 07:15

## 2024-04-24 RX ADMIN — PANTOPRAZOLE SODIUM 40 MG: 40 TABLET, DELAYED RELEASE ORAL at 05:34

## 2024-04-24 RX ADMIN — AZELASTINE HYDROCHLORIDE 1 SPRAY: 137 SPRAY, METERED NASAL at 08:35

## 2024-04-24 RX ADMIN — AMLODIPINE BESYLATE 10 MG: 5 TABLET ORAL at 08:24

## 2024-04-24 RX ADMIN — HYDRALAZINE HYDROCHLORIDE 50 MG: 25 TABLET ORAL at 08:31

## 2024-04-24 RX ADMIN — BUMETANIDE 1 MG: 1 TABLET ORAL at 11:32

## 2024-04-24 RX ADMIN — Medication 10 ML: at 08:27

## 2024-04-24 RX ADMIN — DOCUSATE SODIUM AND SENNOSIDES 2 TABLET: 8.6; 5 TABLET, FILM COATED ORAL at 08:24

## 2024-04-24 RX ADMIN — ALLOPURINOL 100 MG: 100 TABLET ORAL at 08:24

## 2024-04-24 RX ADMIN — CARVEDILOL 12.5 MG: 6.25 TABLET, FILM COATED ORAL at 08:24

## 2024-04-24 RX ADMIN — IPRATROPIUM BROMIDE AND ALBUTEROL SULFATE 3 ML: .5; 3 SOLUTION RESPIRATORY (INHALATION) at 07:10

## 2024-04-24 RX ADMIN — ASPIRIN 81 MG: 81 TABLET, COATED ORAL at 08:24

## 2024-04-24 NOTE — THERAPY TREATMENT NOTE
Subjective: Pt agreeable to therapeutic plan of care.    Objective:     Bed mobility - N/A or Not attempted. Pt up in chair upon arrival.     Transfers - Independent    Ambulation - 400 feet Independent without AD. No LOB    Therapeutic Exercise - 20 Reps B LE AROM supported sitting / chair    Vitals: WNL on room air    Pain: 0 VAS   Location:   Intervention for pain: N/A    Education: Provided education on the importance of mobility in the acute care setting, Verbal/Tactile Cues, Transfer Training, and Gait Training    Assessment: Fransico Cuenca presents with functional mobility impairments which indicate the need for skilled intervention. Tolerating session today without incident. Will continue to follow and progress as tolerated.     Plan/Recommendations:   If medically appropriate, No ongoing therapy recommended post-acute care. No therapy needs. Pt requires no DME at discharge.     Pt desires Home at discharge. Pt cooperative; agreeable to therapeutic recommendations and plan of care.         Basic Mobility 6-click:  Rollin = Total, A lot = 2, A little = 3; 4 = None  Supine>Sit:   1 = Total, A lot = 2, A little = 3; 4 = None   Sit>Stand with arms:  1 = Total, A lot = 2, A little = 3; 4 = None  Bed>Chair:   1 = Total, A lot = 2, A little = 3; 4 = None  Ambulate in room:  1 = Total, A lot = 2, A little = 3; 4 = None  3-5 Steps with railin = Total, A lot = 2, A little = 3; 4 = None  Score: 24    Modified [place]: N/A = No pre-op stroke/TIA    Post-Tx Position: Up in Chair and Call light and personal items within reach  PPE: gloves, surgical mask, and gown

## 2024-04-24 NOTE — PROGRESS NOTES
CC:  SOA    F/U:  MV CAD--Pagni  EF 55% (cath)    Subjective:  emotionally ready for surgery    Just walked with PT around nsg station, slightly SOA  Creatinine 1.24 today      PREOP studies:  Carotid duplex:  HARSH < 50%, LICA normal  Vein fadumo:  adequate  A1c:  6.2   Plt ag%  MRSA screen:  pending  COVID-19 screen:  negative , + human metapneumovirus  UA:  negative UTI , 3+ hematuria        Intake/Output Summary (Last 24 hours) at 2024 0905  Last data filed at 2024 1700  Gross per 24 hour   Intake 240 ml   Output 550 ml   Net -310 ml     Temp:  [97.1 °F (36.2 °C)-97.8 °F (36.6 °C)] 97.1 °F (36.2 °C)  Heart Rate:  [51-91] 68  Resp:  [11-23] 17  BP: (150-191)/() 171/89      Results from last 7 days   Lab Units 24  0212 24  0158 24  0214 24  1351   WBC 10*3/mm3 8.88 8.17   < >  --    HEMOGLOBIN g/dL 10.0* 9.4*   < >  --    HEMATOCRIT % 33.1* 30.6*   < >  --    PLATELETS 10*3/mm3 261 252   < >  --    INR   --   --   --  1.10    < > = values in this interval not displayed.     Results from last 7 days   Lab Units 24  0212 24  2335 24  0214   CREATININE mg/dL 1.24   < > 2.09*   POTASSIUM mmol/L 4.1   < > 3.9   SODIUM mmol/L 148*   < > 142   MAGNESIUM mg/dL  --   --  2.2   PHOSPHORUS mg/dL 3.3   < > 4.4    < > = values in this interval not displayed.       Physical Exam:  Neuro intact, nad, up in recliner  Tele:  SR 60s with PVCs  Diminished bases, 92% RA  Benign abd, + BM  + BLE pitting edema    Assessment/Plan:  Principal Problem:    NSTEMI (non-ST elevated myocardial infarction)    - MV CAD, NSTEMI presentation, EF 55% (cath)--surgical workup in progress  - Plavix on hold--last dose   - Human metapneumovirus infection--per viral panel  - Acute on chronic HFpEF  - EN on CKD stage 3  - HTN  - HLD  - DM type 2--preop A1c 6.2  - Anxiety  - ZHANG with CPAP compliance  - COPD--although he has never smoked  - Hematuria--3+ on UA  - Hx of kidney  stones    Timing of surgery TBD by Dr. Hirsch.  MRSA nasal screen ordered.    Suzy Griffith, APRN  4/24/2024  09:05 EDT

## 2024-04-24 NOTE — PROGRESS NOTES
"NEPHROLOGY PROGRESS NOTE------[facility]    [facility]  [phone]  Bernadine Childress MD      Patient Care Team:  Camryn Salinas MD as PCP - General (Family Medicine)  Ashwin Butcher MD as Consulting Physician (Nephrology)      Provider:  Bernadine Childress MD  Patient Name: Fransico Cuenca  :  1955    SUBJECTIVE:    F/U ARF/EN    S/P Cardiac cath yesterday. No SOB, CP, dysuria.     Medication:  allopurinol, 100 mg, Oral, Daily  amLODIPine, 5 mg, Oral, Q24H  aspirin, 81 mg, Oral, Daily  atorvastatin, 40 mg, Oral, Nightly  azelastine, 1 spray, Each Nare, BID  budesonide, 0.5 mg, Nebulization, BID - RT  carvedilol, 12.5 mg, Oral, BID With Meals  citalopram, 20 mg, Oral, Daily  [Held by provider] clopidogrel, 75 mg, Oral, Daily  fenofibrate, 145 mg, Oral, Daily  hydrALAZINE, 25 mg, Oral, Q12H  insulin lispro, 2-7 Units, Subcutaneous, 4x Daily AC & at Bedtime  ipratropium-albuterol, 3 mL, Nebulization, 4x Daily - RT  montelukast, 10 mg, Oral, Nightly  pantoprazole, 40 mg, Oral, Q AM  predniSONE, 20 mg, Oral, Daily With Breakfast  senna-docusate sodium, 2 tablet, Oral, BID  sodium chloride, 10 mL, Intravenous, Q12H  terazosin, 1 mg, Oral, Nightly      [Held by provider] sodium chloride, 75 mL/hr, Last Rate: Stopped (24 0151)        OBJECTIVE    Vital Sign Min/Max for last 24 hours  Temp  Min: 97.1 °F (36.2 °C)  Max: 97.8 °F (36.6 °C)   BP  Min: 150/77  Max: 191/102   Pulse  Min: 55  Max: 91   Resp  Min: 11  Max: 23   SpO2  Min: 90 %  Max: 99 %   No data recorded   Weight  Min: 103 kg (226 lb 13.7 oz)  Max: 103 kg (226 lb 13.7 oz)     Flowsheet Rows      Flowsheet Row First Filed Value   Admission Height 167.6 cm (66\") Documented at 2024 1301   Admission Weight 95.3 kg (210 lb 1.6 oz) Documented at 2024 1301            No intake/output data recorded.  I/O last 3 completed shifts:  In: 360 [P.O.:360]  Out: 550 " "[Urine:550]    Physical Exam:  General Appearance: alert, appears stated age and cooperative  Head: normocephalic, without obvious abnormality and atraumatic  Eyes: conjunctivae and sclerae normal and no icterus  Neck: supple and no JVD  Lungs: Clear to auscultation bilaterally  Heart: regular rhythm & normal rate and normal S1, S2 +MICA  Chest: Wall no abnormalities observed  Abdomen: normal bowel sounds and soft, nontender  Extremities: moves extremities well, trace edema, no cyanosis and no redness  Skin: no bleeding, bruising or rash, turgor normal, color normal and no lesions noted  Neurologic: Alert, and oriented. No focal deficits    Labs:    WBC WBC   Date Value Ref Range Status   04/24/2024 8.88 3.40 - 10.80 10*3/mm3 Final   04/23/2024 8.17 3.40 - 10.80 10*3/mm3 Final   04/22/2024 6.98 3.40 - 10.80 10*3/mm3 Final      HGB Hemoglobin   Date Value Ref Range Status   04/24/2024 10.0 (L) 13.0 - 17.7 g/dL Final   04/23/2024 9.4 (L) 13.0 - 17.7 g/dL Final   04/22/2024 9.5 (L) 13.0 - 17.7 g/dL Final      HCT Hematocrit   Date Value Ref Range Status   04/24/2024 33.1 (L) 37.5 - 51.0 % Final   04/23/2024 30.6 (L) 37.5 - 51.0 % Final   04/22/2024 30.4 (L) 37.5 - 51.0 % Final      Platelets No results found for: \"LABPLAT\"   MCV MCV   Date Value Ref Range Status   04/24/2024 83.6 79.0 - 97.0 fL Final   04/23/2024 83.8 79.0 - 97.0 fL Final   04/22/2024 82.8 79.0 - 97.0 fL Final          Sodium Sodium   Date Value Ref Range Status   04/24/2024 148 (H) 136 - 145 mmol/L Final   04/23/2024 146 (H) 136 - 145 mmol/L Final   04/22/2024 144 136 - 145 mmol/L Final      Potassium Potassium   Date Value Ref Range Status   04/24/2024 4.1 3.5 - 5.2 mmol/L Final   04/23/2024 4.0 3.5 - 5.2 mmol/L Final     Comment:     Slight hemolysis detected by analyzer. Result may be falsely elevated.   04/22/2024 4.3 3.5 - 5.2 mmol/L Final      Chloride Chloride   Date Value Ref Range Status   04/24/2024 114 (H) 98 - 107 mmol/L Final   04/23/2024 " "113 (H) 98 - 107 mmol/L Final   04/22/2024 112 (H) 98 - 107 mmol/L Final      CO2 CO2   Date Value Ref Range Status   04/24/2024 25.0 22.0 - 29.0 mmol/L Final   04/23/2024 25.0 22.0 - 29.0 mmol/L Final   04/22/2024 22.0 22.0 - 29.0 mmol/L Final      BUN BUN   Date Value Ref Range Status   04/24/2024 42 (H) 8 - 23 mg/dL Final   04/23/2024 55 (H) 8 - 23 mg/dL Final   04/22/2024 63 (H) 8 - 23 mg/dL Final      Creatinine Creatinine   Date Value Ref Range Status   04/24/2024 1.24 0.76 - 1.27 mg/dL Final   04/23/2024 1.38 (H) 0.76 - 1.27 mg/dL Final   04/22/2024 1.49 (H) 0.76 - 1.27 mg/dL Final      Calcium Calcium   Date Value Ref Range Status   04/24/2024 9.3 8.6 - 10.5 mg/dL Final   04/23/2024 8.6 8.6 - 10.5 mg/dL Final   04/22/2024 8.6 8.6 - 10.5 mg/dL Final      PO4 No components found for: \"PO4\"   Albumin Albumin   Date Value Ref Range Status   04/24/2024 3.4 (L) 3.5 - 5.2 g/dL Final   04/23/2024 3.3 (L) 3.5 - 5.2 g/dL Final   04/22/2024 3.4 (L) 3.5 - 5.2 g/dL Final      Magnesium No results found for: \"MG\"     Uric Acid No components found for: \"URIC ACID\"     Imaging Results (Last 72 Hours)       Procedure Component Value Units Date/Time    XR Chest 1 View [[id number]] Collected: 04/23/24 0834     Updated: 04/23/24 0838    Narrative:      XR CHEST 1 VW    Date of Exam: 4/23/2024 8:25 AM EDT    Indication: hypoxia    Comparison: 4/18/2024.    Findings: There is stable mild cardiomegaly. There is infiltrate in the left lung base. The right lung is clear. There are no definite effusions.    Impression:      Impression:  Left lower lobe infiltrate, atelectasis versus pneumonia.      Electronically Signed: Talita Bear MD    4/23/2024 8:36 AM EDT    Workstation ID: [id number]            Results for orders placed during the hospital encounter of 04/18/24    XR Chest 1 View    Narrative  XR CHEST 1 VW    Date of Exam: 4/23/2024 8:25 AM EDT    Indication: hypoxia    Comparison: 4/18/2024.    Findings: There is stable mild " cardiomegaly. There is infiltrate in the left lung base. The right lung is clear. There are no definite effusions.    Impression  Impression:  Left lower lobe infiltrate, atelectasis versus pneumonia.      Electronically Signed: Talita Bear MD  4/23/2024 8:36 AM EDT  Workstation ID: [id number]      XR Chest 1 View    Narrative  XR CHEST 1 VW    Date of Exam: 4/18/2024 1:30 PM EDT    Indication: Chest pain    Comparison: AP chest x-ray 4/18/2024 at 9:00 a.m.    Findings:  Compared to today's earlier chest x-ray, there are lower lung volumes with increased airspace opacities in both lower lungs. No pneumothorax is seen. Cardiac silhouette is enlarged, but is exaggerated by portable AP technique.    Impression  Impression:  Lower lung volumes with increased airspace opacities in both lower lungs, which may be due to atelectasis, pulmonary edema, or less likely pneumonia.      Electronically Signed: Lisa Lan  4/18/2024 1:49 PM EDT  Workstation ID: [id number]      Results for orders placed in visit on 02/23/24    XR Shoulder 2+ View Right    Narrative  DATE OF EXAM:  2/23/2024    PROCEDURE:  XR shoulder 2+ view right    INDICATIONS:  Chronic right shoulder pain    COMPARISON:  No Comparisons Available    FINDINGS:  No acute fracture is seen. Mineralization is within normal limits.  The humeral head appears to be well-seated within the glenoid fossa.  There is mild glenohumeral and acromioclavicular joint space narrowing consistent with mild/early osteoarthritic change.  No radiopaque foreign bodies.  Lung spaces are clear.    Impression  Mild acromioclavicular and glenohumeral osteoarthritic disease, otherwise no acute bony abnormality of the right shoulder.      Results for orders placed during the hospital encounter of 04/18/24    Duplex Carotid Ultrasound CAR    Interpretation Summary    Right internal carotid artery demonstrates a less than 50% stenosis.    Normal left carotid duplex scan.        ASSESSMENT /  PLAN      NSTEMI (non-ST elevated myocardial infarction)    ARF/EN/CRF/CKD-------Nonoliguric. BUN/Cr stable post cath. No NSAIDs. Lytes, acid-base ok    2. HTN WITH CKD-----BP up. Increase Hydralazine and follow    3. DMII------Off Metformin for now. BS ok. Glucometers, SSI    4. HYPERLIPIDEMIA-------On Statin. Hold Fenofibrate as it will falsely elevate serum creatinine    5. HYPERURICEMIA------On Allopurinol. No NSAIDs    6. DEPRESSION-----Celexa    7. COPD-----Respiratory status stable. No active wheezing    8. GERD/PUD PROPHYLAXIS-----PPI. Benefits outweigh risks despite renal dysfunctino    9. BPH------On Hytrin      Okay from RENAL standpoint to d/c today and for patient to follow up as an outpatient as ordered. Thanks      Bernadine Childress MD  [facility]  [phone]  04/24/24  07:35 EDT

## 2024-04-24 NOTE — PLAN OF CARE
Assessment: Fransico Cuenca presents with functional mobility impairments which indicate the need for skilled intervention. Tolerating session today without incident. Will continue to follow and progress as tolerated.     Plan/Recommendations:   If medically appropriate, No ongoing therapy recommended post-acute care. No therapy needs. Pt requires no DME at discharge.     Pt desires Home at discharge. Pt cooperative; agreeable to therapeutic recommendations and plan of care.

## 2024-04-24 NOTE — PROGRESS NOTES
[facility]     Progress Note    Patient Name: Fransico Cuenca  : 1955  MRN: [id number]  Primary Care Physician:  Camryn Salinas MD  Date of admission: 2024  Service date and time: 24 13:19 EDT  Subjective   Subjective     Chief Complaint: chest pain    HPI:  Patient continues to feel well today.  He states his breathing is overall better as is his cough.  He worked with PT today as well as RT and does not have any O2 requirements on discharge.  He is anxious to be discharged as soon as possible.      Objective   Objective     Vitals:   Temp:  [97.1 °F (36.2 °C)-97.8 °F (36.6 °C)] 97.8 °F (36.6 °C)  Heart Rate:  [51-91] 69  Resp:  [15-23] 20  BP: (137-182)/() 137/74  Flow (L/min):  [1-6] 1  Physical Exam   General Appearance:  Alert, cooperative, no distress, appears stated age  Head:  Normocephalic, without obvious abnormality, atraumatic  Eyes:  PERRL, conjunctiva/corneas clear, EOM's intact, fundi benign, both eyes  Ears:  Normal TM's and external ear canals, both ears  Nose: Nares normal, septum midline, mucosa normal, no drainage or sinus tenderness  Throat: Lips, mucosa, and tongue normal; teeth and gums normal  Neck: Supple, symmetrical, trachea midline, no adenopathy, thyroid: not enlarged, symmetric, no tenderness/mass/nodules, no carotid bruit or JVD  Lungs:   Fairly clear to auscultation bilaterally  Heart:  Regular rate and rhythm, S1, S2 normal, no murmur, rub or gallop  Abdomen:  Soft, non-tender, bowel sounds active all four quadrants,  no masses, no organomegaly  Extremities: Extremities normal, atraumatic, no cyanosis or edema  Pulses: 2+ and symmetric  Skin: Skin color, texture, turgor normal, no rashes or lesions  Neurologic: Normal      Result Review    Result Review:  I have personally reviewed the results from the time of this admission to 2024 13:19 EDT and agree with these findings:  [x]  Laboratory list / accordion  [x]  Microbiology  [x]  Radiology  [x]   EKG/Telemetry   [x]  Cardiology/Vascular   []  Pathology  []  Old records  []  Other:        Assessment & Plan   Assessment / Plan       Active Hospital Problems:  Active Hospital Problems    Diagnosis     **NSTEMI (non-ST elevated myocardial infarction)      Plan:      NSTEMI  -Troponin is elevated over 500  -Patient underwent LHC on 4/23 which showed significant disease in the proximal and mid LAD as well as the proximal left circumflex artery and diagonal branches concerning for multivessel disease  -CT surgery recommending CABG although they are timing with patient being discharged home and coming back next week given his underlying acute infectious process     Probable acute on chronic decompensated heart failure  -Patient had elevated BNP with pulmonary edema on imaging  -Patient was initially diuresed but then developed EN and now is back on IV fluids with renal function at baseline  -RVP positive for human metapneumovirus which is likely also contributing to his dyspnea and may have also triggered heart failure exacerbation and NSTEMI in the setting of underlying CAD  -Echo with preserved EF of 51 to 55%  -Continue steroid taper, off antibiotics     COPD exacerbation with acute respiratory failure with hypoxia and viral respiratory infection  -Secondary to human metapneumovirus infection  -Continue steroids, bronchodilators  -Weaned off of O2 to room air  -Walking oximetry indicates patient will not need O2 on discharge    Hypertension  -Continue home medications for BP control     Dyslipidemia  -Continue statin therapy     DM type II, controlled  -Continue sliding scale insulin     EN on CKD stage III  -Trend labs daily, avoid nephrotoxic meds  -Stopped IV fluids  -Stopped diuretics  -Nephrology following and managing    DVT prophylaxis:  No DVT prophylaxis order currently exists.        CODE STATUS:   Level Of Support Discussed With: Patient  Code Status (Patient has no pulse and is not breathing): CPR  (Attempt to Resuscitate)  Medical Interventions (Patient has pulse or is breathing): Full Support    Disposition: Pending clinical progress as patient will likely now need CABG. hopefully discharge home today and have patient return next week for CABG.    Aristeo German MD  [facility] Hospitalist Team  04/24/24  15:49 EDT

## 2024-04-24 NOTE — PROCEDURES
Exercise Oximetry    Patient Name:Fransico Cuenca   MRN: [id number]   Date: 04/24/24             ROOM AIR BASELINE   SpO2% 93   Heart Rate 91   Blood Pressure      EXERCISE ON ROOM AIR SpO2% EXERCISE ON O2 @  LPM SpO2%   1 MINUTE 92 1 MINUTE    2 MINUTES 92 2 MINUTES    3 MINUTES 92 3 MINUTES    4 MINUTES 91 4 MINUTES    5 MINUTES 90 5 MINUTES    6 MINUTES 90 6 MINUTES               Distance Walked  7 min Distance Walked   Dyspnea (Kaden Scale)   Dyspnea (Kaden Scale)   Fatigue (Kaden Scale)   Fatigue (Kaden Scale)   SpO2% Post Exercise  91 SpO2% Post Exercise   HR Post Exercise  101 HR Post Exercise   Time to Recovery   Time to Recovery     Comments:     Pt will not need O2 to maintain sats with exertion.    Pt did state he felt winded at end of walk.  RN made aware.

## 2024-04-24 NOTE — CASE MANAGEMENT/SOCIAL WORK
Continued Stay Note  ERMIAS Valentine     Patient Name: Fransico Cuenca  MRN: [id number]  Today's Date: 4/24/2024    Admit Date: 4/18/2024    Plan: Anticipate routine home alone with anticipated planned readmission for CABG.   Discharge Plan       Row Name 04/24/24 1342       Plan    Plan Anticipate routine home alone with anticipated planned readmission for CABG.    Patient/Family in Agreement with Plan yes    Provided Post Acute Provider List? N/A    Plan Comments Per walking oximetry results, patient did not qualify for home O2. Scheduled to have planned readmission to hospital for CABG procedure at later date. CM met with patient at bedside to discuss dc plan and IMM letter. Patient declined needs at this time. Confirmed he had a home cpap through [place].                  Elisa Yan RN     Office Phone: [phone]  Office Cell: [phone]

## 2024-04-24 NOTE — PROGRESS NOTES
Daily Progress Note          Assessment    COPD  Human metapneumovirus infection  Bibasilar airspace disease on chest x-ray 4/18/2024  Elevated proBNP on admission 10,362  NSTEMI  DM II  HTN  GERD  Hyperlipidemia  EN  Anemia  Chronic allergic rhinitis/sinusitis    Echo 4/18/2024: LVEF 51-55%, normal RV pressure, hypokinesis of distal septum and apex              PLAN:  Respiratory status is gradually improving  Oxygen supplement and titration to maintain saturation 90-95%: Currently requiring 1 L per nasal cannula, CPAP at bedtime and as needed  p.o. prednisone to finish 4/28/2024  Nasal azelastine  Bronchodilators  Inhaled corticosteroids  Incentive spirometer  Heparin drip per Cardiology  Fluid/electrolyte management as per nephrology  Plavix, atorvastatin    BP/glucose controlled  Electrolytes/ glycemic control  DVT and GI prophylaxis  Followed by cardiology/CTS: I noted their plan for possible discharge and scheduling the surgery later on    I personally reviewed the radiological images             LOS: 6 days     Subjective     Patient reports improvement in shortness of breath, no cough, no chest pain    Objective     Vital signs for last 24 hours:  Vitals:    04/24/24 0713 04/24/24 0715 04/24/24 0718 04/24/24 0824   BP:    171/89   BP Location:       Patient Position:       Pulse: 51 57 58 68   Resp: 17 17 17    Temp:       TempSrc:       SpO2: 94% 94% 94%    Weight:       Height:           Intake/Output last 3 shifts:  I/O last 3 completed shifts:  In: 360 [P.O.:360]  Out: 550 [Urine:550]  Intake/Output this shift:  No intake/output data recorded.      Radiology  Imaging Results (Last 24 Hours)       ** No results found for the last 24 hours. **            Labs:  Results from last 7 days   Lab Units 04/24/24  0212   WBC 10*3/mm3 8.88   HEMOGLOBIN g/dL 10.0*   HEMATOCRIT % 33.1*   PLATELETS 10*3/mm3 261     Results from last 7 days   Lab Units 04/24/24  0212   SODIUM mmol/L 148*   POTASSIUM mmol/L 4.1    CHLORIDE mmol/L 114*   CO2 mmol/L 25.0   BUN mg/dL 42*   CREATININE mg/dL 1.24   CALCIUM mg/dL 9.3   GLUCOSE mg/dL 85     Results from last 7 days   Lab Units 04/19/24  0414   PH, ARTERIAL pH units 7.384   PO2 ART mm Hg 74.7*   PCO2, ARTERIAL mm Hg 34.4*   HCO3 ART mmol/L 20.6*     Results from last 7 days   Lab Units 04/24/24  0212 04/23/24  0158 04/22/24  0120   ALBUMIN g/dL 3.4* 3.3* 3.4*     Results from last 7 days   Lab Units 04/24/24  0212 04/19/24  0137 04/18/24  2048 04/18/24  1513   CK TOTAL U/L  --  277*  --   --    HSTROP T ng/L 320*  --  503* 525*     Results from last 7 days   Lab Units 04/20/24  0956   SIN  Negative     Results from last 7 days   Lab Units 04/20/24  0214   MAGNESIUM mg/dL 2.2     Results from last 7 days   Lab Units 04/23/24  0911 04/23/24  0158 04/22/24  0832 04/18/24  1513 04/18/24  1351   INR   --   --   --   --  1.10   APTT seconds 75.4 58.6* 67.5   < > 32.7*    < > = values in this interval not displayed.               Meds:   SCHEDULE  allopurinol, 100 mg, Oral, Daily  amLODIPine, 10 mg, Oral, Q24H  aspirin, 81 mg, Oral, Daily  atorvastatin, 40 mg, Oral, Nightly  azelastine, 1 spray, Each Nare, BID  budesonide, 0.5 mg, Nebulization, BID - RT  carvedilol, 12.5 mg, Oral, BID With Meals  citalopram, 20 mg, Oral, Daily  [Held by provider] clopidogrel, 75 mg, Oral, Daily  hydrALAZINE, 50 mg, Oral, Q12H  insulin lispro, 2-7 Units, Subcutaneous, 4x Daily AC & at Bedtime  ipratropium-albuterol, 3 mL, Nebulization, 4x Daily - RT  montelukast, 10 mg, Oral, Nightly  pantoprazole, 40 mg, Oral, Q AM  predniSONE, 20 mg, Oral, Daily With Breakfast  senna-docusate sodium, 2 tablet, Oral, BID  sodium chloride, 10 mL, Intravenous, Q12H  terazosin, 1 mg, Oral, Nightly      Infusions  [Held by provider] sodium chloride, 75 mL/hr, Last Rate: Stopped (04/23/24 0151)      PRNs    acetaminophen    atropine    senna-docusate sodium **AND** polyethylene glycol **AND** bisacodyl **AND** bisacodyl     Calcium Replacement - Follow Nurse / BPA Driven Protocol    dextrose    dextrose    glucagon (human recombinant)    Magnesium Standard Dose Replacement - Follow Nurse / BPA Driven Protocol    nitroglycerin    Phosphorus Replacement - Follow Nurse / BPA Driven Protocol    Potassium Replacement - Follow Nurse / BPA Driven Protocol    sodium chloride    sodium chloride    sodium chloride    sodium chloride    Physical Exam:  General Appearance:  Alert   HEENT:  Normocephalic, without obvious abnormality, Conjunctiva/corneas clear,.   Nares normal, no drainage     Neck:  Supple, symmetrical, trachea midline.   Lungs /Chest wall: Good air entry without wheezing or rhonchi, respirations unlabored, symmetrical wall movement.     Heart:  Regular rate and rhythm, S1 S2 normal  Abdomen: Soft, non-tender, no masses, no organomegaly.    Extremities: No edema, no clubbing or cyanosis     ROS  Constitutional: Negative for chills, fever and malaise/fatigue.   HENT: Chronic nasal congestion  Eyes: Negative.    Cardiovascular: Negative.    Respiratory: Positive for improvement in the cough and shortness of breath.    Skin: Negative.    Musculoskeletal: Negative.    Gastrointestinal: Negative.    Genitourinary: Negative.    Neurological: Negative.    Psychiatric/Behavioral: Negative.      I reviewed the recent clinical results  I personally reviewed the latest radiological studies    Part of this note may be an electronic transcription/translation of spoken language to printed text using the Dragon Dictation System.

## 2024-04-24 NOTE — PROGRESS NOTES
[facility] [place]        LOS:  LOS: 6 days   Patient Name: Fransico Cuenca  Age/Sex: 69 y.o. male  : 1955  MRN: [id number]    Day of Service: 24   Length of Stay: 6  Encounter Provider: Santiago Lorenzo MD  Place of Service: [facility] CARDIOLOGY  Patient Care Team:  Camryn Salinas MD as PCP - General (Family Medicine)  Ashwin Butcher MD as Consulting Physician (Nephrology)    Subjective:     Chief Complaint: f/u heart failure, CP    Subjective: Denies chest pain or dyspnea    Current Medications:   Scheduled Meds:allopurinol, 100 mg, Oral, Daily  amLODIPine, 10 mg, Oral, Q24H  aspirin, 81 mg, Oral, Daily  atorvastatin, 40 mg, Oral, Nightly  azelastine, 1 spray, Each Nare, BID  budesonide, 0.5 mg, Nebulization, BID - RT  bumetanide, 1 mg, Oral, Daily  carvedilol, 12.5 mg, Oral, BID With Meals  citalopram, 20 mg, Oral, Daily  [Held by provider] clopidogrel, 75 mg, Oral, Daily  hydrALAZINE, 50 mg, Oral, Q12H  insulin lispro, 2-7 Units, Subcutaneous, 4x Daily AC & at Bedtime  ipratropium-albuterol, 3 mL, Nebulization, 4x Daily - RT  montelukast, 10 mg, Oral, Nightly  pantoprazole, 40 mg, Oral, Q AM  predniSONE, 20 mg, Oral, Daily With Breakfast  senna-docusate sodium, 2 tablet, Oral, BID  sodium chloride, 10 mL, Intravenous, Q12H  terazosin, 1 mg, Oral, Nightly      Continuous Infusions:       Allergies:  No Known Allergies    Review of Systems   Constitutional: Negative for chills, diaphoresis and malaise/fatigue.   Cardiovascular:  Positive for leg swelling. Negative for chest pain, dyspnea on exertion, irregular heartbeat, near-syncope, orthopnea, palpitations, paroxysmal nocturnal dyspnea and syncope.   Respiratory:  Negative for cough, shortness of breath, sleep disturbances due to breathing and sputum production.    Gastrointestinal:  Negative for change in bowel habit.   Genitourinary:  Negative for urgency.   Neurological:  Negative for dizziness and headaches.    Psychiatric/Behavioral:  Negative for altered mental status.          Objective:     Temp:  [97.1 °F (36.2 °C)-97.9 °F (36.6 °C)] 97.9 °F (36.6 °C)  Heart Rate:  [51-90] 75  Resp:  [15-21] 18  BP: (132-171)/(73-93) 132/73     Intake/Output Summary (Last 24 hours) at 4/24/2024 1722  Last data filed at 4/24/2024 0713  Gross per 24 hour   Intake 480 ml   Output --   Net 480 ml     Body mass index is 36.62 kg/m².      04/23/24  0125 04/23/24  0500 04/24/24  0215   Weight: 103 kg (227 lb 15.3 oz) 103 kg (227 lb 15.3 oz) 103 kg (226 lb 13.7 oz)         General Appearance:    Alert, cooperative, in no acute distress                                Head: Atraumatic, normocephalic, PERRLA               Neck:   supple, trachea midline, no thyromegaly, no carotid bruit, no JVD   Lungs:     Clear to auscultation, respirations regular, even and               unlabored    Heart:    Regular rhythm and normal rate, normal S1 and S2, no       murmur, no gallop, no rub, no click   Abdomen:     Normal bowel sounds, no masses, no organomegaly, soft  nontender, nondistended, no guarding, no rebound  tenderness   Extremities:   Moves all extremities well, 1+ LE edema bilat, no cyanosis, no  redness   Pulses:   Pulses palpable and equal bilaterally   Skin:   No bleeding, bruising or rash   Neurologic:   Awake, alert, oriented x3         Lab Review:   Results from last 7 days   Lab Units 04/24/24  0212 04/23/24  0158   SODIUM mmol/L 148* 146*   POTASSIUM mmol/L 4.1 4.0   CHLORIDE mmol/L 114* 113*   CO2 mmol/L 25.0 25.0   BUN mg/dL 42* 55*   CREATININE mg/dL 1.24 1.38*   GLUCOSE mg/dL 85 118*   CALCIUM mg/dL 9.3 8.6     Results from last 7 days   Lab Units 04/24/24  0212 04/19/24  0137 04/18/24  2048 04/18/24  1513   CK TOTAL U/L  --  277*  --   --    HSTROP T ng/L 320*  --  503* 525*     Results from last 7 days   Lab Units 04/24/24  0212 04/23/24  0158   WBC 10*3/mm3 8.88 8.17   HEMOGLOBIN g/dL 10.0* 9.4*   HEMATOCRIT % 33.1* 30.6*    PLATELETS 10*3/mm3 261 252     Results from last 7 days   Lab Units 04/23/24  0911 04/23/24  0158 04/18/24  1513 04/18/24  1351   INR   --   --   --  1.10   APTT seconds 75.4 58.6*   < > 32.7*    < > = values in this interval not displayed.     Results from last 7 days   Lab Units 04/20/24  0214 04/19/24  0137   MAGNESIUM mg/dL 2.2 2.3     Results from last 7 days   Lab Units 04/24/24  0212   CHOLESTEROL mg/dL 137   TRIGLYCERIDES mg/dL 92   HDL CHOL mg/dL 55     Results from last 7 days   Lab Units 04/18/24  1513   PROBNP pg/mL 10,362.0*           Recent Radiology:  Imaging Results (Most Recent)       Procedure Component Value Units Date/Time    XR Chest 1 View [[id number]] Collected: 04/23/24 0834     Updated: 04/23/24 0838    Narrative:      XR CHEST 1 VW    Date of Exam: 4/23/2024 8:25 AM EDT    Indication: hypoxia    Comparison: 4/18/2024.    Findings: There is stable mild cardiomegaly. There is infiltrate in the left lung base. The right lung is clear. There are no definite effusions.    Impression:      Impression:  Left lower lobe infiltrate, atelectasis versus pneumonia.      Electronically Signed: Talita Bear MD    4/23/2024 8:36 AM EDT    Workstation ID: [id number]    US Renal Bilateral [[id number]] Collected: 04/19/24 0722     Updated: 04/19/24 0725    Narrative:      US RENAL BILATERAL    Date of Exam: 4/18/2024 10:12 PM EDT    Indication: Kidney failure, acute  EN.    Comparison: CT abdomen and pelvis 7/26/2020    Technique: Grayscale and color Doppler ultrasound evaluation of the kidneys and urinary bladder was performed.      Findings:  RIGHT kidney measures 12.2 cm.  Kidney echogenicity, size, and vascularity appear within normal limits. There is no solid kidney mass.  No echogenic shadowing stone.  No hydronephrosis.    LEFT kidney measures 12.6 cm. Kidney echogenicity, size, and vascularity appear within normal limits. There is no solid kidney mass.  No echogenic shadowing stone.  No  hydronephrosis.    Limited visualization of the urinary bladder is unremarkable. Total volume of 44 mL.        Impression:      Impression:  Unremarkable bilateral renal ultrasound.        Electronically Signed: Sujatha Montoya MD    4/19/2024 7:23 AM EDT    Workstation ID: [id number]    XR Chest 1 View [[id number]] Collected: 04/18/24 1346     Updated: 04/18/24 1351    Narrative:      XR CHEST 1 VW    Date of Exam: 4/18/2024 1:30 PM EDT    Indication: Chest pain    Comparison: AP chest x-ray 4/18/2024 at 9:00 a.m.    Findings:  Compared to today's earlier chest x-ray, there are lower lung volumes with increased airspace opacities in both lower lungs. No pneumothorax is seen. Cardiac silhouette is enlarged, but is exaggerated by portable AP technique.      Impression:      Impression:  Lower lung volumes with increased airspace opacities in both lower lungs, which may be due to atelectasis, pulmonary edema, or less likely pneumonia.      Electronically Signed: Lisa Lan    4/18/2024 1:49 PM EDT    Workstation ID: [id number]            ECHOCARDIOGRAM:    Results for orders placed during the hospital encounter of 04/18/24    Adult Transthoracic Echo Complete w/ Color, Spectral and Contrast if necessary per protocol    Interpretation Summary    Left ventricular systolic function is mildly decreased. Left ventricular ejection fraction appears to be 51 - 55%.    Left ventricular diastolic function was normal.    Estimated right ventricular systolic pressure from tricuspid regurgitation is normal (<35 mmHg).    Hypokinesis of distal septum and apex noted        I reviewed the patient's new clinical results.    EKG:      Assessment:       NSTEMI (non-ST elevated myocardial infarction)    Troponin elevation / normal LVEF  Multivessel CAD: CTS evaluation underway  Shortness of breath / HFpEF - initially diuresed  Human metapneumovirus / shortness of breath  AE COPD  HTN  HLD  DM II  EN    Plan:   CTS evaluation  underway  Plan for d/c then return when pulmonary status optimized and off steroids in a couple of weeks for CABG.   D/W nephrology will add bumex 1 mg po daily  Hydralazine increased to 50 mg BID for BP control  Steroids bronchodilators for AE COPD  Additional recommendations per Dr. Lorenzo      Patient is seen and examined and findings are verified.  All data is reviewed by me personally.  Assessment and plan formulated by APC was done after discussion with attending.  I spent more than 50% of time in taking care of the patient.    Patient is feeling much better.  Right groin is soft without hematoma and the exercise for cardiac catheterization    Blood pressure is stable relatively hide    Normal S1 and S2.  No pericardial rub or murmur abdominal exam is benign    MDM:    1.  CAD:    Patient has two-vessel coronary artery disease requiring CABG.  Patient is seen by Dr. Hirsch.  They wanted to further cardiac surgery for 1 to 2 weeks because of upper respiratory tract infection and steroids which are given to the patient.  Sternal wound healing would be impaired in such cases.  Patient is clinically stable    2.  Non-ST elevation myocardial infarction:    Patient is chest pain-free.  Heparin has been discontinued patient is on aspirin and beta-blocker    3.  Hypertension:    Patient is on carvedilol and hydralazine.  Increase hydralazine today    4.  Bilateral leg edema:    Patient will be started on low-dose Bumex I discussed with nephrology    Electronically signed by Santiago Lorenzo MD, 04/24/24, 5:22 PM EDT.          Santiago Lorenzo MD  04/24/24  17:22 EDT

## 2024-04-24 NOTE — OUTREACH NOTE
Prep Survey      Flowsheet Row Responses   [facility] facility patient discharged from? [facility]   Is LACE score < 7 ? No   Eligibility [facility]   Date of Admission 04/18/24   Date of Discharge 04/24/24   Discharge Disposition Home or Self Care   Discharge diagnosis NSTEMI, heart cath, CAD - needs CABG in 1-2 weeks   Does the patient have one of the following disease processes/diagnoses(primary or secondary)? Acute MI (STEMI,NSTEMI)   Does the patient have Home health ordered? No   Is there a DME ordered? No   Prep survey completed? Yes            Lelia WADE - Registered Nurse

## 2024-04-24 NOTE — PROGRESS NOTES
LOS: 6 days   Admitting Physician- No att. providers found    Reason For Followup:    Non-ST elevation myocardial infarction  CAD  Bilateral leg edema  Hypertension    Subjective     Patient is feeling better.    Objective     Blood pressure is elevated    Review of Systems:   Review of Systems   Constitutional: Negative for chills and fever.   HENT:  Negative for ear discharge and nosebleeds.    Eyes:  Negative for discharge and redness.   Cardiovascular:  Positive for leg swelling. Negative for chest pain, orthopnea, palpitations, paroxysmal nocturnal dyspnea and syncope.   Respiratory:  Positive for shortness of breath. Negative for cough and wheezing.    Endocrine: Negative for heat intolerance.   Skin:  Negative for rash.   Musculoskeletal:  Negative for arthritis and myalgias.   Gastrointestinal:  Negative for abdominal pain, melena, nausea and vomiting.   Genitourinary:  Negative for dysuria and hematuria.   Neurological:  Negative for dizziness, light-headedness, numbness and tremors.   Psychiatric/Behavioral:  Negative for depression. The patient is not nervous/anxious.          Vital Signs  Vitals:    04/24/24 1132 04/24/24 1300 04/24/24 1450 04/24/24 1454   BP: 137/74 132/73     BP Location:  Left arm     Patient Position:  Lying     Pulse: 69 67 75 75   Resp:  18 18 18   Temp:  97.9 °F (36.6 °C)     TempSrc:  Oral     SpO2:  92% 93% 93%   Weight:       Height:         Wt Readings from Last 1 Encounters:   04/24/24 103 kg (226 lb 13.7 oz)       Intake/Output Summary (Last 24 hours) at 4/24/2024 1722  Last data filed at 4/24/2024 0713  Gross per 24 hour   Intake 480 ml   Output --   Net 480 ml     Physical Exam:  Constitutional:       Appearance: Well-developed.   Eyes:      General: No scleral icterus.        Right eye: No discharge.   HENT:      Head: Normocephalic and atraumatic.   Neck:      Thyroid: No thyromegaly.      Lymphadenopathy: No cervical adenopathy.   Pulmonary:      Effort: Pulmonary  effort is normal. No respiratory distress.      Breath sounds: Normal breath sounds. No wheezing. No rales.   Cardiovascular:      Normal rate. Regular rhythm.      No gallop.    Edema:     Peripheral edema present.  Abdominal:      Tenderness: There is no abdominal tenderness.   Skin:     Findings: No erythema or rash.   Neurological:      Mental Status: Alert and oriented to person, place, and time.         Results Review:   Lab Results (last 24 hours)       Procedure Component Value Units Date/Time    MRSA Screen, PCR (Inpatient) - Swab, Nares [[id number]]  (Normal) Collected: 04/24/24 1140    Specimen: Swab from Nares Updated: 04/24/24 1303     MRSA PCR No MRSA Detected    Narrative:      The negative predictive value of this diagnostic test is high and should only be used to consider de-escalating anti-MRSA therapy. A positive result may indicate colonization with MRSA and must be correlated clinically.    POC Glucose 4x Daily Before Meals & at Bedtime [[id number]]  (Abnormal) Collected: 04/24/24 1156    Specimen: Blood Updated: 04/24/24 1158     Glucose 138 mg/dL      Comment: Serial Number: [id number]:  [id number]       POC Glucose 4x Daily Before Meals & at Bedtime [[id number]]  (Normal) Collected: 04/24/24 0718    Specimen: Blood Updated: 04/24/24 0719     Glucose 89 mg/dL      Comment: Serial Number: [id number]:  [id number]       High Sensitivity Troponin T [[id number]]  (Abnormal) Collected: 04/24/24 0212    Specimen: Blood Updated: 04/24/24 0248     HS Troponin T 320 ng/L     Narrative:      High Sensitive Troponin T Reference Range:  <14.0 ng/L- Negative Female for AMI  <22.0 ng/L- Negative Male for AMI  >=14 - Abnormal Female indicating possible myocardial injury.  >=22 - Abnormal Male indicating possible myocardial injury.   Clinicians would have to utilize clinical acumen, EKG, Troponin, and serial changes to determine if it is an Acute Myocardial Infarction or myocardial injury due to an  underlying chronic condition.         Renal Function Panel [[id number]]  (Abnormal) Collected: 04/24/24 0212    Specimen: Blood Updated: 04/24/24 0246     Glucose 85 mg/dL      BUN 42 mg/dL      Creatinine 1.24 mg/dL      Sodium 148 mmol/L      Potassium 4.1 mmol/L      Chloride 114 mmol/L      CO2 25.0 mmol/L      Calcium 9.3 mg/dL      Albumin 3.4 g/dL      Phosphorus 3.3 mg/dL      Anion Gap 9.0 mmol/L      BUN/Creatinine Ratio 33.9     eGFR 62.5 mL/min/1.73     Narrative:      GFR Normal >60  Chronic Kidney Disease <60  Kidney Failure <15      Lipid Panel [[id number]] Collected: 04/24/24 0212    Specimen: Blood Updated: 04/24/24 0246     Total Cholesterol 137 mg/dL      Triglycerides 92 mg/dL      HDL Cholesterol 55 mg/dL      LDL Cholesterol  65 mg/dL      VLDL Cholesterol 17 mg/dL      LDL/HDL Ratio 1.16    Narrative:      Cholesterol Reference Ranges  (U.S. Department of Health and Human Services ATP III Classifications)    Desirable          <200 mg/dL  Borderline High    200-239 mg/dL  High Risk          >240 mg/dL      Triglyceride Reference Ranges  (U.S. Department of Health and Human Services ATP III Classifications)    Normal           <150 mg/dL  Borderline High  150-199 mg/dL  High             200-499 mg/dL  Very High        >500 mg/dL    HDL Reference Ranges  (U.S. Department of Health and Human Services ATP III Classifications)    Low     <40 mg/dl (major risk factor for CHD)  High    >60 mg/dl ('negative' risk factor for CHD)        LDL Reference Ranges  (U.S. Department of Health and Human Services ATP III Classifications)    Optimal          <100 mg/dL  Near Optimal     100-129 mg/dL  Borderline High  130-159 mg/dL  High             160-189 mg/dL  Very High        >189 mg/dL    CBC & Differential [[id number]]  (Abnormal) Collected: 04/24/24 0212    Specimen: Blood Updated: 04/24/24 0221    Narrative:      The following orders were created for panel order CBC & Differential.  Procedure                                Abnormality         Status                     ---------                               -----------         ------                     CBC Auto Differential[[id number]]        Abnormal            Final result                 Please view results for these tests on the individual orders.    CBC Auto Differential [[id number]]  (Abnormal) Collected: 04/24/24 0212    Specimen: Blood Updated: 04/24/24 0221     WBC 8.88 10*3/mm3      RBC 3.96 10*6/mm3      Hemoglobin 10.0 g/dL      Hematocrit 33.1 %      MCV 83.6 fL      MCH 25.3 pg      MCHC 30.2 g/dL      RDW 15.8 %      RDW-SD 47.1 fl      MPV 10.3 fL      Platelets 261 10*3/mm3      Neutrophil % 72.6 %      Lymphocyte % 11.8 %      Monocyte % 10.1 %      Eosinophil % 1.2 %      Basophil % 0.2 %      Immature Grans % 4.1 %      Neutrophils, Absolute 6.44 10*3/mm3      Lymphocytes, Absolute 1.05 10*3/mm3      Monocytes, Absolute 0.90 10*3/mm3      Eosinophils, Absolute 0.11 10*3/mm3      Basophils, Absolute 0.02 10*3/mm3      Immature Grans, Absolute 0.36 10*3/mm3      nRBC 0.0 /100 WBC     Platelet Function ADP [[id number]]  (Normal) Collected: 04/24/24 0212    Specimen: Blood Updated: 04/24/24 0219     ADP Aggregation, % Platelet 94 %     POC Glucose Once [[id number]]  (Abnormal) Collected: 04/23/24 2037    Specimen: Blood Updated: 04/23/24 2039     Glucose 155 mg/dL      Comment: Serial Number: [id number]:  [id number]       ANCA Panel [[id number]] Collected: 04/20/24 0956    Specimen: Blood from Arm, Left Updated: 04/23/24 1908     ANTI-MPO ANTIBODIES <0.2 units      ANTI-PR3 ANTIBODIES <0.2 units      C-ANCA <1:20 titer      P-ANCA <1:20 titer      Comment: The presence of positive fluorescence exhibiting P-ANCA or C-ANCA  patterns alone is not specific for the diagnosis of Wegener's  Granulomatosis (WG) or microscopic polyangiitis. Decisions about  treatment should not be based solely on ANCA IFA results.  The  International ANCA Group Consensus  recommends follow up testing of  positive sera with both MI-3 and MPO-ANCA enzyme immunoassays. As  many as 5% serum samples are positive only by EIA.  Ref. AM J Clin Pathol 1999;111:507-513.        Atypical pANCA <1:20 titer      Comment: The atypical pANCA pattern has been observed in a significant  percentage of patients with ulcerative colitis, primary sclerosing  cholangitis and autoimmune hepatitis.       Narrative:      Performed at:  01 - [facility]  [id number]  : Renzo Morin MD, Phone:  [phone]  Performed at:  02 - [facility] [address]  [id number]  : Herber Reina PhD, Phone:  [phone]    POC Glucose 4x Daily Before Meals & at Bedtime [[id number]]  (Abnormal) Collected: 04/23/24 1724    Specimen: Blood Updated: 04/23/24 1726     Glucose 121 mg/dL      Comment: Serial Number: [id number]:  [id number]             Imaging Results (Last 72 Hours)       Procedure Component Value Units Date/Time    XR Chest 1 View [[id number]] Collected: 04/23/24 0834     Updated: 04/23/24 0838    Narrative:      XR CHEST 1 VW    Date of Exam: 4/23/2024 8:25 AM EDT    Indication: hypoxia    Comparison: 4/18/2024.    Findings: There is stable mild cardiomegaly. There is infiltrate in the left lung base. The right lung is clear. There are no definite effusions.    Impression:      Impression:  Left lower lobe infiltrate, atelectasis versus pneumonia.      Electronically Signed: Talita Bear MD    4/23/2024 8:36 AM EDT    Workstation ID: [id number]          ECG/EMG Results (most recent)       Procedure Component Value Units Date/Time    Adult Transthoracic Echo Complete w/ Color, Spectral and Contrast if necessary per protocol [[id number]] Resulted: 04/18/24 1859     Updated: 04/18/24 1900     LVIDd 4.8 cm      LVIDs 3.2 cm      IVSd 1.20 cm      LVPWd 1.20 cm      FS 33.3 %      IVS/LVPW 1.00 cm      ESV(cubed) 32.8 ml      LV Sys  Vol (BSA corrected) 31.2 cm2      EDV(cubed) 110.6 ml      LV [place] Vol (BSA corrected) 62.7 cm2      LV mass(C)d 219.1 grams      LVOT area 3.5 cm2      LVOT diam 2.10 cm      EDV(MOD-sp4) 128.0 ml      ESV(MOD-sp4) 63.8 ml      SV(MOD-sp4) 64.2 ml      SVi(MOD-SP4) 31.4 ml/m2      EF(MOD-sp4) 50.2 %      MV E max edel 114.0 cm/sec      MV A max edel 113.0 cm/sec      MV dec time 0.19 sec      MV E/A 1.01     LA ESV Index (BP) 31.3 ml/m2      TR max edel 276.0 cm/sec      SV(LVOT) 92.1 ml      RVIDd 4.5 cm      TAPSE (>1.6) 2.24 cm      LA dimension (2D)  3.4 cm      LV V1 max 119.0 cm/sec      LV V1 max PG 5.7 mmHg      LV V1 mean PG 3.0 mmHg      LV V1 VTI 26.6 cm      Ao pk edel 162.0 cm/sec      Ao max PG 10.5 mmHg      Ao mean PG 6.0 mmHg      Ao V2 VTI 31.0 cm      CRIS(I,D) 3.0 cm2      AI P1/2t 321.1 msec      MV max PG 5.6 mmHg      MV mean PG 2.00 mmHg      MV V2 VTI 31.1 cm      MV P1/2t 90.9 msec      MVA(P1/2t) 2.42 cm2      MVA(VTI) 3.0 cm2      MV dec slope 361.0 cm/sec2      TR max PG 30.5 mmHg      RVSP(TR) 33.5 mmHg      RAP systole 3.0 mmHg      RV V1 max PG 3.9 mmHg      RV V1 max 99.0 cm/sec      RV V1 VTI 20.3 cm      PA V2 max 124.0 cm/sec      PA acc time 0.14 sec      ACS 2.00 cm      Sinus 3.2 cm      STJ 2.40 cm      EF(MOD-bp) 50.0 %     Addenda:            Left ventricular systolic function is mildly decreased. Left   ventricular ejection fraction appears to be 51 - 55%.    Left ventricular diastolic function was normal.    Estimated right ventricular systolic pressure from tricuspid   regurgitation is normal (<35 mmHg).    Hypokinesis of distal septum and apex noted    Signed: 04/18/24 1900 by Santiago Lorenzo MD    Telemetry Scan [[id number]] Resulted: 04/18/24     Updated: 04/22/24 0658    Telemetry Scan [[id number]] Resulted: 04/18/24     Updated: 04/22/24 0700    Telemetry Scan [[id number]] Resulted: 04/18/24     Updated: 04/22/24 0710    Telemetry Scan [[id number]] Resulted: 04/18/24      Updated: 04/22/24 1148    Telemetry Scan [[id number]] Resulted: 04/18/24     Updated: 04/22/24 1241    Telemetry Scan [[id number]] Resulted: 04/18/24     Updated: 04/23/24 1533    Telemetry Scan [[id number]] Resulted: 04/18/24     Updated: 04/23/24 1552    Telemetry Scan [[id number]] Resulted: 04/18/24     Updated: 04/23/24 1625    Telemetry Scan [[id number]] Resulted: 04/18/24     Updated: 04/23/24 1644    Telemetry Scan [[id number]] Resulted: 04/18/24     Updated: 04/23/24 1707    Telemetry Scan [[id number]] Resulted: 04/18/24     Updated: 04/23/24 1724    ECG 12 Lead Chest Pain [[id number]] Collected: 04/18/24 1340     Updated: 04/23/24 1750     QT Interval 387 ms      QTC Interval 421 ms     Narrative:      HEART RATE= 71  bpm  RR Interval= 844  ms  OR Interval= 255  ms  P Horizontal Axis= -27  deg  P Front Axis= 61  deg  QRSD Interval= 100  ms  QT Interval= 387  ms  QTcB= 421  ms  QRS Axis= -42  deg  T Wave Axis= 107  deg  - ABNORMAL ECG -  Sinus arrhythmia  Prolonged OR interval  Incomplete RBBB and LAFB  Anterior infarct, age indeterminate  When compared with ECG of 08-Aug-2020 12:07:23,  New or worsened ischemia or infarction  Electronically Signed By: Santiago Lorenzo (JAMESON) 23-Apr-2024 17:49:50  Date and Time of Study: 2024-04-18 13:40:52    Telemetry Scan [[id number]] Resulted: 04/18/24     Updated: 04/23/24 1807    ECG 12 Lead Pre-Op / Pre-Procedure [[id number]] Collected: 04/24/24 0554     Updated: 04/24/24 0557     QT Interval 446 ms      QTC Interval 434 ms     Narrative:      HEART RATE= 57  bpm  RR Interval= 1056  ms  OR Interval= 196  ms  P Horizontal Axis= 15  deg  P Front Axis= 52  deg  QRSD Interval= 101  ms  QT Interval= 446  ms  QTcB= 434  ms  QRS Axis= -45  deg  T Wave Axis= 151  deg  - ABNORMAL ECG -  Sinus bradycardia  Incomplete RBBB and LAFB  Anterior infarct, age indeterminate  When compared with ECG of 18-Apr-2024 13:40:52,  Nonspecific significant change  Electronically Signed By:   Date and Time of  Study: 2024-04-24 05:54:12    Telemetry Scan [[id number]] Resulted: 04/18/24     Updated: 04/24/24 0823    Telemetry Scan [[id number]] Resulted: 04/18/24     Updated: 04/24/24 0841    Telemetry Scan [[id number]] Resulted: 04/18/24     Updated: 04/24/24 0911    Telemetry Scan [[id number]] Resulted: 04/18/24     Updated: 04/24/24 0936    Telemetry Scan [[id number]] Resulted: 04/18/24     Updated: 04/24/24 0958    Telemetry Scan [[id number]] Resulted: 04/18/24     Updated: 04/24/24 1047    Telemetry Scan [[id number]] Resulted: 04/18/24     Updated: 04/24/24 1109    Telemetry Scan [[id number]] Resulted: 04/18/24     Updated: 04/24/24 1156    Telemetry Scan [[id number]] Resulted: 04/18/24     Updated: 04/24/24 1302    Telemetry Scan [[id number]] Resulted: 04/18/24     Updated: 04/24/24 1358    Telemetry Scan [[id number]] Resulted: 04/18/24     Updated: 04/24/24 1506    Telemetry Scan [[id number]] Resulted: 04/18/24     Updated: 04/24/24 1635    Telemetry Scan [[id number]] Resulted: 04/18/24     Updated: 04/24/24 1652          CBC    Results from last 7 days   Lab Units 04/24/24  0212 04/23/24  0158 04/22/24  0120 04/20/24 2335 04/20/24  0214 04/18/24  1343   WBC 10*3/mm3 8.88 8.17 6.98 8.05 7.86 7.67   HEMOGLOBIN g/dL 10.0* 9.4* 9.5* 9.7* 9.3* 10.4*   PLATELETS 10*3/mm3 261 252 243 240 220 256     BMP   Results from last 7 days   Lab Units 04/24/24  0212 04/23/24  0158 04/22/24  0120 04/20/24  2335 04/20/24  0214 04/19/24  0137 04/18/24  1513   SODIUM mmol/L 148* 146* 144 141 142 143 143   POTASSIUM mmol/L 4.1 4.0 4.3 4.2 3.9 3.9 4.0   CHLORIDE mmol/L 114* 113* 112* 108* 107 110* 107   CO2 mmol/L 25.0 25.0 22.0 22.0 24.0 20.0* 20.0*   BUN mg/dL 42* 55* 63* 80* 91* 72* 65*   CREATININE mg/dL 1.24 1.38* 1.49* 2.00* 2.09* 1.83* 1.68*   GLUCOSE mg/dL 85 118* 156* 188* 160* 158* 131*   MAGNESIUM mg/dL  --   --   --   --  2.2 2.3 2.0   PHOSPHORUS mg/dL 3.3 2.9 3.1 3.9 4.4 3.8  --      CMP   Results from last 7 days   Lab Units  04/24/24  0212 04/23/24  0158 04/22/24  0120 04/20/24  2335 04/20/24  0214 04/19/24  0137 04/18/24  1513   SODIUM mmol/L 148* 146* 144 141 142 143 143   POTASSIUM mmol/L 4.1 4.0 4.3 4.2 3.9 3.9 4.0   CHLORIDE mmol/L 114* 113* 112* 108* 107 110* 107   CO2 mmol/L 25.0 25.0 22.0 22.0 24.0 20.0* 20.0*   BUN mg/dL 42* 55* 63* 80* 91* 72* 65*   CREATININE mg/dL 1.24 1.38* 1.49* 2.00* 2.09* 1.83* 1.68*   GLUCOSE mg/dL 85 118* 156* 188* 160* 158* 131*   ALBUMIN g/dL 3.4* 3.3* 3.4* 3.5 3.6 3.2*  --      Cardiac Studies:  Echo- Results for orders placed during the hospital encounter of 04/18/24    Adult Transthoracic Echo Complete w/ Color, Spectral and Contrast if necessary per protocol    Interpretation Summary    Left ventricular systolic function is mildly decreased. Left ventricular ejection fraction appears to be 51 - 55%.    Left ventricular diastolic function was normal.    Estimated right ventricular systolic pressure from tricuspid regurgitation is normal (<35 mmHg).    Hypokinesis of distal septum and apex noted    Stress Myoview-  Cath-      Medication Review:   Scheduled Meds:allopurinol, 100 mg, Oral, Daily  amLODIPine, 10 mg, Oral, Q24H  aspirin, 81 mg, Oral, Daily  atorvastatin, 40 mg, Oral, Nightly  azelastine, 1 spray, Each Nare, BID  budesonide, 0.5 mg, Nebulization, BID - RT  bumetanide, 1 mg, Oral, Daily  carvedilol, 12.5 mg, Oral, BID With Meals  citalopram, 20 mg, Oral, Daily  [Held by provider] clopidogrel, 75 mg, Oral, Daily  hydrALAZINE, 50 mg, Oral, Q12H  insulin lispro, 2-7 Units, Subcutaneous, 4x Daily AC & at Bedtime  ipratropium-albuterol, 3 mL, Nebulization, 4x Daily - RT  montelukast, 10 mg, Oral, Nightly  pantoprazole, 40 mg, Oral, Q AM  predniSONE, 20 mg, Oral, Daily With Breakfast  senna-docusate sodium, 2 tablet, Oral, BID  sodium chloride, 10 mL, Intravenous, Q12H  terazosin, 1 mg, Oral, Nightly      Continuous Infusions:   PRN Meds:.  acetaminophen    atropine    senna-docusate sodium  **AND** polyethylene glycol **AND** bisacodyl **AND** bisacodyl    Calcium Replacement - Follow Nurse / BPA Driven Protocol    dextrose    dextrose    glucagon (human recombinant)    Magnesium Standard Dose Replacement - Follow Nurse / BPA Driven Protocol    nitroglycerin    Phosphorus Replacement - Follow Nurse / BPA Driven Protocol    Potassium Replacement - Follow Nurse / BPA Driven Protocol    sodium chloride    sodium chloride    sodium chloride    sodium chloride      Assessment & Plan     NSTEMI (non-ST elevated myocardial infarction)    MDM:    1.  CAD:    Patient underwent cardiac catheterization and showed two-vessel coronary artery disease.  At this stage, I would recommend cardiothoracic surgical consultation for possible bypass.    2.  Non-ST elevation myocardial infarction:    Patient is on aspirin and statin therapy.  Patient is on carvedilol I would increase carvedilol to 12.5 mg twice daily.    3.  Hypertension:    Increase carvedilol and add hydralazine 25 mg twice daily.    4.  Hyperlipidemia:    Patient is on Lipitor.    5.  Disposition:    I will request cardiac surgery consultation and depending on the timing of surgery patient can be discharged home as a outpatient surgery if it is not being done on this admission.  I will discuss with the surgical team.    Santiago Lorenzo MD  04/24/24  17:22 EDT

## 2024-04-24 NOTE — PLAN OF CARE
Goal Outcome Evaluation:  Plan of Care Reviewed With: patient      Pt bedrest up at 2020. Pt groin site remains soft, dry, intact. Pt has no complaints at this time.                                    Problem: Adult Inpatient Plan of Care  Goal: Plan of Care Review  Outcome: Ongoing, Progressing  Flowsheets (Taken 4/24/2024 0351)  Plan of Care Reviewed With: patient  Goal: Patient-Specific Goal (Individualized)  Outcome: Ongoing, Progressing  Goal: Absence of Hospital-Acquired Illness or Injury  Outcome: Ongoing, Progressing  Intervention: Identify and Manage Fall Risk  Recent Flowsheet Documentation  Taken 4/24/2024 0200 by Teresa Jaramillo RN  Safety Promotion/Fall Prevention:   safety round/check completed   room organization consistent   nonskid shoes/slippers when out of bed   clutter free environment maintained   assistive device/personal items within reach   fall prevention program maintained  Taken 4/24/2024 0000 by Teresa Jaramillo RN  Safety Promotion/Fall Prevention:   safety round/check completed   room organization consistent   nonskid shoes/slippers when out of bed   clutter free environment maintained   assistive device/personal items within reach   fall prevention program maintained  Taken 4/23/2024 2200 by Teresa Jaramillo RN  Safety Promotion/Fall Prevention:   safety round/check completed   room organization consistent   nonskid shoes/slippers when out of bed   clutter free environment maintained   assistive device/personal items within reach   fall prevention program maintained  Taken 4/23/2024 2000 by Teresa Jaramillo RN  Safety Promotion/Fall Prevention:   safety round/check completed   room organization consistent   nonskid shoes/slippers when out of bed   clutter free environment maintained   assistive device/personal items within reach   fall prevention program maintained  Intervention: Prevent Skin Injury  Recent Flowsheet Documentation  Taken 4/24/2024 0200 by Ella  SOFIE Moore  Body Position: position changed independently  Taken 4/24/2024 0000 by Teresa Jaramillo RN  Body Position: position changed independently  Taken 4/23/2024 2200 by Teresa Jaramillo RN  Body Position: position changed independently  Taken 4/23/2024 2000 by Teresa Jaramillo RN  Skin Protection: adhesive use limited  Intervention: Prevent and Manage VTE (Venous Thromboembolism) Risk  Recent Flowsheet Documentation  Taken 4/23/2024 2000 by Teresa Jaramillo RN  Range of Motion: active ROM (range of motion) encouraged  Intervention: Prevent Infection  Recent Flowsheet Documentation  Taken 4/24/2024 0200 by Teresa Jaramillo RN  Infection Prevention:   environmental surveillance performed   equipment surfaces disinfected   hand hygiene promoted   rest/sleep promoted   single patient room provided  Taken 4/24/2024 0000 by Teresa Jaramillo RN  Infection Prevention:   environmental surveillance performed   equipment surfaces disinfected   hand hygiene promoted   rest/sleep promoted   single patient room provided  Taken 4/23/2024 2200 by Teresa Jaramillo RN  Infection Prevention:   environmental surveillance performed   equipment surfaces disinfected   hand hygiene promoted   rest/sleep promoted   single patient room provided  Taken 4/23/2024 2000 by Teresa Jaramillo RN  Infection Prevention:   environmental surveillance performed   equipment surfaces disinfected   hand hygiene promoted   rest/sleep promoted   single patient room provided  Goal: Optimal Comfort and Wellbeing  Outcome: Ongoing, Progressing  Intervention: Provide Person-Centered Care  Recent Flowsheet Documentation  Taken 4/24/2024 0000 by Teresa Jaramillo RN  Trust Relationship/Rapport:   care explained   choices provided  Taken 4/23/2024 2000 by Teresa Jaramillo RN  Trust Relationship/Rapport:   care explained   choices provided  Goal: Readiness for Transition of Care  Outcome: Ongoing, Progressing  Goal: Plan of  Care Review  Outcome: Ongoing, Progressing  Flowsheets (Taken 4/24/2024 0351)  Plan of Care Reviewed With: patient  Goal: Patient-Specific Goal (Individualized)  Outcome: Ongoing, Progressing  Goal: Absence of Hospital-Acquired Illness or Injury  Outcome: Ongoing, Progressing  Intervention: Identify and Manage Fall Risk  Recent Flowsheet Documentation  Taken 4/24/2024 0200 by Teresa Jaramillo RN  Safety Promotion/Fall Prevention:   safety round/check completed   room organization consistent   nonskid shoes/slippers when out of bed   clutter free environment maintained   assistive device/personal items within reach   fall prevention program maintained  Taken 4/24/2024 0000 by Teresa Jaramillo RN  Safety Promotion/Fall Prevention:   safety round/check completed   room organization consistent   nonskid shoes/slippers when out of bed   clutter free environment maintained   assistive device/personal items within reach   fall prevention program maintained  Taken 4/23/2024 2200 by Teresa Jaramillo RN  Safety Promotion/Fall Prevention:   safety round/check completed   room organization consistent   nonskid shoes/slippers when out of bed   clutter free environment maintained   assistive device/personal items within reach   fall prevention program maintained  Taken 4/23/2024 2000 by Teresa Jaramillo RN  Safety Promotion/Fall Prevention:   safety round/check completed   room organization consistent   nonskid shoes/slippers when out of bed   clutter free environment maintained   assistive device/personal items within reach   fall prevention program maintained  Intervention: Prevent Skin Injury  Recent Flowsheet Documentation  Taken 4/24/2024 0200 by Teresa Jaramillo RN  Body Position: position changed independently  Taken 4/24/2024 0000 by Treesa Jaramillo RN  Body Position: position changed independently  Taken 4/23/2024 2200 by Teresa Jaramillo RN  Body Position: position changed independently  Taken  4/23/2024 2000 by Teresa Jaramillo RN  Skin Protection: adhesive use limited  Intervention: Prevent and Manage VTE (Venous Thromboembolism) Risk  Recent Flowsheet Documentation  Taken 4/23/2024 2000 by Teresa Jaramillo RN  Range of Motion: active ROM (range of motion) encouraged  Intervention: Prevent Infection  Recent Flowsheet Documentation  Taken 4/24/2024 0200 by Teresa Jaramillo RN  Infection Prevention:   environmental surveillance performed   equipment surfaces disinfected   hand hygiene promoted   rest/sleep promoted   single patient room provided  Taken 4/24/2024 0000 by Teresa Jaramillo RN  Infection Prevention:   environmental surveillance performed   equipment surfaces disinfected   hand hygiene promoted   rest/sleep promoted   single patient room provided  Taken 4/23/2024 2200 by Teresa Jaramillo RN  Infection Prevention:   environmental surveillance performed   equipment surfaces disinfected   hand hygiene promoted   rest/sleep promoted   single patient room provided  Taken 4/23/2024 2000 by Teresa Jaramillo RN  Infection Prevention:   environmental surveillance performed   equipment surfaces disinfected   hand hygiene promoted   rest/sleep promoted   single patient room provided  Goal: Optimal Comfort and Wellbeing  Outcome: Ongoing, Progressing  Intervention: Provide Person-Centered Care  Recent Flowsheet Documentation  Taken 4/24/2024 0000 by Teresa Jaramillo RN  Trust Relationship/Rapport:   care explained   choices provided  Taken 4/23/2024 2000 by Teresa Jaramillo RN  Trust Relationship/Rapport:   care explained   choices provided  Goal: Readiness for Transition of Care  Outcome: Ongoing, Progressing     Problem: Chest Pain  Goal: Resolution of Chest Pain Symptoms  Outcome: Ongoing, Progressing     Problem: Fall Injury Risk  Goal: Absence of Fall and Fall-Related Injury  Outcome: Ongoing, Progressing  Intervention: Identify and Manage Contributors  Recent Flowsheet  Documentation  Taken 4/23/2024 2000 by Teresa Jaramillo, RN  Medication Review/Management: medications reviewed  Intervention: Promote Injury-Free Environment  Recent Flowsheet Documentation  Taken 4/24/2024 0200 by Teresa Jaramillo, RN  Safety Promotion/Fall Prevention:   safety round/check completed   room organization consistent   nonskid shoes/slippers when out of bed   clutter free environment maintained   assistive device/personal items within reach   fall prevention program maintained  Taken 4/24/2024 0000 by Teresa Jaramillo, RN  Safety Promotion/Fall Prevention:   safety round/check completed   room organization consistent   nonskid shoes/slippers when out of bed   clutter free environment maintained   assistive device/personal items within reach   fall prevention program maintained  Taken 4/23/2024 2200 by Teresa Jaramillo, RN  Safety Promotion/Fall Prevention:   safety round/check completed   room organization consistent   nonskid shoes/slippers when out of bed   clutter free environment maintained   assistive device/personal items within reach   fall prevention program maintained  Taken 4/23/2024 2000 by Teresa Jaramillo, RN  Safety Promotion/Fall Prevention:   safety round/check completed   room organization consistent   nonskid shoes/slippers when out of bed   clutter free environment maintained   assistive device/personal items within reach   fall prevention program maintained

## 2024-04-25 ENCOUNTER — TRANSITIONAL CARE MANAGEMENT TELEPHONE ENCOUNTER (OUTPATIENT)
Dept: CALL CENTER | Facility: HOSPITAL | Age: 69
End: 2024-04-25
Payer: MEDICARE

## 2024-04-25 NOTE — OUTREACH NOTE
Call Center TCM Note      Flowsheet Row Responses   [facility] facility patient discharged from? [facility]   Does the patient have one of the following disease processes/diagnoses(primary or secondary)? Acute MI (STEMI,NSTEMI)   TCM attempt successful? No   Unsuccessful attempts Attempt 1   Call Status Left message            Migdalia Mcgovern RN    4/25/2024, 09:33 EDT

## 2024-04-25 NOTE — CASE MANAGEMENT/SOCIAL WORK
Case Management Discharge Note      Final Note: Home with planned readmission for CABG    Provided Post Acute Provider List?: N/A    Selected Continued Care - Discharged on 4/24/2024 Admission date: 4/18/2024 - Discharge disposition: Home or Self Care         Transportation Services  Private: Car    Final Discharge Disposition Code: 81 - home or self care w/planned readmission

## 2024-04-25 NOTE — OUTREACH NOTE
Call Center TCM Note      Flowsheet Row Responses   [facility] patient discharged from? [facility]   Does the patient have one of the following disease processes/diagnoses(primary or secondary)? Acute MI (STEMI,NSTEMI)   TCM attempt successful? Yes   Call start time 1548   Call end time 1552   Discharge diagnosis NSTEMI, heart cath, CAD - needs CABG in 1-2 weeks   Meds reviewed with patient/caregiver? Yes   Is the patient having any side effects they believe may be caused by any medication additions or changes? No   Does the patient have all prescriptions related to this admission filled (includes statins,anticoagulants,HTN meds,anti-arrhythmia meds) Yes   Is the patient taking all medications as directed (includes completed medication regime)? Yes   Does the patient have an appointment with their PCP within 7-14 days of discharge? No   Nursing Interventions Patient declined scheduling/rescheduling appointment at this time, Routed TCM call to PCP office   Has home health visited the patient within 72 hours of discharge? N/A   Psychosocial issues? No   Did the patient receive a copy of their discharge instructions? Yes   Nursing interventions Reviewed instructions with patient   What is the patient's perception of their health status since discharge? Improving   Nursing interventions Nurse provided patient education   Is the patient/caregiver able to teach back signs and symptoms of when to call for help immediately: Sudden chest discomfort, Sudden discomfort in arms, back, neck or jaw, Shortness of breath at any time, Sudden sweating or clammy skin, Irregular or rapid heart rate   Nursing interventions Nurse provided patient education   Is the pateint /caregiver able to teach back the importance of cardiac rehab? Yes   Nursing interventions Provided education on importance of cardiac rehab   Is the patient/caregiver able to teach back lifestyle changes to help prevent MIs Heart healthy diet   Is the  patient/caregiver able to teach back ways to prevent a second heart attack: Take medications, Follow up with MD, Participate in Cardiac Rehab   If the patient is a current smoker, are they able to teach back resources for cessation? Not a smoker   Is the patient/caregiver able to teach back the hierarchy of who to call/visit for symptoms/problems? PCP, Specialist, Home health nurse, Urgent Care, ED, 911 Yes   TCM call completed? Yes   Wrap up additional comments Patient doing well, CABG to be scheduled in 7-10 days   Call end time 1552   Would this patient benefit from a Referral to Amb Social Work? No   Is the patient interested in additional calls from an ambulatory ? No            Migdalia Mcgovern RN    4/25/2024, 15:53 EDT

## 2024-04-26 PROBLEM — E88.810 METABOLIC SYNDROME X: Chronic | Status: ACTIVE | Noted: 2024-04-26

## 2024-04-26 PROBLEM — N18.2 CKD (CHRONIC KIDNEY DISEASE) STAGE 2, GFR 60-89 ML/MIN: Chronic | Status: ACTIVE | Noted: 2024-04-26

## 2024-04-26 PROBLEM — I25.10 CAD, MULTIPLE VESSEL: Chronic | Status: ACTIVE | Noted: 2024-04-26

## 2024-04-26 NOTE — PROGRESS NOTES
"Cardiology Heart Failure Clinic Note  Nikolas \"Vignesh\" Nadeem CAMARA [facility]    Patient ID: Fransico Cuenca  is a 69 y.o. male.    Encounter Date:04/29/2024    HPI:  69-year-old  male patient now followed by Dr. Lorenzo w/ [facility] of COPD, + Fm Hx CAD, metabolic syndrome X all 4 components and iron deficiency anemia along with obstructive sleep apnea 4/18/24 presented to ED in [facility] with a c/o chest tightness and associated SOA. Patient had noticed he had limitation in activity ongoing for a few weeks and this has gradually worsened & noticed weight change and peripheral edema. Patient was ruled in for NSTEMI, placed on Heparin gtt, and was transferred to [facility] for Cardiology evaluation. LHC showed  severe multivessel coronary artery disease. CXR was also concerning for Pneumonia and Pulmonology was also consulted. Respiratory cultures came back positive for human metapneumovirus.. Patient was diuresed but eventually developed EN. Patient was medically optimized prior to LHC w/ Dr. Lorenzo and on 4/23/2024 showed patient to have 20% LM,mLAD 80%  & 95% pLAD stenosis D1 and D2 both being 80 and 90% stenosed at the ostium, LCx being about 90% stenosed in the midsegment with an RI with 60 to 70% proximal segment stenosis .RCA with 40% disease in the proximal PDA mild disease in the proximal segment noted. Cardiac surgery was consulted for evaluation of patient and recommendation regarding surgical intervention. He also was notedly diuresed while in the hospital and developed a subsequent EN.  Ultimately was discharged home with no resolution to pending CABG.  TTE was also performed which did show him to have preserved LV systolic function with an EF of around 51 to 55% without any diastolic dysfunction.  This is still pending CABG he is coming to the [facility] heart failure clinic for his initial visit on 29 April 2024 since recent hospitalization is noted he is a little better he thinks his legs are " little bit smaller however still rather large and blistered.  Unfortunately do not have time to send him to the lymphedema clinic.  POLANCO etc. is not much change from when he left the hospital       Assessment:    Diagnoses and all orders for this visit:    1. ASCAD (Primary)  Overview:  A. Two-vessel LHC 4/23/2024                  I.  20% LM                 Ii.  95% pLAD with 80%mLAD                III.  90% LCx                            1. 60 to 70% proximal ramus  B.  Being worked up for CABG by CV surgery  C. Preserved EF 51 to 55% w/o DD      2. Stage 3a chronic kidney disease    3. Bilateral leg edema  Overview:  Small blistering pretibially      4. Metabolic syndrome X  Overview:  A.-Benign essential hypertension  B.  Mixed dyslipidemia  C.  Obesity with a BMI of 36.62 kg/m²          I, ZHANG  D.  T2DM         I.  Peripheral neuropathy       5. Primary osteoarthritis of left hand  Overview:  Exacerbation after trauma, ice and anti inflammatories compression.       6. Type 2 diabetes mellitus without complication, without long-term current use of insulin  Overview:  A1c 6.2 03/12/2024 improved with lifestyle changes.   A1c 6.8 12.12.2023 worse less exercise.   A1c 6.1 09/11/2023 improved.   A1c 6.3 05/26/2026 stable he reports BS of 150  A1c 6.3 02/24/2023 slightly worse. But controlled.   A1c 5.9 11/08/2022 improved  A1c 7.8 05/03/2022 marginal control Actos begin for insurance  A1c 7.1 01/24/2022 [id number] 7.5  07/13/2021   A1c 7.5 12/21/2020 stable,    A1c 6.2 08/20/2019,   New onset 04/29/2019, A1c 7.2          7. POLANCO (dyspnea on exertion)  -     Basic Metabolic Panel; Future  -     Magnesium; Future  -     proBNP; Future  -     Basic Metabolic Panel; Standing  -     Basic Metabolic Panel  -     Magnesium; Standing  -     Magnesium  -     proBNP; Standing  -     proBNP    8. Anxiety  Overview:  Stable on meds which are continued. Pros and cons of Rx doscussed      9. Obstructive sleep apnea  syndrome  Overview:  Improved on CPAP he is compliant      10. Class 2 severe obesity due to excess calories with serious comorbidity and body mass index (BMI) of 35.0 to 35.9 in adult  Overview:  Diet and organized wt loss encouraged      11. History of kidney stones  Overview:  Hx of lithopripsy left kidney 07/31/2020 he has done well since      12. Mixed hyperlipidemia  Overview:  Stable, compliant      13. Seasonal allergic rhinitis due to pollen  Overview:  Sxs fairly well controlled. continue Immuno Rx.        Other orders  -     ECG 12 Lead Dyspnea; Standing  -     ECG 12 Lead Dyspnea  -     Magnesium Oxide 400 MG capsule; Take 1 capsule by mouth Daily.  Dispense: 90 each; Refill: 1  -     furosemide (LASIX) injection 80 mg  -     Furosemide (Furoscix) 80 MG/10ML Cartridge Kit; Inject 10 mL under the skin into the appropriate area as directed Once When Specified for 1 dose. Do not take until discussed with provider  Dispense: 8 each; Refill: 0  -     dapagliflozin Propanediol (Farxiga) 10 MG tablet; Take 10 mg by mouth Daily.  Dispense: 90 tablet; Refill: 3        Plan/discussion    Volume overload    Heart Failure Core Measures addressed    Type of Overload unsure    Most recent EF: (TTE 4/18/24)  EF 51-55 w/o any DD    [place] Heart association Class & Stage : 1b    HF Meds Pre Visit    Beta Blocker: Coreg 12.5 mg every 12 hours  ARNI/ACE/ARB: Lisinopril 10 mg daily  SGLT 2 inhibitors: None currently  Diuretics: Bumex 1 mg daily  Aldosterone Antagonist: None currently secondary to CKD  Digitalis: Not applicable  Vasodilators & Nitrates: Currently none    HF Meds Post Visit    Beta Blocker: coreg 12.5 mg q 12h  ARNI/ACE/ARB: lisinopril 10mg QD  SGLT 2 inhibitors: Farxiga 10 daily  Diuretics: Bumex 1 mg daily  Furoscix: 8 kits for at home use ordered  Aldosterone Antagonist: none 2/2 CKD  Digitalis: n/a  Nitrates: currently none          Cardiac medicines reviewed with risk, benefits, and necessity of each  discussed.       _________________________________________________________    Discussion    Already on heart failure core measures including beta-blocker, ACE I, SGLT2, diuretic,   even a nitrate  I will stop actos and start a SGLT2  Will add mag Ox 400 daily  Stopping Norvasc 2/2 leg edema  Will diuresis today with 80 mg lasix IV x 1  Ensure f/u w/ Pagni  Keep an eye on renal function as his EGFR today was only 58 making him a stage III CKD, of note his proBNP today went down from > 10,000 to  2,348 29 Apr 24  Glucose is under much better control  Fortunately not enough time to send him to the edema clinic in [facility] on seeing him weekly until his bypass 3 weeks approximately May 15  Would add typical heart failure nursing interventions including:        A. Fluid restriction at less than 2000 cc daily       B. Daily weights        C.  1 g low-sodium diet        I did the following activities:preparing for the visit, with Fransico Cuenca  including reviewing tests, once pt arrived in clinic I also performed a medically appropriate examination and/or evaluation , I personally spent considerable time counseling and educating the patient/family/caregiver, ordering medications, tests, or procedures, referring and communicating with other health care professionals , and documenting information in the medical record. I estimate including preparation 45 minutes             Subjective:     Chief Complaint   Patient presents with    Congestive Heart Failure       ROS:  Constitutional: + weakness, + fatigue, No fever, rigors, chills   Eyes: No recent vision changes, eye pain   ENT/oropharynx: No recent difficulty swallowing, sore throat, epistaxis, changes in hearing   Cardiovascular: No recent chest pain, chest tightness, palpitations except as noted in HPI, paroxysmal nocturnal dyspnea, orthopnea, diaphoresis, dizziness & no pre or soraya syncopal episodes   Respiratory: Not much shortness of breath noted at rest, +  dyspnea on exertion, no significant productive cough, no wheezing and no hemoptysis   Gastrointestinal: No abdominal pain, nausea, vomiting, diarrhea, bloody stools   Genitourinary: No hematuria, dysuria other than increased frequency   Neurological: No headache, tremors, numbness,  or hemiparesis    Musculoskeletal: No change in typical cramps, myalgias, no new joint pain, joint swelling   Integument: No recent rash, + edema      Patient Active Problem List   Diagnosis    Anxiety    Hypertension, benign    Mixed hyperlipidemia    Allergic rhinitis due to pollen    Type 2 diabetes mellitus without complication, without long-term current use of insulin    Encounter for prostate cancer screening    Screening for colon cancer    Family history of ischemic heart disease    Class 2 severe obesity due to excess calories with serious comorbidity and body mass index (BMI) of 35.0 to 35.9 in adult    Obstructive sleep apnea syndrome    Medicare annual wellness visit, subsequent    History of kidney stones    Primary osteoarthritis of left hand    Iron deficiency anemia due to chronic blood loss    NSTEMI (non-ST elevated myocardial infarction)    ASCAD    Metabolic syndrome X    CKD (chronic kidney disease) stage 3a, GFR 30-59 ml/min    Bilateral leg edema       Past Medical History:   Diagnosis Date    Anxiety     Essential hypertension, benign     Gastroesophageal reflux disease without esophagitis     Kidney stones 05/31/2020    Dr. Kahlil Terrell    Mixed hyperlipidemia     Rotator cuff syndrome     Torn rotator about seven years ago    Seasonal allergic rhinitis due to pollen     Type 2 diabetes mellitus without complication, without long-term current use of insulin        Past Surgical History:   Procedure Laterality Date    CARDIAC CATHETERIZATION N/A 4/23/2024    Procedure: Left Heart Cath;  Surgeon: Santiago Lorenzo MD;  Location: [facility] INVASIVE LOCATION;  Service: Cardiovascular;  Laterality: N/A;    CATARACT  EXTRACTION Left 08/24/2023    Dr Melendez    CATARACT EXTRACTION Right 09/07/2023    Dr Melendez    CYSTOSCOPY W/ LASER LITHOTRIPSY  01/2021    INGUINAL HERNIA REPAIR Left 10/2009    NOSE SURGERY      x2    UMBILICAL HERNIA REPAIR  10/2009    Dr. Napier       Social History     Socioeconomic History    Marital status: Single   Tobacco Use    Smoking status: Never     Passive exposure: Never    Smokeless tobacco: Never    Tobacco comments:     Family members smoked   Vaping Use    Vaping status: Never Used   Substance and Sexual Activity    Alcohol use: Never    Drug use: Never    Sexual activity: Not Currently       No Known Allergies      Current Outpatient Medications:     aspirin (aspirin) 81 MG EC tablet, Take 1 tablet by mouth Daily., Disp: , Rfl:     atorvastatin (LIPITOR) 10 MG tablet, Take 1 tablet by mouth once daily, Disp: 90 tablet, Rfl: 3    bumetanide (BUMEX) 1 MG tablet, Take 1 tablet by mouth Daily., Disp: 90 tablet, Rfl: 0    carvedilol (COREG) 12.5 MG tablet, Take 1 tablet by mouth 2 (Two) Times a Day With Meals., Disp: 60 tablet, Rfl: 3    citalopram (CeleXA) 20 MG tablet, Take 1 tablet by mouth once daily, Disp: 90 tablet, Rfl: 3    doxazosin (CARDURA) 1 MG tablet, Take 1 tablet by mouth once daily, Disp: 90 tablet, Rfl: 3    fenofibrate 160 MG tablet, Take 1 tablet by mouth once daily, Disp: 90 tablet, Rfl: 0    hydrALAZINE (APRESOLINE) 50 MG tablet, Take 1 tablet by mouth Every 12 (Twelve) Hours., Disp: 60 tablet, Rfl: 3    lisinopril (PRINIVIL,ZESTRIL) 10 MG tablet, Take 1 tablet by mouth Daily., Disp: 90 tablet, Rfl: 0    metFORMIN (GLUCOPHAGE) 1000 MG tablet, TAKE 1 TABLET BY MOUTH TWICE DAILY WITH MEALS, Disp: 60 tablet, Rfl: 12    montelukast (SINGULAIR) 10 MG tablet, Take 1 tablet by mouth Daily., Disp: 90 tablet, Rfl: 3    Multiple Vitamins-Minerals (MULTIVITAMIN ADULT PO), Take 1 tablet by mouth Daily., Disp: , Rfl:     Omega-3 Fatty Acids (FISH OIL) 1000 MG capsule delayed-release, Take 2  capsules by mouth 2 (Two) Times a Day., Disp: , Rfl:     omeprazole (priLOSEC) 40 MG capsule, Take 1 capsule by mouth Daily., Disp: 30 capsule, Rfl: 12    pioglitazone (ACTOS) 30 MG tablet, Take 1 tablet by mouth once daily, Disp: 90 tablet, Rfl: 3    dapagliflozin Propanediol (Farxiga) 10 MG tablet, Take 10 mg by mouth Daily., Disp: 90 tablet, Rfl: 3    Furosemide (Furoscix) 80 MG/10ML Cartridge Kit, Inject 10 mL under the skin into the appropriate area as directed Once When Specified for 1 dose. Do not take until discussed with provider, Disp: 8 each, Rfl: 0    Magnesium Oxide 400 MG capsule, Take 1 capsule by mouth Daily., Disp: 90 each, Rfl: 1  No current facility-administered medications for this encounter.    Immunization History   Administered Date(s) Administered    COVID-19 (MODERNA) 1st,2nd,3rd Dose Monovalent 03/23/2021, 04/29/2021, 10/28/2021    DTaP 08/03/2020    FLUAD TRI 65YR+ 10/08/2019    Flu Vaccine Quad PF 6-35MO 10/06/2015    Fluad Quad 65+ 09/24/2020    Fluzone High-Dose 65+yrs 10/22/2021, 11/08/2022, 09/11/2023    Fluzone Quad >6mos (Multi-dose) 11/01/2012, 10/08/2019    Hepatitis A 05/09/2018, 11/09/2018    Influenza Quad Vaccine (Inpatient) 11/01/2012    Influenza TIV (IM) 10/25/2007, 10/14/2014, 10/06/2015, 12/20/2016    Pneumococcal Conjugate 20-Valent (PCV20) 11/08/2022    Pneumococcal Polysaccharide (PPSV23) 05/19/2014, 04/30/2019    Shingrix 07/18/2020, 09/16/2020    Tdap 06/25/2007, 08/03/2020       Most recent EKG as reviewed:  Procedures     Most recent echo as reviewed:  Results for orders placed during the hospital encounter of 04/18/24    Adult Transthoracic Echo Complete w/ Color, Spectral and Contrast if necessary per protocol    Interpretation Summary    Left ventricular systolic function is mildly decreased. Left ventricular ejection fraction appears to be 51 - 55%.    Left ventricular diastolic function was normal.    Estimated right ventricular systolic pressure from  tricuspid regurgitation is normal (<35 mmHg).    Hypokinesis of distal septum and apex noted      Most recent stress test results:      Most recent cardiac catheterization results:  Results for orders placed during the hospital encounter of 04/18/24    Cardiac Catheterization/Vascular Study    Conclusion  CARDIAC CATHETERIZATION REPORT      DATE OF PROCEDURE:  4/23/2024    INDICATION FOR PROCEDURE:    Non-ST elevation myocardial infarction  CAD  Congestive heart failure acute diastolic    PROCEDURE PERFORMED:    Ultrasound-guided access of femoral artery  Left heart catheterization  coronary angiography  left ventriculography    PROCEDURE COMMENTS:    After informed consent was obtained, the patient was prepped and draped in the usual sterile manner.  Mild to moderate sedation was administered.  Right femoral artery was accessed without difficulty under fluoroscopy and ultrasound guidance and 6 Russian arterial sheath was inserted.  Sheath was flushed with heparinized saline.    Using 6 Russian Sudarshan catheters, first left coronary artery and the right coronary was electively engaged and appropriate views were taken.  A 6 Russian JR4 catheter was used to cross aortic valve and left heart catheterization was performed.  Left ventriculography was done in a PATTERSON view    Patient tolerated the procedure well.    FINDINGS:    1. HEMODYNAMICS:    Aortic pressure: 170/101 mmHg    LVEDP: 0 to 5 mmHg    Gradient across aortic valve on pullback: No gradient across aortic valve on pullback      2. LEFT VENTRICULOGRAPHY: 55%      3. CORONARY ANGIOGRAPHY:    A: Left main coronary artery: Short and has 20% stenosis in distal segment    B: Left anterior descending artery: 95% stenosis in proximal segment of LAD and 80% stenosis of mid LAD.  D1 and D2 branches of LAD which is small to medium sized vessels had 80% to 90% stenosis at the ostium    C: Left circumflex coronary artery: LCx has 90% stenosis in the midsegment and ramus  intermedius has 60 to 70% stenosis in the proximal segment    D: Right coronary artery: RCA has diffuse disease up to 40% in proximal PDA.  Mild disease in the proximal segment noted    SUMMARY:    Two-vessel coronary artery disease  Preserved left ventricular function    RECOMMENDATIONS:    Recommend CABG        Imaging:    Results for orders placed during the hospital encounter of 04/18/24    XR Chest 1 View    Narrative  XR CHEST 1 VW    Date of Exam: 4/23/2024 8:25 AM EDT    Indication: hypoxia    Comparison: 4/18/2024.    Findings: There is stable mild cardiomegaly. There is infiltrate in the left lung base. The right lung is clear. There are no definite effusions.    Impression  Impression:  Left lower lobe infiltrate, atelectasis versus pneumonia.      Electronically Signed: Talita Bear MD  4/23/2024 8:36 AM EDT  Workstation ID: [id number]       Results for orders placed during the hospital encounter of 04/18/24    US Renal Bilateral    Narrative  US RENAL BILATERAL    Date of Exam: 4/18/2024 10:12 PM EDT    Indication: Kidney failure, acute  EN.    Comparison: CT abdomen and pelvis 7/26/2020    Technique: Grayscale and color Doppler ultrasound evaluation of the kidneys and urinary bladder was performed.      Findings:  RIGHT kidney measures 12.2 cm.  Kidney echogenicity, size, and vascularity appear within normal limits. There is no solid kidney mass.  No echogenic shadowing stone.  No hydronephrosis.    LEFT kidney measures 12.6 cm. Kidney echogenicity, size, and vascularity appear within normal limits. There is no solid kidney mass.  No echogenic shadowing stone.  No hydronephrosis.    Limited visualization of the urinary bladder is unremarkable. Total volume of 44 mL.    Impression  Impression:  Unremarkable bilateral renal ultrasound.        Electronically Signed: Sujatha Montoya MD  4/19/2024 7:23 AM EDT  Workstation ID: [id number]      Results for orders placed during the hospital encounter of  07/26/20    CT Abdomen Pelvis Without Contrast    Narrative  DATE OF EXAM:  7/26/2020 12:28 PM    PROCEDURE:  CT ABDOMEN PELVIS WO CONTRAST-    INDICATIONS:  flank pain, abdominal pain    COMPARISON:  No Comparisons Available    TECHNIQUE:  Routine transaxial slices were obtained through the abdomen and pelvis  without the administration of intravenous contrast. Reconstructed  coronal and sagittal images were also obtained. Automated exposure  control and iterative construction methods were used.    FINDINGS:  The lung bases are free of active disease.  The liver is diffusely fatty infiltrated. The hepatic segment 8, there  is an incidental 8 mm area of hyperdensity may represent an incidental  hemangioma. The unenhanced spleen, pancreas, adrenal glands, and right  kidney are unremarkable. There is moderate left hydroureteronephrosis  and perinephric fat stranding due to a 4 mm stone in the distal left  ureter 1 cm proximal to the UVJ. No other renal or ureteral stones. No  bladder stones are demonstrated. Prostate gland is mildly enlarged.    Gallstones are present. No biliary dilatation. The appendix is normal.  No focal bowel wall thickening or dilation. The GI tract is unremarkable  except for scattered colonic diverticulosis and a small hiatal hernia.  No CT signs of diverticulitis.. No pathologically enlarged lymph nodes.  No fluid collections. No free intraperitoneal air. Abdominal aorta is  normal in course and caliber. No acute bony abnormality or suspicious  focal osseous lesion.    Impression  1. 4 mm obstructing stone distal left ureter 1 cm proximal to the left  UVJ. This causes mild to moderate obstructing uropathy.  2. Gallstones without biliary dilatation.  3. Other incidental findings are detailed above.    Electronically Signed By-Hector Ponce DO. On:7/26/2020 1:17 PM  This report was finalized on [id number] by  Hector Ponce DO..      Historical data copied forward from previous encounters  in EMR including the history, exam, and assessment/plan has been reviewed and is unchanged except as I have noted and otherwise indicated.      Objective:         /84   Pulse 70   Resp 18   Wt 103 kg (227 lb)   SpO2 96%   BMI 36.64 kg/m²     Physical Examination    General:  Well-developed, McGrath well-nourished, cooperative, no acute distress, appears stated age if not slightly older    Neuro:  A&O x3. No significantly obvious focal neuro deficet    Psych:  Pleasant  affect    HENT:  Normocephalic, atraumatic, moist mucous membranes , Normal ear placement,Throat not injected   Eyes:  PERRLA, Conjunctivae not injected, EOM's intact, (HFmEF) spectacles stating his vision, conjunctiva does not appear significantly injected   Neck:  Supple, Mildly thickened, no lymph adenopathy nor thyromegaly no JVD or bruit    Lungs:    Symmetrical rise & fall of chest with baseline respiratory pattern. To auscultation lungs are Clear bilaterally, faint late phase expiratory wheezes, no rhonchi or rales are noted, bases bilaterally   Chest wall:  No significant tenderness when palpated. PMI @ 6th ICS MAL    Heart:  Regular rate and rhythm, S1 and S2 normal, no S3 or S4, Gr I-II/VI systolic ejection murmur best heard at the apex , no obvious rub, click or gallop.    Abdomen:  non-distended, non-tender, bowel sounds noted in the 4 quadrants of the abdomen, moderate to significant adipose tissue identified    Extremities:  Equal color motion temperature and sensitivity, Rapid capillary refill noted within the nailbeds of fingers and toes bilaterally 2+ lower extremity edema.    Pulses:  2+ and symmetric all extremities, rapid capillary refill    Skin:  No obvious rashes, significant lesions identified other than multiple blisters noted in the pretibial region of the bilateral lower extremities with some mild reddish discoloration pretibially as well,, warm dry and of normal turgor      In-Office Procedure(s):  ECG 12 Lead  Dyspnea   Preliminary Result   HEART RATE= 62  bpm   RR Interval= 964  ms   OK Interval= 215  ms   P Horizontal Axis= 18  deg   P Front Axis= 16  deg   QRSD Interval= 102  ms   QT Interval= 430  ms   QTcB= 438  ms   QRS Axis= -44  deg   T Wave Axis= 148  deg   - ABNORMAL ECG -   Sinus rhythm   Ventricular premature complex   Borderline prolonged OK interval   Incomplete RBBB and LAFB   Probable anterior infarct, age indeterminate   Electronically Signed By:    Date and Time of Study: 2024-04-29 15:58:01           Lab Review:   Hospital Outpatient Visit on 04/29/2024   Component Date Value    Glucose 04/29/2024 101 (H)     BUN 04/29/2024 41 (H)     Creatinine 04/29/2024 1.32 (H)     Sodium 04/29/2024 140     Potassium 04/29/2024 4.9     Chloride 04/29/2024 106     CO2 04/29/2024 24.0     Calcium 04/29/2024 9.6     BUN/Creatinine Ratio 04/29/2024 31.1 (H)     Anion Gap 04/29/2024 10.0     eGFR 04/29/2024 58.0 (L)     Magnesium 04/29/2024 1.6     proBNP 04/29/2024 2,348.0 (H)     QT Interval 04/29/2024 430     QTC Interval 04/29/2024 438    No results displayed because visit has over 200 results.      Office Visit on 03/12/2024   Component Date Value    WBC 03/12/2024 5.3     RBC 03/12/2024 4.61     Hemoglobin 03/12/2024 11.9 (L)     Hematocrit 03/12/2024 37.3 (L)     MCV 03/12/2024 81     MCH 03/12/2024 25.8 (L)     MCHC 03/12/2024 31.9     RDW 03/12/2024 14.1     Platelets 03/12/2024 297     Neutrophil Rel % 03/12/2024 75     Lymphocyte Rel % 03/12/2024 14     Monocyte Rel % 03/12/2024 8     Eosinophil Rel % 03/12/2024 2     Basophil Rel % 03/12/2024 1     Neutrophils Absolute 03/12/2024 4.0     Lymphocytes Absolute 03/12/2024 0.7     Monocytes Absolute 03/12/2024 0.5     Eosinophils Absolute 03/12/2024 0.1     Basophils Absolute 03/12/2024 0.1     Immature Granulocyte Rel* 03/12/2024 0     Immature Grans Absolute 03/12/2024 0.0     Glucose 03/12/2024 81     BUN 03/12/2024 25     Creatinine 03/12/2024 0.94     EGFR  Result 03/12/2024 88     BUN/Creatinine Ratio 03/12/2024 27 (H)     Sodium 03/12/2024 139     Potassium 03/12/2024 4.4     Chloride 03/12/2024 102     Total CO2 03/12/2024 24     Calcium 03/12/2024 9.6     Total Protein 03/12/2024 6.7     Albumin 03/12/2024 4.0     Globulin 03/12/2024 2.7     A/G Ratio 03/12/2024 1.5     Total Bilirubin 03/12/2024 0.3     Alkaline Phosphatase 03/12/2024 39 (L)     AST (SGOT) 03/12/2024 17     ALT (SGPT) 03/12/2024 20     Total Cholesterol 03/12/2024 136     Triglycerides 03/12/2024 52     HDL Cholesterol 03/12/2024 51     VLDL Cholesterol Jasiel 03/12/2024 11     LDL Chol Calc (NIH) 03/12/2024 74     Chol/HDL Ratio 03/12/2024 2.7     PSA 03/12/2024 1.8     TSH 03/12/2024 2.290     Color 03/12/2024 Yellow     Clarity, UA 03/12/2024 Cloudy (A)     Glucose, UA 03/12/2024 Negative     Bilirubin 03/12/2024 Negative     Ketones, UA 03/12/2024 Trace (A)     Specific Gravity  03/12/2024 1.010     Blood, UA 03/12/2024 Small (A)     pH, Urine 03/12/2024 5.0     Protein, POC 03/12/2024 Negative     Urobilinogen, UA 03/12/2024 Normal     Leukocytes 03/12/2024 Negative     Nitrite, UA 03/12/2024 Negative     Creatinine, Urine 03/12/2024 111.0     Microalbumin, Urine 03/12/2024 22.7     Microalbumin/Creatinine * 03/12/2024 20     Hemoglobin A1C 03/12/2024 6.2 (A)     Lot Number 03/12/2024 [id number]     Expiration Date 03/12/2024 01/31/2026    Office Visit on 12/12/2023   Component Date Value    Hemoglobin A1C 12/12/2023 6.8 (A)     Lot Number 12/12/2023 [id number]     Expiration Date 12/12/2023 10/31/2025     Glucose 12/12/2023 127     Color 12/12/2023 Yellow     Clarity, UA 12/12/2023 Clear     Glucose, UA 12/12/2023 Negative     Bilirubin 12/12/2023 Negative     Ketones, UA 12/12/2023 Negative     Specific Gravity  12/12/2023 1.025     Blood, UA 12/12/2023 Trace (A)     pH, Urine 12/12/2023 5.0     Protein, POC 12/12/2023 1+ (A)     Urobilinogen, UA 12/12/2023 Normal     Leukocytes 12/12/2023 Trace  "(A)     Nitrite, UA 12/12/2023 Negative      Recent labs reviewed and interpreted for clinical significance and application    Went over each of the labs with the patient and his friend while they were still here in the clinic            It is a pleasure to be involved in this patient's cardiovascular care relating to their heart failure.  Please feel free to call me with any questions or concerns.    Nikolas \"Vignesh\" Nadeem CAMARA, [facility]  Heart failure program clinical provider    JAX Miller   04/26/24  .  "

## 2024-04-27 DIAGNOSIS — E11.9 TYPE 2 DIABETES MELLITUS WITHOUT COMPLICATION, WITHOUT LONG-TERM CURRENT USE OF INSULIN: ICD-10-CM

## 2024-04-27 LAB
QT INTERVAL: 446 MS
QTC INTERVAL: 434 MS

## 2024-04-29 ENCOUNTER — DISEASE STATE MANAGEMENT VISIT (OUTPATIENT)
Facility: HOSPITAL | Age: 69
End: 2024-04-29
Payer: MEDICARE

## 2024-04-29 ENCOUNTER — PREP FOR SURGERY (OUTPATIENT)
Dept: OTHER | Facility: HOSPITAL | Age: 69
End: 2024-04-29
Payer: MEDICARE

## 2024-04-29 ENCOUNTER — HOSPITAL ENCOUNTER (OUTPATIENT)
Facility: HOSPITAL | Age: 69
Discharge: HOME | End: 2024-04-29
Payer: MEDICARE

## 2024-04-29 ENCOUNTER — HOSPITAL ENCOUNTER (OUTPATIENT)
Dept: CARDIOLOGY | Facility: HOSPITAL | Age: 69
Discharge: HOME | End: 2024-04-29
Payer: MEDICARE

## 2024-04-29 ENCOUNTER — TELEPHONE (OUTPATIENT)
Dept: CARDIAC SURGERY | Facility: CLINIC | Age: 69
End: 2024-04-29
Payer: MEDICARE

## 2024-04-29 VITALS
SYSTOLIC BLOOD PRESSURE: 167 MMHG | OXYGEN SATURATION: 96 % | WEIGHT: 227 LBS | RESPIRATION RATE: 18 BRPM | BODY MASS INDEX: 36.64 KG/M2 | HEART RATE: 70 BPM | DIASTOLIC BLOOD PRESSURE: 84 MMHG

## 2024-04-29 DIAGNOSIS — R79.9 ABNORMAL FINDING OF BLOOD CHEMISTRY, UNSPECIFIED: ICD-10-CM

## 2024-04-29 DIAGNOSIS — I25.10 CAD, MULTIPLE VESSEL: Primary | Chronic | ICD-10-CM

## 2024-04-29 DIAGNOSIS — N18.31 STAGE 3A CHRONIC KIDNEY DISEASE: ICD-10-CM

## 2024-04-29 DIAGNOSIS — R79.1 ABNORMAL COAGULATION PROFILE: ICD-10-CM

## 2024-04-29 DIAGNOSIS — Z87.442 HISTORY OF KIDNEY STONES: ICD-10-CM

## 2024-04-29 DIAGNOSIS — G47.33 OBSTRUCTIVE SLEEP APNEA SYNDROME: ICD-10-CM

## 2024-04-29 DIAGNOSIS — J30.1 SEASONAL ALLERGIC RHINITIS DUE TO POLLEN: ICD-10-CM

## 2024-04-29 DIAGNOSIS — R60.0 BILATERAL LEG EDEMA: ICD-10-CM

## 2024-04-29 DIAGNOSIS — E78.2 MIXED HYPERLIPIDEMIA: ICD-10-CM

## 2024-04-29 DIAGNOSIS — R06.09 DOE (DYSPNEA ON EXERTION): ICD-10-CM

## 2024-04-29 DIAGNOSIS — E66.01 CLASS 2 SEVERE OBESITY DUE TO EXCESS CALORIES WITH SERIOUS COMORBIDITY AND BODY MASS INDEX (BMI) OF 35.0 TO 35.9 IN ADULT: ICD-10-CM

## 2024-04-29 DIAGNOSIS — F41.9 ANXIETY: ICD-10-CM

## 2024-04-29 DIAGNOSIS — E11.9 TYPE 2 DIABETES MELLITUS WITHOUT COMPLICATION, WITHOUT LONG-TERM CURRENT USE OF INSULIN: ICD-10-CM

## 2024-04-29 DIAGNOSIS — M19.042 PRIMARY OSTEOARTHRITIS OF LEFT HAND: ICD-10-CM

## 2024-04-29 DIAGNOSIS — E88.810 METABOLIC SYNDROME X: Chronic | ICD-10-CM

## 2024-04-29 PROBLEM — N18.30 CKD (CHRONIC KIDNEY DISEASE) STAGE 3, GFR 30-59 ML/MIN: Status: ACTIVE | Noted: 2024-04-29

## 2024-04-29 PROBLEM — N18.30 CKD (CHRONIC KIDNEY DISEASE) STAGE 3, GFR 30-59 ML/MIN: Chronic | Status: ACTIVE | Noted: 2024-04-29

## 2024-04-29 PROBLEM — N18.2 CKD (CHRONIC KIDNEY DISEASE) STAGE 2, GFR 60-89 ML/MIN: Chronic | Status: RESOLVED | Noted: 2024-04-26 | Resolved: 2024-04-29

## 2024-04-29 LAB
ANION GAP SERPL CALCULATED.3IONS-SCNC: 10 MMOL/L (ref 5–15)
BUN SERPL-MCNC: 41 MG/DL (ref 8–23)
BUN/CREAT SERPL: 31.1 (ref 7–25)
CALCIUM SPEC-SCNC: 9.6 MG/DL (ref 8.6–10.5)
CHLORIDE SERPL-SCNC: 106 MMOL/L (ref 98–107)
CO2 SERPL-SCNC: 24 MMOL/L (ref 22–29)
CREAT SERPL-MCNC: 1.32 MG/DL (ref 0.76–1.27)
EGFRCR SERPLBLD CKD-EPI 2021: 58 ML/MIN/1.73
GLUCOSE SERPL-MCNC: 101 MG/DL (ref 65–99)
MAGNESIUM SERPL-MCNC: 1.6 MG/DL (ref 1.6–2.4)
NT-PROBNP SERPL-MCNC: 2348 PG/ML (ref 0–900)
POTASSIUM SERPL-SCNC: 4.9 MMOL/L (ref 3.5–5.2)
QT INTERVAL: 430 MS
QTC INTERVAL: 438 MS
SODIUM SERPL-SCNC: 140 MMOL/L (ref 136–145)

## 2024-04-29 PROCEDURE — 93010 ELECTROCARDIOGRAM REPORT: CPT | Performed by: INTERNAL MEDICINE

## 2024-04-29 PROCEDURE — 96374 THER/PROPH/DIAG INJ IV PUSH: CPT

## 2024-04-29 PROCEDURE — 99214 OFFICE O/P EST MOD 30 MIN: CPT | Performed by: NURSE PRACTITIONER

## 2024-04-29 PROCEDURE — 93005 ELECTROCARDIOGRAM TRACING: CPT | Performed by: NURSE PRACTITIONER

## 2024-04-29 PROCEDURE — 83880 ASSAY OF NATRIURETIC PEPTIDE: CPT | Performed by: NURSE PRACTITIONER

## 2024-04-29 PROCEDURE — 80048 BASIC METABOLIC PNL TOTAL CA: CPT | Performed by: NURSE PRACTITIONER

## 2024-04-29 PROCEDURE — 25010000002 FUROSEMIDE PER 20 MG: Performed by: NURSE PRACTITIONER

## 2024-04-29 PROCEDURE — 83735 ASSAY OF MAGNESIUM: CPT | Performed by: NURSE PRACTITIONER

## 2024-04-29 RX ORDER — SODIUM CHLORIDE 9 MG/ML
40 INJECTION, SOLUTION INTRAVENOUS AS NEEDED
OUTPATIENT
Start: 2024-04-29

## 2024-04-29 RX ORDER — FUROSEMIDE INJECTION 80 MG/ 10 ML 8 MG/ML
80 INJECTION SUBCUTANEOUS
Qty: 8 EACH | Refills: 0 | Status: SHIPPED | OUTPATIENT
Start: 2024-04-29 | End: 2024-05-06

## 2024-04-29 RX ORDER — IBUPROFEN 600 MG/1
1 TABLET ORAL
OUTPATIENT
Start: 2024-04-29

## 2024-04-29 RX ORDER — CHLORHEXIDINE GLUCONATE 500 MG/1
CLOTH TOPICAL EVERY 12 HOURS PRN
OUTPATIENT
Start: 2024-04-29

## 2024-04-29 RX ORDER — CALCIUM CARBONATE/VITAMIN D3 500-10/5ML
1 LIQUID (ML) ORAL DAILY
Qty: 90 EACH | Refills: 1 | Status: SHIPPED | OUTPATIENT
Start: 2024-04-29

## 2024-04-29 RX ORDER — CHLORHEXIDINE GLUCONATE ORAL RINSE 1.2 MG/ML
15 SOLUTION DENTAL EVERY 12 HOURS
OUTPATIENT
Start: 2024-04-29 | End: 2024-04-30

## 2024-04-29 RX ORDER — NICOTINE POLACRILEX 4 MG
15 LOZENGE BUCCAL
OUTPATIENT
Start: 2024-04-29

## 2024-04-29 RX ORDER — ACETAMINOPHEN 325 MG/1
650 TABLET ORAL EVERY 4 HOURS PRN
OUTPATIENT
Start: 2024-04-29

## 2024-04-29 RX ORDER — ALPRAZOLAM 0.25 MG/1
0.25 TABLET ORAL ONCE
OUTPATIENT
Start: 2024-04-29 | End: 2024-04-29

## 2024-04-29 RX ORDER — DEXTROSE MONOHYDRATE 25 G/50ML
10-50 INJECTION, SOLUTION INTRAVENOUS
OUTPATIENT
Start: 2024-04-29

## 2024-04-29 RX ORDER — NITROGLYCERIN 0.4 MG/1
0.4 TABLET SUBLINGUAL
OUTPATIENT
Start: 2024-04-29

## 2024-04-29 RX ORDER — FUROSEMIDE 10 MG/ML
80 INJECTION INTRAMUSCULAR; INTRAVENOUS ONCE
Status: COMPLETED | OUTPATIENT
Start: 2024-04-29 | End: 2024-04-29

## 2024-04-29 RX ORDER — SODIUM CHLORIDE 0.9 % (FLUSH) 0.9 %
3-10 SYRINGE (ML) INJECTION AS NEEDED
OUTPATIENT
Start: 2024-04-29

## 2024-04-29 RX ORDER — DAPAGLIFLOZIN 10 MG/1
10 TABLET, FILM COATED ORAL DAILY
Qty: 90 TABLET | Refills: 3 | Status: SHIPPED | OUTPATIENT
Start: 2024-04-29

## 2024-04-29 RX ORDER — PIOGLITAZONEHYDROCHLORIDE 30 MG/1
30 TABLET ORAL DAILY
Qty: 90 TABLET | Refills: 3 | Status: SHIPPED | OUTPATIENT
Start: 2024-04-29 | End: 2024-04-30

## 2024-04-29 RX ORDER — SODIUM CHLORIDE 9 MG/ML
30 INJECTION, SOLUTION INTRAVENOUS CONTINUOUS PRN
OUTPATIENT
Start: 2024-04-29

## 2024-04-29 RX ORDER — SODIUM CHLORIDE 0.9 % (FLUSH) 0.9 %
30 SYRINGE (ML) INJECTION ONCE AS NEEDED
OUTPATIENT
Start: 2024-04-29

## 2024-04-29 RX ORDER — SODIUM CHLORIDE 0.9 % (FLUSH) 0.9 %
3 SYRINGE (ML) INJECTION EVERY 12 HOURS SCHEDULED
OUTPATIENT
Start: 2024-04-29

## 2024-04-29 RX ORDER — CHLORHEXIDINE GLUCONATE ORAL RINSE 1.2 MG/ML
15 SOLUTION DENTAL ONCE
OUTPATIENT
Start: 2024-04-29 | End: 2024-04-29

## 2024-04-29 RX ADMIN — FUROSEMIDE 80 MG: 10 INJECTION, SOLUTION INTRAMUSCULAR; INTRAVENOUS at 16:38

## 2024-04-29 NOTE — ADDENDUM NOTE
Encounter addended by: Berenice Cordoba RN on: 4/29/2024 5:08 PM   Actions taken: LDA properties accepted, Charge Capture section accepted

## 2024-04-29 NOTE — LETTER
April 29, 2024     Santiago Lorenzo MD  [facility] [place] [address]    Patient: Fransico Cuenca   YOB: 1955   Date of Visit: 4/29/2024       Dear Santiago Lorenzo MD:    Thank you for referring Fransico Cuenca to me for evaluation. Below are the relevant portions of my assessment and plan of care.    69-year-old  male patient now followed by Dr. Lorenzo w/ [facility] of COPD, + Fm Hx CAD, metabolic syndrome X all 4 components and iron deficiency anemia along with obstructive sleep apnea 4/18/24 presented to ED in [facility] with a c/o chest tightness and associated SOA. Patient had noticed he had limitation in activity ongoing for a few weeks and this has gradually worsened & noticed weight change and peripheral edema. Patient was ruled in for NSTEMI, placed on Heparin gtt, and was transferred to [facility] for Cardiology evaluation. LHC showed severe multivessel coronary artery disease. CXR was also concerning for Pneumonia and Pulmonology was also consulted. Respiratory cultures came back positive for human metapneumovirus.. Patient was diuresed but eventually developed EN. Patient was medically optimized prior to LHC w/ Dr. Lorenzo and on 4/23/2024 showed patient to have 20% LM,mLAD 80% & 95% pLAD stenosis D1 and D2 both being 80 and 90% stenosed at the ostium, LCx being about 90% stenosed in the midsegment with an RI with 60 to 70% proximal segment stenosis .RCA with 40% disease in the proximal PDA mild disease in the proximal segment noted. Cardiac surgery was consulted for evaluation of patient and recommendation regarding surgical intervention. He also was notedly diuresed while in the hospital and developed a subsequent EN. Ultimately was discharged home with no resolution to pending CABG. TTE was also performed which did show him to have preserved LV systolic function with an EF of around 51 to 55% without any diastolic dysfunction. This is still pending CABG he is coming to the  [facility] heart failure clinic for his initial visit on 29 April 2024 since recent hospitalization is noted he is a little better he thinks his legs are little bit smaller however still rather large and blistered. Unfortunately do not have time to send him to the lymphedema clinic. POLANCO etc. is not much change from when he left the hospital       Encounter Diagnosis and Orders:  Diagnoses and all orders for this visit:    1. ASCAD (Primary)    2. Stage 3a chronic kidney disease    3. Bilateral leg edema    4. Metabolic syndrome X    5. Primary osteoarthritis of left hand    6. Type 2 diabetes mellitus without complication, without long-term current use of insulin    7. POLANCO (dyspnea on exertion)  -     Basic Metabolic Panel; Future  -     Magnesium; Future  -     proBNP; Future  -     Basic Metabolic Panel; Standing  -     Basic Metabolic Panel  -     Magnesium; Standing  -     Magnesium  -     proBNP; Standing  -     proBNP    8. Anxiety    9. Obstructive sleep apnea syndrome    10. Class 2 severe obesity due to excess calories with serious comorbidity and body mass index (BMI) of 35.0 to 35.9 in adult    11. History of kidney stones    12. Mixed hyperlipidemia    13. Seasonal allergic rhinitis due to pollen    Other orders  -     ECG 12 Lead Dyspnea; Standing  -     ECG 12 Lead Dyspnea  -     Magnesium Oxide 400 MG capsule; Take 1 capsule by mouth Daily.  Dispense: 90 each; Refill: 1  -     furosemide (LASIX) injection 80 mg  -     Furosemide (Furoscix) 80 MG/10ML Cartridge Kit; Inject 10 mL under the skin into the appropriate area as directed Once When Specified for 1 dose. Do not take until discussed with provider  Dispense: 8 each; Refill: 0  -     dapagliflozin Propanediol (Farxiga) 10 MG tablet; Take 10 mg by mouth Daily.  Dispense: 90 tablet; Refill: 3        Beta Blocker: coreg 12.5 mg q 12h  ARNI/ACE/ARB: lisinopril 10mg QD  SGLT 2 inhibitors: Farxiga 10 daily  Diuretics: Bumex 1 mg daily  Furoscix: 8  kits for at home use ordered  Aldosterone Antagonist: none 2/2 CKD  Digitalis: n/a  Nitrates: currently none    Discussion     Already on heart failure core measures including beta-blocker, ACE I, SGLT2, diuretic,   even a nitrate  I will stop actos and start a SGLT2  Will add mag Ox 400 daily  Stopping Norvasc 2/2 leg edema  Will diuresis today with 80 mg lasix IV x 1  Ensure f/u w/ Pagni  Keep an eye on renal function as his EGFR today was only 58 making him a stage III CKD, of note his proBNP today went down from > 10,000 to  2,348 29 Apr 24  Glucose is under much better control  Fortunately not enough time to send him to the edema clinic in [facility] on seeing him weekly until his bypass 3 weeks approximately May 15  Would add typical heart failure nursing interventions including:                          A. Fluid restriction at less than 2000 cc daily                          B. Daily weights                          C.  1 g low-sodium diet      If you have questions, please do not hesitate to call me. I look forward to following Fransico along with you.         Sincerely,        JAX Miller     CC: No Recipients

## 2024-04-30 ENCOUNTER — TELEPHONE (OUTPATIENT)
Dept: PHARMACY | Facility: HOSPITAL | Age: 69
End: 2024-04-30
Payer: MEDICARE

## 2024-04-30 ENCOUNTER — TELEPHONE (OUTPATIENT)
Dept: CARDIAC SURGERY | Facility: CLINIC | Age: 69
End: 2024-04-30
Payer: MEDICARE

## 2024-04-30 NOTE — TELEPHONE ENCOUNTER
Spoke to patient. PAT will be scheduled by Serge, he will be notified of times. Surgery is scheduled for 5- at 0800, arrival time is 0500. He will stop his FISH OIL as of today. Expressed a verbal understanding of these instructions. Instructed to call back with any further questions.

## 2024-04-30 NOTE — PROGRESS NOTES
[facility] HEART FAILURE CLINIC - PHARMACY SERVICE    Patient Name: Fransico Cuenca  :1955  Age: 69 y.o.  Sex: male  Primary Cardiologist: unsure, following with Dr. Hirsch currently for upcoming CV surgery  Nephrology: Fior  PCP: Brad     HFpEF with EF 51-55% (Last Echo: ).    NYHA Class I  B      The patient's last EKG was reviewed from  and shows a QTcB of 438 ms.         -CHF-specific BB:      Pre Visit Dose: Carvedilol 12.5 mg PO BID    Post Visit Dose: Carvedilol 12.5 mg PO BID (HR 70 bpm, /8/4 mmHg)     - Target Dose: Carvedilol 25 mg PO BID (<85 kg) or 50 mg PO BID (>85 kg).     - Goal HR of 50s to 60s.     - Patient should be seen every 10 to 14 days for a pulse check with plans for up-titration until target heart rate is achieved.        -ACE/ARB/ARNI:     Pre Visit Dose: Lisinopril 10 mg PO daily    Post Visit Dose: Lisinopril 10 mg PO daily (/84 mmHg, SCr 1.32 mg/dL)    - Target Dose: Lisinopril 20-40 mg daily    - Patient should have a follow-up appointment every 2 to 4 weeks for a hemodynamic check with possible up-titration to target dose.         -SGLT2 inhibitor therapy:    Pre Visit Dose: None    Post Visit Dose: Dapagliflozin 10 mg PO daily    - Target Dose: Dapagliflozin 10 mg PO daily : CrCl > 20 mL/min which has shown benefit in patients with HF       -MRA:     Pre Visit Dose: None    Post Visit Dose: None    - Target Dose:  N/A    - K is < 5 mEq/L and SCr is </= 2.5 mg/dL in male and eGFR > 30 mL/min/1.73m3    -GFR 30 to 50 mL/min: Initial 12.5 mg daily or every other day, may double every 4 weeks if KCL remains < 5 mEq/L  -GFR < 30 mL/min: Use not recommended: HF clinical trials excluded if Scr > 2.5 mg/dL      -DIURETIC:     Pre Visit Dose: Bumetanide 1 mg PO daily     Post Visit Dose: Bumetanide 1 mg PO daily + Furoscix PRN      -MAGNESIUM:     Mag is not > 2 mg/dL (Mg = 1.6 mg/dL)    Pre Visit Dose: None    Post Visit Dose: Magnesium oxide 400 mg  daily    -If Magnesium 1.6-1.9 mg/dL, Initiate Magnesium 400 mg PO daily  -If Magnesium is less than 1.6 mg/dL, Initiate Magnesium 400 mg PO BID    -ANTICOAGULATION:     None       -OTHER CV MEDS:     Pre Visit Dose: ASA 81 mg PO daily, atorvastatin 10 mg PO daily, fenofibrate 160 mg PO daily, hydralazine 50 mg PO q12h, omega-3 2000 mg PO BID    Post Visit Dose: no changes      -Clinic Administered Medications:     Furosemide 80 mg IV x 1          Patient Assistance:      Test claims for dapagliflozin and empagliflozin ran which showed copays of $546.23 and $550.95 per month, respectively.    Patient assistance paperwork completed and will be faxed for [employer].     Sample of dapagliflozin 10 mg tablets #14 provided to patient.     Furoscix application will be submitted electronically.        PLAN:  Initiation/Discontinuation/Dose Adjustment:   A. Discontinue pioglitazone given this can worsen heart failure  B. Initiate dapagliflozin 10 mg PO daily and magnesium oxide 400 mg PO daily  Education provided: Dapagliflozin education sheet provided to patient  Coordination of Care: Contacted PCP via Epic secure chat to inform of change from pioglitazone to dapagliflozin  Refills: New prescriptions sent for dapagliflozin and magnesium oxide      Thank you for allowing me to participate in the care of your patient,    Izabela De Souza, Lila  [facility] Heart Failure Clinic  04/30/24  12:16 EDT

## 2024-04-30 NOTE — TELEPHONE ENCOUNTER
Patient seen in Heart Failure Clinic yesterday and pioglitazone was discontinued from medication list. Refill request completed by PCP yesterday which added pioglitazone back to medication list. Attempted to contact patient to reinforce that pioglitazone should be discontinued and replaced with dapagliflozin (not taken together) but patient did not answer. Left message requesting call back.    Also sent secure chat to PCP to inform of this change.

## 2024-05-02 NOTE — DISCHARGE SUMMARY
[facility] Medicine Services  Discharge Summary    Date of Service: 2024  Patient Name: Fransico Cuenca  : 1955  MRN: [id number]    Date of Admission: 2024  Discharge Diagnosis:   NSTEMI  Probable acute on chronic diastolic heart failure  COPD exacerbation with acute respiratory failure with hypoxia and viral respiratory infection  Hypertension  Dyslipidemia  DM type II, controlled  Acute kidney injury on CKD stage III  Date of Discharge: 2024  Primary Care Physician: Camryn Salinas MD      Presenting Problem:   NSTEMI (non-ST elevated myocardial infarction) [I21.4]    Active and Resolved Hospital Problems:  Active Hospital Problems    Diagnosis POA    **NSTEMI (non-ST elevated myocardial infarction) [I21.4] Yes      Resolved Hospital Problems   No resolved problems to display.         Hospital Course     HPI:  Patient is a 69-year-old male who presented to the hospital with complaints of shortness of breath.  Please see H&P for details.    Hospital Course:  The patient was admitted with what appeared to be an acute COPD exacerbation with respiratory failure with hypoxia.  He tested positive for human metapneumovirus infection.  He was started on steroids and bronchodilators but was requiring oxygen therapy during his hospital stay.  He also appeared to have acute on chronic diastolic heart failure, which likely developed as a result of his underlying respiratory and infectious process.  This led to elevated troponin consistent with NSTEMI.  He was started on heparin drip and seen by cardiology.  He underwent LHC on  which showed significant disease in the proximal and mid LAD as well as a proximal left circumflex artery and diagonal branches concerning for multivessel disease.  CT surgery recommended CABG, however given he has an underlying infectious process and respiratory process, they felt that he would benefit from discharge home and return to the hospital in 7 to  10 days to be admitted for CABG at that time.  The patient was agreeable to this plan.  He was weaned off of oxygen and is stable for discharge.        DISCHARGE Follow Up Recommendations for labs and diagnostics: Return to the hospital and follow-up with CT surgery in 7 to 10 days for planned CABG.      Reasons For Change In Medications and Indications for New Medications:      Day of Discharge     Vital Signs:       Physical Exam:  Physical Exam   General Appearance:  Alert, cooperative, no distress, appears stated age  Head:  Normocephalic, without obvious abnormality, atraumatic  Eyes:  PERRL, conjunctiva/corneas clear, EOM's intact, fundi benign, both eyes  Ears:  Normal TM's and external ear canals, both ears  Nose: Nares normal, septum midline, mucosa normal, no drainage or sinus tenderness  Throat: Lips, mucosa, and tongue normal; teeth and gums normal  Neck: Supple, symmetrical, trachea midline, no adenopathy, thyroid: not enlarged, symmetric, no tenderness/mass/nodules, no carotid bruit or JVD  Lungs:   Clear to auscultation bilaterally, respirations unlabored  Heart:  Regular rate and rhythm, S1, S2 normal, no murmur, rub or gallop  Abdomen:  Soft, non-tender, bowel sounds active all four quadrants,  no masses, no organomegaly  Extremities: Extremities normal, atraumatic, no cyanosis or edema  Pulses: 2+ and symmetric  Skin: Skin color, texture, turgor normal, no rashes or lesions  Neurologic: Normal        Pertinent  and/or Most Recent Results     LAB RESULTS:          Lab 04/29/24  1456   SODIUM 140   POTASSIUM 4.9   CHLORIDE 106   CO2 24.0   ANION GAP 10.0   BUN 41*   CREATININE 1.32*   EGFR 58.0*   GLUCOSE 101*   CALCIUM 9.6   MAGNESIUM 1.6             Lab 04/29/24  1456   PROBNP 2,348.0*                 Brief Urine Lab Results  (Last result in the past 365 days)        Color   Clarity   Blood   Leuk Est   Nitrite   Protein   CREAT   Urine HCG        04/20/24 1053             76.6         04/20/24  1053 Yellow   Hazy   Large (3+)   Trace   Negative   30 mg/dL (1+)                 Microbiology Results (last 10 days)       Procedure Component Value - Date/Time    MRSA Screen, PCR (Inpatient) - Swab, Nares [[id number]]  (Normal) Collected: 04/24/24 1140    Lab Status: Final result Specimen: Swab from Nares Updated: 04/24/24 1303     MRSA PCR No MRSA Detected    Narrative:      The negative predictive value of this diagnostic test is high and should only be used to consider de-escalating anti-MRSA therapy. A positive result may indicate colonization with MRSA and must be correlated clinically.            XR Chest 1 View    Result Date: 4/23/2024  Impression: Impression: Left lower lobe infiltrate, atelectasis versus pneumonia. Electronically Signed: Talita Bear MD  4/23/2024 8:36 AM EDT  Workstation ID: [id number]    US Renal Bilateral    Result Date: 4/19/2024  Impression: Impression: Unremarkable bilateral renal ultrasound. Electronically Signed: Sujatha Montoya MD  4/19/2024 7:23 AM EDT  Workstation ID: [id number]    Adult Transthoracic Echo Complete w/ Color, Spectral and Contrast if necessary per protocol    Addendum Date: 4/18/2024      Left ventricular systolic function is mildly decreased. Left ventricular ejection fraction appears to be 51 - 55%.   Left ventricular diastolic function was normal.   Estimated right ventricular systolic pressure from tricuspid regurgitation is normal (<35 mmHg).   Hypokinesis of distal septum and apex noted     XR Chest 1 View    Result Date: 4/18/2024  Impression: Impression: Lower lung volumes with increased airspace opacities in both lower lungs, which may be due to atelectasis, pulmonary edema, or less likely pneumonia. Electronically Signed: Lisa Lan  4/18/2024 1:49 PM EDT  Workstation ID: [id number]     Results for orders placed during the hospital encounter of 04/18/24    Duplex Carotid Ultrasound CAR    Interpretation Summary    Right internal carotid artery  demonstrates a less than 50% stenosis.    Normal left carotid duplex scan.      Results for orders placed during the hospital encounter of 04/18/24    Duplex Carotid Ultrasound CAR    Interpretation Summary    Right internal carotid artery demonstrates a less than 50% stenosis.    Normal left carotid duplex scan.      Results for orders placed during the hospital encounter of 04/18/24    Adult Transthoracic Echo Complete w/ Color, Spectral and Contrast if necessary per protocol    Interpretation Summary    Left ventricular systolic function is mildly decreased. Left ventricular ejection fraction appears to be 51 - 55%.    Left ventricular diastolic function was normal.    Estimated right ventricular systolic pressure from tricuspid regurgitation is normal (<35 mmHg).    Hypokinesis of distal septum and apex noted      Labs Pending at Discharge:      Procedures Performed  Procedure(s):  Left Heart Cath         Consults:   Consults       Date and Time Order Name Status Description    4/18/2024  1:43 PM Inpatient Pulmonology Consult Completed     4/18/2024  1:14 PM Inpatient Cardiology Consult Completed               Discharge Details        Discharge Medications        New Medications        Instructions Start Date   bumetanide 1 MG tablet  Commonly known as: BUMEX   1 mg, Oral, Daily      carvedilol 12.5 MG tablet  Commonly known as: COREG   12.5 mg, Oral, 2 Times Daily With Meals      hydrALAZINE 50 MG tablet  Commonly known as: APRESOLINE   50 mg, Oral, Every 12 Hours Scheduled      lisinopril 10 MG tablet  Commonly known as: PRINIVIL,ZESTRIL   10 mg, Oral, Daily             Continue These Medications        Instructions Start Date   aspirin 81 MG EC tablet   1 tablet, Oral, Daily      atorvastatin 10 MG tablet  Commonly known as: LIPITOR   Take 1 tablet by mouth once daily      citalopram 20 MG tablet  Commonly known as: CeleXA   Take 1 tablet by mouth once daily      doxazosin 1 MG tablet  Commonly known as:  CARDURA   Take 1 tablet by mouth once daily      fenofibrate 160 MG tablet   Take 1 tablet by mouth once daily      Fish Oil 1000 MG capsule delayed-release   2 capsules, Oral, 2 Times Daily      metFORMIN 1000 MG tablet  Commonly known as: GLUCOPHAGE   1,000 mg, Oral, 2 Times Daily With Meals      montelukast 10 MG tablet  Commonly known as: SINGULAIR   10 mg, Oral, Daily      multivitamin with minerals tablet tablet   1 tablet, Oral, Daily      omeprazole 40 MG capsule  Commonly known as: priLOSEC   40 mg, Oral, Daily             Stop These Medications      lisinopril-hydrochlorothiazide 20-12.5 MG per tablet  Commonly known as: PRINZIDE,ZESTORETIC              No Known Allergies      Discharge Disposition:     Home or Self Care    Diet:  Hospital:No active diet order        Discharge Activity:         CODE STATUS:  Code Status and Medical Interventions:   Ordered at: 04/18/24 1356     Level Of Support Discussed With:    Patient     Code Status (Patient has no pulse and is not breathing):    CPR (Attempt to Resuscitate)     Medical Interventions (Patient has pulse or is breathing):    Full Support         Future Appointments   Date Time Provider Department Center   5/6/2024  9:00 AM Nikolas Silver APRN  [facility] None   5/13/2024  1:00 PM Nikolas Silver APRN  [facility] None   5/14/2024  7:55 AM C19 PRE SCREEN [facility]  [facility]   5/14/2024  8:00 AM HEART 1 [facility]   6/26/2024  9:00 AM Camryn Salinas MD [facility]       Additional Instructions for the Follow-ups that You Need to Schedule       Basic Metabolic Panel    May 01, 2024 (Approximate)      Release to patient: Routine Release        Ambulatory Referral to Cardiac Rehab   As directed      Discharge Follow-up with Specified Provider: Follow up with cardiothoracic surgery for planned CABG in 1 week; 1 Week   As directed      To: Follow up with cardiothoracic surgery for planned CABG in 1 week   Follow Up: 1 Week                Time  spent on Discharge including face to face service:  >30 minutes    Signature: Electronically signed by Aristeo German MD, 05/02/24, 12:22 EDT.  [facility]

## 2024-05-03 PROBLEM — R06.09 DOE (DYSPNEA ON EXERTION): Chronic | Status: ACTIVE | Noted: 2024-05-03

## 2024-05-06 ENCOUNTER — HOSPITAL ENCOUNTER (OUTPATIENT)
Facility: HOSPITAL | Age: 69
Discharge: HOME | End: 2024-05-06
Admitting: NURSE PRACTITIONER
Payer: MEDICARE

## 2024-05-06 ENCOUNTER — DISEASE STATE MANAGEMENT VISIT (OUTPATIENT)
Facility: HOSPITAL | Age: 69
End: 2024-05-06
Payer: MEDICARE

## 2024-05-06 VITALS
SYSTOLIC BLOOD PRESSURE: 135 MMHG | OXYGEN SATURATION: 95 % | HEART RATE: 67 BPM | WEIGHT: 219 LBS | BODY MASS INDEX: 35.35 KG/M2 | RESPIRATION RATE: 16 BRPM | DIASTOLIC BLOOD PRESSURE: 73 MMHG

## 2024-05-06 DIAGNOSIS — R60.0 BILATERAL LEG EDEMA: ICD-10-CM

## 2024-05-06 DIAGNOSIS — F41.9 ANXIETY: ICD-10-CM

## 2024-05-06 DIAGNOSIS — E11.9 TYPE 2 DIABETES MELLITUS WITHOUT COMPLICATION, WITHOUT LONG-TERM CURRENT USE OF INSULIN: ICD-10-CM

## 2024-05-06 DIAGNOSIS — R06.09 DOE (DYSPNEA ON EXERTION): Chronic | ICD-10-CM

## 2024-05-06 DIAGNOSIS — I25.10 CAD, MULTIPLE VESSEL: Primary | Chronic | ICD-10-CM

## 2024-05-06 DIAGNOSIS — N18.31 STAGE 3A CHRONIC KIDNEY DISEASE: Chronic | ICD-10-CM

## 2024-05-06 DIAGNOSIS — M19.042 PRIMARY OSTEOARTHRITIS OF LEFT HAND: ICD-10-CM

## 2024-05-06 DIAGNOSIS — E88.810 METABOLIC SYNDROME X: Chronic | ICD-10-CM

## 2024-05-06 DIAGNOSIS — G47.33 OBSTRUCTIVE SLEEP APNEA SYNDROME: ICD-10-CM

## 2024-05-06 LAB
ANION GAP SERPL CALCULATED.3IONS-SCNC: 10 MMOL/L (ref 5–15)
BUN SERPL-MCNC: 25 MG/DL (ref 8–23)
BUN/CREAT SERPL: 18.9 (ref 7–25)
CALCIUM SPEC-SCNC: 8.8 MG/DL (ref 8.6–10.5)
CHLORIDE SERPL-SCNC: 105 MMOL/L (ref 98–107)
CO2 SERPL-SCNC: 25 MMOL/L (ref 22–29)
CREAT SERPL-MCNC: 1.32 MG/DL (ref 0.76–1.27)
EGFRCR SERPLBLD CKD-EPI 2021: 58 ML/MIN/1.73
GLUCOSE SERPL-MCNC: 95 MG/DL (ref 65–99)
NT-PROBNP SERPL-MCNC: 4072 PG/ML (ref 0–900)
POTASSIUM SERPL-SCNC: 3.9 MMOL/L (ref 3.5–5.2)
SODIUM SERPL-SCNC: 140 MMOL/L (ref 136–145)

## 2024-05-06 PROCEDURE — 83880 ASSAY OF NATRIURETIC PEPTIDE: CPT | Performed by: NURSE PRACTITIONER

## 2024-05-06 PROCEDURE — A9270 NON-COVERED ITEM OR SERVICE: HCPCS | Performed by: NURSE PRACTITIONER

## 2024-05-06 PROCEDURE — 25010000002 FUROSEMIDE PER 20 MG: Performed by: NURSE PRACTITIONER

## 2024-05-06 PROCEDURE — 63710000001 POTASSIUM CHLORIDE 20 MEQ TABLET CONTROLLED-RELEASE: Performed by: NURSE PRACTITIONER

## 2024-05-06 PROCEDURE — 96374 THER/PROPH/DIAG INJ IV PUSH: CPT

## 2024-05-06 PROCEDURE — 80048 BASIC METABOLIC PNL TOTAL CA: CPT | Performed by: NURSE PRACTITIONER

## 2024-05-06 PROCEDURE — 63710000001 POTASSIUM CHLORIDE 10 MEQ TABLET CONTROLLED-RELEASE: Performed by: NURSE PRACTITIONER

## 2024-05-06 PROCEDURE — G2211 COMPLEX E/M VISIT ADD ON: HCPCS | Performed by: NURSE PRACTITIONER

## 2024-05-06 PROCEDURE — 99214 OFFICE O/P EST MOD 30 MIN: CPT | Performed by: NURSE PRACTITIONER

## 2024-05-06 RX ORDER — FUROSEMIDE 10 MG/ML
80 INJECTION INTRAMUSCULAR; INTRAVENOUS ONCE
Status: COMPLETED | OUTPATIENT
Start: 2024-05-06 | End: 2024-05-06

## 2024-05-06 RX ORDER — BUMETANIDE 1 MG/1
1 TABLET ORAL 2 TIMES DAILY
Qty: 90 TABLET | Refills: 0 | Status: SHIPPED | OUTPATIENT
Start: 2024-05-06

## 2024-05-06 RX ORDER — POTASSIUM CHLORIDE 20 MEQ/1
20 TABLET, EXTENDED RELEASE ORAL DAILY
Qty: 90 TABLET | Refills: 2 | Status: SHIPPED | OUTPATIENT
Start: 2024-05-06

## 2024-05-06 RX ADMIN — POTASSIUM CHLORIDE 30 MEQ: 1500 TABLET, EXTENDED RELEASE ORAL at 09:51

## 2024-05-06 RX ADMIN — FUROSEMIDE 80 MG: 10 INJECTION, SOLUTION INTRAMUSCULAR; INTRAVENOUS at 09:51

## 2024-05-06 NOTE — PROGRESS NOTES
APRN initially wanted application for Furoscix submitted for patient however patient does not have a qualifying indication for this. No application submitted.

## 2024-05-10 NOTE — PROGRESS NOTES
"Cardiology Heart Failure Clinic Note  Nikolas \"Vignesh\" Nadeem CAMARA [facility]    Patient ID: Fransico Cuenca  is a 69 y.o. male.    Encounter Date:05/13/2024    HPI:  69-year-old  male patient now followed by Dr. Lorenzo w/ [facility] of COPD, + Fm Hx CAD, metabolic syndrome X all 4 components and iron deficiency anemia along with obstructive sleep apnea 4/18/24 presented to ED in [facility] with a c/o chest tightness and associated SOA. Patient had noticed he had limitation in activity ongoing for a few weeks and this has gradually worsened & noticed weight change and peripheral edema. Patient was ruled in for NSTEMI, placed on Heparin gtt, and was transferred to [facility] for Cardiology evaluation. LHC showed  severe multivessel coronary artery disease. CXR was also concerning for Pneumonia and Pulmonology was also consulted. Respiratory cultures came back positive for human metapneumovirus.. Patient was diuresed but eventually developed EN. Patient was medically optimized prior to LHC w/ Dr. Lorenzo and on 4/23/2024 showed patient to have 20% LM,mLAD 80%  & 95% pLAD stenosis D1 and D2 both being 80 and 90% stenosed at the ostium, LCx being about 90% stenosed in the midsegment with an RI with 60 to 70% proximal segment stenosis .RCA with 40% disease in the proximal PDA mild disease in the proximal segment noted. Cardiac surgery was consulted for evaluation of patient and recommendation regarding surgical intervention. He also was notedly diuresed while in the hospital and developed a subsequent EN.  Ultimately was discharged home with no resolution to pending CABG.  TTE was also performed which did show him to have preserved LV systolic function with an EF of around 51 to 55% without any diastolic dysfunction.  This is still pending CABG he is coming to the [facility] heart failure clinic for his initial visit on 29 April 2024 since recent hospitalization is noted he is a little better he thinks his legs are " little bit smaller however still rather large and blistered.  Unfortunately do not have time to send him to the lymphedema clinic.  POLANCO etc. there has not been much change from when he left the hospital. But was found to be volume overloaded & required IV diuresis. Back 9 May to attempt to get to euvolemic state pre CABG. Since last visit has lost about 8 lbs. he feels overall quite a bit better he is even able to go to [facility] now.  He is Which is set now for the 15 May 24 next Wednesday, he still has anxiety regarding that pending procedure comes in 5/13/24 in pre to attain euvolemia pre CABG. Otherwise no issues       Assessment:   Diagnoses and all orders for this visit:    1. Stage 3a chronic kidney disease (Primary)    2. Bilateral leg edema  Overview:  Small blistering pretibially      3. ASCAD  Overview:  A. Two-vessel LHC 4/23/2024                  I.  20% LM                 Ii.  95% pLAD with 80%mLAD                III.  90% LCx                            1. 60 to 70% proximal ramus  B.  Being worked up for CABG by CV surgery  C. Preserved EF 51 to 55% w/o DD      4. Metabolic syndrome X  Overview:  A.-Benign essential hypertension  B.  Mixed dyslipidemia  C.  Obesity with a BMI of 36.62 kg/m²          I, ZHANG  D.  T2DM         I.  Peripheral neuropathy       5. POLANCO (dyspnea on exertion)  -     Basic Metabolic Panel; Future  -     proBNP; Future  -     Basic Metabolic Panel; Standing  -     Basic Metabolic Panel  -     proBNP; Standing  -     proBNP    6. Obstructive sleep apnea syndrome  Overview:  Improved on CPAP he is compliant      7. Primary osteoarthritis of left hand  Overview:  Exacerbation after trauma, ice and anti inflammatories compression.       8. Type 2 diabetes mellitus without complication, without long-term current use of insulin  Overview:  A1c 6.2 03/12/2024 improved with lifestyle changes.   A1c 6.8 12.12.2023 worse less exercise.   A1c 6.1 09/11/2023 improved.   A1c 6.3 05/26/2026  stable he reports BS of 150  A1c 6.3 02/24/2023 slightly worse. But controlled.   A1c 5.9 11/08/2022 improved  A1c 7.8 05/03/2022 marginal control Actos begin for insurance  A1c 7.1 01/24/2022 [id number] 7.5  07/13/2021   A1c 7.5 12/21/2020 stable,    A1c 6.2 08/20/2019,   New onset 04/29/2019, A1c 7.2          Other orders  -     furosemide (LASIX) injection 60 mg  -     potassium chloride (KLOR-CON M10) CR tablet 10 mEq        Plan/discussion    Volume overload    Heart Failure Core Measures    Type of Overload :  unsure     Most recent EF: (TTE 4/18/24)  EF 51-55 w/o any DD     [place] Heart association Class & Stage : 1b     HF Meds     Beta Blocker: coreg 12.5 mg q 12h  ARNI/ACE/ARB: lisinopril 10mg QD  SGLT 2 inhibitors: Farxiga 10 daily held 5/12/24  Diuretics: Bumex 2 mg am & 1mg PM increased to twice daily on 6 May 2024 by renal  Furoscix: 8 kits for at home use ordered were unaffordable as he didn't have HF & couldn't qualify  Aldosterone Antagonist: none 2/2 CKD  Digitalis: n/a  Nitrates: currently none     * additionally on Mag Ox        Cardiac medicines reviewed with risk, benefits, and necessity of each discussed with myself & a [facility].      _________________________________________________________     Discussion     Already on heart failure core measures including beta-blocker, ACE I, SGLT2, diuretic,   even a nitrate   Stopped Norvasc 2/2 leg edema  4/29/24  He did require diuresis 4/29/24 with 80 mg lasix IV x 1   Again 5/13/24 Bumex 1mg   Keep an eye on renal function as his EGFR 4/29/24 was only 58 making him a stage III CKD, On f/u visit eGFR was 58  A. Cr @ 1.3 w/ eGFR @ 60.8    proBNP 4/29/24 went down from > 10,000 to  2,348  by f/u 5/3/24  was 4072  Going ahead and increasing his diuretic to twice daily briefly since he is pending CABG  He was additionally mildly volume overloaded however his legs did show's some improvement ProBNP @ 3,341 5/13/24   Given 60 mg Lasix IV & 10 meq K-dur PO in  clinic 5/13/24  Glucose remains under much better control  No current need for heart failure to continue to follow we are happy to see again if it is clinically needed.  Continue typical volume overload nursing interventions including:                          A. Fluid restriction at less than 2000 cc daily                          B. Daily weights                          C.  1 g low-sodium diet        I did the following activities preparing for the visit with Fransico Cuenca  including: reviewing tests, once pt arrived in clinic I also performed a medically appropriate examination and/or evaluation, I personally spent considerable time counseling and educating the patient/family/caregiver, ordering medications, tests, or procedures, referring and communicating with other health care professionals, and documenting information in the medical record. I estimate including preparation 20 minutes              Subjective:     Chief Complaint   Patient presents with    Congestive Heart Failure       ROS:    Constitutional: + weakness, + fatigue have continued to improve, No fever, rigors, chills   Eyes: No recent vision changes, eye pain   ENT/oropharynx: No recent difficulty swallowing, sore throat, epistaxis, changes in hearing   Cardiovascular: No recent chest pain, chest tightness, palpitations except as noted in HPI, paroxysmal nocturnal dyspnea, orthopnea, diaphoresis, dizziness & no pre or soraya syncopal episodes   Respiratory: Not much shortness of breath noted at rest, + dyspnea on exertion still, but continues to improve, no significant productive cough, no wheezing and no hemoptysis   Gastrointestinal: No abdominal pain, nausea, vomiting, diarrhea, bloody stools   Genitourinary: No hematuria, dysuria other than increased frequency   Neurological: No headache, tremors, numbness,  or hemiparesis    Musculoskeletal: No change in typical cramps, myalgias, no new joint pain, joint swelling   Integument: No recent  rash, + edema with his blisters having popped on the lower extremities which show continued improvement       Patient Active Problem List   Diagnosis    Anxiety    Hypertension, benign    Mixed hyperlipidemia    Allergic rhinitis due to pollen    Type 2 diabetes mellitus without complication, without long-term current use of insulin    Encounter for prostate cancer screening    Screening for colon cancer    Family history of ischemic heart disease    Class 2 severe obesity due to excess calories with serious comorbidity and body mass index (BMI) of 35.0 to 35.9 in adult    Obstructive sleep apnea syndrome    Medicare annual wellness visit, subsequent    History of kidney stones    Primary osteoarthritis of left hand    Iron deficiency anemia due to chronic blood loss    NSTEMI (non-ST elevated myocardial infarction)    ASCAD    Metabolic syndrome X    CKD (chronic kidney disease) stage 3a, GFR 30-59 ml/min    Bilateral leg edema    POLANCO (dyspnea on exertion)       Past Medical History:   Diagnosis Date    Anxiety     Essential hypertension, benign     Gastroesophageal reflux disease without esophagitis     Kidney stones 05/31/2020    Dr. Kahlil Terrell    Mixed hyperlipidemia     Rotator cuff syndrome     Torn rotator about seven years ago    Seasonal allergic rhinitis due to pollen     Type 2 diabetes mellitus without complication, without long-term current use of insulin        Past Surgical History:   Procedure Laterality Date    CARDIAC CATHETERIZATION N/A 4/23/2024    Procedure: Left Heart Cath;  Surgeon: Santiago Lorenzo MD;  Location: [facility] CATH INVASIVE LOCATION;  Service: Cardiovascular;  Laterality: N/A;    CATARACT EXTRACTION Left 08/24/2023    Dr Melendez    CATARACT EXTRACTION Right 09/07/2023    Dr Melendez    CYSTOSCOPY W/ LASER LITHOTRIPSY  01/2021    INGUINAL HERNIA REPAIR Left 10/2009    NOSE SURGERY      x2    UMBILICAL HERNIA REPAIR  10/2009    Dr. Napier       Social History     Socioeconomic History     Marital status: Single   Tobacco Use    Smoking status: Never     Passive exposure: Never    Smokeless tobacco: Never    Tobacco comments:     Family members smoked   Vaping Use    Vaping status: Never Used   Substance and Sexual Activity    Alcohol use: Never    Drug use: Never    Sexual activity: Not Currently       No Known Allergies      Current Outpatient Medications:     aspirin (aspirin) 81 MG EC tablet, Take 1 tablet by mouth Daily., Disp: , Rfl:     atorvastatin (LIPITOR) 10 MG tablet, Take 1 tablet by mouth once daily, Disp: 90 tablet, Rfl: 3    bumetanide (BUMEX) 1 MG tablet, Take 1 tablet by mouth 2 (Two) Times a Day. (Patient taking differently: Take 2 mg by mouth in the morning and 1 mg by mouth in the early evening.), Disp: 90 tablet, Rfl: 0    carvedilol (COREG) 12.5 MG tablet, Take 1 tablet by mouth 2 (Two) Times a Day With Meals., Disp: 60 tablet, Rfl: 3    citalopram (CeleXA) 20 MG tablet, Take 1 tablet by mouth once daily, Disp: 90 tablet, Rfl: 3    doxazosin (CARDURA) 1 MG tablet, Take 1 tablet by mouth once daily, Disp: 90 tablet, Rfl: 3    fenofibrate 160 MG tablet, Take 1 tablet by mouth once daily, Disp: 90 tablet, Rfl: 0    hydrALAZINE (APRESOLINE) 50 MG tablet, Take 1 tablet by mouth Every 12 (Twelve) Hours., Disp: 60 tablet, Rfl: 3    lisinopril (PRINIVIL,ZESTRIL) 10 MG tablet, Take 1 tablet by mouth Daily., Disp: 90 tablet, Rfl: 0    Magnesium Oxide 400 MG capsule, Take 1 capsule by mouth Daily., Disp: 90 each, Rfl: 1    metFORMIN (GLUCOPHAGE) 1000 MG tablet, TAKE 1 TABLET BY MOUTH TWICE DAILY WITH MEALS, Disp: 60 tablet, Rfl: 12    montelukast (SINGULAIR) 10 MG tablet, Take 1 tablet by mouth Daily., Disp: 90 tablet, Rfl: 3    Multiple Vitamins-Minerals (MULTIVITAMIN ADULT PO), Take 1 tablet by mouth Daily., Disp: , Rfl:     omeprazole (priLOSEC) 40 MG capsule, Take 1 capsule by mouth Daily., Disp: 30 capsule, Rfl: 12    potassium chloride (KLOR-CON M20) 20 MEQ CR tablet, Take 1 tablet  by mouth Daily., Disp: 90 tablet, Rfl: 2    dapagliflozin Propanediol (Farxiga) 10 MG tablet, Take 10 mg by mouth Daily. (Patient not taking: Reported on 5/13/2024), Disp: 90 tablet, Rfl: 3    mupirocin (BACTROBAN) 2 % ointment, Apply to nares (inside each nostril) twice daily as directed for procedure., Disp: 22 g, Rfl: 0  No current facility-administered medications for this encounter.    Facility-Administered Medications Ordered in Other Encounters:     chlorhexidine (PERIDEX) 0.12 % solution 15 mL, 15 mL, Mouth/Throat, Q12H, Satterly-Dg, Suzy L, APRN    Chlorhexidine Gluconate Cloth 2 % pads, , Topical, Q12H PRN, Satterly-Dg, Suzy L, APRN    mupirocin (BACTROBAN) 2 % nasal ointment, , Each Nare, Q12H, Satterly-Dg, Suzy L, APRN    Immunization History   Administered Date(s) Administered    COVID-19 (MODERNA) 1st,2nd,3rd Dose Monovalent 03/23/2021, 04/29/2021, 10/28/2021    DTaP 08/03/2020    FLUAD TRI 65YR+ 10/08/2019    Flu Vaccine Quad PF 6-35MO 10/06/2015    Fluad Quad 65+ 09/24/2020    Fluzone High-Dose 65+yrs 10/22/2021, 11/08/2022, 09/11/2023    Fluzone Quad >6mos (Multi-dose) 11/01/2012, 10/08/2019    Hepatitis A 05/09/2018, 11/09/2018    Influenza Quad Vaccine (Inpatient) 11/01/2012    Influenza TIV (IM) 10/25/2007, 10/14/2014, 10/06/2015, 12/20/2016    Pneumococcal Conjugate 20-Valent (PCV20) 11/08/2022    Pneumococcal Polysaccharide (PPSV23) 05/19/2014, 04/30/2019    Shingrix 07/18/2020, 09/16/2020    Tdap 06/25/2007, 08/03/2020         Most recent EKG as reviewed:  Procedures     Most recent echo as reviewed:  Results for orders placed during the hospital encounter of 04/18/24    Adult Transthoracic Echo Complete w/ Color, Spectral and Contrast if necessary per protocol    Interpretation Summary    Left ventricular systolic function is mildly decreased. Left ventricular ejection fraction appears to be 51 - 55%.    Left ventricular diastolic function was normal.    Estimated  right ventricular systolic pressure from tricuspid regurgitation is normal (<35 mmHg).    Hypokinesis of distal septum and apex noted      Most recent stress test results:      Most recent cardiac catheterization results:  Results for orders placed during the hospital encounter of 04/18/24    Cardiac Catheterization/Vascular Study    Conclusion  CARDIAC CATHETERIZATION REPORT      DATE OF PROCEDURE:  4/23/2024    INDICATION FOR PROCEDURE:    Non-ST elevation myocardial infarction  CAD  Congestive heart failure acute diastolic    PROCEDURE PERFORMED:    Ultrasound-guided access of femoral artery  Left heart catheterization  coronary angiography  left ventriculography    PROCEDURE COMMENTS:    After informed consent was obtained, the patient was prepped and draped in the usual sterile manner.  Mild to moderate sedation was administered.  Right femoral artery was accessed without difficulty under fluoroscopy and ultrasound guidance and 6 [place]an arterial sheath was inserted.  Sheath was flushed with heparinized saline.    Using 6 [place]an Sudarshan catheters, first left coronary artery and the right coronary was electively engaged and appropriate views were taken.  A 6 [place]an JR4 catheter was used to cross aortic valve and left heart catheterization was performed.  Left ventriculography was done in a PATTERSON view    Patient tolerated the procedure well.    FINDINGS:    1. HEMODYNAMICS:    Aortic pressure: 170/101 mmHg    LVEDP: 0 to 5 mmHg    Gradient across aortic valve on pullback: No gradient across aortic valve on pullback      2. LEFT VENTRICULOGRAPHY: 55%      3. CORONARY ANGIOGRAPHY:    A: Left main coronary artery: Short and has 20% stenosis in distal segment    B: Left anterior descending artery: 95% stenosis in proximal segment of LAD and 80% stenosis of mid LAD.  D1 and D2 branches of LAD which is small to medium sized vessels had 80% to 90% stenosis at the ostium    C: Left circumflex coronary artery: LCx has 90%  stenosis in the midsegment and ramus intermedius has 60 to 70% stenosis in the proximal segment    D: Right coronary artery: RCA has diffuse disease up to 40% in proximal PDA.  Mild disease in the proximal segment noted    SUMMARY:    Two-vessel coronary artery disease  Preserved left ventricular function    RECOMMENDATIONS:    Recommend CABG        Historical data copied forward from previous encounters in EMR including the history, exam, and assessment/plan has been reviewed and is unchanged except as I have noted and otherwise indicated.      Objective:         /77   Pulse 65   Resp 16   Wt 96.8 kg (213 lb 6.4 oz)   SpO2 95%   BMI 34.44 kg/m²     General:  Well-developed, [place] well-nourished, cooperative, no acute distress, appears stated age if not slightly older    Neuro:  A&O x3. No significantly obvious focal neuro deficet    Psych:  Pleasant  affect    HENT:  Normocephalic, atraumatic, moist mucous membranes , Normal ear placement,Throat not injected   Eyes:  PERRLA, Conjunctivae not injected, EOM's intact, (HFmEF) wears spectacles to aid in his vision, conjunctiva does not appear significantly injected   Neck:  Supple, Mildly thickened, no lymph adenopathy nor thyromegaly no JVD or bruit    Lungs:    Symmetrical rise & fall of chest with baseline respiratory pattern. To auscultation lungs are Clear bilaterally, faint late phase expiratory wheezes, no rhonchi or rales are noted, bases bilaterally at most mildly diminished   Chest wall:  No significant tenderness when palpated. PMI @ 5th ICS MCL somewhat difficult to palpate given body habitus   Heart:  Regular rate and rhythm, S1 and S2 normal, no S3 or S4, Gr I-II/VI systolic ejection murmur best heard at the apex , no obvious rub, click or gallop.    Abdomen:  non-distended, non-tender, bowel sounds noted in the 4 quadrants of the abdomen, moderate to significant midline adipose tissue identified    Extremities:  Equal color motion temperature  and sensitivity, Rapid capillary refill noted within the nailbeds of fingers and toes bilaterally 2+ lower extremity pitting edema.    Pulses:  2+ and symmetric all extremities, rapid capillary refill    Skin:  No obvious rashes, significant lesions identified other than multiple ruptured blisters noted in the pretibial region of the bilateral lower extremities with some mild reddish discoloration is much lighter today pre-tibially as well,, warm dry and of normal turgor      In-Office Procedure(s):  No orders to display        Imaging:    Results for orders placed during the hospital encounter of 04/18/24    XR Chest 1 View    Narrative  XR CHEST 1 VW    Date of Exam: 4/23/2024 8:25 AM EDT    Indication: hypoxia    Comparison: 4/18/2024.    Findings: There is stable mild cardiomegaly. There is infiltrate in the left lung base. The right lung is clear. There are no definite effusions.    Impression  Impression:  Left lower lobe infiltrate, atelectasis versus pneumonia.      Electronically Signed: Talita Bear MD  4/23/2024 8:36 AM EDT  Workstation ID: [id number]       Results for orders placed during the hospital encounter of 04/18/24    US Renal Bilateral    Narrative  US RENAL BILATERAL    Date of Exam: 4/18/2024 10:12 PM EDT    Indication: Kidney failure, acute  EN.    Comparison: CT abdomen and pelvis 7/26/2020    Technique: Grayscale and color Doppler ultrasound evaluation of the kidneys and urinary bladder was performed.      Findings:  RIGHT kidney measures 12.2 cm.  Kidney echogenicity, size, and vascularity appear within normal limits. There is no solid kidney mass.  No echogenic shadowing stone.  No hydronephrosis.    LEFT kidney measures 12.6 cm. Kidney echogenicity, size, and vascularity appear within normal limits. There is no solid kidney mass.  No echogenic shadowing stone.  No hydronephrosis.    Limited visualization of the urinary bladder is unremarkable. Total volume of 44  mL.    Impression  Impression:  Unremarkable bilateral renal ultrasound.        Electronically Signed: Sujatha Montoya MD  4/19/2024 7:23 AM EDT  Workstation ID: [id number]      Results for orders placed during the hospital encounter of 07/26/20    CT Abdomen Pelvis Without Contrast    Narrative  DATE OF EXAM:  7/26/2020 12:28 PM    PROCEDURE:  CT ABDOMEN PELVIS WO CONTRAST-    INDICATIONS:  flank pain, abdominal pain    COMPARISON:  No Comparisons Available    TECHNIQUE:  Routine transaxial slices were obtained through the abdomen and pelvis  without the administration of intravenous contrast. Reconstructed  coronal and sagittal images were also obtained. Automated exposure  control and iterative construction methods were used.    FINDINGS:  The lung bases are free of active disease.  The liver is diffusely fatty infiltrated. The hepatic segment 8, there  is an incidental 8 mm area of hyperdensity may represent an incidental  hemangioma. The unenhanced spleen, pancreas, adrenal glands, and right  kidney are unremarkable. There is moderate left hydroureteronephrosis  and perinephric fat stranding due to a 4 mm stone in the distal left  ureter 1 cm proximal to the UVJ. No other renal or ureteral stones. No  bladder stones are demonstrated. Prostate gland is mildly enlarged.    Gallstones are present. No biliary dilatation. The appendix is normal.  No focal bowel wall thickening or dilation. The GI tract is unremarkable  except for scattered colonic diverticulosis and a small hiatal hernia.  No CT signs of diverticulitis.. No pathologically enlarged lymph nodes.  No fluid collections. No free intraperitoneal air. Abdominal aorta is  normal in course and caliber. No acute bony abnormality or suspicious  focal osseous lesion.    Impression  1. 4 mm obstructing stone distal left ureter 1 cm proximal to the left  UVJ. This causes mild to moderate obstructing uropathy.  2. Gallstones without biliary dilatation.  3. Other  incidental findings are detailed above.    Electronically Signed By-Hector Ponce DO. On:7/26/2020 1:17 PM  This report was finalized on [id number] by  Hector Ponce DO..      Lab Review:   Hospital Outpatient Visit on 05/13/2024   Component Date Value    Glucose 05/13/2024 88     BUN 05/13/2024 27 (H)     Creatinine 05/13/2024 1.27     Sodium 05/13/2024 141     Potassium 05/13/2024 4.4     Chloride 05/13/2024 104     CO2 05/13/2024 26.0     Calcium 05/13/2024 9.1     BUN/Creatinine Ratio 05/13/2024 21.3     Anion Gap 05/13/2024 11.0     eGFR 05/13/2024 60.8     proBNP 05/13/2024 3,341.0 (H)    Hospital Outpatient Visit on 05/06/2024   Component Date Value    Glucose 05/06/2024 95     BUN 05/06/2024 25 (H)     Creatinine 05/06/2024 1.32 (H)     Sodium 05/06/2024 140     Potassium 05/06/2024 3.9     Chloride 05/06/2024 105     CO2 05/06/2024 25.0     Calcium 05/06/2024 8.8     BUN/Creatinine Ratio 05/06/2024 18.9     Anion Gap 05/06/2024 10.0     eGFR 05/06/2024 58.0 (L)     proBNP 05/06/2024 4,072.0 (H)    Hospital Outpatient Visit on 04/29/2024   Component Date Value    Glucose 04/29/2024 101 (H)     BUN 04/29/2024 41 (H)     Creatinine 04/29/2024 1.32 (H)     Sodium 04/29/2024 140     Potassium 04/29/2024 4.9     Chloride 04/29/2024 106     CO2 04/29/2024 24.0     Calcium 04/29/2024 9.6     BUN/Creatinine Ratio 04/29/2024 31.1 (H)     Anion Gap 04/29/2024 10.0     eGFR 04/29/2024 58.0 (L)     Magnesium 04/29/2024 1.6     proBNP 04/29/2024 2,348.0 (H)     QT Interval 04/29/2024 430     QTC Interval 04/29/2024 438    No results displayed because visit has over 200 results.      Office Visit on 03/12/2024   Component Date Value    WBC 03/12/2024 5.3     RBC 03/12/2024 4.61     Hemoglobin 03/12/2024 11.9 (L)     Hematocrit 03/12/2024 37.3 (L)     MCV 03/12/2024 81     MCH 03/12/2024 25.8 (L)     MCHC 03/12/2024 31.9     RDW 03/12/2024 14.1     Platelets 03/12/2024 297     Neutrophil Rel % 03/12/2024 75      Lymphocyte Rel % 03/12/2024 14     Monocyte Rel % 03/12/2024 8     Eosinophil Rel % 03/12/2024 2     Basophil Rel % 03/12/2024 1     Neutrophils Absolute 03/12/2024 4.0     Lymphocytes Absolute 03/12/2024 0.7     Monocytes Absolute 03/12/2024 0.5     Eosinophils Absolute 03/12/2024 0.1     Basophils Absolute 03/12/2024 0.1     Immature Granulocyte Rel* 03/12/2024 0     Immature Grans Absolute 03/12/2024 0.0     Glucose 03/12/2024 81     BUN 03/12/2024 25     Creatinine 03/12/2024 0.94     EGFR Result 03/12/2024 88     BUN/Creatinine Ratio 03/12/2024 27 (H)     Sodium 03/12/2024 139     Potassium 03/12/2024 4.4     Chloride 03/12/2024 102     Total CO2 03/12/2024 24     Calcium 03/12/2024 9.6     Total Protein 03/12/2024 6.7     Albumin 03/12/2024 4.0     Globulin 03/12/2024 2.7     A/G Ratio 03/12/2024 1.5     Total Bilirubin 03/12/2024 0.3     Alkaline Phosphatase 03/12/2024 39 (L)     AST (SGOT) 03/12/2024 17     ALT (SGPT) 03/12/2024 20     Total Cholesterol 03/12/2024 136     Triglycerides 03/12/2024 52     HDL Cholesterol 03/12/2024 51     VLDL Cholesterol Jasiel 03/12/2024 11     LDL Chol Calc (NIH) 03/12/2024 74     Chol/HDL Ratio 03/12/2024 2.7     PSA 03/12/2024 1.8     TSH 03/12/2024 2.290     Color 03/12/2024 Yellow     Clarity, UA 03/12/2024 Cloudy (A)     Glucose, UA 03/12/2024 Negative     Bilirubin 03/12/2024 Negative     Ketones, UA 03/12/2024 Trace (A)     Specific Gravity  03/12/2024 1.010     Blood, UA 03/12/2024 Small (A)     pH, Urine 03/12/2024 5.0     Protein, POC 03/12/2024 Negative     Urobilinogen, UA 03/12/2024 Normal     Leukocytes 03/12/2024 Negative     Nitrite, UA 03/12/2024 Negative     Creatinine, Urine 03/12/2024 111.0     Microalbumin, Urine 03/12/2024 22.7     Microalbumin/Creatinine * 03/12/2024 20     Hemoglobin A1C 03/12/2024 6.2 (A)     Lot Number 03/12/2024 #[id number]     Expiration Date 03/12/2024 01/31/2026    Office Visit on 12/12/2023   Component Date Value    Hemoglobin A1C  "12/12/2023 6.8 (A)     Lot Number 12/12/2023 #[id number]     Expiration Date 12/12/2023 10/31/2025     Glucose 12/12/2023 127     Color 12/12/2023 Yellow     Clarity, UA 12/12/2023 Clear     Glucose, UA 12/12/2023 Negative     Bilirubin 12/12/2023 Negative     Ketones, UA 12/12/2023 Negative     Specific Gravity  12/12/2023 1.025     Blood, UA 12/12/2023 Trace (A)     pH, Urine 12/12/2023 5.0     Protein, POC 12/12/2023 1+ (A)     Urobilinogen, UA 12/12/2023 Normal     Leukocytes 12/12/2023 Trace (A)     Nitrite, UA 12/12/2023 Negative      Recent labs reviewed and interpreted for clinical significance and application    Went over each of the labs individually with pt & friend while they were still in heart failure clinic          It is a pleasure to be involved in this patient's cardiovascular care relating to their heart failure.  Please feel free to call me with any questions or concerns.    Nikolas \"Vignesh\" Nadeem CAMARA, [facility]  Heart failure program clinical provider    JAX Miller   05/10/24  .  "

## 2024-05-13 ENCOUNTER — DISEASE STATE MANAGEMENT VISIT (OUTPATIENT)
Facility: HOSPITAL | Age: 69
End: 2024-05-13
Payer: MEDICARE

## 2024-05-13 ENCOUNTER — HOSPITAL ENCOUNTER (OUTPATIENT)
Facility: HOSPITAL | Age: 69
Discharge: HOME | End: 2024-05-13
Admitting: NURSE PRACTITIONER
Payer: MEDICARE

## 2024-05-13 VITALS
DIASTOLIC BLOOD PRESSURE: 77 MMHG | HEART RATE: 65 BPM | BODY MASS INDEX: 34.44 KG/M2 | RESPIRATION RATE: 16 BRPM | OXYGEN SATURATION: 95 % | WEIGHT: 213.4 LBS | SYSTOLIC BLOOD PRESSURE: 144 MMHG

## 2024-05-13 DIAGNOSIS — E11.9 TYPE 2 DIABETES MELLITUS WITHOUT COMPLICATION, WITHOUT LONG-TERM CURRENT USE OF INSULIN: ICD-10-CM

## 2024-05-13 DIAGNOSIS — G47.33 OBSTRUCTIVE SLEEP APNEA SYNDROME: ICD-10-CM

## 2024-05-13 DIAGNOSIS — N18.31 STAGE 3A CHRONIC KIDNEY DISEASE: Primary | Chronic | ICD-10-CM

## 2024-05-13 DIAGNOSIS — I25.10 CAD, MULTIPLE VESSEL: Chronic | ICD-10-CM

## 2024-05-13 DIAGNOSIS — R60.0 BILATERAL LEG EDEMA: ICD-10-CM

## 2024-05-13 DIAGNOSIS — R06.09 DOE (DYSPNEA ON EXERTION): Chronic | ICD-10-CM

## 2024-05-13 DIAGNOSIS — M19.042 PRIMARY OSTEOARTHRITIS OF LEFT HAND: ICD-10-CM

## 2024-05-13 DIAGNOSIS — E88.810 METABOLIC SYNDROME X: Chronic | ICD-10-CM

## 2024-05-13 LAB
ANION GAP SERPL CALCULATED.3IONS-SCNC: 11 MMOL/L (ref 5–15)
BUN SERPL-MCNC: 27 MG/DL (ref 8–23)
BUN/CREAT SERPL: 21.3 (ref 7–25)
CALCIUM SPEC-SCNC: 9.1 MG/DL (ref 8.6–10.5)
CHLORIDE SERPL-SCNC: 104 MMOL/L (ref 98–107)
CO2 SERPL-SCNC: 26 MMOL/L (ref 22–29)
CREAT SERPL-MCNC: 1.27 MG/DL (ref 0.76–1.27)
EGFRCR SERPLBLD CKD-EPI 2021: 60.8 ML/MIN/1.73
GLUCOSE SERPL-MCNC: 88 MG/DL (ref 65–99)
NT-PROBNP SERPL-MCNC: 3341 PG/ML (ref 0–900)
POTASSIUM SERPL-SCNC: 4.4 MMOL/L (ref 3.5–5.2)
SODIUM SERPL-SCNC: 141 MMOL/L (ref 136–145)

## 2024-05-13 PROCEDURE — 83880 ASSAY OF NATRIURETIC PEPTIDE: CPT | Performed by: NURSE PRACTITIONER

## 2024-05-13 PROCEDURE — 25010000002 FUROSEMIDE PER 20 MG: Performed by: NURSE PRACTITIONER

## 2024-05-13 PROCEDURE — 99214 OFFICE O/P EST MOD 30 MIN: CPT | Performed by: NURSE PRACTITIONER

## 2024-05-13 PROCEDURE — 80048 BASIC METABOLIC PNL TOTAL CA: CPT | Performed by: NURSE PRACTITIONER

## 2024-05-13 PROCEDURE — 63710000001 POTASSIUM CHLORIDE 10 MEQ TABLET CONTROLLED-RELEASE: Performed by: NURSE PRACTITIONER

## 2024-05-13 PROCEDURE — A9270 NON-COVERED ITEM OR SERVICE: HCPCS | Performed by: NURSE PRACTITIONER

## 2024-05-13 PROCEDURE — 96374 THER/PROPH/DIAG INJ IV PUSH: CPT

## 2024-05-13 RX ORDER — CHLORHEXIDINE GLUCONATE 500 MG/1
CLOTH TOPICAL EVERY 12 HOURS PRN
Status: DISCONTINUED | OUTPATIENT
Start: 2024-05-13 | End: 2024-05-15

## 2024-05-13 RX ORDER — FUROSEMIDE 10 MG/ML
60 INJECTION INTRAMUSCULAR; INTRAVENOUS ONCE
Status: COMPLETED | OUTPATIENT
Start: 2024-05-13 | End: 2024-05-13

## 2024-05-13 RX ORDER — CHLORHEXIDINE GLUCONATE ORAL RINSE 1.2 MG/ML
15 SOLUTION DENTAL EVERY 12 HOURS
Status: DISPENSED | OUTPATIENT
Start: 2024-05-13 | End: 2024-05-14

## 2024-05-13 RX ORDER — POTASSIUM CHLORIDE 750 MG/1
10 TABLET, EXTENDED RELEASE ORAL ONCE
Status: COMPLETED | OUTPATIENT
Start: 2024-05-13 | End: 2024-05-13

## 2024-05-13 RX ADMIN — POTASSIUM CHLORIDE 10 MEQ: 750 TABLET, EXTENDED RELEASE ORAL at 13:40

## 2024-05-13 RX ADMIN — FUROSEMIDE 60 MG: 10 INJECTION, SOLUTION INTRAMUSCULAR; INTRAVENOUS at 13:39

## 2024-05-13 NOTE — LETTER
May 13, 2024     JAX Pizarro  [address]  [address]  [place] [id number]    Patient: Fransico Cuenca   YOB: 1955   Date of Visit: 5/13/2024     Dear  JAX Griffith:    Thank you for referring Fransico Cuenca to me for evaluation. Below are the relevant portions of my assessment and plan of care.    69-year-old  male patient now followed by Dr. Lorenzo w/ [facility] of COPD, + Fm Hx CAD, metabolic syndrome X all 4 components and iron deficiency anemia along with obstructive sleep apnea 4/18/24 presented to ED in [facility] with a c/o chest tightness and associated SOA. Patient had noticed he had limitation in activity ongoing for a few weeks and this has gradually worsened & noticed weight change and peripheral edema. Patient was ruled in for NSTEMI, placed on Heparin gtt, and was transferred to [facility] for Cardiology evaluation. LHC showed  severe multivessel coronary artery disease. CXR was also concerning for Pneumonia and Pulmonology was also consulted. Respiratory cultures came back positive for human metapneumovirus.. Patient was diuresed but eventually developed EN. Patient was medically optimized prior to LHC w/ Dr. Lorenzo and on 4/23/2024 showed patient to have 20% LM,mLAD 80%  & 95% pLAD stenosis D1 and D2 both being 80 and 90% stenosed at the ostium, LCx being about 90% stenosed in the midsegment with an RI with 60 to 70% proximal segment stenosis .RCA with 40% disease in the proximal PDA mild disease in the proximal segment noted. Cardiac surgery was consulted for evaluation of patient and recommendation regarding surgical intervention. He also was notedly diuresed while in the hospital and developed a subsequent EN.  Ultimately was discharged home with no resolution to pending CABG.  TTE was also performed which did show him to have preserved LV systolic function with an EF of around 51 to 55% without any diastolic dysfunction.  This is still  pending CABG he is coming to the [facility] heart failure clinic for his initial visit on 29 April 2024 since recent hospitalization is noted he is a little better he thinks his legs are little bit smaller however still rather large and blistered.  Unfortunately do not have time to send him to the lymphedema clinic.  POLANCO etc. there has not been much change from when he left the hospital. But was found to be volume overloaded & required IV diuresis. Back 9 May to attempt to get to euvolemic state pre CABG. Since last visit has lost about 8 lbs. he feels overall quite a bit better he is even able to go to [facility] now.  He is Which is set now for the 15 May 24 next Wednesday, he still has anxiety regarding that pending procedure comes in 5/13/24 in pre to attain euvolemia pre CABG. Otherwise no issues        Assessment  Assessment:   Diagnoses and all orders for this visit:     1. Stage 3a chronic kidney disease (Primary)     2. Bilateral leg edema  Overview:  Small blistering pretibially        3. ASCAD  Overview:  A. Two-vessel LHC 4/23/2024                  I.  20% LM                 Ii.  95% pLAD with 80%mLAD                III.  90% LCx                            1. 60 to 70% proximal ramus  B.  Being worked up for CABG by CV surgery  C. Preserved EF 51 to 55% w/o DD        4. Metabolic syndrome X  Overview:  A.-Benign essential hypertension  B.  Mixed dyslipidemia  C.  Obesity with a BMI of 36.62 kg/m²          I, ZHANG  D.  T2DM         I.  Peripheral neuropathy                  5. POLANCO (dyspnea on exertion)  -     Basic Metabolic Panel; Future  -     proBNP; Future  -     Basic Metabolic Panel; Standing  -     Basic Metabolic Panel  -     proBNP; Standing  -     proBNP     6. Obstructive sleep apnea syndrome  Overview:  Improved on CPAP he is compliant        7. Primary osteoarthritis of left hand  Overview:  Exacerbation after trauma, ice and anti inflammatories compression.         8. Type 2 diabetes mellitus  "without complication, without long-term current use of insulin    Discussion     Already on heart failure core measures including beta-blocker, ACE I, SGLT2, diuretic,   even a nitrate   Stopped Norvasc 2/2 leg edema  4/29/24  He did require diuresis 4/29/24 with 80 mg lasix IV x 1   Again 5/13/24 Bumex 1mg   Keep an eye on renal function as his EGFR 4/29/24 was only 58 making him a stage III CKD, On f/u visit eGFR was 58  A. Cr @ 1.3 w/ eGFR @ 60.8    proBNP 4/29/24 went down from > 10,000 to  2,348  by f/u 5/3/24  was 4072  Going ahead and increasing his diuretic to twice daily briefly since he is pending CABG  He was additionally mildly volume overloaded however his legs did show's some improvement ProBNP @ 3,341 5/13/24   Given 60 mg Lasix IV & 10 meq K-dur PO in clinic 5/13/24  Glucose remains under much better control  No current need for heart failure to continue to follow we are happy to see again if it is clinically needed.    If you have questions, please do not hesitate to call me. I look forward to following Fransico along with you.         Sincerely,        JAX Miller RCS    Vignesh    CC: Jean Hirsch MD      Progress Notes:  Cardiology Heart Failure Clinic Note  Nikolas \"Vignesh\" Nadeem CAMARA [facility]    Patient ID: Fransico Cuenca  is a 69 y.o. male.    Encounter Date:05/13/2024    HPI:  69-year-old  male patient now followed by Dr. Lorenzo w/ [facility] of COPD, + Fm Hx CAD, metabolic syndrome X all 4 components and iron deficiency anemia along with obstructive sleep apnea 4/18/24 presented to ED in [facility] with a c/o chest tightness and associated SOA. Patient had noticed he had limitation in activity ongoing for a few weeks and this has gradually worsened & noticed weight change and peripheral edema. Patient was ruled in for NSTEMI, placed on Heparin gtt, and was transferred to [facility] for Cardiology evaluation. LHC showed  severe multivessel coronary artery disease. CXR was also " concerning for Pneumonia and Pulmonology was also consulted. Respiratory cultures came back positive for human metapneumovirus.. Patient was diuresed but eventually developed EN. Patient was medically optimized prior to [facility] w/ Dr. Lorenzo and on 4/23/2024 showed patient to have 20% LM,mLAD 80%  & 95% pLAD stenosis D1 and D2 both being 80 and 90% stenosed at the ostium, LCx being about 90% stenosed in the midsegment with an RI with 60 to 70% proximal segment stenosis .RCA with 40% disease in the proximal PDA mild disease in the proximal segment noted. Cardiac surgery was consulted for evaluation of patient and recommendation regarding surgical intervention. He also was notedly diuresed while in the hospital and developed a subsequent EN.  Ultimately was discharged home with no resolution to pending CABG.  TTE was also performed which did show him to have preserved LV systolic function with an EF of around 51 to 55% without any diastolic dysfunction.  This is still pending CABG he is coming to the [facility] heart failure clinic for his initial visit on 29 April 2024 since recent hospitalization is noted he is a little better he thinks his legs are little bit smaller however still rather large and blistered.  Unfortunately do not have time to send him to the lymphedema clinic.  POLANCO etc. there has not been much change from when he left the hospital. But was found to be volume overloaded & required IV diuresis. Back 9 May to attempt to get to euvolemic state pre CABG. Since last visit has lost about 8 lbs. he feels overall quite a bit better he is even able to go to [facility] now.  He is Which is set now for the 15 May 24 next Wednesday, he still has anxiety regarding that pending procedure comes in 5/13/24 in pre to attain euvolemia pre CABG. Otherwise no issues       Assessment:   Diagnoses and all orders for this visit:    1. Stage 3a chronic kidney disease (Primary)    2. Bilateral leg edema  Overview:  Small  blistering pretibially      3. ASCAD  Overview:  A. Two-vessel LHC 4/23/2024                  I.  20% LM                 Ii.  95% pLAD with 80%mLAD                III.  90% LCx                            1. 60 to 70% proximal ramus  B.  Being worked up for CABG by CV surgery  C. Preserved EF 51 to 55% w/o DD      4. Metabolic syndrome X  Overview:  A.-Benign essential hypertension  B.  Mixed dyslipidemia  C.  Obesity with a BMI of 36.62 kg/m²          I, ZHANG  D.  T2DM         I.  Peripheral neuropathy       5. POLANCO (dyspnea on exertion)  -     Basic Metabolic Panel; Future  -     proBNP; Future  -     Basic Metabolic Panel; Standing  -     Basic Metabolic Panel  -     proBNP; Standing  -     proBNP    6. Obstructive sleep apnea syndrome  Overview:  Improved on CPAP he is compliant      7. Primary osteoarthritis of left hand  Overview:  Exacerbation after trauma, ice and anti inflammatories compression.       8. Type 2 diabetes mellitus without complication, without long-term current use of insulin  Overview:  A1c 6.2 03/12/2024 improved with lifestyle changes.   A1c 6.8 12.12.2023 worse less exercise.   A1c 6.1 09/11/2023 improved.   A1c 6.3 05/26/2026 stable he reports BS of 150  A1c 6.3 02/24/2023 slightly worse. But controlled.   A1c 5.9 11/08/2022 improved  A1c 7.8 05/03/2022 marginal control Actos begin for insurance  A1c 7.1 01/24/2022 [id number] 7.5  07/13/2021   A1c 7.5 12/21/2020 stable,    A1c 6.2 08/20/2019,   New onset 04/29/2019, A1c 7.2          Other orders  -     furosemide (LASIX) injection 60 mg  -     potassium chloride (KLOR-CON M10) CR tablet 10 mEq        Plan/discussion    Volume overload    Heart Failure Core Measures    Type of Overload :  unsure     Most recent EF: (TTE 4/18/24)  EF 51-55 w/o any DD     [place] Heart association Class & Stage : 1b     HF Meds     Beta Blocker: coreg 12.5 mg q 12h  ARNI/ACE/ARB: lisinopril 10mg QD  SGLT 2 inhibitors: Farxiga 10 daily held  5/12/24  Diuretics: Bumex 2 mg am & 1mg PM increased to twice daily on 6 May 2024 by renal  Furoscix: 8 kits for at home use ordered were unaffordable as he didn't have HF & couldn't qualify  Aldosterone Antagonist: none 2/2 CKD  Digitalis: n/a  Nitrates: currently none     * additionally on Mag Ox        Cardiac medicines reviewed with risk, benefits, and necessity of each discussed with myself & a RPh.      _________________________________________________________     Discussion     Already on heart failure core measures including beta-blocker, ACE I, SGLT2, diuretic,   even a nitrate   Stopped Norvasc 2/2 leg edema  4/29/24  He did require diuresis 4/29/24 with 80 mg lasix IV x 1   Again 5/13/24 Bumex 1mg   Keep an eye on renal function as his EGFR 4/29/24 was only 58 making him a stage III CKD, On f/u visit eGFR was 58  A. Cr @ 1.3 w/ eGFR @ 60.8    proBNP 4/29/24 went down from > 10,000 to  2,348  by f/u 5/3/24  was 4072  Going ahead and increasing his diuretic to twice daily briefly since he is pending CABG  He was additionally mildly volume overloaded however his legs did show's some improvement ProBNP @ 3,341 5/13/24   Given 60 mg Lasix IV & 10 meq K-dur PO in clinic 5/13/24  Glucose remains under much better control  No current need for heart failure to continue to follow we are happy to see again if it is clinically needed.  Continue typical volume overload nursing interventions including:                          A. Fluid restriction at less than 2000 cc daily                          B. Daily weights                          C.  1 g low-sodium diet        I did the following activities preparing for the visit with Fransico Cuenca  including: reviewing tests, once pt arrived in clinic I also performed a medically appropriate examination and/or evaluation, I personally spent considerable time counseling and educating the patient/family/caregiver, ordering medications, tests, or procedures, referring and  communicating with other health care professionals, and documenting information in the medical record. I estimate including preparation 20 minutes              Subjective:     Chief Complaint   Patient presents with   • Congestive Heart Failure       ROS:    Constitutional: + weakness, + fatigue have continued to improve, No fever, rigors, chills   Eyes: No recent vision changes, eye pain   ENT/oropharynx: No recent difficulty swallowing, sore throat, epistaxis, changes in hearing   Cardiovascular: No recent chest pain, chest tightness, palpitations except as noted in HPI, paroxysmal nocturnal dyspnea, orthopnea, diaphoresis, dizziness & no pre or soraya syncopal episodes   Respiratory: Not much shortness of breath noted at rest, + dyspnea on exertion still, but continues to improve, no significant productive cough, no wheezing and no hemoptysis   Gastrointestinal: No abdominal pain, nausea, vomiting, diarrhea, bloody stools   Genitourinary: No hematuria, dysuria other than increased frequency   Neurological: No headache, tremors, numbness,  or hemiparesis    Musculoskeletal: No change in typical cramps, myalgias, no new joint pain, joint swelling   Integument: No recent rash, + edema with his blisters having popped on the lower extremities which show continued improvement       Patient Active Problem List   Diagnosis   • Anxiety   • Hypertension, benign   • Mixed hyperlipidemia   • Allergic rhinitis due to pollen   • Type 2 diabetes mellitus without complication, without long-term current use of insulin   • Encounter for prostate cancer screening   • Screening for colon cancer   • Family history of ischemic heart disease   • Class 2 severe obesity due to excess calories with serious comorbidity and body mass index (BMI) of 35.0 to 35.9 in adult   • Obstructive sleep apnea syndrome   • Medicare annual wellness visit, subsequent   • History of kidney stones   • Primary osteoarthritis of left hand   • Iron deficiency  anemia due to chronic blood loss   • NSTEMI (non-ST elevated myocardial infarction)   • ASCAD   • Metabolic syndrome X   • CKD (chronic kidney disease) stage 3a, GFR 30-59 ml/min   • Bilateral leg edema   • POLANCO (dyspnea on exertion)       Past Medical History:   Diagnosis Date   • Anxiety    • Essential hypertension, benign    • Gastroesophageal reflux disease without esophagitis    • Kidney stones 05/31/2020    Dr. Kahlil Terrell   • Mixed hyperlipidemia    • Rotator cuff syndrome     Torn rotator about seven years ago   • Seasonal allergic rhinitis due to pollen    • Type 2 diabetes mellitus without complication, without long-term current use of insulin        Past Surgical History:   Procedure Laterality Date   • CARDIAC CATHETERIZATION N/A 4/23/2024    Procedure: Left Heart Cath;  Surgeon: Santiago Lorenzo MD;  Location: [facility] CATH INVASIVE LOCATION;  Service: Cardiovascular;  Laterality: N/A;   • CATARACT EXTRACTION Left 08/24/2023    Dr Melendez   • CATARACT EXTRACTION Right 09/07/2023    Dr Melendez   • CYSTOSCOPY W/ LASER LITHOTRIPSY  01/2021   • INGUINAL HERNIA REPAIR Left 10/2009   • NOSE SURGERY      x2   • UMBILICAL HERNIA REPAIR  10/2009    Dr. Napier       Social History     Socioeconomic History   • Marital status: Single   Tobacco Use   • Smoking status: Never     Passive exposure: Never   • Smokeless tobacco: Never   • Tobacco comments:     Family members smoked   Vaping Use   • Vaping status: Never Used   Substance and Sexual Activity   • Alcohol use: Never   • Drug use: Never   • Sexual activity: Not Currently       No Known Allergies      Current Outpatient Medications:   •  aspirin (aspirin) 81 MG EC tablet, Take 1 tablet by mouth Daily., Disp: , Rfl:   •  atorvastatin (LIPITOR) 10 MG tablet, Take 1 tablet by mouth once daily, Disp: 90 tablet, Rfl: 3  •  bumetanide (BUMEX) 1 MG tablet, Take 1 tablet by mouth 2 (Two) Times a Day. (Patient taking differently: Take 2 mg by mouth in the morning and 1 mg by  mouth in the early evening.), Disp: 90 tablet, Rfl: 0  •  carvedilol (COREG) 12.5 MG tablet, Take 1 tablet by mouth 2 (Two) Times a Day With Meals., Disp: 60 tablet, Rfl: 3  •  citalopram (CeleXA) 20 MG tablet, Take 1 tablet by mouth once daily, Disp: 90 tablet, Rfl: 3  •  doxazosin (CARDURA) 1 MG tablet, Take 1 tablet by mouth once daily, Disp: 90 tablet, Rfl: 3  •  fenofibrate 160 MG tablet, Take 1 tablet by mouth once daily, Disp: 90 tablet, Rfl: 0  •  hydrALAZINE (APRESOLINE) 50 MG tablet, Take 1 tablet by mouth Every 12 (Twelve) Hours., Disp: 60 tablet, Rfl: 3  •  lisinopril (PRINIVIL,ZESTRIL) 10 MG tablet, Take 1 tablet by mouth Daily., Disp: 90 tablet, Rfl: 0  •  Magnesium Oxide 400 MG capsule, Take 1 capsule by mouth Daily., Disp: 90 each, Rfl: 1  •  metFORMIN (GLUCOPHAGE) 1000 MG tablet, TAKE 1 TABLET BY MOUTH TWICE DAILY WITH MEALS, Disp: 60 tablet, Rfl: 12  •  montelukast (SINGULAIR) 10 MG tablet, Take 1 tablet by mouth Daily., Disp: 90 tablet, Rfl: 3  •  Multiple Vitamins-Minerals (MULTIVITAMIN ADULT PO), Take 1 tablet by mouth Daily., Disp: , Rfl:   •  omeprazole (priLOSEC) 40 MG capsule, Take 1 capsule by mouth Daily., Disp: 30 capsule, Rfl: 12  •  potassium chloride (KLOR-CON M20) 20 MEQ CR tablet, Take 1 tablet by mouth Daily., Disp: 90 tablet, Rfl: 2  •  dapagliflozin Propanediol (Farxiga) 10 MG tablet, Take 10 mg by mouth Daily. (Patient not taking: Reported on 5/13/2024), Disp: 90 tablet, Rfl: 3  •  mupirocin (BACTROBAN) 2 % ointment, Apply to nares (inside each nostril) twice daily as directed for procedure., Disp: 22 g, Rfl: 0  No current facility-administered medications for this encounter.    Facility-Administered Medications Ordered in Other Encounters:   •  chlorhexidine (PERIDEX) 0.12 % solution 15 mL, 15 mL, Mouth/Throat, Q12H, Satterly-Dg, Suzy L, APRN  •  Chlorhexidine Gluconate Cloth 2 % pads, , Topical, Q12H PRN, Suzy Griffith APRN  •  mupirocin (BACTROBAN) 2 %  nasal ointment, , Each Nare, Q12H, Satterly-Dg, Suzy L, APRN    Immunization History   Administered Date(s) Administered   • COVID-19 (MODERNA) 1st,2nd,3rd Dose Monovalent 03/23/2021, 04/29/2021, 10/28/2021   • DTaP 08/03/2020   • FLUAD TRI 65YR+ 10/08/2019   • Flu Vaccine Quad PF 6-35MO 10/06/2015   • Fluad Quad 65+ 09/24/2020   • Fluzone High-Dose 65+yrs 10/22/2021, 11/08/2022, 09/11/2023   • Fluzone Quad >6mos (Multi-dose) 11/01/2012, 10/08/2019   • Hepatitis A 05/09/2018, 11/09/2018   • Influenza Quad Vaccine (Inpatient) 11/01/2012   • Influenza TIV (IM) 10/25/2007, 10/14/2014, 10/06/2015, 12/20/2016   • Pneumococcal Conjugate 20-Valent (PCV20) 11/08/2022   • Pneumococcal Polysaccharide (PPSV23) 05/19/2014, 04/30/2019   • Shingrix 07/18/2020, 09/16/2020   • Tdap 06/25/2007, 08/03/2020         Most recent EKG as reviewed:  Procedures     Most recent echo as reviewed:  Results for orders placed during the hospital encounter of 04/18/24    Adult Transthoracic Echo Complete w/ Color, Spectral and Contrast if necessary per protocol    Interpretation Summary  •  Left ventricular systolic function is mildly decreased. Left ventricular ejection fraction appears to be 51 - 55%.  •  Left ventricular diastolic function was normal.  •  Estimated right ventricular systolic pressure from tricuspid regurgitation is normal (<35 mmHg).  •  Hypokinesis of distal septum and apex noted      Most recent stress test results:      Most recent cardiac catheterization results:  Results for orders placed during the hospital encounter of 04/18/24    Cardiac Catheterization/Vascular Study    Conclusion  CARDIAC CATHETERIZATION REPORT      DATE OF PROCEDURE:  4/23/2024    INDICATION FOR PROCEDURE:    Non-ST elevation myocardial infarction  CAD  Congestive heart failure acute diastolic    PROCEDURE PERFORMED:    Ultrasound-guided access of femoral artery  Left heart catheterization  coronary angiography  left  ventriculography    PROCEDURE COMMENTS:    After informed consent was obtained, the patient was prepped and draped in the usual sterile manner.  Mild to moderate sedation was administered.  Right femoral artery was accessed without difficulty under fluoroscopy and ultrasound guidance and 6 Gambian arterial sheath was inserted.  Sheath was flushed with heparinized saline.    Using 6 Gambian Sudarshan catheters, first left coronary artery and the right coronary was electively engaged and appropriate views were taken.  A 6 Gambian JR4 catheter was used to cross aortic valve and left heart catheterization was performed.  Left ventriculography was done in a PATTERSON view    Patient tolerated the procedure well.    FINDINGS:    1. HEMODYNAMICS:    Aortic pressure: 170/101 mmHg    LVEDP: 0 to 5 mmHg    Gradient across aortic valve on pullback: No gradient across aortic valve on pullback      2. LEFT VENTRICULOGRAPHY: 55%      3. CORONARY ANGIOGRAPHY:    A: Left main coronary artery: Short and has 20% stenosis in distal segment    B: Left anterior descending artery: 95% stenosis in proximal segment of LAD and 80% stenosis of mid LAD.  D1 and D2 branches of LAD which is small to medium sized vessels had 80% to 90% stenosis at the ostium    C: Left circumflex coronary artery: LCx has 90% stenosis in the midsegment and ramus intermedius has 60 to 70% stenosis in the proximal segment    D: Right coronary artery: RCA has diffuse disease up to 40% in proximal PDA.  Mild disease in the proximal segment noted    SUMMARY:    Two-vessel coronary artery disease  Preserved left ventricular function    RECOMMENDATIONS:    Recommend CABG        Historical data copied forward from previous encounters in EMR including the history, exam, and assessment/plan has been reviewed and is unchanged except as I have noted and otherwise indicated.      Objective:         /77   Pulse 65   Resp 16   Wt 96.8 kg (213 lb 6.4 oz)   SpO2 95%   BMI 34.44  kg/m²     General:  Well-developed, Crawford well-nourished, cooperative, no acute distress, appears stated age if not slightly older    Neuro:  A&O x3. No significantly obvious focal neuro deficet    Psych:  Pleasant  affect    HENT:  Normocephalic, atraumatic, moist mucous membranes , Normal ear placement,Throat not injected   Eyes:  PERRLA, Conjunctivae not injected, EOM's intact, (HFmEF) wears spectacles to aid in his vision, conjunctiva does not appear significantly injected   Neck:  Supple, Mildly thickened, no lymph adenopathy nor thyromegaly no JVD or bruit    Lungs:    Symmetrical rise & fall of chest with baseline respiratory pattern. To auscultation lungs are Clear bilaterally, faint late phase expiratory wheezes, no rhonchi or rales are noted, bases bilaterally at most mildly diminished   Chest wall:  No significant tenderness when palpated. PMI @ 5th ICS MCL somewhat difficult to palpate given body habitus   Heart:  Regular rate and rhythm, S1 and S2 normal, no S3 or S4, Gr I-II/VI systolic ejection murmur best heard at the apex , no obvious rub, click or gallop.    Abdomen:  non-distended, non-tender, bowel sounds noted in the 4 quadrants of the abdomen, moderate to significant midline adipose tissue identified    Extremities:  Equal color motion temperature and sensitivity, Rapid capillary refill noted within the nailbeds of fingers and toes bilaterally 2+ lower extremity pitting edema.    Pulses:  2+ and symmetric all extremities, rapid capillary refill    Skin:  No obvious rashes, significant lesions identified other than multiple ruptured blisters noted in the pretibial region of the bilateral lower extremities with some mild reddish discoloration is much lighter today pre-tibially as well,, warm dry and of normal turgor      In-Office Procedure(s):  No orders to display        Imaging:    Results for orders placed during the hospital encounter of 04/18/24    XR Chest 1 View    Narrative  XR CHEST 1  VW    Date of Exam: 4/23/2024 8:25 AM EDT    Indication: hypoxia    Comparison: 4/18/2024.    Findings: There is stable mild cardiomegaly. There is infiltrate in the left lung base. The right lung is clear. There are no definite effusions.    Impression  Impression:  Left lower lobe infiltrate, atelectasis versus pneumonia.      Electronically Signed: Talita Bear MD  4/23/2024 8:36 AM EDT  Workstation ID: [id number]       Results for orders placed during the hospital encounter of 04/18/24    US Renal Bilateral    Narrative  US RENAL BILATERAL    Date of Exam: 4/18/2024 10:12 PM EDT    Indication: Kidney failure, acute  EN.    Comparison: CT abdomen and pelvis 7/26/2020    Technique: Grayscale and color Doppler ultrasound evaluation of the kidneys and urinary bladder was performed.      Findings:  RIGHT kidney measures 12.2 cm.  Kidney echogenicity, size, and vascularity appear within normal limits. There is no solid kidney mass.  No echogenic shadowing stone.  No hydronephrosis.    LEFT kidney measures 12.6 cm. Kidney echogenicity, size, and vascularity appear within normal limits. There is no solid kidney mass.  No echogenic shadowing stone.  No hydronephrosis.    Limited visualization of the urinary bladder is unremarkable. Total volume of 44 mL.    Impression  Impression:  Unremarkable bilateral renal ultrasound.        Electronically Signed: Sujatha Montoya MD  4/19/2024 7:23 AM EDT  Workstation ID: [id number]      Results for orders placed during the hospital encounter of 07/26/20    CT Abdomen Pelvis Without Contrast    Narrative  DATE OF EXAM:  7/26/2020 12:28 PM    PROCEDURE:  CT ABDOMEN PELVIS WO CONTRAST-    INDICATIONS:  flank pain, abdominal pain    COMPARISON:  No Comparisons Available    TECHNIQUE:  Routine transaxial slices were obtained through the abdomen and pelvis  without the administration of intravenous contrast. Reconstructed  coronal and sagittal images were also obtained. Automated  exposure  control and iterative construction methods were used.    FINDINGS:  The lung bases are free of active disease.  The liver is diffusely fatty infiltrated. The hepatic segment 8, there  is an incidental 8 mm area of hyperdensity may represent an incidental  hemangioma. The unenhanced spleen, pancreas, adrenal glands, and right  kidney are unremarkable. There is moderate left hydroureteronephrosis  and perinephric fat stranding due to a 4 mm stone in the distal left  ureter 1 cm proximal to the UVJ. No other renal or ureteral stones. No  bladder stones are demonstrated. Prostate gland is mildly enlarged.    Gallstones are present. No biliary dilatation. The appendix is normal.  No focal bowel wall thickening or dilation. The GI tract is unremarkable  except for scattered colonic diverticulosis and a small hiatal hernia.  No CT signs of diverticulitis.. No pathologically enlarged lymph nodes.  No fluid collections. No free intraperitoneal air. Abdominal aorta is  normal in course and caliber. No acute bony abnormality or suspicious  focal osseous lesion.    Impression  1. 4 mm obstructing stone distal left ureter 1 cm proximal to the left  UVJ. This causes mild to moderate obstructing uropathy.  2. Gallstones without biliary dilatation.  3. Other incidental findings are detailed above.    Electronically Signed By-Hector Ponce DO. On:7/26/2020 1:17 PM  This report was finalized on [id number] by  Hector Ponce DO..      Lab Review:   Hospital Outpatient Visit on 05/13/2024   Component Date Value   • Glucose 05/13/2024 88    • BUN 05/13/2024 27 (H)    • Creatinine 05/13/2024 1.27    • Sodium 05/13/2024 141    • Potassium 05/13/2024 4.4    • Chloride 05/13/2024 104    • CO2 05/13/2024 26.0    • Calcium 05/13/2024 9.1    • BUN/Creatinine Ratio 05/13/2024 21.3    • Anion Gap 05/13/2024 11.0    • eGFR 05/13/2024 60.8    • proBNP 05/13/2024 3,341.0 (H)    Hospital Outpatient Visit on 05/06/2024   Component Date  Value   • Glucose 05/06/2024 95    • BUN 05/06/2024 25 (H)    • Creatinine 05/06/2024 1.32 (H)    • Sodium 05/06/2024 140    • Potassium 05/06/2024 3.9    • Chloride 05/06/2024 105    • CO2 05/06/2024 25.0    • Calcium 05/06/2024 8.8    • BUN/Creatinine Ratio 05/06/2024 18.9    • Anion Gap 05/06/2024 10.0    • eGFR 05/06/2024 58.0 (L)    • proBNP 05/06/2024 4,072.0 (H)    Hospital Outpatient Visit on 04/29/2024   Component Date Value   • Glucose 04/29/2024 101 (H)    • BUN 04/29/2024 41 (H)    • Creatinine 04/29/2024 1.32 (H)    • Sodium 04/29/2024 140    • Potassium 04/29/2024 4.9    • Chloride 04/29/2024 106    • CO2 04/29/2024 24.0    • Calcium 04/29/2024 9.6    • BUN/Creatinine Ratio 04/29/2024 31.1 (H)    • Anion Gap 04/29/2024 10.0    • eGFR 04/29/2024 58.0 (L)    • Magnesium 04/29/2024 1.6    • proBNP 04/29/2024 2,348.0 (H)    • QT Interval 04/29/2024 430    • QTC Interval 04/29/2024 438    No results displayed because visit has over 200 results.      Office Visit on 03/12/2024   Component Date Value   • WBC 03/12/2024 5.3    • RBC 03/12/2024 4.61    • Hemoglobin 03/12/2024 11.9 (L)    • Hematocrit 03/12/2024 37.3 (L)    • MCV 03/12/2024 81    • MCH 03/12/2024 25.8 (L)    • MCHC 03/12/2024 31.9    • RDW 03/12/2024 14.1    • Platelets 03/12/2024 297    • Neutrophil Rel % 03/12/2024 75    • Lymphocyte Rel % 03/12/2024 14    • Monocyte Rel % 03/12/2024 8    • Eosinophil Rel % 03/12/2024 2    • Basophil Rel % 03/12/2024 1    • Neutrophils Absolute 03/12/2024 4.0    • Lymphocytes Absolute 03/12/2024 0.7    • Monocytes Absolute 03/12/2024 0.5    • Eosinophils Absolute 03/12/2024 0.1    • Basophils Absolute 03/12/2024 0.1    • Immature Granulocyte Rel* 03/12/2024 0    • Immature Grans Absolute 03/12/2024 0.0    • Glucose 03/12/2024 81    • BUN 03/12/2024 25    • Creatinine 03/12/2024 0.94    • EGFR Result 03/12/2024 88    • BUN/Creatinine Ratio 03/12/2024 27 (H)    • Sodium 03/12/2024 139    • Potassium 03/12/2024  4.4    • Chloride 03/12/2024 102    • Total CO2 03/12/2024 24    • Calcium 03/12/2024 9.6    • Total Protein 03/12/2024 6.7    • Albumin 03/12/2024 4.0    • Globulin 03/12/2024 2.7    • A/G Ratio 03/12/2024 1.5    • Total Bilirubin 03/12/2024 0.3    • Alkaline Phosphatase 03/12/2024 39 (L)    • AST (SGOT) 03/12/2024 17    • ALT (SGPT) 03/12/2024 20    • Total Cholesterol 03/12/2024 136    • Triglycerides 03/12/2024 52    • HDL Cholesterol 03/12/2024 51    • VLDL Cholesterol Jasiel 03/12/2024 11    • LDL Chol Calc (NIH) 03/12/2024 74    • Chol/HDL Ratio 03/12/2024 2.7    • PSA 03/12/2024 1.8    • TSH 03/12/2024 2.290    • Color 03/12/2024 Yellow    • Clarity, UA 03/12/2024 Cloudy (A)    • Glucose, UA 03/12/2024 Negative    • Bilirubin 03/12/2024 Negative    • Ketones, UA 03/12/2024 Trace (A)    • Specific Gravity  03/12/2024 1.010    • Blood, UA 03/12/2024 Small (A)    • pH, Urine 03/12/2024 5.0    • Protein, POC 03/12/2024 Negative    • Urobilinogen, UA 03/12/2024 Normal    • Leukocytes 03/12/2024 Negative    • Nitrite, UA 03/12/2024 Negative    • Creatinine, Urine 03/12/2024 111.0    • Microalbumin, Urine 03/12/2024 22.7    • Microalbumin/Creatinine * 03/12/2024 20    • Hemoglobin A1C 03/12/2024 6.2 (A)    • Lot Number 03/12/2024 #[id number]    • Expiration Date 03/12/2024 01/31/2026    Office Visit on 12/12/2023   Component Date Value   • Hemoglobin A1C 12/12/2023 6.8 (A)    • Lot Number 12/12/2023 #[id number]    • Expiration Date 12/12/2023 10/31/2025    • Glucose 12/12/2023 127    • Color 12/12/2023 Yellow    • Clarity, UA 12/12/2023 Clear    • Glucose, UA 12/12/2023 Negative    • Bilirubin 12/12/2023 Negative    • Ketones, UA 12/12/2023 Negative    • Specific Gravity  12/12/2023 1.025    • Blood, UA 12/12/2023 Trace (A)    • pH, Urine 12/12/2023 5.0    • Protein, POC 12/12/2023 1+ (A)    • Urobilinogen, UA 12/12/2023 Normal    • Leukocytes 12/12/2023 Trace (A)    • Nitrite, UA 12/12/2023 Negative      Recent labs  "reviewed and interpreted for clinical significance and application    Went over each of the labs individually with pt & friend while they were still in heart failure clinic          It is a pleasure to be involved in this patient's cardiovascular care relating to their heart failure.  Please feel free to call me with any questions or concerns.    Nikolas \"Vignesh\" Nadeem CAMARA, [facility]  Heart failure program clinical provider    Nikolas Silver, JAX   05/10/24  .  "

## 2024-05-14 ENCOUNTER — HOSPITAL ENCOUNTER (OUTPATIENT)
Dept: RESPIRATORY THERAPY | Facility: HOSPITAL | Age: 69
Discharge: HOME | End: 2024-05-14
Payer: MEDICARE

## 2024-05-14 ENCOUNTER — LAB (OUTPATIENT)
Dept: LAB | Facility: HOSPITAL | Age: 69
End: 2024-05-14
Payer: MEDICARE

## 2024-05-14 ENCOUNTER — HOSPITAL ENCOUNTER (OUTPATIENT)
Dept: CARDIOLOGY | Facility: HOSPITAL | Age: 69
Discharge: HOME | End: 2024-05-14
Payer: MEDICARE

## 2024-05-14 ENCOUNTER — HOSPITAL ENCOUNTER (OUTPATIENT)
Dept: GENERAL RADIOLOGY | Facility: HOSPITAL | Age: 69
Discharge: HOME | End: 2024-05-14
Payer: MEDICARE

## 2024-05-14 ENCOUNTER — PRE-ADMISSION TESTING (OUTPATIENT)
Dept: PREADMISSION TESTING | Facility: HOSPITAL | Age: 69
DRG: 235 | End: 2024-05-14
Payer: MEDICARE

## 2024-05-14 ENCOUNTER — ANESTHESIA EVENT (OUTPATIENT)
Dept: PERIOP | Facility: HOSPITAL | Age: 69
End: 2024-05-14
Payer: MEDICARE

## 2024-05-14 VITALS
WEIGHT: 211 LBS | OXYGEN SATURATION: 96 % | DIASTOLIC BLOOD PRESSURE: 82 MMHG | HEIGHT: 66 IN | HEART RATE: 68 BPM | SYSTOLIC BLOOD PRESSURE: 148 MMHG | TEMPERATURE: 97.5 F | BODY MASS INDEX: 33.91 KG/M2 | RESPIRATION RATE: 18 BRPM

## 2024-05-14 VITALS — RESPIRATION RATE: 16 BRPM | OXYGEN SATURATION: 97 % | HEART RATE: 63 BPM

## 2024-05-14 DIAGNOSIS — I25.10 CAD, MULTIPLE VESSEL: ICD-10-CM

## 2024-05-14 DIAGNOSIS — I25.10 CAD, MULTIPLE VESSEL: Chronic | ICD-10-CM

## 2024-05-14 DIAGNOSIS — R79.1 ABNORMAL COAGULATION PROFILE: ICD-10-CM

## 2024-05-14 DIAGNOSIS — R79.9 ABNORMAL FINDING OF BLOOD CHEMISTRY, UNSPECIFIED: ICD-10-CM

## 2024-05-14 LAB
ABO GROUP BLD: NORMAL
ALBUMIN SERPL-MCNC: 4.2 G/DL (ref 3.5–5.2)
ALBUMIN/GLOB SERPL: 1.4 G/DL
ALP SERPL-CCNC: 38 U/L (ref 39–117)
ALT SERPL W P-5'-P-CCNC: 13 U/L (ref 1–41)
ANION GAP SERPL CALCULATED.3IONS-SCNC: 10 MMOL/L (ref 5–15)
APTT PPP: 27.3 SECONDS (ref 24–31)
ARTERIAL PATENCY WRIST A: POSITIVE
AST SERPL-CCNC: 13 U/L (ref 1–40)
ATMOSPHERIC PRESS: ABNORMAL MM[HG]
BACTERIA UR QL AUTO: NORMAL /HPF
BASE EXCESS BLDA CALC-SCNC: 2.4 MMOL/L (ref 0–3)
BASOPHILS # BLD AUTO: 0.03 10*3/MM3 (ref 0–0.2)
BASOPHILS NFR BLD AUTO: 0.5 % (ref 0–1.5)
BDY SITE: ABNORMAL
BILIRUB SERPL-MCNC: 0.2 MG/DL (ref 0–1.2)
BILIRUB UR QL STRIP: NEGATIVE
BLD GP AB SCN SERPL QL: NEGATIVE
BUN SERPL-MCNC: 34 MG/DL (ref 8–23)
BUN/CREAT SERPL: 24.1 (ref 7–25)
CALCIUM SPEC-SCNC: 9.4 MG/DL (ref 8.6–10.5)
CHLORIDE SERPL-SCNC: 100 MMOL/L (ref 98–107)
CLARITY UR: CLEAR
CLOSE TME COLL+ADP + EPINEP PNL BLD: 96 % (ref 86–100)
CO2 BLDA-SCNC: 27.3 MMOL/L (ref 22–29)
CO2 SERPL-SCNC: 29 MMOL/L (ref 22–29)
COLOR UR: YELLOW
CREAT SERPL-MCNC: 1.41 MG/DL (ref 0.76–1.27)
DEPRECATED RDW RBC AUTO: 50.5 FL (ref 37–54)
EGFRCR SERPLBLD CKD-EPI 2021: 53.6 ML/MIN/1.73
EOSINOPHIL # BLD AUTO: 0.23 10*3/MM3 (ref 0–0.4)
EOSINOPHIL NFR BLD AUTO: 4.2 % (ref 0.3–6.2)
ERYTHROCYTE [DISTWIDTH] IN BLOOD BY AUTOMATED COUNT: 16.2 % (ref 12.3–15.4)
GLOBULIN UR ELPH-MCNC: 3 GM/DL
GLUCOSE SERPL-MCNC: 93 MG/DL (ref 65–99)
GLUCOSE UR STRIP-MCNC: NEGATIVE MG/DL
HBA1C MFR BLD: 6.1 % (ref 4.8–5.6)
HCO3 BLDA-SCNC: 26.2 MMOL/L (ref 21–28)
HCT VFR BLD AUTO: 32.1 % (ref 37.5–51)
HEMODILUTION: NO
HGB BLD-MCNC: 9.8 G/DL (ref 13–17.7)
HGB UR QL STRIP.AUTO: NEGATIVE
HYALINE CASTS UR QL AUTO: NORMAL /LPF
IMM GRANULOCYTES # BLD AUTO: 0.02 10*3/MM3 (ref 0–0.05)
IMM GRANULOCYTES NFR BLD AUTO: 0.4 % (ref 0–0.5)
INHALED O2 CONCENTRATION: 21 %
INR PPP: 1.07 (ref 0.93–1.1)
KETONES UR QL STRIP: NEGATIVE
LEUKOCYTE ESTERASE UR QL STRIP.AUTO: ABNORMAL
LYMPHOCYTES # BLD AUTO: 0.75 10*3/MM3 (ref 0.7–3.1)
LYMPHOCYTES NFR BLD AUTO: 13.6 % (ref 19.6–45.3)
MCH RBC QN AUTO: 26 PG (ref 26.6–33)
MCHC RBC AUTO-ENTMCNC: 30.5 G/DL (ref 31.5–35.7)
MCV RBC AUTO: 85.1 FL (ref 79–97)
MODALITY: ABNORMAL
MONOCYTES # BLD AUTO: 0.55 10*3/MM3 (ref 0.1–0.9)
MONOCYTES NFR BLD AUTO: 10 % (ref 5–12)
MRSA DNA SPEC QL NAA+PROBE: NORMAL
NEUTROPHILS NFR BLD AUTO: 3.94 10*3/MM3 (ref 1.7–7)
NEUTROPHILS NFR BLD AUTO: 71.3 % (ref 42.7–76)
NITRITE UR QL STRIP: NEGATIVE
NRBC BLD AUTO-RTO: 0 /100 WBC (ref 0–0.2)
PCO2 BLDA: 36.3 MM HG (ref 35–48)
PH BLDA: 7.47 PH UNITS (ref 7.35–7.45)
PH UR STRIP.AUTO: 7 [PH] (ref 5–8)
PLATELET # BLD AUTO: 276 10*3/MM3 (ref 140–450)
PMV BLD AUTO: 10.2 FL (ref 6–12)
PO2 BLDA: 76.4 MM HG (ref 83–108)
POTASSIUM SERPL-SCNC: 4.4 MMOL/L (ref 3.5–5.2)
PROT SERPL-MCNC: 7.2 G/DL (ref 6–8.5)
PROT UR QL STRIP: NEGATIVE
PROTHROMBIN TIME: 11.6 SECONDS (ref 9.6–11.7)
QT INTERVAL: 422 MS
QTC INTERVAL: 430 MS
RBC # BLD AUTO: 3.77 10*6/MM3 (ref 4.14–5.8)
RBC # UR STRIP: NORMAL /HPF
REF LAB TEST METHOD: NORMAL
RH BLD: POSITIVE
SAO2 % BLDCOA: 96 % (ref 94–98)
SARS-COV-2 RNA RESP QL NAA+PROBE: NOT DETECTED
SODIUM SERPL-SCNC: 139 MMOL/L (ref 136–145)
SP GR UR STRIP: 1.01 (ref 1–1.03)
SQUAMOUS #/AREA URNS HPF: NORMAL /HPF
T&S EXPIRATION DATE: NORMAL
UROBILINOGEN UR QL STRIP: ABNORMAL
WBC # UR STRIP: NORMAL /HPF
WBC NRBC COR # BLD AUTO: 5.52 10*3/MM3 (ref 3.4–10.8)

## 2024-05-14 PROCEDURE — 85730 THROMBOPLASTIN TIME PARTIAL: CPT

## 2024-05-14 PROCEDURE — 83036 HEMOGLOBIN GLYCOSYLATED A1C: CPT

## 2024-05-14 PROCEDURE — 86900 BLOOD TYPING SEROLOGIC ABO: CPT

## 2024-05-14 PROCEDURE — 81001 URINALYSIS AUTO W/SCOPE: CPT

## 2024-05-14 PROCEDURE — 93005 ELECTROCARDIOGRAM TRACING: CPT | Performed by: NURSE PRACTITIONER

## 2024-05-14 PROCEDURE — 86923 COMPATIBILITY TEST ELECTRIC: CPT

## 2024-05-14 PROCEDURE — 94727 GAS DIL/WSHOT DETER LNG VOL: CPT

## 2024-05-14 PROCEDURE — A9270 NON-COVERED ITEM OR SERVICE: HCPCS | Performed by: NURSE PRACTITIONER

## 2024-05-14 PROCEDURE — 82803 BLOOD GASES ANY COMBINATION: CPT | Performed by: NURSE PRACTITIONER

## 2024-05-14 PROCEDURE — 86901 BLOOD TYPING SEROLOGIC RH(D): CPT

## 2024-05-14 PROCEDURE — S0260 H&P FOR SURGERY: HCPCS

## 2024-05-14 PROCEDURE — 85610 PROTHROMBIN TIME: CPT

## 2024-05-14 PROCEDURE — 94664 DEMO&/EVAL PT USE INHALER: CPT

## 2024-05-14 PROCEDURE — 63710000001 ALBUTEROL SULFATE HFA 108 (90 BASE) MCG/ACT AEROSOL SOLUTION 6.7 G INHALER: Performed by: NURSE PRACTITIONER

## 2024-05-14 PROCEDURE — 85576 BLOOD PLATELET AGGREGATION: CPT

## 2024-05-14 PROCEDURE — 36600 WITHDRAWAL OF ARTERIAL BLOOD: CPT | Performed by: NURSE PRACTITIONER

## 2024-05-14 PROCEDURE — 87641 MR-STAPH DNA AMP PROBE: CPT

## 2024-05-14 PROCEDURE — 80053 COMPREHEN METABOLIC PANEL: CPT

## 2024-05-14 PROCEDURE — 87635 SARS-COV-2 COVID-19 AMP PRB: CPT

## 2024-05-14 PROCEDURE — 94729 DIFFUSING CAPACITY: CPT

## 2024-05-14 PROCEDURE — 36415 COLL VENOUS BLD VENIPUNCTURE: CPT

## 2024-05-14 PROCEDURE — 86850 RBC ANTIBODY SCREEN: CPT

## 2024-05-14 PROCEDURE — 85025 COMPLETE CBC W/AUTO DIFF WBC: CPT

## 2024-05-14 PROCEDURE — 71046 X-RAY EXAM CHEST 2 VIEWS: CPT

## 2024-05-14 PROCEDURE — 94799 UNLISTED PULMONARY SVC/PX: CPT

## 2024-05-14 PROCEDURE — 94060 EVALUATION OF WHEEZING: CPT

## 2024-05-14 RX ORDER — ALBUTEROL SULFATE 90 UG/1
2 AEROSOL, METERED RESPIRATORY (INHALATION) ONCE
Status: COMPLETED | OUTPATIENT
Start: 2024-05-14 | End: 2024-05-14

## 2024-05-14 RX ADMIN — ALBUTEROL SULFATE 2 PUFF: 108 AEROSOL, METERED RESPIRATORY (INHALATION) at 08:18

## 2024-05-14 NOTE — ANESTHESIA PREPROCEDURE EVALUATION
Anesthesia Evaluation     Patient summary reviewed and Nursing notes reviewed   no history of anesthetic complications:   NPO Solid Status: > 8 hours  NPO Liquid Status: > 8 hours           Airway   Mallampati: II  TM distance: >3 FB  Neck ROM: full  No difficulty expected  Dental      Pulmonary - normal exam   (+) COPD,sleep apnea on CPAP  Cardiovascular - normal exam    (+) hypertension, past MI  <3 months, CAD, POLANCO, hyperlipidemia      Neuro/Psych  (+) psychiatric history Anxiety  GI/Hepatic/Renal/Endo    (+) obesity, GERD, renal disease- CRI, diabetes mellitus type 2    Musculoskeletal     Abdominal  - normal exam   Substance History - negative use     OB/GYN negative ob/gyn ROS         Other   arthritis,     ROS/Med Hx Other: 3. CORONARY ANGIOGRAPHY:            A: Left main coronary artery: Short and has 20% stenosis in distal  segment            B: Left anterior descending artery: 95% stenosis in proximal  segment of LAD and 80% stenosis of mid LAD.  D1 and D2 branches of LAD  which is small to medium sized vessels had 80% to 90% stenosis at the  ostium            C: Left circumflex coronary artery: LCx has 90% stenosis in the  midsegment and ramus intermedius has 60 to 70% stenosis in the proximal  segment            D: Right coronary artery: RCA has diffuse disease up to 40% in  proximal PDA.  Mild disease in the proximal segment noted     SUMMARY:     Two-vessel coronary artery disease  Preserved left ventricular function                  Anesthesia Plan    ASA 4     Amada, CVL, PAC and general     (Discussed a line, central line, intra op DIGNA, and post op vent. All questions answered.)  intravenous induction   Postoperative Plan: Expected vent after surgery  Anesthetic plan, risks, benefits, and alternatives have been provided, discussed and informed consent has been obtained with: patient.      CODE STATUS:

## 2024-05-14 NOTE — H&P
ADDENDUM     Patient Care Team:  Camryn Salinas MD as PCP - General (Family Medicine)  Ashwin Butcher MD as Consulting Physician (Nephrology)     Chief complaint: Shortness of breath and chest pain        Subjective  History of Present Illness     Patient is a 69 year old male with PMH of HTN, HLD, DM II, COPD, ZHANG, anxiety, CKD III, and hx of kidney stones who presented to ED in [facility] on 4/18 with complaints of chest tightness and associated shortness of breath. Patient had noticed he had limitation in activity ongoing for a few weeks and this has gradually worsened. He also noticed weight change and peripheral edema. Patient was ruled in for NSTEMI, placed on Heparin gtt, and was transferred to [facility] ER for Cardiology evaluation. CXR was also concerning for pulmonary edema vs. Pneumonia and Pulmonology was also consulted. Respiratory cultures came back positive for human metapneumovirus. BNP was elevated and patient with acute on chronic HF. Patient was diuresed but eventually developed EN. Patient was medically optimized prior to cardiac catheterization which was completed today by Dr. Lorenzo. Cardiac catheterization showed patient to have severe coronary artery disease. Cardiac surgery was consulted for evaluation of patient and recommendation regarding surgical intervention. Patient denies previous cardiac stenting or arhythmia. Patient does have hx of kidney and pulmonary disease. Patient has never smoked and does not drink alcohol. Patient denies taking anticoagulant at home. Patient reports strong family history of coronary disease, stating that his mother, father, and brothers have all had CAD.      Review of Systems   Constitutional:  Positive for activity change, fatigue and unexpected weight change.   Respiratory:  Positive for chest tightness, shortness of breath and wheezing.    Cardiovascular:  Positive for chest pain and leg swelling.   All other systems reviewed and are negative.         Medical History        Past Medical History:   Diagnosis Date    Anxiety      Essential hypertension, benign      Gastroesophageal reflux disease without esophagitis      Kidney stones 05/31/2020     Dr. Kahlil Terrell    Mixed hyperlipidemia      Rotator cuff syndrome       Torn rotator about seven years ago    Seasonal allergic rhinitis due to pollen      Type 2 diabetes mellitus without complication, without long-term current use of insulin           Surgical History         Past Surgical History:   Procedure Laterality Date    CATARACT EXTRACTION Left 08/24/2023     Dr Melendez    CATARACT EXTRACTION Right 09/07/2023     Dr Melendez    CYSTOSCOPY W/ LASER LITHOTRIPSY   01/2021    INGUINAL HERNIA REPAIR Left 10/2009    NOSE SURGERY         x2    UMBILICAL HERNIA REPAIR   10/2009     Dr. Napier               Family History   Problem Relation Age of Onset    Heart disease Mother      Uterine cancer Mother      Thyroid disease Mother      Cancer Mother           Uterus?    Heart disease Father      Stroke Father      COPD Brother      Diabetes Brother      Breast cancer Maternal Aunt      Breast cancer Maternal Grandmother      Diabetes Brother        Social History   Social History            Tobacco Use    Smoking status: Never       Passive exposure: Never    Smokeless tobacco: Never    Tobacco comments:       Family members smoked   Vaping Use    Vaping status: Never Used   Substance Use Topics    Alcohol use: Never    Drug use: Never         Prescriptions Prior to Admission             Facility-Administered Medications Prior to Admission   Medication Dose Route Frequency Provider Last Rate Last Admin    [DISCONTINUED] Allergy Serum Injection  0.1 mL Subcutaneous Once Brad, Camryn GOLD MD                  Medications Prior to Admission   Medication Sig Dispense Refill Last Dose    amLODIPine (NORVASC) 5 MG tablet Take 1 tablet by mouth Daily. 90 tablet 3 4/18/2024    aspirin (aspirin) 81 MG EC tablet Take 1 tablet by  mouth Daily.     4/18/2024    atorvastatin (LIPITOR) 10 MG tablet Take 1 tablet by mouth once daily 90 tablet 3 4/18/2024    citalopram (CeleXA) 20 MG tablet Take 1 tablet by mouth once daily 90 tablet 3 4/18/2024    doxazosin (CARDURA) 1 MG tablet Take 1 tablet by mouth once daily 90 tablet 3 4/18/2024    fenofibrate 160 MG tablet Take 1 tablet by mouth once daily 90 tablet 0 4/18/2024    lisinopril-hydrochlorothiazide (PRINZIDE,ZESTORETIC) 20-12.5 MG per tablet Take 1 tablet by mouth once daily 90 tablet 3 4/18/2024    metFORMIN (GLUCOPHAGE) 1000 MG tablet TAKE 1 TABLET BY MOUTH TWICE DAILY WITH MEALS 60 tablet 12 4/18/2024    montelukast (SINGULAIR) 10 MG tablet Take 1 tablet by mouth Daily. 90 tablet 3 4/18/2024    Multiple Vitamins-Minerals (MULTIVITAMIN ADULT PO) Take 1 tablet by mouth Daily.     4/18/2024    multivitamin (THERAGRAN) tablet tablet Take 1 tablet by mouth Daily.     4/18/2024    Omega-3 Fatty Acids (FISH OIL) 1000 MG capsule delayed-release Take 2 capsules by mouth 2 (Two) Times a Day.     4/18/2024    omeprazole (priLOSEC) 40 MG capsule Take 1 capsule by mouth Daily. 30 capsule 12 4/18/2024    pioglitazone (ACTOS) 30 MG tablet Take 1 tablet by mouth Daily. 30 tablet 12 4/18/2024           Scheduled Medication   allopurinol, 100 mg, Oral, Daily  amLODIPine, 5 mg, Oral, Q24H  aspirin, 81 mg, Oral, Daily  atorvastatin, 40 mg, Oral, Nightly  azelastine, 1 spray, Each Nare, BID  budesonide, 0.5 mg, Nebulization, BID - RT  carvedilol, 12.5 mg, Oral, BID With Meals  citalopram, 20 mg, Oral, Daily  clopidogrel, 75 mg, Oral, Daily  fenofibrate, 145 mg, Oral, Daily  hydrALAZINE, 25 mg, Oral, Q12H  insulin lispro, 2-7 Units, Subcutaneous, 4x Daily AC & at Bedtime  ipratropium-albuterol, 3 mL, Nebulization, 4x Daily - RT  montelukast, 10 mg, Oral, Nightly  pantoprazole, 40 mg, Oral, Q AM  predniSONE, 20 mg, Oral, Daily With Breakfast  senna-docusate sodium, 2 tablet, Oral, BID  sodium chloride, 10 mL,  "Intravenous, Q12H  terazosin, 1 mg, Oral, Nightly         Allergies:  Patient has no known allergies.           Objective  Vital Signs  Temp:  [97 °F (36.1 °C)-97.7 °F (36.5 °C)] 97.6 °F (36.4 °C)  Heart Rate:  [55-86] 68  Resp:  [11-23] 17  BP: (148-191)/() 165/96     Flowsheet Rows       Flowsheet Row First Filed Value   Admission Height 167.6 cm (66\") Documented at 04/18/2024 1301   Admission Weight 95.3 kg (210 lb 1.6 oz) Documented at 04/18/2024 1301             167.6 cm (66\")     Physical Exam  Vitals reviewed.   Constitutional:       General: He is not in acute distress.     Appearance: He is not toxic-appearing.   HENT:      Head: Normocephalic and atraumatic.      Nose: Nose normal. No congestion or rhinorrhea.      Mouth/Throat:      Mouth: Mucous membranes are moist.      Pharynx: Oropharynx is clear.   Eyes:      General: No scleral icterus.     Extraocular Movements: Extraocular movements intact.      Conjunctiva/sclera: Conjunctivae normal.      Pupils: Pupils are equal, round, and reactive to light.   Neck:      Vascular: No carotid bruit.   Cardiovascular:      Rate and Rhythm: Normal rate and regular rhythm.      Heart sounds: No murmur heard.  Pulmonary:      Effort: Pulmonary effort is normal.      Breath sounds: Wheezing and rales present.   Abdominal:      General: Bowel sounds are normal. There is distension.   Musculoskeletal:         General: Normal range of motion.      Cervical back: Normal range of motion and neck supple.      Right lower leg: Edema present.      Left lower leg: Edema present.   Skin:     General: Skin is warm and dry.      Findings: No rash.   Neurological:      General: No focal deficit present.      Mental Status: He is alert and oriented to person, place, and time.   Psychiatric:         Mood and Affect: Mood normal.         Behavior: Behavior normal.         Thought Content: Thought content normal.         Judgment: Judgment normal.            Results Review: "   Lab Results (last 24 hours)         Procedure Component Value Units Date/Time     POC Glucose 4x Daily Before Meals & at Bedtime [[id number]]  (Abnormal) Collected: 04/23/24 1139     Specimen: Blood Updated: 04/23/24 1141       Glucose 106 mg/dL         Comment: Serial Number: [id number]:  [id number]        POC Glucose Once [[id number]]  (Normal) Collected: 04/23/24 0654     Specimen: Blood Updated: 04/23/24 1001       Glucose 103 mg/dL         Comment: Serial Number: [id number]:  [id number]        aPTT [[id number]]  (Normal) Collected: 04/23/24 0911     Specimen: Blood Updated: 04/23/24 0943       PTT 75.4 seconds       Renal Function Panel [[id number]]  (Abnormal) Collected: 04/23/24 0158     Specimen: Blood Updated: 04/23/24 0228       Glucose 118 mg/dL         BUN 55 mg/dL         Creatinine 1.38 mg/dL         Sodium 146 mmol/L         Potassium 4.0 mmol/L         Comment: Slight hemolysis detected by analyzer. Result may be falsely elevated.          Chloride 113 mmol/L         CO2 25.0 mmol/L         Calcium 8.6 mg/dL         Albumin 3.3 g/dL         Phosphorus 2.9 mg/dL         Anion Gap 8.0 mmol/L         BUN/Creatinine Ratio 39.9       eGFR 55.0 mL/min/1.73       Narrative:       GFR Normal >60  Chronic Kidney Disease <60  Kidney Failure <15        aPTT [[id number]]  (Abnormal) Collected: 04/23/24 0158     Specimen: Blood Updated: 04/23/24 0223       PTT 58.6 seconds       CBC & Differential [[id number]]  (Abnormal) Collected: 04/23/24 0158     Specimen: Blood Updated: 04/23/24 0210     Narrative:       The following orders were created for panel order CBC & Differential.  Procedure                               Abnormality         Status                     ---------                               -----------         ------                     CBC Auto Differential[[id number]]        Abnormal            Final result                  Please view results for these tests on the individual orders.      CBC Auto Differential [[id number]]  (Abnormal) Collected: 04/23/24 0158     Specimen: Blood Updated: 04/23/24 0210       WBC 8.17 10*3/mm3         RBC 3.65 10*6/mm3         Hemoglobin 9.4 g/dL         Hematocrit 30.6 %         MCV 83.8 fL         MCH 25.8 pg         MCHC 30.7 g/dL         RDW 15.8 %         RDW-SD 47.8 fl         MPV 10.7 fL         Platelets 252 10*3/mm3         Neutrophil % 73.6 %         Lymphocyte % 11.8 %         Monocyte % 9.9 %         Eosinophil % 0.4 %         Basophil % 0.1 %         Immature Grans % 4.2 %         Neutrophils, Absolute 6.02 10*3/mm3         Lymphocytes, Absolute 0.96 10*3/mm3         Monocytes, Absolute 0.81 10*3/mm3         Eosinophils, Absolute 0.03 10*3/mm3         Basophils, Absolute 0.01 10*3/mm3         Immature Grans, Absolute 0.34 10*3/mm3         nRBC 0.0 /100 WBC       POC Glucose Once [[id number]]  (Abnormal) Collected: 04/22/24 2031     Specimen: Blood Updated: 04/22/24 2034       Glucose 212 mg/dL         Comment: Serial Number: [id number]:  [id number]        POC Glucose Once [[id number]]  (Abnormal) Collected: 04/22/24 1650     Specimen: Blood Updated: 04/22/24 1651       Glucose 195 mg/dL         Comment: Serial Number: [id number]:  [id number]                               Assessment & Plan    NSTEMI (non-ST elevated myocardial infarction)        Assessment & Plan     - Severe CAD involving the LAD, diagonals, and left circ  - NSTEMI -- Plavix on HOLD  - Human metapneumovirus infection, requiring supplemental O2  - COPD  - Acute on chronic HF  - EN with CKD III -- creatinine back to baseline   - HTN  - HLD  - DM II -- A1c 6.2  - Anxiety  - Hx of kidney stones     Dr. Hirsch has reviewed films, recommends surgical revascularization   Hold Plavix, will monitor platelet aggregation   Allow patient to recover from a pulmonary standpoint  Vein mapping and Carotid duplex US ordered   Surgical timing TBD, possible discharge home and schedule for elective  CABG  Will see patient in the morning, further recommendations to follow      Nataliya Calero PA-C  04/23/24  15:17 EDT   Addendum  Patient was seen and examined by me, agree with the findings above.  I have reviewed the cardiac cath and interpreted results myself.  He has multivessel coronary artery disease and non-ST elevation MI.  Of concern is his viral disease with pneumonia.  He is presently on steroids.  I discussed with patient my recommendation of surgery to prolong survival and symptomatic relief as well as to prevent adverse events however I am concerned about surgery in this admission due to active viral pulmonary disease.  I discussed with Dr. Auguste from cardiology the possibility of discharge home for improvement in the pulmonary status and then readmission for surgery within the next 7 to 10 days.  Jean Hirsch MD      ADDENDUM     Saw patient today in [facility], reports he has lost a lot of weight after aggressive diuresis due to fld overload. Patient is followed by Dr. Childress and has baseline CKD. Creatinine today is listed below. I answered all questions/concerns with verbalized understanding. Patient reports some lesion on his leg which is from boils/stasis edema. Patient reports LE swelling is much improved. These areas have scabbing but do not look infected. I will discuss this with Dr. Hirsch. PFTs completed, will also have Dr. Hirsch review study. Educated patient on procedure and expectations following surgery. Patient denies recent illness since last hospitalization. Patient denies hx of smoking, alcohol use, and does not take anticoagulant. HOLD lisinopril. Pre-op studies reviewed and listed below.     COVID-19 -- neg   Hemoglobin A1c -- 6.2  Lipid panel -- WNL   MRSA -- neg   Urinalysis -- neg  EKG -- SR  Vein mapping -- adequate conduit, bilaterally   Carotid duplex US -- without significant stenosis, bilaterally   Creatinine -- 1.41  PT/PTT/INR -- WNL   Platelet aggregation -- 96% (plt ct  276)    Plan for CABG tomorrow morning with Dr. Hirsch.     Nataliya Calero PA-C   5/14/2024

## 2024-05-14 NOTE — ADDENDUM NOTE
Encounter addended by: Berenice Cordoba RN on: 5/14/2024 9:13 AM   Actions taken: Charge Capture section accepted Pt to CT at this time.      Jay Jay Jackson RN  05/16/23 2025

## 2024-05-14 NOTE — PAT
Emergency Contact - Suri Jimenes-friend/GEN- [phone]    Password:  DIANA    5MW= 6.1, 5.3, 6.0 seconds

## 2024-05-15 ENCOUNTER — HOSPITAL ENCOUNTER (INPATIENT)
Facility: HOSPITAL | Age: 69
LOS: 8 days | Discharge: NURSING, REHAB OR LONG-TERM CARE | DRG: 235 | End: 2024-05-23
Attending: THORACIC SURGERY (CARDIOTHORACIC VASCULAR SURGERY) | Admitting: THORACIC SURGERY (CARDIOTHORACIC VASCULAR SURGERY)
Payer: MEDICARE

## 2024-05-15 ENCOUNTER — ANESTHESIA (OUTPATIENT)
Dept: PERIOP | Facility: HOSPITAL | Age: 69
End: 2024-05-15
Payer: MEDICARE

## 2024-05-15 ENCOUNTER — OFFICE VISIT (OUTPATIENT)
Dept: PERIOP | Facility: HOSPITAL | Age: 69
DRG: 235 | End: 2024-05-15
Payer: MEDICARE

## 2024-05-15 ENCOUNTER — APPOINTMENT (OUTPATIENT)
Dept: GENERAL RADIOLOGY | Facility: HOSPITAL | Age: 69
DRG: 235 | End: 2024-05-15
Payer: MEDICARE

## 2024-05-15 DIAGNOSIS — I25.10 CAD, MULTIPLE VESSEL: Chronic | ICD-10-CM

## 2024-05-15 DIAGNOSIS — N18.31 STAGE 3A CHRONIC KIDNEY DISEASE: Primary | Chronic | ICD-10-CM

## 2024-05-15 DIAGNOSIS — I21.4 NSTEMI (NON-ST ELEVATED MYOCARDIAL INFARCTION): ICD-10-CM

## 2024-05-15 LAB
ACT BLD: 116 SECONDS (ref 89–137)
ACT BLD: 140 SECONDS (ref 89–137)
ACT BLD: 299 SECONDS (ref 89–137)
ACT BLD: 330 SECONDS (ref 89–137)
ACT BLD: 342 SECONDS (ref 89–137)
ACT BLD: 348 SECONDS (ref 89–137)
ALBUMIN SERPL-MCNC: 3.4 G/DL (ref 3.5–5.2)
ANION GAP SERPL CALCULATED.3IONS-SCNC: 11 MMOL/L (ref 5–15)
APTT PPP: 21.7 SECONDS (ref 24–31)
ARTERIAL PATENCY WRIST A: ABNORMAL
ATMOSPHERIC PRESS: ABNORMAL MM[HG]
BASE DEFICIT: ABNORMAL
BASE EXCESS BLDA CALC-SCNC: -1.3 MMOL/L (ref 0–3)
BASE EXCESS BLDA CALC-SCNC: -1.6 MMOL/L (ref 0–3)
BASE EXCESS BLDA CALC-SCNC: -2.4 MMOL/L (ref 0–3)
BASE EXCESS BLDA CALC-SCNC: -3.3 MMOL/L (ref 0–3)
BASE EXCESS BLDA CALC-SCNC: -4.7 MMOL/L (ref 0–3)
BASE EXCESS BLDA CALC-SCNC: 0 MMOL/L (ref 0–3)
BASE EXCESS BLDA CALC-SCNC: 0.2 MMOL/L (ref 0–3)
BASE EXCESS BLDA CALC-SCNC: 0.5 MMOL/L (ref 0–3)
BASE EXCESS BLDA CALC-SCNC: 0.6 MMOL/L (ref 0–3)
BASE EXCESS BLDA CALC-SCNC: 1.3 MMOL/L (ref 0–3)
BASE EXCESS BLDA CALC-SCNC: <0 MMOL/L (ref 0–3)
BASE EXCESS BLDV CALC-SCNC: -0.5 MMOL/L (ref -2–2)
BASE EXCESS BLDV CALC-SCNC: ABNORMAL MMOL/L
BASOPHILS # BLD AUTO: 0.01 10*3/MM3 (ref 0–0.2)
BASOPHILS NFR BLD AUTO: 0.1 % (ref 0–1.5)
BDY SITE: ABNORMAL
BH BB BLOOD EXPIRATION DATE: NORMAL
BH BB BLOOD EXPIRATION DATE: NORMAL
BH BB BLOOD TYPE BARCODE: 6200
BH BB BLOOD TYPE BARCODE: 6200
BH BB DISPENSE STATUS: NORMAL
BH BB DISPENSE STATUS: NORMAL
BH BB PRODUCT CODE: NORMAL
BH BB PRODUCT CODE: NORMAL
BH BB UNIT NUMBER: NORMAL
BH BB UNIT NUMBER: NORMAL
BUN SERPL-MCNC: 35 MG/DL (ref 8–23)
BUN/CREAT SERPL: 24.3 (ref 7–25)
CA-I BLDA-SCNC: 1.06 MMOL/L (ref 1.12–1.32)
CA-I BLDA-SCNC: 1.08 MMOL/L (ref 1.12–1.32)
CA-I BLDA-SCNC: 1.09 MMOL/L (ref 1.12–1.32)
CA-I BLDA-SCNC: 1.15 MMOL/L (ref 1.12–1.32)
CA-I BLDA-SCNC: 1.16 MMOL/L (ref 1.15–1.33)
CA-I BLDA-SCNC: 1.17 MMOL/L (ref 1.15–1.33)
CA-I BLDA-SCNC: 1.21 MMOL/L (ref 1.12–1.32)
CA-I BLDA-SCNC: 1.24 MMOL/L (ref 1.15–1.33)
CA-I BLDA-SCNC: 1.24 MMOL/L (ref 1.15–1.33)
CA-I BLDA-SCNC: 1.26 MMOL/L (ref 1.15–1.33)
CA-I SERPL ISE-MCNC: 1.16 MMOL/L (ref 1.2–1.3)
CALCIUM SPEC-SCNC: 7.9 MG/DL (ref 8.6–10.5)
CHLORIDE SERPL-SCNC: 104 MMOL/L (ref 98–107)
CO2 BLDA-SCNC: 25 MMOL/L (ref 23–27)
CO2 BLDA-SCNC: 25.1 MMOL/L (ref 22–29)
CO2 BLDA-SCNC: 25.5 MMOL/L (ref 22–29)
CO2 BLDA-SCNC: 25.5 MMOL/L (ref 22–29)
CO2 BLDA-SCNC: 25.8 MMOL/L (ref 22–29)
CO2 BLDA-SCNC: 26 MMOL/L (ref 23–27)
CO2 BLDA-SCNC: 26.1 MMOL/L (ref 22–29)
CO2 BLDA-SCNC: 28 MMOL/L (ref 23–27)
CO2 BLDA-SCNC: 28 MMOL/L (ref 23–27)
CO2 CONTENT VENOUS: ABNORMAL
CO2 SERPL-SCNC: 24 MMOL/L (ref 22–29)
CREAT BLDA-MCNC: 1.33 MG/DL (ref 0.6–1.3)
CREAT BLDA-MCNC: 1.47 MG/DL (ref 0.6–1.3)
CREAT BLDA-MCNC: 1.5 MG/DL (ref 0.6–1.3)
CREAT BLDA-MCNC: 1.53 MG/DL (ref 0.6–1.3)
CREAT SERPL-MCNC: 1.44 MG/DL (ref 0.76–1.27)
CROSSMATCH INTERPRETATION: NORMAL
CROSSMATCH INTERPRETATION: NORMAL
D-LACTATE SERPL-SCNC: 0.6 MMOL/L (ref 0.2–2)
D-LACTATE SERPL-SCNC: 0.7 MMOL/L (ref 0.2–2)
D-LACTATE SERPL-SCNC: 0.8 MMOL/L (ref 0.2–2)
D-LACTATE SERPL-SCNC: 1 MMOL/L (ref 0.2–2)
DEPRECATED RDW RBC AUTO: 49.6 FL (ref 37–54)
EGFRCR SERPLBLD CKD-EPI 2021: 48.6 ML/MIN/1.73
EGFRCR SERPLBLD CKD-EPI 2021: 49.8 ML/MIN/1.73
EGFRCR SERPLBLD CKD-EPI 2021: 51 ML/MIN/1.73
EGFRCR SERPLBLD CKD-EPI 2021: 52.3 ML/MIN/1.73
EGFRCR SERPLBLD CKD-EPI 2021: 57.5 ML/MIN/1.73
EOSINOPHIL # BLD AUTO: 0.14 10*3/MM3 (ref 0–0.4)
EOSINOPHIL NFR BLD AUTO: 1.6 % (ref 0.3–6.2)
ERYTHROCYTE [DISTWIDTH] IN BLOOD BY AUTOMATED COUNT: 16 % (ref 12.3–15.4)
GLUCOSE BLDC GLUCOMTR-MCNC: 102 MG/DL (ref 74–100)
GLUCOSE BLDC GLUCOMTR-MCNC: 107 MG/DL (ref 70–105)
GLUCOSE BLDC GLUCOMTR-MCNC: 107 MG/DL (ref 70–105)
GLUCOSE BLDC GLUCOMTR-MCNC: 110 MG/DL (ref 74–100)
GLUCOSE BLDC GLUCOMTR-MCNC: 110 MG/DL (ref 74–100)
GLUCOSE BLDC GLUCOMTR-MCNC: 111 MG/DL (ref 70–105)
GLUCOSE BLDC GLUCOMTR-MCNC: 120 MG/DL (ref 74–100)
GLUCOSE BLDC GLUCOMTR-MCNC: 120 MG/DL (ref 74–100)
GLUCOSE BLDC GLUCOMTR-MCNC: 121 MG/DL (ref 70–105)
GLUCOSE BLDC GLUCOMTR-MCNC: 123 MG/DL (ref 74–100)
GLUCOSE BLDC GLUCOMTR-MCNC: 127 MG/DL (ref 70–105)
GLUCOSE BLDC GLUCOMTR-MCNC: 128 MG/DL (ref 70–105)
GLUCOSE BLDC GLUCOMTR-MCNC: 139 MG/DL (ref 70–105)
GLUCOSE BLDC GLUCOMTR-MCNC: 147 MG/DL (ref 74–100)
GLUCOSE BLDC GLUCOMTR-MCNC: 147 MG/DL (ref 74–100)
GLUCOSE BLDC GLUCOMTR-MCNC: 149 MG/DL (ref 70–105)
GLUCOSE BLDC GLUCOMTR-MCNC: 157 MG/DL (ref 74–100)
GLUCOSE BLDC GLUCOMTR-MCNC: 157 MG/DL (ref 74–100)
GLUCOSE BLDC GLUCOMTR-MCNC: 161 MG/DL (ref 70–105)
GLUCOSE BLDC GLUCOMTR-MCNC: 171 MG/DL (ref 70–105)
GLUCOSE BLDC GLUCOMTR-MCNC: 190 MG/DL (ref 70–105)
GLUCOSE BLDC GLUCOMTR-MCNC: 199 MG/DL (ref 70–105)
GLUCOSE BLDC GLUCOMTR-MCNC: 62 MG/DL (ref 74–100)
GLUCOSE BLDC GLUCOMTR-MCNC: 89 MG/DL (ref 74–100)
GLUCOSE BLDC GLUCOMTR-MCNC: 90 MG/DL (ref 74–100)
GLUCOSE BLDC GLUCOMTR-MCNC: 93 MG/DL (ref 70–105)
GLUCOSE SERPL-MCNC: 131 MG/DL (ref 65–99)
HCO3 BLDA-SCNC: 21.4 MMOL/L (ref 21–28)
HCO3 BLDA-SCNC: 22.8 MMOL/L (ref 21–28)
HCO3 BLDA-SCNC: 23.2 MMOL/L (ref 22–26)
HCO3 BLDA-SCNC: 24 MMOL/L (ref 21–28)
HCO3 BLDA-SCNC: 24.1 MMOL/L (ref 21–28)
HCO3 BLDA-SCNC: 24.4 MMOL/L (ref 21–28)
HCO3 BLDA-SCNC: 24.7 MMOL/L (ref 21–28)
HCO3 BLDA-SCNC: 24.8 MMOL/L (ref 21–28)
HCO3 BLDA-SCNC: 25 MMOL/L (ref 22–26)
HCO3 BLDA-SCNC: 26.2 MMOL/L (ref 22–26)
HCO3 BLDA-SCNC: 26.5 MMOL/L (ref 22–26)
HCO3 BLDV-SCNC: 24.1 MMOL/L (ref 22–26)
HCO3 BLDV-SCNC: 25.7 MMOL/L (ref 23–28)
HCT VFR BLD AUTO: 27.2 % (ref 37.5–51)
HCT VFR BLDA CALC: 21 % (ref 38–51)
HCT VFR BLDA CALC: 23 % (ref 38–51)
HCT VFR BLDA CALC: 23 % (ref 38–51)
HCT VFR BLDA CALC: 24 % (ref 38–51)
HCT VFR BLDA CALC: 25 % (ref 38–51)
HCT VFR BLDA CALC: 25 % (ref 38–51)
HEMODILUTION: NO
HGB BLD-MCNC: 8.4 G/DL (ref 13–17.7)
HGB BLDA-MCNC: 7.1 G/DL (ref 12–17)
HGB BLDA-MCNC: 7.8 G/DL (ref 12–17)
HGB BLDA-MCNC: 7.9 G/DL (ref 12–17)
HGB BLDA-MCNC: 8.2 G/DL (ref 12–17)
HGB BLDA-MCNC: 8.3 G/DL (ref 12–17)
HGB BLDA-MCNC: 8.5 G/DL (ref 12–17)
HGB BLDA-MCNC: 8.6 G/DL (ref 12–17)
IMM GRANULOCYTES # BLD AUTO: 0.14 10*3/MM3 (ref 0–0.05)
IMM GRANULOCYTES NFR BLD AUTO: 1.6 % (ref 0–0.5)
INHALED O2 CONCENTRATION: 40 %
INHALED O2 CONCENTRATION: 50 %
INHALED O2 CONCENTRATION: 52 %
INHALED O2 CONCENTRATION: 70 %
INR PPP: 1.2 (ref 0.93–1.1)
LYMPHOCYTES # BLD AUTO: 0.46 10*3/MM3 (ref 0.7–3.1)
LYMPHOCYTES NFR BLD AUTO: 5.2 % (ref 19.6–45.3)
MCH RBC QN AUTO: 26.1 PG (ref 26.6–33)
MCHC RBC AUTO-ENTMCNC: 30.9 G/DL (ref 31.5–35.7)
MCV RBC AUTO: 84.5 FL (ref 79–97)
MODALITY: ABNORMAL
MONOCYTES # BLD AUTO: 0.72 10*3/MM3 (ref 0.1–0.9)
MONOCYTES NFR BLD AUTO: 8.2 % (ref 5–12)
NEUTROPHILS NFR BLD AUTO: 7.3 10*3/MM3 (ref 1.7–7)
NEUTROPHILS NFR BLD AUTO: 83.3 % (ref 42.7–76)
NRBC BLD AUTO-RTO: 0 /100 WBC (ref 0–0.2)
PCO2 BLDA: 33.1 MM HG (ref 35–48)
PCO2 BLDA: 33.5 MM HG (ref 35–48)
PCO2 BLDA: 35.3 MM HG (ref 35–48)
PCO2 BLDA: 36.5 MM HG (ref 35–48)
PCO2 BLDA: 40.4 MM HG (ref 35–45)
PCO2 BLDA: 43.4 MM HG (ref 35–48)
PCO2 BLDA: 44.6 MM HG (ref 35–45)
PCO2 BLDA: 44.7 MM HG (ref 35–48)
PCO2 BLDA: 46.3 MM HG (ref 35–48)
PCO2 BLDA: 46.6 MM HG (ref 35–48)
PCO2 BLDA: 48.5 MM HG (ref 35–48)
PCO2 BLDA: 50.8 MM HG (ref 35–45)
PCO2 BLDA: 51.6 MM HG (ref 35–45)
PCO2 BLDV: 38.5 MM HG (ref 42–51)
PCO2 BLDV: 55.2 MM HG (ref 41–51)
PEEP RESPIRATORY: 5 CM[H2O]
PEEP RESPIRATORY: 6 CM[H2O]
PEEP RESPIRATORY: 8 CM[H2O]
PEEP RESPIRATORY: 8 CM[H2O]
PH BLDA: 7.3 PH UNITS (ref 7.35–7.45)
PH BLDA: 7.3 PH UNITS (ref 7.35–7.45)
PH BLDA: 7.32 PH UNITS (ref 7.35–7.45)
PH BLDA: 7.33 PH UNITS (ref 7.35–7.45)
PH BLDA: 7.33 PH UNITS (ref 7.35–7.45)
PH BLDA: 7.4 PH UNITS (ref 7.35–7.45)
PH BLDA: 7.43 PH UNITS (ref 7.35–7.45)
PH BLDA: 7.45 PH UNITS (ref 7.35–7.45)
PH BLDA: 7.47 PH UNITS (ref 7.35–7.45)
PH BLDA: 7.48 PH UNITS (ref 7.35–7.45)
PH BLDV: 7.28 PH UNITS (ref 7.31–7.41)
PH BLDV: 7.41 PH UNITS (ref 7.32–7.43)
PHOSPHATE SERPL-MCNC: 2.4 MG/DL (ref 2.5–4.5)
PLATELET # BLD AUTO: 167 10*3/MM3 (ref 140–450)
PMV BLD AUTO: 9.8 FL (ref 6–12)
PO2 BLDA: 104.4 MM HG (ref 83–108)
PO2 BLDA: 109.9 MM HG (ref 83–108)
PO2 BLDA: 139.4 MM HG (ref 83–108)
PO2 BLDA: 243 MM HG (ref 80–105)
PO2 BLDA: 518 MM HG (ref 80–105)
PO2 BLDA: 589 MM HG (ref 80–105)
PO2 BLDA: 68.8 MM HG (ref 83–108)
PO2 BLDA: 70 MM HG (ref 80–105)
PO2 BLDA: 72.7 MM HG (ref 83–108)
PO2 BLDA: 79.7 MM HG (ref 83–108)
PO2 BLDA: 79.8 MM HG (ref 83–108)
PO2 BLDA: 80.4 MM HG (ref 83–108)
PO2 BLDA: 91 MM HG (ref 83–108)
PO2 BLDV: 32.1 MM HG (ref 40–42)
PO2 BLDV: 44 MM HG (ref 35–42)
POTASSIUM BLDA-SCNC: 3.7 MMOL/L (ref 3.5–4.5)
POTASSIUM BLDA-SCNC: 3.8 MMOL/L (ref 3.5–4.5)
POTASSIUM BLDA-SCNC: 3.9 MMOL/L (ref 3.5–4.9)
POTASSIUM BLDA-SCNC: 4 MMOL/L (ref 3.5–4.5)
POTASSIUM BLDA-SCNC: 4 MMOL/L (ref 3.5–4.5)
POTASSIUM BLDA-SCNC: 4.2 MMOL/L (ref 3.5–4.9)
POTASSIUM BLDA-SCNC: 4.4 MMOL/L (ref 3.5–4.5)
POTASSIUM BLDA-SCNC: 4.4 MMOL/L (ref 3.5–4.9)
POTASSIUM BLDA-SCNC: 4.5 MMOL/L (ref 3.5–4.9)
POTASSIUM BLDA-SCNC: 4.7 MMOL/L (ref 3.5–4.9)
POTASSIUM SERPL-SCNC: 4.2 MMOL/L (ref 3.5–5.2)
PROTHROMBIN TIME: 12.9 SECONDS (ref 9.6–11.7)
PSV: 10 CMH2O
PSV: 10 CMH2O
RBC # BLD AUTO: 3.22 10*6/MM3 (ref 4.14–5.8)
RESPIRATORY RATE: 14
RESPIRATORY RATE: 19
RESPIRATORY RATE: 21
SAO2 % BLDCOA: 100 % (ref 95–98)
SAO2 % BLDCOA: 92 % (ref 95–98)
SAO2 % BLDCOA: 93.1 % (ref 94–98)
SAO2 % BLDCOA: 94.8 % (ref 94–98)
SAO2 % BLDCOA: 94.8 % (ref 94–98)
SAO2 % BLDCOA: 96 % (ref 94–98)
SAO2 % BLDCOA: 96.1 % (ref 94–98)
SAO2 % BLDCOA: 96.3 % (ref 94–98)
SAO2 % BLDCOA: 97.4 % (ref 94–98)
SAO2 % BLDCOA: 97.8 % (ref 94–98)
SAO2 % BLDCOA: 99.3 % (ref 94–98)
SAO2 % BLDCOV: 27 % (ref 45–75)
SAO2 % BLDCOV: 62.4 % (ref 45–75)
SODIUM BLD-SCNC: 133 MMOL/L (ref 138–146)
SODIUM BLD-SCNC: 134 MMOL/L (ref 138–146)
SODIUM BLD-SCNC: 135 MMOL/L (ref 138–146)
SODIUM BLD-SCNC: 135 MMOL/L (ref 138–146)
SODIUM BLD-SCNC: 139 MMOL/L (ref 138–146)
SODIUM BLD-SCNC: 140 MMOL/L (ref 138–146)
SODIUM BLD-SCNC: 142 MMOL/L (ref 138–146)
SODIUM BLD-SCNC: 142 MMOL/L (ref 138–146)
SODIUM BLD-SCNC: 143 MMOL/L (ref 138–146)
SODIUM BLD-SCNC: 143 MMOL/L (ref 138–146)
SODIUM SERPL-SCNC: 139 MMOL/L (ref 136–145)
UNIT  ABO: NORMAL
UNIT  ABO: NORMAL
UNIT  RH: NORMAL
UNIT  RH: NORMAL
VENTILATOR MODE: ABNORMAL
VENTILATOR MODE: AC
VT ON VENT VENT: 447 ML
VT ON VENT VENT: 543 ML
VT ON VENT VENT: 700 ML
VT ON VENT VENT: 750 ML
WBC NRBC COR # BLD AUTO: 8.77 10*3/MM3 (ref 3.4–10.8)

## 2024-05-15 PROCEDURE — 82330 ASSAY OF CALCIUM: CPT | Performed by: NURSE PRACTITIONER

## 2024-05-15 PROCEDURE — 33508 ENDOSCOPIC VEIN HARVEST: CPT | Performed by: THORACIC SURGERY (CARDIOTHORACIC VASCULAR SURGERY)

## 2024-05-15 PROCEDURE — 80051 ELECTROLYTE PANEL: CPT

## 2024-05-15 PROCEDURE — 33519 CABG ARTERY-VEIN THREE: CPT | Performed by: THORACIC SURGERY (CARDIOTHORACIC VASCULAR SURGERY)

## 2024-05-15 PROCEDURE — C1713 ANCHOR/SCREW BN/BN,TIS/BN: HCPCS | Performed by: THORACIC SURGERY (CARDIOTHORACIC VASCULAR SURGERY)

## 2024-05-15 PROCEDURE — 25810000003 SODIUM CHLORIDE PER 500 ML: Performed by: THORACIC SURGERY (CARDIOTHORACIC VASCULAR SURGERY)

## 2024-05-15 PROCEDURE — 85014 HEMATOCRIT: CPT

## 2024-05-15 PROCEDURE — 82803 BLOOD GASES ANY COMBINATION: CPT | Performed by: NURSE PRACTITIONER

## 2024-05-15 PROCEDURE — B24BZZ4 ULTRASONOGRAPHY OF HEART WITH AORTA, TRANSESOPHAGEAL: ICD-10-PCS | Performed by: THORACIC SURGERY (CARDIOTHORACIC VASCULAR SURGERY)

## 2024-05-15 PROCEDURE — C1751 CATH, INF, PER/CENT/MIDLINE: HCPCS | Performed by: ANESTHESIOLOGY

## 2024-05-15 PROCEDURE — 06BQ4ZZ EXCISION OF LEFT SAPHENOUS VEIN, PERCUTANEOUS ENDOSCOPIC APPROACH: ICD-10-PCS | Performed by: THORACIC SURGERY (CARDIOTHORACIC VASCULAR SURGERY)

## 2024-05-15 PROCEDURE — 85018 HEMOGLOBIN: CPT

## 2024-05-15 PROCEDURE — 25010000002 ALBUMIN HUMAN 5% PER 50 ML: Performed by: NURSE PRACTITIONER

## 2024-05-15 PROCEDURE — 85025 COMPLETE CBC W/AUTO DIFF WBC: CPT | Performed by: NURSE PRACTITIONER

## 2024-05-15 PROCEDURE — C1729 CATH, DRAINAGE: HCPCS | Performed by: THORACIC SURGERY (CARDIOTHORACIC VASCULAR SURGERY)

## 2024-05-15 PROCEDURE — 5A1221Z PERFORMANCE OF CARDIAC OUTPUT, CONTINUOUS: ICD-10-PCS | Performed by: THORACIC SURGERY (CARDIOTHORACIC VASCULAR SURGERY)

## 2024-05-15 PROCEDURE — 25010000002 CEFAZOLIN PER 500 MG: Performed by: NURSE PRACTITIONER

## 2024-05-15 PROCEDURE — C1887 CATHETER, GUIDING: HCPCS | Performed by: THORACIC SURGERY (CARDIOTHORACIC VASCULAR SURGERY)

## 2024-05-15 PROCEDURE — 82948 REAGENT STRIP/BLOOD GLUCOSE: CPT

## 2024-05-15 PROCEDURE — 71045 X-RAY EXAM CHEST 1 VIEW: CPT

## 2024-05-15 PROCEDURE — 84295 ASSAY OF SERUM SODIUM: CPT

## 2024-05-15 PROCEDURE — 82330 ASSAY OF CALCIUM: CPT

## 2024-05-15 PROCEDURE — 33533 CABG ARTERIAL SINGLE: CPT | Performed by: THORACIC SURGERY (CARDIOTHORACIC VASCULAR SURGERY)

## 2024-05-15 PROCEDURE — 25010000002 MAGNESIUM SULFATE PER 500 MG OF MAGNESIUM: Performed by: ANESTHESIOLOGY

## 2024-05-15 PROCEDURE — 25010000002 MIDAZOLAM PER 1 MG: Performed by: ANESTHESIOLOGY

## 2024-05-15 PROCEDURE — 02100Z9 BYPASS CORONARY ARTERY, ONE ARTERY FROM LEFT INTERNAL MAMMARY, OPEN APPROACH: ICD-10-PCS | Performed by: THORACIC SURGERY (CARDIOTHORACIC VASCULAR SURGERY)

## 2024-05-15 PROCEDURE — 25010000002 HEPARIN (PORCINE) PER 1000 UNITS: Performed by: THORACIC SURGERY (CARDIOTHORACIC VASCULAR SURGERY)

## 2024-05-15 PROCEDURE — 25010000002 CEFAZOLIN PER 500 MG: Performed by: THORACIC SURGERY (CARDIOTHORACIC VASCULAR SURGERY)

## 2024-05-15 PROCEDURE — 82947 ASSAY GLUCOSE BLOOD QUANT: CPT

## 2024-05-15 PROCEDURE — 94799 UNLISTED PULMONARY SVC/PX: CPT

## 2024-05-15 PROCEDURE — 84132 ASSAY OF SERUM POTASSIUM: CPT

## 2024-05-15 PROCEDURE — 25010000002 PROPOFOL 200 MG/20ML EMULSION: Performed by: ANESTHESIOLOGY

## 2024-05-15 PROCEDURE — 25010000002 FENTANYL CITRATE (PF) 250 MCG/5ML SOLUTION: Performed by: ANESTHESIOLOGY

## 2024-05-15 PROCEDURE — P9041 ALBUMIN (HUMAN),5%, 50ML: HCPCS | Performed by: NURSE PRACTITIONER

## 2024-05-15 PROCEDURE — 0212093 BYPASS CORONARY ARTERY, THREE ARTERIES FROM CORONARY ARTERY WITH AUTOLOGOUS VENOUS TISSUE, OPEN APPROACH: ICD-10-PCS | Performed by: THORACIC SURGERY (CARDIOTHORACIC VASCULAR SURGERY)

## 2024-05-15 PROCEDURE — 25010000002 MORPHINE PER 10 MG: Performed by: NURSE PRACTITIONER

## 2024-05-15 PROCEDURE — 25810000003 SODIUM CHLORIDE 0.9 % SOLUTION: Performed by: ANESTHESIOLOGY

## 2024-05-15 PROCEDURE — 94002 VENT MGMT INPAT INIT DAY: CPT

## 2024-05-15 PROCEDURE — 25010000002 HEPARIN (PORCINE) 1000-0.9 UT/500ML-% SOLUTION: Performed by: ANESTHESIOLOGY

## 2024-05-15 PROCEDURE — C1751 CATH, INF, PER/CENT/MIDLINE: HCPCS | Performed by: THORACIC SURGERY (CARDIOTHORACIC VASCULAR SURGERY)

## 2024-05-15 PROCEDURE — A4648 IMPLANTABLE TISSUE MARKER: HCPCS | Performed by: THORACIC SURGERY (CARDIOTHORACIC VASCULAR SURGERY)

## 2024-05-15 PROCEDURE — 25010000002 NICARDIPINE 2.5 MG/ML SOLUTION: Performed by: ANESTHESIOLOGY

## 2024-05-15 PROCEDURE — 25010000002 NICARDIPINE 2.5 MG/ML SOLUTION 10 ML VIAL: Performed by: NURSE PRACTITIONER

## 2024-05-15 PROCEDURE — 82565 ASSAY OF CREATININE: CPT

## 2024-05-15 PROCEDURE — 85610 PROTHROMBIN TIME: CPT | Performed by: NURSE PRACTITIONER

## 2024-05-15 PROCEDURE — 25010000002 ACETAMINOPHEN 10 MG/ML SOLUTION: Performed by: ANESTHESIOLOGY

## 2024-05-15 PROCEDURE — 25010000002 CEFAZOLIN PER 500 MG: Performed by: ANESTHESIOLOGY

## 2024-05-15 PROCEDURE — 94761 N-INVAS EAR/PLS OXIMETRY MLT: CPT

## 2024-05-15 PROCEDURE — 25010000002 ACETAMINOPHEN 10 MG/ML SOLUTION: Performed by: NURSE PRACTITIONER

## 2024-05-15 PROCEDURE — 85730 THROMBOPLASTIN TIME PARTIAL: CPT | Performed by: NURSE PRACTITIONER

## 2024-05-15 PROCEDURE — 83605 ASSAY OF LACTIC ACID: CPT

## 2024-05-15 PROCEDURE — 25010000002 PROTAMINE SULFATE PER 10 MG: Performed by: ANESTHESIOLOGY

## 2024-05-15 PROCEDURE — 82803 BLOOD GASES ANY COMBINATION: CPT

## 2024-05-15 PROCEDURE — 93005 ELECTROCARDIOGRAM TRACING: CPT | Performed by: NURSE PRACTITIONER

## 2024-05-15 PROCEDURE — 80069 RENAL FUNCTION PANEL: CPT | Performed by: NURSE PRACTITIONER

## 2024-05-15 PROCEDURE — 25010000002 POTASSIUM CHLORIDE PER 2 MEQ: Performed by: THORACIC SURGERY (CARDIOTHORACIC VASCULAR SURGERY)

## 2024-05-15 PROCEDURE — 25010000002 CALCIUM GLUCONATE-NACL 1-0.675 GM/50ML-% SOLUTION: Performed by: PHYSICIAN ASSISTANT

## 2024-05-15 PROCEDURE — 99232 SBSQ HOSP IP/OBS MODERATE 35: CPT | Performed by: NURSE PRACTITIONER

## 2024-05-15 PROCEDURE — 25010000002 NITROGLYCERIN 200 MCG/ML SOLUTION: Performed by: ANESTHESIOLOGY

## 2024-05-15 PROCEDURE — 25810000003 SODIUM CHLORIDE 0.9 % SOLUTION 250 ML FLEX CONT: Performed by: NURSE PRACTITIONER

## 2024-05-15 PROCEDURE — 93010 ELECTROCARDIOGRAM REPORT: CPT | Performed by: STUDENT IN AN ORGANIZED HEALTH CARE EDUCATION/TRAINING PROGRAM

## 2024-05-15 PROCEDURE — 25010000002 HEPARIN (PORCINE) PER 1000 UNITS: Performed by: ANESTHESIOLOGY

## 2024-05-15 PROCEDURE — 25010000002 PAPAVERINE PER 60 MG: Performed by: THORACIC SURGERY (CARDIOTHORACIC VASCULAR SURGERY)

## 2024-05-15 PROCEDURE — 25010000002 MAGNESIUM SULFATE IN D5W 1G/100ML (PREMIX) 1-5 GM/100ML-% SOLUTION: Performed by: NURSE PRACTITIONER

## 2024-05-15 PROCEDURE — 85347 COAGULATION TIME ACTIVATED: CPT

## 2024-05-15 PROCEDURE — 93318 ECHO TRANSESOPHAGEAL INTRAOP: CPT

## 2024-05-15 PROCEDURE — 82803 BLOOD GASES ANY COMBINATION: CPT | Performed by: THORACIC SURGERY (CARDIOTHORACIC VASCULAR SURGERY)

## 2024-05-15 PROCEDURE — 25010000002 PHENYLEPHRINE 10 MG/ML SOLUTION: Performed by: ANESTHESIOLOGY

## 2024-05-15 PROCEDURE — 25810000003 SODIUM CHLORIDE 0.9 % SOLUTION: Performed by: NURSE PRACTITIONER

## 2024-05-15 DEVICE — TEMP PACING WIRE
Type: IMPLANTABLE DEVICE | Site: HEART | Status: FUNCTIONAL
Brand: MYO/WIRE

## 2024-05-15 DEVICE — SS SUTURE, 6 PER SLEEVE
Type: IMPLANTABLE DEVICE | Site: STERNUM | Status: FUNCTIONAL
Brand: MYO/WIRE II

## 2024-05-15 DEVICE — ABSORBABLE HEMOSTAT (OXIDIZED REGENERATED CELLULOSE)
Type: IMPLANTABLE DEVICE | Site: MEDIASTINUM | Status: FUNCTIONAL
Brand: SURGICEL

## 2024-05-15 DEVICE — WAX,BONE,NATURAL
Type: IMPLANTABLE DEVICE | Site: STERNUM | Status: FUNCTIONAL
Brand: MEDLINE INDUSTRIES

## 2024-05-15 DEVICE — DEV CONTRL TISS STRATAFIX SPIRAL MNCRYL UD 3/0 PLS 30CM: Type: IMPLANTABLE DEVICE | Site: CHEST | Status: FUNCTIONAL

## 2024-05-15 DEVICE — CLIP LIGAT VASC HORIZON TI SM/WD RED 24CT: Type: IMPLANTABLE DEVICE | Site: MEDIASTINUM | Status: FUNCTIONAL

## 2024-05-15 DEVICE — SS SUTURE, 3 PER SLEEVE
Type: IMPLANTABLE DEVICE | Site: STERNUM | Status: FUNCTIONAL
Brand: MYO/WIRE II

## 2024-05-15 RX ORDER — ACETAMINOPHEN 10 MG/ML
1000 INJECTION, SOLUTION INTRAVENOUS EVERY 8 HOURS
Qty: 200 ML | Refills: 0 | Status: COMPLETED | OUTPATIENT
Start: 2024-05-15 | End: 2024-05-16

## 2024-05-15 RX ORDER — ALBUMIN, HUMAN INJ 5% 5 %
12.5 SOLUTION INTRAVENOUS AS NEEDED
Status: DISCONTINUED | OUTPATIENT
Start: 2024-05-15 | End: 2024-05-16

## 2024-05-15 RX ORDER — AMOXICILLIN 250 MG
2 CAPSULE ORAL 2 TIMES DAILY
Status: DISCONTINUED | OUTPATIENT
Start: 2024-05-15 | End: 2024-05-21

## 2024-05-15 RX ORDER — CEFAZOLIN SODIUM 1 G/3ML
INJECTION, POWDER, FOR SOLUTION INTRAMUSCULAR; INTRAVENOUS AS NEEDED
Status: DISCONTINUED | OUTPATIENT
Start: 2024-05-15 | End: 2024-05-15 | Stop reason: SURG

## 2024-05-15 RX ORDER — ASPIRIN 81 MG/1
81 TABLET ORAL DAILY
Status: DISCONTINUED | OUTPATIENT
Start: 2024-05-16 | End: 2024-05-23 | Stop reason: HOSPADM

## 2024-05-15 RX ORDER — ACETAMINOPHEN 10 MG/ML
INJECTION, SOLUTION INTRAVENOUS AS NEEDED
Status: DISCONTINUED | OUTPATIENT
Start: 2024-05-15 | End: 2024-05-15 | Stop reason: SURG

## 2024-05-15 RX ORDER — PANTOPRAZOLE SODIUM 40 MG/1
40 TABLET, DELAYED RELEASE ORAL DAILY
Status: DISCONTINUED | OUTPATIENT
Start: 2024-05-16 | End: 2024-05-23 | Stop reason: HOSPADM

## 2024-05-15 RX ORDER — POTASSIUM CHLORIDE 29.8 MG/ML
20 INJECTION INTRAVENOUS ONCE
Status: COMPLETED | OUTPATIENT
Start: 2024-05-15 | End: 2024-05-15

## 2024-05-15 RX ORDER — SODIUM CHLORIDE 9 MG/ML
30 INJECTION, SOLUTION INTRAVENOUS CONTINUOUS
Status: DISPENSED | OUTPATIENT
Start: 2024-05-15 | End: 2024-05-18

## 2024-05-15 RX ORDER — AMINOCAPROIC ACID 250 MG/ML
INJECTION, SOLUTION INTRAVENOUS AS NEEDED
Status: DISCONTINUED | OUTPATIENT
Start: 2024-05-15 | End: 2024-05-15 | Stop reason: SURG

## 2024-05-15 RX ORDER — CHLORHEXIDINE GLUCONATE 500 MG/1
CLOTH TOPICAL EVERY 12 HOURS PRN
Status: DISCONTINUED | OUTPATIENT
Start: 2024-05-15 | End: 2024-05-15 | Stop reason: HOSPADM

## 2024-05-15 RX ORDER — CHLORHEXIDINE GLUCONATE ORAL RINSE 1.2 MG/ML
15 SOLUTION DENTAL ONCE
Status: DISCONTINUED | OUTPATIENT
Start: 2024-05-15 | End: 2024-05-15 | Stop reason: HOSPADM

## 2024-05-15 RX ORDER — ATORVASTATIN CALCIUM 40 MG/1
40 TABLET, FILM COATED ORAL NIGHTLY
Status: DISCONTINUED | OUTPATIENT
Start: 2024-05-16 | End: 2024-05-17

## 2024-05-15 RX ORDER — ALPRAZOLAM 0.25 MG/1
0.25 TABLET ORAL ONCE
Status: DISCONTINUED | OUTPATIENT
Start: 2024-05-15 | End: 2024-05-15 | Stop reason: HOSPADM

## 2024-05-15 RX ORDER — ACETAMINOPHEN 325 MG/1
650 TABLET ORAL EVERY 4 HOURS PRN
Status: DISCONTINUED | OUTPATIENT
Start: 2024-05-15 | End: 2024-05-15 | Stop reason: HOSPADM

## 2024-05-15 RX ORDER — SODIUM CHLORIDE 9 MG/ML
40 INJECTION, SOLUTION INTRAVENOUS AS NEEDED
Status: DISCONTINUED | OUTPATIENT
Start: 2024-05-15 | End: 2024-05-15 | Stop reason: HOSPADM

## 2024-05-15 RX ORDER — MORPHINE SULFATE 2 MG/ML
2 INJECTION, SOLUTION INTRAMUSCULAR; INTRAVENOUS
Status: DISCONTINUED | OUTPATIENT
Start: 2024-05-15 | End: 2024-05-16

## 2024-05-15 RX ORDER — EPHEDRINE SULFATE 5 MG/ML
INJECTION INTRAVENOUS AS NEEDED
Status: DISCONTINUED | OUTPATIENT
Start: 2024-05-15 | End: 2024-05-15 | Stop reason: SURG

## 2024-05-15 RX ORDER — ACETAMINOPHEN 650 MG
TABLET, EXTENDED RELEASE ORAL AS NEEDED
Status: DISCONTINUED | OUTPATIENT
Start: 2024-05-15 | End: 2024-05-15 | Stop reason: HOSPADM

## 2024-05-15 RX ORDER — NITROGLYCERIN 20 MG/100ML
INJECTION INTRAVENOUS CONTINUOUS PRN
Status: DISCONTINUED | OUTPATIENT
Start: 2024-05-15 | End: 2024-05-15 | Stop reason: SURG

## 2024-05-15 RX ORDER — NITROGLYCERIN 0.4 MG/1
0.4 TABLET SUBLINGUAL
Status: DISCONTINUED | OUTPATIENT
Start: 2024-05-15 | End: 2024-05-15 | Stop reason: HOSPADM

## 2024-05-15 RX ORDER — ACETAMINOPHEN 650 MG/1
650 SUPPOSITORY RECTAL EVERY 4 HOURS PRN
Status: DISCONTINUED | OUTPATIENT
Start: 2024-05-16 | End: 2024-05-20

## 2024-05-15 RX ORDER — HYDROCODONE BITARTRATE AND ACETAMINOPHEN 5; 325 MG/1; MG/1
1 TABLET ORAL EVERY 4 HOURS PRN
Status: DISCONTINUED | OUTPATIENT
Start: 2024-05-15 | End: 2024-05-23 | Stop reason: HOSPADM

## 2024-05-15 RX ORDER — NALOXONE HCL 0.4 MG/ML
0.4 VIAL (ML) INJECTION
Status: DISCONTINUED | OUTPATIENT
Start: 2024-05-15 | End: 2024-05-23 | Stop reason: HOSPADM

## 2024-05-15 RX ORDER — ASPIRIN 325 MG
325 TABLET ORAL ONCE
Qty: 1 TABLET | Refills: 0 | Status: COMPLETED | OUTPATIENT
Start: 2024-05-15 | End: 2024-05-15

## 2024-05-15 RX ORDER — MIDAZOLAM HYDROCHLORIDE 1 MG/ML
INJECTION INTRAMUSCULAR; INTRAVENOUS AS NEEDED
Status: DISCONTINUED | OUTPATIENT
Start: 2024-05-15 | End: 2024-05-15 | Stop reason: SURG

## 2024-05-15 RX ORDER — SODIUM CHLORIDE 9 MG/ML
INJECTION, SOLUTION INTRAVENOUS AS NEEDED
Status: DISCONTINUED | OUTPATIENT
Start: 2024-05-15 | End: 2024-05-15 | Stop reason: HOSPADM

## 2024-05-15 RX ORDER — SODIUM CHLORIDE 0.9 % (FLUSH) 0.9 %
3-10 SYRINGE (ML) INJECTION AS NEEDED
Status: DISCONTINUED | OUTPATIENT
Start: 2024-05-15 | End: 2024-05-15 | Stop reason: HOSPADM

## 2024-05-15 RX ORDER — MONTELUKAST SODIUM 10 MG/1
10 TABLET ORAL DAILY
Status: DISCONTINUED | OUTPATIENT
Start: 2024-05-16 | End: 2024-05-23 | Stop reason: HOSPADM

## 2024-05-15 RX ORDER — ONDANSETRON 2 MG/ML
4 INJECTION INTRAMUSCULAR; INTRAVENOUS EVERY 6 HOURS PRN
Status: DISCONTINUED | OUTPATIENT
Start: 2024-05-15 | End: 2024-05-23 | Stop reason: HOSPADM

## 2024-05-15 RX ORDER — KETAMINE HCL IN NACL, ISO-OSM 100MG/10ML
SYRINGE (ML) INJECTION AS NEEDED
Status: DISCONTINUED | OUTPATIENT
Start: 2024-05-15 | End: 2024-05-15 | Stop reason: SURG

## 2024-05-15 RX ORDER — CALCIUM GLUCONATE 20 MG/ML
1000 INJECTION, SOLUTION INTRAVENOUS ONCE
Status: COMPLETED | OUTPATIENT
Start: 2024-05-15 | End: 2024-05-15

## 2024-05-15 RX ORDER — NOREPINEPHRINE BITARTRATE 0.03 MG/ML
.02-.3 INJECTION, SOLUTION INTRAVENOUS CONTINUOUS PRN
Status: DISCONTINUED | OUTPATIENT
Start: 2024-05-15 | End: 2024-05-16

## 2024-05-15 RX ORDER — PROPOFOL 10 MG/ML
INJECTION, EMULSION INTRAVENOUS AS NEEDED
Status: DISCONTINUED | OUTPATIENT
Start: 2024-05-15 | End: 2024-05-15 | Stop reason: SURG

## 2024-05-15 RX ORDER — POLYETHYLENE GLYCOL 3350 17 G/17G
17 POWDER, FOR SOLUTION ORAL 2 TIMES DAILY
Status: DISCONTINUED | OUTPATIENT
Start: 2024-05-15 | End: 2024-05-21

## 2024-05-15 RX ORDER — SODIUM CHLORIDE 9 MG/ML
INJECTION, SOLUTION INTRAVENOUS CONTINUOUS PRN
Status: DISCONTINUED | OUTPATIENT
Start: 2024-05-15 | End: 2024-05-15 | Stop reason: SURG

## 2024-05-15 RX ORDER — CHLORHEXIDINE GLUCONATE ORAL RINSE 1.2 MG/ML
15 SOLUTION DENTAL EVERY 12 HOURS
Status: DISCONTINUED | OUTPATIENT
Start: 2024-05-15 | End: 2024-05-22

## 2024-05-15 RX ORDER — DEXTROSE MONOHYDRATE 25 G/50ML
10-50 INJECTION, SOLUTION INTRAVENOUS
Status: DISPENSED | OUTPATIENT
Start: 2024-05-15 | End: 2024-05-18

## 2024-05-15 RX ORDER — SODIUM CHLORIDE 9 MG/ML
30 INJECTION, SOLUTION INTRAVENOUS CONTINUOUS PRN
Status: DISCONTINUED | OUTPATIENT
Start: 2024-05-15 | End: 2024-05-15

## 2024-05-15 RX ORDER — ENOXAPARIN SODIUM 100 MG/ML
40 INJECTION SUBCUTANEOUS EVERY 24 HOURS
Status: DISCONTINUED | OUTPATIENT
Start: 2024-05-16 | End: 2024-05-17

## 2024-05-15 RX ORDER — MEPERIDINE HYDROCHLORIDE 25 MG/ML
25 INJECTION INTRAMUSCULAR; INTRAVENOUS; SUBCUTANEOUS ONCE AS NEEDED
Status: ACTIVE | OUTPATIENT
Start: 2024-05-15 | End: 2024-05-16

## 2024-05-15 RX ORDER — FENTANYL CITRATE 50 UG/ML
INJECTION, SOLUTION INTRAMUSCULAR; INTRAVENOUS AS NEEDED
Status: DISCONTINUED | OUTPATIENT
Start: 2024-05-15 | End: 2024-05-15 | Stop reason: SURG

## 2024-05-15 RX ORDER — NITROGLYCERIN 0.4 MG/1
0.4 TABLET SUBLINGUAL
Status: DISCONTINUED | OUTPATIENT
Start: 2024-05-15 | End: 2024-05-23 | Stop reason: HOSPADM

## 2024-05-15 RX ORDER — PHENYLEPHRINE HYDROCHLORIDE 10 MG/ML
INJECTION INTRAVENOUS AS NEEDED
Status: DISCONTINUED | OUTPATIENT
Start: 2024-05-15 | End: 2024-05-15 | Stop reason: SURG

## 2024-05-15 RX ORDER — SODIUM CHLORIDE 0.9 % (FLUSH) 0.9 %
3 SYRINGE (ML) INJECTION EVERY 12 HOURS SCHEDULED
Status: DISCONTINUED | OUTPATIENT
Start: 2024-05-15 | End: 2024-05-15 | Stop reason: HOSPADM

## 2024-05-15 RX ORDER — NICOTINE POLACRILEX 4 MG
15 LOZENGE BUCCAL
Status: ACTIVE | OUTPATIENT
Start: 2024-05-15 | End: 2024-05-18

## 2024-05-15 RX ORDER — ACETAMINOPHEN 160 MG/5ML
650 SOLUTION ORAL EVERY 4 HOURS PRN
Status: DISCONTINUED | OUTPATIENT
Start: 2024-05-16 | End: 2024-05-20

## 2024-05-15 RX ORDER — IBUPROFEN 600 MG/1
1 TABLET ORAL
Status: DISCONTINUED | OUTPATIENT
Start: 2024-05-15 | End: 2024-05-15 | Stop reason: HOSPADM

## 2024-05-15 RX ORDER — MAGNESIUM HYDROXIDE 1200 MG/15ML
LIQUID ORAL AS NEEDED
Status: DISCONTINUED | OUTPATIENT
Start: 2024-05-15 | End: 2024-05-15 | Stop reason: HOSPADM

## 2024-05-15 RX ORDER — NICOTINE POLACRILEX 4 MG
15 LOZENGE BUCCAL
Status: DISCONTINUED | OUTPATIENT
Start: 2024-05-15 | End: 2024-05-15 | Stop reason: HOSPADM

## 2024-05-15 RX ORDER — DEXMEDETOMIDINE HYDROCHLORIDE 4 UG/ML
.2-1.5 INJECTION, SOLUTION INTRAVENOUS
Status: DISCONTINUED | OUTPATIENT
Start: 2024-05-15 | End: 2024-05-16

## 2024-05-15 RX ORDER — DEXTROSE MONOHYDRATE 25 G/50ML
10-50 INJECTION, SOLUTION INTRAVENOUS
Status: DISCONTINUED | OUTPATIENT
Start: 2024-05-15 | End: 2024-05-15 | Stop reason: HOSPADM

## 2024-05-15 RX ORDER — HEPARIN SODIUM 1000 [USP'U]/ML
INJECTION, SOLUTION INTRAVENOUS; SUBCUTANEOUS AS NEEDED
Status: DISCONTINUED | OUTPATIENT
Start: 2024-05-15 | End: 2024-05-15 | Stop reason: SURG

## 2024-05-15 RX ORDER — ACETAMINOPHEN 325 MG/1
650 TABLET ORAL EVERY 4 HOURS PRN
Status: DISCONTINUED | OUTPATIENT
Start: 2024-05-16 | End: 2024-05-23 | Stop reason: HOSPADM

## 2024-05-15 RX ORDER — NOREPINEPHRINE BITARTRATE 0.03 MG/ML
INJECTION, SOLUTION INTRAVENOUS CONTINUOUS PRN
Status: DISCONTINUED | OUTPATIENT
Start: 2024-05-15 | End: 2024-05-15 | Stop reason: SURG

## 2024-05-15 RX ORDER — PANTOPRAZOLE SODIUM 40 MG/10ML
40 INJECTION, POWDER, LYOPHILIZED, FOR SOLUTION INTRAVENOUS ONCE
Qty: 10 ML | Refills: 0 | Status: COMPLETED | OUTPATIENT
Start: 2024-05-15 | End: 2024-05-15

## 2024-05-15 RX ORDER — SODIUM CHLORIDE 0.9 % (FLUSH) 0.9 %
30 SYRINGE (ML) INJECTION ONCE AS NEEDED
Status: DISCONTINUED | OUTPATIENT
Start: 2024-05-15 | End: 2024-05-15 | Stop reason: HOSPADM

## 2024-05-15 RX ORDER — NICARDIPINE HYDROCHLORIDE 2.5 MG/ML
INJECTION INTRAVENOUS AS NEEDED
Status: DISCONTINUED | OUTPATIENT
Start: 2024-05-15 | End: 2024-05-15 | Stop reason: SURG

## 2024-05-15 RX ORDER — MAGNESIUM SULFATE 1 G/100ML
1 INJECTION INTRAVENOUS EVERY 8 HOURS
Qty: 300 ML | Refills: 0 | Status: ACTIVE | OUTPATIENT
Start: 2024-05-15 | End: 2024-05-16

## 2024-05-15 RX ORDER — MAGNESIUM SULFATE HEPTAHYDRATE 500 MG/ML
INJECTION, SOLUTION INTRAMUSCULAR; INTRAVENOUS AS NEEDED
Status: DISCONTINUED | OUTPATIENT
Start: 2024-05-15 | End: 2024-05-15 | Stop reason: SURG

## 2024-05-15 RX ORDER — NITROGLYCERIN 20 MG/100ML
5-50 INJECTION INTRAVENOUS CONTINUOUS PRN
Status: DISCONTINUED | OUTPATIENT
Start: 2024-05-15 | End: 2024-05-16

## 2024-05-15 RX ORDER — HEPARIN SODIUM 200 [USP'U]/100ML
INJECTION, SOLUTION INTRAVENOUS
Status: COMPLETED | OUTPATIENT
Start: 2024-05-15 | End: 2024-05-15

## 2024-05-15 RX ORDER — IBUPROFEN 600 MG/1
1 TABLET ORAL
Status: ACTIVE | OUTPATIENT
Start: 2024-05-15 | End: 2024-05-18

## 2024-05-15 RX ORDER — VECURONIUM BROMIDE 1 MG/ML
INJECTION, POWDER, LYOPHILIZED, FOR SOLUTION INTRAVENOUS AS NEEDED
Status: DISCONTINUED | OUTPATIENT
Start: 2024-05-15 | End: 2024-05-15 | Stop reason: SURG

## 2024-05-15 RX ADMIN — FENTANYL CITRATE 250 MCG: 50 INJECTION, SOLUTION INTRAMUSCULAR; INTRAVENOUS at 09:33

## 2024-05-15 RX ADMIN — FENTANYL CITRATE 250 MCG: 50 INJECTION, SOLUTION INTRAMUSCULAR; INTRAVENOUS at 07:21

## 2024-05-15 RX ADMIN — FENTANYL CITRATE 250 MCG: 50 INJECTION, SOLUTION INTRAMUSCULAR; INTRAVENOUS at 09:59

## 2024-05-15 RX ADMIN — FENTANYL CITRATE 150 MCG: 50 INJECTION, SOLUTION INTRAMUSCULAR; INTRAVENOUS at 07:05

## 2024-05-15 RX ADMIN — VECURONIUM BROMIDE 12 MG: 1 INJECTION, POWDER, LYOPHILIZED, FOR SOLUTION INTRAVENOUS at 07:05

## 2024-05-15 RX ADMIN — Medication 10 MG: at 06:55

## 2024-05-15 RX ADMIN — SODIUM BICARBONATE 50 MEQ: 84 INJECTION INTRAVENOUS at 18:39

## 2024-05-15 RX ADMIN — MORPHINE SULFATE 2 MG: 2 INJECTION, SOLUTION INTRAMUSCULAR; INTRAVENOUS at 23:39

## 2024-05-15 RX ADMIN — Medication 10 MG: at 07:05

## 2024-05-15 RX ADMIN — HYDROCODONE BITARTRATE AND ACETAMINOPHEN 1 TABLET: 5; 325 TABLET ORAL at 14:35

## 2024-05-15 RX ADMIN — NICARDIPINE HYDROCHLORIDE 1 MG: 25 INJECTION, SOLUTION INTRAVENOUS at 07:23

## 2024-05-15 RX ADMIN — AMINOCAPROIC ACID 10 G: 250 INJECTION, SOLUTION INTRAVENOUS at 09:45

## 2024-05-15 RX ADMIN — HEPARIN SODIUM 30000 UNITS: 1000 INJECTION, SOLUTION INTRAVENOUS; SUBCUTANEOUS at 08:15

## 2024-05-15 RX ADMIN — Medication 0.04 MCG/KG/MIN: at 07:37

## 2024-05-15 RX ADMIN — FENTANYL CITRATE 100 MCG: 50 INJECTION, SOLUTION INTRAMUSCULAR; INTRAVENOUS at 06:55

## 2024-05-15 RX ADMIN — MAGNESIUM SULFATE IN DEXTROSE 1 G: 10 INJECTION, SOLUTION INTRAVENOUS at 18:03

## 2024-05-15 RX ADMIN — NICARDIPINE HYDROCHLORIDE 1 MG: 25 INJECTION, SOLUTION INTRAVENOUS at 08:10

## 2024-05-15 RX ADMIN — DEXMEDETOMIDINE HYDROCHLORIDE 0.8 MCG/KG/HR: 4 INJECTION, SOLUTION INTRAVENOUS at 14:25

## 2024-05-15 RX ADMIN — ALBUMIN (HUMAN) 12.5 G: 12.5 INJECTION, SOLUTION INTRAVENOUS at 16:29

## 2024-05-15 RX ADMIN — NITROGLYCERIN 20 MCG/MIN: 20 INJECTION INTRAVENOUS at 07:23

## 2024-05-15 RX ADMIN — Medication 30 MG: at 07:24

## 2024-05-15 RX ADMIN — SODIUM CHLORIDE 2 G: 900 INJECTION INTRAVENOUS at 19:39

## 2024-05-15 RX ADMIN — HEPARIN SODIUM 1000 UNITS: 200 INJECTION, SOLUTION INTRAVENOUS at 07:04

## 2024-05-15 RX ADMIN — VECURONIUM BROMIDE 4 MG: 1 INJECTION, POWDER, LYOPHILIZED, FOR SOLUTION INTRAVENOUS at 09:18

## 2024-05-15 RX ADMIN — MAGNESIUM SULFATE HEPTAHYDRATE 2 G: 500 INJECTION, SOLUTION INTRAMUSCULAR; INTRAVENOUS at 09:33

## 2024-05-15 RX ADMIN — EPHEDRINE SULFATE 20 MG: 5 INJECTION INTRAVENOUS at 07:32

## 2024-05-15 RX ADMIN — PROPOFOL 80 MG: 10 INJECTION, EMULSION INTRAVENOUS at 07:05

## 2024-05-15 RX ADMIN — HEPARIN SODIUM 10000 UNITS: 1000 INJECTION, SOLUTION INTRAVENOUS; SUBCUTANEOUS at 08:28

## 2024-05-15 RX ADMIN — PHENYLEPHRINE HYDROCHLORIDE 100 MCG: 10 INJECTION INTRAVENOUS at 07:33

## 2024-05-15 RX ADMIN — FENTANYL CITRATE 250 MCG: 50 INJECTION, SOLUTION INTRAMUSCULAR; INTRAVENOUS at 10:17

## 2024-05-15 RX ADMIN — SODIUM CHLORIDE: 9 INJECTION, SOLUTION INTRAVENOUS at 06:52

## 2024-05-15 RX ADMIN — NICARDIPINE HYDROCHLORIDE 1 MG: 25 INJECTION, SOLUTION INTRAVENOUS at 07:54

## 2024-05-15 RX ADMIN — MUPIROCIN 1 APPLICATION: 20 OINTMENT TOPICAL at 21:01

## 2024-05-15 RX ADMIN — SODIUM CHLORIDE 30 ML/HR: 9 INJECTION, SOLUTION INTRAVENOUS at 12:15

## 2024-05-15 RX ADMIN — MIDAZOLAM 3 MG: 1 INJECTION INTRAMUSCULAR; INTRAVENOUS at 09:33

## 2024-05-15 RX ADMIN — ACETAMINOPHEN 1000 MG: 10 INJECTION INTRAVENOUS at 17:28

## 2024-05-15 RX ADMIN — CEFAZOLIN 2 G: 1 INJECTION, POWDER, FOR SOLUTION INTRAMUSCULAR; INTRAVENOUS at 09:47

## 2024-05-15 RX ADMIN — MORPHINE SULFATE 2 MG: 2 INJECTION, SOLUTION INTRAMUSCULAR; INTRAVENOUS at 20:54

## 2024-05-15 RX ADMIN — FENTANYL CITRATE 250 MCG: 50 INJECTION, SOLUTION INTRAMUSCULAR; INTRAVENOUS at 07:47

## 2024-05-15 RX ADMIN — VECURONIUM BROMIDE 4 MG: 1 INJECTION, POWDER, LYOPHILIZED, FOR SOLUTION INTRAVENOUS at 08:06

## 2024-05-15 RX ADMIN — ALBUMIN (HUMAN) 12.5 G: 12.5 INJECTION, SOLUTION INTRAVENOUS at 12:08

## 2024-05-15 RX ADMIN — DEXMEDETOMIDINE HYDROCHLORIDE 0.5 MCG/KG/HR: 100 INJECTION, SOLUTION INTRAVENOUS at 07:23

## 2024-05-15 RX ADMIN — VECURONIUM BROMIDE 4 MG: 1 INJECTION, POWDER, LYOPHILIZED, FOR SOLUTION INTRAVENOUS at 08:41

## 2024-05-15 RX ADMIN — PROTAMINE SULFATE 300 MG: 10 INJECTION, SOLUTION INTRAVENOUS at 09:37

## 2024-05-15 RX ADMIN — CEFAZOLIN 2000 MG: 2 INJECTION, POWDER, FOR SOLUTION INTRAMUSCULAR; INTRAVENOUS at 07:02

## 2024-05-15 RX ADMIN — POTASSIUM CHLORIDE 20 MEQ: 400 INJECTION, SOLUTION INTRAVENOUS at 21:02

## 2024-05-15 RX ADMIN — ACETAMINOPHEN 1000 MG: 10 INJECTION, SOLUTION INTRAVENOUS at 09:59

## 2024-05-15 RX ADMIN — PHENYLEPHRINE HYDROCHLORIDE 100 MCG: 10 INJECTION INTRAVENOUS at 10:07

## 2024-05-15 RX ADMIN — CHLORHEXIDINE GLUCONATE, 0.12% ORAL RINSE 15 ML: 1.2 SOLUTION DENTAL at 21:01

## 2024-05-15 RX ADMIN — VASOPRESSIN 0.03 UNITS/MIN: 0.2 INJECTION INTRAVENOUS at 18:40

## 2024-05-15 RX ADMIN — DEXTROSE MONOHYDRATE 50 ML: 25 INJECTION, SOLUTION INTRAVENOUS at 12:47

## 2024-05-15 RX ADMIN — AMINOCAPROIC ACID 10 G: 250 INJECTION, SOLUTION INTRAVENOUS at 07:51

## 2024-05-15 RX ADMIN — NICARDIPINE HYDROCHLORIDE 5 MG/HR: 25 INJECTION, SOLUTION INTRAVENOUS at 12:59

## 2024-05-15 RX ADMIN — ALBUMIN (HUMAN) 12.5 G: 12.5 INJECTION, SOLUTION INTRAVENOUS at 17:27

## 2024-05-15 RX ADMIN — PANTOPRAZOLE SODIUM 40 MG: 40 INJECTION, POWDER, FOR SOLUTION INTRAVENOUS at 14:24

## 2024-05-15 RX ADMIN — PHENYLEPHRINE HYDROCHLORIDE 100 MCG: 10 INJECTION INTRAVENOUS at 10:09

## 2024-05-15 RX ADMIN — ASPIRIN 325 MG ORAL TABLET 325 MG: 325 PILL ORAL at 14:24

## 2024-05-15 RX ADMIN — MIDAZOLAM 2 MG: 1 INJECTION INTRAMUSCULAR; INTRAVENOUS at 06:55

## 2024-05-15 RX ADMIN — CALCIUM GLUCONATE 1000 MG: 20 INJECTION, SOLUTION INTRAVENOUS at 17:54

## 2024-05-15 RX ADMIN — NICARDIPINE HYDROCHLORIDE 1 MG: 25 INJECTION, SOLUTION INTRAVENOUS at 07:47

## 2024-05-15 RX ADMIN — INSULIN HUMAN 2 UNITS/HR: 1 INJECTION, SOLUTION INTRAVENOUS at 08:46

## 2024-05-15 NOTE — ANESTHESIA PROCEDURE NOTES
Central Line      Patient reassessed immediately prior to procedure    Patient location during procedure: OR  Indications: central pressure monitoring, vascular access and MD/Surgeon request  Staff  Anesthesiologist: Kvng Pearson MD  Preanesthetic Checklist  Completed: patient identified, IV checked, site marked, risks and benefits discussed, surgical consent, monitors and equipment checked, pre-op evaluation and timeout performed  Central Line Prep  Sterile Tech:gloves, cap, gown, mask and sterile barriers  Prep: chloraprep  Patient monitoring: blood pressure monitoring, continuous pulse oximetry and EKG  Central Line Procedure  Laterality:right  Location:internal jugular  Catheter Type:double lumen  Catheter Size:9 Fr  Guidance:ultrasound guided  PROCEDURE NOTE/ULTRASOUND INTERPRETATION.  Using ultrasound guidance the potential vascular sites for insertion of the catheter were visualized to determine the patency of the vessel to be used for vascular access.  After selecting the appropriate site for insertion, the needle was visualized under ultrasound being inserted into the internal jugular vein, followed by ultrasound confirmation of wire and catheter placement. There were no abnormalities seen on ultrasound; an image was taken; and the patient tolerated the procedure with no complications. Images: still images not obtained  Assessment  Post procedure:biopatch applied, line sutured and occlusive dressing applied  Assessement:blood return through all ports, free fluid flow, Koko Test and chest x-ray ordered  Complications:no  Patient Tolerance:patient tolerated the procedure well with no apparent complications

## 2024-05-15 NOTE — PROGRESS NOTES
CARDIOLOGY FOLLOW-UP PROGRESS NOTE      Reason for follow-up:    CAD  S/p CABG       Attending: Jean Hirsch MD      Subjective .     Remains intubated  Immediately post op     ROS  Pertinent items are noted in HPI, all other systems reviewed and negative  Allergies: Patient has no known allergies.    Scheduled Meds:acetaminophen, 1,000 mg, Intravenous, Q8H  [START ON 5/16/2024] aspirin, 81 mg, Oral, Daily  aspirin, 325 mg, Oral, Once  [START ON 5/16/2024] atorvastatin, 40 mg, Oral, Nightly  ceFAZolin, 2 g, Intravenous, Q8H  chlorhexidine, 15 mL, Mouth/Throat, Q12H  [START ON 5/16/2024] enoxaparin, 40 mg, Subcutaneous, Q24H  magnesium sulfate, 1 g, Intravenous, Q8H  [START ON 5/16/2024] montelukast, 10 mg, Oral, Daily  mupirocin, , Each Nare, BID  [START ON 5/16/2024] pantoprazole, 40 mg, Oral, Daily  pantoprazole, 40 mg, Intravenous, Once  polyethylene glycol, 17 g, Oral, BID  senna-docusate sodium, 2 tablet, Oral, BID        Continuous Infusions:dexmedetomidine, 0.2-1.5 mcg/kg/hr, Last Rate: 0.8 mcg/kg/hr (05/15/24 1410)  insulin, 0-100 Units/hr, Last Rate: 0.5 Units/hr (05/15/24 1415)  niCARdipine, 5-15 mg/hr, Last Rate: Stopped (05/15/24 1400)  nitroglycerin, 5-50 mcg/min  norepinephrine, 0.02-0.06 mcg/kg/min  [START ON 5/16/2024] Pharmacy to Dose enoxaparin (LOVENOX),   sodium chloride, 30 mL/hr, Last Rate: 30 mL/hr (05/15/24 1215)        PRN Meds:.  [START ON 5/16/2024] acetaminophen **OR** [START ON 5/16/2024] acetaminophen **OR** [START ON 5/16/2024] acetaminophen    albumin human    Calcium Replacement - Follow Nurse / BPA Driven Protocol    dextrose    dextrose    glucagon (human recombinant)    HYDROcodone-acetaminophen    Magnesium Cardiology Dose Replacement - Follow Nurse / BPA Driven Protocol    meperidine    Morphine **AND** naloxone    niCARdipine    nitroglycerin    nitroglycerin    norepinephrine    ondansetron    [START ON 5/16/2024] Pharmacy to Dose enoxaparin (LOVENOX)    Phosphorus  "Replacement - Follow Nurse / BPA Driven Protocol    Potassium Replacement - Follow Nurse / BPA Driven Protocol    vasopressin    Objective     VITAL SIGNS  Patient Vitals for the past 24 hrs:   BP Temp Pulse Resp SpO2 Height Weight   05/15/24 1335 104/53 97.7 °F (36.5 °C) 80 14 96 % -- --   05/15/24 1320 -- 97.3 °F (36.3 °C) 80 -- 99 % -- --   05/15/24 1315 96/64 97.3 °F (36.3 °C) 80 -- 100 % -- --   05/15/24 1305 145/66 97.3 °F (36.3 °C) 80 14 100 % -- --   05/15/24 1259 152/71 -- 80 -- -- -- --   05/15/24 1258 -- -- 80 14 100 % -- --   05/15/24 1255 -- 97.2 °F (36.2 °C) 80 -- 100 % -- --   05/15/24 1235 -- 97.2 °F (36.2 °C) 70 -- -- -- --   05/15/24 1230 99/69 97.2 °F (36.2 °C) 76 -- 100 % -- --   05/15/24 1215 110/70 97.2 °F (36.2 °C) 79 -- 100 % -- --   05/15/24 1200 114/78 96.8 °F (36 °C) 80 -- 100 % -- --   05/15/24 1159 -- 96.8 °F (36 °C) 80 -- 100 % -- --   05/15/24 1145 108/67 96.3 °F (35.7 °C) 80 -- 100 % -- --   05/15/24 1130 102/65 96.1 °F (35.6 °C) 80 -- 98 % -- --   05/15/24 1115 95/59 95.5 °F (35.3 °C) 80 -- 98 % -- --   05/15/24 1105 93/56 -- 80 14 -- -- --   05/15/24 1100 (!) 85/61 95.4 °F (35.2 °C) 80 -- 97 % -- --   05/15/24 1047 -- 95.4 °F (35.2 °C) 80 -- 98 % -- --   05/15/24 1035 -- -- -- -- -- 167.6 cm (65.98\") 95.8 kg (211 lb 3.2 oz)   05/15/24 0603 149/84 -- 82 -- -- -- --   05/15/24 0600 -- -- -- -- -- -- 95.8 kg (211 lb 3.2 oz)        Flowsheet Rows      Flowsheet Row First Filed Value   Admission Height 167.6 cm (65.98\") Documented at 05/15/2024 1035   Admission Weight 95.8 kg (211 lb 3.2 oz) Documented at 05/15/2024 0600            Body mass index is 34.11 kg/m².      Intake/Output Summary (Last 24 hours) at 5/15/2024 1417  Last data filed at 5/15/2024 1335  Gross per 24 hour   Intake 2100 ml   Output 1402 ml   Net 698 ml        TELEMETRY:     paced    Physical Exam:  Constitutional:       General: Not in acute distress.     Appearance: Normal appearance. Well-developed.      " Interventions: Intubated.   Eyes:      Pupils: Pupils are equal, round, and reactive to light.   HENT:      Head: Normocephalic and atraumatic.   Neck:      Vascular: No JVD.   Pulmonary:      Effort: Pulmonary effort is normal. Intubated.      Breath sounds: Normal breath sounds.   Cardiovascular:      Normal rate. Regular rhythm.   Pulses:     Intact distal pulses.   Edema:     Peripheral edema absent.   Abdominal:      General: There is no distension.      Palpations: Abdomen is soft.   Musculoskeletal: Normal range of motion.      Cervical back: Normal range of motion and neck supple. Skin:     General: Skin is warm and dry.            Results Review:   I reviewed the patient's new clinical results.    CBC    Results from last 7 days   Lab Units 05/15/24  1136 05/15/24  1054 05/15/24  0950 05/15/24  0916 05/15/24  0857 05/15/24  0847 05/15/24  0739 05/14/24  0755   WBC 10*3/mm3  --  8.77  --   --   --   --   --  5.52   HEMOGLOBIN g/dL  --  8.4*  --   --   --   --   --  9.8*   HEMOGLOBIN, POC g/dL 8.6*  --  8.2* 7.8* 8.5* 7.1* 8.2*  --    PLATELETS 10*3/mm3  --  167  --   --   --   --   --  276     BMP   Results from last 7 days   Lab Units 05/15/24  1136 05/15/24  1054 05/14/24  0755 05/13/24  1244   SODIUM mmol/L  --  139 139 141   POTASSIUM mmol/L  --  4.2 4.4 4.4   CHLORIDE mmol/L  --  104 100 104   CO2 mmol/L  --  24.0 29.0 26.0   BUN mg/dL  --  35* 34* 27*   CREATININE mg/dL 1.33* 1.44* 1.41* 1.27   GLUCOSE mg/dL  --  131* 93 88   PHOSPHORUS mg/dL  --  2.4*  --   --      Cr Clearance Estimated Creatinine Clearance: 56 mL/min (A) (by C-G formula based on SCr of 1.33 mg/dL (H)).  Coag   Results from last 7 days   Lab Units 05/15/24  1054 05/14/24  0755   INR  1.20* 1.07   APTT seconds 21.7* 27.3     HbA1C   Lab Results   Component Value Date    HGBA1C 6.10 (H) 05/14/2024    HGBA1C 6.2 (A) 03/12/2024    HGBA1C 6.8 (A) 12/12/2023     Blood Glucose   Glucose   Date/Time Value Ref Range Status   05/15/2024 1348  123 (H) 74 - 100 mg/dL Final     Comment:     Serial Number: [id number]:  [id number]   05/15/2024 1304 171 (H) 70 - 105 mg/dL Final     Comment:     Serial Number: [id number]:  [id number]   05/15/2024 1243 62 (L) 74 - 100 mg/dL Final     Comment:     Serial Number: [id number]:  [id number]   05/15/2024 1136 90 74 - 100 mg/dL Final   05/15/2024 1136 90 74 - 100 mg/dL Final     Comment:     Serial Number: [id number]:  [id number]   05/15/2024 1124 102 (H) 74 - 100 mg/dL Final     Comment:     Serial Number: [id number]:  [id number]   05/15/2024 0950 199 (H) 70 - 105 mg/dL Final     Comment:     Serial Number: [id number]:  [id number]   05/15/2024 0916 190 (H) 70 - 105 mg/dL Final     Comment:     Serial Number: [id number]:  [id number]     Infection     CMP   Results from last 7 days   Lab Units 05/15/24  1136 05/15/24  1054 05/14/24  0755 05/13/24  1244   SODIUM mmol/L  --  139 139 141   POTASSIUM mmol/L  --  4.2 4.4 4.4   CHLORIDE mmol/L  --  104 100 104   CO2 mmol/L  --  24.0 29.0 26.0   BUN mg/dL  --  35* 34* 27*   CREATININE mg/dL 1.33* 1.44* 1.41* 1.27   GLUCOSE mg/dL  --  131* 93 88   ALBUMIN g/dL  --  3.4* 4.2  --    BILIRUBIN mg/dL  --   --  0.2  --    ALK PHOS U/L  --   --  38*  --    AST (SGOT) U/L  --   --  13  --    ALT (SGPT) U/L  --   --  13  --      ABG    Results from last 7 days   Lab Units 05/15/24  1343 05/15/24  1243 05/15/24  1124 05/15/24  0950 05/15/24  0916 05/15/24  0847 05/15/24  0739   PH, ARTERIAL pH units 7.470* 7.477* 7.432 7.320* 7.320* 7.320* 7.400   PCO2, ARTERIAL mm Hg 33.1* 33.5* 36.5 44.6 51.6* 50.8* 40.4   PO2 ART mm Hg 68.8* 139.4* 79.8* 70.0* 589.0* 518.0* 243.0*   O2 SATURATION ART % 94.8 99.3* 96.1 92.0* 100.0* 100.0* 100.0*   BASE EXCESS ART mmol/L 0.6 1.3 0.2 <0.0* 0.0 0.0 0.0     UA    Results from last 7 days   Lab Units 05/14/24  0755   NITRITE UA  Negative   WBC UA /HPF 0-2   BACTERIA UA /HPF None Seen   SQUAM EPITHEL UA /HPF None Seen     RYAN  No results found for:  "\"POCMETH\", \"POCAMPHET\", \"POCBARBITUR\", \"POCBENZO\", \"POCCOCAINE\", \"POCOPIATES\", \"POCOXYCODO\", \"POCPHENCYC\", \"POCPROPOXY\", \"POCTHC\", \"POCTRICYC\"  Lysis Labs   Results from last 7 days   Lab Units 05/15/24  1136 05/15/24  1054 05/15/24  0950 05/15/24  0916 05/15/24  0857 05/15/24  0847 05/15/24  0739 05/14/24  0755 05/13/24  1244   INR   --  1.20*  --   --   --   --   --  1.07  --    APTT seconds  --  21.7*  --   --   --   --   --  27.3  --    HEMOGLOBIN g/dL  --  8.4*  --   --   --   --   --  9.8*  --    HEMOGLOBIN, POC g/dL 8.6*  --  8.2* 7.8* 8.5* 7.1* 8.2*  --   --    PLATELETS 10*3/mm3  --  167  --   --   --   --   --  276  --    CREATININE mg/dL 1.33* 1.44*  --   --   --   --   --  1.41* 1.27     Radiology(recent) XR Chest 1 View    Result Date: 5/15/2024  1.     Interval median sternotomy and CABG. 2.     Tubes and lines appear in satisfactory positions, as detailed above. 3.     Mild bibasilar opacities which may represent atelectasis or edema.  Electronically Signed By-Romaine Szymanski MD On:5/15/2024 11:16 AM      XR Chest PA & Lateral    Result Date: 5/15/2024  1. No convincing acute airspace process. 2. Suspect pleural-based opacity versus nodule measuring 2.7 cm along the lateral aspect of right lower lobe. Recommend CT for further assessment.  Electronically Signed By-Mahesh Porter MD On:5/15/2024 8:00 AM       Imaging Results (Last 24 Hours)       Procedure Component Value Units Date/Time    XR Chest 1 View [[id number]] Collected: 05/15/24 1114     Updated: 05/15/24 1119    Narrative:      DATE OF EXAM:  5/15/2024 10:54 AM     PROCEDURE:  XR CHEST 1 VW-     INDICATIONS:  Post-Op Check Line & Tube Placement; I25.10-Atherosclerotic heart  disease of native coronary artery without angina pectoris     COMPARISON:  May 14, 2024     TECHNIQUE:   Single radiographic view of the chest was obtained.     FINDINGS:  Endotracheal tube tip is approximately 5 cm above the claire. There is a  right IJ [place] " catheter with tip projecting in the area of the right  pulmonary artery. Enteric tube extends into the left upper quadrant, tip  beyond the margins of this exam. There has been interval median  sternotomy and CABG. There appears to be mediastinal drains and a left  basilar chest tube. No definite pneumothorax or significant pleural  effusion is seen. There are mild bibasilar opacities, left greater than  right. Heart size and mediastinal contour appear as expected.       Impression:      1.     Interval median sternotomy and CABG.  2.     Tubes and lines appear in satisfactory positions, as detailed  above.  3.     Mild bibasilar opacities which may represent atelectasis or  edema.     Electronically Signed By-Romaine Szymanski MD On:5/15/2024 11:16 AM                     EKG          I personally viewed and interpreted the patient's EKG/Telemetry data:        ECHOCARDIOGRAM:     Results for orders placed during the hospital encounter of 04/18/24    Adult Transthoracic Echo Complete w/ Color, Spectral and Contrast if necessary per protocol    Interpretation Summary    Left ventricular systolic function is mildly decreased. Left ventricular ejection fraction appears to be 51 - 55%.    Left ventricular diastolic function was normal.    Estimated right ventricular systolic pressure from tricuspid regurgitation is normal (<35 mmHg).    Hypokinesis of distal septum and apex noted        STRESS MYOVIEW:      CARDIAC CATHETERIZATION:      OTHER:         Assessment & Plan            ASCAD        ASSESSMENT:    MV CAD  Immediate post op s/p CABG this am  Intubated; paced  Off pressors  EF 50-55 by last echo    HTN  Will follow postoperatively    Dyslipidemia  Resume statin when taking po      PLAN:    Continue aggressive postoperative supportive care  Will follow         Additional recommendations per Dr. Lorenzo     I discussed the patient's findings and my recommendations with patient and RN                  Electronically  signed by JAX العراقي, 05/15/24, 2:17 PM EDT.          JAX العراقي  05/15/24  14:17 EDT

## 2024-05-15 NOTE — ANESTHESIA PROCEDURE NOTES
Airway  Urgency: elective    Date/Time: 5/15/2024 7:08 AM  Airway not difficult    General Information and Staff    Patient location during procedure: OR  Anesthesiologist: Kvng Pearson MD    Indications and Patient Condition  Indications for airway management: airway protection    Preoxygenated: yes  MILS maintained throughout  Mask difficulty assessment: 2 - vent by mask + OA or adjuvant +/- NMBA    Final Airway Details  Final airway type: endotracheal airway      Successful airway: Microcuff Subglottic Suctioning ETT and ETT  Cuffed: yes   Successful intubation technique: direct laryngoscopy  Facilitating devices/methods: cricoid pressure  Endotracheal tube insertion site: oral  Blade: Lisa  Blade size: 4  ETT size (mm): 7.5  Cormack-Lehane Classification: grade I - full view of glottis  Placement verified by: chest auscultation and capnometry   Measured from: lips  ETT/EBT  to lips (cm): 21  Number of attempts at approach: 1  Assessment: lips, teeth, and gum same as pre-op and atraumatic intubation    Additional Comments  Minimal occlusion pressure in cuff

## 2024-05-15 NOTE — ANESTHESIA PROCEDURE NOTES
Intra-Op Anesthesia DIGNA    Procedure Performed: Intra-Op Anesthesia DIGNA       Start Time:        End Time:      Preanesthesia Checklist:  Patient identified, IV assessed, risks and benefits discussed, monitors and equipment assessed, procedure being performed at surgeon's request and anesthesia consent obtained.    General Procedure Information  DIGNA Placed for monitoring purposes only -- This is not a diagnostic DIGNA  Diagnostic Indications for Echo:  assessment of surgical repair and hemodynamic monitoring  Physician Requesting Echo: Jean Hirsch MD  Location performed:  OR  Intubated  Bite block placed  Heart visualized  Probe Insertion:  Easy  Probe Type:  Multiplane  Modalities:  Color flow mapping and continuous wave Doppler    Echocardiographic and Doppler Measurements    Ventricles    Right Ventricle:  Cavity size normal.  Global function normal.    Left Ventricle:  Cavity size normal.  Global Function normal.          Valves    Aortic Valve:  Annulus normal.  Stenosis not present.  Regurgitation trace.  Leaflets normal.  Leaflet motions normal.      Mitral Valve:  Annulus normal.  Stenosis not present.  Regurgitation trace.  Leaflets normal.  Leaflet motions normal.      Tricuspid Valve:  Annulus normal.  Stenosis not present.  Regurgitation absent.  Leaflets normal.  Leaflet motions normal.    Pulmonic Valve:  Annulus normal.  Stenosis not present.  Regurgitation absent.        Aorta    Ascending Aorta:  Size normal.  Dissection not present.  Mobile plaque not present.    Aortic Arch:  Size normal.  Dissection not present.  Mobile plaque not present.    Descending Aorta:  Size normal.  Dissection not present.  Mobile plaque not present.        Atria    Right Atrium:  Size normal.  Spontaneous echo contrast not present.  Thrombus not present.  Tumor not present.  Device not present.      Left Atrium:  Size normal.  Spontaneous echo contrast not present.  Thrombus not present.  Tumor not present.  Device  not present.    Left atrial appendage normal.      Septa        Ventricular Septum:  Intra-ventricular septum morphology normal.          Other Findings  Pericardium:  normal  Pleural Effusion:  none  Pulmonary Venous Flow:  normal    Anesthesia Information  Performed Personally  Anesthesiologist:  Kvng Pearson MD      Echocardiogram Comments:       Post bypass EF 55%. No change in valves. No air seen.

## 2024-05-15 NOTE — OP NOTE
Operative Note    Date of Dictation: 05/15/24    Date of Procedure: Same    Referring Physician: Santiago Lorenzo MD    Preoperative diagnosis:   1.  Multivessel coronary artery disease  2.  Dyspnea of exertion  3.  Status post non-STEMI  4.  CKD 3  5.  COPD  6.  Diabetes    Postoperative diagnosis:   Same    Procedure:   1.  Elective CABG x 4 with a LIMA to the mid LAD and reverse individual saphenous vein graft to the diagonal and sequential saphenous vein graft to the obtuse marginal 1 and 2.  (CPT code 26707, 02725)  2. EVH of the left leg (CPT code 40282)    Surgeon: Jean Hirsch MD     Assistants: Assistant: Vanesa Jimenes CSA was responsible for performing the following activities: Retraction, Suction, Irrigation, Suturing, Closing, Placing Dressing, Harvesting of Vessels, Bypass Grafting, and all aspects of the complex cardiac case  and their skilled assistance was necessary for the success of this case.    Anesthesia: General endotracheal anesthesia and DIGNA    Findings:  The saphenous vein was harvested endoscopically form the left  leg. The vein had a diameter of 3.5 mm and was of good quality. The coronaries diameters between 1.5 and 2 mm.    Estimated Blood Loss: Approximately 400 cc, most of it recovered with a Cell Saver device and cardiotomy suckers and was retransfuse to the patient    STS Data:    The patient was explained the risks (STS risk score calculated), benefits and alternatives of surgery and agreed to proceed. The antibiotics and b blockers were given in the STS required window.  Counseling was given about diet, alcohol and tobacco use as needed        Description of the procedure:     The patient was placed supine on the operative table. General anesthesia was given and lines placed. The patient was prepped and draped using the usual sterile technique. A median sternotomy was performed with a scalpel and the layers carried down to the sternum using the electrocautery. The sternum was  split in the midline using a vertical oscillating saw. Hemostasis was achieved. The LIMA was harvested as a pedicle and prepared with papaverine. It was of good quality. 300 units/kg of IV heparin was given to an ACT over 400. A Favaloro retractor was placed and the mediastinum exposed, the pericardium was opened and the edges tacked to the wound. Cannulation sutures were placed in the ascending aorta and right atrium. Small cannulas were placed and aorto right atrial cardiopulmonary bypass was started. Cardioplegia cannulas were placed and then the ascending aorta was clamped. One liter of cold blood cardioplegia was given in an antegrade fashion to achieved diastolic arrest and further doses every 15 minutes thereafter. Constructions of grafts were done in the sequence distal - proximal between the reversed saphenous vein graft and each coronary targets. Grafts were constructed to the OM1, OM2, and diagonal  coronary arteries and plegia was given between graft sequences after de-airing the aortic root and tying the proximal anastomosis.  Of note, the reversed graft to the obtuse marginal 1 and 2 was constructed in a side-to-side anastomosis and end-to-side anastomosis.  The LIMA was anastomosed to the mid LAD using 7.0 prolene continuous suture with a small needle, then the warm dose of cardioplegia was given and then the aortic clamp removed as well as the LIMA bulldog clamp. All anastomoses were checked and had good flow and morphology. The left pleural space was suctioned and the lungs ventilated. The heart was paced till regular atrial rhythm resumed. I allowed the heart to eject and once hemodynamics were acceptable, then the CPB was discontinued and the venous and cardioplegia cannulas removed. The matching dose of protamine was given and the aortic cannula removed as well. AV temporary wires and pleural and mediastinal chest tubes were placed and the wound sprayed with platelet rich plasma.  The sternum was  closed with single and double wires and soft tissue in layers of reabsorbable material. The wounds were covered with sterile dressings.       Specimen removed: None    CPB time: 60 minutes    Aortic clamp time: 56 minutes    Complications:  none           Disposition: Cardiovascular recovery room           Condition: Critical but stable.

## 2024-05-15 NOTE — ANESTHESIA PROCEDURE NOTES
Arterial Line      Patient reassessed immediately prior to procedure    Patient location during procedure: OR   Line placed for hemodynamic monitoring, ABGs/Labs/ISTAT and MD/Surgeon request.  Performed By   Anesthesiologist: Kvng Pearson MD   Preanesthetic Checklist  Completed: patient identified, IV checked, site marked, risks and benefits discussed, surgical consent, monitors and equipment checked, pre-op evaluation and timeout performed  Arterial Line Prep    Sterile Tech: cap, gloves and sterile barriers  Prep: ChloraPrep  Patient monitoring: EKG, continuous pulse oximetry and blood pressure monitoring  Arterial Line Procedure   Laterality:left  Location:  radial artery  Catheter size: 20 G   Guidance: palpation technique  Number of attempts: 1  Successful placement: yes  Heparin (Porcine) in NaCl 1000-0.9 UT/500ML-% for arterial line pressure bag - Intra-arterial   1,000 Units - 5/15/2024 7:04:00 AM   Post Assessment   Dressing Type: wrist guard applied, secured with tape and occlusive dressing applied.   Complications no  Circ/Move/Sens Assessment: normal and unchanged.   Patient Tolerance: patient tolerated the procedure well with no apparent complications

## 2024-05-15 NOTE — ANESTHESIA PROCEDURE NOTES
Central Line      Patient reassessed immediately prior to procedure    Patient location during procedure: OR  Indications: central pressure monitoring, vascular access and MD/Surgeon request  Staff  Anesthesiologist: Kvng Pearson MD  Preanesthetic Checklist  Completed: patient identified, IV checked, site marked, risks and benefits discussed, surgical consent, monitors and equipment checked, pre-op evaluation and timeout performed  Central Line Prep  Sterile Tech:gloves, cap, gown, mask and sterile barriers  Prep: chloraprep  Patient monitoring: blood pressure monitoring, continuous pulse oximetry and EKG  Central Line Procedure  Laterality:right  Location:internal jugular  Catheter Type:[place]-Naveen  Assessment  Post procedure:biopatch applied, line sutured and occlusive dressing applied  Assessement:blood return through all ports, free fluid flow, chest x-ray ordered and Koko Test  Complications:no  Patient Tolerance:patient tolerated the procedure well with no apparent complications  Additional Notes  Placed through double lumen

## 2024-05-16 ENCOUNTER — APPOINTMENT (OUTPATIENT)
Dept: GENERAL RADIOLOGY | Facility: HOSPITAL | Age: 69
DRG: 235 | End: 2024-05-16
Payer: MEDICARE

## 2024-05-16 LAB
ALBUMIN SERPL-MCNC: 3.8 G/DL (ref 3.5–5.2)
ANION GAP SERPL CALCULATED.3IONS-SCNC: 12 MMOL/L (ref 5–15)
ARTERIAL PATENCY WRIST A: ABNORMAL
ARTERIAL PATENCY WRIST A: ABNORMAL
ATMOSPHERIC PRESS: ABNORMAL MM[HG]
BASE DEFICIT: -2.9 MEQ/LITER
BASE EXCESS BLDA CALC-SCNC: -2 MMOL/L (ref 0–3)
BASE EXCESS BLDA CALC-SCNC: -2.8 MMOL/L (ref 0–3)
BASOPHILS # BLD AUTO: 0.02 10*3/MM3 (ref 0–0.2)
BASOPHILS NFR BLD AUTO: 0.3 % (ref 0–1.5)
BDY SITE: ABNORMAL
BDY SITE: ABNORMAL
BUN SERPL-MCNC: 38 MG/DL (ref 8–23)
BUN/CREAT SERPL: 25.9 (ref 7–25)
CA-I SERPL ISE-MCNC: 1.23 MMOL/L (ref 1.2–1.3)
CALCIUM SPEC-SCNC: 8.6 MG/DL (ref 8.6–10.5)
CHLORIDE SERPL-SCNC: 107 MMOL/L (ref 98–107)
CO2 BLDA-SCNC: 25.1 MMOL/L (ref 22–29)
CO2 BLDA-SCNC: 25.9 MMOL/L (ref 22–29)
CO2 SERPL-SCNC: 24 MMOL/L (ref 22–29)
CREAT SERPL-MCNC: 1.47 MG/DL (ref 0.76–1.27)
DEPRECATED RDW RBC AUTO: 51.9 FL (ref 37–54)
EGFRCR SERPLBLD CKD-EPI 2021: 51 ML/MIN/1.73
EOSINOPHIL # BLD AUTO: 0.02 10*3/MM3 (ref 0–0.4)
EOSINOPHIL NFR BLD AUTO: 0.3 % (ref 0.3–6.2)
ERYTHROCYTE [DISTWIDTH] IN BLOOD BY AUTOMATED COUNT: 16.4 % (ref 12.3–15.4)
GLUCOSE BLDC GLUCOMTR-MCNC: 113 MG/DL (ref 70–105)
GLUCOSE BLDC GLUCOMTR-MCNC: 122 MG/DL (ref 70–105)
GLUCOSE BLDC GLUCOMTR-MCNC: 123 MG/DL (ref 70–105)
GLUCOSE BLDC GLUCOMTR-MCNC: 128 MG/DL (ref 70–105)
GLUCOSE BLDC GLUCOMTR-MCNC: 130 MG/DL (ref 70–105)
GLUCOSE BLDC GLUCOMTR-MCNC: 134 MG/DL (ref 70–105)
GLUCOSE BLDC GLUCOMTR-MCNC: 136 MG/DL (ref 70–105)
GLUCOSE BLDC GLUCOMTR-MCNC: 141 MG/DL (ref 70–105)
GLUCOSE BLDC GLUCOMTR-MCNC: 151 MG/DL (ref 70–105)
GLUCOSE BLDC GLUCOMTR-MCNC: 157 MG/DL (ref 70–105)
GLUCOSE BLDC GLUCOMTR-MCNC: 163 MG/DL (ref 70–105)
GLUCOSE BLDC GLUCOMTR-MCNC: 166 MG/DL (ref 70–105)
GLUCOSE SERPL-MCNC: 157 MG/DL (ref 65–99)
HCO3 BLDA-SCNC: 24.4 MMOL/L (ref 21–28)
HCO3 MIXED: 23.6 MMOL/L (ref 21–29)
HCT VFR BLD AUTO: 26.1 % (ref 37.5–51)
HCT VFR BLD AUTO: 29.3 % (ref 37.5–51)
HEMODILUTION: NO
HEMODILUTION: NO
HGB BLD-MCNC: 7.8 G/DL (ref 13–17.7)
HGB BLD-MCNC: 8.7 G/DL (ref 13–17.7)
IMM GRANULOCYTES # BLD AUTO: 0.03 10*3/MM3 (ref 0–0.05)
IMM GRANULOCYTES NFR BLD AUTO: 0.4 % (ref 0–0.5)
INHALED O2 CONCENTRATION: 36 %
INHALED O2 CONCENTRATION: 36 %
INR PPP: 1.21 (ref 0.93–1.1)
LYMPHOCYTES # BLD AUTO: 0.35 10*3/MM3 (ref 0.7–3.1)
LYMPHOCYTES NFR BLD AUTO: 4.6 % (ref 19.6–45.3)
MAGNESIUM SERPL-MCNC: 2.6 MG/DL (ref 1.6–2.4)
MCH RBC QN AUTO: 26.1 PG (ref 26.6–33)
MCHC RBC AUTO-ENTMCNC: 29.9 G/DL (ref 31.5–35.7)
MCV RBC AUTO: 87.3 FL (ref 79–97)
MODALITY: ABNORMAL
MODALITY: ABNORMAL
MONOCYTES # BLD AUTO: 0.75 10*3/MM3 (ref 0.1–0.9)
MONOCYTES NFR BLD AUTO: 9.8 % (ref 5–12)
NEUTROPHILS NFR BLD AUTO: 6.48 10*3/MM3 (ref 1.7–7)
NEUTROPHILS NFR BLD AUTO: 84.6 % (ref 42.7–76)
NRBC BLD AUTO-RTO: 0 /100 WBC (ref 0–0.2)
O2 SATURATION MIXED: 58.4 %
PCO2 BLDA: 48.8 MM HG (ref 35–48)
PCO2 MIXED: 48.5 MMHG (ref 35–51)
PH BLDA: 7.31 PH UNITS (ref 7.35–7.45)
PH MIXED: 7.3 PH UNITS (ref 7.32–7.45)
PHOSPHATE SERPL-MCNC: 4.3 MG/DL (ref 2.5–4.5)
PLATELET # BLD AUTO: 172 10*3/MM3 (ref 140–450)
PMV BLD AUTO: 10.5 FL (ref 6–12)
PO2 BLDA: 73.6 MM HG (ref 83–108)
PO2 MIXED: 34.1 MMHG
POTASSIUM SERPL-SCNC: 4.4 MMOL/L (ref 3.5–5.2)
PROTHROMBIN TIME: 13 SECONDS (ref 9.6–11.7)
RBC # BLD AUTO: 2.99 10*6/MM3 (ref 4.14–5.8)
SAO2 % BLDCOA: 92.9 % (ref 94–98)
SODIUM SERPL-SCNC: 143 MMOL/L (ref 136–145)
WBC NRBC COR # BLD AUTO: 7.65 10*3/MM3 (ref 3.4–10.8)

## 2024-05-16 PROCEDURE — 93005 ELECTROCARDIOGRAM TRACING: CPT | Performed by: THORACIC SURGERY (CARDIOTHORACIC VASCULAR SURGERY)

## 2024-05-16 PROCEDURE — P9016 RBC LEUKOCYTES REDUCED: HCPCS

## 2024-05-16 PROCEDURE — 25010000002 ALBUMIN HUMAN 5% PER 50 ML: Performed by: THORACIC SURGERY (CARDIOTHORACIC VASCULAR SURGERY)

## 2024-05-16 PROCEDURE — 25010000002 ACETAMINOPHEN 10 MG/ML SOLUTION: Performed by: NURSE PRACTITIONER

## 2024-05-16 PROCEDURE — 97161 PT EVAL LOW COMPLEX 20 MIN: CPT

## 2024-05-16 PROCEDURE — 97165 OT EVAL LOW COMPLEX 30 MIN: CPT

## 2024-05-16 PROCEDURE — 71045 X-RAY EXAM CHEST 1 VIEW: CPT

## 2024-05-16 PROCEDURE — 25010000002 ENOXAPARIN PER 10 MG: Performed by: NURSE PRACTITIONER

## 2024-05-16 PROCEDURE — 82948 REAGENT STRIP/BLOOD GLUCOSE: CPT

## 2024-05-16 PROCEDURE — 25010000002 MAGNESIUM SULFATE IN D5W 1G/100ML (PREMIX) 1-5 GM/100ML-% SOLUTION: Performed by: NURSE PRACTITIONER

## 2024-05-16 PROCEDURE — 85014 HEMATOCRIT: CPT | Performed by: THORACIC SURGERY (CARDIOTHORACIC VASCULAR SURGERY)

## 2024-05-16 PROCEDURE — 80069 RENAL FUNCTION PANEL: CPT | Performed by: NURSE PRACTITIONER

## 2024-05-16 PROCEDURE — 83735 ASSAY OF MAGNESIUM: CPT | Performed by: NURSE PRACTITIONER

## 2024-05-16 PROCEDURE — P9041 ALBUMIN (HUMAN),5%, 50ML: HCPCS | Performed by: THORACIC SURGERY (CARDIOTHORACIC VASCULAR SURGERY)

## 2024-05-16 PROCEDURE — 25810000003 SODIUM CHLORIDE 0.9 % SOLUTION: Performed by: NURSE PRACTITIONER

## 2024-05-16 PROCEDURE — 25010000002 CEFAZOLIN PER 500 MG: Performed by: NURSE PRACTITIONER

## 2024-05-16 PROCEDURE — 93005 ELECTROCARDIOGRAM TRACING: CPT | Performed by: NURSE PRACTITIONER

## 2024-05-16 PROCEDURE — 25010000002 MORPHINE PER 10 MG: Performed by: NURSE PRACTITIONER

## 2024-05-16 PROCEDURE — 82803 BLOOD GASES ANY COMBINATION: CPT

## 2024-05-16 PROCEDURE — 25010000002 BUMETANIDE PER 0.5 MG: Performed by: NURSE PRACTITIONER

## 2024-05-16 PROCEDURE — 93010 ELECTROCARDIOGRAM REPORT: CPT | Performed by: INTERNAL MEDICINE

## 2024-05-16 PROCEDURE — 36430 TRANSFUSION BLD/BLD COMPNT: CPT

## 2024-05-16 PROCEDURE — 99232 SBSQ HOSP IP/OBS MODERATE 35: CPT | Performed by: INTERNAL MEDICINE

## 2024-05-16 PROCEDURE — 25010000002 BUMETANIDE PER 0.5 MG: Performed by: INTERNAL MEDICINE

## 2024-05-16 PROCEDURE — 85018 HEMOGLOBIN: CPT | Performed by: THORACIC SURGERY (CARDIOTHORACIC VASCULAR SURGERY)

## 2024-05-16 PROCEDURE — 82330 ASSAY OF CALCIUM: CPT | Performed by: NURSE PRACTITIONER

## 2024-05-16 PROCEDURE — 99024 POSTOP FOLLOW-UP VISIT: CPT | Performed by: NURSE PRACTITIONER

## 2024-05-16 PROCEDURE — 85025 COMPLETE CBC W/AUTO DIFF WBC: CPT | Performed by: NURSE PRACTITIONER

## 2024-05-16 PROCEDURE — 86900 BLOOD TYPING SEROLOGIC ABO: CPT

## 2024-05-16 PROCEDURE — 25010000002 CHLOROTHIAZIDE PER 500 MG: Performed by: INTERNAL MEDICINE

## 2024-05-16 PROCEDURE — 74018 RADEX ABDOMEN 1 VIEW: CPT

## 2024-05-16 PROCEDURE — 25810000003 SODIUM CHLORIDE 0.9 % SOLUTION: Performed by: THORACIC SURGERY (CARDIOTHORACIC VASCULAR SURGERY)

## 2024-05-16 PROCEDURE — 85610 PROTHROMBIN TIME: CPT | Performed by: NURSE PRACTITIONER

## 2024-05-16 RX ORDER — BUMETANIDE 0.25 MG/ML
1 INJECTION INTRAMUSCULAR; INTRAVENOUS
Status: DISCONTINUED | OUTPATIENT
Start: 2024-05-16 | End: 2024-05-17

## 2024-05-16 RX ORDER — BUMETANIDE 0.25 MG/ML
2 INJECTION INTRAMUSCULAR; INTRAVENOUS ONCE
Status: COMPLETED | OUTPATIENT
Start: 2024-05-16 | End: 2024-05-16

## 2024-05-16 RX ORDER — CYCLOBENZAPRINE HCL 10 MG
10 TABLET ORAL 3 TIMES DAILY PRN
Status: DISCONTINUED | OUTPATIENT
Start: 2024-05-16 | End: 2024-05-23 | Stop reason: HOSPADM

## 2024-05-16 RX ORDER — BUMETANIDE 1 MG/1
1 TABLET ORAL EVERY EVENING
COMMUNITY
End: 2024-05-23 | Stop reason: HOSPADM

## 2024-05-16 RX ORDER — BUMETANIDE 1 MG/1
2 TABLET ORAL EVERY MORNING
COMMUNITY
End: 2024-05-23 | Stop reason: HOSPADM

## 2024-05-16 RX ORDER — CITALOPRAM 20 MG/1
20 TABLET ORAL DAILY
Status: DISCONTINUED | OUTPATIENT
Start: 2024-05-16 | End: 2024-05-23 | Stop reason: HOSPADM

## 2024-05-16 RX ORDER — TRAMADOL HYDROCHLORIDE 50 MG/1
50 TABLET ORAL EVERY 8 HOURS PRN
Status: DISPENSED | OUTPATIENT
Start: 2024-05-16 | End: 2024-05-21

## 2024-05-16 RX ORDER — GABAPENTIN 100 MG/1
100 CAPSULE ORAL 2 TIMES DAILY
Status: DISCONTINUED | OUTPATIENT
Start: 2024-05-16 | End: 2024-05-23 | Stop reason: HOSPADM

## 2024-05-16 RX ORDER — MAGNESIUM OXIDE 400 MG/1
400 TABLET ORAL DAILY
COMMUNITY
End: 2024-06-05

## 2024-05-16 RX ORDER — ALBUMIN, HUMAN INJ 5% 5 %
250 SOLUTION INTRAVENOUS ONCE
Status: COMPLETED | OUTPATIENT
Start: 2024-05-16 | End: 2024-05-16

## 2024-05-16 RX ORDER — POTASSIUM CHLORIDE 20 MEQ/1
20 TABLET, EXTENDED RELEASE ORAL 2 TIMES DAILY WITH MEALS
Status: DISCONTINUED | OUTPATIENT
Start: 2024-05-16 | End: 2024-05-17

## 2024-05-16 RX ADMIN — HYDROCODONE BITARTRATE AND ACETAMINOPHEN 1 TABLET: 5; 325 TABLET ORAL at 06:35

## 2024-05-16 RX ADMIN — POLYETHYLENE GLYCOL 3350 17 G: 17 POWDER, FOR SOLUTION ORAL at 08:05

## 2024-05-16 RX ADMIN — PANTOPRAZOLE SODIUM 40 MG: 40 TABLET, DELAYED RELEASE ORAL at 08:06

## 2024-05-16 RX ADMIN — CYCLOBENZAPRINE 10 MG: 10 TABLET, FILM COATED ORAL at 19:53

## 2024-05-16 RX ADMIN — VASOPRESSIN 0.03 UNITS/MIN: 0.2 INJECTION INTRAVENOUS at 21:52

## 2024-05-16 RX ADMIN — ATORVASTATIN CALCIUM 40 MG: 40 TABLET, FILM COATED ORAL at 20:00

## 2024-05-16 RX ADMIN — CHLORHEXIDINE GLUCONATE, 0.12% ORAL RINSE 15 ML: 1.2 SOLUTION DENTAL at 08:06

## 2024-05-16 RX ADMIN — POTASSIUM CHLORIDE 20 MEQ: 1500 TABLET, EXTENDED RELEASE ORAL at 09:17

## 2024-05-16 RX ADMIN — MORPHINE SULFATE 2 MG: 2 INJECTION, SOLUTION INTRAMUSCULAR; INTRAVENOUS at 02:22

## 2024-05-16 RX ADMIN — SODIUM CHLORIDE 30 ML/HR: 9 INJECTION, SOLUTION INTRAVENOUS at 09:19

## 2024-05-16 RX ADMIN — VASOPRESSIN 0.03 UNITS/MIN: 0.2 INJECTION INTRAVENOUS at 04:20

## 2024-05-16 RX ADMIN — ACETAMINOPHEN 1000 MG: 10 INJECTION INTRAVENOUS at 02:10

## 2024-05-16 RX ADMIN — SODIUM CHLORIDE 250 ML: 9 INJECTION, SOLUTION INTRAVENOUS at 18:06

## 2024-05-16 RX ADMIN — HYDROCODONE BITARTRATE AND ACETAMINOPHEN 1 TABLET: 5; 325 TABLET ORAL at 16:20

## 2024-05-16 RX ADMIN — BUMETANIDE 1 MG: 0.25 INJECTION INTRAMUSCULAR; INTRAVENOUS at 09:19

## 2024-05-16 RX ADMIN — ALBUMIN (HUMAN) 250 ML: 12.5 INJECTION, SOLUTION INTRAVENOUS at 19:09

## 2024-05-16 RX ADMIN — GABAPENTIN 100 MG: 100 CAPSULE ORAL at 20:00

## 2024-05-16 RX ADMIN — POTASSIUM CHLORIDE 20 MEQ: 1500 TABLET, EXTENDED RELEASE ORAL at 18:13

## 2024-05-16 RX ADMIN — TRAMADOL HYDROCHLORIDE 50 MG: 50 TABLET, COATED ORAL at 19:54

## 2024-05-16 RX ADMIN — SENNOSIDES AND DOCUSATE SODIUM 2 TABLET: 50; 8.6 TABLET ORAL at 20:00

## 2024-05-16 RX ADMIN — MORPHINE SULFATE 2 MG: 2 INJECTION, SOLUTION INTRAMUSCULAR; INTRAVENOUS at 11:50

## 2024-05-16 RX ADMIN — CHLOROTHIAZIDE SODIUM 250 MG: 500 INJECTION, POWDER, LYOPHILIZED, FOR SOLUTION INTRAVENOUS at 19:53

## 2024-05-16 RX ADMIN — HYDROCODONE BITARTRATE AND ACETAMINOPHEN 1 TABLET: 5; 325 TABLET ORAL at 00:18

## 2024-05-16 RX ADMIN — MORPHINE SULFATE 2 MG: 2 INJECTION, SOLUTION INTRAMUSCULAR; INTRAVENOUS at 06:35

## 2024-05-16 RX ADMIN — MORPHINE SULFATE 2 MG: 2 INJECTION, SOLUTION INTRAMUSCULAR; INTRAVENOUS at 09:31

## 2024-05-16 RX ADMIN — MONTELUKAST 10 MG: 10 TABLET, FILM COATED ORAL at 08:06

## 2024-05-16 RX ADMIN — ASPIRIN 81 MG: 81 TABLET, COATED ORAL at 08:06

## 2024-05-16 RX ADMIN — CHLORHEXIDINE GLUCONATE, 0.12% ORAL RINSE 15 ML: 1.2 SOLUTION DENTAL at 20:00

## 2024-05-16 RX ADMIN — POLYETHYLENE GLYCOL 3350 17 G: 17 POWDER, FOR SOLUTION ORAL at 20:00

## 2024-05-16 RX ADMIN — VASOPRESSIN 0.03 UNITS/MIN: 0.2 INJECTION INTRAVENOUS at 13:51

## 2024-05-16 RX ADMIN — BUMETANIDE 2 MG: 0.25 INJECTION INTRAMUSCULAR; INTRAVENOUS at 19:53

## 2024-05-16 RX ADMIN — SENNOSIDES AND DOCUSATE SODIUM 2 TABLET: 50; 8.6 TABLET ORAL at 08:05

## 2024-05-16 RX ADMIN — MORPHINE SULFATE 2 MG: 2 INJECTION, SOLUTION INTRAMUSCULAR; INTRAVENOUS at 13:58

## 2024-05-16 RX ADMIN — ENOXAPARIN SODIUM 40 MG: 100 INJECTION SUBCUTANEOUS at 16:20

## 2024-05-16 RX ADMIN — MUPIROCIN 1 APPLICATION: 20 OINTMENT TOPICAL at 20:00

## 2024-05-16 RX ADMIN — VASOPRESSIN 0.03 UNITS/MIN: 0.2 INJECTION INTRAVENOUS at 04:21

## 2024-05-16 RX ADMIN — BUMETANIDE 1 MG: 0.25 INJECTION INTRAMUSCULAR; INTRAVENOUS at 16:19

## 2024-05-16 RX ADMIN — HYDROCODONE BITARTRATE AND ACETAMINOPHEN 1 TABLET: 5; 325 TABLET ORAL at 10:36

## 2024-05-16 RX ADMIN — CITALOPRAM HYDROBROMIDE 20 MG: 20 TABLET ORAL at 09:17

## 2024-05-16 RX ADMIN — MAGNESIUM SULFATE IN DEXTROSE 1 G: 10 INJECTION, SOLUTION INTRAVENOUS at 02:10

## 2024-05-16 RX ADMIN — SODIUM CHLORIDE 2 G: 900 INJECTION INTRAVENOUS at 09:19

## 2024-05-16 RX ADMIN — MUPIROCIN 1 APPLICATION: 20 OINTMENT TOPICAL at 08:06

## 2024-05-16 RX ADMIN — SODIUM CHLORIDE 2 G: 900 INJECTION INTRAVENOUS at 02:10

## 2024-05-16 RX ADMIN — SODIUM CHLORIDE 2 G: 900 INJECTION INTRAVENOUS at 18:15

## 2024-05-16 RX ADMIN — CYCLOBENZAPRINE 10 MG: 10 TABLET, FILM COATED ORAL at 09:17

## 2024-05-16 RX ADMIN — HYDROCODONE BITARTRATE AND ACETAMINOPHEN 1 TABLET: 5; 325 TABLET ORAL at 22:43

## 2024-05-16 NOTE — PLAN OF CARE
Goal Outcome Evaluation:  Plan of Care Reviewed With: patient        Progress: no change  Outcome Evaluation: Pt. is 68 y/o male S/P POD# 1 elective CABG x4. Pt. w/ severe pain this date, nsg aware and medicated for tx. Pt. friend present, she states patient is typically I/ADL independent, lives alone at baseline. Pt. provided min A x 2 for SPS transfers this date and increased max A x 2 for all bed mobility. Limited activity tolerance secondary to increased pain and fatigue. Pt. provided max A for all LB ADLs, limited forward flexion w/ chest tubes. Pt. not safe to d/c home alone at this time and will require IP rehab at d/c to address aforementioned deficits.      Anticipated Discharge Disposition (OT): inpatient rehabilitation facility

## 2024-05-16 NOTE — PLAN OF CARE
Goal Outcome Evaluation:  Plan of Care Reviewed With: patient           Outcome Evaluation: Pt. is 70 y/o male S/P POD# 1 elective CABG x4. Pt. w/ severe pain this date, nsg aware and medicated for tx. patient is typically I/ADL independent, lives alone at baseline, is active, and does not require AD use. Pt. provided CGA/min A x 2 for Sit to/from stand and ambulatory transfer recliner to bed. max A x 2 for all bed mobility in order to maintain sternal prec. Limited activity tolerance secondary to increased pain and fatigue. Pt is not safe to d/c home alone at this time and will require AIR at d/c to address aforementioned deficits.      Anticipated Discharge Disposition (PT): inpatient rehabilitation facility

## 2024-05-16 NOTE — ANESTHESIA POSTPROCEDURE EVALUATION
Patient: Fransico Cuenca    Procedure Summary       Date: 05/15/24 Room / Location: [facility] 01 / [facility]    Anesthesia Start: 0652 Anesthesia Stop: 1044    Procedures:       CORONARY ARTERY BYPASS GRAFTING (Chest)      TRANSESOPHAGEAL ECHOCARDIOGRAM WITH ANESTHESIA (Chest) Diagnosis:       CAD, multiple vessel      (CAD, multiple vessel [I25.10])    Surgeons: Jean Hirsch MD Provider: Kvng Pearson MD    Anesthesia Type: Amada, CVL, PAC, general ASA Status: 4            Anesthesia Type: Amada, CVL, PAC, general    Vitals  Vitals Value Taken Time   /76 05/16/24 1712   Temp 97.8 °F (36.6 °C) 05/16/24 1656   Pulse 80 05/16/24 1714   Resp 19 05/16/24 1656   SpO2 91 % 05/16/24 1714   Vitals shown include unfiled device data.        Post Anesthesia Care and Evaluation    Patient location during evaluation: ICU  Patient participation: complete - patient participated  Level of consciousness: awake  Pain scale: See nurse's notes for pain score.  Pain management: adequate    Airway patency: patent  Anesthetic complications: No anesthetic complications  PONV Status: none  Cardiovascular status: acceptable  Respiratory status: acceptable and spontaneous ventilation  Hydration status: acceptable    Comments: Patient seen and examined postoperatively; vital signs stable; SpO2 greater than or equal to 90%; cardiopulmonary status stable; nausea/vomiting adequately controlled; pain adequately controlled; no apparent anesthesia complications; patient discharged from anesthesia care when discharge criteria were met

## 2024-05-16 NOTE — PROGRESS NOTES
S/P POD# 1 elective CABG x4 with ELIZABETH--Joycelyn  EF 55% (cath)    Subjective:  reports anterior surgical chest discomfort    Extubated ~2200  Drips:  vaso .03,insulin  CI 3.7, CVP 11,   Wt is up 2 kgs from preop  UF -4.2L  CXR:  atel, vasc congestion  MVO2 sat:  58.41      Intake/Output Summary (Last 24 hours) at 5/16/2024 0833  Last data filed at 5/16/2024 0600  Gross per 24 hour   Intake 4612 ml   Output 7128 ml   Net -2516 ml     Temp:  [95.4 °F (35.2 °C)-99 °F (37.2 °C)] 98.1 °F (36.7 °C)  Heart Rate:  [] 80  Resp:  [14-23] 22  BP: ()/(46-81) 113/49  FiO2 (%):  [40 %-70 %] 40 %  /8  LPCT 28/8    Results from last 7 days   Lab Units 05/16/24  0218 05/15/24  2256 05/15/24  1136 05/15/24  1054 05/15/24  0739 05/14/24  0755   WBC 10*3/mm3 7.65  --   --  8.77  --  5.52   HEMOGLOBIN g/dL 7.8*  --   --  8.4*  --  9.8*   HEMOGLOBIN, POC g/dL  --  8.3*   < >  --    < >  --    HEMATOCRIT % 26.1*  --   --  27.2*  --  32.1*   HEMATOCRIT POC %  --  24*   < >  --    < >  --    PLATELETS 10*3/mm3 172  --   --  167  --  276   INR  1.21*  --   --  1.20*  --  1.07    < > = values in this interval not displayed.     Results from last 7 days   Lab Units 05/16/24  0218   CREATININE mg/dL 1.47*   POTASSIUM mmol/L 4.4   SODIUM mmol/L 143   MAGNESIUM mg/dL 2.6*   PHOSPHORUS mg/dL 4.3     ica 1.23    Physical Exam:  Neuro intact, nad, up in recliner, no family seen  Tele:  AAI 80, underlying SR 60-70s  Diminished bases, 96% 4L  dsg c/d/i, no drainage  Benign abd, no BM  + generalized edema    Assessment/Plan:  Principal Problem:    ASCAD    - Severe MV CAD, EF 55% (cath)--s/p elective CABG x4 with LIMA (Pangi)  - NSTEMI presentation--April 2024  - HTN--pressor postop  - HLD--statin  - CKD stage 3--Dr. Childress has followed pt  - Anxiety  - ZHANG with CPAP compliance  - Recent hx human meta pneumovirus infection--resolved  - Chronic HFpEF, NYHA class II-III  - Postop ABLA, expected    POD# 1.  Still requiring  vasopressin.  Hgb down to 7.8, will d/w Dr. Hirsch about transfusion.  Intensivist seeing pt.  Repeat ABG this morning.  On asa/statin, holding bb given pressor.  DC swan.  Mobilize, aggressive pulm toileting.  Add Flexeril for muscle pain.  He has a catching breath with inspiration, will re-eval chest tubes after walking.  Pt seen with Dr. Lorenzo, he added IV Bumex.    Addendum:  Transfuse pt.    Routine care--as above  Did NOT de-escal  D/w pt/nsg, Dr. Lorenzo, Dr. Hirsch  Discharge plan TBD--he has a friend to help at discharge    Suzy Griffith, APRN  5/16/2024  08:33 EDT

## 2024-05-16 NOTE — PROGRESS NOTES
LOS: 1 day   Admitting Physician- Jean Hirsch MD    Reason For Followup:    CAD  CABG x 4  Bilateral leg edema  Renal insufficiency     Subjective     Patient is sitting up in the chair.  He is complaining of left-sided pain at the chest tube site.    Objective     Hemodynamics are stable patient is being paced    Review of Systems:   Review of Systems   Constitutional: Negative for chills and fever.   HENT:  Negative for ear discharge and nosebleeds.    Eyes:  Negative for discharge and redness.   Cardiovascular:  Positive for chest pain and leg swelling. Negative for orthopnea, palpitations, paroxysmal nocturnal dyspnea and syncope.   Respiratory:  Positive for shortness of breath. Negative for cough and wheezing.    Endocrine: Negative for heat intolerance.   Skin:  Negative for rash.   Musculoskeletal:  Negative for arthritis and myalgias.   Gastrointestinal:  Negative for abdominal pain, melena, nausea and vomiting.   Genitourinary:  Negative for dysuria and hematuria.   Neurological:  Negative for dizziness, light-headedness, numbness and tremors.   Psychiatric/Behavioral:  Negative for depression. The patient is not nervous/anxious.          Vital Signs  Vitals:    05/16/24 0600 05/16/24 0605 05/16/24 0700 05/16/24 0800   BP: 110/70  105/68 113/49   BP Location:    Other (Comment)   Patient Position:    Lying   Pulse: 89 89 89 80   Resp:    22   Temp: 98.1 °F (36.7 °C) 98.1 °F (36.7 °C) 98.2 °F (36.8 °C) 98.1 °F (36.7 °C)   TempSrc:    Core   SpO2: 97% 96% 94% 95%   Weight: 97.4 kg (214 lb 11.7 oz)      Height:         Wt Readings from Last 1 Encounters:   05/16/24 97.4 kg (214 lb 11.7 oz)       Intake/Output Summary (Last 24 hours) at 5/16/2024 0909  Last data filed at 5/16/2024 0727  Gross per 24 hour   Intake 4912 ml   Output 7128 ml   Net -2216 ml     Physical Exam:  Constitutional:       Appearance: Well-developed.   Eyes:      General: No scleral icterus.        Right eye: No discharge.   HENT:       Head: Normocephalic and atraumatic.   Neck:      Thyroid: No thyromegaly.      Lymphadenopathy: No cervical adenopathy.   Pulmonary:      Effort: Pulmonary effort is normal. No respiratory distress.      Breath sounds: Normal breath sounds. No wheezing. No rales.   Cardiovascular:      Normal rate. Regular rhythm.      No gallop.    Edema:     Peripheral edema present.  Abdominal:      Tenderness: There is no abdominal tenderness.   Skin:     Findings: No erythema or rash.   Neurological:      Mental Status: Alert and oriented to person, place, and time.         Results Review:   Lab Results (last 24 hours)       Procedure Component Value Units Date/Time    POC Glucose Once [[id number]]  (Abnormal) Collected: 05/16/24 0720    Specimen: Blood Updated: 05/16/24 0722     Glucose 166 mg/dL      Comment: Serial Number: [id number]:  [id number]       POC Glucose Once [[id number]]  (Abnormal) Collected: 05/16/24 0425    Specimen: Blood Updated: 05/16/24 0426     Glucose 151 mg/dL      Comment: Serial Number: [id number]:  [id number]       Blood Gas, Mixed [[id number]]  (Abnormal) Collected: 05/16/24 0253    Specimen: Blood Updated: 05/16/24 0336     pH, mixed 7.30 pH units      PCO2 MIXED 48.5 mmHg      PO2 MIXED 34.1 mmHg      HCO3 MIXED 23.6 mmol/L      CO2 Content 25.1 mmol/L      Base Excess, Arterial -2.8 mmol/L      Comment: Serial Number: [id number]:  [id number]        O2 SATURATION MIXED 58.4 %      Hemodilution No     Site Swanz Naveen     Festus's Test N/A     Modality Cannula     FIO2 36 %      Base Deficit -2.9 mEq/liter     Renal Function Panel [[id number]]  (Abnormal) Collected: 05/16/24 0218    Specimen: Blood Updated: 05/16/24 0248     Glucose 157 mg/dL      BUN 38 mg/dL      Creatinine 1.47 mg/dL      Sodium 143 mmol/L      Potassium 4.4 mmol/L      Chloride 107 mmol/L      CO2 24.0 mmol/L      Calcium 8.6 mg/dL      Albumin 3.8 g/dL      Phosphorus 4.3 mg/dL      Anion Gap 12.0 mmol/L       BUN/Creatinine Ratio 25.9     eGFR 51.0 mL/min/1.73     Narrative:      GFR Normal >60  Chronic Kidney Disease <60  Kidney Failure <15      Magnesium [[id number]]  (Abnormal) Collected: 05/16/24 0218    Specimen: Blood Updated: 05/16/24 0246     Magnesium 2.6 mg/dL     Protime-INR [[id number]]  (Abnormal) Collected: 05/16/24 0218    Specimen: Blood Updated: 05/16/24 0243     Protime 13.0 Seconds      INR 1.21    Calcium, Ionized [[id number]]  (Normal) Collected: 05/16/24 0218    Specimen: Blood Updated: 05/16/24 0235     Ionized Calcium 1.23 mmol/L     CBC & Differential [[id number]]  (Abnormal) Collected: 05/16/24 0218    Specimen: Blood Updated: 05/16/24 0225    Narrative:      The following orders were created for panel order CBC & Differential.  Procedure                               Abnormality         Status                     ---------                               -----------         ------                     CBC Auto Differential[[id number]]        Abnormal            Final result                 Please view results for these tests on the individual orders.    CBC Auto Differential [[id number]]  (Abnormal) Collected: 05/16/24 0218    Specimen: Blood Updated: 05/16/24 0225     WBC 7.65 10*3/mm3      RBC 2.99 10*6/mm3      Hemoglobin 7.8 g/dL      Hematocrit 26.1 %      MCV 87.3 fL      MCH 26.1 pg      MCHC 29.9 g/dL      RDW 16.4 %      RDW-SD 51.9 fl      MPV 10.5 fL      Platelets 172 10*3/mm3      Neutrophil % 84.6 %      Lymphocyte % 4.6 %      Monocyte % 9.8 %      Eosinophil % 0.3 %      Basophil % 0.3 %      Immature Grans % 0.4 %      Neutrophils, Absolute 6.48 10*3/mm3      Lymphocytes, Absolute 0.35 10*3/mm3      Monocytes, Absolute 0.75 10*3/mm3      Eosinophils, Absolute 0.02 10*3/mm3      Basophils, Absolute 0.02 10*3/mm3      Immature Grans, Absolute 0.03 10*3/mm3      nRBC 0.0 /100 WBC     POC Glucose Once [[id number]]  (Abnormal) Collected: 05/16/24 0209    Specimen: Blood Updated: 05/16/24 0211      Glucose 163 mg/dL      Comment: Serial Number: [id number]:  [id number]       POC Creatinine [[id number]]  (Abnormal) Collected: 05/15/24 2256    Specimen: Arterial Blood Updated: 05/15/24 2259     Creatinine 1.50 mg/dL      Comment: Serial Number: [id number]:  [id number]        eGFR 49.8 mL/min/1.73     Blood Gas, Arterial - [[id number]]  (Abnormal) Collected: 05/15/24 2256    Specimen: Arterial Blood Updated: 05/15/24 2259     Site Arterial Line     Festus's Test N/A     pH, Arterial 7.302 pH units      pCO2, Arterial 48.5 mm Hg      pO2, Arterial 91.0 mm Hg      HCO3, Arterial 24.0 mmol/L      Base Excess, Arterial -2.4 mmol/L      Comment: Serial Number: [id number]:  [id number]        O2 Saturation, Arterial 96.0 %      Barometric Pressure for Blood Gas --     Comment: N/A        Modality HFNC     FIO2 52 %      Hemodilution No    POCT Electrolytes +HGB +HCT [[id number]]  (Abnormal) Collected: 05/15/24 2256    Specimen: Arterial Blood Updated: 05/15/24 2259     Sodium 143 mmol/L      POC Potassium 4.4 mmol/L      Ionized Calcium 1.26 mmol/L      Comment: Serial Number: [id number]:  [id number]        Glucose 157 mg/dL      Hematocrit 24 %      Hemoglobin 8.3 g/dL     POC Lactate [[id number]]  (Normal) Collected: 05/15/24 2256    Specimen: Arterial Blood Updated: 05/15/24 2259     Lactate 0.6 mmol/L      Comment: Serial Number: [id number]:  [id number]       POC Glucose Once [[id number]]  (Abnormal) Collected: 05/15/24 2256    Specimen: Arterial Blood Updated: 05/15/24 2259     Glucose 157 mg/dL      Comment: Serial Number: [id number]:  [id number]       POC Glucose Once [[id number]]  (Abnormal) Collected: 05/15/24 2229    Specimen: Blood Updated: 05/15/24 2232     Glucose 161 mg/dL      Comment: Serial Number: [id number]:  [id number]       POC Creatinine [[id number]]  (Abnormal) Collected: 05/15/24 2108    Specimen: Arterial Blood Updated: 05/15/24 2112     Creatinine 1.47 mg/dL      Comment: Serial Number:  [id number]:  [id number]        eGFR 51.0 mL/min/1.73     Blood Gas, Arterial - [[id number]]  (Abnormal) Collected: 05/15/24 2108    Specimen: Arterial Blood Updated: 05/15/24 2112     Site Arterial Line     Festus's Test N/A     pH, Arterial 7.329 pH units      pCO2, Arterial 46.3 mm Hg      pO2, Arterial 72.7 mm Hg      HCO3, Arterial 24.4 mmol/L      Base Excess, Arterial -1.6 mmol/L      Comment: Serial Number: [id number]:  [id number]        O2 Saturation, Arterial 93.1 %      Barometric Pressure for Blood Gas --     Comment: N/A        Modality Adult Vent     FIO2 40 %      Ventilator Mode CPAP/PS     PEEP 5     Hemodilution No    POCT Electrolytes +HGB +HCT [[id number]]  (Abnormal) Collected: 05/15/24 2108    Specimen: Arterial Blood Updated: 05/15/24 2112     Sodium 143 mmol/L      POC Potassium 4.0 mmol/L      Ionized Calcium 1.24 mmol/L      Comment: Serial Number: [id number]:  [id number]        Glucose 147 mg/dL      Hematocrit 23 %      Hemoglobin 7.9 g/dL     POC Lactate [[id number]]  (Normal) Collected: 05/15/24 2108    Specimen: Arterial Blood Updated: 05/15/24 2112     Lactate 0.8 mmol/L      Comment: Serial Number: [id number]:  [id number]       POC Glucose Once [[id number]]  (Abnormal) Collected: 05/15/24 2108    Specimen: Arterial Blood Updated: 05/15/24 2112     Glucose 147 mg/dL      Comment: Serial Number: [id number]:  [id number]       POC Glucose Once [[id number]]  (Abnormal) Collected: 05/15/24 2002    Specimen: Blood Updated: 05/15/24 2005     Glucose 127 mg/dL      Comment: Serial Number: [id number]:  [id number]       POCT Electrolytes +HGB +HCT [[id number]]  (Abnormal) Collected: 05/15/24 1905    Specimen: Arterial Blood Updated: 05/15/24 1908     Sodium 142 mmol/L      POC Potassium 3.8 mmol/L      Ionized Calcium 1.24 mmol/L      Comment: Serial Number: [id number]:  [id number]        Glucose 120 mg/dL      Hematocrit 24 %      Hemoglobin 8.2 g/dL     POC Lactate [[id number]]  (Normal) Collected:  05/15/24 1905    Specimen: Arterial Blood Updated: 05/15/24 1908     Lactate 0.7 mmol/L      Comment: Serial Number: [id number]:  [id number]       POC Glucose Once [[id number]]  (Abnormal) Collected: 05/15/24 1905    Specimen: Arterial Blood Updated: 05/15/24 1908     Glucose 120 mg/dL      Comment: Serial Number: [id number]:  [id number]       Blood Gas, Arterial - [[id number]]  (Abnormal) Collected: 05/15/24 1905    Specimen: Arterial Blood Updated: 05/15/24 1908     Site Arterial Line     Festus's Test N/A     pH, Arterial 7.315 pH units      pCO2, Arterial 44.7 mm Hg      pO2, Arterial 109.9 mm Hg      HCO3, Arterial 22.8 mmol/L      Base Excess, Arterial -3.3 mmol/L      Comment: Serial Number: [id number]:  [id number]        O2 Saturation, Arterial 97.8 %      Barometric Pressure for Blood Gas --     Comment: N/A        Modality Adult Vent     FIO2 40 %      Ventilator Mode CPAP/PS     PEEP 5     Hemodilution No    POC Creatinine [[id number]]  (Abnormal) Collected: 05/15/24 1905    Specimen: Arterial Blood Updated: 05/15/24 1908     Creatinine 1.53 mg/dL      Comment: Serial Number: [id number]:  [id number]        eGFR 48.6 mL/min/1.73     POC Glucose Once [[id number]]  (Abnormal) Collected: 05/15/24 1810    Specimen: Blood Updated: 05/15/24 1814     Glucose 121 mg/dL      Comment: Serial Number: [id number]:  [id number]       Blood Gas, Arterial - [[id number]]  (Abnormal) Collected: 05/15/24 1752    Specimen: Arterial Blood Updated: 05/15/24 1758     Site Arterial Line     Festus's Test N/A     pH, Arterial 7.302 pH units      pCO2, Arterial 43.4 mm Hg      pO2, Arterial 104.4 mm Hg      HCO3, Arterial 21.4 mmol/L      Base Excess, Arterial -4.7 mmol/L      Comment: Serial Number: [id number]:  [id number]        O2 Saturation, Arterial 97.4 %      Barometric Pressure for Blood Gas --     Comment: N/A        Modality Adult Vent     FIO2 40 %      Ventilator Mode CPAP/PS     Set Tidal Volume 447     PSV 10 cmH2O       Hemodilution No     Respiratory Rate 21    POCT Electrolytes +HGB +HCT [[id number]]  (Abnormal) Collected: 05/15/24 1752    Specimen: Arterial Blood Updated: 05/15/24 1758     Sodium 140 mmol/L      POC Potassium 4.0 mmol/L      Ionized Calcium 1.17 mmol/L      Comment: Serial Number: [id number]:  [id number]        Glucose 110 mg/dL      Hematocrit 24 %      Hemoglobin 8.2 g/dL     POC Glucose Once [[id number]]  (Abnormal) Collected: 05/15/24 1752    Specimen: Arterial Blood Updated: 05/15/24 1758     Glucose 110 mg/dL      Comment: Serial Number: [id number]:  [id number]       POC Glucose Once [[id number]]  (Abnormal) Collected: 05/15/24 1706    Specimen: Blood Updated: 05/15/24 1713     Glucose 111 mg/dL      Comment: Serial Number: [id number]:  [id number]       Blood Gas, Arterial - [[id number]]  (Abnormal) Collected: 05/15/24 1624    Specimen: Arterial Blood Updated: 05/15/24 1629     Site Arterial Line     Festus's Test N/A     pH, Arterial 7.332 pH units      pCO2, Arterial 46.6 mm Hg      pO2, Arterial 80.4 mm Hg      HCO3, Arterial 24.7 mmol/L      Base Excess, Arterial -1.3 mmol/L      Comment: Serial Number: [id number]:  [id number]        O2 Saturation, Arterial 94.8 %      CO2 Content 26.1 mmol/L      Barometric Pressure for Blood Gas --     Comment: N/A        Modality Adult Vent     FIO2 40 %      Ventilator Mode CPAP/PS     Set Tidal Volume 543     PEEP 5     PSV 10 cmH2O      Hemodilution No     Respiratory Rate 19    POC Glucose Once [[id number]]  (Normal) Collected: 05/15/24 1624    Specimen: Arterial Blood Updated: 05/15/24 1629     Glucose 90 mg/dL      Comment: Serial Number: [id number]:  [id number]       POC Glucose Once [[id number]]  (Normal) Collected: 05/15/24 1602    Specimen: Blood Updated: 05/15/24 1604     Glucose 93 mg/dL      Comment: Serial Number: [id number]:  [id number]       Blood Gas, Arterial - [[id number]]  (Abnormal) Collected: 05/15/24 1453    Specimen: Arterial Blood Updated:  05/15/24 1503     Site Arterial Line     Festus's Test N/A     pH, Arterial 7.448 pH units      pCO2, Arterial 35.3 mm Hg      pO2, Arterial 79.7 mm Hg      HCO3, Arterial 24.4 mmol/L      Base Excess, Arterial 0.5 mmol/L      Comment: Serial Number: [id number]:  [id number]        O2 Saturation, Arterial 96.3 %      CO2 Content 25.5 mmol/L      Barometric Pressure for Blood Gas --     Comment: N/A        Modality Adult Vent     FIO2 40 %      Ventilator Mode AC     Set Tidal Volume 700     PEEP 5     Hemodilution No     Respiratory Rate 14    POC Glucose Once [[id number]]  (Normal) Collected: 05/15/24 1453    Specimen: Arterial Blood Updated: 05/15/24 1503     Glucose 89 mg/dL      Comment: Serial Number: [id number]:  [id number]       POC Glucose Once [[id number]]  (Abnormal) Collected: 05/15/24 1415    Specimen: Blood Updated: 05/15/24 1417     Glucose 107 mg/dL      Comment: Serial Number: [id number]:  [id number]       POC Activated Clotting Time [[id number]]  (Normal) Collected: 05/15/24 0950    Specimen: Blood Updated: 05/15/24 1410     Activated Clotting Time  116 Seconds      Comment: Serial Number: [id number]:  [id number]       POC Activated Clotting Time [[id number]]  (Abnormal) Collected: 05/15/24 0916    Specimen: Arterial Blood Updated: 05/15/24 1410     Activated Clotting Time  342 Seconds      Comment: Serial Number: [id number]:  [id number]       POC Activated Clotting Time [[id number]]  (Abnormal) Collected: 05/15/24 0846    Specimen: Arterial Blood Updated: 05/15/24 1410     Activated Clotting Time  330 Seconds      Comment: Serial Number: [id number]:  [id number]       POC Activated Clotting Time [[id number]]  (Abnormal) Collected: 05/15/24 0833    Specimen: Arterial Blood Updated: 05/15/24 1410     Activated Clotting Time  348 Seconds      Comment: Serial Number: [id number]:  [id number]       POC Activated Clotting Time [[id number]]  (Abnormal) Collected: 05/15/24 0823    Specimen: Arterial Blood Updated:  05/15/24 1409     Activated Clotting Time  299 Seconds      Comment: Serial Number: [id number]:  [id number]       POC Activated Clotting Time [[id number]]  (Abnormal) Collected: 05/15/24 0739    Specimen: Arterial Blood Updated: 05/15/24 1409     Activated Clotting Time  140 Seconds      Comment: Serial Number: [id number]:  [id number]       POC Chem 8, arterial (ISTAT) [[id number]]  (Abnormal) Collected: 05/15/24 0950    Specimen: Arterial Blood Updated: 05/15/24 1401     Ionized Calcium 1.15 mmol/L      pH, Arterial 7.320 pH units      pCO2, Arterial 44.6 mm Hg      pO2, Arterial 70.0 mm Hg      HCO3, Arterial 23.2 mmol/L      Base Excess, Arterial <0.0 mmol/L      Base Deficit --     O2 Saturation, Arterial 92.0 %      Glucose 199 mg/dL      Comment: Serial Number: [id number]:  [id number]        Sodium 133 mmol/L      POC Potassium 4.5 mmol/L      Hematocrit 24 %      Hemoglobin 8.2 g/dL      CO2 Content 25 mmol/L     POC Chem 8, arterial (ISTAT) [[id number]]  (Abnormal) Collected: 05/15/24 0916    Specimen: Arterial Blood Updated: 05/15/24 1401     Ionized Calcium 1.09 mmol/L      pH, Arterial 7.320 pH units      pCO2, Arterial 51.6 mm Hg      pO2, Arterial 589.0 mm Hg      HCO3, Arterial 26.2 mmol/L      Base Excess, Arterial 0.0 mmol/L      Base Deficit --     O2 Saturation, Arterial 100.0 %      Glucose 190 mg/dL      Comment: Serial Number: [id number]:  [id number]        Sodium 135 mmol/L      POC Potassium 4.7 mmol/L      Hematocrit 23 %      Hemoglobin 7.8 g/dL      CO2 Content 28 mmol/L     POC Chem Panel [[id number]]  (Abnormal) Collected: 05/15/24 0857    Specimen: Venous Blood Updated: 05/15/24 1400     Glucose 149 mg/dL      Comment: Serial Number: [id number]:  [id number]        Sodium 134 mmol/L      POC Potassium 4.2 mmol/L      Ionized Calcium 1.08 mmol/L      Hematocrit 25 %      Hemoglobin 8.5 g/dL      pH, Venous 7.280 pH Units      pCO2, Venous 55.2 mm Hg      CO2 CONTENT  --     HCO3, Venous 25.7 mmol/L       Base Excess, Venous --     Base Deficit --     O2 Saturation, Venous 27.0 %      pO2, Venous 44.0 mm Hg     POC Chem 8, arterial (ISTAT) [[id number]]  (Abnormal) Collected: 05/15/24 0847    Specimen: Arterial Blood Updated: 05/15/24 1400     Ionized Calcium 1.06 mmol/L      pH, Arterial 7.320 pH units      pCO2, Arterial 50.8 mm Hg      pO2, Arterial 518.0 mm Hg      HCO3, Arterial 26.5 mmol/L      Base Excess, Arterial 0.0 mmol/L      Base Deficit --     O2 Saturation, Arterial 100.0 %      Glucose 139 mg/dL      Comment: Serial Number: [id number]:  [id number]        Sodium 135 mmol/L      POC Potassium 4.4 mmol/L      Hematocrit 21 %      Hemoglobin 7.1 g/dL      CO2 Content 28 mmol/L     POC Chem 8, arterial (ISTAT) [[id number]]  (Abnormal) Collected: 05/15/24 0739    Specimen: Arterial Blood Updated: 05/15/24 1400     Ionized Calcium 1.21 mmol/L      pH, Arterial 7.400 pH units      pCO2, Arterial 40.4 mm Hg      pO2, Arterial 243.0 mm Hg      HCO3, Arterial 25.0 mmol/L      Base Excess, Arterial 0.0 mmol/L      Base Deficit --     O2 Saturation, Arterial 100.0 %      Glucose 107 mg/dL      Comment: Serial Number: [id number]:  [id number]        Sodium 139 mmol/L      POC Potassium 3.9 mmol/L      Hematocrit 24 %      Hemoglobin 8.2 g/dL      CO2 Content 26 mmol/L     Blood Gas, Arterial - [[id number]]  (Abnormal) Collected: 05/15/24 1343    Specimen: Arterial Blood Updated: 05/15/24 1349     Site Arterial Line     Festus's Test N/A     pH, Arterial 7.470 pH units      pCO2, Arterial 33.1 mm Hg      pO2, Arterial 68.8 mm Hg      HCO3, Arterial 24.1 mmol/L      Base Excess, Arterial 0.6 mmol/L      Comment: Serial Number: [id number]:  [id number]        O2 Saturation, Arterial 94.8 %      CO2 Content 25.1 mmol/L      Barometric Pressure for Blood Gas --     Comment: N/A        Modality Adult Vent     FIO2 50 %      Ventilator Mode AC     Set Tidal Volume 750     PEEP 5     Hemodilution No     Respiratory Rate 14     POC Glucose Once [[id number]]  (Abnormal) Collected: 05/15/24 1343    Specimen: Arterial Blood Updated: 05/15/24 1349     Glucose 123 mg/dL      Comment: Serial Number: [id number]:  [id number]       POC Glucose Once [[id number]]  (Abnormal) Collected: 05/15/24 1304    Specimen: Blood Updated: 05/15/24 1307     Glucose 171 mg/dL      Comment: Serial Number: [id number]:  [id number]       Blood Gas, Arterial - [[id number]]  (Abnormal) Collected: 05/15/24 1243    Specimen: Arterial Blood Updated: 05/15/24 1248     Site Arterial Line     Festus's Test N/A     pH, Arterial 7.477 pH units      pCO2, Arterial 33.5 mm Hg      pO2, Arterial 139.4 mm Hg      HCO3, Arterial 24.8 mmol/L      Base Excess, Arterial 1.3 mmol/L      Comment: Serial Number: [id number]:  [id number]        O2 Saturation, Arterial 99.3 %      CO2 Content 25.8 mmol/L      Barometric Pressure for Blood Gas --     Comment: N/A        Modality Adult Vent     FIO2 70 %      Ventilator Mode AC     Set Tidal Volume 750     PEEP 6     Hemodilution No     Respiratory Rate 14    POC Glucose Once [[id number]]  (Abnormal) Collected: 05/15/24 1243    Specimen: Arterial Blood Updated: 05/15/24 1248     Glucose 62 mg/dL      Comment: Serial Number: [id number]:  [id number]       Blood Gas, Venous - [[id number]]  (Abnormal) Collected: 05/15/24 1136    Specimen: Venous Blood Updated: 05/15/24 1142     Site PA     pH, Venous 7.405 pH Units      pCO2, Venous 38.5 mm Hg      pO2, Venous 32.1 mm Hg      HCO3, Venous 24.1 mmol/L      Base Excess, Venous -0.5 mmol/L      Comment: Serial Number: [id number]:  [id number]        O2 Saturation, Venous 62.4 %      Barometric Pressure for Blood Gas --     Comment: N/A        Modality Adult Vent     FIO2 70 %      Ventilator Mode AC     Set Tidal Volume 750     PEEP 8    POC Creatinine [[id number]]  (Abnormal) Collected: 05/15/24 1136    Specimen: Venous Blood Updated: 05/15/24 1141     Creatinine 1.33 mg/dL      Comment: Serial Number:  [id number]:  [id number]        eGFR 57.5 mL/min/1.73     POCT Electrolytes +HGB +HCT [[id number]]  (Abnormal) Collected: 05/15/24 1136    Specimen: Venous Blood Updated: 05/15/24 1141     Sodium 142 mmol/L      POC Potassium 3.7 mmol/L      Ionized Calcium 1.16 mmol/L      Comment: Serial Number: [id number]:  [id number]        Glucose 90 mg/dL      Hematocrit 25 %      Hemoglobin 8.6 g/dL     POC Lactate [[id number]]  (Normal) Collected: 05/15/24 1136    Specimen: Venous Blood Updated: 05/15/24 1141     Lactate 1.0 mmol/L      Comment: Serial Number: [id number]:  [id number]       POC Glucose Once [[id number]]  (Normal) Collected: 05/15/24 1136    Specimen: Venous Blood Updated: 05/15/24 1141     Glucose 90 mg/dL      Comment: Serial Number: [id number]:  [id number]       Blood Gas, Arterial - [[id number]]  (Abnormal) Collected: 05/15/24 1124    Specimen: Arterial Blood Updated: 05/15/24 1128     Site Arterial Line     Festus's Test N/A     pH, Arterial 7.432 pH units      pCO2, Arterial 36.5 mm Hg      pO2, Arterial 79.8 mm Hg      HCO3, Arterial 24.4 mmol/L      Base Excess, Arterial 0.2 mmol/L      Comment: Serial Number: [id number]:  [id number]        O2 Saturation, Arterial 96.1 %      CO2 Content 25.5 mmol/L      Barometric Pressure for Blood Gas --     Comment: N/A        Modality Adult Vent     FIO2 70 %      Ventilator Mode AC     Set Tidal Volume 750     PEEP 8     Hemodilution No     Respiratory Rate 14    POC Glucose Once [[id number]]  (Abnormal) Collected: 05/15/24 1124    Specimen: Arterial Blood Updated: 05/15/24 1128     Glucose 102 mg/dL      Comment: Serial Number: [id number]:  [id number]       Renal Function Panel [[id number]]  (Abnormal) Collected: 05/15/24 1054    Specimen: Blood Updated: 05/15/24 1126     Glucose 131 mg/dL      BUN 35 mg/dL      Creatinine 1.44 mg/dL      Sodium 139 mmol/L      Potassium 4.2 mmol/L      Chloride 104 mmol/L      CO2 24.0 mmol/L      Calcium 7.9 mg/dL      Albumin 3.4 g/dL       Phosphorus 2.4 mg/dL      Anion Gap 11.0 mmol/L      BUN/Creatinine Ratio 24.3     eGFR 52.3 mL/min/1.73     Narrative:      GFR Normal >60  Chronic Kidney Disease <60  Kidney Failure <15      Protime-INR [[id number]]  (Abnormal) Collected: 05/15/24 1054    Specimen: Blood Updated: 05/15/24 1114     Protime 12.9 Seconds      INR 1.20    aPTT [[id number]]  (Abnormal) Collected: 05/15/24 1054    Specimen: Blood Updated: 05/15/24 1114     PTT 21.7 seconds     Calcium, Ionized [[id number]]  (Abnormal) Collected: 05/15/24 1054    Specimen: Blood Updated: 05/15/24 1113     Ionized Calcium 1.16 mmol/L     CBC & Differential [[id number]]  (Abnormal) Collected: 05/15/24 1054    Specimen: Blood Updated: 05/15/24 1107    Narrative:      The following orders were created for panel order CBC & Differential.  Procedure                               Abnormality         Status                     ---------                               -----------         ------                     CBC Auto Differential[[id number]]        Abnormal            Final result                 Please view results for these tests on the individual orders.    CBC Auto Differential [[id number]]  (Abnormal) Collected: 05/15/24 1054    Specimen: Blood Updated: 05/15/24 1107     WBC 8.77 10*3/mm3      RBC 3.22 10*6/mm3      Hemoglobin 8.4 g/dL      Hematocrit 27.2 %      MCV 84.5 fL      MCH 26.1 pg      MCHC 30.9 g/dL      RDW 16.0 %      RDW-SD 49.6 fl      MPV 9.8 fL      Platelets 167 10*3/mm3      Neutrophil % 83.3 %      Lymphocyte % 5.2 %      Monocyte % 8.2 %      Eosinophil % 1.6 %      Basophil % 0.1 %      Immature Grans % 1.6 %      Neutrophils, Absolute 7.30 10*3/mm3      Lymphocytes, Absolute 0.46 10*3/mm3      Monocytes, Absolute 0.72 10*3/mm3      Eosinophils, Absolute 0.14 10*3/mm3      Basophils, Absolute 0.01 10*3/mm3      Immature Grans, Absolute 0.14 10*3/mm3      nRBC 0.0 /100 WBC           Imaging Results (Last 72 Hours)       Procedure  Component Value Units Date/Time    XR Chest 1 View [[id number]] Collected: 05/16/24 0759     Updated: 05/16/24 0810    Narrative:      XR CHEST 1 VW    Date of Exam: 5/16/2024 5:49 AM EDT    Indication: Post-Op Heart Surgery    Comparison: 5/15/2024    Findings:  Interval removal of endotracheal and NG tubes. Stable cardiomegaly and pulmonary congestion. No change in position of a right intrajugular [place] catheter with the tip in the right main pulmonary artery level stable surgical changes post CABG. There   is mild interval improved aeration to the left lung base with persistent atelectasis and a small effusion. There is also mild increased opacity at the right lung base.      Impression:      Impression:  Interval removal of endotracheal and NG tubes    Mild improved aeration to the left lung base. Otherwise grossly stable bibasilar airspace disease      Electronically Signed: Patrick Robbins MD    5/16/2024 8:08 AM EDT    Workstation ID: [id number]    XR Chest 1 View [[id number]] Collected: 05/15/24 1114     Updated: 05/15/24 1119    Narrative:      DATE OF EXAM:  5/15/2024 10:54 AM     PROCEDURE:  XR CHEST 1 VW-     INDICATIONS:  Post-Op Check Line & Tube Placement; I25.10-Atherosclerotic heart  disease of native coronary artery without angina pectoris     COMPARISON:  May 14, 2024     TECHNIQUE:   Single radiographic view of the chest was obtained.     FINDINGS:  Endotracheal tube tip is approximately 5 cm above the claire. There is a  right IJ [place] catheter with tip projecting in the area of the right  pulmonary artery. Enteric tube extends into the left upper quadrant, tip  beyond the margins of this exam. There has been interval median  sternotomy and CABG. There appears to be mediastinal drains and a left  basilar chest tube. No definite pneumothorax or significant pleural  effusion is seen. There are mild bibasilar opacities, left greater than  right. Heart size and mediastinal contour appear as  expected.       Impression:      1.     Interval median sternotomy and CABG.  2.     Tubes and lines appear in satisfactory positions, as detailed  above.  3.     Mild bibasilar opacities which may represent atelectasis or  edema.     Electronically Signed By-Romaine Szymanski MD On:5/15/2024 11:16 AM             ECG/EMG Results (most recent)       Procedure Component Value Units Date/Time    ECG 12 Lead Other; s/p CABG [[id number]] Collected: 05/15/24 1504     Updated: 05/15/24 1506     QT Interval 424 ms      QTC Interval 428 ms     Narrative:      HEART RATE= 61  bpm  RR Interval= 980  ms  MT Interval= 247  ms  P Horizontal Axis= 21  deg  P Front Axis= 32  deg  QRSD Interval= 98  ms  QT Interval= 424  ms  QTcB= 428  ms  QRS Axis= 107  deg  T Wave Axis= -19  deg  - ABNORMAL ECG -  Sinus rhythm  Prolonged MT interval  Right axis deviation  Probable anteroseptal infarct, old  Abnrm T, consider ischemia, anterolateral lds  When compared with ECG of 14-May-2024 7:59:36,  Significant axis, voltage or hypertrophy change  Electronically Signed By:   Date and Time of Study: 2024-05-15 15:04:45          CBC    Results from last 7 days   Lab Units 05/16/24  0218 05/15/24  2256 05/15/24  2108 05/15/24  1905 05/15/24  1752 05/15/24  1136 05/15/24  1054 05/15/24  0739 05/14/24  0755   WBC 10*3/mm3 7.65  --   --   --   --   --  8.77  --  5.52   HEMOGLOBIN g/dL 7.8*  --   --   --   --   --  8.4*  --  9.8*   HEMOGLOBIN, POC g/dL  --  8.3* 7.9* 8.2* 8.2* 8.6*  --    < >  --    PLATELETS 10*3/mm3 172  --   --   --   --   --  167  --  276    < > = values in this interval not displayed.     BMP   Results from last 7 days   Lab Units 05/16/24  0218 05/15/24  2256 05/15/24  2108 05/15/24  1905 05/15/24  1136 05/15/24  1054 05/14/24  0755 05/13/24  1244   SODIUM mmol/L 143  --   --   --   --  139 139 141   POTASSIUM mmol/L 4.4  --   --   --   --  4.2 4.4 4.4   CHLORIDE mmol/L 107  --   --   --   --  104 100 104   CO2 mmol/L 24.0  --   --    --   --  24.0 29.0 26.0   BUN mg/dL 38*  --   --   --   --  35* 34* 27*   CREATININE mg/dL 1.47* 1.50* 1.47* 1.53* 1.33* 1.44* 1.41* 1.27   GLUCOSE mg/dL 157*  --   --   --   --  131* 93 88   MAGNESIUM mg/dL 2.6*  --   --   --   --   --   --   --    PHOSPHORUS mg/dL 4.3  --   --   --   --  2.4*  --   --      CMP   Results from last 7 days   Lab Units 05/16/24  0218 05/15/24  2256 05/15/24  2108 05/15/24  1905 05/15/24  1136 05/15/24  1054 05/14/24  0755 05/13/24  1244   SODIUM mmol/L 143  --   --   --   --  139 139 141   POTASSIUM mmol/L 4.4  --   --   --   --  4.2 4.4 4.4   CHLORIDE mmol/L 107  --   --   --   --  104 100 104   CO2 mmol/L 24.0  --   --   --   --  24.0 29.0 26.0   BUN mg/dL 38*  --   --   --   --  35* 34* 27*   CREATININE mg/dL 1.47* 1.50* 1.47* 1.53* 1.33* 1.44* 1.41* 1.27   GLUCOSE mg/dL 157*  --   --   --   --  131* 93 88   ALBUMIN g/dL 3.8  --   --   --   --  3.4* 4.2  --    BILIRUBIN mg/dL  --   --   --   --   --   --  0.2  --    ALK PHOS U/L  --   --   --   --   --   --  38*  --    AST (SGOT) U/L  --   --   --   --   --   --  13  --    ALT (SGPT) U/L  --   --   --   --   --   --  13  --      Cardiac Studies:  Echo- Results for orders placed in visit on 05/15/24    Intra-Op Anesthesia DIGNA    Narrative  Intra-Op Anesthesia DIGNA    Procedure Performed: Intra-Op Anesthesia DIGNA  Start Time:  End Time:    Preanesthesia Checklist:  Patient identified, IV assessed, risks and benefits discussed, monitors and equipment assessed, procedure being performed at surgeon's request and anesthesia consent obtained.    General Procedure Information  DIGNA Placed for monitoring purposes only -- This is not a diagnostic DIGNA  Diagnostic Indications for Echo:  assessment of surgical repair and hemodynamic monitoring  Physician Requesting Echo: Jean Hirsch MD  Location performed:  OR  Intubated  Bite block placed  Heart visualized  Probe Insertion:  Easy  Probe Type:  Multiplane  Modalities:  Color flow mapping and  continuous wave Doppler    Echocardiographic and Doppler Measurements    Ventricles    Right Ventricle:  Cavity size normal.  Global function normal.  Left Ventricle:  Cavity size normal.  Global Function normal.        Valves    Aortic Valve:  Annulus normal.  Stenosis not present.  Regurgitation trace.  Leaflets normal.  Leaflet motions normal.    Mitral Valve:  Annulus normal.  Stenosis not present.  Regurgitation trace.  Leaflets normal.  Leaflet motions normal.    Tricuspid Valve:  Annulus normal.  Stenosis not present.  Regurgitation absent.  Leaflets normal.  Leaflet motions normal.  Pulmonic Valve:  Annulus normal.  Stenosis not present.  Regurgitation absent.      Aorta    Ascending Aorta:  Size normal.  Dissection not present.  Mobile plaque not present.  Aortic Arch:  Size normal.  Dissection not present.  Mobile plaque not present.  Descending Aorta:  Size normal.  Dissection not present.  Mobile plaque not present.      Atria    Right Atrium:  Size normal.  Spontaneous echo contrast not present.  Thrombus not present.  Tumor not present.  Device not present.    Left Atrium:  Size normal.  Spontaneous echo contrast not present.  Thrombus not present.  Tumor not present.  Device not present.  Left atrial appendage normal.      Septa        Ventricular Septum:  Intra-ventricular septum morphology normal.        Other Findings  Pericardium:  normal  Pleural Effusion:  none  Pulmonary Venous Flow:  normal    Anesthesia Information  Performed Personally  Anesthesiologist:  Kvng Pearson MD      Echocardiogram Comments:  Post bypass EF 55%. No change in valves. No air seen.    Stress Myoview-  Cath-      Medication Review:   Scheduled Meds:aspirin, 81 mg, Oral, Daily  atorvastatin, 40 mg, Oral, Nightly  bumetanide, 1 mg, Intravenous, BID  ceFAZolin, 2 g, Intravenous, Q8H  chlorhexidine, 15 mL, Mouth/Throat, Q12H  citalopram, 20 mg, Oral, Daily  enoxaparin, 40 mg, Subcutaneous, Q24H  magnesium sulfate,  1 g, Intravenous, Q8H  montelukast, 10 mg, Oral, Daily  mupirocin, , Each Nare, BID  pantoprazole, 40 mg, Oral, Daily  polyethylene glycol, 17 g, Oral, BID  potassium chloride, 20 mEq, Oral, BID With Meals  senna-docusate sodium, 2 tablet, Oral, BID      Continuous Infusions:insulin, 0-100 Units/hr, Last Rate: 0.4 Units/hr (05/16/24 3558)  Pharmacy to Dose enoxaparin (LOVENOX),   sodium chloride, 30 mL/hr, Last Rate: 30 mL/hr (05/15/24 1215)  vasopressin, 0.03 Units/min, Last Rate: 0.03 Units/min (05/16/24 2372)      PRN Meds:.  acetaminophen **OR** acetaminophen **OR** acetaminophen    Calcium Replacement - Follow Nurse / BPA Driven Protocol    cyclobenzaprine    dextrose    dextrose    glucagon (human recombinant)    HYDROcodone-acetaminophen    Magnesium Cardiology Dose Replacement - Follow Nurse / BPA Driven Protocol    meperidine    Morphine **AND** naloxone    nitroglycerin    ondansetron    Pharmacy to Dose enoxaparin (LOVENOX)    Phosphorus Replacement - Follow Nurse / BPA Driven Protocol    Potassium Replacement - Follow Nurse / BPA Driven Protocol    vasopressin      Assessment & Plan     ASCAD    MDM:    1.  CAD/CABG:    Patient underwent CABG x 4.  Patient is extubated.  Overall stable.  Patient is on vasopressin.    2.  Bilateral leg edema:    I would recommend that patient should proceed with on Bumex 1 mg twice daily.  Discussed with nurses.    3.  Hyperlipidemia:    Patient is on Lipitor    4.  Renal insufficiency:    Patient is being followed with nephrology.  His creatinine is baseline 1.47.    Santiago Lorenzo MD  05/16/24  09:09 EDT

## 2024-05-16 NOTE — PLAN OF CARE
Goal Outcome Evaluation:  Plan of Care Reviewed With: patient           Outcome Evaluation: Patient is POD 1 s/p CABG x4 with JOHNSON.  [place] out this shift.  A-line, RIJ, chest tubes, and lópez catheter remain in place.  1 unit PRBCs administered this shift for low Hgb; Hgb improved.  Continues with Vasopressin; VSS.  Diuresing with IV bumex.  UOP is low; 250ml out this shift; MD notified and nephrology consulted.  250ml NS bolus and Albumin x1 ordered and administered.  Nephrology ordered IV diuril and IV Bumex and will see in the AM.  External pacemaker on AAI as backup at rate of 80 and mA 10.  Chest tube output was 100 to mediastinal and 52 to left pleural. Patient remains on O2 via HFNC at 4L. Continuing to encourage IS use; patient struggling with motivation due to pain control issues.  Abdomen noted to be quite distended; KUB ordered and showing colonic Gas.  Last BM was Tuesday (2 days ago) and normal per patient report.  Patient ambulates well when motivated to do so, again struggling with pain control.  MD notified of pain concerns; morphine d/c'ed, tramadol and gabapentin ordered, continues with [place].

## 2024-05-16 NOTE — PROGRESS NOTES
Critical Care Progress Note     Fransico Cuenca : 1955 MRN:[id number] LOS:1  Rm:      Principal Problem: CAD, multiple vessel     Reason for follow up: All the medical problems listed below    Summary      A 69 y.o. old male patient with PMH of Hypertension, hyperlipidemia, diabetes mellitus type 2, COPD, ZHANG, anxiety, and CKD 3 presents to the hospital with complaints of chest pain. Patient had a CABG x 4 5/15 with LIMA to the mid LAD and EVH of left leg.  Intensivist team was consulted for pulmonary recommendations.     Significant Events     24 : Patient extubated this AM. Plan to mobilize, aggressive pulmonary toileting.     Assessment / Plan     CABG x 3 5/15/2024  Multivessel coronary artery disease  Status post non-STEMI  -Cardiothoracic surgery following     COPD, not in exacerbation  -Aggressive pulmonary hygiene post extubation  -Continue Singulair  -Incentive spirometry  -Titrate O2 for sats > 90%     Diabetes mellitus type 2, chronic  -Continue insulin drip  -Defer to CV surgery     Chronic Kidney Disease  -Stable  -Monitor and trend labs as appropriate  -Defer to CV surgery     Essential Hypertension, Chronic; hyperlipidemia  -Continue aspirin and atorvastatin  - Monitor with routine vital signs   -Defer to CV surger    Disposition:  ICU. CTS patient    Code status:   Code Status (Patient has no pulse and is not breathing): CPR (Attempt to Resuscitate)  Medical Interventions (Patient has pulse or is breathing): Full       Nutrition:   NPO Diet NPO Type: Sips with Meds   Patient isn't on Tube Feeding     DVT prophylaxis:  Medical and mechanical DVT prophylaxis orders are present.         Subjective / Review of systems     Review of Systems   Constitutional:  Negative for chills and fever.   Respiratory:  Negative for chest tightness and shortness of breath.    Cardiovascular:  Negative for chest pain.   Gastrointestinal:  Negative for abdominal pain, nausea and vomiting.   Genitourinary:   Negative for difficulty urinating.   Musculoskeletal:  Negative for arthralgias and myalgias.   Neurological:  Negative for dizziness and light-headedness.        Objective / Physical Exam     Vital signs:  Temp: 98.1 °F (36.7 °C)  BP: 113/49  Heart Rate: 80  Resp: 22  SpO2: 95 %  Weight: 97.4 kg (214 lb 11.7 oz)    Admission Weight: Weight: 95.8 kg (211 lb 3.2 oz)  Current Weight: Weight: 97.4 kg (214 lb 11.7 oz)    Input/Output in last 24 hours:    Intake/Output Summary (Last 24 hours) at 5/16/2024 0812  Last data filed at 5/16/2024 0600  Gross per 24 hour   Intake 4612 ml   Output 7128 ml   Net -2516 ml      Net IO Since Admission: -2,416 mL [05/16/24 0812]     Physical Exam  Constitutional:       General: He is not in acute distress.     Appearance: He is not ill-appearing.      Comments: Patient sitting up in chair   HENT:      Head: Normocephalic.      Mouth/Throat:      Mouth: Mucous membranes are moist.      Pharynx: Oropharynx is clear.   Eyes:      Extraocular Movements: Extraocular movements intact.      Conjunctiva/sclera: Conjunctivae normal.      Pupils: Pupils are equal, round, and reactive to light.   Cardiovascular:      Rate and Rhythm: Normal rate and regular rhythm.      Pulses: Normal pulses.      Heart sounds: Normal heart sounds.   Pulmonary:      Effort: Pulmonary effort is normal.      Breath sounds: Normal breath sounds.   Abdominal:      General: Bowel sounds are normal.      Palpations: Abdomen is soft.   Musculoskeletal:      Right lower leg: No edema.      Left lower leg: No edema.   Skin:     General: Skin is warm and dry.      Findings: No rash.   Neurological:      General: No focal deficit present.      Mental Status: He is alert and oriented to person, place, and time.   Psychiatric:         Mood and Affect: Mood normal.         Behavior: Behavior normal.          Radiology and Labs     Results from last 7 days   Lab Units 05/16/24  0218 05/15/24  2256 05/15/24  6734  05/15/24  1905 05/15/24  1752 05/15/24  1136 05/15/24  1054 05/15/24  0739 05/14/24  0755   WBC 10*3/mm3 7.65  --   --   --   --   --  8.77  --  5.52   HEMOGLOBIN g/dL 7.8*  --   --   --   --   --  8.4*  --  9.8*   HEMOGLOBIN, POC g/dL  --  8.3* 7.9* 8.2* 8.2*   < >  --    < >  --    HEMATOCRIT % 26.1*  --   --   --   --   --  27.2*  --  32.1*   HEMATOCRIT POC %  --  24* 23* 24* 24*   < >  --    < >  --    PLATELETS 10*3/mm3 172  --   --   --   --   --  167  --  276    < > = values in this interval not displayed.      Results from last 7 days   Lab Units 05/16/24  0218 05/15/24  1054 05/14/24  0755   PROTIME Seconds 13.0* 12.9* 11.6   INR  1.21* 1.20* 1.07   APTT seconds  --  21.7* 27.3      Results from last 7 days   Lab Units 05/16/24  0218 05/15/24  2256 05/15/24  2108 05/15/24  1905 05/15/24  1136 05/15/24  1054 05/14/24  0755 05/13/24  1244   SODIUM mmol/L 143  --   --   --   --  139 139 141   POTASSIUM mmol/L 4.4  --   --   --   --  4.2 4.4 4.4   CHLORIDE mmol/L 107  --   --   --   --  104 100 104   CO2 mmol/L 24.0  --   --   --   --  24.0 29.0 26.0   BUN mg/dL 38*  --   --   --   --  35* 34* 27*   CREATININE mg/dL 1.47* 1.50* 1.47* 1.53* 1.33* 1.44* 1.41* 1.27   GLUCOSE mg/dL 157*  --   --   --   --  131* 93 88   MAGNESIUM mg/dL 2.6*  --   --   --   --   --   --   --    PHOSPHORUS mg/dL 4.3  --   --   --   --  2.4*  --   --       Results from last 7 days   Lab Units 05/14/24  0755   ALK PHOS U/L 38*   AST (SGOT) U/L 13   ALT (SGPT) U/L 13     Results from last 7 days   Lab Units 05/16/24  0253 05/15/24  2256 05/15/24  2108 05/15/24  1905 05/15/24  1752 05/15/24  1624   PH, ARTERIAL pH units  --  7.302* 7.329* 7.315* 7.302* 7.332*   PCO2, ARTERIAL mm Hg  --  48.5* 46.3 44.7 43.4 46.6   PO2 ART mm Hg  --  91.0 72.7* 109.9* 104.4 80.4*   O2 SATURATION ART %  --  96.0 93.1* 97.8 97.4 94.8   FIO2 % 36 52 40 40 40 40   HCO3 ART mmol/L  --  24.0 24.4 22.8 21.4 24.7   BASE EXCESS ART mmol/L -2.8* -2.4* -1.6* -3.3*  -4.7* -1.3*       XR Chest 1 View    Result Date: 5/16/2024  Impression: Interval removal of endotracheal and NG tubes Mild improved aeration to the left lung base. Otherwise grossly stable bibasilar airspace disease Electronically Signed: Patrick Robbins MD  5/16/2024 8:08 AM EDT  Workstation ID: [id number]    XR Chest 1 View    Result Date: 5/15/2024  1.     Interval median sternotomy and CABG. 2.     Tubes and lines appear in satisfactory positions, as detailed above. 3.     Mild bibasilar opacities which may represent atelectasis or edema.  Electronically Signed By-Romaine Szymanski MD On:5/15/2024 11:16 AM         Current medications     Scheduled Meds:   aspirin, 81 mg, Oral, Daily  atorvastatin, 40 mg, Oral, Nightly  ceFAZolin, 2 g, Intravenous, Q8H  chlorhexidine, 15 mL, Mouth/Throat, Q12H  enoxaparin, 40 mg, Subcutaneous, Q24H  magnesium sulfate, 1 g, Intravenous, Q8H  montelukast, 10 mg, Oral, Daily  mupirocin, , Each Nare, BID  pantoprazole, 40 mg, Oral, Daily  polyethylene glycol, 17 g, Oral, BID  senna-docusate sodium, 2 tablet, Oral, BID        Continuous Infusions:   dexmedetomidine, 0.2-1.5 mcg/kg/hr, Last Rate: Stopped (05/15/24 2232)  insulin, 0-100 Units/hr, Last Rate: 0.4 Units/hr (05/16/24 0727)  niCARdipine, 5-15 mg/hr, Last Rate: Stopped (05/15/24 1400)  nitroglycerin, 5-50 mcg/min  norepinephrine, 0.02-0.3 mcg/kg/min, Last Rate: 0.02 mcg/kg/min (05/15/24 1946)  Pharmacy to Dose enoxaparin (LOVENOX),   sodium chloride, 30 mL/hr, Last Rate: 30 mL/hr (05/15/24 1215)  vasopressin, 0.03 Units/min, Last Rate: 0.03 Units/min (05/16/24 2760)          Plan discussed with RN. Reviewed all other data in the last 24 hours, including but not limited to vitals, labs, microbiology, imaging and pertinent notes from other providers.  Plan also discussed with patient at the bedside.      JAX Singh   Critical Care  05/16/24   08:12 EDT

## 2024-05-16 NOTE — CONSULTS
Critical Care Consult Note   Fransico Cuenca : 1955 MRN:[id number] LOS:0 ROOM: 2201/1     Reason for admission: CAD, multiple vessel     Assessment / Plan     CABG x 3 5/15/2024  Multivessel coronary artery disease  Status post non-STEMI  -Cardiothoracic surgery following    COPD, not in exacerbation  -Aggressive pulmonary hygiene post extubation  -Continue Singulair  -Incentive spirometry  -Titrate O2 for sats > 90%    Diabetes mellitus type 2, chronic  -Continue insulin drip  -Defer to CV surgery    Chronic Kidney Disease  -Stable  -Monitor and trend labs as appropriate  -Defer to CV surgery    Essential Hypertension, Chronic; hyperlipidemia  -Continue aspirin and atorvastatin  - Monitor with routine vital signs   -Defer to CV surgery      Code Status (Patient has no pulse and is not breathing): CPR (Attempt to Resuscitate)  Medical Interventions (Patient has pulse or is breathing): Full       Nutrition: NPO Diet NPO Type: Sips with Meds Patient isn't on Tube Feeding     DVT prophylaxis:  Medical and mechanical DVT prophylaxis orders are present.         History of Present illness     A 69 y.o. old male patient with PMH of Hypertension, hyperlipidemia, diabetes mellitus type 2, COPD, ZHANG, anxiety, and CKD 3 presents to the hospital with complaints of chest pain.  HPI information is limited due to patient intubated and sedated at time of assessment.  Information obtained from chart review.  Patient initially presented to [facility] on 2024 with complaints of chest tightness and associated shortness of breath.  Patient had noted limitation in his activity for a few weeks which had been worsening.  Patient also noted peripheral edema and an increase in weight.  Patient was determined to have a non-STEMI and placed on a heparin drip; he was transferred to [facility] for cardiology evaluation.  Chest x-ray was concerning for pulmonary edema versus pneumonia and pulmonology was  consulted.  Respiratory cultures were positive for human metapneumovirus.  Patient's BNP was elevated and he was diuresed but eventually developed an EN.  Cardiac catheterization showed patient to have severe coronary artery disease and cardiac surgery was consulted for evaluation.  Patient had a CABG x 4 with LIMA to the mid LAD and EVH of left leg.  Intensivist team was consulted for pulmonary recommendations.    ACP: Patient intubated and sedated at time of assessment; will remain full code with full intervention no family listed to make decisions if he is unable.    Patient was seen and examined on 05/15/24 at 21:22 EDT .    Subjective / Review of systems     Review of Systems   Unable to perform ROS: Intubated        Past Medical/Surgical/Social/Family History & Allergies     Past Medical History:   Diagnosis Date    Anxiety     Essential hypertension, benign     Gastroesophageal reflux disease without esophagitis     Kidney stones 05/31/2020    Dr. Kahlil Terrell    Mixed hyperlipidemia     Rotator cuff syndrome     Torn rotator about seven years ago    Seasonal allergic rhinitis due to pollen     Type 2 diabetes mellitus without complication, without long-term current use of insulin       Past Surgical History:   Procedure Laterality Date    CARDIAC CATHETERIZATION N/A 4/23/2024    Procedure: Left Heart Cath;  Surgeon: Santiago Lorenzo MD;  Location: [facility] CATH INVASIVE LOC[facility];  Service: Cardiovascular;  Laterality: N/A;    CATARACT EXTRACTION Left 08/24/2023    Dr Melendez    CATARACT EXTRACTION Right 09/07/2023    Dr Melendez    CYSTOSCOPY W/ LASER LITHOTRIPSY  01/2021    INGUINAL HERNIA REPAIR Left 10/2009    NOSE SURGERY      x2    UMBILICAL HERNIA REPAIR  10/2009    Dr. Napier      Social History     Socioeconomic History    Marital status: Single   Tobacco Use    Smoking status: Never     Passive exposure: Never    Smokeless tobacco: Never    Tobacco comments:     Family members smoked   Vaping Use    Vaping  status: Never Used   Substance and Sexual Activity    Alcohol use: Never    Drug use: Never    Sexual activity: Not Currently      Family History   Problem Relation Age of Onset    Heart disease Mother     Uterine cancer Mother     Thyroid disease Mother     Cancer Mother         Uterus?    Heart disease Father     Stroke Father     COPD Brother     Diabetes Brother     Breast cancer Maternal Aunt     Breast cancer Maternal Grandmother     Diabetes Brother       No Known Allergies     Home Medications     Prior to Admission medications    Medication Sig Start Date End Date Taking? Authorizing Provider   aspirin (aspirin) 81 MG EC tablet Take 1 tablet by mouth Daily. 4/30/19   Provider, MD Milly   atorvastatin (LIPITOR) 10 MG tablet Take 1 tablet by mouth once daily 9/5/23   Camryn Salinas MD   bumetanide (BUMEX) 1 MG tablet Take 1 tablet by mouth 2 (Two) Times a Day.  Patient taking differently: Take 2 mg by mouth in the morning and 1 mg by mouth in the early evening. 5/6/24   Nikolas Silver APRN   carvedilol (COREG) 12.5 MG tablet Take 1 tablet by mouth 2 (Two) Times a Day With Meals. 4/24/24   Aristeo German MD   citalopram (CeleXA) 20 MG tablet Take 1 tablet by mouth once daily 5/26/23   Camryn Salinas MD   dapagliflozin Propanediol (Farxiga) 10 MG tablet Take 10 mg by mouth Daily.  Patient not taking: Reported on 5/13/2024 4/29/24   Nikolas Silver APRN   doxazosin (CARDURA) 1 MG tablet Take 1 tablet by mouth once daily 5/26/23   Camryn Salinas MD   fenofibrate 160 MG tablet Take 1 tablet by mouth once daily 3/22/24   Camryn Salinas MD   hydrALAZINE (APRESOLINE) 50 MG tablet Take 1 tablet by mouth Every 12 (Twelve) Hours. 4/24/24   Aristeo German MD   lisinopril (PRINIVIL,ZESTRIL) 10 MG tablet Take 1 tablet by mouth Daily. 4/24/24   Aristeo German MD   Magnesium Oxide 400 MG capsule Take 1 capsule by mouth Daily. 4/29/24   Nikolas Silver APRN   metFORMIN (GLUCOPHAGE) 1000 MG tablet TAKE 1  TABLET BY MOUTH TWICE DAILY WITH MEALS 2/26/24   Camryn Salinas MD   montelukast (SINGULAIR) 10 MG tablet Take 1 tablet by mouth Daily. 11/3/23   Camryn Salinas MD   Multiple Vitamins-Minerals (MULTIVITAMIN ADULT PO) Take 1 tablet by mouth Daily.    Provider, MD Milly   mupirocin (BACTROBAN) 2 % ointment Apply to nares (inside each nostril) twice daily as directed for procedure. 5/13/24   Jean Hirsch MD   omeprazole (priLOSEC) 40 MG capsule Take 1 capsule by mouth Daily. 3/15/24   Camryn Salinas MD   potassium chloride (KLOR-CON M20) 20 MEQ CR tablet Take 1 tablet by mouth Daily. 5/6/24   Nikolas Silver APRN        Objective / Physical Exam     Vital signs:  Temp: 99 °F (37.2 °C)  BP: 101/69  Heart Rate: 106  Resp: 23  SpO2: 96 %  Weight: 95.8 kg (211 lb 3.2 oz)    Admission Weight: Weight: 95.8 kg (211 lb 3.2 oz)    Physical Exam  Vitals and nursing note reviewed.   Constitutional:       Appearance: Normal appearance.   HENT:      Head: Normocephalic.      Nose: Nose normal.      Mouth/Throat:      Mouth: Mucous membranes are moist.      Pharynx: Oropharynx is clear.   Eyes:      Pupils: Pupils are equal, round, and reactive to light.   Cardiovascular:      Rate and Rhythm: Normal rate and regular rhythm.      Pulses: Normal pulses.      Heart sounds: Normal heart sounds.   Pulmonary:      Comments: ET tube in place  Abdominal:      General: Bowel sounds are normal.      Palpations: Abdomen is soft.   Musculoskeletal:         General: Normal range of motion.      Cervical back: Normal range of motion.   Skin:     General: Skin is warm and dry.      Capillary Refill: Capillary refill takes 2 to 3 seconds.   Neurological:      Comments: Intubated and sedated   Psychiatric:      Comments: Intubated and sedated          Labs     Results from last 7 days   Lab Units 05/15/24  2108 05/15/24  1905 05/15/24  1752 05/15/24  1136 05/15/24  1054 05/15/24  0739 05/14/24  0755   WBC 10*3/mm3  --   --   --    --  8.77  --  5.52   HEMATOCRIT %  --   --   --   --  27.2*  --  32.1*   HEMATOCRIT POC % 23* 24* 24* 25*  --    < >  --    PLATELETS 10*3/mm3  --   --   --   --  167  --  276    < > = values in this interval not displayed.      Results from last 7 days   Lab Units 05/15/24  2108 05/15/24  1905 05/15/24  1136 05/15/24  1054 05/14/24  0755 05/13/24  1244   SODIUM mmol/L  --   --   --  139 139 141   POTASSIUM mmol/L  --   --   --  4.2 4.4 4.4   CHLORIDE mmol/L  --   --   --  104 100 104   CO2 mmol/L  --   --   --  24.0 29.0 26.0   BUN mg/dL  --   --   --  35* 34* 27*   CREATININE mg/dL 1.47* 1.53* 1.33* 1.44* 1.41* 1.27        Imaging     XR Chest 1 View    Result Date: 5/15/2024  1.     Interval median sternotomy and CABG. 2.     Tubes and lines appear in satisfactory positions, as detailed above. 3.     Mild bibasilar opacities which may represent atelectasis or edema.  Electronically Signed By-Romaine Szymanski MD On:5/15/2024 11:16 AM      XR Chest PA & Lateral    Result Date: 5/15/2024  1. No convincing acute airspace process. 2. Suspect pleural-based opacity versus nodule measuring 2.7 cm along the lateral aspect of right lower lobe. Recommend CT for further assessment.  Electronically Signed By-Mahesh Porter MD On:5/15/2024 8:00 AM        Chest X ray: My independent assessment showed no infiltrates or effusions      Current Medications     Scheduled Meds:  acetaminophen, 1,000 mg, Intravenous, Q8H  [START ON 5/16/2024] aspirin, 81 mg, Oral, Daily  [START ON 5/16/2024] atorvastatin, 40 mg, Oral, Nightly  ceFAZolin, 2 g, Intravenous, Q8H  chlorhexidine, 15 mL, Mouth/Throat, Q12H  [START ON 5/16/2024] enoxaparin, 40 mg, Subcutaneous, Q24H  magnesium sulfate, 1 g, Intravenous, Q8H  [START ON 5/16/2024] montelukast, 10 mg, Oral, Daily  mupirocin, , Each Nare, BID  [START ON 5/16/2024] pantoprazole, 40 mg, Oral, Daily  polyethylene glycol, 17 g, Oral, BID  potassium chloride, 20 mEq, Intravenous,  Once  senna-docusate sodium, 2 tablet, Oral, BID         Continuous Infusions:  dexmedetomidine, 0.2-1.5 mcg/kg/hr, Last Rate: 0.2 mcg/kg/hr (05/15/24 1834)  insulin, 0-100 Units/hr, Last Rate: 0.3 Units/hr (05/15/24 1707)  niCARdipine, 5-15 mg/hr, Last Rate: Stopped (05/15/24 1400)  nitroglycerin, 5-50 mcg/min  norepinephrine, 0.02-0.3 mcg/kg/min, Last Rate: 0.02 mcg/kg/min (05/15/24 1946)  [START ON 5/16/2024] Pharmacy to Dose enoxaparin (LOVENOX),   sodium chloride, 30 mL/hr, Last Rate: 30 mL/hr (05/15/24 1215)  vasopressin, 0.03 Units/min, Last Rate: 0.03 Units/min (05/15/24 1840)         Patient continues to be critically ill, remains at risk of clinical deterioration or death and needed high complexity decision making. I have spent a total of 50 minutes providing critical care services to this patient including but not limited to: review of labs/ microbiology/imaging/medications, serial monitoring of vital signs, adjusting ventilator settings as needed, review of other consultant's notes, review of events in the last 24 hrs, monitoring input/output, review of treatment plan with bedside nurse, RT and other treatment team, management of life support and nutrition needs.    Time spent in performing separately billable procedures and updating family is not included in the critical care time.     JAX Ozuna   Critical Care  05/15/24   21:22 EDT

## 2024-05-16 NOTE — THERAPY EVALUATION
Patient Name: Fransico Cuenca  : 1955    MRN: [id number]                              Today's Date: 2024       Admit Date: 5/15/2024    Visit Dx:     ICD-10-CM ICD-9-CM   1. ASCAD  I25.10 414.00     Patient Active Problem List   Diagnosis    Anxiety    Hypertension, benign    Mixed hyperlipidemia    Allergic rhinitis due to pollen    Type 2 diabetes mellitus without complication, without long-term current use of insulin    Encounter for prostate cancer screening    Screening for colon cancer    Family history of ischemic heart disease    Class 2 severe obesity due to excess calories with serious comorbidity and body mass index (BMI) of 35.0 to 35.9 in adult    Obstructive sleep apnea syndrome    Medicare annual wellness visit, subsequent    History of kidney stones    Primary osteoarthritis of left hand    Iron deficiency anemia due to chronic blood loss    NSTEMI (non-ST elevated myocardial infarction)    ASCAD    Metabolic syndrome X    CKD (chronic kidney disease) stage 3a, GFR 30-59 ml/min    Bilateral leg edema    POLANCO (dyspnea on exertion)     Past Medical History:   Diagnosis Date    Anxiety     Essential hypertension, benign     Gastroesophageal reflux disease without esophagitis     Kidney stones 2020    Dr. Guillory Pecbill    Mixed hyperlipidemia     Rotator cuff syndrome     Torn rotator about seven years ago    Seasonal allergic rhinitis due to pollen     Type 2 diabetes mellitus without complication, without long-term current use of insulin      Past Surgical History:   Procedure Laterality Date    CARDIAC CATHETERIZATION N/A 2024    Procedure: Left Heart Cath;  Surgeon: Santiago Lorenzo MD;  Location: [facility] INVASIVE LOCATION;  Service: Cardiovascular;  Laterality: N/A;    CATARACT EXTRACTION Left 2023    Dr Melendez    CATARACT EXTRACTION Right 2023    Dr Melendez    CYSTOSCOPY W/ LASER LITHOTRIPSY  2021    INGUINAL HERNIA REPAIR Left 10/2009    NOSE SURGERY      x2     UMBILICAL HERNIA REPAIR  10/2009    Dr. Napier      General Information       [facility] Name 05/16/24 1611          Physical Therapy Time and Intention    Document Type evaluation  -     Mode of Treatment physical therapy  -[facility] Name 05/16/24 1611          General Information    Patient Profile Reviewed yes  -     Prior Level of Function independent:;all household mobility;community mobility;yard work  -     Existing Precautions/Restrictions sternal  -     Barriers to Rehab none identified  -[facility] Name 05/16/24 1611          Living Environment    People in Home alone  -AH       Row Name 05/16/24 1611          Home Main Entrance    Number of Stairs, Main Entrance three  -     Stair Railings, Main Entrance railings safe and in good condition  -[facility] Name 05/16/24 1611          Cognition    Orientation Status (Cognition) oriented x 3  -[facility] Name 05/16/24 1611          Safety Issues, Functional Mobility    Safety Issues Affecting Function (Mobility) safety precaution awareness  -     Impairments Affecting Function (Mobility) balance;endurance/activity tolerance;strength  -               User Key  (r) = Recorded By, (t) = Taken By, (c) = Cosigned By      Initials Name Provider Type     Nelli Gee, PT Physical Therapist                   Mobility       [facility] Name 05/16/24 1612          Bed Mobility    Bed Mobility bed mobility (all) activities  -     All Activities, [place] (Bed Mobility) maximum assist (25% patient effort);2 person assist  -     Comment, (Bed Mobility) sit to supine and scooting up in bed.  -AH       Row Name 05/16/24 1612          Sit-Stand Transfer    Sit-Stand [place] (Transfers) minimum assist (75% patient effort);2 person assist  -     Assistive Device (Sit-Stand Transfers) walker, front-wheeled  -     Comment, (Sit-Stand Transfer) cues for heart hugger use.  -[facility] Name 05/16/24 1612          Gait/Stairs (Locomotion)    [place]  Level (Gait) contact guard;1 person assist;1 person to manage equipment  -     Assistive Device (Gait) walker, front-wheeled  -     Patient was able to Ambulate yes  -     Distance in Feet (Gait) 4  -     Deviations/Abnormal Patterns (Gait) angi decreased  -               User Key  (r) = Recorded By, (t) = Taken By, (c) = Cosigned By      Initials Name Provider Type     Nelli Gee, CLARKE Physical Therapist                   Obj/Interventions       [facility] Name 05/16/24 1613          Range of Motion Comprehensive    Comment, General Range of Motion BLE WFL  -[facility] Name 05/16/24 1613          Strength Comprehensive (MMT)    General Manual Muscle Testing (MMT) Assessment no strength deficits identified  -AH       Row Name 05/16/24 1613          Balance    Balance Assessment sitting static balance;sitting dynamic balance;standing static balance;standing dynamic balance  -     Static Sitting Balance minimal assist  pt with posterior lean when seated in chair without back support. requires cues for anterior lean and maintaining foot contact on the floor. After up and transferred to sitting EOB, pt noted to have improved sitting balance, only requiring supervision.  -     Dynamic Sitting Balance minimal assist;contact guard  -     Position, Sitting Balance unsupported  sitting edge of chair.  -     Static Standing Balance contact guard;1 person to manage equipment;1-person assist  -     Dynamic Standing Balance contact guard;1-person assist;1 person to manage equipment  -     Position/Device Used, Standing Balance walker, front-wheeled;supported  -               User Key  (r) = Recorded By, (t) = Taken By, (c) = Cosigned By      Initials Name Provider Type     Nelli Gee, PT Physical Therapist                   Goals/Plan       Row Name 05/16/24 1622          Bed Mobility Goal 1 (PT)    Activity/Assistive Device (Bed Mobility Goal 1, PT) bed mobility activities, all  -      [place] Level/Cues Needed (Bed Mobility Goal 1, PT) standby assist  -     Time Frame (Bed Mobility Goal 1, PT) long term goal (LTG);2 weeks  -AH       Row Name 05/16/24 1622          Transfer Goal 1 (PT)    Activity/Assistive Device (Transfer Goal 1, PT) transfers, all  -     [place] Level/Cues Needed (Transfer Goal 1, PT) standby assist  -     Time Frame (Transfer Goal 1, PT) long term goal (LTG);2 weeks  -AH       Row Name 05/16/24 1622          Gait Training Goal 1 (PT)    Activity/Assistive Device (Gait Training Goal 1, PT) gait (walking locomotion);assistive device use;walker, rolling  -     [place] Level (Gait Training Goal 1, PT) supervision required  -     Time Frame (Gait Training Goal 1, PT) long term goal (LTG);2 weeks  -AH       Row Name 05/16/24 1622          Problem Specific Goal 1 (PT)    Problem Specific Goal 1 (PT) pt will verbalize and demo understanding of sternal precautions and incorporate into functional mobility tasks with no verbal cues needed.  -     Time Frame (Problem Specific Goal 1, PT) long-term goal (LTG);2 weeks  -AH       Row Name 05/16/24 1622          Therapy Assessment/Plan (PT)    Planned Therapy Interventions (PT) balance training;bed mobility training;gait training;home exercise program;patient/family education;strengthening;transfer training  -               User Key  (r) = Recorded By, (t) = Taken By, (c) = Cosigned By      Initials Name Provider Type     Nelli Gee, PT Physical Therapist                   Clinical Impression       Row Name 05/16/24 1615          Pain    Pretreatment Pain Rating 10/10  -     Posttreatment Pain Rating 7/10  -     Pre/Posttreatment Pain Comment pt decreased after RN arrived and gave IV morphine.  -AH       Row Name 05/16/24 1615          Plan of Care Review    Plan of Care Reviewed With patient  -     Outcome Evaluation Pt. is 70 y/o male S/P POD# 1 elective CABG x4. Pt. w/ severe pain this date, nsg  aware and medicated for tx. patient is typically I/ADL independent, lives alone at baseline, is active, and does not require AD use. Pt. provided CGA/min A x 2 for Sit to/from stand and ambulatory transfer recliner to bed. max A x 2 for all bed mobility in order to maintain sternal prec. Limited activity tolerance secondary to increased pain and fatigue. Pt is not safe to d/c home alone at this time and will require AIR at d/c to address aforementioned deficits.  -       Row Name 05/16/24 1615          Therapy Assessment/Plan (PT)    Patient/Family Therapy Goals Statement (PT) get better, less pain.  -     Rehab Potential (PT) good, to achieve stated therapy goals  -     Criteria for Skilled Interventions Met (PT) yes;meets criteria  -     Therapy Frequency (PT) 5 times/wk  -     Predicted Duration of Therapy Intervention (PT) until DC or goals met  -       Row Name 05/16/24 1615          Vital Signs    Intra Systolic BP Rehab --  stable at rest and with activity. MAP >65 throughout.  -[facility] Name 05/16/24 1615          Positioning and Restraints    In Bed supine;notified nsg;encouraged to call for assist;exit alarm on;with family/caregiver;call light within reach  -               User Key  (r) = Recorded By, (t) = Taken By, (c) = Cosigned By      Initials Name Provider Type     Nelli Gee, PT Physical Therapist                   Outcome Measures       Row Name 05/16/24 1623 05/16/24 1600       How much help from another person do you currently need...    Turning from your back to your side while in flat bed without using bedrails? 2  - 2  -TM    Moving from lying on back to sitting on the side of a flat bed without bedrails? 2  - 2  -TM    Moving to and from a bed to a chair (including a wheelchair)? 3  - 3  -TM    Standing up from a chair using your arms (e.g., wheelchair, bedside chair)? 3  - 3  -TM    Climbing 3-5 steps with a railing? 1  - 2  -TM    To walk in hospital room?  2  -AH 3  -TM    AM-PAC 6 Clicks Score (PT) 13  -AH 15  -TM    Highest Level of Mobility Goal 4 --> Transfer to chair/commode  -AH 4 --> Transfer to chair/commode  -TM      Row Name 05/16/24 1543 05/16/24 1200       How much help from another person do you currently need...    Turning from your back to your side while in flat bed without using bedrails? 2  -TM 2  -TM    Moving from lying on back to sitting on the side of a flat bed without bedrails? 2  -TM 2  -TM    Moving to and from a bed to a chair (including a wheelchair)? 3  -TM 3  -TM    Standing up from a chair using your arms (e.g., wheelchair, bedside chair)? 3  -TM 3  -TM    Climbing 3-5 steps with a railing? 2  -TM 2  -TM    To walk in hospital room? 3  -TM 3  -TM    AM-PAC 6 Clicks Score (PT) 15  -TM 15  -TM    Highest Level of Mobility Goal 4 --> Transfer to chair/commode  -TM 4 --> Transfer to chair/commode  -TM      Row Name 05/16/24 0800          How much help from another person do you currently need...    Turning from your back to your side while in flat bed without using bedrails? 2  -TM     Moving from lying on back to sitting on the side of a flat bed without bedrails? 2  -TM     Moving to and from a bed to a chair (including a wheelchair)? 3  -TM     Standing up from a chair using your arms (e.g., wheelchair, bedside chair)? 3  -TM     Climbing 3-5 steps with a railing? 2  -TM     To walk in hospital room? 3  -TM     AM-PAC 6 Clicks Score (PT) 15  -TM     Highest Level of Mobility Goal 4 --> Transfer to chair/commode  -TM       Row Name 05/16/24 1623          Functional Assessment    Outcome Measure Options AM-PAC 6 Clicks Basic Mobility (PT)  -               User Key  (r) = Recorded By, (t) = Taken By, (c) = Cosigned By      Initials Name Provider Type    TM Berenice Mejias, RN Registered Nurse    Nelli Huerta PT Physical Therapist                                 Physical Therapy Education       Title: PT OT SLP Therapies (In Progress)        Topic: Physical Therapy (Done)       Point: Mobility training (Done)       Learning Progress Summary             Patient Hai, DEBORAH, VU by  at 5/16/2024 1624                         Point: Home exercise program (Done)       Learning Progress Summary             Patient Markuser, E, VU by  at 5/16/2024 1624                         Point: Body mechanics (Done)       Learning Progress Summary             Patient Eager, E, VU by  at 5/16/2024 1624                         Point: Precautions (Done)       Learning Progress Summary             Patient Eager, E, VU by  at 5/16/2024 1624                                         User Key       Initials Effective Dates Name Provider Type Discipline     12/04/23 -  Nelli Gee, PT Physical Therapist PT                  PT Recommendation and Plan  Planned Therapy Interventions (PT): balance training, bed mobility training, gait training, home exercise program, patient/family education, strengthening, transfer training  Plan of Care Reviewed With: patient  Outcome Evaluation: Pt. is 70 y/o male S/P POD# 1 elective CABG x4. Pt. w/ severe pain this date, nsg aware and medicated for tx. patient is typically I/ADL independent, lives alone at baseline, is active, and does not require AD use. Pt. provided CGA/min A x 2 for Sit to/from stand and ambulatory transfer recliner to bed. max A x 2 for all bed mobility in order to maintain sternal prec. Limited activity tolerance secondary to increased pain and fatigue. Pt is not safe to d/c home alone at this time and will require AIR at d/c to address aforementioned deficits.     Time Calculation:         PT Charges       Row Name 05/16/24 1624             Time Calculation    Start Time 1136  -      Stop Time 1202  -      Time Calculation (min) 26 min  -      PT Non-Billable Time (min) 0 min  -      PT Received On 05/16/24  -      PT - Next Appointment 05/17/24  -      PT Goal Re-Cert Due Date 05/30/24  -          Time Calculation- PT    Total Timed Code Minutes- PT 0 minute(s)  -                User Key  (r) = Recorded By, (t) = Taken By, (c) = Cosigned By      Initials Name Provider Type     Nelli Gee, CLARKE Physical Therapist                  Therapy Charges for Today       Code Description Service Date Service Provider Modifiers Qty    [id number] HC PT EVAL LOW COMPLEXITY 4 5/16/2024 Nelli Gee, CLARKE GP 1            PT G-Codes  Outcome Measure Options: AM-PAC 6 Clicks Basic Mobility (PT)  AM-PAC 6 Clicks Score (PT): 13  PT Discharge Summary  Anticipated Discharge Disposition (PT): inpatient rehabilitation facility    Nelli Gee PT  5/16/2024

## 2024-05-16 NOTE — CASE MANAGEMENT/SOCIAL WORK
Discharge Planning Assessment   Serge     Patient Name: Fransico Cuenca  MRN: [id number]  Today's Date: 5/16/2024    Admit Date: 5/15/2024  Plan: DC plan: Pending Clinical Course and PT/OT evaluations. From home with friend.   Discharge Needs Assessment       Row Name 05/16/24 1131       Living Environment    People in Home alone    Current Living Arrangements home    Potentially Unsafe Housing Conditions none    In the past 12 months has the electric, gas, oil, or water company threatened to shut off services in your home? No    Primary Care Provided by self    Provides Primary Care For no one    Family Caregiver if Needed friend(s)    Family Caregiver Names Suri    Quality of Family Relationships helpful;involved;supportive    Able to Return to Prior Arrangements yes       Resource/Environmental Concerns    Resource/Environmental Concerns none    Transportation Concerns none       Transportation Needs    In the past 12 months, has lack of transportation kept you from medical appointments or from getting medications? no    In the past 12 months, has lack of transportation kept you from meetings, work, or from getting things needed for daily living? No       Food Insecurity    Within the past 12 months, you worried that your food would run out before you got the money to buy more. Never true    Within the past 12 months, the food you bought just didn't last and you didn't have money to get more. Never true       Transition Planning    Patient/Family Anticipates Transition to home    Patient/Family Anticipated Services at Transition none    Transportation Anticipated car, drives self;family or friend will provide       Discharge Needs Assessment    Readmission Within the Last 30 Days no previous admission in last 30 days    Equipment Currently Used at Home none    Concerns to be Addressed denies needs/concerns at this time    Anticipated Changes Related to Illness none    Equipment Needed After Discharge none     Provided Post Acute Provider List? N/A    Provided Post Acute Provider Quality & Resource List? N/A              Discharge Plan       Row Name 05/16/24 1132       Plan    Plan DC plan: Pending Clinical Course and PT/OT evaluations. From home with friend.    Provided Post Acute Provider List? N/A    Provided Post Acute Provider Quality & Resource List? N/A    Plan Comments CM met with patient and friend Suri at bedside. Patient states he is independent with ADLs, drives self, and denies current DME. Patient confirms PCP Brad and utilizes [facility] in [place], [place] for preferred pharmacy. Patient agreeable to enrollment in scroll kit program. Patient denies difficulty affording medications, food, or utilities. Patient denies current home health or therapy. Patient states at time of discharge his friend Suri will provide transportation.               Expected Discharge Date and Time       Expected Discharge Date Expected Discharge Time    May 20, 2024         Demographic Summary       Row Name 05/16/24 1130       General Information    Admission Type inpatient    Arrived From emergency department    Referral Source admission list    Reason for Consult discharge planning;care coordination/care conference    Preferred Language English       Contact Information    Permission Granted to Share Info With               Functional Status       Row Name 05/16/24 1130       Functional Status    Usual Activity Tolerance moderate    Current Activity Tolerance moderate       Functional Status, IADL    Medications independent    Meal Preparation independent    Housekeeping independent    Laundry independent    Shopping independent       Mental Status    General Appearance WDL WDL       Mental Status Summary    Recent Changes in Mental Status/Cognitive Functioning no changes       Employment/    Employment Status retired              Patient Forms       Row Name 05/16/24 0354       Patient Forms    Important  Message from Medicare ([facility]) Delivered  IMM per registration 5/15/2024.             Laurie Hanson RN     Office Phone [phone]

## 2024-05-16 NOTE — THERAPY EVALUATION
Patient Name: Fransico Cuenca  : 1955    MRN: [id number]                              Today's Date: 2024       Admit Date: 5/15/2024    Visit Dx:     ICD-10-CM ICD-9-CM   1. ASCAD  I25.10 414.00     Patient Active Problem List   Diagnosis    Anxiety    Hypertension, benign    Mixed hyperlipidemia    Allergic rhinitis due to pollen    Type 2 diabetes mellitus without complication, without long-term current use of insulin    Encounter for prostate cancer screening    Screening for colon cancer    Family history of ischemic heart disease    Class 2 severe obesity due to excess calories with serious comorbidity and body mass index (BMI) of 35.0 to 35.9 in adult    Obstructive sleep apnea syndrome    Medicare annual wellness visit, subsequent    History of kidney stones    Primary osteoarthritis of left hand    Iron deficiency anemia due to chronic blood loss    NSTEMI (non-ST elevated myocardial infarction)    ASCAD    Metabolic syndrome X    CKD (chronic kidney disease) stage 3a, GFR 30-59 ml/min    Bilateral leg edema    POLANCO (dyspnea on exertion)     Past Medical History:   Diagnosis Date    Anxiety     Essential hypertension, benign     Gastroesophageal reflux disease without esophagitis     Kidney stones 2020    Dr. Guillory Pecbill    Mixed hyperlipidemia     Rotator cuff syndrome     Torn rotator about seven years ago    Seasonal allergic rhinitis due to pollen     Type 2 diabetes mellitus without complication, without long-term current use of insulin      Past Surgical History:   Procedure Laterality Date    CARDIAC CATHETERIZATION N/A 2024    Procedure: Left Heart Cath;  Surgeon: Santiago Lorenzo MD;  Location: [facility] INVASIVE LOCATION;  Service: Cardiovascular;  Laterality: N/A;    CATARACT EXTRACTION Left 2023    Dr Melendez    CATARACT EXTRACTION Right 2023    Dr Melendez    CYSTOSCOPY W/ LASER LITHOTRIPSY  2021    INGUINAL HERNIA REPAIR Left 10/2009    NOSE SURGERY      x2     UMBILICAL HERNIA REPAIR  10/2009    Dr. Napier      General Information       Row Name 05/16/24 1505          OT Time and Intention    Document Type evaluation  -MP     Mode of Treatment individual therapy  -MP       Row Name 05/16/24 1505          General Information    Patient Profile Reviewed yes  -MP     Prior Level of Function independent:;ADL's  -MP       Row Name 05/16/24 1505          Living Environment    People in Home alone  -MP       Row Name 05/16/24 1505          Cognition    Orientation Status (Cognition) oriented x 3  -MP       Row Name 05/16/24 1505          Safety Issues, Functional Mobility    Impairments Affecting Function (Mobility) balance;endurance/activity tolerance;strength  -MP               User Key  (r) = Recorded By, (t) = Taken By, (c) = Cosigned By      Initials Name Provider Type    Rock Ashley OT Occupational Therapist                     Mobility/ADL's       Row Name 05/16/24 1508          Bed Mobility    Bed Mobility bed mobility (all) activities  -MP     All Activities, [place] (Bed Mobility) maximum assist (25% patient effort);2 person assist  -MP       Row Name 05/16/24 1508          Transfers    Transfers sit-stand transfer  -MP       Row Name 05/16/24 1508          Sit-Stand Transfer    Sit-Stand [place] (Transfers) minimum assist (75% patient effort);2 person assist  -MP       Row Name 05/16/24 1508          Activities of Daily Living    BADL Assessment/Intervention lower body dressing  -MP       Row Name 05/16/24 1508          Lower Body Dressing Assessment/Training    [place] Level (Lower Body Dressing) lower body dressing skills;maximum assist (25% patient effort)  -MP               User Key  (r) = Recorded By, (t) = Taken By, (c) = Cosigned By      Initials Name Provider Type    Rock Ashley OT Occupational Therapist                   Obj/Interventions       Row Name 05/16/24 1508          Range of Motion Comprehensive    Comment, General  Range of Motion B shoulder AROM limited 90* flexion  -MP       Row Name 05/16/24 1508          Strength Comprehensive (MMT)    Comment, General Manual Muscle Testing (MMT) Assessment BUE 3/5  -MP       Row Name 05/16/24 1508          Balance    Balance Interventions sitting;standing;sit to stand;supported;static;dynamic  -MP               User Key  (r) = Recorded By, (t) = Taken By, (c) = Cosigned By      Initials Name Provider Type    Rock Ashley OT Occupational Therapist                   Goals/Plan       Row Name 05/16/24 1510          Bed Mobility Goal 1 (OT)    Activity/Assistive Device (Bed Mobility Goal 1, OT) bed mobility activities, all  -MP     Bland Level/Cues Needed (Bed Mobility Goal 1, OT) minimum assist (75% or more patient effort)  -MP     Time Frame (Bed Mobility Goal 1, OT) long term goal (LTG);2 weeks  -MP       Row Name 05/16/24 1510          Transfer Goal 1 (OT)    Activity/Assistive Device (Transfer Goal 1, OT) sit-to-stand/stand-to-sit;toilet  -MP     Bland Level/Cues Needed (Transfer Goal 1, OT) contact guard required  -MP     Time Frame (Transfer Goal 1, OT) long term goal (LTG);2 weeks  -MP       Row Name 05/16/24 1510          Dressing Goal 1 (OT)    Activity/Device (Dressing Goal 1, OT) lower body dressing  -MP     Bland/Cues Needed (Dressing Goal 1, OT) minimum assist (75% or more patient effort)  -MP     Time Frame (Dressing Goal 1, OT) long term goal (LTG);2 weeks  -MP               User Key  (r) = Recorded By, (t) = Taken By, (c) = Cosigned By      Initials Name Provider Type    Rock Ashley OT Occupational Therapist                   Clinical Impression       Row Name 05/16/24 1509          Pain Assessment    Pretreatment Pain Rating 10/10  -MP     Posttreatment Pain Rating 10/10  -MP       Row Name 05/16/24 1509          Plan of Care Review    Plan of Care Reviewed With patient  -MP     Progress no change  -MP     Outcome Evaluation Pt. is  70 y/o male S/P POD# 1 elective CABG x4. Pt. w/ severe pain this date, nsg aware and medicated for tx. Pt. friend present, she states patient is typically I/ADL independent, lives alone at baseline. Pt. provided min A x 2 for SPS transfers this date and increased max A x 2 for all bed mobility. Limited activity tolerance secondary to increased pain and fatigue. Pt. provided max A for all LB ADLs, limited forward flexion w/ chest tubes. Pt. not safe to d/c home alone at this time and will require IP rehab at d/c to address aforementioned deficits.  -MP       Row Name 05/16/24 1509          Therapy Assessment/Plan (OT)    Rehab Potential (OT) good, to achieve stated therapy goals  -MP     Criteria for Skilled Therapeutic Interventions Met (OT) yes;meets criteria;skilled treatment is necessary  -MP     Therapy Frequency (OT) 3 times/wk  -MP       Row Name 05/16/24 1509          Therapy Plan Review/Discharge Plan (OT)    Anticipated Discharge Disposition (OT) inpatient rehabilitation facility  -       Row Name 05/16/24 1509          Vital Signs    Pre Patient Position Sitting  -MP     Intra Patient Position Standing  -MP     Post Patient Position Supine  -MP       Row Name 05/16/24 1509          Positioning and Restraints    Pre-Treatment Position sitting in chair/recliner  -MP     Post Treatment Position bed  -MP     In Bed supine;call light within reach;encouraged to call for assist;exit alarm on  -MP               User Key  (r) = Recorded By, (t) = Taken By, (c) = Cosigned By      Initials Name Provider Type    Rock Ashley OT Occupational Therapist                   Outcome Measures    No documentation.                   Occupational Therapy Education       Title: PT OT SLP Therapies (In Progress)       Topic: Occupational Therapy (In Progress)       Point: ADL training (Not Started)       Description:   Instruct learner(s) on proper safety adaptation and remediation techniques during self care or  transfers.   Instruct in proper use of assistive devices.                  Learner Progress:  Not documented in this visit.              Point: Home exercise program (Not Started)       Description:   Instruct learner(s) on appropriate technique for monitoring, assisting and/or progressing therapeutic exercises/activities.                  Learner Progress:  Not documented in this visit.              Point: Precautions (Done)       Description:   Instruct learner(s) on prescribed precautions during self-care and functional transfers.                  Learning Progress Summary             Patient Acceptance, E,TB, VU by  at 5/16/2024 1511                         Point: Body mechanics (Done)       Description:   Instruct learner(s) on proper positioning and spine alignment during self-care, functional mobility activities and/or exercises.                  Learning Progress Summary             Patient Acceptance, E,TB, VU by ROMAIN at 5/16/2024 1511                                         User Key       Initials Effective Dates Name Provider Type Discipline     06/16/21 -  Rock Bateman OT Occupational Therapist OT                  OT Recommendation and Plan  Therapy Frequency (OT): 3 times/wk  Plan of Care Review  Plan of Care Reviewed With: patient  Progress: no change  Outcome Evaluation: Pt. is 70 y/o male S/P POD# 1 elective CABG x4. Pt. w/ severe pain this date, nsg aware and medicated for tx. Pt. friend present, she states patient is typically I/ADL independent, lives alone at baseline. Pt. provided min A x 2 for SPS transfers this date and increased max A x 2 for all bed mobility. Limited activity tolerance secondary to increased pain and fatigue. Pt. provided max A for all LB ADLs, limited forward flexion w/ chest tubes. Pt. not safe to d/c home alone at this time and will require IP rehab at d/c to address aforementioned deficits.     Time Calculation:         Time Calculation- OT       Row Name  05/16/24 1511             Time Calculation- OT    OT Start Time 1135  -MP      OT Stop Time 1155  -MP      OT Time Calculation (min) 20 min  -      Total Timed Code Minutes- OT 0 minute(s)  -MP      OT Received On 05/16/24  -      OT - Next Appointment 05/17/24  -      OT Goal Re-Cert Due Date 05/30/24  -                User Key  (r) = Recorded By, (t) = Taken By, (c) = Cosigned By      Initials Name Provider Type    Rock Ashley OT Occupational Therapist                  Therapy Charges for Today       Code Description Service Date Service Provider Modifiers Qty    [id number] HC OT EVAL LOW COMPLEXITY 4 5/16/2024 Rock Bateman OT GO 1                 Rock Bateman OT  5/16/2024

## 2024-05-16 NOTE — CASE MANAGEMENT/SOCIAL WORK
Social Work Assessment  [facility]     Patient Name: Fransico Cuenca  MRN: [id number]  Today's Date: 5/16/2024    Admit Date: 5/15/2024       Discharge Plan       Row Name 05/16/24 1552       Plan    Plan Comments LSW met with Pt at bedside. Pt has ACP documents on file. Pt's friend Suri was present at bedside is POA. No further needs identified.      AZRA Napoles, MSW    Phone: [phone]  Fax: [phone]  Email: [email or web address]

## 2024-05-17 ENCOUNTER — APPOINTMENT (OUTPATIENT)
Dept: GENERAL RADIOLOGY | Facility: HOSPITAL | Age: 69
DRG: 235 | End: 2024-05-17
Payer: MEDICARE

## 2024-05-17 ENCOUNTER — APPOINTMENT (OUTPATIENT)
Dept: ULTRASOUND IMAGING | Facility: HOSPITAL | Age: 69
DRG: 235 | End: 2024-05-17
Payer: MEDICARE

## 2024-05-17 LAB
ANION GAP SERPL CALCULATED.3IONS-SCNC: 17 MMOL/L (ref 5–15)
BH BB BLOOD EXPIRATION DATE: NORMAL
BH BB BLOOD TYPE BARCODE: 6200
BH BB DISPENSE STATUS: NORMAL
BH BB PRODUCT CODE: NORMAL
BH BB UNIT NUMBER: NORMAL
BUN SERPL-MCNC: 52 MG/DL (ref 8–23)
BUN/CREAT SERPL: 17.6 (ref 7–25)
CALCIUM SPEC-SCNC: 8.7 MG/DL (ref 8.6–10.5)
CHLORIDE SERPL-SCNC: 105 MMOL/L (ref 98–107)
CK SERPL-CCNC: 212 U/L (ref 20–200)
CO2 SERPL-SCNC: 20 MMOL/L (ref 22–29)
CREAT SERPL-MCNC: 2.96 MG/DL (ref 0.76–1.27)
CROSSMATCH INTERPRETATION: NORMAL
DEPRECATED RDW RBC AUTO: 51.8 FL (ref 37–54)
EGFRCR SERPLBLD CKD-EPI 2021: 22 ML/MIN/1.73
EOSINOPHIL SPEC QL MICRO: 0 % EOS/100 CELLS (ref 0–0)
ERYTHROCYTE [DISTWIDTH] IN BLOOD BY AUTOMATED COUNT: 16.3 % (ref 12.3–15.4)
FERRITIN SERPL-MCNC: 176.8 NG/ML (ref 30–400)
GLUCOSE BLDC GLUCOMTR-MCNC: 108 MG/DL (ref 70–105)
GLUCOSE BLDC GLUCOMTR-MCNC: 108 MG/DL (ref 70–105)
GLUCOSE BLDC GLUCOMTR-MCNC: 112 MG/DL (ref 70–105)
GLUCOSE BLDC GLUCOMTR-MCNC: 121 MG/DL (ref 70–105)
GLUCOSE BLDC GLUCOMTR-MCNC: 122 MG/DL (ref 70–105)
GLUCOSE BLDC GLUCOMTR-MCNC: 123 MG/DL (ref 70–105)
GLUCOSE BLDC GLUCOMTR-MCNC: 131 MG/DL (ref 70–105)
GLUCOSE BLDC GLUCOMTR-MCNC: 131 MG/DL (ref 70–105)
GLUCOSE BLDC GLUCOMTR-MCNC: 133 MG/DL (ref 70–105)
GLUCOSE BLDC GLUCOMTR-MCNC: 137 MG/DL (ref 70–105)
GLUCOSE BLDC GLUCOMTR-MCNC: 138 MG/DL (ref 70–105)
GLUCOSE BLDC GLUCOMTR-MCNC: 138 MG/DL (ref 70–105)
GLUCOSE BLDC GLUCOMTR-MCNC: 77 MG/DL (ref 70–105)
GLUCOSE BLDC GLUCOMTR-MCNC: 97 MG/DL (ref 70–105)
GLUCOSE SERPL-MCNC: 129 MG/DL (ref 65–99)
HCT VFR BLD AUTO: 26.6 % (ref 37.5–51)
HGB BLD-MCNC: 8.1 G/DL (ref 13–17.7)
IRON 24H UR-MRATE: 13 MCG/DL (ref 59–158)
IRON SATN MFR SERPL: 4 % (ref 20–50)
MCH RBC QN AUTO: 26.5 PG (ref 26.6–33)
MCHC RBC AUTO-ENTMCNC: 30.5 G/DL (ref 31.5–35.7)
MCV RBC AUTO: 86.9 FL (ref 79–97)
PLATELET # BLD AUTO: 152 10*3/MM3 (ref 140–450)
PMV BLD AUTO: 11 FL (ref 6–12)
POTASSIUM SERPL-SCNC: 4.4 MMOL/L (ref 3.5–5.2)
QT INTERVAL: 380 MS
QT INTERVAL: 389 MS
QTC INTERVAL: 404 MS
QTC INTERVAL: 424 MS
RBC # BLD AUTO: 3.06 10*6/MM3 (ref 4.14–5.8)
SODIUM SERPL-SCNC: 142 MMOL/L (ref 136–145)
SODIUM UR-SCNC: <20 MMOL/L
TIBC SERPL-MCNC: 320 MCG/DL (ref 298–536)
TRANSFERRIN SERPL-MCNC: 215 MG/DL (ref 200–360)
TSH SERPL DL<=0.05 MIU/L-ACNC: 1.12 UIU/ML (ref 0.27–4.2)
UNIT  ABO: NORMAL
UNIT  RH: NORMAL
URATE SERPL-MCNC: 9.4 MG/DL (ref 3.4–7)
WBC NRBC COR # BLD AUTO: 8.46 10*3/MM3 (ref 3.4–10.8)

## 2024-05-17 PROCEDURE — 85027 COMPLETE CBC AUTOMATED: CPT | Performed by: NURSE PRACTITIONER

## 2024-05-17 PROCEDURE — 87205 SMEAR GRAM STAIN: CPT | Performed by: INTERNAL MEDICINE

## 2024-05-17 PROCEDURE — 82728 ASSAY OF FERRITIN: CPT | Performed by: INTERNAL MEDICINE

## 2024-05-17 PROCEDURE — 82550 ASSAY OF CK (CPK): CPT | Performed by: INTERNAL MEDICINE

## 2024-05-17 PROCEDURE — 84165 PROTEIN E-PHORESIS SERUM: CPT | Performed by: INTERNAL MEDICINE

## 2024-05-17 PROCEDURE — 25010000002 CHLOROTHIAZIDE PER 500 MG: Performed by: INTERNAL MEDICINE

## 2024-05-17 PROCEDURE — 97535 SELF CARE MNGMENT TRAINING: CPT

## 2024-05-17 PROCEDURE — 82948 REAGENT STRIP/BLOOD GLUCOSE: CPT

## 2024-05-17 PROCEDURE — 80048 BASIC METABOLIC PNL TOTAL CA: CPT | Performed by: NURSE PRACTITIONER

## 2024-05-17 PROCEDURE — 71045 X-RAY EXAM CHEST 1 VIEW: CPT

## 2024-05-17 PROCEDURE — 94664 DEMO&/EVAL PT USE INHALER: CPT

## 2024-05-17 PROCEDURE — 97530 THERAPEUTIC ACTIVITIES: CPT

## 2024-05-17 PROCEDURE — 94799 UNLISTED PULMONARY SVC/PX: CPT

## 2024-05-17 PROCEDURE — 83540 ASSAY OF IRON: CPT | Performed by: INTERNAL MEDICINE

## 2024-05-17 PROCEDURE — 25010000002 CEFAZOLIN PER 500 MG: Performed by: NURSE PRACTITIONER

## 2024-05-17 PROCEDURE — 84550 ASSAY OF BLOOD/URIC ACID: CPT | Performed by: INTERNAL MEDICINE

## 2024-05-17 PROCEDURE — 97110 THERAPEUTIC EXERCISES: CPT

## 2024-05-17 PROCEDURE — 84155 ASSAY OF PROTEIN SERUM: CPT | Performed by: INTERNAL MEDICINE

## 2024-05-17 PROCEDURE — 99232 SBSQ HOSP IP/OBS MODERATE 35: CPT | Performed by: INTERNAL MEDICINE

## 2024-05-17 PROCEDURE — 25010000002 ENOXAPARIN PER 10 MG: Performed by: INTERNAL MEDICINE

## 2024-05-17 PROCEDURE — 93010 ELECTROCARDIOGRAM REPORT: CPT | Performed by: INTERNAL MEDICINE

## 2024-05-17 PROCEDURE — 25010000002 BUMETANIDE PER 0.5 MG: Performed by: INTERNAL MEDICINE

## 2024-05-17 PROCEDURE — 97116 GAIT TRAINING THERAPY: CPT

## 2024-05-17 PROCEDURE — 94640 AIRWAY INHALATION TREATMENT: CPT

## 2024-05-17 PROCEDURE — 76775 US EXAM ABDO BACK WALL LIM: CPT

## 2024-05-17 PROCEDURE — 99024 POSTOP FOLLOW-UP VISIT: CPT | Performed by: NURSE PRACTITIONER

## 2024-05-17 PROCEDURE — 84300 ASSAY OF URINE SODIUM: CPT | Performed by: INTERNAL MEDICINE

## 2024-05-17 PROCEDURE — 84466 ASSAY OF TRANSFERRIN: CPT | Performed by: INTERNAL MEDICINE

## 2024-05-17 PROCEDURE — 84443 ASSAY THYROID STIM HORMONE: CPT | Performed by: INTERNAL MEDICINE

## 2024-05-17 RX ORDER — ENOXAPARIN SODIUM 100 MG/ML
30 INJECTION SUBCUTANEOUS EVERY 24 HOURS
Status: DISCONTINUED | OUTPATIENT
Start: 2024-05-17 | End: 2024-05-21

## 2024-05-17 RX ORDER — NICOTINE POLACRILEX 4 MG
15 LOZENGE BUCCAL
Status: DISCONTINUED | OUTPATIENT
Start: 2024-05-18 | End: 2024-05-23 | Stop reason: HOSPADM

## 2024-05-17 RX ORDER — SIMETHICONE 80 MG
160 TABLET,CHEWABLE ORAL 4 TIMES DAILY PRN
Status: DISCONTINUED | OUTPATIENT
Start: 2024-05-17 | End: 2024-05-23 | Stop reason: HOSPADM

## 2024-05-17 RX ORDER — BISACODYL 10 MG
10 SUPPOSITORY, RECTAL RECTAL DAILY PRN
Status: DISCONTINUED | OUTPATIENT
Start: 2024-05-17 | End: 2024-05-23 | Stop reason: HOSPADM

## 2024-05-17 RX ORDER — INSULIN LISPRO 100 [IU]/ML
2-9 INJECTION, SOLUTION INTRAVENOUS; SUBCUTANEOUS
Status: DISCONTINUED | OUTPATIENT
Start: 2024-05-18 | End: 2024-05-23 | Stop reason: HOSPADM

## 2024-05-17 RX ORDER — IBUPROFEN 600 MG/1
1 TABLET ORAL
Status: DISCONTINUED | OUTPATIENT
Start: 2024-05-18 | End: 2024-05-23 | Stop reason: HOSPADM

## 2024-05-17 RX ORDER — DEXTROSE MONOHYDRATE 25 G/50ML
25 INJECTION, SOLUTION INTRAVENOUS
Status: DISCONTINUED | OUTPATIENT
Start: 2024-05-18 | End: 2024-05-23 | Stop reason: HOSPADM

## 2024-05-17 RX ORDER — BUMETANIDE 0.25 MG/ML
1 INJECTION INTRAMUSCULAR; INTRAVENOUS EVERY 8 HOURS
Status: DISCONTINUED | OUTPATIENT
Start: 2024-05-17 | End: 2024-05-17

## 2024-05-17 RX ORDER — IPRATROPIUM BROMIDE AND ALBUTEROL SULFATE 2.5; .5 MG/3ML; MG/3ML
3 SOLUTION RESPIRATORY (INHALATION) EVERY 4 HOURS PRN
Status: DISCONTINUED | OUTPATIENT
Start: 2024-05-17 | End: 2024-05-23 | Stop reason: HOSPADM

## 2024-05-17 RX ORDER — ATORVASTATIN CALCIUM 40 MG/1
40 TABLET, FILM COATED ORAL NIGHTLY
Status: DISCONTINUED | OUTPATIENT
Start: 2024-05-18 | End: 2024-05-23 | Stop reason: HOSPADM

## 2024-05-17 RX ADMIN — GABAPENTIN 100 MG: 100 CAPSULE ORAL at 08:15

## 2024-05-17 RX ADMIN — ENOXAPARIN SODIUM 30 MG: 100 INJECTION SUBCUTANEOUS at 16:00

## 2024-05-17 RX ADMIN — ASPIRIN 81 MG: 81 TABLET, COATED ORAL at 08:15

## 2024-05-17 RX ADMIN — CHLOROTHIAZIDE SODIUM 250 MG: 500 INJECTION, POWDER, LYOPHILIZED, FOR SOLUTION INTRAVENOUS at 09:46

## 2024-05-17 RX ADMIN — CITALOPRAM HYDROBROMIDE 20 MG: 20 TABLET ORAL at 08:37

## 2024-05-17 RX ADMIN — CYCLOBENZAPRINE 10 MG: 10 TABLET, FILM COATED ORAL at 05:32

## 2024-05-17 RX ADMIN — SENNOSIDES AND DOCUSATE SODIUM 2 TABLET: 50; 8.6 TABLET ORAL at 20:01

## 2024-05-17 RX ADMIN — CHLORHEXIDINE GLUCONATE, 0.12% ORAL RINSE 15 ML: 1.2 SOLUTION DENTAL at 20:01

## 2024-05-17 RX ADMIN — SIMETHICONE 160 MG: 80 TABLET, CHEWABLE ORAL at 17:18

## 2024-05-17 RX ADMIN — PANTOPRAZOLE SODIUM 40 MG: 40 TABLET, DELAYED RELEASE ORAL at 08:37

## 2024-05-17 RX ADMIN — SENNOSIDES AND DOCUSATE SODIUM 2 TABLET: 50; 8.6 TABLET ORAL at 08:37

## 2024-05-17 RX ADMIN — GABAPENTIN 100 MG: 100 CAPSULE ORAL at 20:01

## 2024-05-17 RX ADMIN — CHLORHEXIDINE GLUCONATE, 0.12% ORAL RINSE 15 ML: 1.2 SOLUTION DENTAL at 08:14

## 2024-05-17 RX ADMIN — HYDROCODONE BITARTRATE AND ACETAMINOPHEN 1 TABLET: 5; 325 TABLET ORAL at 05:32

## 2024-05-17 RX ADMIN — TRAMADOL HYDROCHLORIDE 50 MG: 50 TABLET, COATED ORAL at 20:02

## 2024-05-17 RX ADMIN — POLYETHYLENE GLYCOL 3350 17 G: 17 POWDER, FOR SOLUTION ORAL at 08:14

## 2024-05-17 RX ADMIN — MONTELUKAST 10 MG: 10 TABLET, FILM COATED ORAL at 08:38

## 2024-05-17 RX ADMIN — MUPIROCIN 1 APPLICATION: 20 OINTMENT TOPICAL at 08:14

## 2024-05-17 RX ADMIN — SODIUM CHLORIDE 2 G: 900 INJECTION INTRAVENOUS at 01:55

## 2024-05-17 RX ADMIN — SODIUM BICARBONATE 50 MEQ: 84 INJECTION INTRAVENOUS at 08:14

## 2024-05-17 RX ADMIN — POLYETHYLENE GLYCOL 3350 17 G: 17 POWDER, FOR SOLUTION ORAL at 20:01

## 2024-05-17 RX ADMIN — BUMETANIDE 0.5 MG/HR: 0.25 INJECTION INTRAMUSCULAR; INTRAVENOUS at 17:18

## 2024-05-17 RX ADMIN — SODIUM BICARBONATE 50 MEQ: 84 INJECTION INTRAVENOUS at 16:00

## 2024-05-17 RX ADMIN — BISACODYL 10 MG: 10 SUPPOSITORY RECTAL at 16:00

## 2024-05-17 RX ADMIN — MUPIROCIN 1 APPLICATION: 20 OINTMENT TOPICAL at 20:01

## 2024-05-17 RX ADMIN — IPRATROPIUM BROMIDE AND ALBUTEROL SULFATE 3 ML: .5; 3 SOLUTION RESPIRATORY (INHALATION) at 14:55

## 2024-05-17 RX ADMIN — TRAMADOL HYDROCHLORIDE 50 MG: 50 TABLET, COATED ORAL at 03:21

## 2024-05-17 RX ADMIN — BUMETANIDE 1 MG: 0.25 INJECTION INTRAMUSCULAR; INTRAVENOUS at 08:14

## 2024-05-17 NOTE — PROGRESS NOTES
Critical Care Progress Note     Fransico Cuenca : 1955 MRN:[id number] LOS:2  Rm: 220/1     Principal Problem: CAD, multiple vessel     Reason for follow up: All the medical problems listed below    Summary     A 69 y.o. old male patient with PMH of Hypertension, hyperlipidemia, diabetes mellitus type 2, COPD, ZHANG, anxiety, and CKD 3 presents to the hospital with complaints of chest pain. Patient had a CABG x 4 5/15 with LIMA to the mid LAD and EVH of left leg.  Intensivist team was consulted for post-operative hypoxia recommendations. Extubated 24.    Significant Events     24 : Continued diuresis per nephrology, though creatinine is climbing, closely watching.  Off of vasopressors.  Weaned to 4 LPM.  Discussed with Suzy CAMARA of CTS.  Encouraged incentive spirometry.    Assessment / Plan     Post-operative respiratory failure with hypoxia  Likely due to pulmonary vascular congestion.  Extubated on 24, continues with oxygen supplementation, weaned to 4 LPM N/C  Continue to wean oxygen supplementation, titrate for oxygen saturation > 92%.    CXR: continue pulmonary vascular congestion.  Continue diuresis per nephrology.  Monitor fluid volume status, Net IO Since Admission: 2,214.5 mL [24 1216]  Encouraged incentive spirometry, early mobility.     Acute kidney injury, superimposed on CKD, stage 3  Remains nonoliguric. Baseline creatinine 1.33-1.44.  Likely prerenal. Possible intrinsic component due to ATN from recent surgery, drugs and hypotension.   Nephrology consulted and managing.  Monitor Input/Output very closely.   Avoids NSAIDs, nephrotoxic medications, and hypotension.  Net IO Since Admission: 2,214.5 mL [24 1202]     Multivessel coronary artery disease, status post CABG  Previously evaluated for NSTEMI and discovered to have multivessel CAD, presented for elective CABG, underwent CABG x 5 by Dr. Hirsch (5/15/24)  Cardiology/CTS managing.     COPD, not in exacerbation:  Continue Singulair, on no inhalers as outpatient,        Diabetes mellitus Type 2, not insulin-dependent : well controlled.   Home regimen includes: metformin.  HgbA1c 6.10.  CTS managing.    Essential hypertension: well controlled.   Recently weaned from vasopressin, defer to cardiology/CTS.  Titrate medications as needed.    Dyslipidemia: Continue statin therapy when appropriate.  Obesity: Body mass index is 35.14 kg/m².      Disposition:  ICU. CTS patient    Code status:   Code Status (Patient has no pulse and is not breathing): CPR (Attempt to Resuscitate)  Medical Interventions (Patient has pulse or is breathing): Full       Nutrition:   Diet: Liquid; Clear Liquid; Fluid Consistency: Thin (IDDSI 0)   Patient isn't on Tube Feeding     DVT prophylaxis:  Medical and mechanical DVT prophylaxis orders are present.         Subjective / Review of systems     Review of Systems   Constitutional:  Negative for chills and fever.   Respiratory:  Negative for chest tightness and shortness of breath.    Cardiovascular:  Negative for chest pain.        Post-operative pain well controlled.   Gastrointestinal:  Negative for abdominal pain, nausea and vomiting.   Genitourinary:  Negative for difficulty urinating.   Musculoskeletal:  Negative for arthralgias and myalgias.   Neurological:  Negative for dizziness and light-headedness.   Psychiatric/Behavioral:  Negative for confusion. The patient is not nervous/anxious.         Objective / Physical Exam     Vital signs:  Temp: 98.1 °F (36.7 °C)  BP: 131/76  Heart Rate: 71  Resp: 15  SpO2: 94 %  Weight: 98.7 kg (217 lb 9.5 oz)    Admission Weight: Weight: 95.8 kg (211 lb 3.2 oz)  Current Weight: Weight: 98.7 kg (217 lb 9.5 oz)    Input/Output in last 24 hours:    Intake/Output Summary (Last 24 hours) at 5/17/2024 0927  Last data filed at 5/17/2024 0600  Gross per 24 hour   Intake 4867.5 ml   Output 777 ml   Net 4090.5 ml      Net IO Since Admission: 1,974.5 mL [05/17/24 0927]      Physical Exam  Constitutional:       General: He is not in acute distress.     Appearance: He is obese. He is not ill-appearing or toxic-appearing.      Interventions: Nasal cannula in place.      Comments: Patient sitting up in chair   HENT:      Head: Normocephalic and atraumatic.      Nose: Nose normal. No congestion.      Mouth/Throat:      Mouth: Mucous membranes are moist.      Pharynx: Oropharynx is clear. No oropharyngeal exudate.   Eyes:      General: No scleral icterus.     Extraocular Movements: Extraocular movements intact.      Conjunctiva/sclera: Conjunctivae normal.      Pupils: Pupils are equal, round, and reactive to light.   Cardiovascular:      Rate and Rhythm: Normal rate and regular rhythm.      Pulses: Normal pulses.      Heart sounds: Normal heart sounds.   Pulmonary:      Effort: Pulmonary effort is normal. No respiratory distress.      Breath sounds: Rales (Left basilar.) present. No wheezing or rhonchi.      Comments: Diminished bibasilar breath sounds.  Abdominal:      General: There is distension.      Palpations: Abdomen is soft.      Tenderness: There is no abdominal tenderness.      Comments: Hypoactive to normoactive bowel sounds.   Musculoskeletal:      Right lower leg: No edema.      Left lower leg: No edema.   Skin:     General: Skin is warm and dry.      Findings: No rash.      Comments: Midsternal incision without drainage.   Neurological:      General: No focal deficit present.      Mental Status: He is alert and oriented to person, place, and time.   Psychiatric:         Mood and Affect: Mood normal.         Behavior: Behavior normal. Behavior is cooperative.         Thought Content: Thought content normal.         Judgment: Judgment normal.         Radiology and Labs     Results from last 7 days   Lab Units 05/17/24  0306 05/16/24  1924 05/16/24  0218 05/15/24  2256 05/15/24  2108 05/15/24  1136 05/15/24  1054 05/15/24  0739 05/14/24  0755   WBC 10*3/mm3 8.46  --  7.65  --    --   --  8.77  --  5.52   HEMOGLOBIN g/dL 8.1* 8.7* 7.8*  --   --   --  8.4*  --  9.8*   HEMOGLOBIN, POC g/dL  --   --   --  8.3* 7.9*   < >  --    < >  --    HEMATOCRIT % 26.6* 29.3* 26.1*  --   --   --  27.2*  --  32.1*   HEMATOCRIT POC %  --   --   --  24* 23*   < >  --    < >  --    PLATELETS 10*3/mm3 152  --  172  --   --   --  167  --  276    < > = values in this interval not displayed.      Results from last 7 days   Lab Units 05/16/24  0218 05/15/24  1054 05/14/24  0755   PROTIME Seconds 13.0* 12.9* 11.6   INR  1.21* 1.20* 1.07   APTT seconds  --  21.7* 27.3      Results from last 7 days   Lab Units 05/17/24  0306 05/16/24  0218 05/15/24  2256 05/15/24  2108 05/15/24  1905 05/15/24  1136 05/15/24  1054 05/14/24  0755 05/13/24  1244   SODIUM mmol/L 142 143  --   --   --   --  139 139 141   POTASSIUM mmol/L 4.4 4.4  --   --   --   --  4.2 4.4 4.4   CHLORIDE mmol/L 105 107  --   --   --   --  104 100 104   CO2 mmol/L 20.0* 24.0  --   --   --   --  24.0 29.0 26.0   BUN mg/dL 52* 38*  --   --   --   --  35* 34* 27*   CREATININE mg/dL 2.96* 1.47* 1.50* 1.47* 1.53*   < > 1.44* 1.41* 1.27   GLUCOSE mg/dL 129* 157*  --   --   --   --  131* 93 88   MAGNESIUM mg/dL  --  2.6*  --   --   --   --   --   --   --    PHOSPHORUS mg/dL  --  4.3  --   --   --   --  2.4*  --   --     < > = values in this interval not displayed.      Results from last 7 days   Lab Units 05/14/24  0755   ALK PHOS U/L 38*   AST (SGOT) U/L 13   ALT (SGPT) U/L 13     Results from last 7 days   Lab Units 05/16/24  0908 05/16/24  0253 05/15/24  2256 05/15/24  2108 05/15/24  1905 05/15/24  1752   PH, ARTERIAL pH units 7.306*  --  7.302* 7.329* 7.315* 7.302*   PCO2, ARTERIAL mm Hg 48.8*  --  48.5* 46.3 44.7 43.4   PO2 ART mm Hg 73.6*  --  91.0 72.7* 109.9* 104.4   O2 SATURATION ART % 92.9*  --  96.0 93.1* 97.8 97.4   FIO2 % 36 36 52 40 40 40   HCO3 ART mmol/L 24.4  --  24.0 24.4 22.8 21.4   BASE EXCESS ART mmol/L -2.0* -2.8* -2.4* -1.6* -3.3* -4.7*        XR Chest 1 View    Result Date: 5/17/2024  Impression: Continued mild bibasilar atelectasis, greatest on the left, with small left effusion. Electronically Signed: Talita Bear MD  5/17/2024 8:16 AM EDT  Workstation ID: [id number]    XR Abdomen KUB    Result Date: 5/16/2024  Impression: Gas-distended colon. No evidence of bowel obstruction. Electronically Signed: Braden Bartlett MD  5/16/2024 6:23 PM EDT  Workstation ID: [id number]    XR Chest 1 View    Result Date: 5/16/2024  Impression: Interval removal of endotracheal and NG tubes Mild improved aeration to the left lung base. Otherwise grossly stable bibasilar airspace disease Electronically Signed: Patrick Robbins MD  5/16/2024 8:08 AM EDT  Workstation ID: [id number]    XR Chest 1 View    Result Date: 5/15/2024  1.     Interval median sternotomy and CABG. 2.     Tubes and lines appear in satisfactory positions, as detailed above. 3.     Mild bibasilar opacities which may represent atelectasis or edema.  Electronically Signed By-Romaine Szymanski MD On:5/15/2024 11:16 AM         Current medications     Scheduled Meds:   aspirin, 81 mg, Oral, Daily  [START ON 5/18/2024] atorvastatin, 40 mg, Oral, Nightly  bumetanide, 1 mg, Intravenous, Q8H  chlorhexidine, 15 mL, Mouth/Throat, Q12H  chlorothiazide, 250 mg, Intravenous, Once  citalopram, 20 mg, Oral, Daily  enoxaparin, 30 mg, Subcutaneous, Q24H  gabapentin, 100 mg, Oral, BID  montelukast, 10 mg, Oral, Daily  mupirocin, , Each Nare, BID  pantoprazole, 40 mg, Oral, Daily  polyethylene glycol, 17 g, Oral, BID  senna-docusate sodium, 2 tablet, Oral, BID  sodium bicarbonate, 50 mEq, Intravenous, Q8H        Continuous Infusions:   insulin, 0-100 Units/hr, Last Rate: Stopped (05/17/24 0038)  sodium chloride, 30 mL/hr, Last Rate: 30 mL/hr (05/16/24 0919)  vasopressin, 0.03 Units/min, Last Rate: Stopped (05/17/24 0842)      Plan discussed with RN. Reviewed all other data in the last 24 hours, including but not limited to vitals,  labs, microbiology, imaging and pertinent notes from other providers.  Plan also discussed with patient at the bedside.      JAX Dickerson   Critical Care  05/17/24   09:27 EDT

## 2024-05-17 NOTE — PLAN OF CARE
Assessment: Fransico Cuenca presents with functional mobility impairments which indicate the need for skilled intervention. Tolerating session today without incident. Pt with improved functional mobility this date compared to initial eval. Pt requires CGA for STS and ambulates 120 feet CGA using RW. Pt given cues throughout session to maintain sternal precautions. As pt lives alone with 3 steps to enter home, will continue to recommend Acute IP Rehab at discharge. Will continue to assess and update recommendations as appropriate. Will continue to follow and progress as tolerated.     Plan/Recommendations:   If medically appropriate, High Intensity Therapy recommended post-acute care. This is recommended as therapy feels the patient would require 5-6 days per week, 2-3 hours per day. At this time, inpatient rehabilitation (acute rehab) would be the first choice and SNF would be second. Pt requires no DME at discharge.     Pt desires Inpatient Rehabilitation placement at discharge. Pt cooperative; agreeable to therapeutic recommendations and plan of care.

## 2024-05-17 NOTE — THERAPY TREATMENT NOTE
Subjective: Pt agreeable to therapeutic plan of care.    Objective:     Bed mobility - N/A or Not attempted. Pt up in chair upon arrival.     Transfers - CGA. Cued on sternal precautions    Ambulation - 120 feet CGA and with rolling walker    Phase 1 Cardiac Rehab Initiated - Acute Care    Sitting tolerance: >10min and supported  Standing tolerance: 5-10min and supported    Precautions:  Mid-sternal incision; avoid scapular retraction and depression.  Cardiovascular impairment post-sx; encourage energy conservation strategies.    Therapeutic Exercise: 10 reps UE and LE AROM in supported sitting and supported standing    MET level equivalent: 2.0-3.0 (Unlimited sitting, ambulation on level ground <2mph, light housework)    Vitals: WNL on 4L    Pain: 5 VAS   Location: sternal  Intervention for pain: Repositioned, Increased Activity, and Therapeutic Presence    Education: Provided education on the importance of mobility in the acute care setting, Verbal/Tactile Cues, Transfer Training, Gait Training, and Post-Op Precautions    Assessment: Fransico Cuenca presents with functional mobility impairments which indicate the need for skilled intervention. Tolerating session today without incident. Pt with improved functional mobility this date compared to initial eval. Pt requires CGA for STS and ambulates 120 feet CGA using RW. Pt given cues throughout session to maintain sternal precautions. As pt lives alone with 3 steps to enter home, will continue to recommend Acute IP Rehab at discharge. Will continue to assess and update recommendations as appropriate. Will continue to follow and progress as tolerated.     Plan/Recommendations:   If medically appropriate, High Intensity Therapy recommended post-acute care. This is recommended as therapy feels the patient would require 5-6 days per week, 2-3 hours per day. At this time, inpatient rehabilitation (acute rehab) would be the first choice and SNF would be second. Pt requires no  DME at discharge.     Pt desires Inpatient Rehabilitation placement at discharge. Pt cooperative; agreeable to therapeutic recommendations and plan of care.         Basic Mobility 6-click:  Rollin = Total, A lot = 2, A little = 3; 4 = None  Supine>Sit:   1 = Total, A lot = 2, A little = 3; 4 = None   Sit>Stand with arms:  1 = Total, A lot = 2, A little = 3; 4 = None  Bed>Chair:   1 = Total, A lot = 2, A little = 3; 4 = None  Ambulate in room:  1 = Total, A lot = 2, A little = 3; 4 = None  3-5 Steps with railin = Total, A lot = 2, A little = 3; 4 = None  Score: 18    Modified [place]: N/A = No pre-op stroke/TIA    Post-Tx Position: Up in Chair, Alarms activated, and Call light and personal items within reach  PPE: gloves

## 2024-05-17 NOTE — PROGRESS NOTES
CARDIOLOGY FOLLOW-UP PROGRESS NOTE      Reason for follow-up:    CAD  S/p CABG       Attending: Jean Hirsch MD      Subjective .   C/o soa     Review of Systems   Constitutional: Negative for chills and fever.   HENT:  Negative for ear discharge and nosebleeds.    Eyes:  Negative for discharge and redness.   Cardiovascular:  Positive for chest pain and leg swelling. Negative for orthopnea, palpitations, paroxysmal nocturnal dyspnea and syncope.   Respiratory:  Positive for shortness of breath. Negative for cough and wheezing.    Endocrine: Negative for heat intolerance.   Skin:  Negative for rash.   Musculoskeletal:  Negative for arthritis and myalgias.   Gastrointestinal:  Negative for abdominal pain, melena, nausea and vomiting.   Genitourinary:  Negative for dysuria and hematuria.   Neurological:  Negative for dizziness, light-headedness, numbness and tremors.   Psychiatric/Behavioral:  Negative for depression. The patient is not nervous/anxious.      Pertinent items are noted in HPI, all other systems reviewed and negative  Allergies: Patient has no known allergies.    Scheduled Meds:acetylcysteine, 2 mL, Nebulization, BID - RT  allopurinol, 100 mg, Oral, Daily  aspirin, 81 mg, Oral, Daily  atorvastatin, 40 mg, Oral, Nightly  budesonide, 0.5 mg, Nebulization, BID - RT  bumetanide, 1 mg, Intravenous, Q8H  chlorhexidine, 15 mL, Mouth/Throat, Q12H  citalopram, 20 mg, Oral, Daily  enoxaparin, 30 mg, Subcutaneous, Q24H  gabapentin, 100 mg, Oral, BID  guaiFENesin, 600 mg, Oral, Q12H  insulin lispro, 2-9 Units, Subcutaneous, 4x Daily AC & at Bedtime  ipratropium-albuterol, 3 mL, Nebulization, Q6H While Awake - RT  metoprolol tartrate, 25 mg, Oral, Q8H  montelukast, 10 mg, Oral, Daily  pantoprazole, 40 mg, Oral, Daily  polyethylene glycol, 17 g, Oral, BID  potassium chloride, 20 mEq, Oral, TID With Meals  senna-docusate sodium, 2 tablet, Oral, BID  tamsulosin, 0.4 mg, Oral, Daily        Continuous Infusions:        PRN Meds:.  acetaminophen **OR** [DISCONTINUED] acetaminophen **OR** [DISCONTINUED] acetaminophen    bisacodyl    Calcium Replacement - Follow Nurse / BPA Driven Protocol    cyclobenzaprine    dextrose    dextrose    glucagon (human recombinant)    HYDROcodone-acetaminophen    ipratropium-albuterol    Magnesium Cardiology Dose Replacement - Follow Nurse / BPA Driven Protocol    [DISCONTINUED] Morphine **AND** naloxone    nitroglycerin    ondansetron    Phosphorus Replacement - Follow Nurse / BPA Driven Protocol    Potassium Replacement - Follow Nurse / BPA Driven Protocol    simethicone    traMADol    Objective     VITAL SIGNS  Patient Vitals for the past 24 hrs:   BP Temp Temp src Pulse Resp SpO2   05/20/24 1800 144/82 -- -- 82 -- 95 %   05/20/24 1700 (!) 137/105 -- -- 85 -- 94 %   05/20/24 1600 141/81 98 °F (36.7 °C) Oral 84 22 91 %   05/20/24 1500 129/79 -- -- 72 -- 91 %   05/20/24 1400 159/84 -- -- 81 -- 91 %   05/20/24 1300 161/89 -- -- 77 -- 92 %   05/20/24 1209 141/78 98.2 °F (36.8 °C) Oral -- 18 --   05/20/24 1200 157/83 -- -- 79 -- 94 %   05/20/24 1100 134/74 -- -- 89 -- (!) 87 %   05/20/24 1000 137/72 -- -- 75 -- 94 %   05/20/24 0900 131/74 -- -- 75 -- 91 %   05/20/24 0800 123/68 -- -- 65 -- 93 %   05/20/24 0730 134/72 98 °F (36.7 °C) Oral 69 17 95 %   05/20/24 0700 119/68 -- -- 64 14 94 %   05/20/24 0600 110/58 -- -- 67 13 94 %   05/20/24 0500 130/70 -- -- 73 14 90 %   05/20/24 0400 123/70 -- -- 66 12 93 %   05/20/24 0300 110/57 98 °F (36.7 °C) Oral 66 16 93 %   05/20/24 0200 104/58 -- -- 69 14 92 %   05/20/24 0100 (!) 85/54 -- -- 59 13 92 %   05/20/24 0001 93/51 98 °F (36.7 °C) Oral 62 13 91 %   05/19/24 2339 (!) 88/55 -- -- 60 -- --   05/19/24 2300 103/54 -- -- 65 14 90 %   05/19/24 2200 108/57 -- -- 60 15 --   05/19/24 2152 -- -- -- 58 -- --   05/19/24 2130 -- -- -- 66 17 95 %   05/19/24 2126 -- -- -- 63 18 91 %   05/19/24 2100 99/57 -- -- 64 20 90 %   05/19/24 2000 103/59 -- -- 61 18 (!) 89 %  "  05/19/24 1932 106/58 98 °F (36.7 °C) Oral 58 16 90 %        Flowsheet Rows      Flowsheet Row First Filed Value   Admission Height 167.6 cm (65.98\") Documented at 05/15/2024 1035   Admission Weight 95.8 kg (211 lb 3.2 oz) Documented at 05/15/2024 0600            Body mass index is 34.35 kg/m².      Intake/Output Summary (Last 24 hours) at 5/20/2024 1829  Last data filed at 5/20/2024 1752  Gross per 24 hour   Intake 1698 ml   Output 1055 ml   Net 643 ml        TELEMETRY:     paced    Physical Exam:  Constitutional:       Appearance: Well-developed.   Eyes:      General: No scleral icterus.        Right eye: No discharge.   HENT:      Head: Normocephalic and atraumatic.   Neck:      Thyroid: No thyromegaly.      Lymphadenopathy: No cervical adenopathy.   Pulmonary:      Effort: Pulmonary effort is normal. No respiratory distress.      Breath sounds: Normal breath sounds. No wheezing. No rales.   Cardiovascular:      Normal rate. Regular rhythm.      No gallop.    Edema:     Peripheral edema absent.   Abdominal:      Tenderness: There is no abdominal tenderness.   Skin:     Findings: No erythema or rash.   Neurological:      Mental Status: Alert and oriented to person, place, and time.            Results Review:   I reviewed the patient's new clinical results.    CBC    Results from last 7 days   Lab Units 05/20/24  0342 05/19/24  0401 05/18/24  0321 05/17/24  0306 05/16/24  1924 05/16/24  0218 05/15/24  2256 05/15/24  1136 05/15/24  1054 05/15/24  0739 05/14/24  0755   WBC 10*3/mm3 9.27 9.47 7.18 8.46  --  7.65  --   --  8.77  --  5.52   HEMOGLOBIN g/dL 8.2* 8.9* 8.1* 8.1* 8.7* 7.8*  --   --  8.4*  --  9.8*   HEMOGLOBIN, POC g/dL  --   --   --   --   --   --  8.3*   < >  --    < >  --    PLATELETS 10*3/mm3 293 254 182 152  --  172  --   --  167  --  276    < > = values in this interval not displayed.     BMP   Results from last 7 days   Lab Units 05/20/24  0342 05/19/24  0401 05/18/24  0321 05/17/24  0306 " 05/16/24  0218 05/15/24  2256 05/15/24  2108 05/15/24  1136 05/15/24  1054 05/14/24  0755   SODIUM mmol/L 140 138 142 142 143  --   --   --  139 139   POTASSIUM mmol/L 4.1 4.1 3.9 4.4 4.4  --   --   --  4.2 4.4   CHLORIDE mmol/L 98 98 103 105 107  --   --   --  104 100   CO2 mmol/L 28.0 26.0 26.0 20.0* 24.0  --   --   --  24.0 29.0   BUN mg/dL 81* 72* 65* 52* 38*  --   --   --  35* 34*   CREATININE mg/dL 2.82* 2.85* 2.86* 2.96* 1.47* 1.50* 1.47*   < > 1.44* 1.41*   GLUCOSE mg/dL 110* 104* 82 129* 157*  --   --   --  131* 93   MAGNESIUM mg/dL 2.4 2.3 2.8*  --  2.6*  --   --   --   --   --    PHOSPHORUS mg/dL 5.5* 5.5* 5.4*  --  4.3  --   --   --  2.4*  --     < > = values in this interval not displayed.     Cr Clearance Estimated Creatinine Clearance: 26.5 mL/min (A) (by C-G formula based on SCr of 2.82 mg/dL (H)).  Coag   Results from last 7 days   Lab Units 05/16/24  0218 05/15/24  1054 05/14/24  0755   INR  1.21* 1.20* 1.07   APTT seconds  --  21.7* 27.3     HbA1C   Lab Results   Component Value Date    HGBA1C 6.10 (H) 05/14/2024    HGBA1C 6.2 (A) 03/12/2024    HGBA1C 6.8 (A) 12/12/2023     Blood Glucose   Glucose   Date/Time Value Ref Range Status   05/20/2024 1629 122 (H) 70 - 105 mg/dL Final     Comment:     Serial Number: [id number]:  [id number]   05/20/2024 1149 136 (H) 70 - 105 mg/dL Final     Comment:     Serial Number: [id number]:  [id number]   05/20/2024 0734 107 (H) 70 - 105 mg/dL Final     Comment:     Serial Number: [id number]:  [id number]   05/19/2024 2337 115 (H) 70 - 105 mg/dL Final     Comment:     Serial Number: [id number]:  [id number]   05/19/2024 2058 178 (H) 70 - 105 mg/dL Final     Comment:     Serial Number: [id number]:  [id number]   05/19/2024 1741 182 (H) 70 - 105 mg/dL Final     Comment:     Serial Number: [id number]:  [id number]   05/19/2024 1154 145 (H) 70 - 105 mg/dL Final     Comment:     Serial Number: [id number]:  [id number]   05/19/2024 0807  "107 (H) 70 - 105 mg/dL Final     Comment:     Serial Number: [id number]:  [id number]     Infection     CMP   Results from last 7 days   Lab Units 05/20/24  0342 05/19/24  0401 05/18/24  0321 05/17/24  0306 05/16/24  0218 05/15/24  2256 05/15/24  2108 05/15/24  1136 05/15/24  1054 05/14/24  0755   SODIUM mmol/L 140 138 142 142 143  --   --   --  139 139   POTASSIUM mmol/L 4.1 4.1 3.9 4.4 4.4  --   --   --  4.2 4.4   CHLORIDE mmol/L 98 98 103 105 107  --   --   --  104 100   CO2 mmol/L 28.0 26.0 26.0 20.0* 24.0  --   --   --  24.0 29.0   BUN mg/dL 81* 72* 65* 52* 38*  --   --   --  35* 34*   CREATININE mg/dL 2.82* 2.85* 2.86* 2.96* 1.47* 1.50* 1.47*   < > 1.44* 1.41*   GLUCOSE mg/dL 110* 104* 82 129* 157*  --   --   --  131* 93   ALBUMIN g/dL 3.1*  --  3.4*  --  3.8  --   --   --  3.4* 4.2   BILIRUBIN mg/dL 0.3  --  0.3  --   --   --   --   --   --  0.2   ALK PHOS U/L 51  --  68  --   --   --   --   --   --  38*   AST (SGOT) U/L 22  --  15  --   --   --   --   --   --  13   ALT (SGPT) U/L <5  --  <5  --   --   --   --   --   --  13    < > = values in this interval not displayed.     ABG    Results from last 7 days   Lab Units 05/20/24  1153 05/16/24  0908 05/16/24  0253 05/15/24  2256 05/15/24  2108 05/15/24  1905 05/15/24  1752 05/15/24  1624   PH, ARTERIAL pH units 7.454* 7.306*  --  7.302* 7.329* 7.315* 7.302* 7.332*   PCO2, ARTERIAL mm Hg 39.4 48.8*  --  48.5* 46.3 44.7 43.4 46.6   PO2 ART mm Hg 67.7* 73.6*  --  91.0 72.7* 109.9* 104.4 80.4*   O2 SATURATION ART % 94.1 92.9*  --  96.0 93.1* 97.8 97.4 94.8   BASE EXCESS ART mmol/L 3.5* -2.0* -2.8* -2.4* -1.6* -3.3* -4.7* -1.3*     UA    Results from last 7 days   Lab Units 05/14/24  0755   NITRITE UA  Negative   WBC UA /HPF 0-2   BACTERIA UA /HPF None Seen   SQUAM EPITHEL UA /HPF None Seen     RYAN  No results found for: \"POCMETH\", \"POCAMPHET\", \"POCBARBITUR\", \"POCBENZO\", \"POCCOCAINE\", \"POCOPIATES\", \"POCOXYCODO\", \"POCPHENCYC\", \"POCPROPOXY\", \"POCTHC\", " "\"POCTRICYC\"  Lysis Labs   Results from last 7 days   Lab Units 05/20/24  0342 05/19/24  0401 05/18/24  0321 05/17/24  0306 05/16/24  1924 05/16/24  0218 05/15/24  2256 05/15/24  2108 05/15/24  1136 05/15/24  1054 05/15/24  0739 05/14/24  0755   INR   --   --   --   --   --  1.21*  --   --   --  1.20*  --  1.07   APTT seconds  --   --   --   --   --   --   --   --   --  21.7*  --  27.3   HEMOGLOBIN g/dL 8.2* 8.9* 8.1* 8.1* 8.7* 7.8*  --   --   --  8.4*  --  9.8*   HEMOGLOBIN, POC g/dL  --   --   --   --   --   --  8.3* 7.9*   < >  --    < >  --    PLATELETS 10*3/mm3 293 254 182 152  --  172  --   --   --  167  --  276   CREATININE mg/dL 2.82* 2.85* 2.86* 2.96*  --  1.47* 1.50* 1.47*   < > 1.44*  --  1.41*    < > = values in this interval not displayed.     Radiology(recent) CT Chest Without Contrast Diagnostic    Result Date: 5/20/2024  Impression: 1.Small bilateral pleural effusions. 2.Near complete right middle lobe atelectasis. Partial bilateral lower lobe atelectasis. 3.Expected postoperative changes from recent CABG. Electronically Signed: Lisa Lan  5/20/2024 4:29 PM EDT  Workstation ID: [id number]     Imaging Results (Last 24 Hours)       Procedure Component Value Units Date/Time    CT Chest Without Contrast Diagnostic [[id number]] Collected: 05/20/24 1611     Updated: 05/20/24 1631    Narrative:      CT CHEST WO CONTRAST DIAGNOSTIC    Date of Exam: 5/20/2024 3:51 PM EDT    Indication: hypoxia    Comparison: AP chest x-ray 5/18/2024, two-view chest x-ray 5/14/2024    Technique: Axial CT images were obtained of the chest without contrast administration.  Sagittal and coronal reconstructions were performed.  Automated exposure control and iterative reconstruction methods were used.    Findings:  There are postoperative changes from recent median sternotomy and CABG. Heart size is enlarged, and there are coronary artery calcifications. There is no pericardial effusion. There are small bilateral pleural " effusions. No pneumothorax. There is partial   atelectasis of both lower lobes and near complete atelectasis of the right middle lobe. Lungs are otherwise clear. No lymphadenopathy is seen in the chest. Cholelithiasis is included in the upper abdomen. No acute osseous abnormality is identified.      Impression:      Impression:  1.Small bilateral pleural effusions.  2.Near complete right middle lobe atelectasis. Partial bilateral lower lobe atelectasis.  3.Expected postoperative changes from recent CABG.      Electronically Signed: Lisa Lan    5/20/2024 4:29 PM EDT    Workstation ID: [id number]            Results from last 7 days   Lab Units 05/18/24  0321   CK TOTAL U/L 161       EKG          I personally viewed and interpreted the patient's EKG/Telemetry data:        ECHOCARDIOGRAM:     Results for orders placed in visit on 05/15/24    Intra-Op Anesthesia DIGNA    Narrative  Intra-Op Anesthesia DIGNA    Procedure Performed: Intra-Op Anesthesia DIGNA  Start Time:  End Time:    Preanesthesia Checklist:  Patient identified, IV assessed, risks and benefits discussed, monitors and equipment assessed, procedure being performed at surgeon's request and anesthesia consent obtained.    General Procedure Information  DIGNA Placed for monitoring purposes only -- This is not a diagnostic DIGNA  Diagnostic Indications for Echo:  assessment of surgical repair and hemodynamic monitoring  Physician Requesting Echo: Jean Hirsch MD  Location performed:  OR  Intubated  Bite block placed  Heart visualized  Probe Insertion:  Easy  Probe Type:  Multiplane  Modalities:  Color flow mapping and continuous wave Doppler    Echocardiographic and Doppler Measurements    Ventricles    Right Ventricle:  Cavity size normal.  Global function normal.  Left Ventricle:  Cavity size normal.  Global Function normal.        Valves    Aortic Valve:  Annulus normal.  Stenosis not present.  Regurgitation trace.  Leaflets normal.  Leaflet motions  normal.    Mitral Valve:  Annulus normal.  Stenosis not present.  Regurgitation trace.  Leaflets normal.  Leaflet motions normal.    Tricuspid Valve:  Annulus normal.  Stenosis not present.  Regurgitation absent.  Leaflets normal.  Leaflet motions normal.  Pulmonic Valve:  Annulus normal.  Stenosis not present.  Regurgitation absent.      Aorta    Ascending Aorta:  Size normal.  Dissection not present.  Mobile plaque not present.  Aortic Arch:  Size normal.  Dissection not present.  Mobile plaque not present.  Descending Aorta:  Size normal.  Dissection not present.  Mobile plaque not present.      Atria    Right Atrium:  Size normal.  Spontaneous echo contrast not present.  Thrombus not present.  Tumor not present.  Device not present.    Left Atrium:  Size normal.  Spontaneous echo contrast not present.  Thrombus not present.  Tumor not present.  Device not present.  Left atrial appendage normal.      Septa        Ventricular Septum:  Intra-ventricular septum morphology normal.        Other Findings  Pericardium:  normal  Pleural Effusion:  none  Pulmonary Venous Flow:  normal    Anesthesia Information  Performed Personally  Anesthesiologist:  Kvng Pearson MD      Echocardiogram Comments:  Post bypass EF 55%. No change in valves. No air seen.        STRESS MYOVIEW:      CARDIAC CATHETERIZATION:      OTHER:         Assessment & Plan            ASCAD        ASSESSMENT:    MV CAD  S/p CABG  Off pressors  EF 50-55 by last echo    HTN  Will follow postoperatively    Dyslipidemia  Resume statin when taking po    CKD/EN  BUN/Cr 52/2.96    PLAN:    Continue aggressive postoperative supportive care  Receiving IV NAHCO3  Diuretics managed per nephrology      Additional recommendations per Dr. Lorenzo     Patient is seen and examined and findings are verified.  All data is reviewed by me personally.  Assessment and plan formulated by APC was done after discussion with attending.  I spent more than 50% of time in  taking care of the patient.    MDM:    1.  CAD/CABG:    Patient is complaining of sternal pain.  Otherwise patient is stable.  He is off all inotropic agents.  Continue supportive care    2.  Bilateral leg edema:    Patient is still have significant volume overload continue diuresis.  Patient may need increase in Bumex.  I will discuss with nephrology    3.  Sinus tachycardia:    Continue beta-blocker.    4.  Hypertension:    Patient is on beta-blocker we will monitor    5.  Hyperlipidemia:    Patient is on statin therapy.  Repeat lipid panel testing in future    6.  CKD:    Patient creatinine is 2.9.    Electronically signed by Santiago Lorenzo MD, 05/20/24, 6:29 PM EDT.      I discussed the patient's findings and my recommendations with patient and RN                  Electronically signed by JAX العراقي, 05/15/24, 2:17 PM EDT.          Santiago Lorenzo MD  05/20/24  18:29 EDT

## 2024-05-17 NOTE — CASE MANAGEMENT/SOCIAL WORK
Continued Stay Note  ERMIAS Valentine     Patient Name: Fransico Cuenca  MRN: [id number]  Today's Date: 5/17/2024    Admit Date: 5/15/2024  Plan: DC plan: Return home w/ friend. CABG 5/15/2024.   Discharge Plan       Row Name 05/17/24 1336       Plan    Plan DC plan: Return home w/ friend. CABG 5/15/2024.    Plan Comments DC barriers: IV bumex, Chest tube, HF NC 4L, IV Vasopressin, with cardiac monitoring. CABG post-op day 3.             Expected Discharge Date and Time       Expected Discharge Date Expected Discharge Time    May 20, 2024        Laurie Hanson RN      Office Phone [phone]

## 2024-05-17 NOTE — CONSULTS
Spoke with patient and friend re phase 2 cardiac rehab. Will probably go to [facility]

## 2024-05-17 NOTE — DISCHARGE PLACEMENT REQUEST
"Pb Douglass \"Mike\" (69 y.o. Male)       Date of Birth   1955    Social Security Number       Address   [address] [place] [address]    Home Phone   [phone]    MRN   [id number]       Confucianist   Jehov[employer] witness    Marital Status   Single                            Admission Date   5/15/24    Admission Type   Elective    Admitting Provider   Jean Hirsch MD    Attending Provider   Jean Hirsch MD    Department, Room/Bed   [facility] CARDIOVASCULAR CARE UNIT, 2201/1       Discharge Date       Discharge Disposition       Discharge Destination                                 Attending Provider: Jean Hirsch MD    Allergies: No Known Allergies    Isolation: None   Infection: None   Code Status: CPR    Ht: 167.6 cm (65.98\")   Wt: 98.7 kg (217 lb 9.5 oz)    Admission Cmt: None   Principal Problem: ASCAD [I25.10]                   Active Insurance as of 5/15/2024       Primary Coverage       Payor Plan Insurance Group Employer/Plan Group    MEDICARE MEDICARE A & B        Payor Plan Address Payor Plan Phone Number Payor Plan Fax Number Effective Dates    [address] [phone]  3/1/2019 - None Entered    [facility] [id number]         Subscriber Name Subscriber Birth Date Member ID       PB DOUGLASS 1955 [id number]               Secondary Coverage       Payor Plan Insurance Group Employer/Plan Group    [employer] [facility] SUPP [id number]       Payor Plan Address Payor Plan Phone Number Payor Plan Fax Number Effective Dates    [address]   3/1/2019 - None Entered    [facility] [id number]         Subscriber Name Subscriber Birth Date Member ID       PB DOUGLASS 1955 [id number]                     Emergency Contacts        (Rel.) Home Phone Work Phone Mobile Phone    BHARTI OWENS (Friend) [phone] -- [phone]    jacobo daley (Friend) -- -- [phone]    ebenezer veloz (Friend) -- -- [phone]                "

## 2024-05-17 NOTE — CONSULTS
NEPHROLOGY CONSULTATION-----[facility]    [facility]  [phone]  Bernadine Childress MD    Patient Care Team:  Camryn Salinas MD as PCP - General (Family Medicine)  Marco A Childress MD as Consulting Physician (Nephrology)    CC/REASON FOR CONSULTATION: RENAL FAILURE/OLIGURIA    PHYSICIAN REQUESTING CONSULTATION:     History of Present Illness    HPI    Patient is a 69 y.o. WM whom I was asked to see in consultation for evaluation and management of renal failure/elevated serum creatinine. Patient was admitted post CABG.  Patient with known CRF/CKD STG 3. No NSAIDs or recent IV dye exposure. No known h/o hepatitis, TB, rheumatic fever, jaundice, SLE, bleeding/bruising disorders.  No urinary sx outside of decreased urine output. +LE edema. +Compliance with home meds. Was on diuretics in the form of Bumex  prior to admission. Was on ACE-I/ARB in the form of Lisinopril prior to admission. No herbal med use.    Review of Systems   Constitutional:  Positive for activity change, appetite change and fatigue. Negative for chills, diaphoresis, fever and unexpected weight change.   HENT:  Negative for congestion, dental problem, drooling, ear discharge, ear pain, facial swelling, hearing loss, mouth sores, nosebleeds, postnasal drip, rhinorrhea, sinus pressure, sinus pain, sneezing, sore throat, tinnitus, trouble swallowing and voice change.    Eyes:  Negative for photophobia, pain, discharge, redness, itching and visual disturbance.   Respiratory:  Negative for apnea, cough, choking, chest tightness, shortness of breath, wheezing and stridor.    Cardiovascular:  Positive for leg swelling. Negative for chest pain and palpitations.   Gastrointestinal:  Negative for abdominal distention, abdominal pain, anal bleeding, blood in stool, constipation, diarrhea, nausea, rectal pain and vomiting.   Endocrine: Negative for cold intolerance, heat  intolerance, polydipsia, polyphagia and polyuria.   Genitourinary:  Positive for decreased urine volume. Negative for difficulty urinating, dysuria, enuresis, flank pain, frequency, genital sores, hematuria and urgency.   Musculoskeletal:  Positive for arthralgias and back pain. Negative for gait problem, joint swelling, myalgias, neck pain and neck stiffness.   Skin:  Negative for color change, pallor, rash and wound.   Allergic/Immunologic: Negative for environmental allergies, food allergies and immunocompromised state.   Neurological:  Positive for weakness. Negative for dizziness, tremors, seizures, syncope, facial asymmetry, speech difficulty, light-headedness, numbness and headaches.   Hematological:  Negative for adenopathy. Does not bruise/bleed easily.   Psychiatric/Behavioral:  Negative for agitation, behavioral problems, confusion, decreased concentration, dysphoric mood, hallucinations, self-injury, sleep disturbance and suicidal ideas. The patient is not nervous/anxious and is not hyperactive.           Past Medical History:   Diagnosis Date    Anxiety     Essential hypertension, benign     Gastroesophageal reflux disease without esophagitis     Kidney stones 05/31/2020    Dr. Kahlil Ba    Mixed hyperlipidemia     Rotator cuff syndrome     Torn rotator about seven years ago    Seasonal allergic rhinitis due to pollen     Type 2 diabetes mellitus without complication, without long-term current use of insulin        Past Surgical History:   Procedure Laterality Date    CARDIAC CATHETERIZATION N/A 4/23/2024    Procedure: Left Heart Cath;  Surgeon: Santiago Lorenzo MD;  Location: [facility] INVASIVE LOCATION;  Service: Cardiovascular;  Laterality: N/A;    CATARACT EXTRACTION Left 08/24/2023    Dr Melendez    CATARACT EXTRACTION Right 09/07/2023    Dr Melendez    CYSTOSCOPY W/ LASER LITHOTRIPSY  01/2021    INGUINAL HERNIA REPAIR Left 10/2009    NOSE SURGERY      x2    UMBILICAL HERNIA REPAIR  10/2009      Day       Family History   Problem Relation Age of Onset    Heart disease Mother     Uterine cancer Mother     Thyroid disease Mother     Cancer Mother         Uterus?    Heart disease Father     Stroke Father     COPD Brother     Diabetes Brother     Breast cancer Maternal Aunt     Breast cancer Maternal Grandmother     Diabetes Brother        Social History     Tobacco Use    Smoking status: Never     Passive exposure: Never    Smokeless tobacco: Never    Tobacco comments:     Family members smoked   Vaping Use    Vaping status: Never Used   Substance Use Topics    Alcohol use: Never    Drug use: Never       Home Meds:   Medications Prior to Admission   Medication Sig Dispense Refill Last Dose    aspirin (aspirin) 81 MG EC tablet Take 1 tablet by mouth Daily.       atorvastatin (LIPITOR) 10 MG tablet Take 1 tablet by mouth once daily 90 tablet 3     bumetanide (BUMEX) 1 MG tablet Take 2 tablets by mouth Every Morning. Patient also takes 1 mg every evening       bumetanide (BUMEX) 1 MG tablet Take 1 tablet by mouth Every Evening. Patient also takes 2 mg every morning       carvedilol (COREG) 12.5 MG tablet Take 1 tablet by mouth 2 (Two) Times a Day With Meals. 60 tablet 3     citalopram (CeleXA) 20 MG tablet Take 1 tablet by mouth once daily 90 tablet 3     doxazosin (CARDURA) 1 MG tablet Take 1 tablet by mouth once daily 90 tablet 3     fenofibrate 160 MG tablet Take 1 tablet by mouth once daily 90 tablet 0     hydrALAZINE (APRESOLINE) 50 MG tablet Take 1 tablet by mouth Every 12 (Twelve) Hours. 60 tablet 3     lisinopril (PRINIVIL,ZESTRIL) 10 MG tablet Take 1 tablet by mouth Daily. 90 tablet 0     magnesium oxide (MAG-OX) 400 MG tablet Take 1 tablet by mouth Daily.       metFORMIN (GLUCOPHAGE) 1000 MG tablet TAKE 1 TABLET BY MOUTH TWICE DAILY WITH MEALS 60 tablet 12     montelukast (SINGULAIR) 10 MG tablet Take 1 tablet by mouth Daily. 90 tablet 3     Multiple Vitamins-Minerals (MULTIVITAMIN ADULT PO) Take 1  tablet by mouth Daily.       mupirocin (BACTROBAN) 2 % ointment Apply to nares (inside each nostril) twice daily as directed for procedure. 22 g 0     omeprazole (priLOSEC) 40 MG capsule Take 1 capsule by mouth Daily. 30 capsule 12     potassium chloride (KLOR-CON M20) 20 MEQ CR tablet Take 1 tablet by mouth Daily. 90 tablet 2        Scheduled Meds:  aspirin, 81 mg, Oral, Daily  atorvastatin, 40 mg, Oral, Nightly  bumetanide, 1 mg, Intravenous, BID  chlorhexidine, 15 mL, Mouth/Throat, Q12H  citalopram, 20 mg, Oral, Daily  enoxaparin, 40 mg, Subcutaneous, Q24H  gabapentin, 100 mg, Oral, BID  montelukast, 10 mg, Oral, Daily  mupirocin, , Each Nare, BID  pantoprazole, 40 mg, Oral, Daily  polyethylene glycol, 17 g, Oral, BID  potassium chloride, 20 mEq, Oral, BID With Meals  senna-docusate sodium, 2 tablet, Oral, BID        Continuous Infusions:  insulin, 0-100 Units/hr, Last Rate: Stopped (05/17/24 0038)  sodium chloride, 30 mL/hr, Last Rate: 30 mL/hr (05/16/24 0919)  vasopressin, 0.03 Units/min, Last Rate: 0.03 Units/min (05/16/24 1351)        PRN Meds:    acetaminophen **OR** acetaminophen **OR** acetaminophen    Calcium Replacement - Follow Nurse / BPA Driven Protocol    cyclobenzaprine    dextrose    dextrose    glucagon (human recombinant)    HYDROcodone-acetaminophen    Magnesium Cardiology Dose Replacement - Follow Nurse / BPA Driven Protocol    [DISCONTINUED] Morphine **AND** naloxone    nitroglycerin    ondansetron    Phosphorus Replacement - Follow Nurse / BPA Driven Protocol    Potassium Replacement - Follow Nurse / BPA Driven Protocol    traMADol    vasopressin    Allergies:  Patient has no known allergies.    OBJECTIVE    Vital Signs  Temp:  [97.3 °F (36.3 °C)-98.6 °F (37 °C)] 97.7 °F (36.5 °C)  Heart Rate:  [70-88] 70  Resp:  [15-24] 22  BP: ()/() 130/74    No intake/output data recorded.  I/O last 3 completed shifts:  In: 6986.5 [P.O.:2220; I.V.:4294; Blood:472.5]  Out: 1755 [Urine:1215;  Chest Tube:540]    Physical Exam: CVP=20  General Appearance: alert, appears stated age and cooperative  Head: normocephalic, without obvious abnormality and atraumatic  Eyes: conjunctivae and sclerae normal and no icterus  Neck: supple and +JVD  Lungs: DECREASED BS BIBASILAR WITH FINE LEFT CRACKLES  Heart: regular rhythm & normal rate and normal S1, S2 +MICA  Chest Wall: no abnormalities observed  Abdomen: normal bowel sounds and +HYPOACTIVE BS WITH MILD DISTENSION. +BAHENA  Extremities: moves extremities well, TRACE/1+ BILAT LE EDEMA, no cyanosis  Skin: no bleeding, bruising or rash  Neurologic: Alert, and oriented. No focal deficits    Results Review:    I reviewed the patient's new clinical results.    WBC WBC   Date Value Ref Range Status   05/17/2024 8.46 3.40 - 10.80 10*3/mm3 Final   05/16/2024 7.65 3.40 - 10.80 10*3/mm3 Final   05/15/2024 8.77 3.40 - 10.80 10*3/mm3 Final   05/14/2024 5.52 3.40 - 10.80 10*3/mm3 Final      HGB Hemoglobin   Date Value Ref Range Status   05/17/2024 8.1 (L) 13.0 - 17.7 g/dL Final   05/16/2024 8.7 (L) 13.0 - 17.7 g/dL Final   05/16/2024 7.8 (L) 13.0 - 17.7 g/dL Final   05/15/2024 8.3 (L) 12.0 - 17.0 g/dL Final   05/15/2024 7.9 (C) 12.0 - 17.0 g/dL Final   05/15/2024 8.2 (L) 12.0 - 17.0 g/dL Final   05/15/2024 8.2 (L) 12.0 - 17.0 g/dL Final   05/15/2024 8.6 (L) 12.0 - 17.0 g/dL Final   05/15/2024 8.4 (L) 13.0 - 17.7 g/dL Final   05/15/2024 8.2 (L) 12.0 - 17.0 g/dL Final   05/15/2024 7.8 (C) 12.0 - 17.0 g/dL Final   05/15/2024 8.5 (L) 12.0 - 17.0 g/dL Final   05/15/2024 7.1 (C) 12.0 - 17.0 g/dL Final   05/15/2024 8.2 (L) 12.0 - 17.0 g/dL Final   05/14/2024 9.8 (L) 13.0 - 17.7 g/dL Final      HCT Hematocrit   Date Value Ref Range Status   05/17/2024 26.6 (L) 37.5 - 51.0 % Final   05/16/2024 29.3 (L) 37.5 - 51.0 % Final   05/16/2024 26.1 (L) 37.5 - 51.0 % Final   05/15/2024 24 (L) 38 - 51 % Final   05/15/2024 23 (L) 38 - 51 % Final   05/15/2024 24 (L) 38 - 51 % Final   05/15/2024 24 (L)  "38 - 51 % Final   05/15/2024 25 (L) 38 - 51 % Final   05/15/2024 27.2 (L) 37.5 - 51.0 % Final   05/15/2024 24 (L) 38 - 51 % Final   05/15/2024 23 (L) 38 - 51 % Final   05/15/2024 25 (L) 38 - 51 % Final   05/15/2024 21 (L) 38 - 51 % Final   05/15/2024 24 (L) 38 - 51 % Final   05/14/2024 32.1 (L) 37.5 - 51.0 % Final      Platelets No results found for: \"LABPLAT\"   MCV MCV   Date Value Ref Range Status   05/17/2024 86.9 79.0 - 97.0 fL Final   05/16/2024 87.3 79.0 - 97.0 fL Final   05/15/2024 84.5 79.0 - 97.0 fL Final   05/14/2024 85.1 79.0 - 97.0 fL Final          Sodium Sodium   Date Value Ref Range Status   05/17/2024 142 136 - 145 mmol/L Final   05/16/2024 143 136 - 145 mmol/L Final   05/15/2024 139 136 - 145 mmol/L Final   05/14/2024 139 136 - 145 mmol/L Final      Potassium Potassium   Date Value Ref Range Status   05/17/2024 4.4 3.5 - 5.2 mmol/L Final   05/16/2024 4.4 3.5 - 5.2 mmol/L Final   05/15/2024 4.2 3.5 - 5.2 mmol/L Final   05/14/2024 4.4 3.5 - 5.2 mmol/L Final      Chloride Chloride   Date Value Ref Range Status   05/17/2024 105 98 - 107 mmol/L Final   05/16/2024 107 98 - 107 mmol/L Final   05/15/2024 104 98 - 107 mmol/L Final   05/14/2024 100 98 - 107 mmol/L Final      CO2 CO2   Date Value Ref Range Status   05/17/2024 20.0 (L) 22.0 - 29.0 mmol/L Final   05/16/2024 24.0 22.0 - 29.0 mmol/L Final   05/15/2024 24.0 22.0 - 29.0 mmol/L Final   05/14/2024 29.0 22.0 - 29.0 mmol/L Final      BUN BUN   Date Value Ref Range Status   05/17/2024 52 (H) 8 - 23 mg/dL Final   05/16/2024 38 (H) 8 - 23 mg/dL Final   05/15/2024 35 (H) 8 - 23 mg/dL Final   05/14/2024 34 (H) 8 - 23 mg/dL Final      Creatinine Creatinine   Date Value Ref Range Status   05/17/2024 2.96 (H) 0.76 - 1.27 mg/dL Final   05/16/2024 1.47 (H) 0.76 - 1.27 mg/dL Final   05/15/2024 1.50 (H) 0.60 - 1.30 mg/dL Final     Comment:     Serial Number: [id number]:  [id number]   05/15/2024 1.47 (H) 0.60 - 1.30 mg/dL Final     Comment:     Serial Number: " "[id number]:  [id number]   05/15/2024 1.53 (H) 0.60 - 1.30 mg/dL Final     Comment:     Serial Number: [id number]:  [id number]   05/15/2024 1.33 (H) 0.60 - 1.30 mg/dL Final     Comment:     Serial Number: [id number]:  [id number]   05/15/2024 1.44 (H) 0.76 - 1.27 mg/dL Final   05/14/2024 1.41 (H) 0.76 - 1.27 mg/dL Final      Calcium Calcium   Date Value Ref Range Status   05/17/2024 8.7 8.6 - 10.5 mg/dL Final   05/16/2024 8.6 8.6 - 10.5 mg/dL Final   05/15/2024 7.9 (L) 8.6 - 10.5 mg/dL Final   05/14/2024 9.4 8.6 - 10.5 mg/dL Final      PO4 No results found for: \"CAPO4\"   Albumin Albumin   Date Value Ref Range Status   05/16/2024 3.8 3.5 - 5.2 g/dL Final   05/15/2024 3.4 (L) 3.5 - 5.2 g/dL Final   05/14/2024 4.2 3.5 - 5.2 g/dL Final      Magnesium Magnesium   Date Value Ref Range Status   05/16/2024 2.6 (H) 1.6 - 2.4 mg/dL Final      Uric Acid No results found for: \"URICACID\"       Imaging Results (Last 72 Hours)       Procedure Component Value Units Date/Time    XR Chest 1 View [[id number]] Resulted: 05/17/24 0428     Updated: 05/17/24 0428    XR Abdomen KUB [[id number]] Collected: 05/16/24 1820     Updated: 05/16/24 1825    Narrative:      XR ABDOMEN KUB    Date of Exam: 5/16/2024 6:00 PM EDT    Indication: Abd distension, increased pain    Comparison: 2020 CT    Findings:  Gas-filled colon. No evidence of small bowel obstruction. No radiodense renal calculi noted. No acute osseous abnormality.      Impression:      Impression:  Gas-distended colon. No evidence of bowel obstruction.      Electronically Signed: Braden Bartlett MD    5/16/2024 6:23 PM EDT    Workstation ID: [id number]    XR Chest 1 View [[id number]] Collected: 05/16/24 0759     Updated: 05/16/24 0810    Narrative:      XR CHEST 1 VW    Date of Exam: 5/16/2024 5:49 AM EDT    Indication: Post-Op Heart Surgery    Comparison: 5/15/2024    Findings:  Interval removal of endotracheal and NG tubes. Stable cardiomegaly and pulmonary congestion. No change in position of a " right intrajugular Beaufort-Naveen catheter with the tip in the right main pulmonary artery level stable surgical changes post CABG. There   is mild interval improved aeration to the left lung base with persistent atelectasis and a small effusion. There is also mild increased opacity at the right lung base.      Impression:      Impression:  Interval removal of endotracheal and NG tubes    Mild improved aeration to the left lung base. Otherwise grossly stable bibasilar airspace disease      Electronically Signed: Patrick Robbins MD    5/16/2024 8:08 AM EDT    Workstation ID: [id number]    XR Chest 1 View [[id number]] Collected: 05/15/24 1114     Updated: 05/15/24 1119    Narrative:      DATE OF EXAM:  5/15/2024 10:54 AM     PROCEDURE:  XR CHEST 1 VW-     INDICATIONS:  Post-Op Check Line & Tube Placement; I25.10-Atherosclerotic heart  disease of native coronary artery without angina pectoris     COMPARISON:  May 14, 2024     TECHNIQUE:   Single radiographic view of the chest was obtained.     FINDINGS:  Endotracheal tube tip is approximately 5 cm above the claire. There is a  right IJ Beaufort-Naveen catheter with tip projecting in the area of the right  pulmonary artery. Enteric tube extends into the left upper quadrant, tip  beyond the margins of this exam. There has been interval median  sternotomy and CABG. There appears to be mediastinal drains and a left  basilar chest tube. No definite pneumothorax or significant pleural  effusion is seen. There are mild bibasilar opacities, left greater than  right. Heart size and mediastinal contour appear as expected.       Impression:      1.     Interval median sternotomy and CABG.  2.     Tubes and lines appear in satisfactory positions, as detailed  above.  3.     Mild bibasilar opacities which may represent atelectasis or  edema.     Electronically Signed By-Romaine Szymanski MD On:5/15/2024 11:16 AM                 Results for orders placed during the hospital encounter of  05/15/24    XR Abdomen KUB    Narrative  XR ABDOMEN KUB    Date of Exam: 5/16/2024 6:00 PM EDT    Indication: Abd distension, increased pain    Comparison: 2020 CT    Findings:  Gas-filled colon. No evidence of small bowel obstruction. No radiodense renal calculi noted. No acute osseous abnormality.    Impression  Impression:  Gas-distended colon. No evidence of bowel obstruction.      Electronically Signed: Braden Bartlett MD  5/16/2024 6:23 PM EDT  Workstation ID: [id number]      XR Chest 1 View    Narrative  XR CHEST 1 VW    Date of Exam: 5/16/2024 5:49 AM EDT    Indication: Post-Op Heart Surgery    Comparison: 5/15/2024    Findings:  Interval removal of endotracheal and NG tubes. Stable cardiomegaly and pulmonary congestion. No change in position of a right intrajugular Makanda-Naveen catheter with the tip in the right main pulmonary artery level stable surgical changes post CABG. There  is mild interval improved aeration to the left lung base with persistent atelectasis and a small effusion. There is also mild increased opacity at the right lung base.    Impression  Impression:  Interval removal of endotracheal and NG tubes    Mild improved aeration to the left lung base. Otherwise grossly stable bibasilar airspace disease      Electronically Signed: Patrick Robbins MD  5/16/2024 8:08 AM EDT  Workstation ID: [id number]      XR Chest 1 View    Narrative  DATE OF EXAM:  5/15/2024 10:54 AM    PROCEDURE:  XR CHEST 1 VW-    INDICATIONS:  Post-Op Check Line & Tube Placement; I25.10-Atherosclerotic heart  disease of native coronary artery without angina pectoris    COMPARISON:  May 14, 2024    TECHNIQUE:  Single radiographic view of the chest was obtained.    FINDINGS:  Endotracheal tube tip is approximately 5 cm above the claire. There is a  right IJ Makanda-Naveen catheter with tip projecting in the area of the right  pulmonary artery. Enteric tube extends into the left upper quadrant, tip  beyond the margins of this exam. There has  been interval median  sternotomy and CABG. There appears to be mediastinal drains and a left  basilar chest tube. No definite pneumothorax or significant pleural  effusion is seen. There are mild bibasilar opacities, left greater than  right. Heart size and mediastinal contour appear as expected.    Impression  1.     Interval median sternotomy and CABG.  2.     Tubes and lines appear in satisfactory positions, as detailed  above.  3.     Mild bibasilar opacities which may represent atelectasis or  edema.    Electronically Signed By-Romaine Szymanski MD On:5/15/2024 11:16 AM        Results for orders placed during the hospital encounter of 04/18/24    Duplex Carotid Ultrasound CAR    Interpretation Summary    Right internal carotid artery demonstrates a less than 50% stenosis.    Normal left carotid duplex scan.      ASSESSMENT / PLAN      ASCAD      RENAL FAILURE-------Nonoliguric overnight with diuretics. +ARF/EN on top  Of known CRF/CKD STG 3A with a baseline serum creatinine of about 1.4-1.5. CRF/CKD STG 3A secondary to DGS/HTN NS. +ARF/EN secondary to ATN from hypotension and renal perfusion disturbance from CP bypass with preadmission ACE-I use. No NSAIDs or IV dye. Check urine and serum studies and renal US to assess for other contributing factors to ARF/EN. Diurese. Support BP and hemodynamics. Off of ACE-I. Dose meds for CrCl less than 10 cc/min until ARF/EN is resolved     2. HTN WITH CKD-----BP better. Off of Vasopressin now. Keep off of Lisinopril. No ACE-I/ARB/DRI for now. Keep MAP>65     3. DMII WITH RENAL MANIFESTATIONS------Off Metformin for now. BS ok. Glucometers, SSI and Insulin gtt post op per protocol     4. HYPERLIPIDEMIA------Hold Statin until tomorrow. Follow CK. TSH normal     5. OA/DJD/HYPERURICEMIA------On Allopurinol preadmission. Check uric acid levels No NSAIDs     6. DEPRESSION-----Celexa     7. COPD-----Respiratory status stable. No active wheezing     8. GERD/PUD PROPHYLAXIS-----PPI.  Benefits outweigh risks despite renal dysfunction     9. BPH------On Hytrin preadmission. Currently with lópez    10. ANEMIA------IV iron for MOSHE. Check SPEP    11. ACIDOSIS------Metabolic. No elevation in AGAP. +Type 4 RTA. Give IV NaHCO3 and follow.      I discussed the patient's findings and my recommendations with patient and nursing staff    Will follow along closely. Thank you for allowing us to see this patient in renal consultation.    [facility]  [phone]  MD Bernadine Aguilar MD  05/17/24  07:31 EDT

## 2024-05-17 NOTE — PROGRESS NOTES
S/P POD# 2 elective CABG x4 with ELIZABETH--Joycelyn  EF 55% (cath)    Subjective:  reports surgical pain is improved    Off vaso, creatinine up to 2.96, renal following  Drips:  insulin  CVP 20  Wt is up 3 kgs from preop  CXR:  atel, vasc congestion, left effusion  KUB:  gaseous colon      Intake/Output Summary (Last 24 hours) at 5/17/2024 0837  Last data filed at 5/17/2024 0600  Gross per 24 hour   Intake 4867.5 ml   Output 777 ml   Net 4090.5 ml     Temp:  [97.3 °F (36.3 °C)-98.6 °F (37 °C)] 98.1 °F (36.7 °C)  Heart Rate:  [70-88] 71  Resp:  [15-24] 15  BP: ()/() 131/76  MCT 30/8  LPCT 20/8    Results from last 7 days   Lab Units 05/17/24  0306 05/16/24  1924 05/16/24  0218 05/15/24  1136 05/15/24  1054 05/15/24  0739 05/14/24  0755   WBC 10*3/mm3 8.46  --  7.65  --  8.77  --  5.52   HEMOGLOBIN g/dL 8.1* 8.7* 7.8*  --  8.4*  --  9.8*   HEMOGLOBIN, POC   --   --   --    < >  --    < >  --    HEMATOCRIT % 26.6* 29.3* 26.1*  --  27.2*  --  32.1*   HEMATOCRIT POC   --   --   --    < >  --    < >  --    PLATELETS 10*3/mm3 152  --  172  --  167  --  276   INR   --   --  1.21*  --  1.20*  --  1.07    < > = values in this interval not displayed.     Results from last 7 days   Lab Units 05/17/24  0306 05/16/24  0218   CREATININE mg/dL 2.96* 1.47*   POTASSIUM mmol/L 4.4 4.4   SODIUM mmol/L 142 143   MAGNESIUM mg/dL  --  2.6*   PHOSPHORUS mg/dL  --  4.3       Physical Exam:  Neuro intact, nad, up in recliner, no family seen  Tele:  SR 60-70s  Diminished bases, 93% 4L  dsg c/d/i, no drainage  Taunt abd, distended, + flatus, no BM, no n/v  + generalized edema    Assessment/Plan:  Principal Problem:    ASCAD    - Severe MV CAD, EF 55% (cath)--s/p elective CABG x4 with LIMA ([place])  - NSTEMI presentation--April 2024  - HTN--pressor postop  - HLD--statin  - Postop EN/ CKD stage 3--Dr. Childress following  - Anxiety  - ZHANG with CPAP compliance  - Recent hx human meta pneumovirus infection--resolved  - Chronic HFpEF, NYHA  class II-III  - Postop ABLA, expected--transfused 5/16    POD# 2.  Off pressor.  Creatinine elevated to 2.96, renal following, continue diuretics.  On asa/statin, holding bb to allow higher BP for kidney perfusion.  DC chest tubes, herrera.  Keep central line and lópez per Dr. Hirsch.  Mobilize, aggressive pulm toileting.  Pain control improved.  Gas-X added for gas, suppository prn to help expel gas.    Routine care--as above  De-escal  D/w pt/nsg, Dr. Hirsch  Discharge plan TBD--he has a friend to help at discharge    Suzy Griffith, APRN  5/17/2024  08:37 EDT

## 2024-05-18 ENCOUNTER — APPOINTMENT (OUTPATIENT)
Dept: GENERAL RADIOLOGY | Facility: HOSPITAL | Age: 69
DRG: 235 | End: 2024-05-18
Payer: MEDICARE

## 2024-05-18 LAB
ALBUMIN SERPL-MCNC: 3.4 G/DL (ref 3.5–5.2)
ALBUMIN/GLOB SERPL: 1.4 G/DL
ALP SERPL-CCNC: 68 U/L (ref 39–117)
ALT SERPL W P-5'-P-CCNC: <5 U/L (ref 1–41)
ANION GAP SERPL CALCULATED.3IONS-SCNC: 13 MMOL/L (ref 5–15)
AST SERPL-CCNC: 15 U/L (ref 1–40)
BILIRUB SERPL-MCNC: 0.3 MG/DL (ref 0–1.2)
BUN SERPL-MCNC: 65 MG/DL (ref 8–23)
BUN/CREAT SERPL: 22.7 (ref 7–25)
CA-I SERPL ISE-MCNC: 1.21 MMOL/L (ref 1.2–1.3)
CALCIUM SPEC-SCNC: 8.7 MG/DL (ref 8.6–10.5)
CHLORIDE SERPL-SCNC: 103 MMOL/L (ref 98–107)
CK SERPL-CCNC: 161 U/L (ref 20–200)
CO2 SERPL-SCNC: 26 MMOL/L (ref 22–29)
CREAT SERPL-MCNC: 2.86 MG/DL (ref 0.76–1.27)
D-LACTATE SERPL-SCNC: 0.7 MMOL/L (ref 0.5–2)
DEPRECATED RDW RBC AUTO: 49.6 FL (ref 37–54)
EGFRCR SERPLBLD CKD-EPI 2021: 22.9 ML/MIN/1.73
ERYTHROCYTE [DISTWIDTH] IN BLOOD BY AUTOMATED COUNT: 16.1 % (ref 12.3–15.4)
GLOBULIN UR ELPH-MCNC: 2.5 GM/DL
GLUCOSE BLDC GLUCOMTR-MCNC: 107 MG/DL (ref 70–105)
GLUCOSE BLDC GLUCOMTR-MCNC: 123 MG/DL (ref 70–105)
GLUCOSE BLDC GLUCOMTR-MCNC: 81 MG/DL (ref 70–105)
GLUCOSE BLDC GLUCOMTR-MCNC: 81 MG/DL (ref 70–105)
GLUCOSE SERPL-MCNC: 82 MG/DL (ref 65–99)
HCT VFR BLD AUTO: 26.2 % (ref 37.5–51)
HGB BLD-MCNC: 8.1 G/DL (ref 13–17.7)
MAGNESIUM SERPL-MCNC: 2.8 MG/DL (ref 1.6–2.4)
MCH RBC QN AUTO: 26.1 PG (ref 26.6–33)
MCHC RBC AUTO-ENTMCNC: 30.9 G/DL (ref 31.5–35.7)
MCV RBC AUTO: 84.5 FL (ref 79–97)
PHOSPHATE SERPL-MCNC: 5.4 MG/DL (ref 2.5–4.5)
PLATELET # BLD AUTO: 182 10*3/MM3 (ref 140–450)
PMV BLD AUTO: 10.7 FL (ref 6–12)
POTASSIUM SERPL-SCNC: 3.9 MMOL/L (ref 3.5–5.2)
PROT SERPL-MCNC: 5.9 G/DL (ref 6–8.5)
RBC # BLD AUTO: 3.1 10*6/MM3 (ref 4.14–5.8)
SODIUM SERPL-SCNC: 142 MMOL/L (ref 136–145)
WBC NRBC COR # BLD AUTO: 7.18 10*3/MM3 (ref 3.4–10.8)

## 2024-05-18 PROCEDURE — 82948 REAGENT STRIP/BLOOD GLUCOSE: CPT | Performed by: NURSE PRACTITIONER

## 2024-05-18 PROCEDURE — 25010000002 ENOXAPARIN PER 10 MG: Performed by: INTERNAL MEDICINE

## 2024-05-18 PROCEDURE — 84100 ASSAY OF PHOSPHORUS: CPT | Performed by: INTERNAL MEDICINE

## 2024-05-18 PROCEDURE — 85027 COMPLETE CBC AUTOMATED: CPT | Performed by: NURSE PRACTITIONER

## 2024-05-18 PROCEDURE — 82550 ASSAY OF CK (CPK): CPT | Performed by: INTERNAL MEDICINE

## 2024-05-18 PROCEDURE — 25010000002 NA FERRIC GLUC CPLX PER 12.5 MG: Performed by: INTERNAL MEDICINE

## 2024-05-18 PROCEDURE — 83735 ASSAY OF MAGNESIUM: CPT | Performed by: INTERNAL MEDICINE

## 2024-05-18 PROCEDURE — 99024 POSTOP FOLLOW-UP VISIT: CPT

## 2024-05-18 PROCEDURE — 94761 N-INVAS EAR/PLS OXIMETRY MLT: CPT

## 2024-05-18 PROCEDURE — 94799 UNLISTED PULMONARY SVC/PX: CPT

## 2024-05-18 PROCEDURE — 71045 X-RAY EXAM CHEST 1 VIEW: CPT

## 2024-05-18 PROCEDURE — 94664 DEMO&/EVAL PT USE INHALER: CPT

## 2024-05-18 PROCEDURE — 83605 ASSAY OF LACTIC ACID: CPT | Performed by: INTERNAL MEDICINE

## 2024-05-18 PROCEDURE — 82948 REAGENT STRIP/BLOOD GLUCOSE: CPT

## 2024-05-18 PROCEDURE — 99232 SBSQ HOSP IP/OBS MODERATE 35: CPT | Performed by: INTERNAL MEDICINE

## 2024-05-18 PROCEDURE — 25010000002 CHLOROTHIAZIDE PER 500 MG: Performed by: INTERNAL MEDICINE

## 2024-05-18 PROCEDURE — 25010000002 BUMETANIDE PER 0.5 MG: Performed by: INTERNAL MEDICINE

## 2024-05-18 PROCEDURE — 82330 ASSAY OF CALCIUM: CPT | Performed by: INTERNAL MEDICINE

## 2024-05-18 PROCEDURE — 80053 COMPREHEN METABOLIC PANEL: CPT | Performed by: INTERNAL MEDICINE

## 2024-05-18 RX ORDER — ALLOPURINOL 100 MG/1
100 TABLET ORAL DAILY
Status: DISCONTINUED | OUTPATIENT
Start: 2024-05-18 | End: 2024-05-23 | Stop reason: HOSPADM

## 2024-05-18 RX ORDER — POTASSIUM CHLORIDE 20 MEQ/1
20 TABLET, EXTENDED RELEASE ORAL
Status: DISCONTINUED | OUTPATIENT
Start: 2024-05-18 | End: 2024-05-23 | Stop reason: HOSPADM

## 2024-05-18 RX ADMIN — TRAMADOL HYDROCHLORIDE 50 MG: 50 TABLET, COATED ORAL at 20:27

## 2024-05-18 RX ADMIN — IPRATROPIUM BROMIDE AND ALBUTEROL SULFATE 3 ML: .5; 3 SOLUTION RESPIRATORY (INHALATION) at 10:54

## 2024-05-18 RX ADMIN — BUMETANIDE 1 MG/HR: 0.25 INJECTION INTRAMUSCULAR; INTRAVENOUS at 19:11

## 2024-05-18 RX ADMIN — CHLORHEXIDINE GLUCONATE, 0.12% ORAL RINSE 15 ML: 1.2 SOLUTION DENTAL at 08:05

## 2024-05-18 RX ADMIN — PANTOPRAZOLE SODIUM 40 MG: 40 TABLET, DELAYED RELEASE ORAL at 08:06

## 2024-05-18 RX ADMIN — ENOXAPARIN SODIUM 30 MG: 100 INJECTION SUBCUTANEOUS at 18:27

## 2024-05-18 RX ADMIN — Medication 12.5 MG: at 20:27

## 2024-05-18 RX ADMIN — POLYETHYLENE GLYCOL 3350 17 G: 17 POWDER, FOR SOLUTION ORAL at 20:27

## 2024-05-18 RX ADMIN — GABAPENTIN 100 MG: 100 CAPSULE ORAL at 20:27

## 2024-05-18 RX ADMIN — ASPIRIN 81 MG: 81 TABLET, COATED ORAL at 08:06

## 2024-05-18 RX ADMIN — CHLORHEXIDINE GLUCONATE, 0.12% ORAL RINSE 15 ML: 1.2 SOLUTION DENTAL at 20:27

## 2024-05-18 RX ADMIN — CITALOPRAM HYDROBROMIDE 20 MG: 20 TABLET ORAL at 08:06

## 2024-05-18 RX ADMIN — SENNOSIDES AND DOCUSATE SODIUM 2 TABLET: 50; 8.6 TABLET ORAL at 20:27

## 2024-05-18 RX ADMIN — GABAPENTIN 100 MG: 100 CAPSULE ORAL at 08:06

## 2024-05-18 RX ADMIN — MUPIROCIN 1 APPLICATION: 20 OINTMENT TOPICAL at 08:05

## 2024-05-18 RX ADMIN — ATORVASTATIN CALCIUM 40 MG: 40 TABLET, FILM COATED ORAL at 20:27

## 2024-05-18 RX ADMIN — POTASSIUM CHLORIDE 20 MEQ: 1500 TABLET, EXTENDED RELEASE ORAL at 08:05

## 2024-05-18 RX ADMIN — IPRATROPIUM BROMIDE AND ALBUTEROL SULFATE 3 ML: .5; 3 SOLUTION RESPIRATORY (INHALATION) at 04:30

## 2024-05-18 RX ADMIN — MUPIROCIN 1 APPLICATION: 20 OINTMENT TOPICAL at 20:27

## 2024-05-18 RX ADMIN — SIMETHICONE 160 MG: 80 TABLET, CHEWABLE ORAL at 08:06

## 2024-05-18 RX ADMIN — Medication 12.5 MG: at 13:51

## 2024-05-18 RX ADMIN — SODIUM BICARBONATE 50 MEQ: 84 INJECTION INTRAVENOUS at 00:44

## 2024-05-18 RX ADMIN — SODIUM CHLORIDE 125 MG: 9 INJECTION, SOLUTION INTRAVENOUS at 09:25

## 2024-05-18 RX ADMIN — HYDROCODONE BITARTRATE AND ACETAMINOPHEN 1 TABLET: 5; 325 TABLET ORAL at 13:52

## 2024-05-18 RX ADMIN — POTASSIUM CHLORIDE 20 MEQ: 1500 TABLET, EXTENDED RELEASE ORAL at 13:52

## 2024-05-18 RX ADMIN — CHLOROTHIAZIDE SODIUM 250 MG: 500 INJECTION, POWDER, LYOPHILIZED, FOR SOLUTION INTRAVENOUS at 09:25

## 2024-05-18 RX ADMIN — ALLOPURINOL 100 MG: 100 TABLET ORAL at 08:05

## 2024-05-18 RX ADMIN — POTASSIUM CHLORIDE 20 MEQ: 1500 TABLET, EXTENDED RELEASE ORAL at 18:27

## 2024-05-18 RX ADMIN — MONTELUKAST 10 MG: 10 TABLET, FILM COATED ORAL at 08:06

## 2024-05-18 NOTE — PLAN OF CARE
Stable oxygen requirements, noted increase in diuretics.  Spoke with robert Shoemaker for critical care to sign off.    Electronically signed by Ayush Sierra MD, 05/18/24, 11:24 AM EDT.

## 2024-05-18 NOTE — PROGRESS NOTES
Cardiology Progress Note      PATIENT IDENTIFICATION    Name: Fransico Cuenca  Age: 69 y.o. Sex: male : 1955  MRN: [id number]    Requesting Provider    Jean Hirsch MD     LOS: 3 days       Reason For Followup:  Coronary artery disease      Subjective:    Interval History:  Seen and examined.  Chart and labs reviewed.  Patient reports some sharp chest discomfort with deep inspiration.  Reports being short of breath today.  Patient currently on Bumex infusion.    Review of Systems:  A complete review of systems was undertaken with the pertinent cardiovascular findings listed in history of present illness and all other systems being negative.     Assessment & Plan    Impressions:  Coronary artery disease status post four-vessel coronary arterial bypass grafting     LIMA-LAD, SVG-DG, SVG-OM1-OM 2  Hyperlipidemia  CKD    Recommendations:  Continue with routine postoperative care  Continue to diurese as renal function allows  Monitor renal function electrolytes  Check chest x-ray  Fluid restriction  Increase activity as tolerated  Monitor rate and rhythm        Objective:    Medication Review:   Scheduled Meds:allopurinol, 100 mg, Oral, Daily  aspirin, 81 mg, Oral, Daily  atorvastatin, 40 mg, Oral, Nightly  chlorhexidine, 15 mL, Mouth/Throat, Q12H  citalopram, 20 mg, Oral, Daily  enoxaparin, 30 mg, Subcutaneous, Q24H  ferric gluconate, 125 mg, Intravenous, Daily  gabapentin, 100 mg, Oral, BID  insulin lispro, 2-9 Units, Subcutaneous, 4x Daily AC & at Bedtime  metoprolol tartrate, 12.5 mg, Oral, Q12H  montelukast, 10 mg, Oral, Daily  mupirocin, , Each Nare, BID  pantoprazole, 40 mg, Oral, Daily  polyethylene glycol, 17 g, Oral, BID  potassium chloride, 20 mEq, Oral, TID With Meals  senna-docusate sodium, 2 tablet, Oral, BID      Continuous Infusions:bumetanide, 1 mg/hr, Last Rate: 1 mg/hr (24 9585)  sodium chloride, 30 mL/hr, Last Rate: Stopped (24 3542)      PRN Meds:.  acetaminophen **OR**  acetaminophen **OR** acetaminophen    bisacodyl    Calcium Replacement - Follow Nurse / BPA Driven Protocol    cyclobenzaprine    dextrose    dextrose    glucagon (human recombinant)    HYDROcodone-acetaminophen    ipratropium-albuterol    Magnesium Cardiology Dose Replacement - Follow Nurse / BPA Driven Protocol    [DISCONTINUED] Morphine **AND** naloxone    nitroglycerin    ondansetron    Phosphorus Replacement - Follow Nurse / BPA Driven Protocol    Potassium Replacement - Follow Nurse / BPA Driven Protocol    simethicone    traMADol      ASCAD         Physical Exam:    General: Alert, cooperative, no distress, appears stated age  Head:  Normocephalic, atraumatic, mucous membranes moist  Eyes:  Conjunctivae/corneas clear, EOMs intact     Neck:  Supple,  no bruit  Lungs:  Coarse and diminished bilaterally  Chest wall: Tender at incision site  Heart::  Regular rate and rhythm, S1 and S2 normal, 1/6 holosystolic murmur.  No rub or gallop  Abdomen: Soft, nontender.  Bowel sounds active.  Abdominal distention noted.  Extremities: No cyanosis, clubbing.  3+ edema  Pulses: Diminished pedal pulses  Skin:  No rashes or lesions  Neuro/psych: A&O x3. CN II through XII are grossly intact with appropriate affect    Vital Signs:  Vitals:    05/18/24 1054 05/18/24 1058 05/18/24 1100 05/18/24 1125   BP:   135/86    BP Location:       Patient Position:       Pulse: 86 83 85    Resp: 18 18     Temp:    97.7 °F (36.5 °C)   TempSrc:    Oral   SpO2: 97% 97% 96%    Weight:       Height:         Wt Readings from Last 1 Encounters:   05/18/24 97.9 kg (215 lb 13.3 oz)       Intake/Output Summary (Last 24 hours) at 5/18/2024 1206  Last data filed at 5/18/2024 1000  Gross per 24 hour   Intake 2363 ml   Output 2195 ml   Net 168 ml         Results Review:     CBC    Results from last 7 days   Lab Units 05/18/24  0321 05/17/24  0306 05/16/24  1924 05/16/24  0218 05/15/24  2256 05/15/24  2108 05/15/24  1905 05/15/24  1136 05/15/24  1054  05/15/24  0739 05/14/24  0755   WBC 10*3/mm3 7.18 8.46  --  7.65  --   --   --   --  8.77  --  5.52   HEMOGLOBIN g/dL 8.1* 8.1* 8.7* 7.8*  --   --   --   --  8.4*  --  9.8*   HEMOGLOBIN, POC g/dL  --   --   --   --  8.3* 7.9* 8.2*   < >  --    < >  --    PLATELETS 10*3/mm3 182 152  --  172  --   --   --   --  167  --  276    < > = values in this interval not displayed.     Cr Clearance Estimated Creatinine Clearance: 26.3 mL/min (A) (by C-G formula based on SCr of 2.86 mg/dL (H)).  Coag   Results from last 7 days   Lab Units 05/16/24  0218 05/15/24  1054 05/14/24  0755   INR  1.21* 1.20* 1.07   APTT seconds  --  21.7* 27.3     HbA1C   Lab Results   Component Value Date    HGBA1C 6.10 (H) 05/14/2024    HGBA1C 6.2 (A) 03/12/2024    HGBA1C 6.8 (A) 12/12/2023     Blood Glucose   Glucose   Date/Time Value Ref Range Status   05/18/2024 1135 123 (H) 70 - 105 mg/dL Final     Comment:     Serial Number: [id number]:  [id number]   05/18/2024 0754 81 70 - 105 mg/dL Final     Comment:     Serial Number: [id number]:  [id number]   05/17/2024 2312 97 70 - 105 mg/dL Final     Comment:     Serial Number: [id number]:  [id number]   05/17/2024 2107 112 (H) 70 - 105 mg/dL Final     Comment:     Serial Number: [id number]:  [id number]   05/17/2024 2007 108 (H) 70 - 105 mg/dL Final     Comment:     Serial Number: [id number]:  [id number]   05/17/2024 1840 122 (H) 70 - 105 mg/dL Final     Comment:     Serial Number: [id number]:  [id number]   05/17/2024 1546 77 70 - 105 mg/dL Final     Comment:     Serial Number: [id number]:  [id number]   05/17/2024 1245 108 (H) 70 - 105 mg/dL Final     Comment:     Serial Number: [id number]:  [id number]     Infection     CMP   Results from last 7 days   Lab Units 05/18/24  0321 05/17/24  0306 05/16/24  0218 05/15/24  2256 05/15/24  2108 05/15/24  1905 05/15/24  1136 05/15/24  1054 05/14/24  0755 05/13/24  1244   SODIUM mmol/L 142 142 143  --   --   --   --  139  "139 141   POTASSIUM mmol/L 3.9 4.4 4.4  --   --   --   --  4.2 4.4 4.4   CHLORIDE mmol/L 103 105 107  --   --   --   --  104 100 104   CO2 mmol/L 26.0 20.0* 24.0  --   --   --   --  24.0 29.0 26.0   BUN mg/dL 65* 52* 38*  --   --   --   --  35* 34* 27*   CREATININE mg/dL 2.86* 2.96* 1.47* 1.50* 1.47* 1.53* 1.33* 1.44* 1.41* 1.27   GLUCOSE mg/dL 82 129* 157*  --   --   --   --  131* 93 88   ALBUMIN g/dL 3.4*  --  3.8  --   --   --   --  3.4* 4.2  --    BILIRUBIN mg/dL 0.3  --   --   --   --   --   --   --  0.2  --    ALK PHOS U/L 68  --   --   --   --   --   --   --  38*  --    AST (SGOT) U/L 15  --   --   --   --   --   --   --  13  --    ALT (SGPT) U/L <5  --   --   --   --   --   --   --  13  --      ABG    Results from last 7 days   Lab Units 05/16/24  0908 05/16/24  0253 05/15/24  2256 05/15/24  2108 05/15/24  1905 05/15/24  1752 05/15/24  1624 05/15/24  1453   PH, ARTERIAL pH units 7.306*  --  7.302* 7.329* 7.315* 7.302* 7.332* 7.448   PCO2, ARTERIAL mm Hg 48.8*  --  48.5* 46.3 44.7 43.4 46.6 35.3   PO2 ART mm Hg 73.6*  --  91.0 72.7* 109.9* 104.4 80.4* 79.7*   O2 SATURATION ART % 92.9*  --  96.0 93.1* 97.8 97.4 94.8 96.3   BASE EXCESS ART mmol/L -2.0* -2.8* -2.4* -1.6* -3.3* -4.7* -1.3* 0.5     UA    Results from last 7 days   Lab Units 05/14/24  0755   NITRITE UA  Negative   WBC UA /HPF 0-2   BACTERIA UA /HPF None Seen   SQUAM EPITHEL UA /HPF None Seen     RYAN  No results found for: \"POCMETH\", \"POCAMPHET\", \"POCBARBITUR\", \"POCBENZO\", \"POCCOCAINE\", \"POCOPIATES\", \"POCOXYCODO\", \"POCPHENCYC\", \"POCPROPOXY\", \"POCTHC\", \"POCTRICYC\"  Lysis Labs   Results from last 7 days   Lab Units 05/18/24  0321 05/17/24  0306 05/16/24  1924 05/16/24  0218 05/15/24  2256 05/15/24  2108 05/15/24  1905 05/15/24  1752 05/15/24  1136 05/15/24  1054 05/15/24  0739 05/14/24  0755   INR   --   --   --  1.21*  --   --   --   --   --  1.20*  --  1.07   APTT seconds  --   --   --   --   --   --   --   --   --  21.7*  --  27.3   HEMOGLOBIN g/dL " 8.1* 8.1* 8.7* 7.8*  --   --   --   --   --  8.4*  --  9.8*   HEMOGLOBIN, POC g/dL  --   --   --   --  8.3* 7.9* 8.2*   < > 8.6*  --    < >  --    PLATELETS 10*3/mm3 182 152  --  172  --   --   --   --   --  167  --  276   CREATININE mg/dL 2.86* 2.96*  --  1.47* 1.50* 1.47* 1.53*  --  1.33* 1.44*  --  1.41*    < > = values in this interval not displayed.     Radiology(recent) XR Chest 1 View    Result Date: 5/17/2024  Impression: Interval removal of left thoracostomy tube. No evidence of pneumothorax. Electronically Signed: Braden Bartlett MD  5/17/2024 5:45 PM EDT  Workstation ID: [id number]    US Renal Bilateral    Result Date: 5/17/2024  Impression: 1.Left renal cysts measuring up to 1.6 cm. 2.No hydronephrosis. Electronically Signed: Lisa Lan  5/17/2024 1:50 PM EDT  Workstation ID: [id number]    XR Chest 1 View    Result Date: 5/17/2024  Impression: Continued mild bibasilar atelectasis, greatest on the left, with small left effusion. Electronically Signed: Talita Bear MD  5/17/2024 8:16 AM EDT  Workstation ID: [id number]    XR Abdomen KUB    Result Date: 5/16/2024  Impression: Gas-distended colon. No evidence of bowel obstruction. Electronically Signed: Braden Bartlett MD  5/16/2024 6:23 PM EDT  Workstation ID: [id number]       Results from last 7 days   Lab Units 05/18/24  0321   CK TOTAL U/L 161       Imaging Results (Last 24 Hours)       Procedure Component Value Units Date/Time    XR Chest 1 View [[id number]] Collected: 05/17/24 1743     Updated: 05/17/24 1747    Narrative:      XR CHEST 1 VW    Date of Exam: 5/17/2024 5:39 PM EDT    Indication: chest tube removal    Comparison: Same day chest radiographs.    Findings:  Interval removal of left basilar thoracostomy tube. No evidence of new pneumothorax. Right approach vascular sheath in unchanged position. Cardiac surgical changes with median sternotomy wires. Unchanged enlarged cardiomediastinal silhouette. Low lung   volumes. No definite new focal airspace  opacity or pleural effusion. Osseous structures are unchanged.,      Impression:      Impression:  Interval removal of left thoracostomy tube. No evidence of pneumothorax.      Electronically Signed: Braden Bartlett MD    5/17/2024 5:45 PM EDT    Workstation ID: [id number]    US Renal Bilateral [[id number]] Collected: 05/17/24 1347     Updated: 05/17/24 1352    Narrative:      US RENAL BILATERAL    Date of Exam: 5/17/2024 1:16 PM EDT    Indication: ARF/EN/CRF/CKD.    Comparison: CT abdomen and pelvis without contrast 7/26/2020, renal ultrasound 4/18/2024.    Technique: Grayscale and color Doppler ultrasound evaluation of the kidneys and urinary bladder was performed.    Findings:  Technologist noted a technically difficult exam due to body habitus and portable technique, done in the CVICU.    Right kidney measures 11.2 x 6.1 x 6.1 cm and demonstrates normal cortical echogenicity. No definite renal masses are seen. No right hydronephrosis.    Left kidney measures 10.3 x 5.2 x 6.5 cm. 2 left renal cysts measure up to 1.6 cm. Normal cortical echogenicity of the left kidney. No left hydronephrosis.    Urinary bladder is decompressed with Rasmussen catheter in place.      Impression:      Impression:  1.Left renal cysts measuring up to 1.6 cm.  2.No hydronephrosis.        Electronically Signed: Lisa Lan    5/17/2024 1:50 PM EDT    Workstation ID: [id number]            Cardiac Studies:  Echo- Results for orders placed in visit on 05/15/24    Intra-Op Anesthesia DIGNA    Narrative  Intra-Op Anesthesia DIGNA    Procedure Performed: Intra-Op Anesthesia DIGNA  Start Time:  End Time:    Preanesthesia Checklist:  Patient identified, IV assessed, risks and benefits discussed, monitors and equipment assessed, procedure being performed at surgeon's request and anesthesia consent obtained.    General Procedure Information  DIGNA Placed for monitoring purposes only -- This is not a diagnostic DIGNA  Diagnostic Indications for Echo:  assessment of  surgical repair and hemodynamic monitoring  Physician Requesting Echo: Jean Hirsch MD  Location performed:  OR  Intubated  Bite block placed  Heart visualized  Probe Insertion:  Easy  Probe Type:  Multiplane  Modalities:  Color flow mapping and continuous wave Doppler    Echocardiographic and Doppler Measurements    Ventricles    Right Ventricle:  Cavity size normal.  Global function normal.  Left Ventricle:  Cavity size normal.  Global Function normal.        Valves    Aortic Valve:  Annulus normal.  Stenosis not present.  Regurgitation trace.  Leaflets normal.  Leaflet motions normal.    Mitral Valve:  Annulus normal.  Stenosis not present.  Regurgitation trace.  Leaflets normal.  Leaflet motions normal.    Tricuspid Valve:  Annulus normal.  Stenosis not present.  Regurgitation absent.  Leaflets normal.  Leaflet motions normal.  Pulmonic Valve:  Annulus normal.  Stenosis not present.  Regurgitation absent.      Aorta    Ascending Aorta:  Size normal.  Dissection not present.  Mobile plaque not present.  Aortic Arch:  Size normal.  Dissection not present.  Mobile plaque not present.  Descending Aorta:  Size normal.  Dissection not present.  Mobile plaque not present.      Atria    Right Atrium:  Size normal.  Spontaneous echo contrast not present.  Thrombus not present.  Tumor not present.  Device not present.    Left Atrium:  Size normal.  Spontaneous echo contrast not present.  Thrombus not present.  Tumor not present.  Device not present.  Left atrial appendage normal.      Septa        Ventricular Septum:  Intra-ventricular septum morphology normal.        Other Findings  Pericardium:  normal  Pleural Effusion:  none  Pulmonary Venous Flow:  normal    Anesthesia Information  Performed Personally  Anesthesiologist:  Kvng Pearson MD      Echocardiogram Comments:  Post bypass EF 55%. No change in valves. No air seen.    Stress Myoview-  Cath-        Jesús Burns DO  05/18/24  12:06  EDT    Copied information has been reviewed and is current as of 05/18/24

## 2024-05-18 NOTE — PROGRESS NOTES
"NEPHROLOGY PROGRESS NOTE------[facility]    [facility]  [phone]  Bernadine Childress MD      Patient Care Team:  Camryn Salinas MD as PCP - General (Family Medicine)  Marco A Childress MD as Consulting Physician (Nephrology)      Provider:  Bernadine Childress MD  Patient Name: Fransico Cuenca  :  1955    SUBJECTIVE:    F/U ARF/EN/CRF/CKD    Up in chair. Still SOB and swollen. No dysuria. No n/v    Medication:  aspirin, 81 mg, Oral, Daily  atorvastatin, 40 mg, Oral, Nightly  chlorhexidine, 15 mL, Mouth/Throat, Q12H  citalopram, 20 mg, Oral, Daily  enoxaparin, 30 mg, Subcutaneous, Q24H  gabapentin, 100 mg, Oral, BID  insulin lispro, 2-9 Units, Subcutaneous, 4x Daily AC & at Bedtime  montelukast, 10 mg, Oral, Daily  mupirocin, , Each Nare, BID  pantoprazole, 40 mg, Oral, Daily  polyethylene glycol, 17 g, Oral, BID  senna-docusate sodium, 2 tablet, Oral, BID      bumetanide, 0.5 mg/hr, Last Rate: 0.5 mg/hr (24 1718)  insulin, 0-100 Units/hr, Last Rate: Stopped (24 0038)  sodium chloride, 30 mL/hr, Last Rate: Stopped (24 2317)        OBJECTIVE    Vital Sign Min/Max for last 24 hours  Temp  Min: 97.4 °F (36.3 °C)  Max: 99.6 °F (37.6 °C)   BP  Min: 96/65  Max: 152/87   Pulse  Min: 65  Max: 102   Resp  Min: 10  Max: 20   SpO2  Min: 90 %  Max: 97 %   No data recorded   Weight  Min: 97.9 kg (215 lb 13.3 oz)  Max: 97.9 kg (215 lb 13.3 oz)     Flowsheet Rows      Flowsheet Row First Filed Value   Admission Height 167.6 cm (65.98\") Documented at 05/15/2024 1035   Admission Weight 95.8 kg (211 lb 3.2 oz) Documented at 05/15/2024 0600            No intake/output data recorded.  I/O last 3 completed shifts:  In:  [P.O.:2600; I.V.:]  Out:  [Urine:1780; Chest Tube:190]    Physical Exam: CVP=20  General Appearance: alert, appears stated age and cooperative  Head: normocephalic, without obvious abnormality and " atraumatic  Eyes: conjunctivae and sclerae normal and no icterus  Neck: supple and +JVD  Lungs: DECREASED BS BIBASILAR WITH FINE LEFT CRACKLES  Heart: regular rhythm & normal rate and normal S1, S2 +MICA  Chest Wall: no abnormalities observed  Abdomen: normal bowel sounds and +HYPOACTIVE BS WITH MILD DISTENSION. +BAHENA  Extremities: moves extremities well, TRACE/1+ BILAT LE EDEMA, no cyanosis  Skin: no bleeding, bruising or rash  Neurologic: Alert, and oriented. No focal deficits    Labs:    WBC WBC   Date Value Ref Range Status   05/18/2024 7.18 3.40 - 10.80 10*3/mm3 Final   05/17/2024 8.46 3.40 - 10.80 10*3/mm3 Final   05/16/2024 7.65 3.40 - 10.80 10*3/mm3 Final   05/15/2024 8.77 3.40 - 10.80 10*3/mm3 Final      HGB Hemoglobin   Date Value Ref Range Status   05/18/2024 8.1 (L) 13.0 - 17.7 g/dL Final   05/17/2024 8.1 (L) 13.0 - 17.7 g/dL Final   05/16/2024 8.7 (L) 13.0 - 17.7 g/dL Final   05/16/2024 7.8 (L) 13.0 - 17.7 g/dL Final   05/15/2024 8.3 (L) 12.0 - 17.0 g/dL Final   05/15/2024 7.9 (C) 12.0 - 17.0 g/dL Final   05/15/2024 8.2 (L) 12.0 - 17.0 g/dL Final   05/15/2024 8.2 (L) 12.0 - 17.0 g/dL Final   05/15/2024 8.6 (L) 12.0 - 17.0 g/dL Final   05/15/2024 8.4 (L) 13.0 - 17.7 g/dL Final   05/15/2024 8.2 (L) 12.0 - 17.0 g/dL Final   05/15/2024 7.8 (C) 12.0 - 17.0 g/dL Final   05/15/2024 8.5 (L) 12.0 - 17.0 g/dL Final   05/15/2024 7.1 (C) 12.0 - 17.0 g/dL Final   05/15/2024 8.2 (L) 12.0 - 17.0 g/dL Final      HCT Hematocrit   Date Value Ref Range Status   05/18/2024 26.2 (L) 37.5 - 51.0 % Final   05/17/2024 26.6 (L) 37.5 - 51.0 % Final   05/16/2024 29.3 (L) 37.5 - 51.0 % Final   05/16/2024 26.1 (L) 37.5 - 51.0 % Final   05/15/2024 24 (L) 38 - 51 % Final   05/15/2024 23 (L) 38 - 51 % Final   05/15/2024 24 (L) 38 - 51 % Final   05/15/2024 24 (L) 38 - 51 % Final   05/15/2024 25 (L) 38 - 51 % Final   05/15/2024 27.2 (L) 37.5 - 51.0 % Final   05/15/2024 24 (L) 38 - 51 % Final   05/15/2024 23 (L) 38 - 51 % Final  "  05/15/2024 25 (L) 38 - 51 % Final   05/15/2024 21 (L) 38 - 51 % Final   05/15/2024 24 (L) 38 - 51 % Final      Platelets No results found for: \"LABPLAT\"   MCV MCV   Date Value Ref Range Status   05/18/2024 84.5 79.0 - 97.0 fL Final   05/17/2024 86.9 79.0 - 97.0 fL Final   05/16/2024 87.3 79.0 - 97.0 fL Final   05/15/2024 84.5 79.0 - 97.0 fL Final          Sodium Sodium   Date Value Ref Range Status   05/18/2024 142 136 - 145 mmol/L Final   05/17/2024 142 136 - 145 mmol/L Final   05/16/2024 143 136 - 145 mmol/L Final   05/15/2024 139 136 - 145 mmol/L Final      Potassium Potassium   Date Value Ref Range Status   05/18/2024 3.9 3.5 - 5.2 mmol/L Final   05/17/2024 4.4 3.5 - 5.2 mmol/L Final   05/16/2024 4.4 3.5 - 5.2 mmol/L Final   05/15/2024 4.2 3.5 - 5.2 mmol/L Final      Chloride Chloride   Date Value Ref Range Status   05/18/2024 103 98 - 107 mmol/L Final   05/17/2024 105 98 - 107 mmol/L Final   05/16/2024 107 98 - 107 mmol/L Final   05/15/2024 104 98 - 107 mmol/L Final      CO2 CO2   Date Value Ref Range Status   05/18/2024 26.0 22.0 - 29.0 mmol/L Final   05/17/2024 20.0 (L) 22.0 - 29.0 mmol/L Final   05/16/2024 24.0 22.0 - 29.0 mmol/L Final   05/15/2024 24.0 22.0 - 29.0 mmol/L Final      BUN BUN   Date Value Ref Range Status   05/18/2024 65 (H) 8 - 23 mg/dL Final   05/17/2024 52 (H) 8 - 23 mg/dL Final   05/16/2024 38 (H) 8 - 23 mg/dL Final   05/15/2024 35 (H) 8 - 23 mg/dL Final      Creatinine Creatinine   Date Value Ref Range Status   05/18/2024 2.86 (H) 0.76 - 1.27 mg/dL Final   05/17/2024 2.96 (H) 0.76 - 1.27 mg/dL Final   05/16/2024 1.47 (H) 0.76 - 1.27 mg/dL Final   05/15/2024 1.50 (H) 0.60 - 1.30 mg/dL Final     Comment:     Serial Number: [id number]:  [id number]   05/15/2024 1.47 (H) 0.60 - 1.30 mg/dL Final     Comment:     Serial Number: [id number]:  [id number]   05/15/2024 1.53 (H) 0.60 - 1.30 mg/dL Final     Comment:     Serial Number: [id number]:  [id number]   05/15/2024 1.33 (H) 0.60 - 1.30 mg/dL Final " "    Comment:     Serial Number: [id number]:  [id number]   05/15/2024 1.44 (H) 0.76 - 1.27 mg/dL Final      Calcium Calcium   Date Value Ref Range Status   05/18/2024 8.7 8.6 - 10.5 mg/dL Final   05/17/2024 8.7 8.6 - 10.5 mg/dL Final   05/16/2024 8.6 8.6 - 10.5 mg/dL Final   05/15/2024 7.9 (L) 8.6 - 10.5 mg/dL Final      PO4 No components found for: \"PO4\"   Albumin Albumin   Date Value Ref Range Status   05/18/2024 3.4 (L) 3.5 - 5.2 g/dL Final   05/16/2024 3.8 3.5 - 5.2 g/dL Final   05/15/2024 3.4 (L) 3.5 - 5.2 g/dL Final      Magnesium Magnesium   Date Value Ref Range Status   05/18/2024 2.8 (H) 1.6 - 2.4 mg/dL Final   05/16/2024 2.6 (H) 1.6 - 2.4 mg/dL Final      Uric Acid No components found for: \"URIC ACID\"     Imaging Results (Last 72 Hours)       Procedure Component Value Units Date/Time    XR Chest 1 View [[id number]] Collected: 05/17/24 1743     Updated: 05/17/24 1747    Narrative:      XR CHEST 1 VW    Date of Exam: 5/17/2024 5:39 PM EDT    Indication: chest tube removal    Comparison: Same day chest radiographs.    Findings:  Interval removal of left basilar thoracostomy tube. No evidence of new pneumothorax. Right approach vascular sheath in unchanged position. Cardiac surgical changes with median sternotomy wires. Unchanged enlarged cardiomediastinal silhouette. Low lung   volumes. No definite new focal airspace opacity or pleural effusion. Osseous structures are unchanged.,      Impression:      Impression:  Interval removal of left thoracostomy tube. No evidence of pneumothorax.      Electronically Signed: Braden Bartlett MD    5/17/2024 5:45 PM EDT    Workstation ID: [id number]    US Renal Bilateral [[id number]] Collected: 05/17/24 1347     Updated: 05/17/24 1352    Narrative:      US RENAL BILATERAL    Date of Exam: 5/17/2024 1:16 PM EDT    Indication: ARF/EN/CRF/CKD.    Comparison: CT abdomen and pelvis without contrast 7/26/2020, renal ultrasound 4/18/2024.    Technique: Grayscale and color Doppler " ultrasound evaluation of the kidneys and urinary bladder was performed.    Findings:  Technologist noted a technically difficult exam due to body habitus and portable technique, done in the CVICU.    Right kidney measures 11.2 x 6.1 x 6.1 cm and demonstrates normal cortical echogenicity. No definite renal masses are seen. No right hydronephrosis.    Left kidney measures 10.3 x 5.2 x 6.5 cm. 2 left renal cysts measure up to 1.6 cm. Normal cortical echogenicity of the left kidney. No left hydronephrosis.    Urinary bladder is decompressed with Rasmussen catheter in place.      Impression:      Impression:  1.Left renal cysts measuring up to 1.6 cm.  2.No hydronephrosis.        Electronically Signed: Lisa Lan    5/17/2024 1:50 PM EDT    Workstation ID: [id number]    XR Chest 1 View [[id number]] Collected: 05/17/24 0814     Updated: 05/17/24 0818    Narrative:      XR CHEST 1 VW    Date of Exam: 5/17/2024 4:23 AM EDT    Indication: Post-Op Heart Surgery    Comparison: 5/16/2024  Findings:  [place]-Naveen catheter has been removed and replaced by sheath with its tip in the upper SVC. Left chest tube is unchanged in position. There is no pneumothorax. There is continued mild atelectasis of the lung bases, greatest on the left. Small left effusion   is again noted.      Impression:      Impression:  Continued mild bibasilar atelectasis, greatest on the left, with small left effusion.      Electronically Signed: Talita Bear MD    5/17/2024 8:16 AM EDT    Workstation ID: [id number]    XR Abdomen KUB [[id number]] Collected: 05/16/24 1820     Updated: 05/16/24 1825    Narrative:      XR ABDOMEN KUB    Date of Exam: 5/16/2024 6:00 PM EDT    Indication: Abd distension, increased pain    Comparison: 2020 CT    Findings:  Gas-filled colon. No evidence of small bowel obstruction. No radiodense renal calculi noted. No acute osseous abnormality.      Impression:      Impression:  Gas-distended colon. No evidence of bowel  obstruction.      Electronically Signed: Braden Bartlett MD    5/16/2024 6:23 PM EDT    Workstation ID: [id number]    XR Chest 1 View [[id number]] Collected: 05/16/24 0759     Updated: 05/16/24 0810    Narrative:      XR CHEST 1 VW    Date of Exam: 5/16/2024 5:49 AM EDT    Indication: Post-Op Heart Surgery    Comparison: 5/15/2024    Findings:  Interval removal of endotracheal and NG tubes. Stable cardiomegaly and pulmonary congestion. No change in position of a right intrajugular [place]-Naveen catheter with the tip in the right main pulmonary artery level stable surgical changes post CABG. There   is mild interval improved aeration to the left lung base with persistent atelectasis and a small effusion. There is also mild increased opacity at the right lung base.      Impression:      Impression:  Interval removal of endotracheal and NG tubes    Mild improved aeration to the left lung base. Otherwise grossly stable bibasilar airspace disease      Electronically Signed: Patrick Robbins MD    5/16/2024 8:08 AM EDT    Workstation ID: [id number]    XR Chest 1 View [[id number]] Collected: 05/15/24 1114     Updated: 05/15/24 1119    Narrative:      DATE OF EXAM:  5/15/2024 10:54 AM     PROCEDURE:  XR CHEST 1 VW-     INDICATIONS:  Post-Op Check Line & Tube Placement; I25.10-Atherosclerotic heart  disease of native coronary artery without angina pectoris     COMPARISON:  May 14, 2024     TECHNIQUE:   Single radiographic view of the chest was obtained.     FINDINGS:  Endotracheal tube tip is approximately 5 cm above the claire. There is a  right IJ [place]-Naveen catheter with tip projecting in the area of the right  pulmonary artery. Enteric tube extends into the left upper quadrant, tip  beyond the margins of this exam. There has been interval median  sternotomy and CABG. There appears to be mediastinal drains and a left  basilar chest tube. No definite pneumothorax or significant pleural  effusion is seen. There are mild bibasilar  opacities, left greater than  right. Heart size and mediastinal contour appear as expected.       Impression:      1.     Interval median sternotomy and CABG.  2.     Tubes and lines appear in satisfactory positions, as detailed  above.  3.     Mild bibasilar opacities which may represent atelectasis or  edema.     Electronically Signed By-Romaine Szymanski MD On:5/15/2024 11:16 AM               Results for orders placed during the hospital encounter of 05/15/24    XR Chest 1 View    Narrative  XR CHEST 1 VW    Date of Exam: 5/17/2024 5:39 PM EDT    Indication: chest tube removal    Comparison: Same day chest radiographs.    Findings:  Interval removal of left basilar thoracostomy tube. No evidence of new pneumothorax. Right approach vascular sheath in unchanged position. Cardiac surgical changes with median sternotomy wires. Unchanged enlarged cardiomediastinal silhouette. Low lung  volumes. No definite new focal airspace opacity or pleural effusion. Osseous structures are unchanged.,    Impression  Impression:  Interval removal of left thoracostomy tube. No evidence of pneumothorax.      Electronically Signed: Braden Bartlett MD  5/17/2024 5:45 PM EDT  Workstation ID: [id number]      XR Abdomen KUB    Narrative  XR ABDOMEN KUB    Date of Exam: 5/16/2024 6:00 PM EDT    Indication: Abd distension, increased pain    Comparison: 2020 CT    Findings:  Gas-filled colon. No evidence of small bowel obstruction. No radiodense renal calculi noted. No acute osseous abnormality.    Impression  Impression:  Gas-distended colon. No evidence of bowel obstruction.      Electronically Signed: Braden Bartlett MD  5/16/2024 6:23 PM EDT  Workstation ID: [id number]      XR Chest 1 View    Narrative  XR CHEST 1 VW    Date of Exam: 5/17/2024 4:23 AM EDT    Indication: Post-Op Heart Surgery    Comparison: 5/16/2024  Findings:  [place]-Naveen catheter has been removed and replaced by sheath with its tip in the upper SVC. Left chest tube is unchanged in  position. There is no pneumothorax. There is continued mild atelectasis of the lung bases, greatest on the left. Small left effusion  is again noted.    Impression  Impression:  Continued mild bibasilar atelectasis, greatest on the left, with small left effusion.      Electronically Signed: Talita Bear MD  5/17/2024 8:16 AM EDT  Workstation ID: [id number]      Results for orders placed during the hospital encounter of 04/18/24    Duplex Carotid Ultrasound CAR    Interpretation Summary    Right internal carotid artery demonstrates a less than 50% stenosis.    Normal left carotid duplex scan.        ASSESSMENT / PLAN      ASCAD      ARF/EN/CRF/CKD------Nonoliguric overnight with diuretics. +ARF/EN on top  Of known CRF/CKD STG 3A with a baseline serum creatinine of about 1.4-1.5. CRF/CKD STG 3A secondary to DGS/HTN NS. +ARF/EN secondary to ATN from hypotension and renal perfusion disturbance from CP bypass with preadmission ACE-I use. No NSAIDs or IV dye.  Diurese. Support BP and hemodynamics. Off of ACE-I. Dose meds for CrCl less than 10 cc/min until ARF/EN is resolved     2. HTN WITH CKD-----BP better. Off of Vasopressin now. Keep off of Lisinopril. No ACE-I/ARB/DRI for now. Keep MAP>65     3. DMII WITH RENAL MANIFESTATIONS------Off Metformin for now. BS ok. Glucometers, SSI and Insulin gtt post op per protocol     4. HYPERLIPIDEMIA------Hold Statin until tomorrow. Follow CK. TSH normal     5. OA/DJD/HYPERURICEMIA------On Allopurinol preadmission. Check uric acid levels No NSAIDs     6. DEPRESSION-----Celexa     7. COPD-----Respiratory status stable. No active wheezing     8. GERD/PUD PROPHYLAXIS-----PPI. Benefits outweigh risks despite renal dysfunction     9. BPH------On Hytrin preadmission. Currently with lópez     10. ANEMIA------IV iron for MOSHE. Check SPEP     11. ACIDOSIS-----Resolved    12. VOLUME OVERLOAD/ELEVATED CVP/EDEMA------Increase Bumex gtt and redose Diuril    13. HYPOKALEMIA-------Replace  po    14. VERY MILD RENAL FAILURE ASSOCIATED HYPERPHOSPHATEMIA      Bernadine Childress MD  [facility]  [phone]  05/18/24  07:13 EDT

## 2024-05-18 NOTE — PROGRESS NOTES
" LOS: 3 days   Patient Care Team:  Camryn Salinas MD as PCP - General (Family Medicine)  Marco A Childress MD as Consulting Physician (Nephrology)    Chief Complaint: post-op    Subjective     Subjective:  Symptoms:  Stable.  No shortness of breath or chest pain.    Diet:  Adequate intake.  No nausea or vomiting.    Activity level: Impaired due to weakness.    Pain:  He reports no pain.      Objective     Vital Signs  Temp:  [97.4 °F (36.3 °C)-99.6 °F (37.6 °C)] 97.8 °F (36.6 °C)  Heart Rate:  [] 78  Resp:  [10-20] 16  BP: ()/(56-87) 133/75  Body mass index is 34.85 kg/m².    Intake/Output Summary (Last 24 hours) at 5/18/2024 0758  Last data filed at 5/18/2024 0600  Gross per 24 hour   Intake 2363 ml   Output 1595 ml   Net 768 ml     No intake/output data recorded.      Wt Readings from Last 3 Encounters:   05/18/24 97.9 kg (215 lb 13.3 oz)   05/14/24 95.7 kg (211 lb)   05/13/24 96.8 kg (213 lb 6.4 oz)       Flowsheet Rows      Flowsheet Row First Filed Value   Admission Height 167.6 cm (65.98\") Documented at 05/15/2024 1035   Admission Weight 95.8 kg (211 lb 3.2 oz) Documented at 05/15/2024 0600            Objective:  General Appearance:  Comfortable and in no acute distress.    Vital signs: (most recent): Blood pressure 137/77, pulse 83, temperature 97.7 °F (36.5 °C), temperature source Oral, resp. rate 18, height 167.6 cm (65.98\"), weight 97.9 kg (215 lb 13.3 oz), SpO2 93%.  Vital signs are normal.  No fever.    Output: Producing urine and producing stool.    Lungs:  Normal effort and normal respiratory rate.  There are rales, decreased breath sounds and wheezes.    Heart: Normal rate.  Regular rhythm.    Abdomen: Bowel sounds are normal.     Extremities: Normal range of motion.    Neurological: Patient is alert and oriented to person, place and time.    Skin:  Warm and dry.                Results Review:        Results from last 7 days   Lab Units 05/18/24  0321 05/17/24  0306 " 05/16/24  1924 05/16/24  0218   WBC 10*3/mm3 7.18 8.46  --  7.65   HEMOGLOBIN g/dL 8.1* 8.1* 8.7* 7.8*   HEMATOCRIT % 26.2* 26.6* 29.3* 26.1*   PLATELETS 10*3/mm3 182 152  --  172         PT/INR:    Protime   Date Value Ref Range Status   05/16/2024 13.0 (H) 9.6 - 11.7 Seconds Final   05/15/2024 12.9 (H) 9.6 - 11.7 Seconds Final   /  INR   Date Value Ref Range Status   05/16/2024 1.21 (H) 0.93 - 1.10 Final   05/15/2024 1.20 (H) 0.93 - 1.10 Final       Results from last 7 days   Lab Units 05/18/24  0321 05/17/24  0306 05/16/24  0218   SODIUM mmol/L 142 142 143   POTASSIUM mmol/L 3.9 4.4 4.4   CHLORIDE mmol/L 103 105 107   CO2 mmol/L 26.0 20.0* 24.0   BUN mg/dL 65* 52* 38*   CREATININE mg/dL 2.86* 2.96* 1.47*   GLUCOSE mg/dL 82 129* 157*   CALCIUM mg/dL 8.7 8.7 8.6         Scheduled Meds:  allopurinol, 100 mg, Oral, Daily  aspirin, 81 mg, Oral, Daily  atorvastatin, 40 mg, Oral, Nightly  chlorhexidine, 15 mL, Mouth/Throat, Q12H  chlorothiazide, 250 mg, Intravenous, Once  citalopram, 20 mg, Oral, Daily  enoxaparin, 30 mg, Subcutaneous, Q24H  ferric gluconate, 125 mg, Intravenous, Daily  gabapentin, 100 mg, Oral, BID  insulin lispro, 2-9 Units, Subcutaneous, 4x Daily AC & at Bedtime  montelukast, 10 mg, Oral, Daily  mupirocin, , Each Nare, BID  pantoprazole, 40 mg, Oral, Daily  polyethylene glycol, 17 g, Oral, BID  potassium chloride, 20 mEq, Oral, TID With Meals  senna-docusate sodium, 2 tablet, Oral, BID        Infusions:  bumetanide, 0.5 mg/hr, Last Rate: 0.5 mg/hr (05/17/24 1718)  insulin, 0-100 Units/hr, Last Rate: Stopped (05/17/24 0038)  sodium chloride, 30 mL/hr, Last Rate: Stopped (05/17/24 2317)          Assessment & Plan         ASCAD      Assessment & Plan      - Severe MV CAD, EF 55% (cath)--s/p elective CABG x4 with LIMA ([place])  - NSTEMI presentation--April 2024  - HTN--pressor postop  - HLD--statin  - Postop EN/ CKD stage 3--Dr. Childress following  - Anxiety  - ZHANG with CPAP compliance  - Recent hx human  meta pneumovirus infection--resolved  - Chronic HFpEF, NYHA class II-III  - Postop ABLA, expected--transfused 5/16    POD3:     Patient looks good this morning, sitting in bedside chair   Currently on 5LHFNC, wean as able   Creatinine 2.86 this morning, on Bumex gtt and diuril -- Dr. Childress managing, follows as outpatient for CKD  Vitals stable and a little hypertensive, will add low dose beta blocker, monitor BP and MAP to ensure good renal perfusion   Keep central line another day, keep pacing wires and lópez  Continue routine care, encourage IS, mobilize    Nataliya Calero PA-C  05/18/24  07:58 EDT

## 2024-05-19 LAB
ANION GAP SERPL CALCULATED.3IONS-SCNC: 14 MMOL/L (ref 5–15)
BUN SERPL-MCNC: 72 MG/DL (ref 8–23)
BUN/CREAT SERPL: 25.3 (ref 7–25)
CALCIUM SPEC-SCNC: 9.1 MG/DL (ref 8.6–10.5)
CHLORIDE SERPL-SCNC: 98 MMOL/L (ref 98–107)
CO2 SERPL-SCNC: 26 MMOL/L (ref 22–29)
CREAT SERPL-MCNC: 2.85 MG/DL (ref 0.76–1.27)
DEPRECATED RDW RBC AUTO: 48.4 FL (ref 37–54)
EGFRCR SERPLBLD CKD-EPI 2021: 23 ML/MIN/1.73
ERYTHROCYTE [DISTWIDTH] IN BLOOD BY AUTOMATED COUNT: 15.9 % (ref 12.3–15.4)
GLUCOSE BLDC GLUCOMTR-MCNC: 107 MG/DL (ref 70–105)
GLUCOSE BLDC GLUCOMTR-MCNC: 115 MG/DL (ref 70–105)
GLUCOSE BLDC GLUCOMTR-MCNC: 145 MG/DL (ref 70–105)
GLUCOSE BLDC GLUCOMTR-MCNC: 178 MG/DL (ref 70–105)
GLUCOSE BLDC GLUCOMTR-MCNC: 182 MG/DL (ref 70–105)
GLUCOSE SERPL-MCNC: 104 MG/DL (ref 65–99)
HCT VFR BLD AUTO: 28.2 % (ref 37.5–51)
HGB BLD-MCNC: 8.9 G/DL (ref 13–17.7)
MAGNESIUM SERPL-MCNC: 2.3 MG/DL (ref 1.6–2.4)
MCH RBC QN AUTO: 26.2 PG (ref 26.6–33)
MCHC RBC AUTO-ENTMCNC: 31.6 G/DL (ref 31.5–35.7)
MCV RBC AUTO: 82.9 FL (ref 79–97)
PHOSPHATE SERPL-MCNC: 5.5 MG/DL (ref 2.5–4.5)
PLATELET # BLD AUTO: 254 10*3/MM3 (ref 140–450)
PMV BLD AUTO: 10.8 FL (ref 6–12)
POTASSIUM SERPL-SCNC: 4.1 MMOL/L (ref 3.5–5.2)
RBC # BLD AUTO: 3.4 10*6/MM3 (ref 4.14–5.8)
SODIUM SERPL-SCNC: 138 MMOL/L (ref 136–145)
WBC NRBC COR # BLD AUTO: 9.47 10*3/MM3 (ref 3.4–10.8)

## 2024-05-19 PROCEDURE — 84100 ASSAY OF PHOSPHORUS: CPT | Performed by: INTERNAL MEDICINE

## 2024-05-19 PROCEDURE — 85027 COMPLETE CBC AUTOMATED: CPT | Performed by: NURSE PRACTITIONER

## 2024-05-19 PROCEDURE — 83735 ASSAY OF MAGNESIUM: CPT | Performed by: INTERNAL MEDICINE

## 2024-05-19 PROCEDURE — 82948 REAGENT STRIP/BLOOD GLUCOSE: CPT

## 2024-05-19 PROCEDURE — 99024 POSTOP FOLLOW-UP VISIT: CPT

## 2024-05-19 PROCEDURE — 94799 UNLISTED PULMONARY SVC/PX: CPT

## 2024-05-19 PROCEDURE — 25010000002 ENOXAPARIN PER 10 MG: Performed by: INTERNAL MEDICINE

## 2024-05-19 PROCEDURE — 63710000001 INSULIN LISPRO (HUMAN) PER 5 UNITS: Performed by: NURSE PRACTITIONER

## 2024-05-19 PROCEDURE — 25010000002 BUMETANIDE PER 0.5 MG: Performed by: INTERNAL MEDICINE

## 2024-05-19 PROCEDURE — 80048 BASIC METABOLIC PNL TOTAL CA: CPT | Performed by: NURSE PRACTITIONER

## 2024-05-19 PROCEDURE — 82948 REAGENT STRIP/BLOOD GLUCOSE: CPT | Performed by: NURSE PRACTITIONER

## 2024-05-19 PROCEDURE — 25010000002 NA FERRIC GLUC CPLX PER 12.5 MG: Performed by: INTERNAL MEDICINE

## 2024-05-19 PROCEDURE — 93005 ELECTROCARDIOGRAM TRACING: CPT | Performed by: INTERNAL MEDICINE

## 2024-05-19 PROCEDURE — 99233 SBSQ HOSP IP/OBS HIGH 50: CPT | Performed by: INTERNAL MEDICINE

## 2024-05-19 PROCEDURE — 25010000002 DIGOXIN PER 500 MCG: Performed by: INTERNAL MEDICINE

## 2024-05-19 PROCEDURE — 93010 ELECTROCARDIOGRAM REPORT: CPT | Performed by: INTERNAL MEDICINE

## 2024-05-19 RX ORDER — DILTIAZEM HCL/D5W 125 MG/125
5-15 PLASTIC BAG, INJECTION (ML) INTRAVENOUS
Status: DISCONTINUED | OUTPATIENT
Start: 2024-05-19 | End: 2024-05-20

## 2024-05-19 RX ORDER — DIGOXIN 0.25 MG/ML
0.5 INJECTION INTRAMUSCULAR; INTRAVENOUS ONCE
Status: COMPLETED | OUTPATIENT
Start: 2024-05-19 | End: 2024-05-19

## 2024-05-19 RX ORDER — METOPROLOL TARTRATE 1 MG/ML
5 INJECTION, SOLUTION INTRAVENOUS
Status: DISCONTINUED | OUTPATIENT
Start: 2024-05-19 | End: 2024-05-19

## 2024-05-19 RX ADMIN — POTASSIUM CHLORIDE 20 MEQ: 1500 TABLET, EXTENDED RELEASE ORAL at 14:40

## 2024-05-19 RX ADMIN — ENOXAPARIN SODIUM 30 MG: 100 INJECTION SUBCUTANEOUS at 17:42

## 2024-05-19 RX ADMIN — CHLORHEXIDINE GLUCONATE, 0.12% ORAL RINSE 15 ML: 1.2 SOLUTION DENTAL at 08:53

## 2024-05-19 RX ADMIN — IPRATROPIUM BROMIDE AND ALBUTEROL SULFATE 3 ML: .5; 3 SOLUTION RESPIRATORY (INHALATION) at 21:26

## 2024-05-19 RX ADMIN — Medication 15 MG/HR: at 10:27

## 2024-05-19 RX ADMIN — METOPROLOL TARTRATE 25 MG: 25 TABLET, FILM COATED ORAL at 20:10

## 2024-05-19 RX ADMIN — ATORVASTATIN CALCIUM 40 MG: 40 TABLET, FILM COATED ORAL at 20:10

## 2024-05-19 RX ADMIN — METOPROLOL TARTRATE 5 MG: 1 INJECTION, SOLUTION INTRAVENOUS at 01:31

## 2024-05-19 RX ADMIN — GABAPENTIN 100 MG: 100 CAPSULE ORAL at 20:10

## 2024-05-19 RX ADMIN — POTASSIUM CHLORIDE 20 MEQ: 1500 TABLET, EXTENDED RELEASE ORAL at 17:42

## 2024-05-19 RX ADMIN — ASPIRIN 81 MG: 81 TABLET, COATED ORAL at 08:52

## 2024-05-19 RX ADMIN — CHLORHEXIDINE GLUCONATE, 0.12% ORAL RINSE 15 ML: 1.2 SOLUTION DENTAL at 20:10

## 2024-05-19 RX ADMIN — Medication 15 MG/HR: at 19:05

## 2024-05-19 RX ADMIN — SODIUM CHLORIDE 125 MG: 9 INJECTION, SOLUTION INTRAVENOUS at 08:53

## 2024-05-19 RX ADMIN — GABAPENTIN 100 MG: 100 CAPSULE ORAL at 08:53

## 2024-05-19 RX ADMIN — INSULIN LISPRO 2 UNITS: 100 INJECTION, SOLUTION INTRAVENOUS; SUBCUTANEOUS at 17:44

## 2024-05-19 RX ADMIN — INSULIN LISPRO 2 UNITS: 100 INJECTION, SOLUTION INTRAVENOUS; SUBCUTANEOUS at 21:03

## 2024-05-19 RX ADMIN — PANTOPRAZOLE SODIUM 40 MG: 40 TABLET, DELAYED RELEASE ORAL at 08:52

## 2024-05-19 RX ADMIN — SENNOSIDES AND DOCUSATE SODIUM 2 TABLET: 50; 8.6 TABLET ORAL at 08:53

## 2024-05-19 RX ADMIN — MUPIROCIN 1 APPLICATION: 20 OINTMENT TOPICAL at 20:10

## 2024-05-19 RX ADMIN — MONTELUKAST 10 MG: 10 TABLET, FILM COATED ORAL at 08:53

## 2024-05-19 RX ADMIN — CITALOPRAM HYDROBROMIDE 20 MG: 20 TABLET ORAL at 08:53

## 2024-05-19 RX ADMIN — DIGOXIN 0.5 MG: 0.25 INJECTION INTRAMUSCULAR; INTRAVENOUS at 05:46

## 2024-05-19 RX ADMIN — BUMETANIDE 0.5 MG/HR: 0.25 INJECTION INTRAMUSCULAR; INTRAVENOUS at 15:16

## 2024-05-19 RX ADMIN — ALLOPURINOL 100 MG: 100 TABLET ORAL at 08:52

## 2024-05-19 RX ADMIN — METOPROLOL TARTRATE 5 MG: 1 INJECTION, SOLUTION INTRAVENOUS at 01:53

## 2024-05-19 RX ADMIN — MUPIROCIN 1 APPLICATION: 20 OINTMENT TOPICAL at 08:53

## 2024-05-19 RX ADMIN — POTASSIUM CHLORIDE 20 MEQ: 1500 TABLET, EXTENDED RELEASE ORAL at 08:52

## 2024-05-19 RX ADMIN — Medication 12.5 MG: at 08:52

## 2024-05-19 RX ADMIN — POLYETHYLENE GLYCOL 3350 17 G: 17 POWDER, FOR SOLUTION ORAL at 08:53

## 2024-05-19 RX ADMIN — Medication 5 MG/HR: at 03:52

## 2024-05-19 NOTE — PLAN OF CARE
Goal Outcome Evaluation:    Around 01:00 Pt went into Afib. Orders for Lopressor IV and Cardizem given and administered. Pt remained in Afib with rate of 120-130's while max on Cardizem gtt.  New orders received for 0.5 of digoxin. Rate is now controlled, but pt is still in Afib. Pt remains on 6L of oxygen. Bumex gtt is going at 1 mg. Urine output was 1350 for 8 hours. Pt's po intake was 480 ml from 00:00 - 0600. His weight was 96.4 kg this morning, which is down from yesterday. Pt had 1 large bowel movement. His abdomen is still distended. He continues to complain of fullness. Pt is up in chair with no complaints this morning.

## 2024-05-19 NOTE — PLAN OF CARE
Goal Outcome Evaluation:  Plan of Care Reviewed With: patient           Outcome Evaluation: Patient is POD 4/ s/p CABG x4 with LIMA.  RIJ, lópez catheter, and epicardial wires remain in place.  Epicardial wires are isolated and pacer is off.  Patient is on Cardizem drip; remains in AFIB.  Patient also on Bumex drip r/t fluid retention; bumex dose reduced this shift; UOP has slowed.  UOP this shift: 905ml  Patient on 1800ml fluid restriction.  PO Intake this shift: 1200ml  Total intake 1659mL  Last flat CVP:  10  Patient remains on O2 at 8L via HFNC.  Patient reports trying not to cough; educated patient on the need to expectorate and encouraging IS usage.  Ambulating well with assist x1 upon nurse request.  Frequently reminding patient of sternal precautions.  Patient had large unmeasured liquid BM this shift.

## 2024-05-19 NOTE — PROGRESS NOTES
Cardiology Progress Note      PATIENT IDENTIFICATION    Name: Fransico Cuenca  Age: 69 y.o. Sex: male : 1955  MRN: [id number]    Requesting Provider    Jean Hirsch MD     LOS: 4 days       Reason For Followup:  Coronary artery disease      Subjective:    Interval History:  Seen and examined.  Chart and labs reviewed.  Patient went into rapid atrial fibrillation last night and was initiated on IV diltiazem.  Rate is controlled.  Remains on 15 mg an hour.    Patient reports some sharp chest discomfort with deep inspiration.  Reports being short of breath today.  Patient currently on Bumex infusion.    Review of Systems:  A complete review of systems was undertaken with the pertinent cardiovascular findings listed in history of present illness and all other systems being negative.     Assessment & Plan    Impressions:  Coronary artery disease status post four-vessel coronary arterial bypass grafting     LIMA-LAD, SVG-DG, SVG-OM1-OM 2  Postoperative atrial fibrillation with rapid ventricular response     Currently on IV diltiazem for rate control  Hyperlipidemia  CKD    Recommendations:  Continue with routine postoperative care  Continue to diurese as renal function allows  Monitor renal function electrolytes  Patient is on high rate IV diltiazem.  Will try to decrease rate.  Monitor rate and rhythm for control and potential bradycardia from IV negative chronotropic agents  Increase beta-blocker  Fluid restriction  Increase activity as tolerated        Objective:    Medication Review:   Scheduled Meds:allopurinol, 100 mg, Oral, Daily  aspirin, 81 mg, Oral, Daily  atorvastatin, 40 mg, Oral, Nightly  chlorhexidine, 15 mL, Mouth/Throat, Q12H  citalopram, 20 mg, Oral, Daily  enoxaparin, 30 mg, Subcutaneous, Q24H  ferric gluconate, 125 mg, Intravenous, Daily  gabapentin, 100 mg, Oral, BID  insulin lispro, 2-9 Units, Subcutaneous, 4x Daily AC & at Bedtime  metoprolol tartrate, 12.5 mg, Oral, Q12H  montelukast, 10  mg, Oral, Daily  mupirocin, , Each Nare, BID  pantoprazole, 40 mg, Oral, Daily  polyethylene glycol, 17 g, Oral, BID  potassium chloride, 20 mEq, Oral, TID With Meals  senna-docusate sodium, 2 tablet, Oral, BID      Continuous Infusions:bumetanide, 0.5 mg/hr, Last Rate: 0.5 mg/hr (05/19/24 0917)  dilTIAZem, 5-15 mg/hr, Last Rate: 15 mg/hr (05/19/24 1027)      PRN Meds:.  acetaminophen **OR** acetaminophen **OR** acetaminophen    bisacodyl    Calcium Replacement - Follow Nurse / BPA Driven Protocol    cyclobenzaprine    dextrose    dextrose    glucagon (human recombinant)    HYDROcodone-acetaminophen    ipratropium-albuterol    Magnesium Cardiology Dose Replacement - Follow Nurse / BPA Driven Protocol    metoprolol tartrate    [DISCONTINUED] Morphine **AND** naloxone    nitroglycerin    ondansetron    Phosphorus Replacement - Follow Nurse / BPA Driven Protocol    Potassium Replacement - Follow Nurse / BPA Driven Protocol    simethicone    traMADol      ASCAD         Physical Exam:    General: Alert, cooperative, no distress, appears stated age  Head:  Normocephalic, atraumatic, mucous membranes moist  Eyes:  Conjunctivae/corneas clear, EOMs intact     Neck:  Supple,  no bruit  Lungs:  Coarse and diminished bilaterally  Chest wall: Tender at incision site  Heart::  Irregular rate and rhythm, S1 and S2 normal, 1/6 holosystolic murmur.  No rub or gallop  Abdomen: Soft, nontender.  Bowel sounds active.  Abdominal distention noted.  Extremities: No cyanosis, clubbing.  3+ edema  Pulses: Diminished pedal pulses  Skin:  No rashes or lesions  Neuro/psych: A&O x3. CN II through XII are grossly intact with appropriate affect    Vital Signs:  Vitals:    05/19/24 0700 05/19/24 0800 05/19/24 0900 05/19/24 1000   BP: 93/64  122/61 97/61   BP Location:       Patient Position:       Pulse: 84 79 79 67   Resp:       Temp:       TempSrc:       SpO2: 91% (!) 88% (!) 88% 91%   Weight:       Height:         Wt Readings from Last 1  Encounters:   05/19/24 96.5 kg (212 lb 11.9 oz)       Intake/Output Summary (Last 24 hours) at 5/19/2024 1145  Last data filed at 5/19/2024 0900  Gross per 24 hour   Intake 1276 ml   Output 3540 ml   Net -2264 ml         Results Review:     CBC    Results from last 7 days   Lab Units 05/19/24  0401 05/18/24  0321 05/17/24  0306 05/16/24  1924 05/16/24  0218 05/15/24  2256 05/15/24  2108 05/15/24  1136 05/15/24  1054 05/15/24  0739 05/14/24  0755   WBC 10*3/mm3 9.47 7.18 8.46  --  7.65  --   --   --  8.77  --  5.52   HEMOGLOBIN g/dL 8.9* 8.1* 8.1* 8.7* 7.8*  --   --   --  8.4*  --  9.8*   HEMOGLOBIN, POC g/dL  --   --   --   --   --  8.3* 7.9*   < >  --    < >  --    PLATELETS 10*3/mm3 254 182 152  --  172  --   --   --  167  --  276    < > = values in this interval not displayed.     Cr Clearance Estimated Creatinine Clearance: 26.2 mL/min (A) (by C-G formula based on SCr of 2.85 mg/dL (H)).  Coag   Results from last 7 days   Lab Units 05/16/24  0218 05/15/24  1054 05/14/24  0755   INR  1.21* 1.20* 1.07   APTT seconds  --  21.7* 27.3     HbA1C   Lab Results   Component Value Date    HGBA1C 6.10 (H) 05/14/2024    HGBA1C 6.2 (A) 03/12/2024    HGBA1C 6.8 (A) 12/12/2023     Blood Glucose   Glucose   Date/Time Value Ref Range Status   05/19/2024 0807 107 (H) 70 - 105 mg/dL Final     Comment:     Serial Number: [id number]:  [id number]   05/18/2024 1910 107 (H) 70 - 105 mg/dL Final     Comment:     Serial Number: [id number]:  [id number]   05/18/2024 1744 81 70 - 105 mg/dL Final     Comment:     Serial Number: [id number]:  [id number]   05/18/2024 1135 123 (H) 70 - 105 mg/dL Final     Comment:     Serial Number: [id number]:  [id number]   05/18/2024 0754 81 70 - 105 mg/dL Final     Comment:     Serial Number: [id number]:  [id number]   05/17/2024 2312 97 70 - 105 mg/dL Final     Comment:     Serial Number: [id number]:  [id number]   05/17/2024 2107 112 (H) 70 - 105 mg/dL Final     Comment:      Serial Number: [id number]:  [id number]   05/17/2024 2007 108 (H) 70 - 105 mg/dL Final     Comment:     Serial Number: [id number]:  [id number]     Infection     CMP   Results from last 7 days   Lab Units 05/19/24  0401 05/18/24  0321 05/17/24  0306 05/16/24  0218 05/15/24  2256 05/15/24  2108 05/15/24  1905 05/15/24  1136 05/15/24  1054 05/14/24  0755 05/13/24  1244   SODIUM mmol/L 138 142 142 143  --   --   --   --  139 139 141   POTASSIUM mmol/L 4.1 3.9 4.4 4.4  --   --   --   --  4.2 4.4 4.4   CHLORIDE mmol/L 98 103 105 107  --   --   --   --  104 100 104   CO2 mmol/L 26.0 26.0 20.0* 24.0  --   --   --   --  24.0 29.0 26.0   BUN mg/dL 72* 65* 52* 38*  --   --   --   --  35* 34* 27*   CREATININE mg/dL 2.85* 2.86* 2.96* 1.47* 1.50* 1.47* 1.53*   < > 1.44* 1.41* 1.27   GLUCOSE mg/dL 104* 82 129* 157*  --   --   --   --  131* 93 88   ALBUMIN g/dL  --  3.4*  --  3.8  --   --   --   --  3.4* 4.2  --    BILIRUBIN mg/dL  --  0.3  --   --   --   --   --   --   --  0.2  --    ALK PHOS U/L  --  68  --   --   --   --   --   --   --  38*  --    AST (SGOT) U/L  --  15  --   --   --   --   --   --   --  13  --    ALT (SGPT) U/L  --  <5  --   --   --   --   --   --   --  13  --     < > = values in this interval not displayed.     ABG    Results from last 7 days   Lab Units 05/16/24  0908 05/16/24  0253 05/15/24  2256 05/15/24  2108 05/15/24  1905 05/15/24  1752 05/15/24  1624 05/15/24  1453   PH, ARTERIAL pH units 7.306*  --  7.302* 7.329* 7.315* 7.302* 7.332* 7.448   PCO2, ARTERIAL mm Hg 48.8*  --  48.5* 46.3 44.7 43.4 46.6 35.3   PO2 ART mm Hg 73.6*  --  91.0 72.7* 109.9* 104.4 80.4* 79.7*   O2 SATURATION ART % 92.9*  --  96.0 93.1* 97.8 97.4 94.8 96.3   BASE EXCESS ART mmol/L -2.0* -2.8* -2.4* -1.6* -3.3* -4.7* -1.3* 0.5     UA    Results from last 7 days   Lab Units 05/14/24  0755   NITRITE UA  Negative   WBC UA /HPF 0-2   BACTERIA UA /HPF None Seen   SQUAM EPITHEL UA /HPF None Seen     RYAN  No results found for:  "\"POCMETH\", \"POCAMPHET\", \"POCBARBITUR\", \"POCBENZO\", \"POCCOCAINE\", \"POCOPIATES\", \"POCOXYCODO\", \"POCPHENCYC\", \"POCPROPOXY\", \"POCTHC\", \"POCTRICYC\"  Lysis Labs   Results from last 7 days   Lab Units 05/19/24  0401 05/18/24  0321 05/17/24  0306 05/16/24  1924 05/16/24  0218 05/15/24  2256 05/15/24  2108 05/15/24  1905 05/15/24  1136 05/15/24  1054 05/15/24  0739 05/14/24  0755   INR   --   --   --   --  1.21*  --   --   --   --  1.20*  --  1.07   APTT seconds  --   --   --   --   --   --   --   --   --  21.7*  --  27.3   HEMOGLOBIN g/dL 8.9* 8.1* 8.1* 8.7* 7.8*  --   --   --   --  8.4*  --  9.8*   HEMOGLOBIN, POC g/dL  --   --   --   --   --  8.3* 7.9* 8.2*   < >  --    < >  --    PLATELETS 10*3/mm3 254 182 152  --  172  --   --   --   --  167  --  276   CREATININE mg/dL 2.85* 2.86* 2.96*  --  1.47* 1.50* 1.47* 1.53*   < > 1.44*  --  1.41*    < > = values in this interval not displayed.     Radiology(recent) XR Chest 1 View    Result Date: 5/18/2024  Impression: 1.Bibasilar areas of density that could like atelectasis and effusions. 2.Fullness to the mediastinal shadow unchanged that could be postsurgical and has been suggested since surgery Electronically Signed: Anurag Colon MD  5/18/2024 2:06 PM EDT  Workstation ID: [id number]    XR Chest 1 View    Result Date: 5/17/2024  Impression: Interval removal of left thoracostomy tube. No evidence of pneumothorax. Electronically Signed: Braden Bartlett MD  5/17/2024 5:45 PM EDT  Workstation ID: [id number]    US Renal Bilateral    Result Date: 5/17/2024  Impression: 1.Left renal cysts measuring up to 1.6 cm. 2.No hydronephrosis. Electronically Signed: Lisa Lan  5/17/2024 1:50 PM EDT  Workstation ID: [id number]       Results from last 7 days   Lab Units 05/18/24  0321   CK TOTAL U/L 161       Imaging Results (Last 24 Hours)       Procedure Component Value Units Date/Time    XR Chest 1 View [[id number]] Collected: 05/18/24 1404     Updated: 05/18/24 1408    Narrative:      XR CHEST 1 " VW    Date of Exam: 5/18/2024 1:56 PM EDT    Indication: sob, chest pain    Comparison: May 17, 2024    Findings:  There is a sheath in the right internal jugular vein. Sternotomy wires noted. The heart looks enlarged. There is fullness of the mediastinum unchanged. The depth of inspiration is poor. A pneumothorax not definitely identified. There are bibasilar   densities that could relate to some atelectasis and effusions.      Impression:      Impression:  1.Bibasilar areas of density that could like atelectasis and effusions.  2.Fullness to the mediastinal shadow unchanged that could be postsurgical and has been suggested since surgery      Electronically Signed: Anurag Colon MD    5/18/2024 2:06 PM EDT    Workstation ID: [id number]            Cardiac Studies:  Echo- Results for orders placed in visit on 05/15/24    Intra-Op Anesthesia DIGNA    Narrative  Intra-Op Anesthesia DIGNA    Procedure Performed: Intra-Op Anesthesia DIGNA  Start Time:  End Time:    Preanesthesia Checklist:  Patient identified, IV assessed, risks and benefits discussed, monitors and equipment assessed, procedure being performed at surgeon's request and anesthesia consent obtained.    General Procedure Information  DIGNA Placed for monitoring purposes only -- This is not a diagnostic DIGNA  Diagnostic Indications for Echo:  assessment of surgical repair and hemodynamic monitoring  Physician Requesting Echo: Jean Hirsch MD  Location performed:  OR  Intubated  Bite block placed  Heart visualized  Probe Insertion:  Easy  Probe Type:  Multiplane  Modalities:  Color flow mapping and continuous wave Doppler    Echocardiographic and Doppler Measurements    Ventricles    Right Ventricle:  Cavity size normal.  Global function normal.  Left Ventricle:  Cavity size normal.  Global Function normal.        Valves    Aortic Valve:  Annulus normal.  Stenosis not present.  Regurgitation trace.  Leaflets normal.  Leaflet motions normal.    Mitral Valve:  Annulus  normal.  Stenosis not present.  Regurgitation trace.  Leaflets normal.  Leaflet motions normal.    Tricuspid Valve:  Annulus normal.  Stenosis not present.  Regurgitation absent.  Leaflets normal.  Leaflet motions normal.  Pulmonic Valve:  Annulus normal.  Stenosis not present.  Regurgitation absent.      Aorta    Ascending Aorta:  Size normal.  Dissection not present.  Mobile plaque not present.  Aortic Arch:  Size normal.  Dissection not present.  Mobile plaque not present.  Descending Aorta:  Size normal.  Dissection not present.  Mobile plaque not present.      Atria    Right Atrium:  Size normal.  Spontaneous echo contrast not present.  Thrombus not present.  Tumor not present.  Device not present.    Left Atrium:  Size normal.  Spontaneous echo contrast not present.  Thrombus not present.  Tumor not present.  Device not present.  Left atrial appendage normal.      Septa        Ventricular Septum:  Intra-ventricular septum morphology normal.        Other Findings  Pericardium:  normal  Pleural Effusion:  none  Pulmonary Venous Flow:  normal    Anesthesia Information  Performed Personally  Anesthesiologist:  Kvng Pearson MD      Echocardiogram Comments:  Post bypass EF 55%. No change in valves. No air seen.    Stress Myoview-  Cath-        Jesús Burns DO  05/19/24  11:45 EDT    Copied information has been reviewed and is current as of 05/19/24

## 2024-05-19 NOTE — PROGRESS NOTES
"NEPHROLOGY PROGRESS NOTE------[facility]    [facility]  [phone]  Bernadine Childress MD      Patient Care Team:  Camryn Salinas MD as PCP - General (Family Medicine)  Marco A Childress MD as Consulting Physician (Nephrology)      Provider:  Bernadine Childress MD  Patient Name: Fransico Cuenca  :  1955    SUBJECTIVE:    F/U ARF/EN/CRF/CKD    Up in chair. Breathing ok. No angina. No dysuria. Appetite ok. Edema improving    Medication:  allopurinol, 100 mg, Oral, Daily  aspirin, 81 mg, Oral, Daily  atorvastatin, 40 mg, Oral, Nightly  chlorhexidine, 15 mL, Mouth/Throat, Q12H  citalopram, 20 mg, Oral, Daily  enoxaparin, 30 mg, Subcutaneous, Q24H  ferric gluconate, 125 mg, Intravenous, Daily  gabapentin, 100 mg, Oral, BID  insulin lispro, 2-9 Units, Subcutaneous, 4x Daily AC & at Bedtime  metoprolol tartrate, 12.5 mg, Oral, Q12H  montelukast, 10 mg, Oral, Daily  mupirocin, , Each Nare, BID  pantoprazole, 40 mg, Oral, Daily  polyethylene glycol, 17 g, Oral, BID  potassium chloride, 20 mEq, Oral, TID With Meals  senna-docusate sodium, 2 tablet, Oral, BID      bumetanide, 1 mg/hr, Last Rate: 1 mg/hr (24)  dilTIAZem, 5-15 mg/hr, Last Rate: 15 mg/hr (24 0537)        OBJECTIVE    Vital Sign Min/Max for last 24 hours  Temp  Min: 97.7 °F (36.5 °C)  Max: 99.7 °F (37.6 °C)   BP  Min: 84/55  Max: 155/85   Pulse  Min: 68  Max: 130   Resp  Min: 14  Max: 24   SpO2  Min: 90 %  Max: 98 %   No data recorded   Weight  Min: 96.5 kg (212 lb 11.9 oz)  Max: 96.5 kg (212 lb 11.9 oz)     Flowsheet Rows      Flowsheet Row First Filed Value   Admission Height 167.6 cm (65.98\") Documented at 05/15/2024 1035   Admission Weight 95.8 kg (211 lb 3.2 oz) Documented at 05/15/2024 0600            I/O this shift:  In: -   Out: 225 [Urine:225]  I/O last 3 completed shifts:  In: 1601 [P.O.:1248; I.V.:353]  Out: 4975 [Urine:4975]    Physical Exam: " "CVP=16  General Appearance: alert, appears stated age and cooperative  Head: normocephalic, without obvious abnormality and atraumatic  Eyes: conjunctivae and sclerae normal and no icterus  Neck: supple and +JVD  Lungs: DECREASED BS BIBASILAR WITH FINE LEFT CRACKLES  Heart: regular rhythm & normal rate and normal S1, S2 +MICA  Chest Wall: no abnormalities observed  Abdomen: normal bowel sounds and +HYPOACTIVE BS WITH MILD DISTENSION. +BAHENA  Extremities: moves extremities well, TRACE/1+ BILAT LE EDEMA, no cyanosis  Skin: no bleeding, bruising or rash  Neurologic: Alert, and oriented. No focal deficits    Labs:    WBC WBC   Date Value Ref Range Status   05/19/2024 9.47 3.40 - 10.80 10*3/mm3 Final   05/18/2024 7.18 3.40 - 10.80 10*3/mm3 Final   05/17/2024 8.46 3.40 - 10.80 10*3/mm3 Final      HGB Hemoglobin   Date Value Ref Range Status   05/19/2024 8.9 (L) 13.0 - 17.7 g/dL Final   05/18/2024 8.1 (L) 13.0 - 17.7 g/dL Final   05/17/2024 8.1 (L) 13.0 - 17.7 g/dL Final   05/16/2024 8.7 (L) 13.0 - 17.7 g/dL Final      HCT Hematocrit   Date Value Ref Range Status   05/19/2024 28.2 (L) 37.5 - 51.0 % Final   05/18/2024 26.2 (L) 37.5 - 51.0 % Final   05/17/2024 26.6 (L) 37.5 - 51.0 % Final   05/16/2024 29.3 (L) 37.5 - 51.0 % Final      Platelets No results found for: \"LABPLAT\"   MCV MCV   Date Value Ref Range Status   05/19/2024 82.9 79.0 - 97.0 fL Final   05/18/2024 84.5 79.0 - 97.0 fL Final   05/17/2024 86.9 79.0 - 97.0 fL Final          Sodium Sodium   Date Value Ref Range Status   05/19/2024 138 136 - 145 mmol/L Final   05/18/2024 142 136 - 145 mmol/L Final   05/17/2024 142 136 - 145 mmol/L Final      Potassium Potassium   Date Value Ref Range Status   05/19/2024 4.1 3.5 - 5.2 mmol/L Final   05/18/2024 3.9 3.5 - 5.2 mmol/L Final   05/17/2024 4.4 3.5 - 5.2 mmol/L Final      Chloride Chloride   Date Value Ref Range Status   05/19/2024 98 98 - 107 mmol/L Final   05/18/2024 103 98 - 107 mmol/L Final   05/17/2024 105 98 - 107 " "mmol/L Final      CO2 CO2   Date Value Ref Range Status   05/19/2024 26.0 22.0 - 29.0 mmol/L Final   05/18/2024 26.0 22.0 - 29.0 mmol/L Final   05/17/2024 20.0 (L) 22.0 - 29.0 mmol/L Final      BUN BUN   Date Value Ref Range Status   05/19/2024 72 (H) 8 - 23 mg/dL Final   05/18/2024 65 (H) 8 - 23 mg/dL Final   05/17/2024 52 (H) 8 - 23 mg/dL Final      Creatinine Creatinine   Date Value Ref Range Status   05/19/2024 2.85 (H) 0.76 - 1.27 mg/dL Final   05/18/2024 2.86 (H) 0.76 - 1.27 mg/dL Final   05/17/2024 2.96 (H) 0.76 - 1.27 mg/dL Final      Calcium Calcium   Date Value Ref Range Status   05/19/2024 9.1 8.6 - 10.5 mg/dL Final   05/18/2024 8.7 8.6 - 10.5 mg/dL Final   05/17/2024 8.7 8.6 - 10.5 mg/dL Final      PO4 No components found for: \"PO4\"   Albumin Albumin   Date Value Ref Range Status   05/18/2024 3.4 (L) 3.5 - 5.2 g/dL Final      Magnesium Magnesium   Date Value Ref Range Status   05/19/2024 2.3 1.6 - 2.4 mg/dL Final   05/18/2024 2.8 (H) 1.6 - 2.4 mg/dL Final      Uric Acid No components found for: \"URIC ACID\"     Imaging Results (Last 72 Hours)       Procedure Component Value Units Date/Time    XR Chest 1 View [[id number]] Collected: 05/18/24 1404     Updated: 05/18/24 1408    Narrative:      XR CHEST 1 VW    Date of Exam: 5/18/2024 1:56 PM EDT    Indication: sob, chest pain    Comparison: May 17, 2024    Findings:  There is a sheath in the right internal jugular vein. Sternotomy wires noted. The heart looks enlarged. There is fullness of the mediastinum unchanged. The depth of inspiration is poor. A pneumothorax not definitely identified. There are bibasilar   densities that could relate to some atelectasis and effusions.      Impression:      Impression:  1.Bibasilar areas of density that could like atelectasis and effusions.  2.Fullness to the mediastinal shadow unchanged that could be postsurgical and has been suggested since surgery      Electronically Signed: Anurag Colon MD    5/18/2024 2:06 PM EDT    " Workstation ID: [id number]    XR Chest 1 View [[id number]] Collected: 05/17/24 1743     Updated: 05/17/24 1747    Narrative:      XR CHEST 1 VW    Date of Exam: 5/17/2024 5:39 PM EDT    Indication: chest tube removal    Comparison: Same day chest radiographs.    Findings:  Interval removal of left basilar thoracostomy tube. No evidence of new pneumothorax. Right approach vascular sheath in unchanged position. Cardiac surgical changes with median sternotomy wires. Unchanged enlarged cardiomediastinal silhouette. Low lung   volumes. No definite new focal airspace opacity or pleural effusion. Osseous structures are unchanged.,      Impression:      Impression:  Interval removal of left thoracostomy tube. No evidence of pneumothorax.      Electronically Signed: Braden Bartlett MD    5/17/2024 5:45 PM EDT    Workstation ID: [id number]    US Renal Bilateral [[id number]] Collected: 05/17/24 1347     Updated: 05/17/24 1352    Narrative:      US RENAL BILATERAL    Date of Exam: 5/17/2024 1:16 PM EDT    Indication: ARF/EN/CRF/CKD.    Comparison: CT abdomen and pelvis without contrast 7/26/2020, renal ultrasound 4/18/2024.    Technique: Grayscale and color Doppler ultrasound evaluation of the kidneys and urinary bladder was performed.    Findings:  Technologist noted a technically difficult exam due to body habitus and portable technique, done in the CVICU.    Right kidney measures 11.2 x 6.1 x 6.1 cm and demonstrates normal cortical echogenicity. No definite renal masses are seen. No right hydronephrosis.    Left kidney measures 10.3 x 5.2 x 6.5 cm. 2 left renal cysts measure up to 1.6 cm. Normal cortical echogenicity of the left kidney. No left hydronephrosis.    Urinary bladder is decompressed with Rasmussen catheter in place.      Impression:      Impression:  1.Left renal cysts measuring up to 1.6 cm.  2.No hydronephrosis.        Electronically Signed: Lisa Lan    5/17/2024 1:50 PM EDT    Workstation ID: [id number]    XR Chest 1  View [[id number]] Collected: 05/17/24 0814     Updated: 05/17/24 0818    Narrative:      XR CHEST 1 VW    Date of Exam: 5/17/2024 4:23 AM EDT    Indication: Post-Op Heart Surgery    Comparison: 5/16/2024  Findings:  Harrison-Naveen catheter has been removed and replaced by sheath with its tip in the upper SVC. Left chest tube is unchanged in position. There is no pneumothorax. There is continued mild atelectasis of the lung bases, greatest on the left. Small left effusion   is again noted.      Impression:      Impression:  Continued mild bibasilar atelectasis, greatest on the left, with small left effusion.      Electronically Signed: Talita Bear MD    5/17/2024 8:16 AM EDT    Workstation ID: [id number]    XR Abdomen KUB [[id number]] Collected: 05/16/24 1820     Updated: 05/16/24 1825    Narrative:      XR ABDOMEN KUB    Date of Exam: 5/16/2024 6:00 PM EDT    Indication: Abd distension, increased pain    Comparison: 2020 CT    Findings:  Gas-filled colon. No evidence of small bowel obstruction. No radiodense renal calculi noted. No acute osseous abnormality.      Impression:      Impression:  Gas-distended colon. No evidence of bowel obstruction.      Electronically Signed: Braden Bartlett MD    5/16/2024 6:23 PM EDT    Workstation ID: [id number]            Results for orders placed during the hospital encounter of 05/15/24    XR Chest 1 View    Narrative  XR CHEST 1 VW    Date of Exam: 5/18/2024 1:56 PM EDT    Indication: sob, chest pain    Comparison: May 17, 2024    Findings:  There is a sheath in the right internal jugular vein. Sternotomy wires noted. The heart looks enlarged. There is fullness of the mediastinum unchanged. The depth of inspiration is poor. A pneumothorax not definitely identified. There are bibasilar  densities that could relate to some atelectasis and effusions.    Impression  Impression:  1.Bibasilar areas of density that could like atelectasis and effusions.  2.Fullness to the mediastinal shadow  unchanged that could be postsurgical and has been suggested since surgery      Electronically Signed: Anurag Colon MD  5/18/2024 2:06 PM EDT  Workstation ID: [id number]      XR Chest 1 View    Narrative  XR CHEST 1 VW    Date of Exam: 5/17/2024 5:39 PM EDT    Indication: chest tube removal    Comparison: Same day chest radiographs.    Findings:  Interval removal of left basilar thoracostomy tube. No evidence of new pneumothorax. Right approach vascular sheath in unchanged position. Cardiac surgical changes with median sternotomy wires. Unchanged enlarged cardiomediastinal silhouette. Low lung  volumes. No definite new focal airspace opacity or pleural effusion. Osseous structures are unchanged.,    Impression  Impression:  Interval removal of left thoracostomy tube. No evidence of pneumothorax.      Electronically Signed: Braden Bartlett MD  5/17/2024 5:45 PM EDT  Workstation ID: [id number]      XR Abdomen KUB    Narrative  XR ABDOMEN KUB    Date of Exam: 5/16/2024 6:00 PM EDT    Indication: Abd distension, increased pain    Comparison: 2020 CT    Findings:  Gas-filled colon. No evidence of small bowel obstruction. No radiodense renal calculi noted. No acute osseous abnormality.    Impression  Impression:  Gas-distended colon. No evidence of bowel obstruction.      Electronically Signed: Braden Bartlett MD  5/16/2024 6:23 PM EDT  Workstation ID: [id number]      Results for orders placed during the hospital encounter of 04/18/24    Duplex Carotid Ultrasound CAR    Interpretation Summary    Right internal carotid artery demonstrates a less than 50% stenosis.    Normal left carotid duplex scan.        ASSESSMENT / PLAN      ASCAD      ARF/EN/CRF/CKD------Nonoliguric overnight with diuretics. +ARF/EN on top  Of known CRF/CKD STG 3A with a baseline serum creatinine of about 1.4-1.5. CRF/CKD STG 3A secondary to DGS/HTN NS. +ARF/EN secondary to ATN from hypotension and renal perfusion disturbance from CP bypass with preadmission  ACE-I use. No NSAIDs or IV dye.  Continue to diurese. Support BP and hemodynamics. Off of ACE-I. Dose meds for CrCl less than 10 cc/min until ARF/EN is resolved     2. HTN WITH CKD-----BP better. Off of Vasopressin now. Keep off of Lisinopril. No ACE-I/ARB/DRI for now. Keep MAP>65     3. DMII WITH RENAL MANIFESTATIONS------Off Metformin for now. BS ok. Glucometers, SSI and Insulin gtt post op per protocol     4. HYPERLIPIDEMIA------Hold Statin until tomorrow. Follow CK. TSH normal     5. OA/DJD/HYPERURICEMIA------On Allopurinol preadmission. Check uric acid levels No NSAIDs     6. DEPRESSION-----Celexa     7. COPD-----Respiratory status stable. No active wheezing     8. GERD/PUD PROPHYLAXIS-----PPI. Benefits outweigh risks despite renal dysfunction     9. BPH------On Hytrin preadmission. Currently with lópez     10. ANEMIA------IV iron for MOSHE.       11. ACIDOSIS-----Resolved    12. VOLUME OVERLOAD/ELEVATED CVP/EDEMA-----Improving. Back down Bumex gtt and follow    13. HYPOKALEMIA-------Replaced    14. VERY MILD RENAL FAILURE ASSOCIATED HYPERPHOSPHATEMIA      Bernadine Childress MD  [facility]  [phone]  05/19/24  08:22 EDT

## 2024-05-19 NOTE — PROGRESS NOTES
" LOS: 4 days   Patient Care Team:  Camryn aSlinas MD as PCP - General (Family Medicine)  Marco A Childress MD as Consulting Physician (Nephrology)    Chief Complaint: post-op    Subjective     Subjective:  Symptoms:  Stable.  No shortness of breath or chest pain.    Diet:  Adequate intake.  No nausea or vomiting.    Activity level: Impaired due to weakness.    Pain:  He reports no pain.      Objective     Vital Signs  Temp:  [97.7 °F (36.5 °C)-99.7 °F (37.6 °C)] 99.7 °F (37.6 °C)  Heart Rate:  [] 84  Resp:  [14-24] 15  BP: ()/(55-96) 93/64  Body mass index is 34.35 kg/m².    Intake/Output Summary (Last 24 hours) at 5/19/2024 0745  Last data filed at 5/19/2024 0600  Gross per 24 hour   Intake 1096 ml   Output 3550 ml   Net -2454 ml     No intake/output data recorded.      Wt Readings from Last 3 Encounters:   05/19/24 96.5 kg (212 lb 11.9 oz)   05/14/24 95.7 kg (211 lb)   05/13/24 96.8 kg (213 lb 6.4 oz)       Flowsheet Rows      Flowsheet Row First Filed Value   Admission Height 167.6 cm (65.98\") Documented at 05/15/2024 1035   Admission Weight 95.8 kg (211 lb 3.2 oz) Documented at 05/15/2024 0600            Objective:  General Appearance:  Comfortable and in no acute distress.    Vital signs: (most recent): Blood pressure 122/61, pulse 79, temperature 99.7 °F (37.6 °C), temperature source Oral, resp. rate 15, height 167.6 cm (65.98\"), weight 96.5 kg (212 lb 11.9 oz), SpO2 (!) 88%.  Vital signs are normal.  No fever.    Output: Producing urine and producing stool.    Lungs:  Normal effort and normal respiratory rate.  There are rales, decreased breath sounds and wheezes.    Heart: Normal rate.  Irregular rhythm.    Abdomen: Bowel sounds are normal.     Extremities: Normal range of motion.    Neurological: Patient is alert and oriented to person, place and time.    Skin:  Warm and dry.  (Sternal incision clean, dry, and intact without erythema, edema, or drainage)              Results Review: " "       Results from last 7 days   Lab Units 05/19/24  0401 05/18/24  0321 05/17/24  0306   WBC 10*3/mm3 9.47 7.18 8.46   HEMOGLOBIN g/dL 8.9* 8.1* 8.1*   HEMATOCRIT % 28.2* 26.2* 26.6*   PLATELETS 10*3/mm3 254 182 152         PT/INR:    No results found for: \"PROTIME\"  /  No results found for: \"INR\"      Results from last 7 days   Lab Units 05/19/24  0401 05/18/24  0321 05/17/24  0306   SODIUM mmol/L 138 142 142   POTASSIUM mmol/L 4.1 3.9 4.4   CHLORIDE mmol/L 98 103 105   CO2 mmol/L 26.0 26.0 20.0*   BUN mg/dL 72* 65* 52*   CREATININE mg/dL 2.85* 2.86* 2.96*   GLUCOSE mg/dL 104* 82 129*   CALCIUM mg/dL 9.1 8.7 8.7         Scheduled Meds:  allopurinol, 100 mg, Oral, Daily  aspirin, 81 mg, Oral, Daily  atorvastatin, 40 mg, Oral, Nightly  chlorhexidine, 15 mL, Mouth/Throat, Q12H  citalopram, 20 mg, Oral, Daily  enoxaparin, 30 mg, Subcutaneous, Q24H  ferric gluconate, 125 mg, Intravenous, Daily  gabapentin, 100 mg, Oral, BID  insulin lispro, 2-9 Units, Subcutaneous, 4x Daily AC & at Bedtime  metoprolol tartrate, 12.5 mg, Oral, Q12H  montelukast, 10 mg, Oral, Daily  mupirocin, , Each Nare, BID  pantoprazole, 40 mg, Oral, Daily  polyethylene glycol, 17 g, Oral, BID  potassium chloride, 20 mEq, Oral, TID With Meals  senna-docusate sodium, 2 tablet, Oral, BID        Infusions:  bumetanide, 1 mg/hr, Last Rate: 1 mg/hr (05/18/24 1911)  dilTIAZem, 5-15 mg/hr, Last Rate: 15 mg/hr (05/19/24 0537)          Assessment & Plan         ASCAD      Assessment & Plan      - Severe MV CAD, EF 55% (cath)--s/p elective CABG x4 with LIMA ([facility])  - NSTEMI presentation--April 2024  - HTN--pressor postop  - HLD--statin  - Postop EN/ CKD stage 3--Dr. Childress following  - Anxiety  - ZHANG with CPAP compliance  - Recent hx human meta pneumovirus infection--resolved  - Chronic HFpEF, NYHA class II-III  - Postop ABLA, expected--transfused 5/16    POD4:    Currently on 6LHFNC, wean as able   Patient went into a.fib lastnight and is currently on " diltiazem gtt per Cardiology   Creatinine 2.85 this morning (2.86 yesterday), on Bumex gtt and diuril -- Dr. Childress managing, follows as outpatient for CKD  Low dose beta blocker started yesterday -- monitor BP and MAP to ensure good renal perfusion   Keep central line another day, keep pacing wires and lópez  Continue routine care, encourage IS, mobilize    Nataliya Calero PA-C  05/19/24  07:45 EDT    No indicators present

## 2024-05-19 NOTE — CASE MANAGEMENT/SOCIAL WORK
Continued Stay Note  ERMIAS Valentine     Patient Name: Fransico Cuenca  MRN: [id number]  Today's Date: 5/19/2024    Admit Date: 5/15/2024    Plan: Referrals in place [facility]; pending acceptance. CABG 5/15/24.   Discharge Plan       Row Name 05/19/24 1241       Plan    Plan Referrals in place to [facility]; pending acceptance. CABG 5/15/24.    Patient/Family in Agreement with Plan yes    Plan Comments Received message from pt RN that pt would like to change his first inpt rehab choice to 1) [facility] 2) [facility]. [facility] referral placed and liaison notified.

## 2024-05-20 ENCOUNTER — APPOINTMENT (OUTPATIENT)
Dept: CT IMAGING | Facility: HOSPITAL | Age: 69
DRG: 235 | End: 2024-05-20
Payer: MEDICARE

## 2024-05-20 LAB
ALBUMIN SERPL-MCNC: 3.1 G/DL (ref 3.5–5.2)
ALBUMIN/GLOB SERPL: 0.9 G/DL
ALP SERPL-CCNC: 51 U/L (ref 39–117)
ALT SERPL W P-5'-P-CCNC: <5 U/L (ref 1–41)
ANION GAP SERPL CALCULATED.3IONS-SCNC: 14 MMOL/L (ref 5–15)
ARTERIAL PATENCY WRIST A: NORMAL
ARTERIAL PATENCY WRIST A: POSITIVE
AST SERPL-CCNC: 22 U/L (ref 1–40)
ATMOSPHERIC PRESS: ABNORMAL MM[HG]
ATMOSPHERIC PRESS: ABNORMAL MM[HG]
ATMOSPHERIC PRESS: NORMAL MM[HG]
BASE EXCESS BLDA CALC-SCNC: 3.5 MMOL/L (ref 0–3)
BASE EXCESS BLDA CALC-SCNC: NORMAL MMOL/L
BASE EXCESS BLDV CALC-SCNC: 4.2 MMOL/L (ref -2–2)
BDY SITE: ABNORMAL
BDY SITE: ABNORMAL
BDY SITE: NORMAL
BILIRUB SERPL-MCNC: 0.3 MG/DL (ref 0–1.2)
BUN SERPL-MCNC: 81 MG/DL (ref 8–23)
BUN/CREAT SERPL: 28.7 (ref 7–25)
CA-I SERPL ISE-MCNC: 1.2 MMOL/L (ref 1.2–1.3)
CALCIUM SPEC-SCNC: 9 MG/DL (ref 8.6–10.5)
CHLORIDE SERPL-SCNC: 98 MMOL/L (ref 98–107)
CO2 BLDA-SCNC: 28.9 MMOL/L (ref 22–29)
CO2 BLDA-SCNC: 29.8 MMOL/L (ref 22–29)
CO2 BLDA-SCNC: NORMAL MMOL/L
CO2 SERPL-SCNC: 28 MMOL/L (ref 22–29)
CREAT SERPL-MCNC: 2.82 MG/DL (ref 0.76–1.27)
DEPRECATED RDW RBC AUTO: 48.7 FL (ref 37–54)
EGFRCR SERPLBLD CKD-EPI 2021: 23.3 ML/MIN/1.73
ERYTHROCYTE [DISTWIDTH] IN BLOOD BY AUTOMATED COUNT: 15.9 % (ref 12.3–15.4)
GLOBULIN UR ELPH-MCNC: 3.3 GM/DL
GLUCOSE BLDC GLUCOMTR-MCNC: 107 MG/DL (ref 70–105)
GLUCOSE BLDC GLUCOMTR-MCNC: 121 MG/DL (ref 70–105)
GLUCOSE BLDC GLUCOMTR-MCNC: 122 MG/DL (ref 70–105)
GLUCOSE BLDC GLUCOMTR-MCNC: 136 MG/DL (ref 70–105)
GLUCOSE SERPL-MCNC: 110 MG/DL (ref 65–99)
HCO3 BLDA-SCNC: 27.7 MMOL/L (ref 21–28)
HCO3 BLDA-SCNC: NORMAL MMOL/L
HCO3 BLDV-SCNC: 28.6 MMOL/L (ref 22–26)
HCT VFR BLD AUTO: 25.9 % (ref 37.5–51)
HEMODILUTION: NO
HEMODILUTION: NORMAL
HGB BLD-MCNC: 8.2 G/DL (ref 13–17.7)
INHALED O2 CONCENTRATION: 48 %
INHALED O2 CONCENTRATION: 50 %
INHALED O2 CONCENTRATION: NORMAL %
MAGNESIUM SERPL-MCNC: 2.4 MG/DL (ref 1.6–2.4)
MCH RBC QN AUTO: 26.6 PG (ref 26.6–33)
MCHC RBC AUTO-ENTMCNC: 31.7 G/DL (ref 31.5–35.7)
MCV RBC AUTO: 84.1 FL (ref 79–97)
MODALITY: ABNORMAL
MODALITY: ABNORMAL
MODALITY: NORMAL
PCO2 BLDA: 39.4 MM HG (ref 35–48)
PCO2 BLDA: NORMAL MM[HG]
PCO2 BLDV: 40.8 MM HG (ref 42–51)
PH BLDA: 7.45 PH UNITS (ref 7.35–7.45)
PH BLDA: NORMAL [PH]
PH BLDV: 7.45 PH UNITS (ref 7.32–7.43)
PHOSPHATE SERPL-MCNC: 5.5 MG/DL (ref 2.5–4.5)
PLATELET # BLD AUTO: 293 10*3/MM3 (ref 140–450)
PMV BLD AUTO: 10.2 FL (ref 6–12)
PO2 BLDA: 67.7 MM HG (ref 83–108)
PO2 BLDA: NORMAL MM[HG]
PO2 BLDV: 52.8 MM HG (ref 40–42)
POTASSIUM SERPL-SCNC: 4.1 MMOL/L (ref 3.5–5.2)
PROT SERPL-MCNC: 6.4 G/DL (ref 6–8.5)
RBC # BLD AUTO: 3.08 10*6/MM3 (ref 4.14–5.8)
SAO2 % BLDCOA: 94.1 % (ref 94–98)
SAO2 % BLDCOA: NORMAL %
SAO2 % BLDCOV: 88.4 % (ref 45–75)
SODIUM SERPL-SCNC: 140 MMOL/L (ref 136–145)
WBC NRBC COR # BLD AUTO: 9.27 10*3/MM3 (ref 3.4–10.8)

## 2024-05-20 PROCEDURE — 94799 UNLISTED PULMONARY SVC/PX: CPT

## 2024-05-20 PROCEDURE — 80053 COMPREHEN METABOLIC PANEL: CPT | Performed by: INTERNAL MEDICINE

## 2024-05-20 PROCEDURE — 36600 WITHDRAWAL OF ARTERIAL BLOOD: CPT

## 2024-05-20 PROCEDURE — 25010000002 ENOXAPARIN PER 10 MG: Performed by: INTERNAL MEDICINE

## 2024-05-20 PROCEDURE — 82948 REAGENT STRIP/BLOOD GLUCOSE: CPT | Performed by: NURSE PRACTITIONER

## 2024-05-20 PROCEDURE — 82803 BLOOD GASES ANY COMBINATION: CPT

## 2024-05-20 PROCEDURE — 25010000002 NA FERRIC GLUC CPLX PER 12.5 MG: Performed by: INTERNAL MEDICINE

## 2024-05-20 PROCEDURE — 97110 THERAPEUTIC EXERCISES: CPT

## 2024-05-20 PROCEDURE — 82948 REAGENT STRIP/BLOOD GLUCOSE: CPT

## 2024-05-20 PROCEDURE — 25010000002 BUMETANIDE PER 0.5 MG: Performed by: INTERNAL MEDICINE

## 2024-05-20 PROCEDURE — 99024 POSTOP FOLLOW-UP VISIT: CPT | Performed by: NURSE PRACTITIONER

## 2024-05-20 PROCEDURE — 85027 COMPLETE CBC AUTOMATED: CPT | Performed by: NURSE PRACTITIONER

## 2024-05-20 PROCEDURE — 84100 ASSAY OF PHOSPHORUS: CPT | Performed by: INTERNAL MEDICINE

## 2024-05-20 PROCEDURE — 97530 THERAPEUTIC ACTIVITIES: CPT

## 2024-05-20 PROCEDURE — 71250 CT THORAX DX C-: CPT

## 2024-05-20 PROCEDURE — 83735 ASSAY OF MAGNESIUM: CPT | Performed by: INTERNAL MEDICINE

## 2024-05-20 PROCEDURE — 82330 ASSAY OF CALCIUM: CPT | Performed by: INTERNAL MEDICINE

## 2024-05-20 RX ORDER — TAMSULOSIN HYDROCHLORIDE 0.4 MG/1
0.4 CAPSULE ORAL DAILY
Status: DISCONTINUED | OUTPATIENT
Start: 2024-05-20 | End: 2024-05-23 | Stop reason: HOSPADM

## 2024-05-20 RX ORDER — IPRATROPIUM BROMIDE AND ALBUTEROL SULFATE 2.5; .5 MG/3ML; MG/3ML
3 SOLUTION RESPIRATORY (INHALATION)
Status: DISCONTINUED | OUTPATIENT
Start: 2024-05-20 | End: 2024-05-23 | Stop reason: HOSPADM

## 2024-05-20 RX ORDER — BUMETANIDE 0.25 MG/ML
1 INJECTION INTRAMUSCULAR; INTRAVENOUS EVERY 8 HOURS
Status: DISCONTINUED | OUTPATIENT
Start: 2024-05-20 | End: 2024-05-21

## 2024-05-20 RX ORDER — ACETYLCYSTEINE 200 MG/ML
2 SOLUTION ORAL; RESPIRATORY (INHALATION)
Status: DISCONTINUED | OUTPATIENT
Start: 2024-05-20 | End: 2024-05-23 | Stop reason: HOSPADM

## 2024-05-20 RX ORDER — BUDESONIDE 0.5 MG/2ML
0.5 INHALANT ORAL
Status: DISCONTINUED | OUTPATIENT
Start: 2024-05-20 | End: 2024-05-23 | Stop reason: HOSPADM

## 2024-05-20 RX ORDER — CEPHALEXIN 500 MG/1
500 CAPSULE ORAL EVERY 8 HOURS SCHEDULED
Status: DISCONTINUED | OUTPATIENT
Start: 2024-05-20 | End: 2024-05-22

## 2024-05-20 RX ORDER — GUAIFENESIN 600 MG/1
600 TABLET, EXTENDED RELEASE ORAL EVERY 12 HOURS SCHEDULED
Status: DISCONTINUED | OUTPATIENT
Start: 2024-05-20 | End: 2024-05-23 | Stop reason: HOSPADM

## 2024-05-20 RX ADMIN — MONTELUKAST 10 MG: 10 TABLET, FILM COATED ORAL at 09:44

## 2024-05-20 RX ADMIN — SODIUM CHLORIDE 125 MG: 9 INJECTION, SOLUTION INTRAVENOUS at 09:43

## 2024-05-20 RX ADMIN — ATORVASTATIN CALCIUM 40 MG: 40 TABLET, FILM COATED ORAL at 20:46

## 2024-05-20 RX ADMIN — ENOXAPARIN SODIUM 30 MG: 100 INJECTION SUBCUTANEOUS at 17:17

## 2024-05-20 RX ADMIN — POTASSIUM CHLORIDE 20 MEQ: 1500 TABLET, EXTENDED RELEASE ORAL at 09:44

## 2024-05-20 RX ADMIN — METOPROLOL TARTRATE 25 MG: 25 TABLET, FILM COATED ORAL at 20:46

## 2024-05-20 RX ADMIN — TAMSULOSIN HYDROCHLORIDE 0.4 MG: 0.4 CAPSULE ORAL at 09:51

## 2024-05-20 RX ADMIN — POTASSIUM CHLORIDE 20 MEQ: 1500 TABLET, EXTENDED RELEASE ORAL at 17:17

## 2024-05-20 RX ADMIN — METOPROLOL TARTRATE 25 MG: 25 TABLET, FILM COATED ORAL at 09:44

## 2024-05-20 RX ADMIN — ASPIRIN 81 MG: 81 TABLET, COATED ORAL at 09:44

## 2024-05-20 RX ADMIN — MUPIROCIN 1 APPLICATION: 20 OINTMENT TOPICAL at 09:43

## 2024-05-20 RX ADMIN — GUAIFENESIN 600 MG: 600 TABLET, EXTENDED RELEASE ORAL at 20:46

## 2024-05-20 RX ADMIN — GABAPENTIN 100 MG: 100 CAPSULE ORAL at 20:46

## 2024-05-20 RX ADMIN — IPRATROPIUM BROMIDE AND ALBUTEROL SULFATE 3 ML: .5; 3 SOLUTION RESPIRATORY (INHALATION) at 18:59

## 2024-05-20 RX ADMIN — GABAPENTIN 100 MG: 100 CAPSULE ORAL at 09:44

## 2024-05-20 RX ADMIN — CEPHALEXIN 500 MG: 500 CAPSULE ORAL at 18:48

## 2024-05-20 RX ADMIN — BUMETANIDE 1 MG: 0.25 INJECTION INTRAMUSCULAR; INTRAVENOUS at 17:17

## 2024-05-20 RX ADMIN — ALLOPURINOL 100 MG: 100 TABLET ORAL at 09:44

## 2024-05-20 RX ADMIN — ACETYLCYSTEINE 2 ML: 200 SOLUTION ORAL; RESPIRATORY (INHALATION) at 19:00

## 2024-05-20 RX ADMIN — GUAIFENESIN 600 MG: 600 TABLET, EXTENDED RELEASE ORAL at 09:43

## 2024-05-20 RX ADMIN — BUDESONIDE 0.5 MG: 0.5 SUSPENSION RESPIRATORY (INHALATION) at 19:00

## 2024-05-20 RX ADMIN — PANTOPRAZOLE SODIUM 40 MG: 40 TABLET, DELAYED RELEASE ORAL at 09:44

## 2024-05-20 RX ADMIN — POTASSIUM CHLORIDE 20 MEQ: 1500 TABLET, EXTENDED RELEASE ORAL at 13:08

## 2024-05-20 RX ADMIN — CHLORHEXIDINE GLUCONATE, 0.12% ORAL RINSE 15 ML: 1.2 SOLUTION DENTAL at 20:46

## 2024-05-20 RX ADMIN — CITALOPRAM HYDROBROMIDE 20 MG: 20 TABLET ORAL at 09:43

## 2024-05-20 RX ADMIN — CHLORHEXIDINE GLUCONATE, 0.12% ORAL RINSE 15 ML: 1.2 SOLUTION DENTAL at 09:43

## 2024-05-20 NOTE — PLAN OF CARE
Goal Outcome Evaluation:  Plan of Care Reviewed With: patient        Progress: improving  Outcome Evaluation: Patient is POD 5 s/p CABG x4 with LIMA.  RIJ and lópez catheter removed this shift.  Patient using urinal and able to urinate independently.  Epicardial wires remain in place and isolated; pacer is off.  Patient converted to SR during shift prior and has remained as such.  Bumex drip discontinued this shift and changed to intermittent IV.  UOP: 830ml.  Currently PO intake is 1200ml out of 1800ml fluid restriction.  Patient up to shower this shift.  This evening, sternal incision noted to be reddened proximal to the abdomen; no drainage, it is not warm to touch, no fever, it is blanchable and appears slightly more edematous than the rest of the area surrounding the incision;  MD notified and Keflex ordered and administered.  Continuing to provide frequent reminders to patient regarding sternal precautions, IS usage, and handwashing.  Patient ambulating around unit with assist x1 and walker.  Patient continues to c/o feeling the need to push urine out even with lópez catheter in place; MD notified; flomax added;  López catheter out and patient passed voiding trial.  Pulmonology consulted.  Chest CT completed this shift showing pulmonary edema and atelectasis. Continuing to encourage aggressive pulmonary hygeine.  Mucinex added.

## 2024-05-20 NOTE — CASE MANAGEMENT/SOCIAL WORK
Continued Stay Note   Serge     Patient Name: Fransico Cuenca  MRN: [id number]  Today's Date: 5/20/2024    Admit Date: 5/15/2024  Plan: DC plan: [facility] accepted, no pre-cert required. CABG 5/15/2024.   Discharge Plan       Row Name 05/20/24 1213       Plan    Plan Comments CM spoke with patient's nurse and [facility] NP Suzy Dinero to obtain clinical updates. CM confirmed with liaison [facility] accepted patient, no pre-cert required. DC barriers: POD 5, pacer wires, Cardizem gtt, IV bumex, 8L O2.      Row Name 05/20/24 1122       Plan    Plan DC plan: [facility] accepted, no pre-cert required. CABG 5/15/2024.             Expected Discharge Date and Time       Expected Discharge Date Expected Discharge Time    May 22, 2024        Laurie Hanson RN     Office Phone [phone]

## 2024-05-20 NOTE — PROGRESS NOTES
"NEPHROLOGY PROGRESS NOTE------[facility]    [facility]  [phone]  Bernadine Childress MD      Patient Care Team:  Camryn Salinas MD as PCP - General (Family Medicine)  Marco A Childress MD as Consulting Physician (Nephrology)      Provider:  Bernadine Childress MD  Patient Name: Fransico Cuenca  :  1955    SUBJECTIVE:    F/U ARF/EN/CRF/CKD    Up in chair. Breathing ok. Edema down. No dysuria.     Medication:  allopurinol, 100 mg, Oral, Daily  aspirin, 81 mg, Oral, Daily  atorvastatin, 40 mg, Oral, Nightly  chlorhexidine, 15 mL, Mouth/Throat, Q12H  citalopram, 20 mg, Oral, Daily  enoxaparin, 30 mg, Subcutaneous, Q24H  ferric gluconate, 125 mg, Intravenous, Daily  gabapentin, 100 mg, Oral, BID  insulin lispro, 2-9 Units, Subcutaneous, 4x Daily AC & at Bedtime  metoprolol tartrate, 25 mg, Oral, Q12H  montelukast, 10 mg, Oral, Daily  mupirocin, , Each Nare, BID  pantoprazole, 40 mg, Oral, Daily  polyethylene glycol, 17 g, Oral, BID  potassium chloride, 20 mEq, Oral, TID With Meals  senna-docusate sodium, 2 tablet, Oral, BID      bumetanide, 0.5 mg/hr, Last Rate: 0.5 mg/hr (24 1516)  dilTIAZem, 5-15 mg/hr, Last Rate: Stopped (24 2339)        OBJECTIVE    Vital Sign Min/Max for last 24 hours  Temp  Min: 98 °F (36.7 °C)  Max: 98.6 °F (37 °C)   BP  Min: 83/57  Max: 146/115   Pulse  Min: 58  Max: 99   Resp  Min: 12  Max: 20   SpO2  Min: 86 %  Max: 95 %   No data recorded   No data recorded     Flowsheet Rows      Flowsheet Row First Filed Value   Admission Height 167.6 cm (65.98\") Documented at 05/15/2024 1035   Admission Weight 95.8 kg (211 lb 3.2 oz) Documented at 05/15/2024 0600            I/O this shift:  In: 360 [P.O.:240; I.V.:120]  Out: 250 [Urine:250]  I/O last 3 completed shifts:  In: 2755.9 [P.O.:1968; I.V.:607.9; Other:180]  Out: 4680 [Urine:4680]    Physical Exam: CVP=12  General Appearance: alert, appears stated age " "and cooperative  Head: normocephalic, without obvious abnormality and atraumatic  Eyes: conjunctivae and sclerae normal and no icterus  Neck: supple and +MILD JVD  Lungs: DECREASED BS BIBASILAR WITH FINE LEFT CRACKLES  Heart: regular rhythm & normal rate and normal S1, S2 +MICA  Chest Wall: no abnormalities observed  Abdomen: normal bowel sounds and + BS   Extremities: moves extremities well, TRACE/1+ BILAT LE EDEMA, no cyanosis  Skin: no bleeding, bruising or rash  Neurologic: Alert, and oriented. No focal deficits    Labs:    WBC WBC   Date Value Ref Range Status   05/20/2024 9.27 3.40 - 10.80 10*3/mm3 Final   05/19/2024 9.47 3.40 - 10.80 10*3/mm3 Final   05/18/2024 7.18 3.40 - 10.80 10*3/mm3 Final      HGB Hemoglobin   Date Value Ref Range Status   05/20/2024 8.2 (L) 13.0 - 17.7 g/dL Final   05/19/2024 8.9 (L) 13.0 - 17.7 g/dL Final   05/18/2024 8.1 (L) 13.0 - 17.7 g/dL Final      HCT Hematocrit   Date Value Ref Range Status   05/20/2024 25.9 (L) 37.5 - 51.0 % Final   05/19/2024 28.2 (L) 37.5 - 51.0 % Final   05/18/2024 26.2 (L) 37.5 - 51.0 % Final      Platelets No results found for: \"LABPLAT\"   MCV MCV   Date Value Ref Range Status   05/20/2024 84.1 79.0 - 97.0 fL Final   05/19/2024 82.9 79.0 - 97.0 fL Final   05/18/2024 84.5 79.0 - 97.0 fL Final          Sodium Sodium   Date Value Ref Range Status   05/20/2024 140 136 - 145 mmol/L Final   05/19/2024 138 136 - 145 mmol/L Final   05/18/2024 142 136 - 145 mmol/L Final      Potassium Potassium   Date Value Ref Range Status   05/20/2024 4.1 3.5 - 5.2 mmol/L Final   05/19/2024 4.1 3.5 - 5.2 mmol/L Final   05/18/2024 3.9 3.5 - 5.2 mmol/L Final      Chloride Chloride   Date Value Ref Range Status   05/20/2024 98 98 - 107 mmol/L Final   05/19/2024 98 98 - 107 mmol/L Final   05/18/2024 103 98 - 107 mmol/L Final      CO2 CO2   Date Value Ref Range Status   05/20/2024 28.0 22.0 - 29.0 mmol/L Final   05/19/2024 26.0 22.0 - 29.0 mmol/L Final   05/18/2024 26.0 22.0 - 29.0 " "mmol/L Final      BUN BUN   Date Value Ref Range Status   05/20/2024 81 (H) 8 - 23 mg/dL Final   05/19/2024 72 (H) 8 - 23 mg/dL Final   05/18/2024 65 (H) 8 - 23 mg/dL Final      Creatinine Creatinine   Date Value Ref Range Status   05/20/2024 2.82 (H) 0.76 - 1.27 mg/dL Final   05/19/2024 2.85 (H) 0.76 - 1.27 mg/dL Final   05/18/2024 2.86 (H) 0.76 - 1.27 mg/dL Final      Calcium Calcium   Date Value Ref Range Status   05/20/2024 9.0 8.6 - 10.5 mg/dL Final   05/19/2024 9.1 8.6 - 10.5 mg/dL Final   05/18/2024 8.7 8.6 - 10.5 mg/dL Final      PO4 No components found for: \"PO4\"   Albumin Albumin   Date Value Ref Range Status   05/20/2024 3.1 (L) 3.5 - 5.2 g/dL Final   05/18/2024 3.4 (L) 3.5 - 5.2 g/dL Final      Magnesium Magnesium   Date Value Ref Range Status   05/20/2024 2.4 1.6 - 2.4 mg/dL Final   05/19/2024 2.3 1.6 - 2.4 mg/dL Final   05/18/2024 2.8 (H) 1.6 - 2.4 mg/dL Final      Uric Acid No components found for: \"URIC ACID\"     Imaging Results (Last 72 Hours)       Procedure Component Value Units Date/Time    XR Chest 1 View [[id number]] Collected: 05/18/24 1404     Updated: 05/18/24 1408    Narrative:      XR CHEST 1 VW    Date of Exam: 5/18/2024 1:56 PM EDT    Indication: sob, chest pain    Comparison: May 17, 2024    Findings:  There is a sheath in the right internal jugular vein. Sternotomy wires noted. The heart looks enlarged. There is fullness of the mediastinum unchanged. The depth of inspiration is poor. A pneumothorax not definitely identified. There are bibasilar   densities that could relate to some atelectasis and effusions.      Impression:      Impression:  1.Bibasilar areas of density that could like atelectasis and effusions.  2.Fullness to the mediastinal shadow unchanged that could be postsurgical and has been suggested since surgery      Electronically Signed: Anurag Colon MD    5/18/2024 2:06 PM EDT    Workstation ID: [id number]    XR Chest 1 View [[id number]] Collected: 05/17/24 1768     Updated: " 05/17/24 1747    Narrative:      XR CHEST 1 VW    Date of Exam: 5/17/2024 5:39 PM EDT    Indication: chest tube removal    Comparison: Same day chest radiographs.    Findings:  Interval removal of left basilar thoracostomy tube. No evidence of new pneumothorax. Right approach vascular sheath in unchanged position. Cardiac surgical changes with median sternotomy wires. Unchanged enlarged cardiomediastinal silhouette. Low lung   volumes. No definite new focal airspace opacity or pleural effusion. Osseous structures are unchanged.,      Impression:      Impression:  Interval removal of left thoracostomy tube. No evidence of pneumothorax.      Electronically Signed: Braden Bartlett MD    5/17/2024 5:45 PM EDT    Workstation ID: [id number]    US Renal Bilateral [[id number]] Collected: 05/17/24 1347     Updated: 05/17/24 1352    Narrative:      US RENAL BILATERAL    Date of Exam: 5/17/2024 1:16 PM EDT    Indication: ARF/EN/CRF/CKD.    Comparison: CT abdomen and pelvis without contrast 7/26/2020, renal ultrasound 4/18/2024.    Technique: Grayscale and color Doppler ultrasound evaluation of the kidneys and urinary bladder was performed.    Findings:  Technologist noted a technically difficult exam due to body habitus and portable technique, done in the CVICU.    Right kidney measures 11.2 x 6.1 x 6.1 cm and demonstrates normal cortical echogenicity. No definite renal masses are seen. No right hydronephrosis.    Left kidney measures 10.3 x 5.2 x 6.5 cm. 2 left renal cysts measure up to 1.6 cm. Normal cortical echogenicity of the left kidney. No left hydronephrosis.    Urinary bladder is decompressed with Rasmussen catheter in place.      Impression:      Impression:  1.Left renal cysts measuring up to 1.6 cm.  2.No hydronephrosis.        Electronically Signed: Lisa Lan    5/17/2024 1:50 PM EDT    Workstation ID: [id number]    XR Chest 1 View [[id number]] Collected: 05/17/24 0814     Updated: 05/17/24 0818    Narrative:      XR  CHEST 1 VW    Date of Exam: 5/17/2024 4:23 AM EDT    Indication: Post-Op Heart Surgery    Comparison: 5/16/2024  Findings:  Hinsdale-Naveen catheter has been removed and replaced by sheath with its tip in the upper SVC. Left chest tube is unchanged in position. There is no pneumothorax. There is continued mild atelectasis of the lung bases, greatest on the left. Small left effusion   is again noted.      Impression:      Impression:  Continued mild bibasilar atelectasis, greatest on the left, with small left effusion.      Electronically Signed: Talita Bear MD    5/17/2024 8:16 AM EDT    Workstation ID: [id number]            Results for orders placed during the hospital encounter of 05/15/24    XR Chest 1 View    Narrative  XR CHEST 1 VW    Date of Exam: 5/18/2024 1:56 PM EDT    Indication: sob, chest pain    Comparison: May 17, 2024    Findings:  There is a sheath in the right internal jugular vein. Sternotomy wires noted. The heart looks enlarged. There is fullness of the mediastinum unchanged. The depth of inspiration is poor. A pneumothorax not definitely identified. There are bibasilar  densities that could relate to some atelectasis and effusions.    Impression  Impression:  1.Bibasilar areas of density that could like atelectasis and effusions.  2.Fullness to the mediastinal shadow unchanged that could be postsurgical and has been suggested since surgery      Electronically Signed: Anurag Colon MD  5/18/2024 2:06 PM EDT  Workstation ID: [id number]      XR Chest 1 View    Narrative  XR CHEST 1 VW    Date of Exam: 5/17/2024 5:39 PM EDT    Indication: chest tube removal    Comparison: Same day chest radiographs.    Findings:  Interval removal of left basilar thoracostomy tube. No evidence of new pneumothorax. Right approach vascular sheath in unchanged position. Cardiac surgical changes with median sternotomy wires. Unchanged enlarged cardiomediastinal silhouette. Low lung  volumes. No definite new focal airspace  opacity or pleural effusion. Osseous structures are unchanged.,    Impression  Impression:  Interval removal of left thoracostomy tube. No evidence of pneumothorax.      Electronically Signed: Braden Bartlett MD  5/17/2024 5:45 PM EDT  Workstation ID: [id number]      XR Abdomen KUB    Narrative  XR ABDOMEN KUB    Date of Exam: 5/16/2024 6:00 PM EDT    Indication: Abd distension, increased pain    Comparison: 2020 CT    Findings:  Gas-filled colon. No evidence of small bowel obstruction. No radiodense renal calculi noted. No acute osseous abnormality.    Impression  Impression:  Gas-distended colon. No evidence of bowel obstruction.      Electronically Signed: Braden Bartlett MD  5/16/2024 6:23 PM EDT  Workstation ID: [id number]      Results for orders placed during the hospital encounter of 04/18/24    Duplex Carotid Ultrasound CAR    Interpretation Summary    Right internal carotid artery demonstrates a less than 50% stenosis.    Normal left carotid duplex scan.        ASSESSMENT / PLAN      ASCAD      ARF/EN/CRF/CKD------Nonoliguric overnight with diuretics. +ARF/EN on top  Of known CRF/CKD STG 3A with a baseline serum creatinine of about 1.4-1.5. CRF/CKD STG 3A secondary to DGS/HTN NS. +ARF/EN secondary to ATN from hypotension and renal perfusion disturbance from CP bypass with preadmission ACE-I use. No NSAIDs or IV dye.  Continue to diurese but back down Bumex gtt today. Support BP and hemodynamics. Off of ACE-I. Dose meds for CrCl less than 10 cc/min until ARF/EN is resolved. No uremia despite elevated azotemia     2. HTN WITH CKD-----BP better. Off of Vasopressin now. Keep off of Lisinopril. No ACE-I/ARB/DRI for now. Keep MAP>65     3. DMII WITH RENAL MANIFESTATIONS------Off Metformin for now. BS ok. Glucometers, SSI and Insulin gtt post op per protocol     4. HYPERLIPIDEMIA------Hold Statin until tomorrow. Follow CK. TSH normal     5. OA/DJD/HYPERURICEMIA------On Allopurinol preadmission. Check uric acid levels No  NSAIDs     6. DEPRESSION-----Celexa     7. COPD-----Respiratory status stable. No active wheezing     8. GERD/PUD PROPHYLAXIS-----PPI. Benefits outweigh risks despite renal dysfunction     9. BPH------On Hytrin preadmission. Currently with lópez     10. ANEMIA------IV iron for MOSHE.       11. ACIDOSIS-----Resolved    12. VOLUME OVERLOAD/ELEVATED CVP/EDEMA-----Improving. Change off of Bumex gtt to intermittent Bumex IV today    13. HYPOKALEMIA-------Replaced    14. VERY MILD RENAL FAILURE ASSOCIATED HYPERPHOSPHATEMIA      Bernadine Childress MD  [facility]  [phone]  05/20/24  06:21 EDT

## 2024-05-20 NOTE — CONSULTS
[facility]: [facility]         CONSULT NOTE    Patient Identification:  Fransico Cuenca  69 y.o.  male  1955  [id number]            Requesting physician: Attending physician    Reason for Consultation:  hypoxia        History of Present Illness:   69-year-old male post CABG developed hypoxia with increasing oxygen demand chest x-ray suggestive of combination of pulmonary edema and bilateral lower lobe partial atelectasis    Assessment:    Hypoxic respiratory insufficiency  Pulmonary edema  Bibasilar atelectasis    COPD, mild  Pulmonary function test 5/14/2024, FVC 3.0 L which is 81%, FEV1 2.4 L which is 86%, FEV1/FVC ratio 78, total lung capacity 96%, residual volume 116%, diffusion capacity 54%    NSTEMI, CAD, EF 55% (cath)--s/p elective CABG x4 with LIMA     DM II  HTN  GERD  Hyperlipidemia  EN  Anemia  Chronic allergic rhinitis/sinusitis     Echo 4/18/2024: LVEF 51-55%, normal RV pressure, hypokinesis of distal septum and apex      Recommendations:    Oxygen titration currently on 8 L     Aggressive bronchodilators start Pulmicort and DuoNeb  Side Mucomyst nebulized   Continue    Aggressive diuresis currently on Bumex drip and Diuril, followed by renal     Incentive spirometry and flutter valve     Ambulation     Lactulose may be used to achieve bowel movement if no response to MiraLax          Review of Sytems:  Review of Systems   Respiratory:  Positive for cough and shortness of breath. Negative for wheezing and stridor.    Cardiovascular:  Positive for palpitations and leg swelling. Negative for chest pain.       Past Medical History:  Past Medical History:   Diagnosis Date    Anxiety     Essential hypertension, benign     Gastroesophageal reflux disease without esophagitis     Kidney stones 05/31/2020    Dr. Kahlil Terrell    Mixed hyperlipidemia     Rotator cuff syndrome     Torn rotator about seven years ago    Seasonal allergic rhinitis due to pollen     Type 2 diabetes mellitus without  complication, without long-term current use of insulin        Past Surgical History:  Past Surgical History:   Procedure Laterality Date    CARDIAC CATHETERIZATION N/A 4/23/2024    Procedure: Left Heart Cath;  Surgeon: Santiago Lorenzo MD;  Location: [facility] CATH INVASIVE LOCATION;  Service: Cardiovascular;  Laterality: N/A;    CATARACT EXTRACTION Left 08/24/2023    Dr Melendez    CATARACT EXTRACTION Right 09/07/2023    Dr Melendez    CYSTOSCOPY W/ LASER LITHOTRIPSY  01/2021    INGUINAL HERNIA REPAIR Left 10/2009    NOSE SURGERY      x2    UMBILICAL HERNIA REPAIR  10/2009    Dr. Napier        Home Meds:  Medications Prior to Admission   Medication Sig Dispense Refill Last Dose    aspirin (aspirin) 81 MG EC tablet Take 1 tablet by mouth Daily.   Past Week    atorvastatin (LIPITOR) 10 MG tablet Take 1 tablet by mouth once daily 90 tablet 3 Past Week    bumetanide (BUMEX) 1 MG tablet Take 2 tablets by mouth Every Morning. Patient also takes 1 mg every evening   Past Week    bumetanide (BUMEX) 1 MG tablet Take 1 tablet by mouth Every Evening. Patient also takes 2 mg every morning   Past Week    carvedilol (COREG) 12.5 MG tablet Take 1 tablet by mouth 2 (Two) Times a Day With Meals. 60 tablet 3 Past Week    citalopram (CeleXA) 20 MG tablet Take 1 tablet by mouth once daily 90 tablet 3 Past Week    doxazosin (CARDURA) 1 MG tablet Take 1 tablet by mouth once daily 90 tablet 3 Past Week    fenofibrate 160 MG tablet Take 1 tablet by mouth once daily 90 tablet 0 Past Week    hydrALAZINE (APRESOLINE) 50 MG tablet Take 1 tablet by mouth Every 12 (Twelve) Hours. 60 tablet 3 Past Week    lisinopril (PRINIVIL,ZESTRIL) 10 MG tablet Take 1 tablet by mouth Daily. 90 tablet 0 Past Week    magnesium oxide (MAG-OX) 400 MG tablet Take 1 tablet by mouth Daily.   Past Week    metFORMIN (GLUCOPHAGE) 1000 MG tablet TAKE 1 TABLET BY MOUTH TWICE DAILY WITH MEALS 60 tablet 12 Past Week    montelukast (SINGULAIR) 10 MG tablet Take 1 tablet by mouth  "Daily. 90 tablet 3 Past Week    Multiple Vitamins-Minerals (MULTIVITAMIN ADULT PO) Take 1 tablet by mouth Daily.   Past Week    mupirocin (BACTROBAN) 2 % ointment Apply to nares (inside each nostril) twice daily as directed for procedure. 22 g 0 Past Week    omeprazole (priLOSEC) 40 MG capsule Take 1 capsule by mouth Daily. 30 capsule 12 Past Week    potassium chloride (KLOR-CON M20) 20 MEQ CR tablet Take 1 tablet by mouth Daily. 90 tablet 2 Past Week       Allergies:  No Known Allergies    Social History:   Social History     Socioeconomic History    Marital status: Single   Tobacco Use    Smoking status: Never     Passive exposure: Never    Smokeless tobacco: Never    Tobacco comments:     Family members smoked   Vaping Use    Vaping status: Never Used   Substance and Sexual Activity    Alcohol use: Never    Drug use: Never    Sexual activity: Not Currently       Family History:  Family History   Problem Relation Age of Onset    Heart disease Mother     Uterine cancer Mother     Thyroid disease Mother     Cancer Mother         Uterus?    Heart disease Father     Stroke Father     COPD Brother     Diabetes Brother     Breast cancer Maternal Aunt     Breast cancer Maternal Grandmother     Diabetes Brother        Physical Exam:  /68   Pulse 65   Temp 98 °F (36.7 °C) (Oral)   Resp 17   Ht 167.6 cm (65.98\")   Wt 96.5 kg (212 lb 11.9 oz)   SpO2 93%   BMI 34.35 kg/m²  Body mass index is 34.35 kg/m². 93% 96.5 kg (212 lb 11.9 oz)  Physical Exam  Cardiovascular:      Heart sounds: Murmur heard.      No gallop.   Pulmonary:      Effort: No respiratory distress.      Breath sounds: No stridor. Rhonchi and rales present. No wheezing.   Chest:      Chest wall: No tenderness.         LABS:  Lab Results   Component Value Date    CALCIUM 9.0 05/20/2024    PHOS 5.5 (H) 05/20/2024     Results from last 7 days   Lab Units 05/20/24  0342 05/19/24  0401 05/18/24  0321 05/15/24  0739 05/14/24  0755 05/13/24  1244 "   MAGNESIUM mg/dL 2.4 2.3 2.8*   < >  --   --    SODIUM mmol/L 140 138 142   < > 139 141   POTASSIUM mmol/L 4.1 4.1 3.9   < > 4.4 4.4   CHLORIDE mmol/L 98 98 103   < > 100 104   CO2 mmol/L 28.0 26.0 26.0   < > 29.0 26.0   BUN mg/dL 81* 72* 65*   < > 34* 27*   CREATININE mg/dL 2.82* 2.85* 2.86*   < > 1.41* 1.27   GLUCOSE mg/dL 110* 104* 82   < > 93 88   CALCIUM mg/dL 9.0 9.1 8.7   < > 9.4 9.1   WBC 10*3/mm3 9.27 9.47 7.18   < > 5.52  --    HEMOGLOBIN g/dL 8.2* 8.9* 8.1*   < > 9.8*  --    HEMOGLOBIN, POC   --   --   --    < >  --   --    PLATELETS 10*3/mm3 293 254 182   < > 276  --    ALT (SGPT) U/L <5  --  <5  --  13  --    AST (SGOT) U/L 22  --  15  --  13  --    PROBNP pg/mL  --   --   --   --   --  3,341.0*    < > = values in this interval not displayed.     Lab Results   Component Value Date    CKTOTAL 161 05/18/2024    TROPONINT 320 (C) 04/24/2024     Results from last 7 days   Lab Units 05/18/24  0321 05/17/24  0306   CK TOTAL U/L 161 212*         Results from last 7 days   Lab Units 05/18/24  0321 05/15/24  2256 05/15/24  2108 05/15/24  1905 05/15/24  1136   LACTATE mmol/L 0.7 0.6 0.8 0.7 1.0     Results from last 7 days   Lab Units 05/16/24  0908 05/16/24  0253 05/15/24  2256 05/15/24  2108 05/15/24  1905 05/15/24  1752 05/15/24  1624 05/15/24  1453   PH, ARTERIAL pH units 7.306*  --  7.302* 7.329* 7.315* 7.302* 7.332* 7.448   PCO2, ARTERIAL mm Hg 48.8*  --  48.5* 46.3 44.7 43.4 46.6 35.3   PO2 ART mm Hg 73.6*  --  91.0 72.7* 109.9* 104.4 80.4* 79.7*   O2 SATURATION ART % 92.9*  --  96.0 93.1* 97.8 97.4 94.8 96.3   MODALITY  Cannula Cannula HFNC Adult Vent Adult Vent Adult Vent Adult Vent Adult Vent         Results from last 7 days   Lab Units 05/16/24  0218 05/15/24  1054 05/14/24  0755   INR  1.21* 1.20* 1.07         Lab Results   Component Value Date    TSH 1.120 05/17/2024     Estimated Creatinine Clearance: 26.5 mL/min (A) (by C-G formula based on SCr of 2.82 mg/dL (H)).  Results from last 7 days   Lab  Units 05/14/24  0755   NITRITE UA  Negative   WBC UA /HPF 0-2   BACTERIA UA /HPF None Seen   SQUAM EPITHEL UA /HPF None Seen        Imaging:  Imaging Results (Last 24 Hours)       ** No results found for the last 24 hours. **              Current Meds:   SCHEDULE  allopurinol, 100 mg, Oral, Daily  aspirin, 81 mg, Oral, Daily  atorvastatin, 40 mg, Oral, Nightly  bumetanide, 1 mg, Intravenous, Q8H  chlorhexidine, 15 mL, Mouth/Throat, Q12H  citalopram, 20 mg, Oral, Daily  enoxaparin, 30 mg, Subcutaneous, Q24H  ferric gluconate, 125 mg, Intravenous, Daily  gabapentin, 100 mg, Oral, BID  guaiFENesin, 600 mg, Oral, Q12H  insulin lispro, 2-9 Units, Subcutaneous, 4x Daily AC & at Bedtime  metoprolol tartrate, 25 mg, Oral, Q12H  montelukast, 10 mg, Oral, Daily  pantoprazole, 40 mg, Oral, Daily  polyethylene glycol, 17 g, Oral, BID  potassium chloride, 20 mEq, Oral, TID With Meals  senna-docusate sodium, 2 tablet, Oral, BID  tamsulosin, 0.4 mg, Oral, Daily      Infusions     PRNs    acetaminophen **OR** [DISCONTINUED] acetaminophen **OR** [DISCONTINUED] acetaminophen    bisacodyl    Calcium Replacement - Follow Nurse / BPA Driven Protocol    cyclobenzaprine    dextrose    dextrose    glucagon (human recombinant)    HYDROcodone-acetaminophen    ipratropium-albuterol    Magnesium Cardiology Dose Replacement - Follow Nurse / BPA Driven Protocol    [DISCONTINUED] Morphine **AND** naloxone    nitroglycerin    ondansetron    Phosphorus Replacement - Follow Nurse / BPA Driven Protocol    Potassium Replacement - Follow Nurse / BPA Driven Protocol    simethicone    traMADol        John Napier MD  5/20/2024  10:27 EDT      Much of this encounter note is an electronic transcription/translation of spoken language to printed text using Dragon Software.

## 2024-05-20 NOTE — PLAN OF CARE
Assessment: Fransico Cuenca presents with ADL impairments affecting function including balance, coordination, endurance / activity tolerance, range of motion (ROM), shortness of breath, and strength. Pt alert and attentive, recalls 1/3 sternal precautions therefore required mod cues to recall 3/3. Pt with dep a to don socks sitting supported in chair, requires min assist to come to standing. CGA with FWW to ambulate household distances. Pt requires frequent cues for sternal precautions to grasp heart hugger upon transfers. Pt presents on 8L O2 NC at 96% Spo2, during ambulation desats to 86% requires x2 rest breaks cues for PLB rebounds to 92%, end of session 94% Spo2. Pt completed cardiac rehab exercises, demoes fair understanding and educated on completing x3/day. Demonstrated functioning below baseline abilities indicate the need for continued skilled intervention while inpatient. Tolerating session today without incident. Will continue to follow and progress as tolerated.      Plan/Recommendations:   High Intensity Therapy recommended post-acute care. This is recommended as therapy feels the patient would require 5-6 days per week, 2-3 hours per day. At this time, inpatient rehabilitation (acute rehab) would be the first choice and SNF would be second.

## 2024-05-20 NOTE — SIGNIFICANT NOTE
05/20/24 0730   OTHER   Discipline physical therapist   Rehab Time/Intention   Session Not Performed unable to treat, medical status change  (off the floor for CT)   Therapy Assessment/Plan (PT)   Criteria for Skilled Interventions Met (PT) yes;meets criteria   Recommendation   PT - Next Appointment 05/21/24

## 2024-05-20 NOTE — PROGRESS NOTES
Card[facility] [place]        LOS:  LOS: 6 days   Patient Name: Fransico Cuenca  Age/Sex: 69 y.o. male  : 1955  MRN: [id number]    Day of Service: 24   Length of Stay: 6  Encounter Provider: JAX Schroeder  Place of Service: [facility] CARDIOLOGY  Patient Care Team:  Camryn Salinas MD as PCP - General (Family Medicine)  Marco A Childress MD as Consulting Physician (Nephrology)    Subjective:     Chief Complaint: f/u CAD s/p CABG    Subjective:remains with edema but improving, afib briefly this am but now back in SR sitting up in chair on supplemental O2    Current Medications:   Scheduled Meds:acetylcysteine, 2 mL, Nebulization, BID - RT  allopurinol, 100 mg, Oral, Daily  amiodarone, 400 mg, Oral, Q12H  aspirin, 81 mg, Oral, Daily  atorvastatin, 40 mg, Oral, Nightly  budesonide, 0.5 mg, Nebulization, BID - RT  bumetanide, 1 mg, Oral, BID  cephalexin, 500 mg, Oral, Q8H  chlorhexidine, 15 mL, Mouth/Throat, Q12H  citalopram, 20 mg, Oral, Daily  enoxaparin, 40 mg, Subcutaneous, Q24H  gabapentin, 100 mg, Oral, BID  guaiFENesin, 600 mg, Oral, Q12H  insulin lispro, 2-9 Units, Subcutaneous, 4x Daily AC & at Bedtime  ipratropium-albuterol, 3 mL, Nebulization, Q6H While Awake - RT  metoprolol tartrate, 25 mg, Oral, Q8H  montelukast, 10 mg, Oral, Daily  pantoprazole, 40 mg, Oral, Daily  polyethylene glycol, 17 g, Oral, BID  potassium chloride, 20 mEq, Oral, TID With Meals  senna-docusate sodium, 2 tablet, Oral, BID  tamsulosin, 0.4 mg, Oral, Daily      Continuous Infusions:     Allergies:  No Known Allergies    Review of Systems   Constitutional: Positive for malaise/fatigue. Negative for chills and diaphoresis.   Cardiovascular:  Positive for leg swelling. Negative for chest pain, dyspnea on exertion, irregular heartbeat, near-syncope, orthopnea, palpitations, paroxysmal nocturnal dyspnea and syncope.   Respiratory:  Negative for cough, shortness of breath, sleep  disturbances due to breathing and sputum production.    Gastrointestinal:  Negative for change in bowel habit.   Genitourinary:  Negative for urgency.   Neurological:  Negative for dizziness and headaches.   Psychiatric/Behavioral:  Negative for altered mental status.          Objective:     Temp:  [97.8 °F (36.6 °C)-98.4 °F (36.9 °C)] 97.8 °F (36.6 °C)  Heart Rate:  [] 105  Resp:  [10-22] 19  BP: (103-154)/() 108/63     Intake/Output Summary (Last 24 hours) at 5/21/2024 1423  Last data filed at 5/21/2024 1330  Gross per 24 hour   Intake 1112 ml   Output 1880 ml   Net -768 ml     Body mass index is 33.57 kg/m².      05/18/24  0526 05/19/24  0555 05/21/24  0433   Weight: 97.9 kg (215 lb 13.3 oz) 96.5 kg (212 lb 11.9 oz) 94.3 kg (207 lb 14.3 oz)         General Appearance:    Alert, cooperative, in no acute distress                                Head: Atraumatic, normocephalic, PERRLA               Neck:   supple, + JVD   Lungs:     Clear to auscultation, respirations regular, even and               unlabored    Heart:    Regular rhythm and normal rate, normal S1 and S2, mid sternal incsion   Abdomen:     Normal bowel sounds, no masses, no organomegaly, soft  nontender, nondistended, no guarding, no rebound  tenderness   Extremities:   Moves all extremities well, edema, no cyanosis, no  redness   Pulses:   Pulses palpable and equal bilaterally   Skin:   No bleeding, bruising or rash   Neurologic:   Awake, alert, oriented x3         Lab Review:   Results from last 7 days   Lab Units 05/21/24  0401 05/20/24  0342 05/19/24  0401 05/18/24  0321   SODIUM mmol/L 141 140   < > 142   POTASSIUM mmol/L 4.0 4.1   < > 3.9   CHLORIDE mmol/L 99 98   < > 103   CO2 mmol/L 28.0 28.0   < > 26.0   BUN mg/dL 81* 81*   < > 65*   CREATININE mg/dL 2.24* 2.82*   < > 2.86*   GLUCOSE mg/dL 106* 110*   < > 82   CALCIUM mg/dL 9.8 9.0   < > 8.7   AST (SGOT) U/L  --  22  --  15   ALT (SGPT) U/L  --  <5  --  <5    < > = values in this  "interval not displayed.     Results from last 7 days   Lab Units 05/18/24  0321 05/17/24  0306   CK TOTAL U/L 161 212*     Results from last 7 days   Lab Units 05/21/24  0401 05/20/24  0342   WBC 10*3/mm3 9.26 9.27   HEMOGLOBIN g/dL 9.4* 8.2*   HEMATOCRIT % 30.0* 25.9*   PLATELETS 10*3/mm3 332 293     Results from last 7 days   Lab Units 05/16/24  0218 05/15/24  1054   INR  1.21* 1.20*   APTT seconds  --  21.7*     Results from last 7 days   Lab Units 05/21/24  0401 05/20/24  0342   MAGNESIUM mg/dL 2.0 2.4           Invalid input(s): \"LDLCALC\"        Results from last 7 days   Lab Units 05/17/24  0306   TSH uIU/mL 1.120       Recent Radiology:  Imaging Results (Most Recent)       Procedure Component Value Units Date/Time    CT Chest Without Contrast Diagnostic [[id number]] Collected: 05/20/24 1611     Updated: 05/20/24 1631    Narrative:      CT CHEST WO CONTRAST DIAGNOSTIC    Date of Exam: 5/20/2024 3:51 PM EDT    Indication: hypoxia    Comparison: AP chest x-ray 5/18/2024, two-view chest x-ray 5/14/2024    Technique: Axial CT images were obtained of the chest without contrast administration.  Sagittal and coronal reconstructions were performed.  Automated exposure control and iterative reconstruction methods were used.    Findings:  There are postoperative changes from recent median sternotomy and CABG. Heart size is enlarged, and there are coronary artery calcifications. There is no pericardial effusion. There are small bilateral pleural effusions. No pneumothorax. There is partial   atelectasis of both lower lobes and near complete atelectasis of the right middle lobe. Lungs are otherwise clear. No lymphadenopathy is seen in the chest. Cholelithiasis is included in the upper abdomen. No acute osseous abnormality is identified.      Impression:      Impression:  1.Small bilateral pleural effusions.  2.Near complete right middle lobe atelectasis. Partial bilateral lower lobe atelectasis.  3.Expected postoperative " changes from recent CABG.      Electronically Signed: Lisajudit Lan    5/20/2024 4:29 PM EDT    Workstation ID: [id number]    XR Chest 1 View [[id number]] Collected: 05/18/24 1404     Updated: 05/18/24 1408    Narrative:      XR CHEST 1 VW    Date of Exam: 5/18/2024 1:56 PM EDT    Indication: sob, chest pain    Comparison: May 17, 2024    Findings:  There is a sheath in the right internal jugular vein. Sternotomy wires noted. The heart looks enlarged. There is fullness of the mediastinum unchanged. The depth of inspiration is poor. A pneumothorax not definitely identified. There are bibasilar   densities that could relate to some atelectasis and effusions.      Impression:      Impression:  1.Bibasilar areas of density that could like atelectasis and effusions.  2.Fullness to the mediastinal shadow unchanged that could be postsurgical and has been suggested since surgery      Electronically Signed: Anurag Colon MD    5/18/2024 2:06 PM EDT    Workstation ID: [id number]    XR Chest 1 View [[id number]] Collected: 05/17/24 1743     Updated: 05/17/24 1747    Narrative:      XR CHEST 1 VW    Date of Exam: 5/17/2024 5:39 PM EDT    Indication: chest tube removal    Comparison: Same day chest radiographs.    Findings:  Interval removal of left basilar thoracostomy tube. No evidence of new pneumothorax. Right approach vascular sheath in unchanged position. Cardiac surgical changes with median sternotomy wires. Unchanged enlarged cardiomediastinal silhouette. Low lung   volumes. No definite new focal airspace opacity or pleural effusion. Osseous structures are unchanged.,      Impression:      Impression:  Interval removal of left thoracostomy tube. No evidence of pneumothorax.      Electronically Signed: Braden Bartlett MD    5/17/2024 5:45 PM EDT    Workstation ID: [id number]    US Renal Bilateral [[id number]] Collected: 05/17/24 1347     Updated: 05/17/24 1352    Narrative:      US RENAL BILATERAL    Date of Exam: 5/17/2024 1:16 PM  EDT    Indication: ARF/EN/CRF/CKD.    Comparison: CT abdomen and pelvis without contrast 7/26/2020, renal ultrasound 4/18/2024.    Technique: Grayscale and color Doppler ultrasound evaluation of the kidneys and urinary bladder was performed.    Findings:  Technologist noted a technically difficult exam due to body habitus and portable technique, done in the CVICU.    Right kidney measures 11.2 x 6.1 x 6.1 cm and demonstrates normal cortical echogenicity. No definite renal masses are seen. No right hydronephrosis.    Left kidney measures 10.3 x 5.2 x 6.5 cm. 2 left renal cysts measure up to 1.6 cm. Normal cortical echogenicity of the left kidney. No left hydronephrosis.    Urinary bladder is decompressed with Rasmussen catheter in place.      Impression:      Impression:  1.Left renal cysts measuring up to 1.6 cm.  2.No hydronephrosis.        Electronically Signed: Lisa Lan    5/17/2024 1:50 PM EDT    Workstation ID: [id number]    XR Chest 1 View [[id number]] Collected: 05/17/24 0814     Updated: 05/17/24 0818    Narrative:      XR CHEST 1 VW    Date of Exam: 5/17/2024 4:23 AM EDT    Indication: Post-Op Heart Surgery    Comparison: 5/16/2024  Findings:  [place]-Naveen catheter has been removed and replaced by sheath with its tip in the upper SVC. Left chest tube is unchanged in position. There is no pneumothorax. There is continued mild atelectasis of the lung bases, greatest on the left. Small left effusion   is again noted.      Impression:      Impression:  Continued mild bibasilar atelectasis, greatest on the left, with small left effusion.      Electronically Signed: Talita Bear MD    5/17/2024 8:16 AM EDT    Workstation ID: [id number]    XR Abdomen KUB [[id number]] Collected: 05/16/24 1820     Updated: 05/16/24 1825    Narrative:      XR ABDOMEN KUB    Date of Exam: 5/16/2024 6:00 PM EDT    Indication: Abd distension, increased pain    Comparison: 2020 CT    Findings:  Gas-filled colon. No evidence of small bowel  obstruction. No radiodense renal calculi noted. No acute osseous abnormality.      Impression:      Impression:  Gas-distended colon. No evidence of bowel obstruction.      Electronically Signed: Braden Bartlett MD    5/16/2024 6:23 PM EDT    Workstation ID: [id number]    XR Chest 1 View [[id number]] Collected: 05/16/24 0759     Updated: 05/16/24 0810    Narrative:      XR CHEST 1 VW    Date of Exam: 5/16/2024 5:49 AM EDT    Indication: Post-Op Heart Surgery    Comparison: 5/15/2024    Findings:  Interval removal of endotracheal and NG tubes. Stable cardiomegaly and pulmonary congestion. No change in position of a right intrajugular [place]-Naveen catheter with the tip in the right main pulmonary artery level stable surgical changes post CABG. There   is mild interval improved aeration to the left lung base with persistent atelectasis and a small effusion. There is also mild increased opacity at the right lung base.      Impression:      Impression:  Interval removal of endotracheal and NG tubes    Mild improved aeration to the left lung base. Otherwise grossly stable bibasilar airspace disease      Electronically Signed: Patrick Robbins MD    5/16/2024 8:08 AM EDT    Workstation ID: [id number]    XR Chest 1 View [[id number]] Collected: 05/15/24 1114     Updated: 05/15/24 1119    Narrative:      DATE OF EXAM:  5/15/2024 10:54 AM     PROCEDURE:  XR CHEST 1 VW-     INDICATIONS:  Post-Op Check Line & Tube Placement; I25.10-Atherosclerotic heart  disease of native coronary artery without angina pectoris     COMPARISON:  May 14, 2024     TECHNIQUE:   Single radiographic view of the chest was obtained.     FINDINGS:  Endotracheal tube tip is approximately 5 cm above the clarie. There is a  right IJ [place]-Naveen catheter with tip projecting in the area of the right  pulmonary artery. Enteric tube extends into the left upper quadrant, tip  beyond the margins of this exam. There has been interval median  sternotomy and CABG. There appears to  be mediastinal drains and a left  basilar chest tube. No definite pneumothorax or significant pleural  effusion is seen. There are mild bibasilar opacities, left greater than  right. Heart size and mediastinal contour appear as expected.       Impression:      1.     Interval median sternotomy and CABG.  2.     Tubes and lines appear in satisfactory positions, as detailed  above.  3.     Mild bibasilar opacities which may represent atelectasis or  edema.     Electronically Signed By-Romaine Szymanski MD On:5/15/2024 11:16 AM               ECHOCARDIOGRAM:    Results for orders placed in visit on 05/15/24    Intra-Op Anesthesia DIGNA    Narrative  Intra-Op Anesthesia DIGNA    Procedure Performed: Intra-Op Anesthesia DIGNA  Start Time:  End Time:    Preanesthesia Checklist:  Patient identified, IV assessed, risks and benefits discussed, monitors and equipment assessed, procedure being performed at surgeon's request and anesthesia consent obtained.    General Procedure Information  DIGNA Placed for monitoring purposes only -- This is not a diagnostic DIGNA  Diagnostic Indications for Echo:  assessment of surgical repair and hemodynamic monitoring  Physician Requesting Echo: Jean Hirsch MD  Location performed:  OR  Intubated  Bite block placed  Heart visualized  Probe Insertion:  Easy  Probe Type:  Multiplane  Modalities:  Color flow mapping and continuous wave Doppler    Echocardiographic and Doppler Measurements    Ventricles    Right Ventricle:  Cavity size normal.  Global function normal.  Left Ventricle:  Cavity size normal.  Global Function normal.        Valves    Aortic Valve:  Annulus normal.  Stenosis not present.  Regurgitation trace.  Leaflets normal.  Leaflet motions normal.    Mitral Valve:  Annulus normal.  Stenosis not present.  Regurgitation trace.  Leaflets normal.  Leaflet motions normal.    Tricuspid Valve:  Annulus normal.  Stenosis not present.  Regurgitation absent.  Leaflets normal.  Leaflet motions  normal.  Pulmonic Valve:  Annulus normal.  Stenosis not present.  Regurgitation absent.      Aorta    Ascending Aorta:  Size normal.  Dissection not present.  Mobile plaque not present.  Aortic Arch:  Size normal.  Dissection not present.  Mobile plaque not present.  Descending Aorta:  Size normal.  Dissection not present.  Mobile plaque not present.      Atria    Right Atrium:  Size normal.  Spontaneous echo contrast not present.  Thrombus not present.  Tumor not present.  Device not present.    Left Atrium:  Size normal.  Spontaneous echo contrast not present.  Thrombus not present.  Tumor not present.  Device not present.  Left atrial appendage normal.      Septa        Ventricular Septum:  Intra-ventricular septum morphology normal.        Other Findings  Pericardium:  normal  Pleural Effusion:  none  Pulmonary Venous Flow:  normal    Anesthesia Information  Performed Personally  Anesthesiologist:  Kvng Pearson MD      Echocardiogram Comments:  Post bypass EF 55%. No change in valves. No air seen.        I reviewed the patient's new clinical results.    EKG:      Assessment:       ASCAD    Coronary artery disease status post four-vessel coronary arterial bypass grafting     LIMA-LAD, SVG-DG, SVG-OM1-OM 2  Hyperlipidemia  CKD  Bilateral lower extremity edema / HFpEF  Post operative Atrial fibrillation, now SR  ZHANG  HTN    Plan:   Continue with routine postoperative care  Continue to diurese as renal function allows  Monitor renal function electrolytes  Fluid restriction  Increase activity as tolerated  Patient did have another episode of afib this am, back in SR, I would favor initiation of amiodarone and eliquis for anticoagulation    Megan Wagoner, JAX  05/21/24  14:23 EDT

## 2024-05-20 NOTE — THERAPY TREATMENT NOTE
Subjective: Pt agreeable to therapeutic plan of care. Verbalizes 1/3 sternal precautions, mod cues   Cognition: arousal/alertness: Alert and Attentive    Objective:     Bed Mobility: Max-A and Assist x 2, sit<>sup  Functional Transfers: Min-A and with rolling walker  Balance: standing CGA  Functional Ambulation: CGA and with rolling walker    Lower Body Dressing: Dependent  ADL Position: supported sitting  ADL Comments: Pt dep A to don socks in supported sitting    Phase 1 Cardiac Rehab Initiated - Acute Care  Sitting tolerance: >10min and supported  Standing tolerance: 5-10min and supported    Precautions:  Mid-sternal incision; avoid scapular retraction and depression.  Cardiovascular impairment post-sx; encourage energy conservation strategies.    Therapeutic Exercise: Pt completed x10 sets per cardiac rehab exercises, demoes fair understanding. Educated on completing exercises x3/day.    MET level equivalent: 1.4-2.0 (Self care ADLs in sitting / slow ambulation in room, light intensity activities)     Vitals: Desaturates, pt presents on 8L O2 96%, during ambulation desats to 86% requires x2 rest breaks cues for PLB rebounds to 92%, end of session 94% Spo2    Pain: 0 VAS  Location: reports soreness in sternum/chest, though no pain.   Interventions for pain: Repositioned and Therapeutic Presence  Education: Provided education on the importance of mobility in the acute care setting, Verbal/Tactile Cues, ADL training, Transfer Training, WB'ing status, and Post-Op Precautions    Assessment: Fransico Cuenca presents with ADL impairments affecting function including balance, coordination, endurance / activity tolerance, range of motion (ROM), shortness of breath, and strength. Pt alert and attentive, recalls 1/3 sternal precautions therefore required mod cues to recall 3/3. Pt with dep a to don socks sitting supported in chair, requires min assist to come to standing. CGA with FWW to ambulate household distances. Pt  requires frequent cues for sternal precautions to grasp heart hugger upon transfers. Pt presents on 8L O2 NC at 96% Spo2, during ambulation desats to 86% requires x2 rest breaks cues for PLB rebounds to 92%, end of session 94% Spo2. Pt completed cardiac rehab exercises, demoes fair understanding and educated on completing x3/day. Demonstrated functioning below baseline abilities indicate the need for continued skilled intervention while inpatient. Tolerating session today without incident. Will continue to follow and progress as tolerated.     Plan/Recommendations:   High Intensity Therapy recommended post-acute care. This is recommended as therapy feels the patient would require 5-6 days per week, 2-3 hours per day. At this time, inpatient rehabilitation (acute rehab) would be the first choice and SNF would be second..    Pt desires Inpatient Rehabilitation placement at discharge. Pt cooperative; agreeable to therapeutic recommendations and plan of care.     Modified Price: N/A = No pre-op stroke/TIA    Post-Tx Position: Supine with HOB Elevated, Staff Present, and Call light and personal items within reach  PPE: gloves

## 2024-05-20 NOTE — PROGRESS NOTES
"S/P POD# 5 elective CABG x4 with ELIZABETH--Joycelyn  EF 55% (cath)    Subjective:  reports feeling better today    A-fib over the weekend and had been placed on diltiazem drip per cardiology  Creatinine remains elevated at 2.8--renal following  Drips: Bumex 0.5  Wt is up 1 kg from preop      Intake/Output Summary (Last 24 hours) at 5/20/2024 0837  Last data filed at 5/20/2024 0600  Gross per 24 hour   Intake 1917.94 ml   Output 1355 ml   Net 562.94 ml     Temp:  [98 °F (36.7 °C)-98.6 °F (37 °C)] 98 °F (36.7 °C)  Heart Rate:  [58-80] 65  Resp:  [12-20] 17  BP: ()/(51-72) 123/68      Results from last 7 days   Lab Units 05/20/24  0342 05/19/24  0401 05/16/24  1924 05/16/24  0218 05/15/24  1136 05/15/24  1054 05/15/24  0739 05/14/24  0755   WBC 10*3/mm3 9.27 9.47   < > 7.65  --  8.77  --  5.52   HEMOGLOBIN g/dL 8.2* 8.9*   < > 7.8*  --  8.4*  --  9.8*   HEMOGLOBIN, POC   --   --   --   --    < >  --    < >  --    HEMATOCRIT % 25.9* 28.2*   < > 26.1*  --  27.2*  --  32.1*   HEMATOCRIT POC   --   --   --   --    < >  --    < >  --    PLATELETS 10*3/mm3 293 254   < > 172  --  167  --  276   INR   --   --   --  1.21*  --  1.20*  --  1.07    < > = values in this interval not displayed.     Results from last 7 days   Lab Units 05/20/24  0342   CREATININE mg/dL 2.82*   POTASSIUM mmol/L 4.1   SODIUM mmol/L 140   MAGNESIUM mg/dL 2.4   PHOSPHORUS mg/dL 5.5*       Physical Exam:  Neuro intact, nad, up in recliner, no family seen  Tele:  SR 60s  Diminished bases, 93% 8L  Sternotomy/ SVHS healing well,  LLE with scabbed areas from \"water blisters\"  Softer abd, + BM  + trace generalized edema    Assessment/Plan:  Principal Problem:    ASCAD    - Severe MV CAD, EF 55% (cath)--s/p elective CABG x4 with LIMA ([place])  - NSTEMI presentation--April 2024  - HTN--pressor postop  - HLD--statin  - Postop EN/ARF on CKD stage 3--Dr. Childress following  - Anxiety  - ZHANG with CPAP compliance  - Recent hx human meta pneumovirus " infection--resolved  - Chronic HFpEF, NYHA class II-III  - Postop ABLA, expected--transfused 5/16  - Postop atrial fib--converted to SR  - Postop acute hypoxic respiratory insufficiency--pulm consulted  - Mild COPD per PFTs    POD# 5.  Atrial fib over the weekend, now sinus.  Creatinine remains elevated 2.82 renal following, Bumex drip transitioning to scheduled Bumex.  He is still requiring 8 L of oxygen, per Dr. Hirsch consult Dr. Napier, CT chest without, ABG now.  On asa/statin/bb.  Plavix at DC for NSTEMI presentation.  DC central line and lópez.  Mobilize, aggressive pulm toileting.      Addendum:  Pt reporting unable to void and burning.  Dr. Hirsch wanted Flomax added.    [id number] Score for Atrial Fibrillation Stroke Risk 5/20/2024  RESULT SUMMARY:  4 points  Stroke risk was 4.8% per year in >90,000 patients (the [place] Atrial Fibrillation Cohort Study) and 6.7% risk of stroke/TIA/systemic embolism.    Routine care--as above  D/w pt/nsgDr. Hirsch  Discharge plan TBD--looking at acute rehab    JAX Pizarro  5/20/2024  08:37 EDT

## 2024-05-21 LAB
ALBUMIN SERPL ELPH-MCNC: 3.1 G/DL (ref 2.9–4.4)
ALBUMIN/GLOB SERPL: 1.3 {RATIO} (ref 0.7–1.7)
ALPHA1 GLOB SERPL ELPH-MCNC: 0.4 G/DL (ref 0–0.4)
ALPHA2 GLOB SERPL ELPH-MCNC: 0.7 G/DL (ref 0.4–1)
ANION GAP SERPL CALCULATED.3IONS-SCNC: 14 MMOL/L (ref 5–15)
B-GLOBULIN SERPL ELPH-MCNC: 0.7 G/DL (ref 0.7–1.3)
BUN SERPL-MCNC: 81 MG/DL (ref 8–23)
BUN/CREAT SERPL: 36.2 (ref 7–25)
CALCIUM SPEC-SCNC: 9.8 MG/DL (ref 8.6–10.5)
CHLORIDE SERPL-SCNC: 99 MMOL/L (ref 98–107)
CO2 SERPL-SCNC: 28 MMOL/L (ref 22–29)
CREAT SERPL-MCNC: 2.24 MG/DL (ref 0.76–1.27)
DEPRECATED RDW RBC AUTO: 48.4 FL (ref 37–54)
EGFRCR SERPLBLD CKD-EPI 2021: 30.8 ML/MIN/1.73
ERYTHROCYTE [DISTWIDTH] IN BLOOD BY AUTOMATED COUNT: 15.8 % (ref 12.3–15.4)
GAMMA GLOB SERPL ELPH-MCNC: 0.6 G/DL (ref 0.4–1.8)
GLOBULIN SER CALC-MCNC: 2.4 G/DL (ref 2.2–3.9)
GLUCOSE BLDC GLUCOMTR-MCNC: 115 MG/DL (ref 70–105)
GLUCOSE BLDC GLUCOMTR-MCNC: 134 MG/DL (ref 70–105)
GLUCOSE BLDC GLUCOMTR-MCNC: 196 MG/DL (ref 70–105)
GLUCOSE BLDC GLUCOMTR-MCNC: 211 MG/DL (ref 70–105)
GLUCOSE SERPL-MCNC: 106 MG/DL (ref 65–99)
HCT VFR BLD AUTO: 30 % (ref 37.5–51)
HGB BLD-MCNC: 9.4 G/DL (ref 13–17.7)
LABORATORY COMMENT REPORT: ABNORMAL
M PROTEIN SERPL ELPH-MCNC: ABNORMAL G/DL
MAGNESIUM SERPL-MCNC: 2 MG/DL (ref 1.6–2.4)
MCH RBC QN AUTO: 26.3 PG (ref 26.6–33)
MCHC RBC AUTO-ENTMCNC: 31.3 G/DL (ref 31.5–35.7)
MCV RBC AUTO: 83.8 FL (ref 79–97)
PHOSPHATE SERPL-MCNC: 4 MG/DL (ref 2.5–4.5)
PLATELET # BLD AUTO: 332 10*3/MM3 (ref 140–450)
PMV BLD AUTO: 9.8 FL (ref 6–12)
POTASSIUM SERPL-SCNC: 4 MMOL/L (ref 3.5–5.2)
PROT PATTERN SERPL ELPH-IMP: ABNORMAL
PROT SERPL-MCNC: 5.5 G/DL (ref 6–8.5)
QT INTERVAL: 338 MS
QT INTERVAL: 350 MS
QTC INTERVAL: 369 MS
QTC INTERVAL: 423 MS
RBC # BLD AUTO: 3.58 10*6/MM3 (ref 4.14–5.8)
SODIUM SERPL-SCNC: 141 MMOL/L (ref 136–145)
WBC NRBC COR # BLD AUTO: 9.26 10*3/MM3 (ref 3.4–10.8)

## 2024-05-21 PROCEDURE — 85027 COMPLETE CBC AUTOMATED: CPT | Performed by: NURSE PRACTITIONER

## 2024-05-21 PROCEDURE — 63710000001 INSULIN LISPRO (HUMAN) PER 5 UNITS: Performed by: NURSE PRACTITIONER

## 2024-05-21 PROCEDURE — 99024 POSTOP FOLLOW-UP VISIT: CPT | Performed by: PHYSICIAN ASSISTANT

## 2024-05-21 PROCEDURE — 93010 ELECTROCARDIOGRAM REPORT: CPT | Performed by: INTERNAL MEDICINE

## 2024-05-21 PROCEDURE — 94664 DEMO&/EVAL PT USE INHALER: CPT

## 2024-05-21 PROCEDURE — 25010000002 BUMETANIDE PER 0.5 MG: Performed by: INTERNAL MEDICINE

## 2024-05-21 PROCEDURE — 93005 ELECTROCARDIOGRAM TRACING: CPT | Performed by: PHYSICIAN ASSISTANT

## 2024-05-21 PROCEDURE — 99232 SBSQ HOSP IP/OBS MODERATE 35: CPT | Performed by: INTERNAL MEDICINE

## 2024-05-21 PROCEDURE — 83735 ASSAY OF MAGNESIUM: CPT | Performed by: INTERNAL MEDICINE

## 2024-05-21 PROCEDURE — 94799 UNLISTED PULMONARY SVC/PX: CPT

## 2024-05-21 PROCEDURE — 97530 THERAPEUTIC ACTIVITIES: CPT

## 2024-05-21 PROCEDURE — 80048 BASIC METABOLIC PNL TOTAL CA: CPT | Performed by: NURSE PRACTITIONER

## 2024-05-21 PROCEDURE — 94761 N-INVAS EAR/PLS OXIMETRY MLT: CPT

## 2024-05-21 PROCEDURE — 82948 REAGENT STRIP/BLOOD GLUCOSE: CPT

## 2024-05-21 PROCEDURE — 82948 REAGENT STRIP/BLOOD GLUCOSE: CPT | Performed by: NURSE PRACTITIONER

## 2024-05-21 PROCEDURE — 97116 GAIT TRAINING THERAPY: CPT

## 2024-05-21 PROCEDURE — 25010000002 ENOXAPARIN PER 10 MG: Performed by: THORACIC SURGERY (CARDIOTHORACIC VASCULAR SURGERY)

## 2024-05-21 PROCEDURE — 93005 ELECTROCARDIOGRAM TRACING: CPT | Performed by: THORACIC SURGERY (CARDIOTHORACIC VASCULAR SURGERY)

## 2024-05-21 PROCEDURE — 84100 ASSAY OF PHOSPHORUS: CPT | Performed by: INTERNAL MEDICINE

## 2024-05-21 RX ORDER — AMIODARONE HYDROCHLORIDE 200 MG/1
400 TABLET ORAL EVERY 12 HOURS SCHEDULED
Status: DISCONTINUED | OUTPATIENT
Start: 2024-05-21 | End: 2024-05-23 | Stop reason: HOSPADM

## 2024-05-21 RX ORDER — BUMETANIDE 1 MG/1
1 TABLET ORAL 2 TIMES DAILY
Status: DISCONTINUED | OUTPATIENT
Start: 2024-05-21 | End: 2024-05-23 | Stop reason: HOSPADM

## 2024-05-21 RX ORDER — AMOXICILLIN 250 MG
2 CAPSULE ORAL 2 TIMES DAILY PRN
Status: DISCONTINUED | OUTPATIENT
Start: 2024-05-21 | End: 2024-05-23 | Stop reason: HOSPADM

## 2024-05-21 RX ORDER — POLYETHYLENE GLYCOL 3350 17 G/17G
17 POWDER, FOR SOLUTION ORAL 2 TIMES DAILY PRN
Status: DISCONTINUED | OUTPATIENT
Start: 2024-05-21 | End: 2024-05-23 | Stop reason: HOSPADM

## 2024-05-21 RX ORDER — ENOXAPARIN SODIUM 100 MG/ML
40 INJECTION SUBCUTANEOUS EVERY 24 HOURS
Status: DISCONTINUED | OUTPATIENT
Start: 2024-05-21 | End: 2024-05-23 | Stop reason: HOSPADM

## 2024-05-21 RX ADMIN — ASPIRIN 81 MG: 81 TABLET, COATED ORAL at 09:52

## 2024-05-21 RX ADMIN — CHLORHEXIDINE GLUCONATE, 0.12% ORAL RINSE 15 ML: 1.2 SOLUTION DENTAL at 20:31

## 2024-05-21 RX ADMIN — POTASSIUM CHLORIDE 20 MEQ: 1500 TABLET, EXTENDED RELEASE ORAL at 17:37

## 2024-05-21 RX ADMIN — ALLOPURINOL 100 MG: 100 TABLET ORAL at 09:53

## 2024-05-21 RX ADMIN — POTASSIUM CHLORIDE 20 MEQ: 1500 TABLET, EXTENDED RELEASE ORAL at 12:09

## 2024-05-21 RX ADMIN — GABAPENTIN 100 MG: 100 CAPSULE ORAL at 09:52

## 2024-05-21 RX ADMIN — INSULIN LISPRO 2 UNITS: 100 INJECTION, SOLUTION INTRAVENOUS; SUBCUTANEOUS at 20:31

## 2024-05-21 RX ADMIN — BUDESONIDE 0.5 MG: 0.5 SUSPENSION RESPIRATORY (INHALATION) at 19:00

## 2024-05-21 RX ADMIN — AMIODARONE HYDROCHLORIDE 400 MG: 200 TABLET ORAL at 20:32

## 2024-05-21 RX ADMIN — BUMETANIDE 1 MG: 1 TABLET ORAL at 09:52

## 2024-05-21 RX ADMIN — METOPROLOL TARTRATE 25 MG: 25 TABLET, FILM COATED ORAL at 12:09

## 2024-05-21 RX ADMIN — ACETYLCYSTEINE 2 ML: 200 SOLUTION ORAL; RESPIRATORY (INHALATION) at 06:40

## 2024-05-21 RX ADMIN — BUDESONIDE 0.5 MG: 0.5 SUSPENSION RESPIRATORY (INHALATION) at 06:44

## 2024-05-21 RX ADMIN — POTASSIUM CHLORIDE 20 MEQ: 1500 TABLET, EXTENDED RELEASE ORAL at 09:52

## 2024-05-21 RX ADMIN — TAMSULOSIN HYDROCHLORIDE 0.4 MG: 0.4 CAPSULE ORAL at 09:53

## 2024-05-21 RX ADMIN — METOPROLOL TARTRATE 25 MG: 25 TABLET, FILM COATED ORAL at 03:41

## 2024-05-21 RX ADMIN — GUAIFENESIN 600 MG: 600 TABLET, EXTENDED RELEASE ORAL at 09:53

## 2024-05-21 RX ADMIN — ACETYLCYSTEINE 2 ML: 200 SOLUTION ORAL; RESPIRATORY (INHALATION) at 18:55

## 2024-05-21 RX ADMIN — CEPHALEXIN 500 MG: 500 CAPSULE ORAL at 22:12

## 2024-05-21 RX ADMIN — CHLORHEXIDINE GLUCONATE, 0.12% ORAL RINSE 15 ML: 1.2 SOLUTION DENTAL at 09:53

## 2024-05-21 RX ADMIN — CEPHALEXIN 500 MG: 500 CAPSULE ORAL at 06:13

## 2024-05-21 RX ADMIN — BUMETANIDE 1 MG: 0.25 INJECTION INTRAMUSCULAR; INTRAVENOUS at 00:09

## 2024-05-21 RX ADMIN — INSULIN LISPRO 4 UNITS: 100 INJECTION, SOLUTION INTRAVENOUS; SUBCUTANEOUS at 12:09

## 2024-05-21 RX ADMIN — IPRATROPIUM BROMIDE AND ALBUTEROL SULFATE 3 ML: .5; 3 SOLUTION RESPIRATORY (INHALATION) at 18:55

## 2024-05-21 RX ADMIN — ATORVASTATIN CALCIUM 40 MG: 40 TABLET, FILM COATED ORAL at 20:32

## 2024-05-21 RX ADMIN — GABAPENTIN 100 MG: 100 CAPSULE ORAL at 20:31

## 2024-05-21 RX ADMIN — PANTOPRAZOLE SODIUM 40 MG: 40 TABLET, DELAYED RELEASE ORAL at 09:52

## 2024-05-21 RX ADMIN — IPRATROPIUM BROMIDE AND ALBUTEROL SULFATE 3 ML: .5; 3 SOLUTION RESPIRATORY (INHALATION) at 10:48

## 2024-05-21 RX ADMIN — ENOXAPARIN SODIUM 40 MG: 100 INJECTION SUBCUTANEOUS at 17:37

## 2024-05-21 RX ADMIN — CEPHALEXIN 500 MG: 500 CAPSULE ORAL at 13:47

## 2024-05-21 RX ADMIN — METOPROLOL TARTRATE 25 MG: 25 TABLET, FILM COATED ORAL at 20:31

## 2024-05-21 RX ADMIN — CITALOPRAM HYDROBROMIDE 20 MG: 20 TABLET ORAL at 09:52

## 2024-05-21 RX ADMIN — MONTELUKAST 10 MG: 10 TABLET, FILM COATED ORAL at 09:52

## 2024-05-21 RX ADMIN — GUAIFENESIN 600 MG: 600 TABLET, EXTENDED RELEASE ORAL at 22:13

## 2024-05-21 RX ADMIN — IPRATROPIUM BROMIDE AND ALBUTEROL SULFATE 3 ML: .5; 3 SOLUTION RESPIRATORY (INHALATION) at 06:40

## 2024-05-21 RX ADMIN — BUMETANIDE 1 MG: 1 TABLET ORAL at 20:32

## 2024-05-21 RX ADMIN — AMIODARONE HYDROCHLORIDE 400 MG: 200 TABLET ORAL at 12:09

## 2024-05-21 NOTE — PLAN OF CARE
Goal Outcome Evaluation:  Plan of Care Reviewed With: patient        Progress: improving  Outcome Evaluation: Pt is POD 6 s/p CABGx4 with LIMA. Epicardial wires remain in place. While ambulating this morning patient went into a fib. CV surgery PA notified, started on PO amiodarone. Has converted back to NSR this shift. Sternal incision still noted to be reddened at base. Plan of care ongoing.       Addendum to add 500 cc of fluid left of 1800 cc restriction at handoff to nightshift RN.

## 2024-05-21 NOTE — PROGRESS NOTES
"NEPHROLOGY PROGRESS NOTE------[facility]    [facility]  [phone]  Bernadine Childress MD      Patient Care Team:  Camryn Salinas MD as PCP - General (Family Medicine)  Marco A Childress MD as Consulting Physician (Nephrology)      Provider:  Bernadine Childress MD  Patient Name: Fransico Cuenca  :  1955    SUBJECTIVE:    F/U ARF/EN/CRF/CKD    Breathing fine. Up in chair. No dysuria. Appetite ok    Medication:  acetylcysteine, 2 mL, Nebulization, BID - RT  allopurinol, 100 mg, Oral, Daily  aspirin, 81 mg, Oral, Daily  atorvastatin, 40 mg, Oral, Nightly  budesonide, 0.5 mg, Nebulization, BID - RT  bumetanide, 1 mg, Intravenous, Q8H  cephalexin, 500 mg, Oral, Q8H  chlorhexidine, 15 mL, Mouth/Throat, Q12H  citalopram, 20 mg, Oral, Daily  enoxaparin, 30 mg, Subcutaneous, Q24H  gabapentin, 100 mg, Oral, BID  guaiFENesin, 600 mg, Oral, Q12H  insulin lispro, 2-9 Units, Subcutaneous, 4x Daily AC & at Bedtime  ipratropium-albuterol, 3 mL, Nebulization, Q6H While Awake - RT  metoprolol tartrate, 25 mg, Oral, Q8H  montelukast, 10 mg, Oral, Daily  pantoprazole, 40 mg, Oral, Daily  polyethylene glycol, 17 g, Oral, BID  potassium chloride, 20 mEq, Oral, TID With Meals  senna-docusate sodium, 2 tablet, Oral, BID  tamsulosin, 0.4 mg, Oral, Daily             OBJECTIVE    Vital Sign Min/Max for last 24 hours  Temp  Min: 98 °F (36.7 °C)  Max: 98.4 °F (36.9 °C)   BP  Min: 103/87  Max: 161/89   Pulse  Min: 65  Max: 92   Resp  Min: 10  Max: 22   SpO2  Min: 87 %  Max: 100 %   No data recorded   Weight  Min: 94.3 kg (207 lb 14.3 oz)  Max: 94.3 kg (207 lb 14.3 oz)     Flowsheet Rows      Flowsheet Row First Filed Value   Admission Height 167.6 cm (65.98\") Documented at 05/15/2024 1035   Admission Weight 95.8 kg (211 lb 3.2 oz) Documented at 05/15/2024 0600            No intake/output data recorded.  I/O last 3 completed shifts:  In:  [P.O.:1590; " "I.V.:138]  Out: 2830 [Urine:2830]    Physical Exam: CVP=12  General Appearance: alert, appears stated age and cooperative  Head: normocephalic, without obvious abnormality and atraumatic  Eyes: conjunctivae and sclerae normal and no icterus  Neck: supple and +MILD JVD  Lungs: DECREASED BS BIBASILAR WITH FINE LEFT CRACKLES (BETTER)  Heart: regular rhythm & normal rate and normal S1, S2 +MICA  Chest Wall: no abnormalities observed  Abdomen: normal bowel sounds and + BS   Extremities: moves extremities well, TRACE BILAT LE EDEMA, no cyanosis  Skin: no bleeding, bruising or rash  Neurologic: Alert, and oriented. No focal deficits    Labs:    WBC WBC   Date Value Ref Range Status   05/21/2024 9.26 3.40 - 10.80 10*3/mm3 Final   05/20/2024 9.27 3.40 - 10.80 10*3/mm3 Final   05/19/2024 9.47 3.40 - 10.80 10*3/mm3 Final      HGB Hemoglobin   Date Value Ref Range Status   05/21/2024 9.4 (L) 13.0 - 17.7 g/dL Final   05/20/2024 8.2 (L) 13.0 - 17.7 g/dL Final   05/19/2024 8.9 (L) 13.0 - 17.7 g/dL Final      HCT Hematocrit   Date Value Ref Range Status   05/21/2024 30.0 (L) 37.5 - 51.0 % Final   05/20/2024 25.9 (L) 37.5 - 51.0 % Final   05/19/2024 28.2 (L) 37.5 - 51.0 % Final      Platelets No results found for: \"LABPLAT\"   MCV MCV   Date Value Ref Range Status   05/21/2024 83.8 79.0 - 97.0 fL Final   05/20/2024 84.1 79.0 - 97.0 fL Final   05/19/2024 82.9 79.0 - 97.0 fL Final          Sodium Sodium   Date Value Ref Range Status   05/21/2024 141 136 - 145 mmol/L Final   05/20/2024 140 136 - 145 mmol/L Final   05/19/2024 138 136 - 145 mmol/L Final      Potassium Potassium   Date Value Ref Range Status   05/21/2024 4.0 3.5 - 5.2 mmol/L Final   05/20/2024 4.1 3.5 - 5.2 mmol/L Final   05/19/2024 4.1 3.5 - 5.2 mmol/L Final      Chloride Chloride   Date Value Ref Range Status   05/21/2024 99 98 - 107 mmol/L Final   05/20/2024 98 98 - 107 mmol/L Final   05/19/2024 98 98 - 107 mmol/L Final      CO2 CO2   Date Value Ref Range Status " "  05/21/2024 28.0 22.0 - 29.0 mmol/L Final   05/20/2024 28.0 22.0 - 29.0 mmol/L Final   05/19/2024 26.0 22.0 - 29.0 mmol/L Final      BUN BUN   Date Value Ref Range Status   05/21/2024 81 (H) 8 - 23 mg/dL Final   05/20/2024 81 (H) 8 - 23 mg/dL Final   05/19/2024 72 (H) 8 - 23 mg/dL Final      Creatinine Creatinine   Date Value Ref Range Status   05/21/2024 2.24 (H) 0.76 - 1.27 mg/dL Final   05/20/2024 2.82 (H) 0.76 - 1.27 mg/dL Final   05/19/2024 2.85 (H) 0.76 - 1.27 mg/dL Final      Calcium Calcium   Date Value Ref Range Status   05/21/2024 9.8 8.6 - 10.5 mg/dL Final   05/20/2024 9.0 8.6 - 10.5 mg/dL Final   05/19/2024 9.1 8.6 - 10.5 mg/dL Final      PO4 No components found for: \"PO4\"   Albumin Albumin   Date Value Ref Range Status   05/20/2024 3.1 (L) 3.5 - 5.2 g/dL Final      Magnesium Magnesium   Date Value Ref Range Status   05/21/2024 2.0 1.6 - 2.4 mg/dL Final   05/20/2024 2.4 1.6 - 2.4 mg/dL Final   05/19/2024 2.3 1.6 - 2.4 mg/dL Final      Uric Acid No components found for: \"URIC ACID\"     Imaging Results (Last 72 Hours)       Procedure Component Value Units Date/Time    CT Chest Without Contrast Diagnostic [[id number]] Collected: 05/20/24 1611     Updated: 05/20/24 1631    Narrative:      CT CHEST WO CONTRAST DIAGNOSTIC    Date of Exam: 5/20/2024 3:51 PM EDT    Indication: hypoxia    Comparison: AP chest x-ray 5/18/2024, two-view chest x-ray 5/14/2024    Technique: Axial CT images were obtained of the chest without contrast administration.  Sagittal and coronal reconstructions were performed.  Automated exposure control and iterative reconstruction methods were used.    Findings:  There are postoperative changes from recent median sternotomy and CABG. Heart size is enlarged, and there are coronary artery calcifications. There is no pericardial effusion. There are small bilateral pleural effusions. No pneumothorax. There is partial   atelectasis of both lower lobes and near complete atelectasis of the right " middle lobe. Lungs are otherwise clear. No lymphadenopathy is seen in the chest. Cholelithiasis is included in the upper abdomen. No acute osseous abnormality is identified.      Impression:      Impression:  1.Small bilateral pleural effusions.  2.Near complete right middle lobe atelectasis. Partial bilateral lower lobe atelectasis.  3.Expected postoperative changes from recent CABG.      Electronically Signed: Lisajudit Lan    5/20/2024 4:29 PM EDT    Workstation ID: [id number]    XR Chest 1 View [[id number]] Collected: 05/18/24 1404     Updated: 05/18/24 1408    Narrative:      XR CHEST 1 VW    Date of Exam: 5/18/2024 1:56 PM EDT    Indication: sob, chest pain    Comparison: May 17, 2024    Findings:  There is a sheath in the right internal jugular vein. Sternotomy wires noted. The heart looks enlarged. There is fullness of the mediastinum unchanged. The depth of inspiration is poor. A pneumothorax not definitely identified. There are bibasilar   densities that could relate to some atelectasis and effusions.      Impression:      Impression:  1.Bibasilar areas of density that could like atelectasis and effusions.  2.Fullness to the mediastinal shadow unchanged that could be postsurgical and has been suggested since surgery      Electronically Signed: Anurag Colon MD    5/18/2024 2:06 PM EDT    Workstation ID: [id number]            Results for orders placed during the hospital encounter of 05/15/24    XR Chest 1 View    Narrative  XR CHEST 1 VW    Date of Exam: 5/18/2024 1:56 PM EDT    Indication: sob, chest pain    Comparison: May 17, 2024    Findings:  There is a sheath in the right internal jugular vein. Sternotomy wires noted. The heart looks enlarged. There is fullness of the mediastinum unchanged. The depth of inspiration is poor. A pneumothorax not definitely identified. There are bibasilar  densities that could relate to some atelectasis and effusions.    Impression  Impression:  1.Bibasilar areas of density  that could like atelectasis and effusions.  2.Fullness to the mediastinal shadow unchanged that could be postsurgical and has been suggested since surgery      Electronically Signed: Anurag Colon MD  5/18/2024 2:06 PM EDT  Workstation ID: [id number]      XR Chest 1 View    Narrative  XR CHEST 1 VW    Date of Exam: 5/17/2024 5:39 PM EDT    Indication: chest tube removal    Comparison: Same day chest radiographs.    Findings:  Interval removal of left basilar thoracostomy tube. No evidence of new pneumothorax. Right approach vascular sheath in unchanged position. Cardiac surgical changes with median sternotomy wires. Unchanged enlarged cardiomediastinal silhouette. Low lung  volumes. No definite new focal airspace opacity or pleural effusion. Osseous structures are unchanged.,    Impression  Impression:  Interval removal of left thoracostomy tube. No evidence of pneumothorax.      Electronically Signed: Braden Bartlett MD  5/17/2024 5:45 PM EDT  Workstation ID: [id number]      XR Abdomen KUB    Narrative  XR ABDOMEN KUB    Date of Exam: 5/16/2024 6:00 PM EDT    Indication: Abd distension, increased pain    Comparison: 2020 CT    Findings:  Gas-filled colon. No evidence of small bowel obstruction. No radiodense renal calculi noted. No acute osseous abnormality.    Impression  Impression:  Gas-distended colon. No evidence of bowel obstruction.      Electronically Signed: Braden Bartlett MD  5/16/2024 6:23 PM EDT  Workstation ID: [id number]      Results for orders placed during the hospital encounter of 04/18/24    Duplex Carotid Ultrasound CAR    Interpretation Summary    Right internal carotid artery demonstrates a less than 50% stenosis.    Normal left carotid duplex scan.        ASSESSMENT / PLAN      ASCAD      ARF/EN/CRF/CKD------Nonoliguric overnight with diuretics. +ARF/EN on top  Of known CRF/CKD STG 3A with a baseline serum creatinine of about 1.4-1.5. CRF/CKD STG 3A secondary to DGS/HTN NS. BUN/Cr down. +ARF/EN  secondary to ATN from hypotension and renal perfusion disturbance from CP bypass with preadmission ACE-I use. No NSAIDs or IV dye.  Continue to diurese but back change to po Bumex today. Support BP and hemodynamics. Off of ACE-I. Dose meds for CrCl less than 10 cc/min until ARF/EN is resolved. No uremia despite elevated azotemia     2. HTN WITH CKD-----BP good. Off of Vasopressin now. Keep off of Lisinopril. No ACE-I/ARB/DRI for now. Keep MAP>65     3. DMII WITH RENAL MANIFESTATIONS------Off Metformin for now. BS ok. Glucometers, SSI and Insulin gtt post op per protocol     4. HYPERLIPIDEMIA------Hold Statin until tomorrow. Follow CK. TSH normal     5. OA/DJD/HYPERURICEMIA------On Allopurinol preadmission. Check uric acid levels No NSAIDs     6. DEPRESSION-----Celexa     7. COPD-----Respiratory status stable. No active wheezing     8. GERD/PUD PROPHYLAXIS-----PPI. Benefits outweigh risks despite renal dysfunction     9. BPH------On Hytrin preadmission. Currently with lópez     10. ANEMIA------IV iron for MOSHE.       11. ACIDOSIS-----Resolved    12. VOLUME OVERLOAD/ELEVATED CVP/EDEMA-----Improving. Change to po Bumex    13. HYPOKALEMIA-------Replaced    14. VERY MILD RENAL FAILURE ASSOCIATED HYPERPHOSPHATEMIA      Bernadine Childress MD  [facility]  [phone]  05/21/24  07:48 EDT

## 2024-05-21 NOTE — PLAN OF CARE
Assessment: Fransico Cuenca presents with steady improvements in gait distance and functional mobility impairments, however with Afib onset during gait trial this AM. Pt also with continued need for high flow supplemental O2 (at 6 LPM today) which indicates the need for skilled intervention. Pt also requires continued cues and reminders for implementing sternal precautions. Tolerating session today without incident. Will continue to follow and progress as tolerated.       Anticipated Discharge Disposition (PT): inpatient rehabilitation facility

## 2024-05-21 NOTE — PROGRESS NOTES
[facility] [place]        LOS:  LOS: 6 days   Patient Name: Fransico Cuenca  Age/Sex: 69 y.o. male  : 1955  MRN: [id number]    Day of Service: 24   Length of Stay: 6  Encounter Provider: Santiago Lorenzo MD  Place of Service: [facility] CARDIOLOGY  Patient Care Team:  Camryn Salinas MD as PCP - General (Family Medicine)  Marco A Childress MD as Consulting Physician (Nephrology)    Subjective:     Chief Complaint: f/u CAD s/p CABG    Subjective: Patient had afib again this am. Lower extremity edema improved. Started on amiodarone and renal managing diuresis    Current Medications:   Scheduled Meds:acetylcysteine, 2 mL, Nebulization, BID - RT  allopurinol, 100 mg, Oral, Daily  amiodarone, 400 mg, Oral, Q12H  aspirin, 81 mg, Oral, Daily  atorvastatin, 40 mg, Oral, Nightly  budesonide, 0.5 mg, Nebulization, BID - RT  bumetanide, 1 mg, Oral, BID  cephalexin, 500 mg, Oral, Q8H  chlorhexidine, 15 mL, Mouth/Throat, Q12H  citalopram, 20 mg, Oral, Daily  enoxaparin, 40 mg, Subcutaneous, Q24H  gabapentin, 100 mg, Oral, BID  guaiFENesin, 600 mg, Oral, Q12H  insulin lispro, 2-9 Units, Subcutaneous, 4x Daily AC & at Bedtime  ipratropium-albuterol, 3 mL, Nebulization, Q6H While Awake - RT  metoprolol tartrate, 25 mg, Oral, Q8H  montelukast, 10 mg, Oral, Daily  pantoprazole, 40 mg, Oral, Daily  polyethylene glycol, 17 g, Oral, BID  potassium chloride, 20 mEq, Oral, TID With Meals  senna-docusate sodium, 2 tablet, Oral, BID  tamsulosin, 0.4 mg, Oral, Daily      Continuous Infusions:     Allergies:  No Known Allergies    Review of Systems   Constitutional: Positive for malaise/fatigue. Negative for chills and diaphoresis.   Cardiovascular:  Positive for leg swelling. Negative for chest pain, dyspnea on exertion, irregular heartbeat, near-syncope, orthopnea, palpitations, paroxysmal nocturnal dyspnea and syncope.   Respiratory:  Negative for cough, shortness of breath, sleep  disturbances due to breathing and sputum production.    Gastrointestinal:  Negative for change in bowel habit.   Genitourinary:  Negative for urgency.   Neurological:  Negative for dizziness and headaches.   Psychiatric/Behavioral:  Negative for altered mental status.          Objective:     Temp:  [97.8 °F (36.6 °C)-98.4 °F (36.9 °C)] 97.8 °F (36.6 °C)  Heart Rate:  [] 105  Resp:  [10-22] 19  BP: (103-154)/() 108/63     Intake/Output Summary (Last 24 hours) at 5/21/2024 1459  Last data filed at 5/21/2024 1330  Gross per 24 hour   Intake 1112 ml   Output 1880 ml   Net -768 ml     Body mass index is 33.57 kg/m².      05/18/24  0526 05/19/24  0555 05/21/24  0433   Weight: 97.9 kg (215 lb 13.3 oz) 96.5 kg (212 lb 11.9 oz) 94.3 kg (207 lb 14.3 oz)         General Appearance:    Alert, cooperative, in no acute distress                                Head: Atraumatic, normocephalic, PERRLA               Neck:   supple, improving JVD   Lungs:     Clear to auscultation, respirations regular, even and               unlabored    Heart:    Regular rhythm and normal rate, normal S1 and S2, mid sternal incsion   Abdomen:     Normal bowel sounds, no masses, no organomegaly, soft  nontender, nondistended, no guarding, no rebound  tenderness   Extremities:   Moves all extremities well, edema, no cyanosis, no  redness   Pulses:   Pulses palpable and equal bilaterally   Skin:   No bleeding, bruising or rash   Neurologic:   Awake, alert, oriented x3         Lab Review:   Results from last 7 days   Lab Units 05/21/24  0401 05/20/24  0342 05/19/24  0401 05/18/24  0321   SODIUM mmol/L 141 140   < > 142   POTASSIUM mmol/L 4.0 4.1   < > 3.9   CHLORIDE mmol/L 99 98   < > 103   CO2 mmol/L 28.0 28.0   < > 26.0   BUN mg/dL 81* 81*   < > 65*   CREATININE mg/dL 2.24* 2.82*   < > 2.86*   GLUCOSE mg/dL 106* 110*   < > 82   CALCIUM mg/dL 9.8 9.0   < > 8.7   AST (SGOT) U/L  --  22  --  15   ALT (SGPT) U/L  --  <5  --  <5    < > = values  "in this interval not displayed.     Results from last 7 days   Lab Units 05/18/24  0321 05/17/24  0306   CK TOTAL U/L 161 212*     Results from last 7 days   Lab Units 05/21/24  0401 05/20/24  0342   WBC 10*3/mm3 9.26 9.27   HEMOGLOBIN g/dL 9.4* 8.2*   HEMATOCRIT % 30.0* 25.9*   PLATELETS 10*3/mm3 332 293     Results from last 7 days   Lab Units 05/16/24  0218 05/15/24  1054   INR  1.21* 1.20*   APTT seconds  --  21.7*     Results from last 7 days   Lab Units 05/21/24  0401 05/20/24  0342   MAGNESIUM mg/dL 2.0 2.4           Invalid input(s): \"LDLCALC\"        Results from last 7 days   Lab Units 05/17/24  0306   TSH uIU/mL 1.120       Recent Radiology:  Imaging Results (Most Recent)       Procedure Component Value Units Date/Time    CT Chest Without Contrast Diagnostic [[id number]] Collected: 05/20/24 1611     Updated: 05/20/24 1631    Narrative:      CT CHEST WO CONTRAST DIAGNOSTIC    Date of Exam: 5/20/2024 3:51 PM EDT    Indication: hypoxia    Comparison: AP chest x-ray 5/18/2024, two-view chest x-ray 5/14/2024    Technique: Axial CT images were obtained of the chest without contrast administration.  Sagittal and coronal reconstructions were performed.  Automated exposure control and iterative reconstruction methods were used.    Findings:  There are postoperative changes from recent median sternotomy and CABG. Heart size is enlarged, and there are coronary artery calcifications. There is no pericardial effusion. There are small bilateral pleural effusions. No pneumothorax. There is partial   atelectasis of both lower lobes and near complete atelectasis of the right middle lobe. Lungs are otherwise clear. No lymphadenopathy is seen in the chest. Cholelithiasis is included in the upper abdomen. No acute osseous abnormality is identified.      Impression:      Impression:  1.Small bilateral pleural effusions.  2.Near complete right middle lobe atelectasis. Partial bilateral lower lobe atelectasis.  3.Expected " postoperative changes from recent CABG.      Electronically Signed: Lisa Lan    5/20/2024 4:29 PM EDT    Workstation ID: [id number]    XR Chest 1 View [[id number]] Collected: 05/18/24 1404     Updated: 05/18/24 1408    Narrative:      XR CHEST 1 VW    Date of Exam: 5/18/2024 1:56 PM EDT    Indication: sob, chest pain    Comparison: May 17, 2024    Findings:  There is a sheath in the right internal jugular vein. Sternotomy wires noted. The heart looks enlarged. There is fullness of the mediastinum unchanged. The depth of inspiration is poor. A pneumothorax not definitely identified. There are bibasilar   densities that could relate to some atelectasis and effusions.      Impression:      Impression:  1.Bibasilar areas of density that could like atelectasis and effusions.  2.Fullness to the mediastinal shadow unchanged that could be postsurgical and has been suggested since surgery      Electronically Signed: Anurag Colon MD    5/18/2024 2:06 PM EDT    Workstation ID: [id number]    XR Chest 1 View [[id number]] Collected: 05/17/24 1743     Updated: 05/17/24 1747    Narrative:      XR CHEST 1 VW    Date of Exam: 5/17/2024 5:39 PM EDT    Indication: chest tube removal    Comparison: Same day chest radiographs.    Findings:  Interval removal of left basilar thoracostomy tube. No evidence of new pneumothorax. Right approach vascular sheath in unchanged position. Cardiac surgical changes with median sternotomy wires. Unchanged enlarged cardiomediastinal silhouette. Low lung   volumes. No definite new focal airspace opacity or pleural effusion. Osseous structures are unchanged.,      Impression:      Impression:  Interval removal of left thoracostomy tube. No evidence of pneumothorax.      Electronically Signed: Braden Bartlett MD    5/17/2024 5:45 PM EDT    Workstation ID: [id number]    US Renal Bilateral [[id number]] Collected: 05/17/24 1347     Updated: 05/17/24 1352    Narrative:      US RENAL BILATERAL    Date of Exam:  5/17/2024 1:16 PM EDT    Indication: ARF/EN/CRF/CKD.    Comparison: CT abdomen and pelvis without contrast 7/26/2020, renal ultrasound 4/18/2024.    Technique: Grayscale and color Doppler ultrasound evaluation of the kidneys and urinary bladder was performed.    Findings:  Technologist noted a technically difficult exam due to body habitus and portable technique, done in the CVICU.    Right kidney measures 11.2 x 6.1 x 6.1 cm and demonstrates normal cortical echogenicity. No definite renal masses are seen. No right hydronephrosis.    Left kidney measures 10.3 x 5.2 x 6.5 cm. 2 left renal cysts measure up to 1.6 cm. Normal cortical echogenicity of the left kidney. No left hydronephrosis.    Urinary bladder is decompressed with Rasmussen catheter in place.      Impression:      Impression:  1.Left renal cysts measuring up to 1.6 cm.  2.No hydronephrosis.        Electronically Signed: Lisa Lan    5/17/2024 1:50 PM EDT    Workstation ID: [id number]    XR Chest 1 View [[id number]] Collected: 05/17/24 0814     Updated: 05/17/24 0818    Narrative:      XR CHEST 1 VW    Date of Exam: 5/17/2024 4:23 AM EDT    Indication: Post-Op Heart Surgery    Comparison: 5/16/2024  Findings:  [place]ave[place] catheter has been removed and replaced by sheath with its tip in the upper SVC. Left chest tube is unchanged in position. There is no pneumothorax. There is continued mild atelectasis of the lung bases, greatest on the left. Small left effusion   is again noted.      Impression:      Impression:  Continued mild bibasilar atelectasis, greatest on the left, with small left effusion.      Electronically Signed: Talita Bear MD    5/17/2024 8:16 AM EDT    Workstation ID: [id number]    XR Abdomen KUB [[id number]] Collected: 05/16/24 1820     Updated: 05/16/24 1825    Narrative:      XR ABDOMEN KUB    Date of Exam: 5/16/2024 6:00 PM EDT    Indication: Abd distension, increased pain    Comparison: 2020 CT    Findings:  Gas-filled colon. No evidence  of small bowel obstruction. No radiodense renal calculi noted. No acute osseous abnormality.      Impression:      Impression:  Gas-distended colon. No evidence of bowel obstruction.      Electronically Signed: Braden Bartlett MD    5/16/2024 6:23 PM EDT    Workstation ID: [id number]    XR Chest 1 View [[id number]] Collected: 05/16/24 0759     Updated: 05/16/24 0810    Narrative:      XR CHEST 1 VW    Date of Exam: 5/16/2024 5:49 AM EDT    Indication: Post-Op Heart Surgery    Comparison: 5/15/2024    Findings:  Interval removal of endotracheal and NG tubes. Stable cardiomegaly and pulmonary congestion. No change in position of a right intrajugular [place]aveen catheter with the tip in the right main pulmonary artery level stable surgical changes post CABG. There   is mild interval improved aeration to the left lung base with persistent atelectasis and a small effusion. There is also mild increased opacity at the right lung base.      Impression:      Impression:  Interval removal of endotracheal and NG tubes    Mild improved aeration to the left lung base. Otherwise grossly stable bibasilar airspace disease      Electronically Signed: Patrick Robbins MD    5/16/2024 8:08 AM EDT    Workstation ID: [id number]    XR Chest 1 View [[id number]] Collected: 05/15/24 1114     Updated: 05/15/24 1119    Narrative:      DATE OF EXAM:  5/15/2024 10:54 AM     PROCEDURE:  XR CHEST 1 VW-     INDICATIONS:  Post-Op Check Line & Tube Placement; I25.10-Atherosclerotic heart  disease of native coronary artery without angina pectoris     COMPARISON:  May 14, 2024     TECHNIQUE:   Single radiographic view of the chest was obtained.     FINDINGS:  Endotracheal tube tip is approximately 5 cm above the claire. There is a  right IJ [place]-Naveen catheter with tip projecting in the area of the right  pulmonary artery. Enteric tube extends into the left upper quadrant, tip  beyond the margins of this exam. There has been interval median  sternotomy and CABG.  There appears to be mediastinal drains and a left  basilar chest tube. No definite pneumothorax or significant pleural  effusion is seen. There are mild bibasilar opacities, left greater than  right. Heart size and mediastinal contour appear as expected.       Impression:      1.     Interval median sternotomy and CABG.  2.     Tubes and lines appear in satisfactory positions, as detailed  above.  3.     Mild bibasilar opacities which may represent atelectasis or  edema.     Electronically Signed By-Romaine Szymanski MD On:5/15/2024 11:16 AM               ECHOCARDIOGRAM:    Results for orders placed in visit on 05/15/24    Intra-Op Anesthesia DIGNA    Narrative  Intra-Op Anesthesia DIGNA    Procedure Performed: Intra-Op Anesthesia DIGNA  Start Time:  End Time:    Preanesthesia Checklist:  Patient identified, IV assessed, risks and benefits discussed, monitors and equipment assessed, procedure being performed at surgeon's request and anesthesia consent obtained.    General Procedure Information  DIGNA Placed for monitoring purposes only -- This is not a diagnostic DIGNA  Diagnostic Indications for Echo:  assessment of surgical repair and hemodynamic monitoring  Physician Requesting Echo: Jean Hirsch MD  Location performed:  OR  Intubated  Bite block placed  Heart visualized  Probe Insertion:  Easy  Probe Type:  Multiplane  Modalities:  Color flow mapping and continuous wave Doppler    Echocardiographic and Doppler Measurements    Ventricles    Right Ventricle:  Cavity size normal.  Global function normal.  Left Ventricle:  Cavity size normal.  Global Function normal.        Valves    Aortic Valve:  Annulus normal.  Stenosis not present.  Regurgitation trace.  Leaflets normal.  Leaflet motions normal.    Mitral Valve:  Annulus normal.  Stenosis not present.  Regurgitation trace.  Leaflets normal.  Leaflet motions normal.    Tricuspid Valve:  Annulus normal.  Stenosis not present.  Regurgitation absent.  Leaflets normal.   Leaflet motions normal.  Pulmonic Valve:  Annulus normal.  Stenosis not present.  Regurgitation absent.      Aorta    Ascending Aorta:  Size normal.  Dissection not present.  Mobile plaque not present.  Aortic Arch:  Size normal.  Dissection not present.  Mobile plaque not present.  Descending Aorta:  Size normal.  Dissection not present.  Mobile plaque not present.      Atria    Right Atrium:  Size normal.  Spontaneous echo contrast not present.  Thrombus not present.  Tumor not present.  Device not present.    Left Atrium:  Size normal.  Spontaneous echo contrast not present.  Thrombus not present.  Tumor not present.  Device not present.  Left atrial appendage normal.      Septa        Ventricular Septum:  Intra-ventricular septum morphology normal.        Other Findings  Pericardium:  normal  Pleural Effusion:  none  Pulmonary Venous Flow:  normal    Anesthesia Information  Performed Personally  Anesthesiologist:  Kvng Pearson MD      Echocardiogram Comments:  Post bypass EF 55%. No change in valves. No air seen.        I reviewed the patient's new clinical results.    EKG:      Assessment:       ASCAD    Coronary artery disease status post four-vessel coronary arterial bypass grafting     LIMA-LAD, SVG-DG, SVG-OM1-OM 2  Hyperlipidemia  CKD  Bilateral lower extremity edema / HFpEF  Post operative Atrial fibrillation, now SR  ZHANG  HTN    Plan:   Continue with routine postoperative care  Continue to diurese as renal function allows  Monitor renal function electrolytes  Fluid restriction  Increase activity as tolerated  Monitor rate and rhythm-given additional afib episode, would favor anticoagulation if okay with [facility], on amiodarone currently  Continue to diurese the patient.  Creatinine is stable.      Patient is seen and examined and findings are verified.  All data is reviewed by me personally.  Assessment and plan formulated by APC was done after discussion with attending.  I spent more than 50% of  time in taking care of the patient.    Patient is feeling better.  Shortness of breath is much better.  Still leg edema present    Patient is having short episode of atrial fibrillation.    Normal S1 and S2.  Currently sinus rhythm.  Decreased breath sound.    MDM:    1.  CAD/CABG:    Patient underwent CABG x 4.  He is slowly recovering    2.  Bilateral leg edema:    Continue diuretics.    3.  Atrial fibrillation postoperative:    Patient is on amiodarone.  If he has further episodes I would consider Eliquis    4.  Hypertension:    Blood pressure is on the lower side.  Continue to monitor    5.  Renal insufficiency/CKD    Creatinine 2.2        Santiago Lorenzo MD  05/21/24  14:59 EDT

## 2024-05-21 NOTE — CASE MANAGEMENT/SOCIAL WORK
Continued Stay Note  ERMI[facility]     Patient Name: Fransico Cuenca  MRN: [id number]  Today's Date: 5/21/2024    Admit Date: 5/15/2024  Plan: DC plan: [facility] accepted, no pre-cert required. CABG 5/15/2024.   Discharge Plan       Row Name 05/21/24 1336       Plan    Plan Comments CM spoke with patient’s nurse and [facility] PA Rosa Mayo to obtain clinical updates. DC barriers: POD 6, pacer wires, pending EKG, cardiac monitoring, and monitor labs.               Expected Discharge Date and Time       Expected Discharge Date Expected Discharge Time    May 22, 2024        Laurie Hanson RN      Office Phone [phone]

## 2024-05-21 NOTE — PROGRESS NOTES
Daily Progress Note        ASCAD      Assessment:     Hypoxic respiratory insufficiency  Pulmonary edema  Bibasilar atelectasis     COPD, mild  Pulmonary function test 5/14/2024, FVC 3.0 L which is 81%, FEV1 2.4 L which is 86%, FEV1/FVC ratio 78, total lung capacity 96%, residual volume 116%, diffusion capacity 54%     NSTEMI, CAD, EF 55% (cath)--s/p elective CABG x4 with LIMA      DM II  HTN  GERD  Hyperlipidemia  EN  Anemia  Chronic allergic rhinitis/sinusitis     Echo 4/18/2024: LVEF 51-55%, normal RV pressure, hypokinesis of distal septum and apex        Recommendations:     Oxygen titration currently on 6 L     bronchodilators Pulmicort and DuoNeb  Mucomyst nebulized       diuresis by renal      Incentive spirometry and flutter valve      Ambulation     bowel regimen on MiraLax                LOS: 6 days     Subjective         Objective     Vital signs for last 24 hours:  Vitals:    05/21/24 1100 05/21/24 1200 05/21/24 1300 05/21/24 1519   BP: 131/70 108/63  101/61   BP Location:    Left arm   Patient Position:    Sitting   Pulse: 89 90 105    Resp:  19  19   Temp:  97.8 °F (36.6 °C)  97.4 °F (36.3 °C)   TempSrc:  Oral  Oral   SpO2: 94% 94%     Weight:       Height:           Intake/Output last 3 shifts:  I/O last 3 completed shifts:  In: 1988 [P.O.:1850; I.V.:138]  Out: 3005 [Urine:3005]  Intake/Output this shift:  I/O this shift:  In: 702 [P.O.:702]  Out: 200 [Urine:200]      Radiology  Imaging Results (Last 24 Hours)       ** No results found for the last 24 hours. **            Labs:  Results from last 7 days   Lab Units 05/21/24  0401   WBC 10*3/mm3 9.26   HEMOGLOBIN g/dL 9.4*   HEMATOCRIT % 30.0*   PLATELETS 10*3/mm3 332     Results from last 7 days   Lab Units 05/21/24  0401 05/20/24  0342   SODIUM mmol/L 141 140   POTASSIUM mmol/L 4.0 4.1   CHLORIDE mmol/L 99 98   CO2 mmol/L 28.0 28.0   BUN mg/dL 81* 81*   CREATININE mg/dL 2.24* 2.82*   CALCIUM mg/dL 9.8 9.0   BILIRUBIN mg/dL  --  0.3   ALK PHOS U/L   --  51   ALT (SGPT) U/L  --  <5   AST (SGOT) U/L  --  22   GLUCOSE mg/dL 106* 110*     Results from last 7 days   Lab Units 05/20/24  1153   PH, ARTERIAL pH units 7.454*   PO2 ART mm Hg 67.7*   PCO2, ARTERIAL mm Hg 39.4   HCO3 ART mmol/L 27.7     Results from last 7 days   Lab Units 05/20/24  0342 05/18/24  0321 05/17/24  0943   ALBUMIN g/dL 3.1* 3.4* 3.1     Results from last 7 days   Lab Units 05/18/24  0321 05/17/24  0306   CK TOTAL U/L 161 212*         Results from last 7 days   Lab Units 05/21/24  0401   MAGNESIUM mg/dL 2.0     Results from last 7 days   Lab Units 05/16/24  0218 05/15/24  1054   INR  1.21* 1.20*   APTT seconds  --  21.7*     Results from last 7 days   Lab Units 05/17/24  0306   TSH uIU/mL 1.120           Meds:   SCHEDULE  acetylcysteine, 2 mL, Nebulization, BID - RT  allopurinol, 100 mg, Oral, Daily  amiodarone, 400 mg, Oral, Q12H  aspirin, 81 mg, Oral, Daily  atorvastatin, 40 mg, Oral, Nightly  budesonide, 0.5 mg, Nebulization, BID - RT  bumetanide, 1 mg, Oral, BID  cephalexin, 500 mg, Oral, Q8H  chlorhexidine, 15 mL, Mouth/Throat, Q12H  citalopram, 20 mg, Oral, Daily  enoxaparin, 40 mg, Subcutaneous, Q24H  gabapentin, 100 mg, Oral, BID  guaiFENesin, 600 mg, Oral, Q12H  insulin lispro, 2-9 Units, Subcutaneous, 4x Daily AC & at Bedtime  ipratropium-albuterol, 3 mL, Nebulization, Q6H While Awake - RT  metoprolol tartrate, 25 mg, Oral, Q8H  montelukast, 10 mg, Oral, Daily  pantoprazole, 40 mg, Oral, Daily  polyethylene glycol, 17 g, Oral, BID  potassium chloride, 20 mEq, Oral, TID With Meals  senna-docusate sodium, 2 tablet, Oral, BID  tamsulosin, 0.4 mg, Oral, Daily      Infusions     PRNs    acetaminophen **OR** [DISCONTINUED] acetaminophen **OR** [DISCONTINUED] acetaminophen    bisacodyl    Calcium Replacement - Follow Nurse / BPA Driven Protocol    cyclobenzaprine    dextrose    dextrose    glucagon (human recombinant)    HYDROcodone-acetaminophen    ipratropium-albuterol    Magnesium  Cardiology Dose Replacement - Follow Nurse / BPA Driven Protocol    [DISCONTINUED] Morphine **AND** naloxone    nitroglycerin    ondansetron    Phosphorus Replacement - Follow Nurse / BPA Driven Protocol    Potassium Replacement - Follow Nurse / BPA Driven Protocol    simethicone    traMADol    Physical Exam:  Physical Exam  Cardiovascular:      Heart sounds: Murmur heard.      No gallop.   Pulmonary:      Effort: No respiratory distress.      Breath sounds: No stridor. Rhonchi and rales present. No wheezing.   Chest:      Chest wall: No tenderness.         ROS  Review of Systems   Respiratory:  Positive for cough and shortness of breath. Negative for wheezing and stridor.    Cardiovascular:  Negative for chest pain, palpitations and leg swelling.             Total time spent with patient greater than: 45 Minutes

## 2024-05-21 NOTE — THERAPY TREATMENT NOTE
Subjective: Pt agreeable to therapeutic plan of care.    Objective:     Bed mobility - N/A or Not attempted. Pt up in chair upon arrival.  Transfers - SBA and with rolling walker sit to/from stand from toilet and recliner.  Ambulation - 200 feet SBA and with rolling walker. Good angi and good gait mechanics. Afib noted during trial, stopped briefly for RN to monitor rhythm.     Vitals: Tachycardic, in Afib during ambulation. HR as high has 125 bpm but mostly between 101 and 115. BP at rest 139/89 (85), standing/ambulating 113/85 (96), and after activity seated rest 141/81 mmHg.     Phase 1 Cardiac Rehab Initiated - Acute Care    Cardiac Level IV Activities  Sitting tolerance: >10min and supported  Standing tolerance: 5-10min and supported    Precautions:  Mid-sternal incision; avoid scapular retraction and depression.  Cardiovascular impairment post-sx; encourage energy conservation strategies.    MET level equivalent: 1.4-2.0 (Self care ADLs in sitting / slow ambulation in room, light intensity activities)     Pain: 2 VAS   Location: L upper chest, increased with inspiration  Intervention for pain: Repositioned, Increased Activity, and Therapeutic Presence    Education: Provided education on the importance of mobility in the acute care setting, Verbal/Tactile Cues, Transfer Training, Gait Training, Energy conservation strategies, and Post-Op Precautions    Assessment: Fransico NADINE Cuenca presents with steady improvements in gait distance and functional mobility impairments, however with Afib onset during gait trial this AM. Pt also with continued need for high flow supplemental O2 (at 6 LPM today) which shows continued endurance deficit and indicates the need for skilled intervention. Pt also requires continued cues and reminders for implementing sternal precautions. Tolerating session today without incident. Will continue to follow and progress as tolerated.     Plan/Recommendations:   If medically appropriate, High  Intensity Therapy recommended post-acute care. This is recommended as therapy feels the patient would require 5-6 days per week, 2-3 hours per day. At this time, inpatient rehabilitation (acute rehab) would be the first choice and SNF would be second. Pt requires no DME at discharge.     Pt desires Inpatient Rehabilitation placement at discharge. Pt cooperative; agreeable to therapeutic recommendations and plan of care.         Basic Mobility 6-click:  Rollin = Total, A lot = 2, A little = 3; 4 = None  Supine>Sit:   1 = Total, A lot = 2, A little = 3; 4 = None   Sit>Stand with arms:  1 = Total, A lot = 2, A little = 3; 4 = None  Bed>Chair:   1 = Total, A lot = 2, A little = 3; 4 = None  Ambulate in room:  1 = Total, A lot = 2, A little = 3; 4 = None  3-5 Steps with railin = Total, A lot = 2, A little = 3; 4 = None  Score: 20    Modified [place]: N/A = No pre-op stroke/TIA    Post-Tx Position: Up in Chair and Call light and personal items within reach,RN at bedside.   PPE: gloves

## 2024-05-21 NOTE — PROGRESS NOTES
" LOS: 6 days   Patient Care Team:  Camryn Salinas MD as PCP - General (Family Medicine)  Marco A Childress MD as Consulting Physician (Nephrology)    Chief Complaint:   Post-op follow-up, s/p CABG    Subjective  Sitting in chair.  Just finished working with physical therapy.  Is a little short of breath.    Vital Signs  Temp:  [98 °F (36.7 °C)-98.4 °F (36.9 °C)] 98.3 °F (36.8 °C)  Heart Rate:  [65-92] 70  Resp:  [10-22] 16  BP: (103-161)/() 142/79      05/18/24  0526 05/19/24  0555 05/21/24  0433   Weight: 97.9 kg (215 lb 13.3 oz) 96.5 kg (212 lb 11.9 oz) 94.3 kg (207 lb 14.3 oz)     Body mass index is 33.57 kg/m².    Intake/Output Summary (Last 24 hours) at 5/21/2024 1041  Last data filed at 5/21/2024 0900  Gross per 24 hour   Intake 1472 ml   Output 1830 ml   Net -358 ml     I/O this shift:  In: 222 [P.O.:222]  Out: -     Objective:  Vital signs: (most recent): Blood pressure 142/79, pulse 70, temperature 98.3 °F (36.8 °C), temperature source Oral, resp. rate 16, height 167.6 cm (65.98\"), weight 94.3 kg (207 lb 14.3 oz), SpO2 98%.                Physical Exam:   General Appearance: awake and alert, no acute distress   Lungs: respirations regular and respirations unlabored   Heart:  regular rate, irreg irreg rhythm, no murmur   Abdomen: soft or nontender, + bowel sounds, +BM   Skin: sternal incision clean, dry, intact; EVH site c/d/i   Neuro: alert and oriented, no focal deficits.     Results Review:      WBC WBC   Date Value Ref Range Status   05/21/2024 9.26 3.40 - 10.80 10*3/mm3 Final   05/20/2024 9.27 3.40 - 10.80 10*3/mm3 Final   05/19/2024 9.47 3.40 - 10.80 10*3/mm3 Final      HGB Hemoglobin   Date Value Ref Range Status   05/21/2024 9.4 (L) 13.0 - 17.7 g/dL Final   05/20/2024 8.2 (L) 13.0 - 17.7 g/dL Final   05/19/2024 8.9 (L) 13.0 - 17.7 g/dL Final      HCT Hematocrit   Date Value Ref Range Status   05/21/2024 30.0 (L) 37.5 - 51.0 % Final   05/20/2024 25.9 (L) 37.5 - 51.0 % Final " "  05/19/2024 28.2 (L) 37.5 - 51.0 % Final      Platelets Platelets   Date Value Ref Range Status   05/21/2024 332 140 - 450 10*3/mm3 Final   05/20/2024 293 140 - 450 10*3/mm3 Final   05/19/2024 254 140 - 450 10*3/mm3 Final        PT/INR:  No results found for: \"PROTIME\"/No results found for: \"INR\"    Sodium Sodium   Date Value Ref Range Status   05/21/2024 141 136 - 145 mmol/L Final   05/20/2024 140 136 - 145 mmol/L Final   05/19/2024 138 136 - 145 mmol/L Final      Potassium Potassium   Date Value Ref Range Status   05/21/2024 4.0 3.5 - 5.2 mmol/L Final   05/20/2024 4.1 3.5 - 5.2 mmol/L Final   05/19/2024 4.1 3.5 - 5.2 mmol/L Final      Chloride Chloride   Date Value Ref Range Status   05/21/2024 99 98 - 107 mmol/L Final   05/20/2024 98 98 - 107 mmol/L Final   05/19/2024 98 98 - 107 mmol/L Final      Bicarbonate CO2   Date Value Ref Range Status   05/21/2024 28.0 22.0 - 29.0 mmol/L Final   05/20/2024 28.0 22.0 - 29.0 mmol/L Final   05/19/2024 26.0 22.0 - 29.0 mmol/L Final      BUN BUN   Date Value Ref Range Status   05/21/2024 81 (H) 8 - 23 mg/dL Final   05/20/2024 81 (H) 8 - 23 mg/dL Final   05/19/2024 72 (H) 8 - 23 mg/dL Final      Creatinine Creatinine   Date Value Ref Range Status   05/21/2024 2.24 (H) 0.76 - 1.27 mg/dL Final   05/20/2024 2.82 (H) 0.76 - 1.27 mg/dL Final   05/19/2024 2.85 (H) 0.76 - 1.27 mg/dL Final      Calcium Calcium   Date Value Ref Range Status   05/21/2024 9.8 8.6 - 10.5 mg/dL Final   05/20/2024 9.0 8.6 - 10.5 mg/dL Final   05/19/2024 9.1 8.6 - 10.5 mg/dL Final      Magnesium Magnesium   Date Value Ref Range Status   05/21/2024 2.0 1.6 - 2.4 mg/dL Final   05/20/2024 2.4 1.6 - 2.4 mg/dL Final   05/19/2024 2.3 1.6 - 2.4 mg/dL Final        acetylcysteine, 2 mL, Nebulization, BID - RT  allopurinol, 100 mg, Oral, Daily  amiodarone, 400 mg, Oral, Q12H  aspirin, 81 mg, Oral, Daily  atorvastatin, 40 mg, Oral, Nightly  budesonide, 0.5 mg, Nebulization, BID - RT  bumetanide, 1 mg, Oral, " BID  cephalexin, 500 mg, Oral, Q8H  chlorhexidine, 15 mL, Mouth/Throat, Q12H  citalopram, 20 mg, Oral, Daily  enoxaparin, 40 mg, Subcutaneous, Q24H  gabapentin, 100 mg, Oral, BID  guaiFENesin, 600 mg, Oral, Q12H  insulin lispro, 2-9 Units, Subcutaneous, 4x Daily AC & at Bedtime  ipratropium-albuterol, 3 mL, Nebulization, Q6H While Awake - RT  metoprolol tartrate, 25 mg, Oral, Q8H  montelukast, 10 mg, Oral, Daily  pantoprazole, 40 mg, Oral, Daily  polyethylene glycol, 17 g, Oral, BID  potassium chloride, 20 mEq, Oral, TID With Meals  senna-docusate sodium, 2 tablet, Oral, BID  tamsulosin, 0.4 mg, Oral, Daily             ASCAD      Assessment & Plan    - Severe MV CAD, EF 55% (cath)--s/p elective CABG x4 with LIMA ([place])  - NSTEMI presentation--April 2024  - HTN--pressor postop  - HLD--statin  - Postop EN/ARF on CKD stage 3--Dr. Childress following  - Anxiety  - ZHANG with CPAP compliance  - Recent hx human meta pneumovirus infection--resolved  - Chronic HFpEF, NYHA class II-III  - Postop ABLA, expected--transfused 5/16  - Postop atrial fib--converted to SR  - Postop acute hypoxic respiratory insufficiency--pulm consulted  - Mild COPD per PFTs     POD# 6.  Continue routine care.  On aspirin/statin/beta-blocker.  Will need Plavix at DC for NSTEMI presentation.  Went into atrial fibrillation this morning after ambulating with PT.  EKG shows A-fib heart rate 80s.  On metoprolol TID. Will start p.o. amnio.  PO bumex per nephrology.   Still on 6 L nasal cannula, wean as tolerated. Pulmonology following.  Mobilize and encourage pulm toilet.  Plan for [facility] at discharge.    Lux Daniel PA-C  05/21/24  10:41 EDT

## 2024-05-22 LAB
ANION GAP SERPL CALCULATED.3IONS-SCNC: 11 MMOL/L (ref 5–15)
BUN SERPL-MCNC: 71 MG/DL (ref 8–23)
BUN/CREAT SERPL: 40.1 (ref 7–25)
CALCIUM SPEC-SCNC: 9.7 MG/DL (ref 8.6–10.5)
CHLORIDE SERPL-SCNC: 100 MMOL/L (ref 98–107)
CO2 SERPL-SCNC: 29 MMOL/L (ref 22–29)
CREAT SERPL-MCNC: 1.77 MG/DL (ref 0.76–1.27)
DEPRECATED RDW RBC AUTO: 50.3 FL (ref 37–54)
EGFRCR SERPLBLD CKD-EPI 2021: 40.8 ML/MIN/1.73
ERYTHROCYTE [DISTWIDTH] IN BLOOD BY AUTOMATED COUNT: 16.3 % (ref 12.3–15.4)
GLUCOSE BLDC GLUCOMTR-MCNC: 117 MG/DL (ref 70–105)
GLUCOSE BLDC GLUCOMTR-MCNC: 132 MG/DL (ref 70–105)
GLUCOSE BLDC GLUCOMTR-MCNC: 132 MG/DL (ref 70–105)
GLUCOSE BLDC GLUCOMTR-MCNC: 133 MG/DL (ref 70–105)
GLUCOSE BLDC GLUCOMTR-MCNC: 148 MG/DL (ref 70–105)
GLUCOSE SERPL-MCNC: 114 MG/DL (ref 65–99)
HCT VFR BLD AUTO: 29.3 % (ref 37.5–51)
HGB BLD-MCNC: 9.1 G/DL (ref 13–17.7)
MAGNESIUM SERPL-MCNC: 1.8 MG/DL (ref 1.6–2.4)
MCH RBC QN AUTO: 26.5 PG (ref 26.6–33)
MCHC RBC AUTO-ENTMCNC: 31.1 G/DL (ref 31.5–35.7)
MCV RBC AUTO: 85.4 FL (ref 79–97)
PHOSPHATE SERPL-MCNC: 3.5 MG/DL (ref 2.5–4.5)
PLATELET # BLD AUTO: 394 10*3/MM3 (ref 140–450)
PMV BLD AUTO: 9.9 FL (ref 6–12)
POTASSIUM SERPL-SCNC: 4 MMOL/L (ref 3.5–5.2)
RBC # BLD AUTO: 3.43 10*6/MM3 (ref 4.14–5.8)
SODIUM SERPL-SCNC: 140 MMOL/L (ref 136–145)
WBC NRBC COR # BLD AUTO: 11.49 10*3/MM3 (ref 3.4–10.8)

## 2024-05-22 PROCEDURE — 25010000002 MAGNESIUM SULFATE 4 GM/100ML SOLUTION: Performed by: THORACIC SURGERY (CARDIOTHORACIC VASCULAR SURGERY)

## 2024-05-22 PROCEDURE — 97530 THERAPEUTIC ACTIVITIES: CPT

## 2024-05-22 PROCEDURE — 94799 UNLISTED PULMONARY SVC/PX: CPT

## 2024-05-22 PROCEDURE — 83735 ASSAY OF MAGNESIUM: CPT | Performed by: INTERNAL MEDICINE

## 2024-05-22 PROCEDURE — 85027 COMPLETE CBC AUTOMATED: CPT | Performed by: NURSE PRACTITIONER

## 2024-05-22 PROCEDURE — 97116 GAIT TRAINING THERAPY: CPT

## 2024-05-22 PROCEDURE — 82948 REAGENT STRIP/BLOOD GLUCOSE: CPT

## 2024-05-22 PROCEDURE — 80048 BASIC METABOLIC PNL TOTAL CA: CPT | Performed by: NURSE PRACTITIONER

## 2024-05-22 PROCEDURE — 94762 N-INVAS EAR/PLS OXIMTRY CONT: CPT

## 2024-05-22 PROCEDURE — 99024 POSTOP FOLLOW-UP VISIT: CPT | Performed by: NURSE PRACTITIONER

## 2024-05-22 PROCEDURE — 84100 ASSAY OF PHOSPHORUS: CPT | Performed by: INTERNAL MEDICINE

## 2024-05-22 PROCEDURE — 94761 N-INVAS EAR/PLS OXIMETRY MLT: CPT

## 2024-05-22 PROCEDURE — 99232 SBSQ HOSP IP/OBS MODERATE 35: CPT | Performed by: INTERNAL MEDICINE

## 2024-05-22 PROCEDURE — 94664 DEMO&/EVAL PT USE INHALER: CPT

## 2024-05-22 PROCEDURE — 82948 REAGENT STRIP/BLOOD GLUCOSE: CPT | Performed by: NURSE PRACTITIONER

## 2024-05-22 PROCEDURE — 25010000002 ENOXAPARIN PER 10 MG: Performed by: THORACIC SURGERY (CARDIOTHORACIC VASCULAR SURGERY)

## 2024-05-22 RX ORDER — ACETAMINOPHEN 650 MG
TABLET, EXTENDED RELEASE ORAL ONCE
Status: COMPLETED | OUTPATIENT
Start: 2024-05-22 | End: 2024-05-22

## 2024-05-22 RX ORDER — CEPHALEXIN 500 MG/1
500 CAPSULE ORAL 4 TIMES DAILY
Status: DISCONTINUED | OUTPATIENT
Start: 2024-05-22 | End: 2024-05-23 | Stop reason: HOSPADM

## 2024-05-22 RX ORDER — MAGNESIUM SULFATE HEPTAHYDRATE 40 MG/ML
4 INJECTION, SOLUTION INTRAVENOUS ONCE
Status: COMPLETED | OUTPATIENT
Start: 2024-05-22 | End: 2024-05-22

## 2024-05-22 RX ADMIN — IPRATROPIUM BROMIDE AND ALBUTEROL SULFATE 3 ML: .5; 3 SOLUTION RESPIRATORY (INHALATION) at 12:40

## 2024-05-22 RX ADMIN — ACETYLCYSTEINE 2 ML: 200 SOLUTION ORAL; RESPIRATORY (INHALATION) at 18:50

## 2024-05-22 RX ADMIN — ASPIRIN 81 MG: 81 TABLET, COATED ORAL at 08:11

## 2024-05-22 RX ADMIN — GUAIFENESIN 600 MG: 600 TABLET, EXTENDED RELEASE ORAL at 08:11

## 2024-05-22 RX ADMIN — GABAPENTIN 100 MG: 100 CAPSULE ORAL at 08:11

## 2024-05-22 RX ADMIN — ACETYLCYSTEINE 2 ML: 200 SOLUTION ORAL; RESPIRATORY (INHALATION) at 06:46

## 2024-05-22 RX ADMIN — GABAPENTIN 100 MG: 100 CAPSULE ORAL at 20:00

## 2024-05-22 RX ADMIN — POTASSIUM CHLORIDE 20 MEQ: 1500 TABLET, EXTENDED RELEASE ORAL at 17:57

## 2024-05-22 RX ADMIN — PANTOPRAZOLE SODIUM 40 MG: 40 TABLET, DELAYED RELEASE ORAL at 08:11

## 2024-05-22 RX ADMIN — CEPHALEXIN 500 MG: 500 CAPSULE ORAL at 05:46

## 2024-05-22 RX ADMIN — GUAIFENESIN 600 MG: 600 TABLET, EXTENDED RELEASE ORAL at 20:00

## 2024-05-22 RX ADMIN — METOPROLOL TARTRATE 25 MG: 25 TABLET, FILM COATED ORAL at 14:03

## 2024-05-22 RX ADMIN — METOPROLOL TARTRATE 25 MG: 25 TABLET, FILM COATED ORAL at 03:28

## 2024-05-22 RX ADMIN — IPRATROPIUM BROMIDE AND ALBUTEROL SULFATE 3 ML: .5; 3 SOLUTION RESPIRATORY (INHALATION) at 18:50

## 2024-05-22 RX ADMIN — AMIODARONE HYDROCHLORIDE 400 MG: 200 TABLET ORAL at 20:00

## 2024-05-22 RX ADMIN — BUDESONIDE 0.5 MG: 0.5 SUSPENSION RESPIRATORY (INHALATION) at 18:45

## 2024-05-22 RX ADMIN — TAMSULOSIN HYDROCHLORIDE 0.4 MG: 0.4 CAPSULE ORAL at 08:11

## 2024-05-22 RX ADMIN — POVIDONE-IODINE: 10 SOLUTION TOPICAL at 18:00

## 2024-05-22 RX ADMIN — MAGNESIUM SULFATE 4 G: 4 INJECTION INTRAVENOUS at 05:46

## 2024-05-22 RX ADMIN — POTASSIUM CHLORIDE 20 MEQ: 1500 TABLET, EXTENDED RELEASE ORAL at 08:11

## 2024-05-22 RX ADMIN — ALLOPURINOL 100 MG: 100 TABLET ORAL at 08:11

## 2024-05-22 RX ADMIN — ENOXAPARIN SODIUM 40 MG: 100 INJECTION SUBCUTANEOUS at 17:57

## 2024-05-22 RX ADMIN — ATORVASTATIN CALCIUM 40 MG: 40 TABLET, FILM COATED ORAL at 20:00

## 2024-05-22 RX ADMIN — METOPROLOL TARTRATE 25 MG: 25 TABLET, FILM COATED ORAL at 20:00

## 2024-05-22 RX ADMIN — BUMETANIDE 1 MG: 1 TABLET ORAL at 08:11

## 2024-05-22 RX ADMIN — CEPHALEXIN 500 MG: 500 CAPSULE ORAL at 14:03

## 2024-05-22 RX ADMIN — IPRATROPIUM BROMIDE AND ALBUTEROL SULFATE 3 ML: .5; 3 SOLUTION RESPIRATORY (INHALATION) at 06:46

## 2024-05-22 RX ADMIN — BUMETANIDE 1 MG: 1 TABLET ORAL at 20:00

## 2024-05-22 RX ADMIN — MONTELUKAST 10 MG: 10 TABLET, FILM COATED ORAL at 08:11

## 2024-05-22 RX ADMIN — BUDESONIDE 0.5 MG: 0.5 SUSPENSION RESPIRATORY (INHALATION) at 06:52

## 2024-05-22 RX ADMIN — CITALOPRAM HYDROBROMIDE 20 MG: 20 TABLET ORAL at 08:11

## 2024-05-22 RX ADMIN — CHLORHEXIDINE GLUCONATE, 0.12% ORAL RINSE 15 ML: 1.2 SOLUTION DENTAL at 08:12

## 2024-05-22 RX ADMIN — CEPHALEXIN 500 MG: 500 CAPSULE ORAL at 20:00

## 2024-05-22 RX ADMIN — POTASSIUM CHLORIDE 20 MEQ: 1500 TABLET, EXTENDED RELEASE ORAL at 14:03

## 2024-05-22 RX ADMIN — AMIODARONE HYDROCHLORIDE 400 MG: 200 TABLET ORAL at 08:11

## 2024-05-22 NOTE — PROGRESS NOTES
[facility]        LOS:  LOS: 7 days   Patient Name: Fransico Cuenca  Age/Sex: 69 y.o. male  : 1955  MRN: [id number]    Day of Service: 24   Length of Stay: 7  Encounter Provider: JAX العراقي  Place of Service: [facility] CARDIOLOGY  Patient Care Team:  Camryn Salinas MD as PCP - General (Family Medicine)  Marco A Childress MD as Consulting Physician (Nephrology)    Subjective:     Chief Complaint: f/u CAD s/p CABG    Subjective:no af overnight. Lower extremity edema improved. Started on amiodarone and renal managing diuresis    Current Medications:   Scheduled Meds:acetylcysteine, 2 mL, Nebulization, BID - RT  allopurinol, 100 mg, Oral, Daily  amiodarone, 400 mg, Oral, Q12H  aspirin, 81 mg, Oral, Daily  atorvastatin, 40 mg, Oral, Nightly  budesonide, 0.5 mg, Nebulization, BID - RT  bumetanide, 1 mg, Oral, BID  cephalexin, 500 mg, Oral, Q8H  citalopram, 20 mg, Oral, Daily  enoxaparin, 40 mg, Subcutaneous, Q24H  gabapentin, 100 mg, Oral, BID  guaiFENesin, 600 mg, Oral, Q12H  insulin lispro, 2-9 Units, Subcutaneous, 4x Daily AC & at Bedtime  ipratropium-albuterol, 3 mL, Nebulization, Q6H While Awake - RT  metoprolol tartrate, 25 mg, Oral, Q8H  montelukast, 10 mg, Oral, Daily  pantoprazole, 40 mg, Oral, Daily  potassium chloride, 20 mEq, Oral, TID With Meals  tamsulosin, 0.4 mg, Oral, Daily      Continuous Infusions:     Allergies:  No Known Allergies    Review of Systems   Constitutional: Positive for malaise/fatigue. Negative for chills and diaphoresis.   Cardiovascular:  Positive for leg swelling. Negative for chest pain, dyspnea on exertion, irregular heartbeat, near-syncope, orthopnea, palpitations, paroxysmal nocturnal dyspnea and syncope.   Respiratory:  Negative for cough, shortness of breath, sleep disturbances due to breathing and sputum production.    Gastrointestinal:  Negative for change in bowel habit.   Genitourinary:  Negative for  urgency.   Neurological:  Negative for dizziness and headaches.   Psychiatric/Behavioral:  Negative for altered mental status.          Objective:     Temp:  [97.4 °F (36.3 °C)-98.3 °F (36.8 °C)] 98.1 °F (36.7 °C)  Heart Rate:  [] 67  Resp:  [16-19] 19  BP: (101-149)/(61-83) 127/82     Intake/Output Summary (Last 24 hours) at 5/22/2024 1019  Last data filed at 5/22/2024 0753  Gross per 24 hour   Intake 1380 ml   Output 1350 ml   Net 30 ml     Body mass index is 33.25 kg/m².      05/19/24  0555 05/21/24  0433 05/22/24  0600   Weight: 96.5 kg (212 lb 11.9 oz) 94.3 kg (207 lb 14.3 oz) 93.4 kg (205 lb 14.6 oz)         General Appearance:    Alert, cooperative, in no acute distress                                Head: Atraumatic, normocephalic, PERRLA               Neck:   supple, improving JVD   Lungs:     Clear to auscultation, respirations regular, even and               unlabored    Heart:    Regular rhythm and normal rate, normal S1 and S2, mid sternal incsion   Abdomen:     Normal bowel sounds, no masses, no organomegaly, soft  nontender, nondistended, no guarding, no rebound  tenderness   Extremities:   Moves all extremities well, edema, no cyanosis, no  redness   Pulses:   Pulses palpable and equal bilaterally   Skin:   No bleeding, bruising or rash   Neurologic:   Awake, alert, oriented x3         Lab Review:   Results from last 7 days   Lab Units 05/22/24  0352 05/21/24  0401 05/20/24  0342 05/19/24  0401 05/18/24  0321   SODIUM mmol/L 140 141 140   < > 142   POTASSIUM mmol/L 4.0 4.0 4.1   < > 3.9   CHLORIDE mmol/L 100 99 98   < > 103   CO2 mmol/L 29.0 28.0 28.0   < > 26.0   BUN mg/dL 71* 81* 81*   < > 65*   CREATININE mg/dL 1.77* 2.24* 2.82*   < > 2.86*   GLUCOSE mg/dL 114* 106* 110*   < > 82   CALCIUM mg/dL 9.7 9.8 9.0   < > 8.7   AST (SGOT) U/L  --   --  22  --  15   ALT (SGPT) U/L  --   --  <5  --  <5    < > = values in this interval not displayed.     Results from last 7 days   Lab Units  "05/18/24  0321 05/17/24  0306   CK TOTAL U/L 161 212*     Results from last 7 days   Lab Units 05/22/24  0352 05/21/24  0401   WBC 10*3/mm3 11.49* 9.26   HEMOGLOBIN g/dL 9.1* 9.4*   HEMATOCRIT % 29.3* 30.0*   PLATELETS 10*3/mm3 394 332     Results from last 7 days   Lab Units 05/16/24  0218 05/15/24  1054   INR  1.21* 1.20*   APTT seconds  --  21.7*     Results from last 7 days   Lab Units 05/22/24  0352 05/21/24  0401   MAGNESIUM mg/dL 1.8 2.0           Invalid input(s): \"LDLCALC\"        Results from last 7 days   Lab Units 05/17/24  0306   TSH uIU/mL 1.120       Recent Radiology:  Imaging Results (Most Recent)       Procedure Component Value Units Date/Time    CT Chest Without Contrast Diagnostic [[id number]] Collected: 05/20/24 1611     Updated: 05/20/24 1631    Narrative:      CT CHEST WO CONTRAST DIAGNOSTIC    Date of Exam: 5/20/2024 3:51 PM EDT    Indication: hypoxia    Comparison: AP chest x-ray 5/18/2024, two-view chest x-ray 5/14/2024    Technique: Axial CT images were obtained of the chest without contrast administration.  Sagittal and coronal reconstructions were performed.  Automated exposure control and iterative reconstruction methods were used.    Findings:  There are postoperative changes from recent median sternotomy and CABG. Heart size is enlarged, and there are coronary artery calcifications. There is no pericardial effusion. There are small bilateral pleural effusions. No pneumothorax. There is partial   atelectasis of both lower lobes and near complete atelectasis of the right middle lobe. Lungs are otherwise clear. No lymphadenopathy is seen in the chest. Cholelithiasis is included in the upper abdomen. No acute osseous abnormality is identified.      Impression:      Impression:  1.Small bilateral pleural effusions.  2.Near complete right middle lobe atelectasis. Partial bilateral lower lobe atelectasis.  3.Expected postoperative changes from recent CABG.      Electronically Signed: Lisa" Edyta    5/20/2024 4:29 PM EDT    Workstation ID: [id number]    XR Chest 1 View [[id number]] Collected: 05/18/24 1404     Updated: 05/18/24 1408    Narrative:      XR CHEST 1 VW    Date of Exam: 5/18/2024 1:56 PM EDT    Indication: sob, chest pain    Comparison: May 17, 2024    Findings:  There is a sheath in the right internal jugular vein. Sternotomy wires noted. The heart looks enlarged. There is fullness of the mediastinum unchanged. The depth of inspiration is poor. A pneumothorax not definitely identified. There are bibasilar   densities that could relate to some atelectasis and effusions.      Impression:      Impression:  1.Bibasilar areas of density that could like atelectasis and effusions.  2.Fullness to the mediastinal shadow unchanged that could be postsurgical and has been suggested since surgery      Electronically Signed: Anurag Colon MD    5/18/2024 2:06 PM EDT    Workstation ID: [id number]    XR Chest 1 View [[id number]] Collected: 05/17/24 1743     Updated: 05/17/24 1747    Narrative:      XR CHEST 1 VW    Date of Exam: 5/17/2024 5:39 PM EDT    Indication: chest tube removal    Comparison: Same day chest radiographs.    Findings:  Interval removal of left basilar thoracostomy tube. No evidence of new pneumothorax. Right approach vascular sheath in unchanged position. Cardiac surgical changes with median sternotomy wires. Unchanged enlarged cardiomediastinal silhouette. Low lung   volumes. No definite new focal airspace opacity or pleural effusion. Osseous structures are unchanged.,      Impression:      Impression:  Interval removal of left thoracostomy tube. No evidence of pneumothorax.      Electronically Signed: Braden Bartlett MD    5/17/2024 5:45 PM EDT    Workstation ID: [id number]    US Renal Bilateral [[id number]] Collected: 05/17/24 1347     Updated: 05/17/24 1352    Narrative:      US RENAL BILATERAL    Date of Exam: 5/17/2024 1:16 PM EDT    Indication: ARF/EN/CRF/CKD.    Comparison: CT abdomen  and pelvis without contrast 7/26/2020, renal ultrasound 4/18/2024.    Technique: Grayscale and color Doppler ultrasound evaluation of the kidneys and urinary bladder was performed.    Findings:  Technologist noted a technically difficult exam due to body habitus and portable technique, done in the CVICU.    Right kidney measures 11.2 x 6.1 x 6.1 cm and demonstrates normal cortical echogenicity. No definite renal masses are seen. No right hydronephrosis.    Left kidney measures 10.3 x 5.2 x 6.5 cm. 2 left renal cysts measure up to 1.6 cm. Normal cortical echogenicity of the left kidney. No left hydronephrosis.    Urinary bladder is decompressed with Rasmussen catheter in place.      Impression:      Impression:  1.Left renal cysts measuring up to 1.6 cm.  2.No hydronephrosis.        Electronically Signed: Lisa Lan    5/17/2024 1:50 PM EDT    Workstation ID: [id number]    XR Chest 1 View [[id number]] Collected: 05/17/24 0814     Updated: 05/17/24 0818    Narrative:      XR CHEST 1 VW    Date of Exam: 5/17/2024 4:23 AM EDT    Indication: Post-Op Heart Surgery    Comparison: 5/16/2024  Findings:  [place] catheter has been removed and replaced by sheath with its tip in the upper SVC. Left chest tube is unchanged in position. There is no pneumothorax. There is continued mild atelectasis of the lung bases, greatest on the left. Small left effusion   is again noted.      Impression:      Impression:  Continued mild bibasilar atelectasis, greatest on the left, with small left effusion.      Electronically Signed: Talita Bear MD    5/17/2024 8:16 AM EDT    Workstation ID: [id number]    XR Abdomen KUB [[id number]] Collected: 05/16/24 1820     Updated: 05/16/24 1825    Narrative:      XR ABDOMEN KUB    Date of Exam: 5/16/2024 6:00 PM EDT    Indication: Abd distension, increased pain    Comparison: 2020 CT    Findings:  Gas-filled colon. No evidence of small bowel obstruction. No radiodense renal calculi noted. No acute osseous  abnormality.      Impression:      Impression:  Gas-distended colon. No evidence of bowel obstruction.      Electronically Signed: Braden Bartlett MD    5/16/2024 6:23 PM EDT    Workstation ID: [id number]    XR Chest 1 View [[id number]] Collected: 05/16/24 0759     Updated: 05/16/24 0810    Narrative:      XR CHEST 1 VW    Date of Exam: 5/16/2024 5:49 AM EDT    Indication: Post-Op Heart Surgery    Comparison: 5/15/2024    Findings:  Interval removal of endotracheal and NG tubes. Stable cardiomegaly and pulmonary congestion. No change in position of a right intrajugular [place]aveen catheter with the tip in the right main pulmonary artery level stable surgical changes post CABG. There   is mild interval improved aeration to the left lung base with persistent atelectasis and a small effusion. There is also mild increased opacity at the right lung base.      Impression:      Impression:  Interval removal of endotracheal and NG tubes    Mild improved aeration to the left lung base. Otherwise grossly stable bibasilar airspace disease      Electronically Signed: Patrick Robbins MD    5/16/2024 8:08 AM EDT    Workstation ID: [id number]    XR Chest 1 View [[id number]] Collected: 05/15/24 1114     Updated: 05/15/24 1119    Narrative:      DATE OF EXAM:  5/15/2024 10:54 AM     PROCEDURE:  XR CHEST 1 VW-     INDICATIONS:  Post-Op Check Line & Tube Placement; I25.10-Atherosclerotic heart  disease of native coronary artery without angina pectoris     COMPARISON:  May 14, 2024     TECHNIQUE:   Single radiographic view of the chest was obtained.     FINDINGS:  Endotracheal tube tip is approximately 5 cm above the claire. There is a  right IJ [place]-Naveen catheter with tip projecting in the area of the right  pulmonary artery. Enteric tube extends into the left upper quadrant, tip  beyond the margins of this exam. There has been interval median  sternotomy and CABG. There appears to be mediastinal drains and a left  basilar chest tube. No  definite pneumothorax or significant pleural  effusion is seen. There are mild bibasilar opacities, left greater than  right. Heart size and mediastinal contour appear as expected.       Impression:      1.     Interval median sternotomy and CABG.  2.     Tubes and lines appear in satisfactory positions, as detailed  above.  3.     Mild bibasilar opacities which may represent atelectasis or  edema.     Electronically Signed By-Romaine Szymanski MD On:5/15/2024 11:16 AM               ECHOCARDIOGRAM:    Results for orders placed in visit on 05/15/24    Intra-Op Anesthesia DIGNA    Narrative  Intra-Op Anesthesia DIGNA    Procedure Performed: Intra-Op Anesthesia DIGNA  Start Time:  End Time:    Preanesthesia Checklist:  Patient identified, IV assessed, risks and benefits discussed, monitors and equipment assessed, procedure being performed at surgeon's request and anesthesia consent obtained.    General Procedure Information  DIGNA Placed for monitoring purposes only -- This is not a diagnostic DIGNA  Diagnostic Indications for Echo:  assessment of surgical repair and hemodynamic monitoring  Physician Requesting Echo: Jean Hirsch MD  Location performed:  OR  Intubated  Bite block placed  Heart visualized  Probe Insertion:  Easy  Probe Type:  Multiplane  Modalities:  Color flow mapping and continuous wave Doppler    Echocardiographic and Doppler Measurements    Ventricles    Right Ventricle:  Cavity size normal.  Global function normal.  Left Ventricle:  Cavity size normal.  Global Function normal.        Valves    Aortic Valve:  Annulus normal.  Stenosis not present.  Regurgitation trace.  Leaflets normal.  Leaflet motions normal.    Mitral Valve:  Annulus normal.  Stenosis not present.  Regurgitation trace.  Leaflets normal.  Leaflet motions normal.    Tricuspid Valve:  Annulus normal.  Stenosis not present.  Regurgitation absent.  Leaflets normal.  Leaflet motions normal.  Pulmonic Valve:  Annulus normal.  Stenosis not  present.  Regurgitation absent.      Aorta    Ascending Aorta:  Size normal.  Dissection not present.  Mobile plaque not present.  Aortic Arch:  Size normal.  Dissection not present.  Mobile plaque not present.  Descending Aorta:  Size normal.  Dissection not present.  Mobile plaque not present.      Atria    Right Atrium:  Size normal.  Spontaneous echo contrast not present.  Thrombus not present.  Tumor not present.  Device not present.    Left Atrium:  Size normal.  Spontaneous echo contrast not present.  Thrombus not present.  Tumor not present.  Device not present.  Left atrial appendage normal.      Septa        Ventricular Septum:  Intra-ventricular septum morphology normal.        Other Findings  Pericardium:  normal  Pleural Effusion:  none  Pulmonary Venous Flow:  normal    Anesthesia Information  Performed Personally  Anesthesiologist:  Kvng Pearson MD      Echocardiogram Comments:  Post bypass EF 55%. No change in valves. No air seen.        I reviewed the patient's new clinical results.    EKG:      Assessment:       ASCAD    Coronary artery disease status post four-vessel coronary arterial bypass grafting     LIMA-LAD, SVG-DG, SVG-OM1-OM 2  Hyperlipidemia  CKD  Bilateral lower extremity edema / HFpEF  Post operative Atrial fibrillation, now SR  ZHANG  HTN    Plan:   Continue with routine postoperative care  Continue to diurese as renal function allows  Monitor renal function electrolytes  Fluid restriction  Increase activity as tolerated  Monitor rate and rhythm- no recurrent AF overnight  Continue to diurese the patient.  71/1.77    Additional recommendations per Dr. Lorenzo    Patient is seen and examined and findings are verified.  All data is reviewed by me personally.  Assessment and plan formulated by APC was done after discussion with attending.  I spent more than 50% of time in taking care of the patient.    Patient was taking shower and came out.  Patient did well.  Patient denies any  symptom of chest pain or tightness or heaviness.  Leg edema is minimal    Hemodynamics are stable    Normal S1 and S2.  No pericardial rub or murmur abdominal exam is benign    MDM:    1.  CAD/CABG:    Patient has done well.  I am overall pleased with the patient condition    2.  Bilateral leg edema/congestive heart failure acute on chronic preserved left function    I would continue diuresis.  Change Bumex to 1 mg twice daily    3.  Hypertension:    Blood pressure is controlled    4.  S/p atrial fibrillation    Patient is on amiodarone.  Patient may need anticoagulation    Electronically signed by Santiago Lorenzo MD, 05/23/24, 5:12 PM EDT.             JAX العراقي  05/22/24  10:19 EDT

## 2024-05-22 NOTE — PROGRESS NOTES
"S/P POD# 7 elective CABG x4 with ELIZABETH--Joycelyn  EF 55% (cath)    Subjective:  reports feeling better today    A-fib yesterday, now SR  Creatinine down to 1.77--renal following  Started Keflex yest for distal sternotomy erythema  Wt is down 3 kg from preop      Intake/Output Summary (Last 24 hours) at 5/22/2024 0842  Last data filed at 5/22/2024 0753  Gross per 24 hour   Intake 1602 ml   Output 1350 ml   Net 252 ml     Temp:  [97.4 °F (36.3 °C)-98.3 °F (36.8 °C)] 98.1 °F (36.7 °C)  Heart Rate:  [] 67  Resp:  [16-19] 19  BP: (101-149)/(61-89) 127/82      Results from last 7 days   Lab Units 05/22/24  0352 05/21/24  0401 05/16/24  1924 05/16/24  0218 05/15/24  1136 05/15/24  1054   WBC 10*3/mm3 11.49* 9.26   < > 7.65  --  8.77   HEMOGLOBIN g/dL 9.1* 9.4*   < > 7.8*  --  8.4*   HEMOGLOBIN, POC   --   --   --   --    < >  --    HEMATOCRIT % 29.3* 30.0*   < > 26.1*  --  27.2*   HEMATOCRIT POC   --   --   --   --    < >  --    PLATELETS 10*3/mm3 394 332   < > 172  --  167   INR   --   --   --  1.21*  --  1.20*    < > = values in this interval not displayed.     Results from last 7 days   Lab Units 05/22/24  0352   CREATININE mg/dL 1.77*   POTASSIUM mmol/L 4.0   SODIUM mmol/L 140   MAGNESIUM mg/dL 1.8   PHOSPHORUS mg/dL 3.5       Physical Exam:  Neuro intact, nad, up in recliner, just walked with therapy, looks much better from Monday  Tele:  SR 70s  Diminished bases, 94% 2L  Sternotomy/ SVHS healing well,  LLE with scabbed areas from \"water blisters\"  Softer abd, + BM  + trace generalized edema    Assessment/Plan:  Principal Problem:    ASCAD    - Severe MV CAD, EF 55% (cath)--s/p elective CABG x4 with LIMA (Joycelyn)  - NSTEMI presentation--April 2024  - HTN--pressor postop  - HLD--statin  - Postop EN/ARF on CKD stage 3--Dr. Childress following  - Anxiety  - ZHANG with CPAP compliance  - Recent hx human meta pneumovirus infection--resolved  - Chronic HFpEF, NYHA class II-III  - Postop ABLA, expected--transfused 5/16  - " Postop atrial fib--converted to SR  - Postop acute hypoxic respiratory insufficiency--pulm consulted  - Mild COPD per PFTs    POD# 7.  Atrial fib yesterday, now sinus.  Creatinine down to 1.77, renal following.  On asa/statin/bb.  Plavix at DC for NSTEMI presentation.  DC wires.  Mobilize, aggressive pulm toileting.  Keflex started yest d/t distal sternotomy erythema.  It is slightly red, but he bends at that point of the incision.  Will continue.  Paint chest tube exit sites with betadine.  Hopeful for transfer to [facility] tomorrow.      [id number] Score for Atrial Fibrillation Stroke Risk 5/20/2024  RESULT SUMMARY:  4 points  Stroke risk was 4.8% per year in >90,000 patients (the [place] Atrial Fibrillation Cohort Study) and 6.7% risk of stroke/TIA/systemic embolism.    Routine care--as above  D/w pt/nsg, Dr. Hirsch  Discharge plan TBD--looking at acute rehab, [facility]    Suzy Griffith, JAX  5/22/2024  08:42 EDT

## 2024-05-22 NOTE — PROGRESS NOTES
"NEPHROLOGY PROGRESS NOTE------[facility]    [facility]  [phone]  Bernadine Childress MD      Patient Care Team:  Camryn Salinas MD as PCP - General (Family Medicine)  Marco A Childress MD as Consulting Physician (Nephrology)      Provider:  Bernadine Childress MD  Patient Name: Fransico Cuenca  :  1955    SUBJECTIVE:    F/U ARF/EN/CRF/CKD    Comfortable. Up in chair. Breathing ok. Edema improving. No dysuria.     Medication:  acetylcysteine, 2 mL, Nebulization, BID - RT  allopurinol, 100 mg, Oral, Daily  amiodarone, 400 mg, Oral, Q12H  aspirin, 81 mg, Oral, Daily  atorvastatin, 40 mg, Oral, Nightly  budesonide, 0.5 mg, Nebulization, BID - RT  bumetanide, 1 mg, Oral, BID  cephalexin, 500 mg, Oral, Q8H  chlorhexidine, 15 mL, Mouth/Throat, Q12H  citalopram, 20 mg, Oral, Daily  enoxaparin, 40 mg, Subcutaneous, Q24H  gabapentin, 100 mg, Oral, BID  guaiFENesin, 600 mg, Oral, Q12H  insulin lispro, 2-9 Units, Subcutaneous, 4x Daily AC & at Bedtime  ipratropium-albuterol, 3 mL, Nebulization, Q6H While Awake - RT  magnesium sulfate, 4 g, Intravenous, Once  metoprolol tartrate, 25 mg, Oral, Q8H  montelukast, 10 mg, Oral, Daily  pantoprazole, 40 mg, Oral, Daily  potassium chloride, 20 mEq, Oral, TID With Meals  tamsulosin, 0.4 mg, Oral, Daily             OBJECTIVE    Vital Sign Min/Max for last 24 hours  Temp  Min: 97.4 °F (36.3 °C)  Max: 98.3 °F (36.8 °C)   BP  Min: 101/61  Max: 149/79   Pulse  Min: 65  Max: 105   Resp  Min: 16  Max: 19   SpO2  Min: 91 %  Max: 99 %   No data recorded   Weight  Min: 93.4 kg (205 lb 14.6 oz)  Max: 93.4 kg (205 lb 14.6 oz)     Flowsheet Rows      Flowsheet Row First Filed Value   Admission Height 167.6 cm (65.98\") Documented at 05/15/2024 1035   Admission Weight 95.8 kg (211 lb 3.2 oz) Documented at 05/15/2024 0600            No intake/output data recorded.  I/O last 3 completed shifts:  In:  " "[P.O.:1892]  Out: 2450 [Urine:2450]    Physical Exam:    General Appearance: alert, appears stated age and cooperative  Head: normocephalic, without obvious abnormality and atraumatic  Eyes: conjunctivae and sclerae normal and no icterus  Neck: supple and +MILD JVD  Lungs: DECREASED BS BIBASILAR WITH FINE LEFT CRACKLES (BETTER)  Heart: regular rhythm & normal rate and normal S1, S2 +MICA  Chest Wall: no abnormalities observed  Abdomen: normal bowel sounds and + BS   Extremities: moves extremities well, TRACE BILAT LE EDEMA, no cyanosis  Skin: no bleeding, bruising or rash  Neurologic: Alert, and oriented. No focal deficits    Labs:    WBC WBC   Date Value Ref Range Status   05/22/2024 11.49 (H) 3.40 - 10.80 10*3/mm3 Final   05/21/2024 9.26 3.40 - 10.80 10*3/mm3 Final   05/20/2024 9.27 3.40 - 10.80 10*3/mm3 Final      HGB Hemoglobin   Date Value Ref Range Status   05/22/2024 9.1 (L) 13.0 - 17.7 g/dL Final   05/21/2024 9.4 (L) 13.0 - 17.7 g/dL Final   05/20/2024 8.2 (L) 13.0 - 17.7 g/dL Final      HCT Hematocrit   Date Value Ref Range Status   05/22/2024 29.3 (L) 37.5 - 51.0 % Final   05/21/2024 30.0 (L) 37.5 - 51.0 % Final   05/20/2024 25.9 (L) 37.5 - 51.0 % Final      Platelets No results found for: \"LABPLAT\"   MCV MCV   Date Value Ref Range Status   05/22/2024 85.4 79.0 - 97.0 fL Final   05/21/2024 83.8 79.0 - 97.0 fL Final   05/20/2024 84.1 79.0 - 97.0 fL Final          Sodium Sodium   Date Value Ref Range Status   05/22/2024 140 136 - 145 mmol/L Final   05/21/2024 141 136 - 145 mmol/L Final   05/20/2024 140 136 - 145 mmol/L Final      Potassium Potassium   Date Value Ref Range Status   05/22/2024 4.0 3.5 - 5.2 mmol/L Final   05/21/2024 4.0 3.5 - 5.2 mmol/L Final   05/20/2024 4.1 3.5 - 5.2 mmol/L Final      Chloride Chloride   Date Value Ref Range Status   05/22/2024 100 98 - 107 mmol/L Final   05/21/2024 99 98 - 107 mmol/L Final   05/20/2024 98 98 - 107 mmol/L Final      CO2 CO2   Date Value Ref Range Status " "  05/22/2024 29.0 22.0 - 29.0 mmol/L Final   05/21/2024 28.0 22.0 - 29.0 mmol/L Final   05/20/2024 28.0 22.0 - 29.0 mmol/L Final      BUN BUN   Date Value Ref Range Status   05/22/2024 71 (H) 8 - 23 mg/dL Final   05/21/2024 81 (H) 8 - 23 mg/dL Final   05/20/2024 81 (H) 8 - 23 mg/dL Final      Creatinine Creatinine   Date Value Ref Range Status   05/22/2024 1.77 (H) 0.76 - 1.27 mg/dL Final   05/21/2024 2.24 (H) 0.76 - 1.27 mg/dL Final   05/20/2024 2.82 (H) 0.76 - 1.27 mg/dL Final      Calcium Calcium   Date Value Ref Range Status   05/22/2024 9.7 8.6 - 10.5 mg/dL Final   05/21/2024 9.8 8.6 - 10.5 mg/dL Final   05/20/2024 9.0 8.6 - 10.5 mg/dL Final      PO4 No components found for: \"PO4\"   Albumin Albumin   Date Value Ref Range Status   05/20/2024 3.1 (L) 3.5 - 5.2 g/dL Final      Magnesium Magnesium   Date Value Ref Range Status   05/22/2024 1.8 1.6 - 2.4 mg/dL Final   05/21/2024 2.0 1.6 - 2.4 mg/dL Final   05/20/2024 2.4 1.6 - 2.4 mg/dL Final      Uric Acid No components found for: \"URIC ACID\"     Imaging Results (Last 72 Hours)       Procedure Component Value Units Date/Time    CT Chest Without Contrast Diagnostic [[id number]] Collected: 05/20/24 1611     Updated: 05/20/24 1631    Narrative:      CT CHEST WO CONTRAST DIAGNOSTIC    Date of Exam: 5/20/2024 3:51 PM EDT    Indication: hypoxia    Comparison: AP chest x-ray 5/18/2024, two-view chest x-ray 5/14/2024    Technique: Axial CT images were obtained of the chest without contrast administration.  Sagittal and coronal reconstructions were performed.  Automated exposure control and iterative reconstruction methods were used.    Findings:  There are postoperative changes from recent median sternotomy and CABG. Heart size is enlarged, and there are coronary artery calcifications. There is no pericardial effusion. There are small bilateral pleural effusions. No pneumothorax. There is partial   atelectasis of both lower lobes and near complete atelectasis of the right " middle lobe. Lungs are otherwise clear. No lymphadenopathy is seen in the chest. Cholelithiasis is included in the upper abdomen. No acute osseous abnormality is identified.      Impression:      Impression:  1.Small bilateral pleural effusions.  2.Near complete right middle lobe atelectasis. Partial bilateral lower lobe atelectasis.  3.Expected postoperative changes from recent CABG.      Electronically Signed: Lisa Lan    5/20/2024 4:29 PM EDT    Workstation ID: [id number]            Results for orders placed during the hospital encounter of 05/15/24    XR Chest 1 View    Narrative  XR CHEST 1 VW    Date of Exam: 5/18/2024 1:56 PM EDT    Indication: sob, chest pain    Comparison: May 17, 2024    Findings:  There is a sheath in the right internal jugular vein. Sternotomy wires noted. The heart looks enlarged. There is fullness of the mediastinum unchanged. The depth of inspiration is poor. A pneumothorax not definitely identified. There are bibasilar  densities that could relate to some atelectasis and effusions.    Impression  Impression:  1.Bibasilar areas of density that could like atelectasis and effusions.  2.Fullness to the mediastinal shadow unchanged that could be postsurgical and has been suggested since surgery      Electronically Signed: Anurag Colon MD  5/18/2024 2:06 PM EDT  Workstation ID: [id number]      XR Chest 1 View    Narrative  XR CHEST 1 VW    Date of Exam: 5/17/2024 5:39 PM EDT    Indication: chest tube removal    Comparison: Same day chest radiographs.    Findings:  Interval removal of left basilar thoracostomy tube. No evidence of new pneumothorax. Right approach vascular sheath in unchanged position. Cardiac surgical changes with median sternotomy wires. Unchanged enlarged cardiomediastinal silhouette. Low lung  volumes. No definite new focal airspace opacity or pleural effusion. Osseous structures are unchanged.,    Impression  Impression:  Interval removal of left thoracostomy tube. No  evidence of pneumothorax.      Electronically Signed: Braden Bartlett MD  5/17/2024 5:45 PM EDT  Workstation ID: [id number]      XR Abdomen KUB    Narrative  XR ABDOMEN KUB    Date of Exam: 5/16/2024 6:00 PM EDT    Indication: Abd distension, increased pain    Comparison: 2020 CT    Findings:  Gas-filled colon. No evidence of small bowel obstruction. No radiodense renal calculi noted. No acute osseous abnormality.    Impression  Impression:  Gas-distended colon. No evidence of bowel obstruction.      Electronically Signed: Braden Bartlett MD  5/16/2024 6:23 PM EDT  Workstation ID: [id number]      Results for orders placed during the hospital encounter of 04/18/24    Duplex Carotid Ultrasound CAR    Interpretation Summary    Right internal carotid artery demonstrates a less than 50% stenosis.    Normal left carotid duplex scan.        ASSESSMENT / PLAN      ASCAD      ARF/EN/CRF/CKD------Nonoliguric. BUN/Cr down. +ARF/EN on top  Of known CRF/CKD STG 3A with a baseline serum creatinine of about 1.4-1.5. CRF/CKD STG 3A secondary to DGS/HTN NS. BUN/Cr down. +ARF/EN secondary to ATN from hypotension and renal perfusion disturbance from CP bypass with preadmission ACE-I use. No NSAIDs or IV dye.  Continue to diurese with po Bumex.  Support BP and hemodynamics. Off of ACE-I. Dose meds for CrCl less than 10 cc/min until ARF/EN is resolved. No uremia despite elevated azotemia     2. HTN WITH CKD-----BP good. Off of Vasopressin now. Keep off of Lisinopril. No ACE-I/ARB/DRI for now. Keep MAP>65     3. DMII WITH RENAL MANIFESTATIONS------Off Metformin for now. BS ok. Glucometers, SSI and Insulin gtt post op per protocol     4. HYPERLIPIDEMIA----On Statin. TSH normal     5. OA/DJD/HYPERURICEMIA------On Allopurinol preadmission.   No NSAIDs     6. DEPRESSION-----Celexa     7. COPD-----Respiratory status stable. No active wheezing     8. GERD/PUD PROPHYLAXIS-----PPI. Benefits outweigh risks despite renal dysfunction     9. BPH------On  Hytrin preadmission. [place] out     10. ANEMIA------H/H stable. S/P IV iron for MOSHE.       11. ACIDOSIS-----Resolved    12. VOLUME OVERLOAD----Diuresing. On po Bumex    13. HYPOKALEMIA-------Replaced    14. VERY MILD RENAL FAILURE ASSOCIATED HYPERPHOSPHATEMIA-----Resolved    15. HYPOMAGNESEMIA------Replace IV      Bernadine Childress MD  [facility]  [phone]  05/22/24  07:50 EDT

## 2024-05-22 NOTE — THERAPY TREATMENT NOTE
Subjective: Pt agreeable to therapeutic plan of care.  Cognition: oriented to Person, Place, and Time    Objective:     Bed Mobility: N/A or Not attempted.   Functional Transfers: CGA and Min-A     Balance: supported, static, and standing CGA  Functional Ambulation: CGA    Lower Body Dressing: Mod-A and Max-A  ADL Position: supported sitting and supported standing      Vitals: WNL    Pain: 3 VAS  Location: sternal  Interventions for pain: Repositioned  Education: Provided education on the importance of mobility in the acute care setting      Assessment: Fransico Cuenca presents with ADL impairments affecting function including balance, endurance / activity tolerance, pain, and strength. Demonstrated functioning below baseline abilities indicate the need for continued skilled intervention while inpatient. Tolerating session today without incident. Will continue to follow and progress as tolerated.     Plan/Recommendations:   High Intensity Therapy recommended post-acute care. This is recommended as therapy feels the patient would require 5-6 days per week, 2-3 hours per day. At this time, inpatient rehabilitation (acute rehab) would be the first choice and SNF would be second.. Pt requires no DME at discharge.     Pt desires Inpatient Rehabilitation placement at discharge. Pt cooperative; agreeable to therapeutic recommendations and plan of care.     Modified Banks: N/A = No pre-op stroke/TIA    Post-Tx Position: Up in Chair  PPE: gloves

## 2024-05-22 NOTE — DISCHARGE SUMMARY
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% (cath)--s/p elective CABG x4 with LIMA (Joycelyn)  - NSTEMI April 2024  - HTN  - HLD  - Postop EN/ARF on CKD stage 3  - Anxiety  - ZHANG with CPAP compliance  - Recent hx human meta pneumovirus infection  - Chronic HFpEF, NYHA class II-III  - Postop ABLA, expected  - Postop atrial fib  - Postop acute hypoxic respiratory insufficiency  - Mild COPD per PFTs    Presenting Problem/History of Present Illness:  - Severe MV CAD, EF 55% (cath)  - NSTEMI April 2024  - HTN  - HLD  - CKD stage 3  - Anxiety  - ZHANG with CPAP compliance  - Recent hx human meta pneumovirus infection  - Chronic HFpEF, NYHA class II-III  - Mild COPD per PFTs    Hospital Course:  Patient is a 69 y.o. male who was admitted to the hospital on 5/15/24 for elective same day surgery. That morning he was taken to the Operating Room and under general anesthesia and via median sternotomy underwent CABG x 4 by Dr. Hirsch. Patient tolerated the procedure well and was transported to the Cardiovascular Care Unit in stable condition. Post-operatively patient experienced acute hypoxic respiratory insufficiency, so the Intensivist was consulted. Later that evening patient was successfully weaned from the ventilator and extubated. On post-operative day #1 he was transfused 1 unit of PRBC for Hgb 7.8 and his [place] was removed. By post-operative day #2 he was weaned off all pressors and his arterial line and chest tubes were removed. His creatinine bumped to 2.96, so Nephrology was consulted and he was started on a Bumex drip. On post-operative day #3 he was started on low dose beta blocker. Later that evening patient went into atrial fibrillation and was started on a Diltiazem drip. On post-operative day #5 he converted to sinus rhythm, his Bumex drip was transitioned to scheduled, and his central line and lópez catheter were removed. He still had significant  oxygen requirements, so Pulmonology was consulted and a CT of the chest without contrast was ordered. CT revealed small bilateral pleural effusions, near complete right middle lobe atelectasis, and partial bilateral lower lobe atelectasis. On post-operative day #6 patient went back into atrial fibrillation and was started on Amiodarone. He was also started on Keflex for erythema of his distal sternotomy. By post-operative day #7 he converted back to sinus rhythm and his temporary pacing wires were removed. On post-operative day #8, patient was deemed ready for discharge to [facility]. Patient educated on sternal precautions and signs of infection. At time of discharge, patient is still on oxygen at 2LNC. Patient to be discharged with Eliquis and Plavix (NSTEMI). Patient will not be discharged with aspirin. Patient discharged with statin and beta blocker. Follow up appointment in place and listed below. Spoke with Cardiology, amiodarone taper at discharge. Spoke with Nephrology, continuing diuresis regimen and check BMP in 5 days to monitor potassium.     Procedures Performed:  5/15/24 Dr. Hirsch  1.  Elective CABG x 4 with a LIMA to the mid LAD and reverse individual saphenous vein graft to the diagonal and sequential saphenous vein graft to the obtuse marginal 1 and 2.  (CPT code 75016, 46130)  2. EVH of the left leg (CPT code 06714)       Consults:   Consults       Date and Time Order Name Status Description    5/20/2024  9:36 AM Inpatient Pulmonology Consult Completed     5/16/2024  6:50 PM Inpatient Nephrology Consult Completed     5/15/2024  9:21 PM Inpatient Intensivist Consult Completed     5/15/2024 10:40 AM Inpatient Cardiology Consult      4/18/2024  1:43 PM Inpatient Pulmonology Consult Completed     4/18/2024  1:14 PM Inpatient Cardiology Consult Completed             Pertinent Test Results:    Lab Results   Component Value Date    WBC 11.68 (H) 05/23/2024    HGB 9.5 (L) 05/23/2024    HCT 31.2 (L) 05/23/2024     MCV 86.0 05/23/2024     05/23/2024      Lab Results   Component Value Date    GLUCOSE 113 (H) 05/23/2024    CALCIUM 9.7 05/23/2024     05/23/2024    K 4.9 05/23/2024    CO2 29.0 05/23/2024     05/23/2024    BUN 63 (H) 05/23/2024    CREATININE 1.53 (H) 05/23/2024    EGFRIFAFRI 102 10/22/2021    EGFRIFNONA 88 10/22/2021    BCR 41.2 (H) 05/23/2024    ANIONGAP 10.0 05/23/2024     Lab Results   Component Value Date    INR 1.21 (H) 05/16/2024    PROTIME 13.0 (H) 05/16/2024         Condition on Discharge: Stable     Vital Signs  Temp:  [96.8 °F (36 °C)-98.6 °F (37 °C)] 97.8 °F (36.6 °C)  Heart Rate:  [67-78] 76  Resp:  [18-22] 21  BP: (115-155)/(69-85) 146/85  Body mass index is 32.86 kg/m².    Discharge Disposition  Rehab Facility or Unit (DC - External)    Discharge Medications     Discharge Medications        New Medications        Instructions Start Date   acetaminophen 325 MG tablet  Commonly known as: TYLENOL   650 mg, Oral, Every 4 Hours PRN      acetylcysteine 20 % nebulizer solution  Commonly known as: MUCOMYST   2 mL, Nebulization, 2 Times Daily - RT      allopurinol 100 MG tablet  Commonly known as: ZYLOPRIM   100 mg, Oral, Daily   Start Date: May 24, 2024     amiodarone 400 MG tablet  Commonly known as: PACERONE   Take 1 tablet by mouth Every 12 (Twelve) Hours for 7 days, THEN 0.5 tablets Every 12 (Twelve) Hours for 21 days.   Start Date: May 23, 2024     apixaban 5 MG tablet tablet  Commonly known as: ELIQUIS   5 mg, Oral, Every 12 Hours Scheduled      budesonide 0.5 MG/2ML nebulizer solution  Commonly known as: PULMICORT   0.5 mg, Nebulization, 2 Times Daily - RT      cephalexin 500 MG capsule  Commonly known as: KEFLEX   500 mg, Oral, 3 Times Daily      clopidogrel 75 MG tablet  Commonly known as: PLAVIX   75 mg, Oral, Daily      guaiFENesin 600 MG 12 hr tablet  Commonly known as: MUCINEX   600 mg, Oral, Every 12 Hours Scheduled      ipratropium-albuterol 0.5-2.5 mg/3 ml  nebulizer  Commonly known as: DUO-NEB   3 mL, Nebulization, Every 6 Hours While Awake - RT      metoprolol tartrate 25 MG tablet  Commonly known as: LOPRESSOR   25 mg, Oral, Every 8 Hours      tamsulosin 0.4 MG capsule 24 hr capsule  Commonly known as: FLOMAX   0.4 mg, Oral, Daily   Start Date: May 24, 2024            Changes to Medications        Instructions Start Date   atorvastatin 40 MG tablet  Commonly known as: LIPITOR  What changed:   medication strength  how much to take  when to take this   40 mg, Oral, Nightly      bumetanide 1 MG tablet  Commonly known as: BUMEX  What changed:   how much to take  when to take this  additional instructions  Another medication with the same name was removed. Continue taking this medication, and follow the directions you see here.   1 mg, Oral, 2 Times Daily      hydrALAZINE 25 MG tablet  Commonly known as: APRESOLINE  What changed:   medication strength  how much to take  when to take this   25 mg, Oral, 2 Times Daily      potassium chloride 20 MEQ CR tablet  Commonly known as: KLOR-CON M20  What changed: when to take this   20 mEq, Oral, 2 Times Daily             Continue These Medications        Instructions Start Date   citalopram 20 MG tablet  Commonly known as: CeleXA   Take 1 tablet by mouth once daily      fenofibrate 160 MG tablet   Take 1 tablet by mouth once daily      magnesium oxide 400 MG tablet  Commonly known as: MAG-OX   400 mg, Oral, Daily      metFORMIN 1000 MG tablet  Commonly known as: GLUCOPHAGE   1,000 mg, Oral, 2 Times Daily With Meals      montelukast 10 MG tablet  Commonly known as: SINGULAIR   10 mg, Oral, Daily      multivitamin with minerals tablet tablet   1 tablet, Oral, Daily      omeprazole 40 MG capsule  Commonly known as: priLOSEC   40 mg, Oral, Daily             Stop These Medications      aspirin 81 MG EC tablet     carvedilol 12.5 MG tablet  Commonly known as: COREG     doxazosin 1 MG tablet  Commonly known as: CARDURA     lisinopril 10  MG tablet  Commonly known as: PRINIVIL,ZESTRIL     mupirocin 2 % ointment  Commonly known as: BACTROBAN              Discharge Diet: Heart healthy     Activity at Discharge:     1. No driving until seen in office and off narcotic pain medications.  2. Shower daily. Clean incisions with warm water and antibacterial soap only. Do not put any lotion or ointments on incisions.  3. Ambulate for 10 minutes at least 3 times a day.  4. No heavy lifting > 10lbs until seen in office.   5. Take all medications as prescribed.      Follow-up Appointments  Future Appointments   Date Time Provider Department Center   6/5/2024 12:45 PM Suzy Griffith APRN [facility]   6/26/2024  9:00 AM Camryn Salinas MD [facility]   7/2/2024 11:00 AM Lila Melgar APRN [facility]     Additional Instructions for the Follow-ups that You Need to Schedule       Call MD With Problems / Concerns   As directed      Instructions:  Call office at [phone] for any drainage, increased redness, or fever over 100.5    Order Comments: Instructions:  Call office at [phone] for any drainage, increased redness, or fever over 100.5         Discharge Follow-up with PCP   As directed       Currently Documented PCP:    Camryn Salinas MD    PCP Phone Number:    [phone]     Follow Up Details: in 1 week        Discharge Follow-up with Specialty: Cardiac surgery; 2 Weeks   As directed      Specialty: Cardiac surgery   Follow Up: 2 Weeks   Follow Up Details: Follow up appt in place        Discharge Follow-up with Specified Provider: Cardiologist; 1 Month   As directed      To: Cardiologist   Follow Up: 1 Month   Follow Up Details: call for appointment, bring all medication bottles to appointment        Discharge Follow-up with Specified Provider:    As directed      To:    Follow Up Details: 4-6 weeks, bring all current medications to appointment        Basic Metabolic Panel    May 28, 2024 (Approximate)       Release to patient: Routine Release                Test Results Pending at Discharge       Nataliya Calero PA-C  05/23/24  14:53 EDT

## 2024-05-22 NOTE — THERAPY TREATMENT NOTE
Subjective: Pt agreeable to therapeutic plan of care.    Objective:     Bed mobility - N/A or Not attempted. Pt up in chair.   Transfers - Min-A sit to stand. Supervision stand to sit. Cues/reminders for sternal prec.   Ambulation - 250 feet SBA and with rolling walker    Vitals: WNL HR stable today - 80 at rest, 87 with gait, 77 after resting. BP 97/62 in standing.     Pain: 0 VAS       Education: Provided education on the importance of mobility in the acute care setting, Verbal/Tactile Cues, Transfer Training, Gait Training, and Energy conservation strategies    Phase 1 Cardiac Rehab Initiated - Acute Care    Cardiac Level IV Activities  Sitting tolerance: >10min and supported  Standing tolerance: 5-10min and supported    Precautions:  Mid-sternal incision; avoid scapular retraction and depression.  Cardiovascular impairment post-sx; encourage energy conservation strategies.    MET level equivalent: 1.4-2.0 (Self care ADLs in sitting / slow ambulation in room, light intensity activities)     Assessment: Fransico Cuenca presents with sit to stand transfer assist needs, reminders needed for sternal precautions, and continued supplemental O2 needs  which indicate the need for skilled intervention. Tolerating session today without incident. Will continue to follow and progress as tolerated.     Plan/Recommendations:   If medically appropriate, High Intensity Therapy recommended post-acute care. This is recommended as therapy feels the patient would require 5-6 days per week, 2-3 hours per day. At this time, inpatient rehabilitation (acute rehab) would be the first choice and SNF would be second. Pt requires no DME at discharge.     Pt desires Inpatient Rehabilitation placement at discharge. Pt cooperative; agreeable to therapeutic recommendations and plan of care.         Basic Mobility 6-click:  Rollin = Total, A lot = 2, A little = 3; 4 = None  Supine>Sit:   1 = Total, A lot = 2, A little = 3; 4 = None    Sit>Stand with arms:  1 = Total, A lot = 2, A little = 3; 4 = None  Bed>Chair:   1 = Total, A lot = 2, A little = 3; 4 = None  Ambulate in room:  1 = Total, A lot = 2, A little = 3; 4 = None  3-5 Steps with railin = Total, A lot = 2, A little = 3; 4 = None  Score: 19    Modified Alexandra: N/A = No pre-op stroke/TIA    Post-Tx Position: Up in Chair, Alarms activated, and Call light and personal items within reach  PPE: gloves

## 2024-05-22 NOTE — PLAN OF CARE
Goal Outcome Evaluation:  Plan of Care Reviewed With: patient      Pt. Demonstrates progress w/ functional mobility this date, long ambulatory transfer w/ CGA for safety + rolling walker support, increased min A for sit to stand w/ min Vcs ot maintain sternal precautions. Pt. Provided max A for all LB ADLs this date. Recommend IP Rehab at d/c to address aforementioned deficits.       Anticipated Discharge Disposition (OT): inpatient rehabilitation facility

## 2024-05-22 NOTE — CASE MANAGEMENT/SOCIAL WORK
Continued Stay Note  ERMIAS Valentine     Patient Name: Fransico Cuenca  MRN: [id number]  Today's Date: 5/22/2024    Admit Date: 5/15/2024  Plan: DC plan: [facility] accepted, no pre-cert required. CABG 5/15/2024.   Discharge Plan       Row Name 05/22/24 1412       Plan    Plan DC plan: [facility] accepted, no pre-cert required. CABG 5/15/2024.    Plan Comments CM spoke with patient's nurse and [facility] NP Suzy Dinero to obtain clinical updates. Anticipate discharge tomorrow. DC barriers: 5L O2 NC, pacer wires, POD 7.             Expected Discharge Date and Time       Expected Discharge Date Expected Discharge Time    May 23, 2024        Laurie Hanson RN      Office Phone [phone]

## 2024-05-22 NOTE — PROGRESS NOTES
Daily Progress Note        ASCAD      Assessment:     Hypoxic respiratory insufficiency  Pulmonary edema  Bibasilar atelectasis     COPD, mild  Pulmonary function test 5/14/2024, FVC 3.0 L which is 81%, FEV1 2.4 L which is 86%, FEV1/FVC ratio 78, total lung capacity 96%, residual volume 116%, diffusion capacity 54%     NSTEMI, CAD, EF 55% (cath)--s/p elective CABG x4 with LIMA      DM II  HTN  GERD  Hyperlipidemia  EN  Anemia  Chronic allergic rhinitis/sinusitis     Echo 4/18/2024: LVEF 51-55%, normal RV pressure, hypokinesis of distal septum and apex        Recommendations:     Oxygen titration currently on 5 L     bronchodilators Pulmicort and DuoNeb  Mucomyst nebulized       diuresis by renal      Incentive spirometry and flutter valve      Ambulation     bowel regimen on MiraLax                LOS: 7 days     Subjective         Objective     Vital signs for last 24 hours:  Vitals:    05/22/24 0651 05/22/24 0652 05/22/24 0657 05/22/24 0800   BP:       Pulse: 67 72 67    Resp: 16 16 16 19   Temp:    98.1 °F (36.7 °C)   TempSrc:    Oral   SpO2: 98% 98% 99%    Weight:       Height:           Intake/Output last 3 shifts:  I/O last 3 completed shifts:  In: 1892 [P.O.:1892]  Out: 2450 [Urine:2450]  Intake/Output this shift:  I/O this shift:  In: -   Out: 150 [Urine:150]      Radiology  Imaging Results (Last 24 Hours)       ** No results found for the last 24 hours. **            Labs:  Results from last 7 days   Lab Units 05/22/24  0352   WBC 10*3/mm3 11.49*   HEMOGLOBIN g/dL 9.1*   HEMATOCRIT % 29.3*   PLATELETS 10*3/mm3 394     Results from last 7 days   Lab Units 05/22/24  0352 05/21/24  0401 05/20/24  0342   SODIUM mmol/L 140   < > 140   POTASSIUM mmol/L 4.0   < > 4.1   CHLORIDE mmol/L 100   < > 98   CO2 mmol/L 29.0   < > 28.0   BUN mg/dL 71*   < > 81*   CREATININE mg/dL 1.77*   < > 2.82*   CALCIUM mg/dL 9.7   < > 9.0   BILIRUBIN mg/dL  --   --  0.3   ALK PHOS U/L  --   --  51   ALT (SGPT) U/L  --   --  <5   AST  (SGOT) U/L  --   --  22   GLUCOSE mg/dL 114*   < > 110*    < > = values in this interval not displayed.     Results from last 7 days   Lab Units 05/20/24  1153   PH, ARTERIAL pH units 7.454*   PO2 ART mm Hg 67.7*   PCO2, ARTERIAL mm Hg 39.4   HCO3 ART mmol/L 27.7     Results from last 7 days   Lab Units 05/20/24  0342 05/18/24  0321 05/17/24  0943   ALBUMIN g/dL 3.1* 3.4* 3.1     Results from last 7 days   Lab Units 05/18/24  0321 05/17/24  0306   CK TOTAL U/L 161 212*         Results from last 7 days   Lab Units 05/22/24  0352   MAGNESIUM mg/dL 1.8     Results from last 7 days   Lab Units 05/16/24  0218 05/15/24  1054   INR  1.21* 1.20*   APTT seconds  --  21.7*     Results from last 7 days   Lab Units 05/17/24  0306   TSH uIU/mL 1.120           Meds:   SCHEDULE  acetylcysteine, 2 mL, Nebulization, BID - RT  allopurinol, 100 mg, Oral, Daily  amiodarone, 400 mg, Oral, Q12H  aspirin, 81 mg, Oral, Daily  atorvastatin, 40 mg, Oral, Nightly  budesonide, 0.5 mg, Nebulization, BID - RT  bumetanide, 1 mg, Oral, BID  cephalexin, 500 mg, Oral, Q8H  citalopram, 20 mg, Oral, Daily  enoxaparin, 40 mg, Subcutaneous, Q24H  gabapentin, 100 mg, Oral, BID  guaiFENesin, 600 mg, Oral, Q12H  insulin lispro, 2-9 Units, Subcutaneous, 4x Daily AC & at Bedtime  ipratropium-albuterol, 3 mL, Nebulization, Q6H While Awake - RT  magnesium sulfate, 4 g, Intravenous, Once  metoprolol tartrate, 25 mg, Oral, Q8H  montelukast, 10 mg, Oral, Daily  pantoprazole, 40 mg, Oral, Daily  potassium chloride, 20 mEq, Oral, TID With Meals  tamsulosin, 0.4 mg, Oral, Daily      Infusions     PRNs    acetaminophen **OR** [DISCONTINUED] acetaminophen **OR** [DISCONTINUED] acetaminophen    bisacodyl    Calcium Replacement - Follow Nurse / BPA Driven Protocol    cyclobenzaprine    dextrose    dextrose    glucagon (human recombinant)    HYDROcodone-acetaminophen    ipratropium-albuterol    Magnesium Cardiology Dose Replacement - Follow Nurse / BPA Driven Protocol     [DISCONTINUED] Morphine **AND** naloxone    nitroglycerin    ondansetron    Phosphorus Replacement - Follow Nurse / BPA Driven Protocol    polyethylene glycol    Potassium Replacement - Follow Nurse / BPA Driven Protocol    senna-docusate sodium    simethicone    Physical Exam:  Physical Exam  Cardiovascular:      Heart sounds: Murmur heard.      No gallop.   Pulmonary:      Effort: No respiratory distress.      Breath sounds: No stridor. Rhonchi and rales present. No wheezing.   Chest:      Chest wall: No tenderness.         ROS  Review of Systems   Respiratory:  Positive for cough and shortness of breath. Negative for wheezing and stridor.    Cardiovascular:  Negative for chest pain, palpitations and leg swelling.             Total time spent with patient greater than: 45 Minutes

## 2024-05-22 NOTE — PLAN OF CARE
Goal Outcome Evaluation:  Plan of Care Reviewed With: patient        Progress: improving  Outcome Evaluation: Pt is POD 7 s/p CABGx4 with LIMA. D/C'd epicardial wires with no complications. Pt remains on 3L NC. Tolerated ambulating well during my shift. Plan of care ongoing.

## 2024-05-23 VITALS
SYSTOLIC BLOOD PRESSURE: 146 MMHG | RESPIRATION RATE: 21 BRPM | WEIGHT: 203.48 LBS | DIASTOLIC BLOOD PRESSURE: 85 MMHG | TEMPERATURE: 97.8 F | HEIGHT: 66 IN | BODY MASS INDEX: 32.7 KG/M2 | HEART RATE: 76 BPM | OXYGEN SATURATION: 91 %

## 2024-05-23 LAB
ANION GAP SERPL CALCULATED.3IONS-SCNC: 10 MMOL/L (ref 5–15)
BUN SERPL-MCNC: 63 MG/DL (ref 8–23)
BUN/CREAT SERPL: 41.2 (ref 7–25)
CALCIUM SPEC-SCNC: 9.7 MG/DL (ref 8.6–10.5)
CHLORIDE SERPL-SCNC: 102 MMOL/L (ref 98–107)
CO2 SERPL-SCNC: 29 MMOL/L (ref 22–29)
CREAT SERPL-MCNC: 1.53 MG/DL (ref 0.76–1.27)
DEPRECATED RDW RBC AUTO: 51.1 FL (ref 37–54)
EGFRCR SERPLBLD CKD-EPI 2021: 48.6 ML/MIN/1.73
ERYTHROCYTE [DISTWIDTH] IN BLOOD BY AUTOMATED COUNT: 16.2 % (ref 12.3–15.4)
GLUCOSE BLDC GLUCOMTR-MCNC: 128 MG/DL (ref 70–105)
GLUCOSE BLDC GLUCOMTR-MCNC: 132 MG/DL (ref 70–105)
GLUCOSE SERPL-MCNC: 113 MG/DL (ref 65–99)
HCT VFR BLD AUTO: 31.2 % (ref 37.5–51)
HGB BLD-MCNC: 9.5 G/DL (ref 13–17.7)
MAGNESIUM SERPL-MCNC: 2.3 MG/DL (ref 1.6–2.4)
MCH RBC QN AUTO: 26.2 PG (ref 26.6–33)
MCHC RBC AUTO-ENTMCNC: 30.4 G/DL (ref 31.5–35.7)
MCV RBC AUTO: 86 FL (ref 79–97)
PHOSPHATE SERPL-MCNC: 3.4 MG/DL (ref 2.5–4.5)
PLATELET # BLD AUTO: 422 10*3/MM3 (ref 140–450)
PMV BLD AUTO: 9.7 FL (ref 6–12)
POTASSIUM SERPL-SCNC: 4.9 MMOL/L (ref 3.5–5.2)
QT INTERVAL: 326 MS
QTC INTERVAL: 447 MS
RBC # BLD AUTO: 3.63 10*6/MM3 (ref 4.14–5.8)
SODIUM SERPL-SCNC: 141 MMOL/L (ref 136–145)
WBC NRBC COR # BLD AUTO: 11.68 10*3/MM3 (ref 3.4–10.8)

## 2024-05-23 PROCEDURE — 83735 ASSAY OF MAGNESIUM: CPT | Performed by: THORACIC SURGERY (CARDIOTHORACIC VASCULAR SURGERY)

## 2024-05-23 PROCEDURE — 80048 BASIC METABOLIC PNL TOTAL CA: CPT | Performed by: NURSE PRACTITIONER

## 2024-05-23 PROCEDURE — 85027 COMPLETE CBC AUTOMATED: CPT | Performed by: NURSE PRACTITIONER

## 2024-05-23 PROCEDURE — 94761 N-INVAS EAR/PLS OXIMETRY MLT: CPT

## 2024-05-23 PROCEDURE — 94664 DEMO&/EVAL PT USE INHALER: CPT

## 2024-05-23 PROCEDURE — 82948 REAGENT STRIP/BLOOD GLUCOSE: CPT | Performed by: NURSE PRACTITIONER

## 2024-05-23 PROCEDURE — 94799 UNLISTED PULMONARY SVC/PX: CPT

## 2024-05-23 PROCEDURE — 97110 THERAPEUTIC EXERCISES: CPT

## 2024-05-23 PROCEDURE — 99232 SBSQ HOSP IP/OBS MODERATE 35: CPT | Performed by: INTERNAL MEDICINE

## 2024-05-23 PROCEDURE — 99024 POSTOP FOLLOW-UP VISIT: CPT

## 2024-05-23 PROCEDURE — 82948 REAGENT STRIP/BLOOD GLUCOSE: CPT

## 2024-05-23 PROCEDURE — 97116 GAIT TRAINING THERAPY: CPT

## 2024-05-23 PROCEDURE — 84100 ASSAY OF PHOSPHORUS: CPT | Performed by: INTERNAL MEDICINE

## 2024-05-23 PROCEDURE — 97530 THERAPEUTIC ACTIVITIES: CPT

## 2024-05-23 RX ORDER — TAMSULOSIN HYDROCHLORIDE 0.4 MG/1
0.4 CAPSULE ORAL DAILY
Qty: 30 CAPSULE | Refills: 0 | Status: SHIPPED | OUTPATIENT
Start: 2024-05-24 | End: 2024-06-23

## 2024-05-23 RX ORDER — ACETAMINOPHEN 325 MG/1
650 TABLET ORAL EVERY 4 HOURS PRN
Start: 2024-05-23

## 2024-05-23 RX ORDER — CLOPIDOGREL BISULFATE 75 MG/1
75 TABLET ORAL DAILY
Qty: 30 TABLET | Refills: 2 | Status: SHIPPED | OUTPATIENT
Start: 2024-05-23 | End: 2024-08-21

## 2024-05-23 RX ORDER — GUAIFENESIN 600 MG/1
600 TABLET, EXTENDED RELEASE ORAL EVERY 12 HOURS SCHEDULED
Qty: 60 TABLET | Refills: 0 | Status: SHIPPED | OUTPATIENT
Start: 2024-05-23 | End: 2024-06-05

## 2024-05-23 RX ORDER — ACETYLCYSTEINE 200 MG/ML
2 SOLUTION ORAL; RESPIRATORY (INHALATION)
Start: 2024-05-23 | End: 2024-06-05

## 2024-05-23 RX ORDER — HYDRALAZINE HYDROCHLORIDE 25 MG/1
25 TABLET, FILM COATED ORAL 2 TIMES DAILY
Status: DISCONTINUED | OUTPATIENT
Start: 2024-05-23 | End: 2024-05-23 | Stop reason: HOSPADM

## 2024-05-23 RX ORDER — ATORVASTATIN CALCIUM 40 MG/1
40 TABLET, FILM COATED ORAL NIGHTLY
Qty: 30 TABLET | Refills: 2 | Status: SHIPPED | OUTPATIENT
Start: 2024-05-23 | End: 2024-08-21

## 2024-05-23 RX ORDER — BUMETANIDE 1 MG/1
1 TABLET ORAL 2 TIMES DAILY
Qty: 60 TABLET | Refills: 0 | Status: SHIPPED | OUTPATIENT
Start: 2024-05-23 | End: 2024-06-22

## 2024-05-23 RX ORDER — POLYETHYLENE GLYCOL 3350 17 G/17G
17 POWDER, FOR SOLUTION ORAL 2 TIMES DAILY PRN
Status: CANCELLED
Start: 2024-05-23

## 2024-05-23 RX ORDER — CEPHALEXIN 500 MG/1
500 CAPSULE ORAL 3 TIMES DAILY
Qty: 21 CAPSULE | Refills: 0 | Status: SHIPPED | OUTPATIENT
Start: 2024-05-23 | End: 2024-05-30

## 2024-05-23 RX ORDER — IPRATROPIUM BROMIDE AND ALBUTEROL SULFATE 2.5; .5 MG/3ML; MG/3ML
3 SOLUTION RESPIRATORY (INHALATION)
Qty: 270 ML | Refills: 0 | Status: SHIPPED | OUTPATIENT
Start: 2024-05-23 | End: 2024-06-06

## 2024-05-23 RX ORDER — AMIODARONE HYDROCHLORIDE 400 MG/1
TABLET ORAL
Qty: 35 TABLET | Refills: 0 | Status: SHIPPED | OUTPATIENT
Start: 2024-05-23 | End: 2024-06-20

## 2024-05-23 RX ORDER — ALLOPURINOL 100 MG/1
100 TABLET ORAL DAILY
Qty: 14 TABLET | Refills: 0 | Status: SHIPPED | OUTPATIENT
Start: 2024-05-24 | End: 2024-06-07

## 2024-05-23 RX ORDER — CEPHALEXIN 500 MG/1
500 CAPSULE ORAL 4 TIMES DAILY
Qty: 30 CAPSULE | Refills: 0 | Status: CANCELLED | OUTPATIENT
Start: 2024-05-23 | End: 2024-05-31

## 2024-05-23 RX ORDER — HYDRALAZINE HYDROCHLORIDE 25 MG/1
25 TABLET, FILM COATED ORAL 2 TIMES DAILY
Qty: 60 TABLET | Refills: 1 | Status: SHIPPED | OUTPATIENT
Start: 2024-05-23 | End: 2024-07-22

## 2024-05-23 RX ORDER — BUDESONIDE 0.5 MG/2ML
0.5 INHALANT ORAL
Qty: 120 ML | Refills: 0 | Status: SHIPPED | OUTPATIENT
Start: 2024-05-23 | End: 2024-06-05

## 2024-05-23 RX ORDER — POTASSIUM CHLORIDE 20 MEQ/1
20 TABLET, EXTENDED RELEASE ORAL 2 TIMES DAILY
Qty: 60 TABLET | Refills: 0 | Status: SHIPPED | OUTPATIENT
Start: 2024-05-23 | End: 2024-06-22

## 2024-05-23 RX ADMIN — PANTOPRAZOLE SODIUM 40 MG: 40 TABLET, DELAYED RELEASE ORAL at 08:19

## 2024-05-23 RX ADMIN — CEPHALEXIN 500 MG: 500 CAPSULE ORAL at 08:20

## 2024-05-23 RX ADMIN — POTASSIUM CHLORIDE 20 MEQ: 1500 TABLET, EXTENDED RELEASE ORAL at 13:14

## 2024-05-23 RX ADMIN — METOPROLOL TARTRATE 25 MG: 25 TABLET, FILM COATED ORAL at 03:23

## 2024-05-23 RX ADMIN — GUAIFENESIN 600 MG: 600 TABLET, EXTENDED RELEASE ORAL at 08:20

## 2024-05-23 RX ADMIN — POTASSIUM CHLORIDE 20 MEQ: 1500 TABLET, EXTENDED RELEASE ORAL at 08:20

## 2024-05-23 RX ADMIN — BUMETANIDE 1 MG: 1 TABLET ORAL at 08:20

## 2024-05-23 RX ADMIN — ACETYLCYSTEINE 2 ML: 200 SOLUTION ORAL; RESPIRATORY (INHALATION) at 07:00

## 2024-05-23 RX ADMIN — IPRATROPIUM BROMIDE AND ALBUTEROL SULFATE 3 ML: .5; 3 SOLUTION RESPIRATORY (INHALATION) at 06:55

## 2024-05-23 RX ADMIN — CITALOPRAM HYDROBROMIDE 20 MG: 20 TABLET ORAL at 08:20

## 2024-05-23 RX ADMIN — GABAPENTIN 100 MG: 100 CAPSULE ORAL at 08:20

## 2024-05-23 RX ADMIN — MONTELUKAST 10 MG: 10 TABLET, FILM COATED ORAL at 08:20

## 2024-05-23 RX ADMIN — ALLOPURINOL 100 MG: 100 TABLET ORAL at 08:20

## 2024-05-23 RX ADMIN — ASPIRIN 81 MG: 81 TABLET, COATED ORAL at 08:20

## 2024-05-23 RX ADMIN — AMIODARONE HYDROCHLORIDE 400 MG: 200 TABLET ORAL at 08:19

## 2024-05-23 RX ADMIN — METOPROLOL TARTRATE 25 MG: 25 TABLET, FILM COATED ORAL at 13:15

## 2024-05-23 RX ADMIN — BUDESONIDE 0.5 MG: 0.5 SUSPENSION RESPIRATORY (INHALATION) at 07:05

## 2024-05-23 RX ADMIN — HYDRALAZINE HYDROCHLORIDE 25 MG: 25 TABLET ORAL at 11:02

## 2024-05-23 RX ADMIN — TAMSULOSIN HYDROCHLORIDE 0.4 MG: 0.4 CAPSULE ORAL at 08:20

## 2024-05-23 RX ADMIN — CEPHALEXIN 500 MG: 500 CAPSULE ORAL at 13:15

## 2024-05-23 NOTE — CASE MANAGEMENT/SOCIAL WORK
Case Management Discharge Note    Selected Continued Care - Discharged on 5/23/2024 Admission date: 5/15/2024 - Discharge disposition: Rehab Facility or Unit (DC - External)      Destination Coordination complete.      Service Provider Selected Services Address Phone Fax Patient Preferred    [facility] [address] [place] [address] [place] [address] [phone] --                 Transportation Services  W/C Van: Other ([facility] transport Van)    Final Discharge Disposition Code: 62 - inpatient rehab facility

## 2024-05-23 NOTE — PROGRESS NOTES
" LOS: 8 days   Patient Care Team:  Camryn Salinas MD as PCP - General (Family Medicine)  Marco A Childress MD as Consulting Physician (Nephrology)    Chief Complaint: post-op    Subjective     Subjective:  Symptoms:  Stable.  No shortness of breath or chest pain.    Diet:  Adequate intake.  No nausea or vomiting.    Activity level: Returning to normal.    Pain:  He reports no pain.      Objective     Vital Signs  Temp:  [96.8 °F (36 °C)-98.6 °F (37 °C)] 96.8 °F (36 °C)  Heart Rate:  [66-79] 78  Resp:  [16-23] 19  BP: (111-155)/(69-85) 152/78  Body mass index is 32.86 kg/m².    Intake/Output Summary (Last 24 hours) at 5/23/2024 0929  Last data filed at 5/23/2024 0800  Gross per 24 hour   Intake 1295 ml   Output 1520 ml   Net -225 ml     I/O this shift:  In: 240 [P.O.:240]  Out: -       Wt Readings from Last 3 Encounters:   05/23/24 92.3 kg (203 lb 7.8 oz)   05/14/24 95.7 kg (211 lb)   05/13/24 96.8 kg (213 lb 6.4 oz)       Flowsheet Rows      Flowsheet Row First Filed Value   Admission Height 167.6 cm (65.98\") Documented at 05/15/2024 1035   Admission Weight 95.8 kg (211 lb 3.2 oz) Documented at 05/15/2024 0600            Objective:  General Appearance:  Comfortable, in no acute distress, well-appearing and not in pain.    Vital signs: (most recent): Blood pressure 152/78, pulse 78, temperature 96.8 °F (36 °C), temperature source Axillary, resp. rate 19, height 167.6 cm (65.98\"), weight 92.3 kg (203 lb 7.8 oz), SpO2 91%.  Vital signs are normal.  No fever.    Output: Producing urine and producing stool.    Lungs:  Normal effort and normal respiratory rate.  There are decreased breath sounds and wheezes.    Heart: Normal rate.  Irregular rhythm.    Abdomen: Bowel sounds are normal.     Extremities: Normal range of motion.    Neurological: Patient is alert and oriented to person, place and time.    Skin:  Warm and dry.  (Sternal incision clean, dry, and intact without erythema, edema, or " "drainage)              Results Review:        Results from last 7 days   Lab Units 05/23/24  0350 05/22/24  0352 05/21/24  0401   WBC 10*3/mm3 11.68* 11.49* 9.26   HEMOGLOBIN g/dL 9.5* 9.1* 9.4*   HEMATOCRIT % 31.2* 29.3* 30.0*   PLATELETS 10*3/mm3 422 394 332         PT/INR:    No results found for: \"PROTIME\"  /  No results found for: \"INR\"      Results from last 7 days   Lab Units 05/23/24  0350 05/22/24  0352 05/21/24  0401   SODIUM mmol/L 141 140 141   POTASSIUM mmol/L 4.9 4.0 4.0   CHLORIDE mmol/L 102 100 99   CO2 mmol/L 29.0 29.0 28.0   BUN mg/dL 63* 71* 81*   CREATININE mg/dL 1.53* 1.77* 2.24*   GLUCOSE mg/dL 113* 114* 106*   CALCIUM mg/dL 9.7 9.7 9.8         Scheduled Meds:  acetylcysteine, 2 mL, Nebulization, BID - RT  allopurinol, 100 mg, Oral, Daily  amiodarone, 400 mg, Oral, Q12H  aspirin, 81 mg, Oral, Daily  atorvastatin, 40 mg, Oral, Nightly  budesonide, 0.5 mg, Nebulization, BID - RT  bumetanide, 1 mg, Oral, BID  cephalexin, 500 mg, Oral, 4x Daily  citalopram, 20 mg, Oral, Daily  enoxaparin, 40 mg, Subcutaneous, Q24H  gabapentin, 100 mg, Oral, BID  guaiFENesin, 600 mg, Oral, Q12H  hydrALAZINE, 25 mg, Oral, BID  insulin lispro, 2-9 Units, Subcutaneous, 4x Daily AC & at Bedtime  ipratropium-albuterol, 3 mL, Nebulization, Q6H While Awake - RT  metoprolol tartrate, 25 mg, Oral, Q8H  montelukast, 10 mg, Oral, Daily  pantoprazole, 40 mg, Oral, Daily  potassium chloride, 20 mEq, Oral, TID With Meals  tamsulosin, 0.4 mg, Oral, Daily        Infusions:           Assessment & Plan         ASCAD      Assessment & Plan      - Severe MV CAD, EF 55% (cath)--s/p elective CABG x4 with LIMA ([place])  - NSTEMI presentation--April 2024  - HTN--pressor postop  - HLD--statin  - Postop EN/ CKD stage 3--Dr. Childress following  - Anxiety  - ZHANG with CPAP compliance  - Recent hx human meta pneumovirus infection--resolved  - Chronic HFpEF, NYHA class II-III  - Postop ABLA, expected--transfused 5/16    POD8:    Patient looks " great this morning, color is good and he does not have any complaints  Patient currently weaned to 2LNC, continue to wean as able and encourage IS  Nephrology following -- PO Bumex, creatinine of 1.53 this morning (1.77 yesterday)  Patient remains in rate controlled atrial fibrillation, will discuss anticoagulation with Dr. Hirsch -- on PO amiodarone, Qtc of 423  Patient with NSTEMI presentation, Plavix at discharge   Patient   Patient will be going to [facility] at discharge, no precert required -- possible discharge later today   Continue routine care, encourage IS, mobilize    Nataliya Calero PA-C  05/23/24  09:29 EDT

## 2024-05-23 NOTE — CASE MANAGEMENT/SOCIAL WORK
Continued Stay Note  ERMIAS Valentine     Patient Name: Fransico Cuenca  MRN: [id number]  Today's Date: 5/23/2024    Admit Date: 5/15/2024    Plan: DC plan: [facility] accepted, no pre-cert required. CABG 5/15/2024.   Discharge Plan       Row Name 05/23/24 1540       Plan    Plan DC plan: [facility] accepted, no pre-cert required. CABG 5/15/2024.    Patient/Family in Agreement with Plan yes    Provided Post Acute Provider List? N/A    Provided Post Acute Provider Quality & Resource List? N/A    Plan Comments CM spoke with [facility] NP Suzy Dinero to obtain clinical updates. Patient will be ready to DC to [facility] today. ANGELIA confirmed bed is ready with liaison Poly. Pauline requiring oxygen. oPly having [facility] van pick patient up for transport at 3:30 pm. CM informed patient and nursing of plan. DC orders in place. No barriers.                  Expected Discharge Date Expected Discharge Time    May 23, 2024           Met with patient in room   Maintain distance greater than six feet and spent less than fifteen minutes in the room.      Gabbi Shelton RN     Office Phone: [phone]  Office Cell:     [phone]

## 2024-05-23 NOTE — DISCHARGE INSTRUCTIONS
Post Coronary Artery Bypass Graft (CABG) Surgery Discharge Instructions    To Accompany Standard Discharge Form      Call these physicians to schedule a follow up visit:    Cardiothoracic Surgery Phone:  [phone] Fax:  [phone] Address:  [address] [place] [address] [place] [address]       Wear your Heart Hugger continuously until your follow-up appointment with Cardiothoracic Surgery.  No lifting over 10 pounds (gallon of milk) for six (6) weeks after surgery.  No driving for four (4) weeks.    Showers are to be taken every day.    Clean all incisions with fragrance free liquid anti-bacterial soap.  No bar soap because bacteria grows on bar soap.  Please do not use any lotions or powders on incisions.  No tub baths until incisions are completely healed.   Avoid sitting for long periods of time.  Try to stand and walk every 1-2 hours when you travel or are sitting for a long time.   If your legs are swollen, elevate your legs on pillows above the level of your heart.    Rest as needed during the day, but do not take more than 2 naps during the day.   Extra naps may interfere with your ability to sleep at night.  Take your pain medications as ordered, particularly at night.    Avoid straining or bending over with your head down.    You may participate in sex when you can walk up 2 flights of stairs without shortness of breath.   Avoid positions that put pressure on your upper arms.  Do not have sex after a heavy meal or if you are tired.  Weigh yourself daily.   Weight gain can be a sign of extra fluid in your lungs and body.   Call your care giver if you have gained 2-3 pounds in one day.  Stay away from large crowds and people with colds, influenzas, or COVID.  Keep your incision clean.  Do not hold animals or small children or allow them near your incision sites to prevent infection.    Your physician will release you to return to work after your surgery.   Usually patients may return to  work six (6) weeks after surgery.    If you smoke, now is a good time to quit.  Nicotine can cause increased narrowing of the new grafts.  Call the doctor if you have any of the following:    Incisions that are red, swollen, or increasingly tender or have pus coming from them  A temperature over 101.0 degrees  Sudden trouble breathing. This could be a sign of a clot in your lung  A “pop” in your chest bone or the incision is   Increasing chest pain or pressure radiating to your neck, jaw, or arm. These are signs of a heart attack.  Weakness on one side of your body or difficulty speaking. These are signs of a stroke.    In case of an emergency, contact your physician or nurse practitioner. They will direct you regarding whether to go to the emergency room or call 911.

## 2024-05-23 NOTE — THERAPY TREATMENT NOTE
Subjective: Pt agreeable to therapeutic plan of care.    Objective:     Bed mobility - N/A or Not attempted.    Transfers - SBA    Ambulation - 200 feet SBA and with rolling walker    Phase 1 Cardiac Rehab Initiated - Acute Care    Sitting tolerance: >10min and supported  Standing tolerance: >10min and supported    Precautions:  Mid-sternal incision; avoid scapular retraction and depression.  Cardiovascular impairment post-sx; encourage energy conservation strategies.    Therapeutic Exercise: 10 reps UE and LE AROM in supported sitting and supported standing    MET level equivalent: 2.0-3.0 (Unlimited sitting, ambulation on level ground <2mph, light housework)    Vitals: Desaturates to 85% on room air with ambulation.    Pain: 0 VAS   Location:   Intervention for pain: N/A    Education: Provided education on the importance of mobility in the acute care setting, Verbal/Tactile Cues, Transfer Training, Gait Training, and Post-Op Precautions    Assessment: Fransico Cuenca presents with functional mobility impairments which indicate the need for skilled intervention. Tolerating session today without incident. Pt on 1L upon arrival at 93%. Attempted to ambulate on room air, with pt desatting to 85% towards end of ambulation bout. Pt reporting minimal SOA with desat. Pt placed back on 1L post ambulation. Will continue to follow and progress as tolerated.     Plan/Recommendations:   If medically appropriate, High Intensity Therapy recommended post-acute care. This is recommended as therapy feels the patient would require 5-6 days per week, 2-3 hours per day. At this time, inpatient rehabilitation (acute rehab) would be the first choice and SNF would be second. Pt requires no DME at discharge.     Pt desires Inpatient Rehabilitation placement at discharge. Pt cooperative; agreeable to therapeutic recommendations and plan of care.         Basic Mobility 6-click:  Rollin = Total, A lot = 2, A little = 3; 4 =  None  Supine>Sit:   1 = Total, A lot = 2, A little = 3; 4 = None   Sit>Stand with arms:  1 = Total, A lot = 2, A little = 3; 4 = None  Bed>Chair:   1 = Total, A lot = 2, A little = 3; 4 = None  Ambulate in room:  1 = Total, A lot = 2, A little = 3; 4 = None  3-5 Steps with railin = Total, A lot = 2, A little = 3; 4 = None  Score: 22    Modified Kirwin: N/A = No pre-op stroke/TIA    Post-Tx Position: Up in Chair, Alarms activated, and Call light and personal items within reach  PPE: gloves

## 2024-05-23 NOTE — PLAN OF CARE
Assessment: Fransico Cuenca presents with functional mobility impairments which indicate the need for skilled intervention. Tolerating session today without incident. Pt on 1L upon arrival at 93%. Attempted to ambulate on room air, with pt desatting to 85% towards end of ambulation bout. Pt reporting minimal SOA with desat. Pt placed back on 1L post ambulation. Will continue to follow and progress as tolerated.     Plan/Recommendations:   If medically appropriate, High Intensity Therapy recommended post-acute care. This is recommended as therapy feels the patient would require 5-6 days per week, 2-3 hours per day. At this time, inpatient rehabilitation (acute rehab) would be the first choice and SNF would be second. Pt requires no DME at discharge.     Pt desires Inpatient Rehabilitation placement at discharge. Pt cooperative; agreeable to therapeutic recommendations and plan of care.

## 2024-05-23 NOTE — PROGRESS NOTES
Daily Progress Note        [facility]      Assessment:     Hypoxic respiratory insufficiency  Pulmonary edema  Bibasilar atelectasis     COPD, mild  Pulmonary function test 5/14/2024, FVC 3.0 L which is 81%, FEV1 2.4 L which is 86%, FEV1/FVC ratio 78, total lung capacity 96%, residual volume 116%, diffusion capacity 54%     NSTEMI, CAD, EF 55% (cath)--s/p elective CABG x4 with LIMA      DM II  HTN  GERD  Hyperlipidemia  EN  Anemia  Chronic allergic rhinitis/sinusitis     Echo 4/18/2024: LVEF 51-55%, normal RV pressure, hypokinesis of distal septum and apex        Recommendations:     Oxygen titration currently on 5 L     bronchodilators Pulmicort and DuoNeb  Mucomyst nebulized       diuresis by renal      Incentive spirometry and flutter valve      Ambulation     bowel regimen on MiraLax                LOS: 8 days     Subjective         Objective     Vital signs for last 24 hours:  Vitals:    05/23/24 0708 05/23/24 0718 05/23/24 1057 05/23/24 1100   BP:  152/78 144/84 146/85   BP Location:  Right arm Right arm    Patient Position:  Sitting Sitting    Pulse: 69 78 75 76   Resp: 20 19 21    Temp:  96.8 °F (36 °C) 97.8 °F (36.6 °C)    TempSrc:  Axillary Axillary    SpO2: 91% 91% 91% 91%   Weight:       Height:           Intake/Output last 3 shifts:  I/O last 3 completed shifts:  In: 2015 [P.O.:1920; I.V.:95]  Out: 1670 [Urine:1670]  Intake/Output this shift:  No intake/output data recorded.      Radiology  Imaging Results (Last 24 Hours)       ** No results found for the last 24 hours. **            Labs:  Results from last 7 days   Lab Units 05/23/24  0350   WBC 10*3/mm3 11.68*   HEMOGLOBIN g/dL 9.5*   HEMATOCRIT % 31.2*   PLATELETS 10*3/mm3 422     Results from last 7 days   Lab Units 05/23/24  0350 05/21/24  0401 05/20/24  0342   SODIUM mmol/L 141   < > 140   POTASSIUM mmol/L 4.9   < > 4.1   CHLORIDE mmol/L 102   < > 98   CO2 mmol/L 29.0   < > 28.0   BUN mg/dL 63*   < > 81*   CREATININE mg/dL 1.53*   < > 2.82*   CALCIUM  mg/dL 9.7   < > 9.0   BILIRUBIN mg/dL  --   --  0.3   ALK PHOS U/L  --   --  51   ALT (SGPT) U/L  --   --  <5   AST (SGOT) U/L  --   --  22   GLUCOSE mg/dL 113*   < > 110*    < > = values in this interval not displayed.     Results from last 7 days   Lab Units 05/20/24  1153   PH, ARTERIAL pH units 7.454*   PO2 ART mm Hg 67.7*   PCO2, ARTERIAL mm Hg 39.4   HCO3 ART mmol/L 27.7     Results from last 7 days   Lab Units 05/20/24  0342 05/18/24  0321 05/17/24  0943   ALBUMIN g/dL 3.1* 3.4* 3.1     Results from last 7 days   Lab Units 05/18/24  0321 05/17/24  0306   CK TOTAL U/L 161 212*         Results from last 7 days   Lab Units 05/23/24  0350   MAGNESIUM mg/dL 2.3           Results from last 7 days   Lab Units 05/17/24  0306   TSH uIU/mL 1.120           Meds:   SCHEDULE      Infusions  No current facility-administered medications for this encounter.    PRNs      Physical Exam:  Physical Exam  Cardiovascular:      Heart sounds: Murmur heard.      No gallop.   Pulmonary:      Effort: No respiratory distress.      Breath sounds: No stridor. Rhonchi and rales present. No wheezing.   Chest:      Chest wall: No tenderness.         ROS  Review of Systems   Respiratory:  Positive for cough and shortness of breath. Negative for wheezing and stridor.    Cardiovascular:  Negative for chest pain, palpitations and leg swelling.             Total time spent with patient greater than: 45 Minutes

## 2024-05-23 NOTE — PROGRESS NOTES
"NEPHROLOGY PROGRESS NOTE------[facility]    [facility]  [phone]  Bernadine Childress MD      Patient Care Team:  Camryn Salinas MD as PCP - General (Family Medicine)  Marco A Childress MD as Consulting Physician (Nephrology)      Provider:  Bernadine Childress MD  Patient Name: Fransico Cuenca  :  1955    SUBJECTIVE:    F/U ARF/EN/CRF/CKD    Breathing ok. No angina. No dysuria. Edema better. Appetite ok. +BM    Medication:  acetylcysteine, 2 mL, Nebulization, BID - RT  allopurinol, 100 mg, Oral, Daily  amiodarone, 400 mg, Oral, Q12H  aspirin, 81 mg, Oral, Daily  atorvastatin, 40 mg, Oral, Nightly  budesonide, 0.5 mg, Nebulization, BID - RT  bumetanide, 1 mg, Oral, BID  cephalexin, 500 mg, Oral, 4x Daily  citalopram, 20 mg, Oral, Daily  enoxaparin, 40 mg, Subcutaneous, Q24H  gabapentin, 100 mg, Oral, BID  guaiFENesin, 600 mg, Oral, Q12H  insulin lispro, 2-9 Units, Subcutaneous, 4x Daily AC & at Bedtime  ipratropium-albuterol, 3 mL, Nebulization, Q6H While Awake - RT  metoprolol tartrate, 25 mg, Oral, Q8H  montelukast, 10 mg, Oral, Daily  pantoprazole, 40 mg, Oral, Daily  potassium chloride, 20 mEq, Oral, TID With Meals  tamsulosin, 0.4 mg, Oral, Daily             OBJECTIVE    Vital Sign Min/Max for last 24 hours  Temp  Min: 96.8 °F (36 °C)  Max: 98.6 °F (37 °C)   BP  Min: 111/70  Max: 155/85   Pulse  Min: 66  Max: 79   Resp  Min: 16  Max: 23   SpO2  Min: 91 %  Max: 98 %   No data recorded   Weight  Min: 92.3 kg (203 lb 7.8 oz)  Max: 92.3 kg (203 lb 7.8 oz)     Flowsheet Rows      Flowsheet Row First Filed Value   Admission Height 167.6 cm (65.98\") Documented at 05/15/2024 1035   Admission Weight 95.8 kg (211 lb 3.2 oz) Documented at 05/15/2024 0600            I/O this shift:  In: 240 [P.O.:240]  Out: -   I/O last 3 completed shifts:  In: 1715 [P.O.:1620; I.V.:95]  Out: 2395 [Urine:2395]    Physical Exam:    General Appearance: " "alert, appears stated age and cooperative  Head: normocephalic, without obvious abnormality and atraumatic  Eyes: conjunctivae and sclerae normal and no icterus  Neck: supple and NO GROSS JVD NOW  Lungs: DECREASED BS BIBASILAR AND NO CRACKLES NOW  Heart: regular rhythm & normal rate and normal S1, S2 +MICA  Chest Wall: no abnormalities observed  Abdomen: normal bowel sounds and + BS   Extremities: moves extremities well, TRACE BILAT LE EDEMA, no cyanosis  Skin: no bleeding, bruising or rash  Neurologic: Alert, and oriented. No focal deficits    Labs:    WBC WBC   Date Value Ref Range Status   05/23/2024 11.68 (H) 3.40 - 10.80 10*3/mm3 Final   05/22/2024 11.49 (H) 3.40 - 10.80 10*3/mm3 Final   05/21/2024 9.26 3.40 - 10.80 10*3/mm3 Final      HGB Hemoglobin   Date Value Ref Range Status   05/23/2024 9.5 (L) 13.0 - 17.7 g/dL Final   05/22/2024 9.1 (L) 13.0 - 17.7 g/dL Final   05/21/2024 9.4 (L) 13.0 - 17.7 g/dL Final      HCT Hematocrit   Date Value Ref Range Status   05/23/2024 31.2 (L) 37.5 - 51.0 % Final   05/22/2024 29.3 (L) 37.5 - 51.0 % Final   05/21/2024 30.0 (L) 37.5 - 51.0 % Final      Platelets No results found for: \"LABPLAT\"   MCV MCV   Date Value Ref Range Status   05/23/2024 86.0 79.0 - 97.0 fL Final   05/22/2024 85.4 79.0 - 97.0 fL Final   05/21/2024 83.8 79.0 - 97.0 fL Final          Sodium Sodium   Date Value Ref Range Status   05/23/2024 141 136 - 145 mmol/L Final   05/22/2024 140 136 - 145 mmol/L Final   05/21/2024 141 136 - 145 mmol/L Final      Potassium Potassium   Date Value Ref Range Status   05/23/2024 4.9 3.5 - 5.2 mmol/L Final   05/22/2024 4.0 3.5 - 5.2 mmol/L Final   05/21/2024 4.0 3.5 - 5.2 mmol/L Final      Chloride Chloride   Date Value Ref Range Status   05/23/2024 102 98 - 107 mmol/L Final   05/22/2024 100 98 - 107 mmol/L Final   05/21/2024 99 98 - 107 mmol/L Final      CO2 CO2   Date Value Ref Range Status   05/23/2024 29.0 22.0 - 29.0 mmol/L Final   05/22/2024 29.0 22.0 - 29.0 mmol/L " "Final   05/21/2024 28.0 22.0 - 29.0 mmol/L Final      BUN BUN   Date Value Ref Range Status   05/23/2024 63 (H) 8 - 23 mg/dL Final   05/22/2024 71 (H) 8 - 23 mg/dL Final   05/21/2024 81 (H) 8 - 23 mg/dL Final      Creatinine Creatinine   Date Value Ref Range Status   05/23/2024 1.53 (H) 0.76 - 1.27 mg/dL Final   05/22/2024 1.77 (H) 0.76 - 1.27 mg/dL Final   05/21/2024 2.24 (H) 0.76 - 1.27 mg/dL Final      Calcium Calcium   Date Value Ref Range Status   05/23/2024 9.7 8.6 - 10.5 mg/dL Final   05/22/2024 9.7 8.6 - 10.5 mg/dL Final   05/21/2024 9.8 8.6 - 10.5 mg/dL Final      PO4 No components found for: \"PO4\"   Albumin No results found for: \"ALBUMIN\"     Magnesium Magnesium   Date Value Ref Range Status   05/23/2024 2.3 1.6 - 2.4 mg/dL Final   05/22/2024 1.8 1.6 - 2.4 mg/dL Final   05/21/2024 2.0 1.6 - 2.4 mg/dL Final      Uric Acid No components found for: \"URIC ACID\"     Imaging Results (Last 72 Hours)       Procedure Component Value Units Date/Time    CT Chest Without Contrast Diagnostic [[id number]] Collected: 05/20/24 1611     Updated: 05/20/24 1631    Narrative:      CT CHEST WO CONTRAST DIAGNOSTIC    Date of Exam: 5/20/2024 3:51 PM EDT    Indication: hypoxia    Comparison: AP chest x-ray 5/18/2024, two-view chest x-ray 5/14/2024    Technique: Axial CT images were obtained of the chest without contrast administration.  Sagittal and coronal reconstructions were performed.  Automated exposure control and iterative reconstruction methods were used.    Findings:  There are postoperative changes from recent median sternotomy and CABG. Heart size is enlarged, and there are coronary artery calcifications. There is no pericardial effusion. There are small bilateral pleural effusions. No pneumothorax. There is partial   atelectasis of both lower lobes and near complete atelectasis of the right middle lobe. Lungs are otherwise clear. No lymphadenopathy is seen in the chest. Cholelithiasis is included in the upper abdomen. " No acute osseous abnormality is identified.      Impression:      Impression:  1.Small bilateral pleural effusions.  2.Near complete right middle lobe atelectasis. Partial bilateral lower lobe atelectasis.  3.Expected postoperative changes from recent CABG.      Electronically Signed: Lisa Lan    5/20/2024 4:29 PM EDT    Workstation ID: [id number]            Results for orders placed during the hospital encounter of 05/15/24    XR Chest 1 View    Narrative  XR CHEST 1 VW    Date of Exam: 5/18/2024 1:56 PM EDT    Indication: sob, chest pain    Comparison: May 17, 2024    Findings:  There is a sheath in the right internal jugular vein. Sternotomy wires noted. The heart looks enlarged. There is fullness of the mediastinum unchanged. The depth of inspiration is poor. A pneumothorax not definitely identified. There are bibasilar  densities that could relate to some atelectasis and effusions.    Impression  Impression:  1.Bibasilar areas of density that could like atelectasis and effusions.  2.Fullness to the mediastinal shadow unchanged that could be postsurgical and has been suggested since surgery      Electronically Signed: Anurag Colon MD  5/18/2024 2:06 PM EDT  Workstation ID: [id number]      XR Chest 1 View    Narrative  XR CHEST 1 VW    Date of Exam: 5/17/2024 5:39 PM EDT    Indication: chest tube removal    Comparison: Same day chest radiographs.    Findings:  Interval removal of left basilar thoracostomy tube. No evidence of new pneumothorax. Right approach vascular sheath in unchanged position. Cardiac surgical changes with median sternotomy wires. Unchanged enlarged cardiomediastinal silhouette. Low lung  volumes. No definite new focal airspace opacity or pleural effusion. Osseous structures are unchanged.,    Impression  Impression:  Interval removal of left thoracostomy tube. No evidence of pneumothorax.      Electronically Signed: Braden Bartlett MD  5/17/2024 5:45 PM EDT  Workstation ID: [id number]      XR  Abdomen KUB    Narrative  XR ABDOMEN KUB    Date of Exam: 5/16/2024 6:00 PM EDT    Indication: Abd distension, increased pain    Comparison: 2020 CT    Findings:  Gas-filled colon. No evidence of small bowel obstruction. No radiodense renal calculi noted. No acute osseous abnormality.    Impression  Impression:  Gas-distended colon. No evidence of bowel obstruction.      Electronically Signed: Braden Bartlett MD  5/16/2024 6:23 PM EDT  Workstation ID: [id number]      Results for orders placed during the hospital encounter of 04/18/24    Duplex Carotid Ultrasound CAR    Interpretation Summary    Right internal carotid artery demonstrates a less than 50% stenosis.    Normal left carotid duplex scan.        ASSESSMENT / PLAN      [facility]      ARF/EN/CRF/CKD------Nonoliguric. BUN/Cr coming down. +ARF/EN on top  Of known CRF/CKD STG 3A with a baseline serum creatinine of about 1.4-1.5. CRF/CKD STG 3A secondary to DGS/HTN NS. BUN/Cr down. +ARF/EN secondary to ATN from hypotension and renal perfusion disturbance from CP bypass with preadmission ACE-I use. No NSAIDs or IV dye.  Continue to diurese with po Bumex.  Support BP and hemodynamics. Off of ACE-I. Dose meds for CrCl less than 10 cc/min until ARF/EN is resolved. No uremia despite elevated azotemia     2. HTN WITH CKD-----BP up a little. Restart low dose Hydralazine     3. DMII WITH RENAL MANIFESTATIONS------Off Metformin for now. BS ok. Glucometers, SSI and Insulin gtt post op per protocol     4. HYPERLIPIDEMIA----On Statin. TSH normal     5. OA/DJD/HYPERURICEMIA------On Allopurinol preadmission.   No NSAIDs     6. DEPRESSION-----Celexa     7. COPD-----Respiratory status stable. No active wheezing     8. GERD/PUD PROPHYLAXIS-----PPI. Benefits outweigh risks despite renal dysfunction     9. BPH------On Hytrin preadmission. Rasmussen out     10. ANEMIA------H/H stable. S/P IV iron for MOSHE.       11. ACIDOSIS-----Resolved    12. VOLUME OVERLOAD----Diuresing. On po Bumex    13.  HYPOKALEMIA-------Replaced    14. VERY MILD RENAL FAILURE ASSOCIATED HYPERPHOSPHATEMIA-----Resolved    15. HYPOMAGNESEMIA------Replaced      Bernadine Childress MD  [facility]  [phone]  05/23/24  09:10 EDT

## 2024-05-23 NOTE — PROGRESS NOTES
LOS: 8 days   Admitting Physician- No att. providers found    Reason For Followup:    Postoperative atrial fibrillation  CAD  CABG  Hypertension  CKD  Leg edema    Subjective     Patient is feeling much better.  Wants to go home    Objective     Hemodynamics are stable    Review of Systems:   Review of Systems   Constitutional: Negative for chills and fever.   HENT:  Negative for ear discharge and nosebleeds.    Eyes:  Negative for discharge and redness.   Cardiovascular:  Negative for chest pain, orthopnea, palpitations, paroxysmal nocturnal dyspnea and syncope.   Respiratory:  Negative for cough, shortness of breath and wheezing.    Endocrine: Negative for heat intolerance.   Skin:  Negative for rash.   Musculoskeletal:  Negative for arthritis and myalgias.   Gastrointestinal:  Negative for abdominal pain, melena, nausea and vomiting.   Genitourinary:  Negative for dysuria and hematuria.   Neurological:  Negative for dizziness, light-headedness, numbness and tremors.   Psychiatric/Behavioral:  Negative for depression. The patient is not nervous/anxious.          Vital Signs  Vitals:    05/23/24 0708 05/23/24 0718 05/23/24 1057 05/23/24 1100   BP:  152/78 144/84 146/85   BP Location:  Right arm Right arm    Patient Position:  Sitting Sitting    Pulse: 69 78 75 76   Resp: 20 19 21    Temp:  96.8 °F (36 °C) 97.8 °F (36.6 °C)    TempSrc:  Axillary Axillary    SpO2: 91% 91% 91% 91%   Weight:       Height:         Wt Readings from Last 1 Encounters:   05/23/24 92.3 kg (203 lb 7.8 oz)       Intake/Output Summary (Last 24 hours) at 5/23/2024 1713  Last data filed at 5/23/2024 1500  Gross per 24 hour   Intake 1200 ml   Output 700 ml   Net 500 ml     Physical Exam:  Constitutional:       Appearance: Well-developed.   Eyes:      General: No scleral icterus.        Right eye: No discharge.   HENT:      Head: Normocephalic and atraumatic.   Neck:      Thyroid: No thyromegaly.      Lymphadenopathy: No cervical adenopathy.    Pulmonary:      Effort: Pulmonary effort is normal. No respiratory distress.      Breath sounds: Normal breath sounds. No wheezing. No rales.   Cardiovascular:      Normal rate. Regular rhythm.      No gallop.    Edema:     Peripheral edema absent.   Abdominal:      Tenderness: There is no abdominal tenderness.   Skin:     Findings: No erythema or rash.   Neurological:      Mental Status: Alert and oriented to person, place, and time.         Results Review:   Lab Results (last 24 hours)       Procedure Component Value Units Date/Time    POC Glucose Once [[id number]]  (Abnormal) Collected: 05/23/24 1103    Specimen: Blood Updated: 05/23/24 1105     Glucose 128 mg/dL      Comment: Serial Number: [id number]:  [id number]       POC Glucose 4x Daily Before Meals & at Bedtime [[id number]]  (Abnormal) Collected: 05/23/24 0715    Specimen: Blood Updated: 05/23/24 0717     Glucose 132 mg/dL      Comment: Serial Number: [id number]:  [id number]       Magnesium [[id number]]  (Normal) Collected: 05/23/24 0350    Specimen: Blood Updated: 05/23/24 0425     Magnesium 2.3 mg/dL     Basic Metabolic Panel [[id number]]  (Abnormal) Collected: 05/23/24 0350    Specimen: Blood Updated: 05/23/24 0422     Glucose 113 mg/dL      BUN 63 mg/dL      Creatinine 1.53 mg/dL      Sodium 141 mmol/L      Potassium 4.9 mmol/L      Chloride 102 mmol/L      CO2 29.0 mmol/L      Calcium 9.7 mg/dL      BUN/Creatinine Ratio 41.2     Anion Gap 10.0 mmol/L      eGFR 48.6 mL/min/1.73     Narrative:      GFR Normal >60  Chronic Kidney Disease <60  Kidney Failure <15      Phosphorus [[id number]]  (Normal) Collected: 05/23/24 0350    Specimen: Blood Updated: 05/23/24 0422     Phosphorus 3.4 mg/dL     CBC (No Diff) [[id number]]  (Abnormal) Collected: 05/23/24 0350    Specimen: Blood Updated: 05/23/24 0356     WBC 11.68 10*3/mm3      RBC 3.63 10*6/mm3      Hemoglobin 9.5 g/dL      Hematocrit 31.2 %      MCV 86.0 fL      MCH 26.2 pg      MCHC 30.4 g/dL       RDW 16.2 %      RDW-SD 51.1 fl      MPV 9.7 fL      Platelets 422 10*3/mm3     POC Glucose Once [[id number]]  (Abnormal) Collected: 05/22/24 1953    Specimen: Blood Updated: 05/1954     Glucose 148 mg/dL      Comment: Serial Number: [id number]:  [id number]       POC Glucose 4x Daily Before Meals & at Bedtime [[id number]]  (Abnormal) Collected: 05/22/24 1714    Specimen: Blood Updated: 05/22/24 1716     Glucose 117 mg/dL      Comment: Serial Number: [id number]:  [id number]       POC Glucose Once [[id number]]  (Abnormal) Collected: 05/22/24 1601    Specimen: Blood Updated: 05/22/24 1603     Glucose 132 mg/dL      Comment: Serial Number: [id number]:  [id number]             Imaging Results (Last 72 Hours)       Procedure Component Value Units Date/Time    CT Chest Without Contrast Diagnostic [[id number]] Collected: 05/20/24 1611     Updated: 05/20/24 1631    Narrative:      CT CHEST WO CONTRAST DIAGNOSTIC    Date of Exam: 5/20/2024 3:51 PM EDT    Indication: hypoxia    Comparison: AP chest x-ray 5/18/2024, two-view chest x-ray 5/14/2024    Technique: Axial CT images were obtained of the chest without contrast administration.  Sagittal and coronal reconstructions were performed.  Automated exposure control and iterative reconstruction methods were used.    Findings:  There are postoperative changes from recent median sternotomy and CABG. Heart size is enlarged, and there are coronary artery calcifications. There is no pericardial effusion. There are small bilateral pleural effusions. No pneumothorax. There is partial   atelectasis of both lower lobes and near complete atelectasis of the right middle lobe. Lungs are otherwise clear. No lymphadenopathy is seen in the chest. Cholelithiasis is included in the upper abdomen. No acute osseous abnormality is identified.      Impression:      Impression:  1.Small bilateral pleural effusions.  2.Near complete right middle lobe atelectasis. Partial bilateral  lower lobe atelectasis.  3.Expected postoperative changes from recent CABG.      Electronically Signed: Lisa Lan    5/20/2024 4:29 PM EDT    Workstation ID: [id number]          ECG/EMG Results (most recent)       Procedure Component Value Units Date/Time    ECG 12 Lead Other; s/p CABG [[id number]] Collected: 05/15/24 1504     Updated: 05/15/24 1506     QT Interval 424 ms      QTC Interval 428 ms     Narrative:      HEART RATE= 61  bpm  RR Interval= 980  ms  IN Interval= 247  ms  P Horizontal Axis= 21  deg  P Front Axis= 32  deg  QRSD Interval= 98  ms  QT Interval= 424  ms  QTcB= 428  ms  QRS Axis= 107  deg  T Wave Axis= -19  deg  - ABNORMAL ECG -  Sinus rhythm  Prolonged IN interval  Right axis deviation  Probable anteroseptal infarct, old  Abnrm T, consider ischemia, anterolateral lds  When compared with ECG of 14-May-2024 7:59:36,  Significant axis, voltage or hypertrophy change  Electronically Signed By:   Date and Time of Study: 2024-05-15 15:04:45    ECG 12 Lead Other; s/p CABG [[id number]] Collected: 05/17/24 0311     Updated: 05/17/24 1455     QT Interval 380 ms      QTC Interval 424 ms     Narrative:      HEART RATE= 75  bpm  RR Interval= 804  ms  IN Interval= 208  ms  P Horizontal Axis= 23  deg  P Front Axis= 20  deg  QRSD Interval= 94  ms  QT Interval= 380  ms  QTcB= 424  ms  QRS Axis= -33  deg  T Wave Axis= 72  deg  - ABNORMAL ECG -  Sinus rhythm  Left axis deviation  Anteroseptal infarct, age indeterminate  ST elevation, consider inferior injury  Lateral wall also involved  When compared with ECG of 15-May-2024 15:04:45,  New or worsened ischemia or infarction  Electronically Signed By: Andres Dawson (JAMESON) 17-May-2024 14:55:01  Date and Time of Study: 2024-05-17 03:11:25    ECG 12 Lead Other; s/p CABG [[id number]] Collected: 05/16/24 0531     Updated: 05/17/24 1456     QT Interval 389 ms      QTC Interval 404 ms     Narrative:      HEART RATE= 65  bpm  RR Interval= 928  ms  IN Interval= 246  ms  P  Horizontal Axis= 9  deg  P Front Axis= 25  deg  QRSD Interval= 96  ms  QT Interval= 389  ms  QTcB= 404  ms  QRS Axis= -39  deg  T Wave Axis= 92  deg  - ABNORMAL ECG -  Sinus rhythm  Prolonged MO interval  Left axis deviation  Probable anteroseptal infarct, old  Abnrm T, consider ischemia, anterolateral lds  ST elevation, consider inferior injury  When compared with ECG of 15-May-2024 15:04:45,  Nonspecific significant change  Electronically Signed By: Andres Dawson (JAMESON) 17-May-2024 14:56:13  Date and Time of Study: 2024-05-16 05:31:08    ECG 12 Lead QT Measurement [[id number]] Collected: 05/21/24 0412     Updated: 05/21/24 0419     QT Interval 338 ms      QTC Interval 369 ms     Narrative:      HEART RATE= 71  bpm  RR Interval= 840  ms  MO Interval= 198  ms  P Horizontal Axis= 1  deg  P Front Axis= 38  deg  QRSD Interval= 96  ms  QT Interval= 338  ms  QTcB= 369  ms  QRS Axis= -42  deg  T Wave Axis=   deg  - ABNORMAL ECG -  Sinus rhythm  Evolving inferior infarct since 19-May-2024 1:07:58  Consider anteroseptal infarct  When compared with ECG of 19-May-2024 1:07:58,  Significant change in rhythm: previously atrial fibrillation  New or worsened ischemia or infarction  Electronically Signed By:   Date and Time of Study: 2024-05-21 04:12:27    ECG 12 Lead Rhythm Change [[id number]] Collected: 05/21/24 0947     Updated: 05/21/24 0949     QT Interval 350 ms      QTC Interval 423 ms     Narrative:      HEART RATE= 87  bpm  RR Interval= 685  ms  MO Interval=   ms  P Horizontal Axis=   deg  P Front Axis=   deg  QRSD Interval= 94  ms  QT Interval= 350  ms  QTcB= 423  ms  QRS Axis= -39  deg  T Wave Axis= 108  deg  - ABNORMAL ECG -  Atrial fibrillation  Ventricular premature complex  Inferior infarct, acute (LCx)  Anteroseptal infarct, age indeterminate  Electronically Signed By:   Date and Time of Study: 2024-05-21 09:47:10    ECG 12 Lead Rhythm Change [[id number]] Collected: 05/19/24 0107     Updated: 05/23/24 0939     QT  Interval 326 ms      QTC Interval 447 ms     Narrative:      HEART RATE= 113  bpm  RR Interval= 532  ms  SC Interval=   ms  P Horizontal Axis=   deg  P Front Axis=   deg  QRSD Interval= 91  ms  QT Interval= 326  ms  QTcB= 447  ms  QRS Axis= -45  deg  T Wave Axis= 113  deg  - ABNORMAL ECG -  Atrial fibrillation  Inferior infarct, acute (RCA)  Anterior infarct, old  When compared with ECG of 17-May-2024 3:11:25,  Significant change in rhythm: previously sinus  Electronically Signed By: Dimas Mendoza (JAMESON) 23-May-2024 09:39:32  Date and Time of Study: 2024-05-19 01:07:58          CBC    Results from last 7 days   Lab Units 05/23/24  0350 05/22/24 0352 05/21/24 0401 05/20/24 0342 05/19/24 0401 05/18/24  0321 05/17/24  0306   WBC 10*3/mm3 11.68* 11.49* 9.26 9.27 9.47 7.18 8.46   HEMOGLOBIN g/dL 9.5* 9.1* 9.4* 8.2* 8.9* 8.1* 8.1*   PLATELETS 10*3/mm3 422 394 332 293 254 182 152     BMP   Results from last 7 days   Lab Units 05/23/24 0350 05/22/24 0352 05/21/24 0401 05/20/24 0342 05/19/24  0401 05/18/24  0321 05/17/24  0306   SODIUM mmol/L 141 140 141 140 138 142 142   POTASSIUM mmol/L 4.9 4.0 4.0 4.1 4.1 3.9 4.4   CHLORIDE mmol/L 102 100 99 98 98 103 105   CO2 mmol/L 29.0 29.0 28.0 28.0 26.0 26.0 20.0*   BUN mg/dL 63* 71* 81* 81* 72* 65* 52*   CREATININE mg/dL 1.53* 1.77* 2.24* 2.82* 2.85* 2.86* 2.96*   GLUCOSE mg/dL 113* 114* 106* 110* 104* 82 129*   MAGNESIUM mg/dL 2.3 1.8 2.0 2.4 2.3 2.8*  --    PHOSPHORUS mg/dL 3.4 3.5 4.0 5.5* 5.5* 5.4*  --      CMP   Results from last 7 days   Lab Units 05/23/24  0350 05/22/24  0352 05/21/24  0401 05/20/24  0342 05/19/24  0401 05/18/24  0321 05/17/24  0943 05/17/24  0306   SODIUM mmol/L 141 140 141 140 138 142  --  142   POTASSIUM mmol/L 4.9 4.0 4.0 4.1 4.1 3.9  --  4.4   CHLORIDE mmol/L 102 100 99 98 98 103  --  105   CO2 mmol/L 29.0 29.0 28.0 28.0 26.0 26.0  --  20.0*   BUN mg/dL 63* 71* 81* 81* 72* 65*  --  52*   CREATININE mg/dL 1.53* 1.77* 2.24* 2.82* 2.85* 2.86*  --   2.96*   GLUCOSE mg/dL 113* 114* 106* 110* 104* 82  --  129*   ALBUMIN g/dL  --   --   --  3.1*  --  3.4* 3.1  --    BILIRUBIN mg/dL  --   --   --  0.3  --  0.3  --   --    ALK PHOS U/L  --   --   --  51  --  68  --   --    AST (SGOT) U/L  --   --   --  22  --  15  --   --    ALT (SGPT) U/L  --   --   --  <5  --  <5  --   --      Cardiac Studies:  Echo- Results for orders placed in visit on 05/15/24    Intra-Op Anesthesia DIGNA    Narrative  Intra-Op Anesthesia DIGNA    Procedure Performed: Intra-Op Anesthesia DIGNA  Start Time:  End Time:    Preanesthesia Checklist:  Patient identified, IV assessed, risks and benefits discussed, monitors and equipment assessed, procedure being performed at surgeon's request and anesthesia consent obtained.    General Procedure Information  DIGNA Placed for monitoring purposes only -- This is not a diagnostic DIGNA  Diagnostic Indications for Echo:  assessment of surgical repair and hemodynamic monitoring  Physician Requesting Echo: Jean Hirsch MD  Location performed:  OR  Intubated  Bite block placed  Heart visualized  Probe Insertion:  Easy  Probe Type:  Multiplane  Modalities:  Color flow mapping and continuous wave Doppler    Echocardiographic and Doppler Measurements    Ventricles    Right Ventricle:  Cavity size normal.  Global function normal.  Left Ventricle:  Cavity size normal.  Global Function normal.        Valves    Aortic Valve:  Annulus normal.  Stenosis not present.  Regurgitation trace.  Leaflets normal.  Leaflet motions normal.    Mitral Valve:  Annulus normal.  Stenosis not present.  Regurgitation trace.  Leaflets normal.  Leaflet motions normal.    Tricuspid Valve:  Annulus normal.  Stenosis not present.  Regurgitation absent.  Leaflets normal.  Leaflet motions normal.  Pulmonic Valve:  Annulus normal.  Stenosis not present.  Regurgitation absent.      Aorta    Ascending Aorta:  Size normal.  Dissection not present.  Mobile plaque not present.  Aortic Arch:  Size  normal.  Dissection not present.  Mobile plaque not present.  Descending Aorta:  Size normal.  Dissection not present.  Mobile plaque not present.      Atria    Right Atrium:  Size normal.  Spontaneous echo contrast not present.  Thrombus not present.  Tumor not present.  Device not present.    Left Atrium:  Size normal.  Spontaneous echo contrast not present.  Thrombus not present.  Tumor not present.  Device not present.  Left atrial appendage normal.      Septa        Ventricular Septum:  Intra-ventricular septum morphology normal.        Other Findings  Pericardium:  normal  Pleural Effusion:  none  Pulmonary Venous Flow:  normal    Anesthesia Information  Performed Personally  Anesthesiologist:  Kvng Pearson MD      Echocardiogram Comments:  Post bypass EF 55%. No change in valves. No air seen.    Stress Myoview-  Cath-      Medication Review:   Scheduled Meds:acetylcysteine, 2 mL, Nebulization, BID - RT  allopurinol, 100 mg, Oral, Daily  amiodarone, 400 mg, Oral, Q12H  aspirin, 81 mg, Oral, Daily  atorvastatin, 40 mg, Oral, Nightly  budesonide, 0.5 mg, Nebulization, BID - RT  bumetanide, 1 mg, Oral, BID  cephalexin, 500 mg, Oral, 4x Daily  citalopram, 20 mg, Oral, Daily  enoxaparin, 40 mg, Subcutaneous, Q24H  gabapentin, 100 mg, Oral, BID  guaiFENesin, 600 mg, Oral, Q12H  hydrALAZINE, 25 mg, Oral, BID  insulin lispro, 2-9 Units, Subcutaneous, 4x Daily AC & at Bedtime  ipratropium-albuterol, 3 mL, Nebulization, Q6H While Awake - RT  metoprolol tartrate, 25 mg, Oral, Q8H  montelukast, 10 mg, Oral, Daily  pantoprazole, 40 mg, Oral, Daily  potassium chloride, 20 mEq, Oral, TID With Meals  tamsulosin, 0.4 mg, Oral, Daily      Continuous Infusions:   PRN Meds:.  acetaminophen **OR** [DISCONTINUED] acetaminophen **OR** [DISCONTINUED] acetaminophen    bisacodyl    Calcium Replacement - Follow Nurse / BPA Driven Protocol    cyclobenzaprine    dextrose    dextrose    glucagon (human recombinant)     HYDROcodone-acetaminophen    ipratropium-albuterol    Magnesium Cardiology Dose Replacement - Follow Nurse / BPA Driven Protocol    [DISCONTINUED] Morphine **AND** naloxone    nitroglycerin    ondansetron    Phosphorus Replacement - Follow Nurse / BPA Driven Protocol    polyethylene glycol    Potassium Replacement - Follow Nurse / BPA Driven Protocol    senna-docusate sodium    simethicone      Assessment & Plan     ASCAD    MDM:    1.  CAD/CABG:    Patient done well after the surgery.  Patient is ready to be discharged from cardiac standpoint    2.  Hypertension:    Patient is on hydralazine and metoprolol current treatment would be continued    3.  Bilateral leg edema:    Continue Bumex 1 mg twice daily.    4.  Postoperative atrial fibrillation:    Patient is on amiodarone Eliquis can be started    5.  CKD:    Creatinine is stable patient follows with Dr. Childress    6.  Disposition:    Patient can be discharged from cardiac standpoint    Santiago Lorenzo MD  05/23/24  17:13 EDT

## 2024-05-23 NOTE — PLAN OF CARE
Goal Outcome Evaluation:         Patient completed overnight oximetry. WBC count 11.49 and increased to 11.68 for am draw. Patient on PO Keflex. Patient requiring 2L NC.sternal incision and leg incisions cleansed with hibiclens and painted with betadine.

## 2024-05-23 NOTE — PLAN OF CARE
Goal Outcome Evaluation:  Plan of Care Reviewed With: patient        Progress: improving  Outcome Evaluation: Patient discharged to [facility] this shift.  Report called and patient transported via wheelchair van by facility.

## 2024-05-25 LAB
QT INTERVAL: 424 MS
QTC INTERVAL: 428 MS

## 2024-05-26 LAB
QT INTERVAL: 422 MS
QTC INTERVAL: 430 MS

## 2024-05-28 ENCOUNTER — TELEPHONE (OUTPATIENT)
Dept: CARDIAC REHAB | Facility: HOSPITAL | Age: 69
End: 2024-05-28
Payer: MEDICARE

## 2024-05-28 NOTE — TELEPHONE ENCOUNTER
Cover sheet requests facility to contact patient to see if interested in cardiac rehab. Facesheet lists patient contact information and qualifying procedure for cardiac rehab program.

## 2024-05-30 LAB
QT INTERVAL: 338 MS
QT INTERVAL: 350 MS
QTC INTERVAL: 369 MS
QTC INTERVAL: 423 MS

## 2024-06-05 ENCOUNTER — OFFICE VISIT (OUTPATIENT)
Dept: CARDIAC SURGERY | Facility: CLINIC | Age: 69
End: 2024-06-05
Payer: MEDICARE

## 2024-06-05 ENCOUNTER — LAB (OUTPATIENT)
Dept: LAB | Facility: HOSPITAL | Age: 69
End: 2024-06-05
Payer: MEDICARE

## 2024-06-05 VITALS
HEART RATE: 70 BPM | WEIGHT: 200.8 LBS | BODY MASS INDEX: 32.27 KG/M2 | SYSTOLIC BLOOD PRESSURE: 132 MMHG | DIASTOLIC BLOOD PRESSURE: 90 MMHG | HEIGHT: 66 IN | OXYGEN SATURATION: 96 % | TEMPERATURE: 97.3 F | RESPIRATION RATE: 20 BRPM

## 2024-06-05 DIAGNOSIS — N18.31 STAGE 3A CHRONIC KIDNEY DISEASE: Primary | Chronic | ICD-10-CM

## 2024-06-05 DIAGNOSIS — N18.31 STAGE 3A CHRONIC KIDNEY DISEASE: Chronic | ICD-10-CM

## 2024-06-05 DIAGNOSIS — Z95.1 S/P CABG X 4: ICD-10-CM

## 2024-06-05 LAB
ANION GAP SERPL CALCULATED.3IONS-SCNC: 10.5 MMOL/L (ref 5–15)
BUN SERPL-MCNC: 24 MG/DL (ref 8–23)
BUN/CREAT SERPL: 22.9 (ref 7–25)
CALCIUM SPEC-SCNC: 9.5 MG/DL (ref 8.6–10.5)
CHLORIDE SERPL-SCNC: 107 MMOL/L (ref 98–107)
CO2 SERPL-SCNC: 25.5 MMOL/L (ref 22–29)
CREAT SERPL-MCNC: 1.05 MG/DL (ref 0.76–1.27)
EGFRCR SERPLBLD CKD-EPI 2021: 76.4 ML/MIN/1.73
GLUCOSE SERPL-MCNC: 75 MG/DL (ref 65–99)
POTASSIUM SERPL-SCNC: 4.4 MMOL/L (ref 3.5–5.2)
SODIUM SERPL-SCNC: 143 MMOL/L (ref 136–145)

## 2024-06-05 PROCEDURE — 3080F DIAST BP >= 90 MM HG: CPT | Performed by: NURSE PRACTITIONER

## 2024-06-05 PROCEDURE — 3075F SYST BP GE 130 - 139MM HG: CPT | Performed by: NURSE PRACTITIONER

## 2024-06-05 PROCEDURE — 1159F MED LIST DOCD IN RCRD: CPT | Performed by: NURSE PRACTITIONER

## 2024-06-05 PROCEDURE — 99024 POSTOP FOLLOW-UP VISIT: CPT | Performed by: NURSE PRACTITIONER

## 2024-06-05 PROCEDURE — 80048 BASIC METABOLIC PNL TOTAL CA: CPT

## 2024-06-05 PROCEDURE — 1160F RVW MEDS BY RX/DR IN RCRD: CPT | Performed by: NURSE PRACTITIONER

## 2024-06-05 PROCEDURE — 36415 COLL VENOUS BLD VENIPUNCTURE: CPT

## 2024-06-05 RX ORDER — SODIUM BICARBONATE 650 MG/1
130 TABLET ORAL 2 TIMES DAILY
COMMUNITY

## 2024-06-05 RX ORDER — ALBUTEROL SULFATE 90 UG/1
2 AEROSOL, METERED RESPIRATORY (INHALATION) EVERY 4 HOURS PRN
COMMUNITY

## 2024-06-06 ENCOUNTER — TELEPHONE (OUTPATIENT)
Dept: CARDIAC SURGERY | Facility: CLINIC | Age: 69
End: 2024-06-06
Payer: MEDICARE

## 2024-06-06 PROBLEM — Z95.1 S/P CABG X 4: Status: ACTIVE | Noted: 2024-06-06

## 2024-06-06 NOTE — TELEPHONE ENCOUNTER
----- Message from Suzy Griffith sent at 6/6/2024  7:59 AM EDT -----  Please let pt known to increase Bumex to bid for 5 days

## 2024-06-06 NOTE — TELEPHONE ENCOUNTER
Spoke with Mr. Cuenca, advised of lab results and to increase Bumetanide to 1 pill twice a  day for 5 days. He did verify he is taking potassium advised him to continue potassium no changes on how he takes it. He verbalized understanding and this was agreeable.

## 2024-06-06 NOTE — PROGRESS NOTES
"CARDIOVASCULAR SURGERY FOLLOW-UP PROGRESS NOTE  Chief Complaint: Post-op Follow Up        HPI:   Dear Dr. Salinas, Camryn GOLD MD and colleagues:    It was nice to see Fransico Cuenca in follow up today after cardiac surgery.  As you know, he is a 69 y.o. male with severe MV CAD, NSTEMI in April 2024, HTN, HLD, CKD stage III, anxiety, ZHANG with CPAP compliance, and chronic HF who underwent elective CABG x 4 with LIMA to mid LAD, SVG to diagonal, sequential SVG to OM1 and OM2 at [facility] by Dr. Hirsch on 5/15/2024.   His postop course was complicated by acute respiratory failure, EN/ARF, anemia requiring transfusion, and postop atrial fibrillation.  All of these issues were resolved or resolved by discharge to [facility].  He comes in today complaining of dry mouth which is not uncommon for him.  He is very pleased with the progress he has made and very happy to be home and out of rehab.  His activity level has been good. He has not seen cardiology but has an appointment on 7/2/2024.  He has not started cardiac rehab.  He remains on Eliquis.  His surgical incisions are healing well.  He did have a stitch in his LL ET that was easily removed.  He has not had any follow-up with Dr. Childress yet.  His sister is with him for his appointment today.    Physical Exam:         /90 (BP Location: Right arm, Patient Position: Sitting, Cuff Size: Adult)   Pulse 70   Temp 97.3 °F (36.3 °C)   Resp 20   Ht 167.6 cm (66\")   Wt 91.1 kg (200 lb 12.8 oz)   SpO2 96%   BMI 32.41 kg/m²   Heart:  regular rate and rhythm  Lungs:  clear to auscultation bilaterally  Extremities:  1+ and pitting lower extremity edema bilaterally  Incision(s):  mid chest healing well, left leg healing well, sternum stable    Assessment/Plan:     S/P elective CABG x 4 with LIMA to mid LAD, SVG to diagonal, sequential SVG to OM1 and OM2 . Overall, he is doing well.  I have ordered a BMP for today to evaluate his kidney function.  His surgical incisions are " healing well.  He is asking if he can start driving short distances.  He is not taking narcotic pain prescriptions.  I told him he could start driving short distances with someone in the car with him.  He is aware that he needs to get an appointment to follow-up with Dr. Childress.    Post-op atrial fibrillation--in sinus, on Eliquis  Post-op blood loss anemia s/p blood transfusion  Post-op respiratory insufficiency--resolved  Post-op EN on CKD stage 3--resolved    Keep incisions clean and dry  OK to drive if not taking narcotic pain medicine  OK to begin cardiac rehab  Follow-up as scheduled with cardiology  Follow-up as scheduled with nephrology  Follow-up as scheduled with PCP  Follow-up with CT surgery prn    Continue lifting restriction of 10 lbs until 6 weeks and 50 lbs until 12 weeks from the date of surgery, no excessive jarring motions or twisting motions until 12 weeks from the date of surgery.    Addendum:  BMP reviewed.  Creatinine 1.05.  Increased Bumex to twice daily for 5 days and then return to daily dosing.  Continue potassium as scheduled.    Thank you for allowing me to participate in the care of your patient.    Regards,  Suzy Griffith, APRN

## 2024-06-14 ENCOUNTER — CLINICAL SUPPORT (OUTPATIENT)
Dept: FAMILY MEDICINE CLINIC | Facility: CLINIC | Age: 69
End: 2024-06-14
Payer: MEDICARE

## 2024-06-14 DIAGNOSIS — J30.1 SEASONAL ALLERGIC RHINITIS DUE TO POLLEN: Primary | ICD-10-CM

## 2024-06-14 PROCEDURE — 95117 IMMUNOTHERAPY INJECTIONS: CPT | Performed by: FAMILY MEDICINE

## 2024-06-14 NOTE — PROGRESS NOTES
Allergy Injection only    Vial BTGW Given:RT     Vial AMCD Given:LT    Patient tolerated well no reaction.

## 2024-06-17 RX ORDER — FENOFIBRATE 160 MG/1
TABLET ORAL
Qty: 90 TABLET | Refills: 0 | OUTPATIENT
Start: 2024-06-17

## 2024-06-24 NOTE — PROGRESS NOTES
Chief Complaint  Diabetes, Hypertension, and Hyperlipidemia    Subjective     CC  Problem List  Visit Diagnosis   Encounters  Notes  Medications  Labs  Result Review Imaging  Media    Fransico Cuenca presents to [facility] for   History of Present Illness  Fransico was seen at [facility] . He was admitted on 05/15/2024  for CABG x 4. He was discharged on 05/23/2024. Discharge diagnosis was CABG x 4 with LIMA. Labs that were performed during the encounter included: CBC-WBC:11.68,RBC:3.63,Hemoglobin: 9.5, Hematocrit:31.2, BMP-Glucose:113, BUN:63,Creatinine:1.53, BUN/Creatinie Ratio:41.2,eGFR: 48.6,Magnesium-2.3, and Phosphorus:3.4. Diagnostic studies that were performed included: Chest x-ray-Bibasilar areas of density that could like atelectasis and effusions, and fullness to the mediastinal shadow unchanged that could be postsurgical and suggested since recent surgery , CT Scan-Small bilateral pleural effusions, near complete right middle lobe atelectasis, partial bilateral lower lobe atelectasis, postoperative changes from recent surgery CABG, and KUB: Gas distended colon, no bowel obstruction, U/S Renal bilateral: left renal cyst up to 1.6 cm, No hydronephrosis. Currently Fransico receives care at home. Complications from the hospital stay include cardiac. The patient stated that they do need help with their daily life and activities. The patient stated that they do have emotional support at home.   Diabetes  He presents for his follow-up diabetic visit. He has type 2 diabetes mellitus. His disease course has been stable. There are no hypoglycemic associated symptoms. Pertinent negatives for hypoglycemia include no confusion, dizziness, headaches, nervousness/anxiousness or sweats. Pertinent negatives for diabetes include no blurred vision, no chest pain, no fatigue, no polyuria, no visual change, no weakness and no weight loss. There are no hypoglycemic complications.  Symptoms are stable. There are no diabetic complications. Risk factors for coronary artery disease include diabetes mellitus, dyslipidemia, hypertension, male sex, obesity and family history. Current diabetic treatment includes oral agent (monotherapy) (Metformin). He is compliant with treatment most of the time. His weight is fluctuating minimally. He is following a generally healthy diet. Meal planning includes avoidance of concentrated sweets. He has not had a previous visit with a dietitian. He participates in exercise weekly. (Patient reports he does not check blood sugars at home) An ACE inhibitor/angiotensin II receptor blocker is being taken. He does not see a podiatrist.Eye exam is current ([facility] in [place]).   Hypertension  This is a chronic problem. The current episode started more than 1 year ago. The problem has been gradually improving since onset. The problem is controlled. Pertinent negatives include no anxiety, blurred vision, chest pain, headaches, malaise/fatigue, palpitations, shortness of breath or sweats. There are no associated agents to hypertension. Risk factors for coronary artery disease include diabetes mellitus, family history, obesity, male gender, stress and dyslipidemia. Past treatments include ACE inhibitors. Current antihypertension treatment includes ACE inhibitors and diuretics. The current treatment provides mild improvement. There are no compliance problems.  Hypertensive end-organ damage includes kidney disease and CAD/MI. There is no history of chronic renal disease.   Hyperlipidemia  This is a chronic problem. The current episode started more than 1 year ago. The problem is controlled. Recent lipid tests were reviewed and are variable. Exacerbating diseases include diabetes and obesity. He has no history of chronic renal disease, hypothyroidism, liver disease or nephrotic syndrome. Factors aggravating his hyperlipidemia include fatty foods. Pertinent negatives  "include no chest pain, focal sensory loss, focal weakness, leg pain, myalgias or shortness of breath. Current antihyperlipidemic treatment includes herbal therapy. The current treatment provides mild improvement of lipids. There are no compliance problems.  Risk factors for coronary artery disease include family history, male sex, hypertension, diabetes mellitus, dyslipidemia and obesity.       Review of Systems   Constitutional:  Negative for appetite change, fatigue, malaise/fatigue, unexpected weight gain and unexpected weight loss.   Eyes:  Negative for blurred vision.   Respiratory:  Negative for shortness of breath.    Cardiovascular:  Positive for leg swelling (mild). Negative for chest pain and palpitations.   Gastrointestinal:  Negative for abdominal pain, constipation, diarrhea, nausea and vomiting.   Endocrine: Negative for polyuria.   Genitourinary:  Negative for dysuria.   Musculoskeletal:  Negative for arthralgias and myalgias.   Skin:  Negative for rash.   Neurological:  Negative for dizziness, focal weakness, weakness, numbness and confusion.   Hematological:  Negative for adenopathy. Does not bruise/bleed easily.   Psychiatric/Behavioral:  The patient is not nervous/anxious.         Objective   Vital Signs:   /82 (BP Location: Right arm, Patient Position: Sitting, Cuff Size: Adult)   Pulse 75   Temp 97.8 °F (36.6 °C) (Temporal)   Resp 18   Ht 167.6 cm (66\")   Wt 93.7 kg (206 lb 8 oz)   SpO2 92% Comment: room air  BMI 33.33 kg/m²     Physical Exam  Constitutional:       General: He is not in acute distress.  Cardiovascular:      Rate and Rhythm: Normal rate and regular rhythm.      Heart sounds: No murmur heard.  Pulmonary:      Effort: Pulmonary effort is normal.      Breath sounds: Normal breath sounds. No wheezing.   Musculoskeletal:      Cervical back: Neck supple.      Right lower leg: No edema.      Left lower leg: No edema.      Comments: Negative Nya's   Lymphadenopathy:      " Cervical: No cervical adenopathy.   Skin:     Findings: No rash.   Neurological:      Mental Status: He is alert.        Result Review :Labs  Result Review  Imaging  Med Tab  Media                 Assessment and Plan CC Problem List  Visit Diagnosis  ROS  Review (Popup)  Health Maintenance  Quality  BestPractice  Medications  SmartSets  SnapShot Encounters  Media  Problem List Items Addressed This Visit          High    ASCAD (Chronic)    Overview     A. Two-vessel [facility] 4/23/2024                  I.  20% LM                 Ii.  95% pLAD with 80%mLAD                III.  90% LCx                            1. 60 to 70% proximal ramus  B.  Being worked up for CABG by CV surgery  C. Preserved EF 51 to 55% w/o DD  Bj  No sxs         Relevant Medications    metoprolol tartrate (LOPRESSOR) 25 MG tablet    clopidogrel (PLAVIX) 75 MG tablet    CKD (chronic kidney disease) stage 3a, GFR 30-59 ml/min (Chronic)    Overview     1.07 GFR 74 06/25/2024 resolved after CABG         Relevant Medications    sodium bicarbonate 650 MG tablet    Hypertension, benign    Overview     Controlled compliant continue meds.          Current Assessment & Plan     Hypertension is stable and controlled  Continue current treatment regimen.  Blood pressure will be reassessed in 6 months.         Relevant Medications    metoprolol tartrate (LOPRESSOR) 25 MG tablet    Type 2 diabetes mellitus without complication, without long-term current use of insulin - Primary    Overview     A12c 5.6 06/26/2024 improved.   A1c 6.2 03/12/2024 improved with lifestyle changes.   A1c 6.8 12.12.2023 worse less exercise.   A1c 6.1 09/11/2023 improved.   A1c 6.3 05/26/2026 stable he reports BS of 150  A1c 6.3 02/24/2023 slightly worse. But controlled.   A1c 5.9 11/08/2022 improved  A1c 7.8 05/03/2022 marginal control Actos begin for insurance  A1c 7.1 01/24/2022 [id number] 7.5  07/13/2021   A1c 7.5 12/21/2020 stable,    A1c 6.2 08/20/2019,   New onset  04/29/2019, A1c 7.2             Current Assessment & Plan     Diabetes is improving with treatment.   Continue current treatment regimen.  Diabetes will be reassessed in 3 months         Relevant Medications    metFORMIN (GLUCOPHAGE) 1000 MG tablet    Other Relevant Orders    POC Glycosylated Hemoglobin (Hb A1C) (Completed)    POCT urinalysis dipstick, manual (Completed)    History of non-ST elevation myocardial infarction (NSTEMI)    Overview     04/17/2024, s/P CABG 06/2024 he is doing well. Good support network, compliant with meds. No sxs. Risk factors modified. Increase exercise and allowed and tolerated.          S/P CABG x 4 with JOHNSON per Dr. Hirsch on 5/15/2024    Paroxysmal atrial fibrillation    Overview     During MI and CABG sinus by exam, continue meds and cardiology follow         Relevant Medications    metoprolol tartrate (LOPRESSOR) 25 MG tablet    clopidogrel (PLAVIX) 75 MG tablet    apixaban (ELIQUIS) 5 MG tablet tablet    amiodarone (PACERONE) 200 MG tablet       Medium    Mixed hyperlipidemia    Overview     Stable, compliant LDL 80         Current Assessment & Plan      Lipid abnormalities are stable    Plan:  Continue same medication/s without change.      Discussed medication dosage, use, side effects, and goals of treatment in detail.    Counseled patient on lifestyle modifications to help control hyperlipidemia.     Patient Treatment Goals:   LDL goal is less than 70    Followup in 3 months.         Relevant Medications    atorvastatin (LIPITOR) 40 MG tablet    Allergic rhinitis due to pollen    Overview     Sxs fairly well controlled. continue Immuno Rx.           Relevant Medications    montelukast (SINGULAIR) 10 MG tablet    albuterol sulfate  (90 Base) MCG/ACT inhaler       Low    Anxiety    Overview     Stable on meds which are continued. Pros and cons of Rx doscussed         Relevant Medications    clopidogrel (PLAVIX) 75 MG tablet    Class 2 severe obesity due to excess  calories with serious comorbidity and body mass index (BMI) of 35.0 to 35.9 in adult    Overview     Diet and organized wt loss encouraged         Current Assessment & Plan     Patient's (Body mass index is 33.33 kg/m².) indicates that they are obese (BMI >30) with health conditions that include obstructive sleep apnea, hypertension, diabetes mellitus, and dyslipidemias . Weight is unchanged. BMI  is above average; BMI management plan is completed. We discussed portion control and increasing exercise.          Benign prostatic hyperplasia with urinary frequency    Relevant Medications    tamsulosin (FLOMAX) 0.4 MG capsule 24 hr capsule       Unprioritized    Iron deficiency anemia due to chronic blood loss    Overview     Hgb 8.2 post op, 8.7 06/25/2024         Relevant Medications    ferrous sulfate 325 (65 FE) MG tablet    Bilateral leg edema    Overview     Small blistering pretibially         Idiopathic chronic gout of multiple sites without tophus    Overview     Uric acid 8.3 06/25/2024 no sxs on allopurinol         Relevant Medications    allopurinol (ZYLOPRIM) 100 MG tablet     Other Visit Diagnoses       Vitamin D deficiency        Relevant Medications    cholecalciferol (Vitamin D) 25 MCG (1000 UT) tablet    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  Patient was given instructions and counseling regarding his condition or for health maintenance advice. Please see specific information pulled into the AVS if appropriate.

## 2024-06-25 ENCOUNTER — LAB (OUTPATIENT)
Dept: LAB | Facility: HOSPITAL | Age: 69
End: 2024-06-25
Payer: MEDICARE

## 2024-06-25 ENCOUNTER — TRANSCRIBE ORDERS (OUTPATIENT)
Dept: ADMINISTRATIVE | Facility: HOSPITAL | Age: 69
End: 2024-06-25
Payer: MEDICARE

## 2024-06-25 DIAGNOSIS — N18.30 STAGE 3 CHRONIC KIDNEY DISEASE, UNSPECIFIED WHETHER STAGE 3A OR 3B CKD: ICD-10-CM

## 2024-06-25 DIAGNOSIS — E11.8 DIABETIC COMPLICATION: ICD-10-CM

## 2024-06-25 DIAGNOSIS — N18.31 CHRONIC KIDNEY DISEASE (CKD) STAGE G3A/A1, MODERATELY DECREASED GLOMERULAR FILTRATION RATE (GFR) BETWEEN 45-59 ML/MIN/1.73 SQUARE METER AND ALBUMINURIA CREATININE RATIO LESS THAN 30 MG/G (CMS/H*: ICD-10-CM

## 2024-06-25 DIAGNOSIS — N18.31 CHRONIC KIDNEY DISEASE (CKD) STAGE G3A/A1, MODERATELY DECREASED GLOMERULAR FILTRATION RATE (GFR) BETWEEN 45-59 ML/MIN/1.73 SQUARE METER AND ALBUMINURIA CREATININE RATIO LESS THAN 30 MG/G (CMS/H*: Primary | ICD-10-CM

## 2024-06-25 LAB
25(OH)D3 SERPL-MCNC: 28.6 NG/ML (ref 30–100)
ALBUMIN SERPL-MCNC: 3.7 G/DL (ref 3.5–5.2)
ALBUMIN/GLOB SERPL: 1.3 G/DL
ALP SERPL-CCNC: 87 U/L (ref 39–117)
ALT SERPL W P-5'-P-CCNC: 15 U/L (ref 1–41)
ANION GAP SERPL CALCULATED.3IONS-SCNC: 10 MMOL/L (ref 5–15)
AST SERPL-CCNC: 12 U/L (ref 1–40)
BACTERIA UR QL AUTO: ABNORMAL /HPF
BASOPHILS # BLD AUTO: 0.04 10*3/MM3 (ref 0–0.2)
BASOPHILS NFR BLD AUTO: 0.7 % (ref 0–1.5)
BILIRUB SERPL-MCNC: 0.3 MG/DL (ref 0–1.2)
BILIRUB UR QL STRIP: NEGATIVE
BUN SERPL-MCNC: 20 MG/DL (ref 8–23)
BUN/CREAT SERPL: 18.7 (ref 7–25)
CALCIUM SPEC-SCNC: 9 MG/DL (ref 8.6–10.5)
CHLORIDE SERPL-SCNC: 106 MMOL/L (ref 98–107)
CK SERPL-CCNC: 51 U/L (ref 20–200)
CLARITY UR: CLEAR
CO2 SERPL-SCNC: 27 MMOL/L (ref 22–29)
COLOR UR: YELLOW
CREAT SERPL-MCNC: 1.07 MG/DL (ref 0.76–1.27)
CREAT UR-MCNC: 87.1 MG/DL
DEPRECATED RDW RBC AUTO: 45.1 FL (ref 37–54)
EGFRCR SERPLBLD CKD-EPI 2021: 74.7 ML/MIN/1.73
EOSINOPHIL # BLD AUTO: 0.22 10*3/MM3 (ref 0–0.4)
EOSINOPHIL NFR BLD AUTO: 3.7 % (ref 0.3–6.2)
ERYTHROCYTE [DISTWIDTH] IN BLOOD BY AUTOMATED COUNT: 14.8 % (ref 12.3–15.4)
GLOBULIN UR ELPH-MCNC: 2.9 GM/DL
GLUCOSE SERPL-MCNC: 87 MG/DL (ref 65–99)
GLUCOSE UR STRIP-MCNC: NEGATIVE MG/DL
HBA1C MFR BLD: 5.6 % (ref 4.8–5.6)
HCT VFR BLD AUTO: 27.7 % (ref 37.5–51)
HGB BLD-MCNC: 8.7 G/DL (ref 13–17.7)
HGB UR QL STRIP.AUTO: ABNORMAL
HYALINE CASTS UR QL AUTO: ABNORMAL /LPF
IMM GRANULOCYTES # BLD AUTO: 0.01 10*3/MM3 (ref 0–0.05)
IMM GRANULOCYTES NFR BLD AUTO: 0.2 % (ref 0–0.5)
KETONES UR QL STRIP: NEGATIVE
LEUKOCYTE ESTERASE UR QL STRIP.AUTO: NEGATIVE
LYMPHOCYTES # BLD AUTO: 0.62 10*3/MM3 (ref 0.7–3.1)
LYMPHOCYTES NFR BLD AUTO: 10.5 % (ref 19.6–45.3)
MAGNESIUM SERPL-MCNC: 1.7 MG/DL (ref 1.6–2.4)
MCH RBC QN AUTO: 26.4 PG (ref 26.6–33)
MCHC RBC AUTO-ENTMCNC: 31.4 G/DL (ref 31.5–35.7)
MCV RBC AUTO: 83.9 FL (ref 79–97)
MONOCYTES # BLD AUTO: 0.51 10*3/MM3 (ref 0.1–0.9)
MONOCYTES NFR BLD AUTO: 8.7 % (ref 5–12)
NEUTROPHILS NFR BLD AUTO: 4.49 10*3/MM3 (ref 1.7–7)
NEUTROPHILS NFR BLD AUTO: 76.2 % (ref 42.7–76)
NITRITE UR QL STRIP: NEGATIVE
NRBC BLD AUTO-RTO: 0 /100 WBC (ref 0–0.2)
PH UR STRIP.AUTO: 8.5 [PH] (ref 5–8)
PHOSPHATE SERPL-MCNC: 3.3 MG/DL (ref 2.5–4.5)
PLATELET # BLD AUTO: 272 10*3/MM3 (ref 140–450)
PMV BLD AUTO: 10.1 FL (ref 6–12)
POTASSIUM SERPL-SCNC: 4.2 MMOL/L (ref 3.5–5.2)
PROT ?TM UR-MCNC: 49.1 MG/DL
PROT SERPL-MCNC: 6.6 G/DL (ref 6–8.5)
PROT UR QL STRIP: ABNORMAL
PROT/CREAT UR: 563.7 MG/G CREA (ref 0–200)
PTH-INTACT SERPL-MCNC: 73.3 PG/ML (ref 15–65)
RBC # BLD AUTO: 3.3 10*6/MM3 (ref 4.14–5.8)
RBC # UR STRIP: ABNORMAL /HPF
REF LAB TEST METHOD: ABNORMAL
SODIUM SERPL-SCNC: 143 MMOL/L (ref 136–145)
SP GR UR STRIP: 1.02 (ref 1–1.03)
SQUAMOUS #/AREA URNS HPF: ABNORMAL /HPF
URATE SERPL-MCNC: 8.3 MG/DL (ref 3.4–7)
UROBILINOGEN UR QL STRIP: ABNORMAL
WBC # UR STRIP: ABNORMAL /HPF
WBC NRBC COR # BLD AUTO: 5.89 10*3/MM3 (ref 3.4–10.8)

## 2024-06-25 PROCEDURE — 81001 URINALYSIS AUTO W/SCOPE: CPT

## 2024-06-25 PROCEDURE — 84100 ASSAY OF PHOSPHORUS: CPT

## 2024-06-25 PROCEDURE — 82306 VITAMIN D 25 HYDROXY: CPT

## 2024-06-25 PROCEDURE — 82570 ASSAY OF URINE CREATININE: CPT

## 2024-06-25 PROCEDURE — 82550 ASSAY OF CK (CPK): CPT

## 2024-06-25 PROCEDURE — 85025 COMPLETE CBC W/AUTO DIFF WBC: CPT

## 2024-06-25 PROCEDURE — 83970 ASSAY OF PARATHORMONE: CPT

## 2024-06-25 PROCEDURE — 84550 ASSAY OF BLOOD/URIC ACID: CPT

## 2024-06-25 PROCEDURE — 36415 COLL VENOUS BLD VENIPUNCTURE: CPT

## 2024-06-25 PROCEDURE — 80053 COMPREHEN METABOLIC PANEL: CPT

## 2024-06-25 PROCEDURE — 83036 HEMOGLOBIN GLYCOSYLATED A1C: CPT

## 2024-06-25 PROCEDURE — 83735 ASSAY OF MAGNESIUM: CPT

## 2024-06-25 PROCEDURE — 84156 ASSAY OF PROTEIN URINE: CPT

## 2024-06-26 ENCOUNTER — OFFICE VISIT (OUTPATIENT)
Dept: FAMILY MEDICINE CLINIC | Facility: CLINIC | Age: 69
End: 2024-06-26
Payer: MEDICARE

## 2024-06-26 VITALS
BODY MASS INDEX: 33.19 KG/M2 | HEART RATE: 75 BPM | DIASTOLIC BLOOD PRESSURE: 82 MMHG | WEIGHT: 206.5 LBS | HEIGHT: 66 IN | RESPIRATION RATE: 18 BRPM | TEMPERATURE: 97.8 F | OXYGEN SATURATION: 92 % | SYSTOLIC BLOOD PRESSURE: 140 MMHG

## 2024-06-26 DIAGNOSIS — I10 HYPERTENSION, BENIGN: ICD-10-CM

## 2024-06-26 DIAGNOSIS — T56.0X1D LEAD-INDUCED CHRONIC GOUT OF MULTIPLE SITES WITHOUT TOPHUS, SUBSEQUENT ENCOUNTER: ICD-10-CM

## 2024-06-26 DIAGNOSIS — M1A.19X0 LEAD-INDUCED CHRONIC GOUT OF MULTIPLE SITES WITHOUT TOPHUS, SUBSEQUENT ENCOUNTER: ICD-10-CM

## 2024-06-26 DIAGNOSIS — F41.9 ANXIETY: ICD-10-CM

## 2024-06-26 DIAGNOSIS — N18.31 STAGE 3A CHRONIC KIDNEY DISEASE: Chronic | ICD-10-CM

## 2024-06-26 DIAGNOSIS — J30.1 SEASONAL ALLERGIC RHINITIS DUE TO POLLEN: ICD-10-CM

## 2024-06-26 DIAGNOSIS — I25.10 CAD, MULTIPLE VESSEL: Chronic | ICD-10-CM

## 2024-06-26 DIAGNOSIS — E11.9 TYPE 2 DIABETES MELLITUS WITHOUT COMPLICATION, WITHOUT LONG-TERM CURRENT USE OF INSULIN: Primary | ICD-10-CM

## 2024-06-26 DIAGNOSIS — M1A.09X0 IDIOPATHIC CHRONIC GOUT OF MULTIPLE SITES WITHOUT TOPHUS: ICD-10-CM

## 2024-06-26 DIAGNOSIS — R35.0 BENIGN PROSTATIC HYPERPLASIA WITH URINARY FREQUENCY: ICD-10-CM

## 2024-06-26 DIAGNOSIS — I48.0 PAROXYSMAL ATRIAL FIBRILLATION: ICD-10-CM

## 2024-06-26 DIAGNOSIS — N40.1 BENIGN PROSTATIC HYPERPLASIA WITH URINARY FREQUENCY: ICD-10-CM

## 2024-06-26 DIAGNOSIS — E66.01 CLASS 2 SEVERE OBESITY DUE TO EXCESS CALORIES WITH SERIOUS COMORBIDITY AND BODY MASS INDEX (BMI) OF 35.0 TO 35.9 IN ADULT: ICD-10-CM

## 2024-06-26 DIAGNOSIS — R60.0 BILATERAL LEG EDEMA: ICD-10-CM

## 2024-06-26 DIAGNOSIS — Z95.1 S/P CABG X 4: ICD-10-CM

## 2024-06-26 DIAGNOSIS — D50.0 IRON DEFICIENCY ANEMIA DUE TO CHRONIC BLOOD LOSS: ICD-10-CM

## 2024-06-26 DIAGNOSIS — E55.9 VITAMIN D DEFICIENCY: ICD-10-CM

## 2024-06-26 DIAGNOSIS — R35.0 URINARY FREQUENCY: ICD-10-CM

## 2024-06-26 DIAGNOSIS — I25.2 HISTORY OF NON-ST ELEVATION MYOCARDIAL INFARCTION (NSTEMI): ICD-10-CM

## 2024-06-26 DIAGNOSIS — E78.2 MIXED HYPERLIPIDEMIA: ICD-10-CM

## 2024-06-26 PROBLEM — R79.89 ELEVATED PTHRP LEVEL: Status: ACTIVE | Noted: 2024-06-26

## 2024-06-26 PROBLEM — R06.09 DOE (DYSPNEA ON EXERTION): Chronic | Status: RESOLVED | Noted: 2024-05-03 | Resolved: 2024-06-26

## 2024-06-26 LAB
BILIRUB BLD-MCNC: NEGATIVE MG/DL
CLARITY, POC: CLEAR
COLOR UR: YELLOW
EXPIRATION DATE: NORMAL
GLUCOSE UR STRIP-MCNC: NEGATIVE MG/DL
HBA1C MFR BLD: 5.7 % (ref 4.5–5.7)
KETONES UR QL: NEGATIVE
LEUKOCYTE EST, POC: NEGATIVE
Lab: NORMAL
NITRITE UR-MCNC: NEGATIVE MG/ML
PH UR: 5 [PH] (ref 5–8)
PROT UR STRIP-MCNC: ABNORMAL MG/DL
RBC # UR STRIP: ABNORMAL /UL
SP GR UR: 1.01 (ref 1–1.03)
UROBILINOGEN UR QL: NORMAL

## 2024-06-26 RX ORDER — ALBUTEROL SULFATE 90 UG/1
2 AEROSOL, METERED RESPIRATORY (INHALATION) EVERY 4 HOURS PRN
Qty: 18 G | Refills: 12 | Status: SHIPPED | OUTPATIENT
Start: 2024-06-26

## 2024-06-26 RX ORDER — MONTELUKAST SODIUM 10 MG/1
10 TABLET ORAL DAILY
Qty: 90 TABLET | Refills: 3 | Status: SHIPPED | OUTPATIENT
Start: 2024-06-26

## 2024-06-26 RX ORDER — ALLOPURINOL 100 MG/1
100 TABLET ORAL DAILY
Qty: 90 TABLET | Refills: 3 | Status: SHIPPED | OUTPATIENT
Start: 2024-06-26

## 2024-06-26 RX ORDER — FERROUS SULFATE 325(65) MG
325 TABLET ORAL
Qty: 90 TABLET | Refills: 3 | Status: SHIPPED | OUTPATIENT
Start: 2024-06-26

## 2024-06-26 RX ORDER — BUMETANIDE 1 MG/1
1 TABLET ORAL DAILY
Qty: 90 TABLET | Refills: 3 | Status: SHIPPED | OUTPATIENT
Start: 2024-06-26 | End: 2024-07-02 | Stop reason: SDUPTHER

## 2024-06-26 RX ORDER — MELATONIN
1000 DAILY
Qty: 30 TABLET | Refills: 12 | Status: SHIPPED | OUTPATIENT
Start: 2024-06-26

## 2024-06-26 RX ORDER — CITALOPRAM 20 MG/1
20 TABLET ORAL DAILY
Qty: 90 TABLET | Refills: 3 | Status: SHIPPED | OUTPATIENT
Start: 2024-06-26

## 2024-06-26 RX ORDER — SODIUM BICARBONATE 650 MG/1
650 TABLET ORAL 2 TIMES DAILY
Qty: 180 TABLET | Refills: 3 | Status: SHIPPED | OUTPATIENT
Start: 2024-06-26

## 2024-06-26 RX ORDER — ALLOPURINOL 100 MG/1
100 TABLET ORAL DAILY
COMMUNITY
End: 2024-06-26 | Stop reason: SDUPTHER

## 2024-06-26 RX ORDER — TAMSULOSIN HYDROCHLORIDE 0.4 MG/1
1 CAPSULE ORAL DAILY
COMMUNITY
End: 2024-06-26 | Stop reason: SDUPTHER

## 2024-06-26 RX ORDER — ATORVASTATIN CALCIUM 40 MG/1
40 TABLET, FILM COATED ORAL NIGHTLY
Qty: 90 TABLET | Refills: 3 | Status: SHIPPED | OUTPATIENT
Start: 2024-06-26 | End: 2025-06-21

## 2024-06-26 RX ORDER — AMIODARONE HYDROCHLORIDE 200 MG/1
400 TABLET ORAL DAILY
Qty: 90 TABLET | Refills: 3 | Status: SHIPPED | OUTPATIENT
Start: 2024-06-26

## 2024-06-26 RX ORDER — CLOPIDOGREL BISULFATE 75 MG/1
75 TABLET ORAL DAILY
Qty: 90 TABLET | Refills: 3 | Status: SHIPPED | OUTPATIENT
Start: 2024-06-26 | End: 2025-06-21

## 2024-06-26 RX ORDER — AMIODARONE HYDROCHLORIDE 200 MG/1
400 TABLET ORAL DAILY
COMMUNITY
End: 2024-06-26 | Stop reason: SDUPTHER

## 2024-06-26 RX ORDER — TAMSULOSIN HYDROCHLORIDE 0.4 MG/1
1 CAPSULE ORAL DAILY
Qty: 90 CAPSULE | Refills: 3 | Status: SHIPPED | OUTPATIENT
Start: 2024-06-26

## 2024-06-26 RX ORDER — MAGNESIUM OXIDE 400 MG/1
400 TABLET ORAL DAILY
COMMUNITY

## 2024-06-26 NOTE — ASSESSMENT & PLAN NOTE
Patient's (Body mass index is 33.33 kg/m².) indicates that they are obese (BMI >30) with health conditions that include obstructive sleep apnea, hypertension, diabetes mellitus, and dyslipidemias . Weight is unchanged. BMI  is above average; BMI management plan is completed. We discussed portion control and increasing exercise.

## 2024-06-27 LAB
APPEARANCE UR: CLEAR
BACTERIA #/AREA URNS HPF: ABNORMAL /[HPF]
BILIRUB UR QL STRIP: NEGATIVE
CASTS URNS QL MICRO: ABNORMAL /LPF
COLOR UR: YELLOW
EPI CELLS #/AREA URNS HPF: ABNORMAL /HPF (ref 0–10)
GLUCOSE UR QL STRIP: NEGATIVE
HGB UR QL STRIP: ABNORMAL
KETONES UR QL STRIP: ABNORMAL
LEUKOCYTE ESTERASE UR QL STRIP: ABNORMAL
MICRO URNS: ABNORMAL
NITRITE UR QL STRIP: NEGATIVE
PH UR STRIP: 5.5 [PH] (ref 5–7.5)
PROT UR QL STRIP: ABNORMAL
RBC #/AREA URNS HPF: ABNORMAL /HPF (ref 0–2)
SP GR UR STRIP: 1.02 (ref 1–1.03)
UROBILINOGEN UR STRIP-MCNC: 0.2 MG/DL (ref 0.2–1)
WBC #/AREA URNS HPF: ABNORMAL /HPF (ref 0–5)

## 2024-06-28 NOTE — PROGRESS NOTES
Called patient about lab   Good morning we have the urine microscopic back and per Dr. Salinas   The microscopic urine has come back and there was blood seen in the urine. We see the urologist has also ordered this as well. We will check it again when you come back in next month and we will see what it has come as.

## 2024-07-02 ENCOUNTER — OFFICE VISIT (OUTPATIENT)
Dept: CARDIOLOGY | Facility: CLINIC | Age: 69
End: 2024-07-02
Payer: MEDICARE

## 2024-07-02 VITALS
BODY MASS INDEX: 33.75 KG/M2 | OXYGEN SATURATION: 96 % | HEART RATE: 60 BPM | SYSTOLIC BLOOD PRESSURE: 171 MMHG | DIASTOLIC BLOOD PRESSURE: 90 MMHG | WEIGHT: 210 LBS | RESPIRATION RATE: 18 BRPM | HEIGHT: 66 IN

## 2024-07-02 DIAGNOSIS — E78.2 MIXED HYPERLIPIDEMIA: ICD-10-CM

## 2024-07-02 DIAGNOSIS — I25.10 CAD, MULTIPLE VESSEL: Chronic | ICD-10-CM

## 2024-07-02 DIAGNOSIS — I48.0 PAROXYSMAL ATRIAL FIBRILLATION: Primary | ICD-10-CM

## 2024-07-02 DIAGNOSIS — I10 HYPERTENSION, BENIGN: ICD-10-CM

## 2024-07-02 DIAGNOSIS — Z95.1 S/P CABG X 4: ICD-10-CM

## 2024-07-02 RX ORDER — LOSARTAN POTASSIUM 25 MG/1
25 TABLET ORAL DAILY
Qty: 30 TABLET | Refills: 11 | Status: SHIPPED | OUTPATIENT
Start: 2024-07-02

## 2024-07-02 RX ORDER — BUMETANIDE 1 MG/1
1 TABLET ORAL DAILY
Qty: 90 TABLET | Refills: 3 | Status: SHIPPED | OUTPATIENT
Start: 2024-07-02 | End: 2025-06-27

## 2024-07-02 RX ORDER — LISINOPRIL AND HYDROCHLOROTHIAZIDE 20; 12.5 MG/1; MG/1
TABLET ORAL
COMMUNITY
Start: 2024-06-30 | End: 2024-07-02

## 2024-07-02 NOTE — PROGRESS NOTES
Cardiology Office Follow Up Visit      Primary Care Provider:  Camryn Salinas MD    Reason for f/u:     Coronary artery disease  Status post CABG  Hypertension  Dyslipidemia  Paroxysmal atrial fibrillation      Subjective     CC:    Denies chest pain or dyspnea    History of Present Illness       Fransico Cuenca is a 69 y.o. male.  Patient is a very pleasant 69-year-old male who presents today for hospital follow-up.    Patient was admitted at [facility] from May 15, 2024 through May 23, 2024.  He then was discharged to rehab and has since been discharged home.    He initially presented with complaints of chest discomfort.  He underwent cardiac catheterization and was found to have multivessel coronary artery disease.  He underwent four-vessel CABG by Dr. Hirsch.  Next    Postoperative course was complicated by volume overload, paroxysmal atrial arrhythmias and pleural effusions.    The patient reports overall he has been feeling well.  He reports he has been more active.  He is denying chest pain.  He has had no feelings of his heart racing.  He reports compliance with prescribed medical therapy.  He does report he is noticed some lower extremity swelling.          ASSESSMENT/PLAN:      Diagnoses and all orders for this visit:    1. Paroxysmal atrial fibrillation (Primary)    2. ASCAD  -     bumetanide (BUMEX) 1 MG tablet; Take 1 tablet by mouth Daily for 360 days. For fluid  Dispense: 90 tablet; Refill: 3    3. S/P CABG x 4 with JOHNSON per Dr. Hirsch on 5/15/2024    4. Hypertension, benign    Other orders  -     losartan (Cozaar) 25 MG tablet; Take 1 tablet by mouth Daily.  Dispense: 30 tablet; Refill: 11            MEDICAL DECISION MAKING:    Patient does have lower extremity edema and weight is up approximately 7 pounds.    I have asked him to increase his Bumex to twice daily for the next 3 days and then contact us to update on response.    EKG today shows sinus rhythm with first-degree AV block.  Heart  rate is 60.  His blood pressure is elevated.    Last BMP1 week ago showed his BUN and creatinine to be 20 and 1.0.    Will add low-dose losartan for blood pressure control 25 mg daily and will repeat a BMP in 1 week.  Patient will be referred to cardiac rehab.  He does disclose to me that he has been eating fast food at times.  We discussed watching his sodium as well as trying to follow a healthy heart diet.    Patient is to call and update us with his swelling at the end of the week.  We will plan on him seeing Dr. Lorenzo for follow-up in approximately 8 weeks.        Past Medical History:   Diagnosis Date    Anxiety     Essential hypertension, benign     Gastroesophageal reflux disease without esophagitis     Kidney stones 05/31/2020    Dr. Kahlil Terrell    Mixed hyperlipidemia     Rotator cuff syndrome     Torn rotator about seven years ago    Seasonal allergic rhinitis due to pollen     Type 2 diabetes mellitus without complication, without long-term current use of insulin        Past Surgical History:   Procedure Laterality Date    CARDIAC CATHETERIZATION N/A 4/23/2024    Procedure: Left Heart Cath;  Surgeon: Santiago Lorenzo MD;  Location: [facility] CATH INVASIVE LOCATION;  Service: Cardiovascular;  Laterality: N/A;    CATARACT EXTRACTION Left 08/24/2023    Dr Melendez    CATARACT EXTRACTION Right 09/07/2023    Dr Melendez    CORONARY ARTERY BYPASS GRAFT N/A 5/15/2024    Procedure: CORONARY ARTERY BYPASS GRAFTING;  Surgeon: Jean Hirsch MD;  Location: [facility];  Service: Cardiothoracic;  Laterality: N/A;  CABG X4 (one sequential) using LIMA and left endosaphenous vein; 2 coronary markers implanted.    CYSTOSCOPY W/ LASER LITHOTRIPSY  01/2021    INGUINAL HERNIA REPAIR Left 10/2009    NOSE SURGERY      x2    TRANSESOPHAGEAL ECHOCARDIOGRAM (DIGNA) N/A 5/15/2024    Procedure: TRANSESOPHAGEAL ECHOCARDIOGRAM WITH ANESTHESIA;  Surgeon: Jean Hirsch MD;  Location: [facility];  Service: Cardiothoracic;  Laterality:  N/A;    UMBILICAL HERNIA REPAIR  10/2009    Dr. Napier         Current Outpatient Medications:     albuterol sulfate  (90 Base) MCG/ACT inhaler, Inhale 2 puffs Every 4 (Four) Hours As Needed for Wheezing., Disp: 18 g, Rfl: 12    allopurinol (ZYLOPRIM) 100 MG tablet, Take 1 tablet by mouth Daily. For gout, Disp: 90 tablet, Rfl: 3    amiodarone (PACERONE) 200 MG tablet, Take 2 tablets by mouth Daily. For heart rhythm a fib, Disp: 90 tablet, Rfl: 3    apixaban (ELIQUIS) 5 MG tablet tablet, Take 1 tablet by mouth Every 12 (Twelve) Hours for 360 days. For blood thinning, Disp: 180 tablet, Rfl: 3    atorvastatin (LIPITOR) 40 MG tablet, Take 1 tablet by mouth Every Night for 360 days. For cholesterol, Disp: 90 tablet, Rfl: 3    bumetanide (BUMEX) 1 MG tablet, Take 1 tablet by mouth Daily for 360 days. For fluid, Disp: 90 tablet, Rfl: 3    cholecalciferol (Vitamin D) 25 MCG (1000 UT) tablet, Take 1 tablet by mouth Daily., Disp: 30 tablet, Rfl: 12    citalopram (CeleXA) 20 MG tablet, Take 1 tablet by mouth Daily. For depression, Disp: 90 tablet, Rfl: 3    clopidogrel (PLAVIX) 75 MG tablet, Take 1 tablet by mouth Daily for 360 days., Disp: 90 tablet, Rfl: 3    ferrous sulfate 325 (65 FE) MG tablet, Take 1 tablet by mouth Daily With Breakfast., Disp: 90 tablet, Rfl: 3    magnesium oxide (MAG-OX) 400 MG tablet, Take 1 tablet by mouth Daily., Disp: , Rfl:     metFORMIN (GLUCOPHAGE) 1000 MG tablet, Take 1 tablet by mouth 2 (Two) Times a Day With Meals. For blood sugar, Disp: 180 tablet, Rfl: 3    metoprolol tartrate (LOPRESSOR) 25 MG tablet, Take 1 tablet by mouth Every 8 (Eight) Hours for 360 days. For heart, slows heart rate, Disp: 270 tablet, Rfl: 3    montelukast (SINGULAIR) 10 MG tablet, Take 1 tablet by mouth Daily. For allergies, Disp: 90 tablet, Rfl: 3    Multiple Vitamins-Minerals (MULTIVITAMIN ADULT PO), Take 1 tablet by mouth Daily., Disp: , Rfl:     sodium bicarbonate 650 MG tablet, Take 1 tablet by mouth 2 (Two)  "Times a Day., Disp: 180 tablet, Rfl: 3    tamsulosin (FLOMAX) 0.4 MG capsule 24 hr capsule, Take 1 capsule by mouth Daily. For prostate, Disp: 90 capsule, Rfl: 3    acetaminophen (TYLENOL) 325 MG tablet, Take 2 tablets by mouth Every 4 (Four) Hours As Needed for Mild Pain. (Patient not taking: Reported on 6/5/2024), Disp: , Rfl:     losartan (Cozaar) 25 MG tablet, Take 1 tablet by mouth Daily., Disp: 30 tablet, Rfl: 11    Social History     Socioeconomic History    Marital status: Single   Tobacco Use    Smoking status: Never     Passive exposure: Never    Smokeless tobacco: Never    Tobacco comments:     Family members smoked   Vaping Use    Vaping status: Never Used   Substance and Sexual Activity    Alcohol use: Never    Drug use: Never    Sexual activity: Not Currently       Family History   Problem Relation Age of Onset    Heart disease Mother     Uterine cancer Mother     Thyroid disease Mother     Cancer Mother         Uterus?    Heart disease Father     Stroke Father     COPD Brother     Diabetes Brother     Breast cancer Maternal Aunt     Breast cancer Maternal Grandmother     Diabetes Brother        The following portions of the patient's history were reviewed and updated as appropriate: allergies, current medications, past family history, past medical history, past social history, past surgical history and problem list.    Review of Systems   Constitutional: Positive for malaise/fatigue.   Cardiovascular:  Positive for leg swelling.   Neurological:  Positive for weakness.   All other systems reviewed and are negative.      Pertinent items are noted in HPI, all other systems reviewed and negative    /90   Pulse 60   Resp 18   Ht 167.6 cm (66\")   Wt 95.3 kg (210 lb)   SpO2 96%   BMI 33.89 kg/m² .  Objective     Vitals reviewed.   Constitutional:       General: Not in acute distress.     Appearance: Normal appearance. Well-developed.   Eyes:      Pupils: Pupils are equal, round, and reactive to " light.   HENT:      Head: Normocephalic and atraumatic.   Neck:      Vascular: No JVD.   Pulmonary:      Effort: Pulmonary effort is normal.      Breath sounds: Normal breath sounds.   Cardiovascular:      Normal rate. Regular rhythm.   Pulses:     Intact distal pulses.   Edema:     Peripheral edema present.  Abdominal:      General: There is no distension.      Palpations: Abdomen is soft.      Tenderness: There is no abdominal tenderness.   Musculoskeletal: Normal range of motion.      Cervical back: Normal range of motion and neck supple. Skin:     General: Skin is warm and dry.   Neurological:      Mental Status: Alert and oriented to person, place, and time.             ECG 12 Lead    Date/Time: 7/2/2024 1:13 PM  Performed by: Lila Melgar APRN    Authorized by: Lila Melgar APRN  Comparison: compared with previous ECG   Similar to previous ECG  Rhythm: sinus rhythm  Rate: normal  BPM: 60  Conduction: 1st degree AV block  QRS axis: normal  Other findings: non-specific ST-T wave changes          EKG ordered by and reviewed by me in office

## 2024-07-11 ENCOUNTER — CLINICAL SUPPORT (OUTPATIENT)
Dept: FAMILY MEDICINE CLINIC | Facility: CLINIC | Age: 69
End: 2024-07-11
Payer: MEDICARE

## 2024-07-11 DIAGNOSIS — J30.1 SEASONAL ALLERGIC RHINITIS DUE TO POLLEN: Primary | ICD-10-CM

## 2024-07-11 PROCEDURE — 95117 IMMUNOTHERAPY INJECTIONS: CPT | Performed by: FAMILY MEDICINE

## 2024-07-23 ENCOUNTER — CLINICAL SUPPORT (OUTPATIENT)
Dept: FAMILY MEDICINE CLINIC | Facility: CLINIC | Age: 69
End: 2024-07-23
Payer: MEDICARE

## 2024-07-23 DIAGNOSIS — J30.1 SEASONAL ALLERGIC RHINITIS DUE TO POLLEN: Primary | ICD-10-CM

## 2024-07-23 PROCEDURE — 95117 IMMUNOTHERAPY INJECTIONS: CPT | Performed by: FAMILY MEDICINE

## 2024-07-30 NOTE — PROGRESS NOTES
Subjective   Fransico Cuenca is a 64 y.o. male.     Chief Complaint   Patient presents with   • Diabetes   • Hypertension   • Hyperlipidemia       Diabetes   He presents for his follow-up diabetic visit. He has type 2 diabetes mellitus. Pertinent negatives for hypoglycemia include no dizziness, headaches or sweats. Pertinent negatives for diabetes include no blurred vision, no chest pain, no fatigue, no polydipsia, no polyuria, no weakness and no weight loss. There are no hypoglycemic complications. There are no diabetic complications. Risk factors for coronary artery disease include diabetes mellitus, hypertension, dyslipidemia, male sex, obesity and family history. Current diabetic treatment includes diet. Meal planning includes avoidance of concentrated sweets. He participates in exercise intermittently. An ACE inhibitor/angiotensin II receptor blocker is being taken. He does not see a podiatrist.Eye exam is current (10/2019 [facility]).   Hypertension   This is a chronic problem. The current episode started more than 1 year ago. The problem is controlled. Pertinent negatives include no blurred vision, chest pain, headaches, orthopnea, palpitations, peripheral edema, PND, shortness of breath or sweats. Risk factors for coronary artery disease include diabetes mellitus, dyslipidemia, family history, male gender and obesity. Current antihypertension treatment includes ACE inhibitors and diuretics. The current treatment provides significant improvement.   Hyperlipidemia   This is a chronic problem. The current episode started more than 1 year ago. The problem is uncontrolled. Recent lipid tests were reviewed and are high. Exacerbating diseases include diabetes and obesity. Pertinent negatives include no chest pain, myalgias or shortness of breath. Current antihyperlipidemic treatment includes statins. The current treatment provides moderate improvement of lipids. Risk factors for coronary artery disease include  [Awake] : awake diabetes mellitus, dyslipidemia, family history, hypertension, male sex and obesity.        The following portions of the patient's history were reviewed and updated as appropriate: allergies, current medications, past family history, past medical history, past social history, past surgical history and problem list.    Allergies:  No Known Allergies    Social History:  Social History     Socioeconomic History   • Marital status: Single     Spouse name: Not on file   • Number of children: Not on file   • Years of education: Not on file   • Highest education level: Not on file   Tobacco Use   • Smoking status: Never Smoker   • Smokeless tobacco: Never Used   Substance and Sexual Activity   • Alcohol use: No     Frequency: Never   • Drug use: No   • Sexual activity: Defer       Family History:  Family History   Problem Relation Age of Onset   • Heart disease Mother    • Uterine cancer Mother    • Thyroid disease Mother    • Heart disease Father    • Stroke Father    • COPD Brother    • Diabetes Brother    • Breast cancer Maternal Aunt    • Breast cancer Maternal Grandmother        Past Medical History :  Patient Active Problem List   Diagnosis   • Anxiety   • Gastroesophageal reflux disease without esophagitis   • Hypertension, benign   • Mixed hyperlipidemia   • Allergic rhinitis due to pollen   • Type 2 diabetes mellitus without complication, without long-term current use of insulin (CMS/[facility])   • Encounter for prostate cancer screening   • Screening for malignant neoplasm of the rectum   • Family history of ischemic heart disease   • Class 1 obesity due to excess calories with serious comorbidity and body mass index (BMI) of 33.0 to 33.9 in adult   • Obstructive sleep apnea       Medication List:  Outpatient Encounter Medications as of 3/3/2020   Medication Sig Dispense Refill   • aspirin (ASPIRIN ADULT LOW DOSE) 81 MG EC tablet Take 1 tablet by mouth Daily.     • azelastine (ASTELIN) 0.1 % nasal spray 1 spray into  [Alert] : alert the nostril(s) as directed by provider 2 (Two) Times a Day.     • Carboxymethylcellul-Glycerin (LUBRICATING EYE DROPS) 0.5-0.9 % solution Apply 1 drop to eye(s) as directed by provider Daily.     • citalopram (CeleXA) 20 MG tablet Take 1 tablet by mouth Daily. 30 tablet 12   • fenofibrate 160 MG tablet Take 1 tablet by mouth Daily. 30 tablet 12   • fluticasone (FLONASE ALLERGY RELIEF) 50 MCG/ACT nasal spray 2 sprays into the nostril(s) as directed by provider Daily.     • lisinopril-hydrochlorothiazide (PRINZIDE,ZESTORETIC) 20-12.5 MG per tablet Take 1 tablet by mouth Daily. 90 tablet 3   • montelukast (SINGULAIR) 10 MG tablet Take 1 tablet by mouth Daily.     • Multiple Vitamins-Minerals (MULTIVITAMIN ADULT PO) Take 1 tablet by mouth Daily.     • Omega-3 Fatty Acids (FISH OIL) 1000 MG capsule delayed-release Take 2 capsules by mouth 2 (Two) Times a Day.     • amLODIPine (NORVASC) 5 MG tablet Take 1 tablet by mouth Daily. 30 tablet 12   • metFORMIN (GLUCOPHAGE) 500 MG tablet Take 1 tablet by mouth Daily With Breakfast. For diabetes 30 tablet 12   • [DISCONTINUED] albuterol sulfate HFA (VENTOLIN HFA) 108 (90 Base) MCG/ACT inhaler Inhale 2 puffs Every 4 (Four) Hours As Needed for Wheezing. 1 inhaler 12   • [DISCONTINUED] azithromycin (ZITHROMAX) 500 MG tablet Take 1 tablet by mouth Daily.     • [DISCONTINUED] predniSONE (DELTASONE) 20 MG tablet Take 1 tablet by mouth 2 (Two) Times a Day.       Facility-Administered Encounter Medications as of 3/3/2020   Medication Dose Route Frequency Provider Last Rate Last Dose   • Allergy Serum Injection  0.1 mL Subcutaneous Once BradCamryn MD       • [COMPLETED] Allergy Serum Injection  0.5 mL Subcutaneous Once BradCamryn MD   0.5 mL at 03/03/20 0818   • [COMPLETED] Allergy Serum Injection  0.5 mL Subcutaneous Once BradCamryn MD   0.5 mL at 03/03/20 0818       Past Surgical History:  Past Surgical History:   Procedure Laterality Date   • INGUINAL HERNIA REPAIR  "Left 10/2009   • NOSE SURGERY      x2   • UMBILICAL HERNIA REPAIR  10/2009    Dr. Napier       Review of Systems:  Review of Systems   Constitutional: Negative for appetite change, fatigue, unexpected weight gain and unexpected weight loss.   HENT: Negative for congestion and sinus pressure.    Eyes: Negative for blurred vision.   Respiratory: Negative for shortness of breath and wheezing.    Cardiovascular: Negative for chest pain, palpitations, orthopnea, leg swelling and PND.   Gastrointestinal: Negative for abdominal pain, constipation, diarrhea, nausea and vomiting.   Endocrine: Negative for cold intolerance, heat intolerance, polydipsia and polyuria.        He reports that he has not improved his diet enough nor is he exercising after his dx of DM.    Musculoskeletal: Negative for arthralgias, joint swelling and myalgias.   Skin: Negative for rash.   Neurological: Negative for dizziness, weakness, numbness and headache.   Hematological: Negative for adenopathy. Does not bruise/bleed easily.       I have reviewed and confirmed the accuracy of the ROS as documented by the MA/LPN/RN Camryn Salinas MD    Vital Signs:  Visit Vitals  /92 (BP Location: Left arm, Patient Position: Sitting, Cuff Size: Adult)   Pulse 80   Temp 98.3 °F (36.8 °C) (Oral)   Resp 18   Ht 167.6 cm (66\")   Wt 93.4 kg (205 lb 12.8 oz)   SpO2 96% Comment: Room air   BMI 33.22 kg/m²       Physical Exam   Constitutional: He is oriented to person, place, and time. He appears well-developed and well-nourished. No distress.   Eyes: Conjunctivae are normal.   Neck: Neck supple. No JVD present. No thyromegaly present.   Cardiovascular: Normal rate, regular rhythm, normal heart sounds and intact distal pulses. Exam reveals no gallop and no friction rub.   No murmur heard.  Pulmonary/Chest: Effort normal and breath sounds normal. No respiratory distress. He has no wheezes. He has no rales.   Musculoskeletal: He exhibits no edema.   Lymphadenopathy: " [Soft] : soft     He has no cervical adenopathy.   Neurological: He is alert and oriented to person, place, and time. Coordination normal.   Skin: Skin is warm. No rash noted. No erythema.   Psychiatric: He has a normal mood and affect.   Vitals reviewed.      Assessment and Plan:  Problem List Items Addressed This Visit        High    Hypertension, benign    Overview     Controlled poorlyHe will add amlodipine resume low salt diet and exercise wt loss encouraged and follow up 3 mo  He will monitor his pressures at home and return with a log.         Current Assessment & Plan     Hypertension is worsening.  Continue current treatment regimen.  Weight loss.  Regular aerobic exercise.  Blood pressure will be reassessed in 3 months.         Relevant Medications    amLODIPine (NORVASC) 5 MG tablet    Type 2 diabetes mellitus without complication, without long-term current use of insulin (CMS/[facility]) - Primary    Overview     A1c 8.4 03/03/2020 begin metformin and follow.   A1c 7.2 10/11/2020  A1c 6.2 08/20/2019,   New onset 04/29/2019, A1c 7.2             Current Assessment & Plan     Diabetes is worsening.   Continue current treatment regimen.  Dietary recommendations for ADA diet.  Regular aerobic exercise.  Diabetes will be reassessed in 3 months.         Relevant Medications    metFORMIN (GLUCOPHAGE) 500 MG tablet    Other Relevant Orders    POCT urinalysis dipstick, manual (Completed)    POCT Glucose (Completed)    POC Glycosylated Hemoglobin (Hb A1C) (Completed)       Medium    Mixed hyperlipidemia    Overview     Stable, compliant with meds         Current Assessment & Plan     Lipid abnormalities are improving with treatment.  Nutritional counseling was provided.  Lipids will be reassessed in 3 months.         Allergic rhinitis due to pollen    Overview     on Immuno Rx. No Recent exacerbation  His sxs are chronic and significant            Relevant Medications    Allergy Serum Injection (Completed)    Allergy Serum Injection  [Oriented x3] : oriented to person, place, and time (Completed)       Low    Class 1 obesity due to excess calories with serious comorbidity and body mass index (BMI) of 33.0 to 33.9 in adult    Overview     Diet and organized wt loss encouraged.          Current Assessment & Plan     Obesity is unchanged.  Discussed the patient's BMI.  The BMI is above average; BMI management plan is completed.  General weight loss/lifestyle modification strategies discussed (elicit support from others; identify saboteurs; non-food rewards, etc).         Obstructive sleep apnea    Overview     Improved on CPAP           Other Visit Diagnoses     Urinary frequency        Relevant Orders    Urinalysis With Microscopic - Urine, Clean Catch (Completed)          An After Visit Summary and PPPS were given to the patient.      [Normal] : cervix [Acute Distress] : no acute distress [Mass] : no breast mass [Nipple Discharge] : no nipple discharge [Axillary LAD] : no axillary lymphadenopathy [Tender] : non tender [FreeTextEntry4] : small Right Bartholin cyst was present which was soft and non tender

## 2024-07-31 NOTE — PROGRESS NOTES
Chief Complaint  Diabetes, Hypertension, Hyperlipidemia, Chronic Kidney Disease (/), and Atrial Fibrillation    Subjective     CC  Problem List  Visit Diagnosis   Encounters  Notes  Medications  Labs  Result Review Imaging  Media    Fransico Cuenca presents to [facility] for   Diabetes  He presents for his follow-up diabetic visit. He has type 2 diabetes mellitus. No MedicAlert identification noted. The initial diagnosis of diabetes was made 5 years ago. His disease course has been stable. There are no hypoglycemic associated symptoms. Pertinent negatives for hypoglycemia include no confusion, dizziness, headaches, nervousness/anxiousness, seizures or sweats. Pertinent negatives for diabetes include no blurred vision, no chest pain, no fatigue, no foot paresthesias, no foot ulcerations, no polydipsia, no polyuria, no visual change, no weakness and no weight loss. There are no hypoglycemic complications. Symptoms are stable. There are no diabetic complications. Risk factors for coronary artery disease include diabetes mellitus, dyslipidemia, hypertension, male sex, obesity and family history. Current diabetic treatment includes oral agent (monotherapy) (Metformin). He is compliant with treatment most of the time. His weight is fluctuating minimally. He is following a generally healthy and diabetic diet. Meal planning includes avoidance of concentrated sweets. He has not had a previous visit with a dietitian. He participates in exercise weekly. (Patient reports he does not check blood sugars at home) An ACE inhibitor/angiotensin II receptor blocker is being taken. He does not see a podiatrist.Eye exam is current ([facility] in [place]).   Hypertension  This is a chronic problem. The current episode started more than 1 year ago. The problem has been stable since onset. The problem is controlled. Pertinent negatives include no anxiety, blurred vision, chest pain,  headaches, malaise/fatigue, palpitations, shortness of breath or sweats. There are no associated agents to hypertension. Risk factors for coronary artery disease include diabetes mellitus, family history, obesity, male gender, stress and dyslipidemia. Past treatments include ACE inhibitors. Current antihypertension treatment includes ACE inhibitors and diuretics. The current treatment provides mild improvement. There are no compliance problems.  Hypertensive end-organ damage includes kidney disease and CAD/MI. There is no history of chronic renal disease.   Hyperlipidemia  This is a chronic problem. The current episode started more than 1 year ago. The problem is controlled. Recent lipid tests were reviewed and are variable. Exacerbating diseases include diabetes and obesity. He has no history of chronic renal disease, hypothyroidism, liver disease or nephrotic syndrome. Factors aggravating his hyperlipidemia include fatty foods. Pertinent negatives include no chest pain, focal sensory loss, focal weakness, leg pain, myalgias or shortness of breath. Current antihyperlipidemic treatment includes herbal therapy and statins. The current treatment provides mild improvement of lipids. There are no compliance problems.  Risk factors for coronary artery disease include family history, male sex, hypertension, diabetes mellitus, dyslipidemia and obesity.   Chronic Kidney Disease  This is a chronic problem. The current episode started more than 1 year ago. The problem occurs constantly. Pertinent negatives include no abdominal pain, chest pain, fatigue, headaches, myalgias, nausea, rash, visual change, vomiting or weakness.   Atrial Fibrillation  Presents for follow-up visit. Symptoms include hypertension. Symptoms are negative for an AICD problem, bradycardia, chest pain, dizziness, palpitations, shortness of breath and weakness. The symptoms have been stable. Past medical history includes atrial fibrillation and  "hyperlipidemia. There are no medication compliance problems.       Review of Systems   Constitutional:  Negative for appetite change, fatigue, malaise/fatigue and unexpected weight loss.   Eyes:  Negative for blurred vision.   Respiratory:  Negative for shortness of breath.    Cardiovascular:  Negative for chest pain, palpitations and leg swelling.   Gastrointestinal:  Negative for abdominal pain, constipation, diarrhea, nausea and vomiting.   Endocrine: Negative for cold intolerance, heat intolerance, polydipsia and polyuria.   Genitourinary:  Negative for dysuria.   Musculoskeletal:  Negative for myalgias.   Skin:  Negative for rash.   Neurological:  Negative for dizziness, focal weakness, seizures, weakness and confusion.   Psychiatric/Behavioral:  The patient is not nervous/anxious.         Objective   Vital Signs:   /76 (BP Location: Right arm, Patient Position: Sitting, Cuff Size: Adult)   Pulse 64   Temp 97.8 °F (36.6 °C) (Temporal)   Resp 18   Ht 167.6 cm (66\")   Wt 89.1 kg (196 lb 8 oz)   SpO2 98% Comment: room air  BMI 31.72 kg/m²     Physical Exam  Constitutional:       General: He is not in acute distress.  Cardiovascular:      Rate and Rhythm: Normal rate and regular rhythm.      Pulses:           Dorsalis pedis pulses are 2+ on the right side and 2+ on the left side.      Heart sounds: No murmur heard.     Comments: Neg radha's bilaterally   Pulmonary:      Effort: Pulmonary effort is normal.      Breath sounds: Normal breath sounds. No wheezing.   Musculoskeletal:      Cervical back: Neck supple.      Right lower leg: No edema.      Left lower leg: No edema.      Comments: Negative Radha's   Lymphadenopathy:      Cervical: No cervical adenopathy.   Skin:     Findings: No rash.   Neurological:      Mental Status: He is alert.        Result Review :Labs  Result Review  Imaging  Med Tab  Media                 Assessment and Plan CC Problem List  Visit Diagnosis  ROS  Review (Popup)  " Health Maintenance  Quality  BestPractice  Medications  SmartSets  SnapShot Encounters  Media  Problem List Items Addressed This Visit          High    Hypertension, benign    Overview     Controlled compliant continue meds.          Type 2 diabetes mellitus without complication, without long-term current use of insulin - Primary    Overview     A12c 5.6 06/26/2024 improved.   A1c 6.2 03/12/2024 improved with lifestyle changes.   A1c 6.8 12.12.2023 worse less exercise.   A1c 6.1 09/11/2023 improved.   A1c 6.3 05/26/2026 stable he reports BS of 150  A1c 6.3 02/24/2023 slightly worse. But controlled.   A1c 5.9 11/08/2022 improved  A1c 7.8 05/03/2022 marginal control Actos begin for insurance  A1c 7.1 01/24/2022 [id number]  07/13/2021   A1c 7.5 12/21/2020 stable,    A1c 6.2 08/20/2019,   New onset 04/29/2019, A1c 7.2             Relevant Medications    pioglitazone (ACTOS) 30 MG tablet    Coronary artery disease involving native coronary artery of native heart without angina pectoris    Overview     A. Two-vessel LHC 4/23/2024                  I.  20% LM                 Ii.  95% pLAD with 80%mLAD                III.  90% LCx                            1. 60 to 70% proximal ramus  B.  Being worked up for CABG by CV surgery  C. Preserved EF 51 to 55% w/o DD  Bj  No sxs.         Relevant Medications    potassium chloride (MICRO-K) 10 MEQ CR capsule    Paroxysmal atrial fibrillation    Overview     During MI and CABG sinus by exam, continue meds and cardiology follow.            Medium    Mixed hyperlipidemia    Overview     Stable, compliant LDL 64, 06/2024            Low    Class 1 obesity due to excess calories with serious comorbidity and body mass index (BMI) of 31.0 to 31.9 in adult    Overview     Diet and organized wt loss encouraged         Current Assessment & Plan     Patient's (Body mass index is 31.72 kg/m².) indicates that they are obese (BMI >30) with health conditions that include obstructive  sleep apnea, hypertension, diabetes mellitus, dyslipidemias, and osteoarthritis . Weight is improving with lifestyle modifications. BMI  is above average; BMI management plan is completed. We discussed portion control and increasing exercise.           Other Visit Diagnoses       Urinary frequency        Relevant Orders    Urinalysis With Microscopic - Urine, Clean Catch (Completed)            Follow Up Instructions Charge Capture  Follow-up Communications  Return in about 3 months (around 11/5/2024).  Patient was given instructions and counseling regarding his condition or for health maintenance advice. Please see specific information pulled into the AVS if appropriate.

## 2024-08-05 ENCOUNTER — OFFICE VISIT (OUTPATIENT)
Dept: FAMILY MEDICINE CLINIC | Facility: CLINIC | Age: 69
End: 2024-08-05
Payer: MEDICARE

## 2024-08-05 VITALS
HEART RATE: 64 BPM | HEIGHT: 66 IN | RESPIRATION RATE: 18 BRPM | SYSTOLIC BLOOD PRESSURE: 132 MMHG | OXYGEN SATURATION: 98 % | BODY MASS INDEX: 31.58 KG/M2 | TEMPERATURE: 97.8 F | WEIGHT: 196.5 LBS | DIASTOLIC BLOOD PRESSURE: 76 MMHG

## 2024-08-05 DIAGNOSIS — I25.10 CORONARY ARTERY DISEASE INVOLVING NATIVE CORONARY ARTERY OF NATIVE HEART WITHOUT ANGINA PECTORIS: ICD-10-CM

## 2024-08-05 DIAGNOSIS — E66.09 CLASS 1 OBESITY DUE TO EXCESS CALORIES WITH SERIOUS COMORBIDITY AND BODY MASS INDEX (BMI) OF 31.0 TO 31.9 IN ADULT: ICD-10-CM

## 2024-08-05 DIAGNOSIS — I48.0 PAROXYSMAL ATRIAL FIBRILLATION: ICD-10-CM

## 2024-08-05 DIAGNOSIS — R35.0 URINARY FREQUENCY: ICD-10-CM

## 2024-08-05 DIAGNOSIS — E11.9 TYPE 2 DIABETES MELLITUS WITHOUT COMPLICATION, WITHOUT LONG-TERM CURRENT USE OF INSULIN: Primary | ICD-10-CM

## 2024-08-05 DIAGNOSIS — I10 HYPERTENSION, BENIGN: ICD-10-CM

## 2024-08-05 DIAGNOSIS — E78.2 MIXED HYPERLIPIDEMIA: ICD-10-CM

## 2024-08-05 PROCEDURE — 99214 OFFICE O/P EST MOD 30 MIN: CPT | Performed by: FAMILY MEDICINE

## 2024-08-05 PROCEDURE — 1160F RVW MEDS BY RX/DR IN RCRD: CPT | Performed by: FAMILY MEDICINE

## 2024-08-05 PROCEDURE — 3078F DIAST BP <80 MM HG: CPT | Performed by: FAMILY MEDICINE

## 2024-08-05 PROCEDURE — 1159F MED LIST DOCD IN RCRD: CPT | Performed by: FAMILY MEDICINE

## 2024-08-05 PROCEDURE — G2211 COMPLEX E/M VISIT ADD ON: HCPCS | Performed by: FAMILY MEDICINE

## 2024-08-05 PROCEDURE — 3044F HG A1C LEVEL LT 7.0%: CPT | Performed by: FAMILY MEDICINE

## 2024-08-05 PROCEDURE — 3075F SYST BP GE 130 - 139MM HG: CPT | Performed by: FAMILY MEDICINE

## 2024-08-05 PROCEDURE — 1126F AMNT PAIN NOTED NONE PRSNT: CPT | Performed by: FAMILY MEDICINE

## 2024-08-05 RX ORDER — PIOGLITAZONEHYDROCHLORIDE 30 MG/1
30 TABLET ORAL DAILY
Qty: 90 TABLET | Refills: 3 | Status: SHIPPED | OUTPATIENT
Start: 2024-08-05

## 2024-08-05 RX ORDER — POTASSIUM CHLORIDE 750 MG/1
10 CAPSULE, EXTENDED RELEASE ORAL 2 TIMES DAILY
Qty: 90 CAPSULE | Refills: 3 | Status: SHIPPED | OUTPATIENT
Start: 2024-08-05

## 2024-08-05 NOTE — ASSESSMENT & PLAN NOTE
Patient's (Body mass index is 31.72 kg/m².) indicates that they are obese (BMI >30) with health conditions that include obstructive sleep apnea, hypertension, diabetes mellitus, dyslipidemias, and osteoarthritis . Weight is improving with lifestyle modifications. BMI  is above average; BMI management plan is completed. We discussed portion control and increasing exercise.

## 2024-08-06 LAB
APPEARANCE UR: CLEAR
BACTERIA #/AREA URNS HPF: NORMAL /[HPF]
BILIRUB UR QL STRIP: NEGATIVE
CASTS URNS QL MICRO: NORMAL /LPF
COLOR UR: YELLOW
EPI CELLS #/AREA URNS HPF: NORMAL /HPF (ref 0–10)
GLUCOSE UR QL STRIP: ABNORMAL
HGB UR QL STRIP: ABNORMAL
KETONES UR QL STRIP: NEGATIVE
LEUKOCYTE ESTERASE UR QL STRIP: NEGATIVE
MICRO URNS: ABNORMAL
NITRITE UR QL STRIP: NEGATIVE
PH UR STRIP: 5.5 [PH] (ref 5–7.5)
PROT UR QL STRIP: ABNORMAL
RBC #/AREA URNS HPF: NORMAL /HPF (ref 0–2)
SP GR UR STRIP: 1.02 (ref 1–1.03)
UROBILINOGEN UR STRIP-MCNC: 0.2 MG/DL (ref 0.2–1)
WBC #/AREA URNS HPF: NORMAL /HPF (ref 0–5)

## 2024-08-06 NOTE — PROGRESS NOTES
Called patient about lab   Good morning we have your urine back and per Dr. Salinas   Your urine microscopic has come back and it is normal. This is good as this is the most accurate way to see if there is blood in the urine.

## 2024-08-13 ENCOUNTER — CLINICAL SUPPORT (OUTPATIENT)
Dept: FAMILY MEDICINE CLINIC | Facility: CLINIC | Age: 69
End: 2024-08-13
Payer: MEDICARE

## 2024-08-13 DIAGNOSIS — J30.1 SEASONAL ALLERGIC RHINITIS DUE TO POLLEN: Primary | ICD-10-CM

## 2024-08-13 PROCEDURE — 95117 IMMUNOTHERAPY INJECTIONS: CPT | Performed by: FAMILY MEDICINE

## 2024-08-20 ENCOUNTER — TELEPHONE (OUTPATIENT)
Dept: CARDIOLOGY | Facility: CLINIC | Age: 69
End: 2024-08-20
Payer: MEDICARE

## 2024-08-27 ENCOUNTER — CLINICAL SUPPORT (OUTPATIENT)
Dept: FAMILY MEDICINE CLINIC | Facility: CLINIC | Age: 69
End: 2024-08-27
Payer: MEDICARE

## 2024-08-27 DIAGNOSIS — J30.1 SEASONAL ALLERGIC RHINITIS DUE TO POLLEN: Primary | ICD-10-CM

## 2024-08-27 PROCEDURE — 95117 IMMUNOTHERAPY INJECTIONS: CPT | Performed by: FAMILY MEDICINE

## 2024-08-27 NOTE — PROGRESS NOTES
Allergy Injection only    Vial AMCD Given: right     Vial BTGW Given: left     Patient tolerated well no reaction.

## 2024-09-05 NOTE — PROGRESS NOTES
Date of Office Visit: 2024  Encounter Provider: Dr. Santiago Lorenzo  Place of Service: [facility] CARDIOLOGY [place]  Patient Name: Fransico Cuenca  :1955  Camryn Salinas MD    Chief Complaint   Patient presents with    Hypertension    Diabetes    Hyperlipidemia    Follow-up     History of Present Illness:    I am pleased to see Mr. Cuenca in my office today as a follow-up.    As you know, patient is 69 years old white gentleman whose past medical history is significant for hypertension, hyperlipidemia, CAD, CABG, postoperative atrial fibrillation who came today for follow-up.    In May 2024, patient was admitted in the hospital with progressive shortness of breath and chest discomfort.  Patient was found to be in congestive heart failure.  Patient underwent echocardiogram which showed EF of 50 to 55%.  Patient underwent cardiac catheterization which showed three-vessel coronary artery disease.  Patient underwent CABG x 4 by Dr. Hirsch.  Postoperatively patient developed atrial fibrillation and required anticoagulation and amiodarone.  Patient was discharged home on diuretics and Farxiga and Eliquis.    Patient came today and he is feeling very well.  He is very active.  Does not have any shortness of breath or chest pain or tightness or heaviness.  No leg edema noted.  He is in euvolemic status.  Patient denies any orthopnea PND no syncope or presyncope.    EKG showed sinus bradycardia.  Left anterior fascicular block noted.  Septal myocardial infarction and Q waves noted.    Patient is clinically doing well.  Patient did not do cardiac rehab but he does not want to proceed with it.  Patient has done well.  He is very active.  Patient is in sinus rhythm.  I would recommend to discontinue amiodarone.  I would decrease Lopressor to 25 mg twice daily.  Blood pressure is high I would recommend to increase losartan 50 mg twice daily.  Blood pressure monitoring is recommended.      Past Medical History:    Diagnosis Date    Anxiety     Essential hypertension, benign     Gastroesophageal reflux disease without esophagitis     Kidney stones 05/31/2020    Dr. Kahlil Terrell    Mixed hyperlipidemia     Rotator cuff syndrome     Torn rotator about seven years ago    Seasonal allergic rhinitis due to pollen     Type 2 diabetes mellitus without complication, without long-term current use of insulin          Past Surgical History:   Procedure Laterality Date    CARDIAC CATHETERIZATION N/A 4/23/2024    Procedure: Left Heart Cath;  Surgeon: Santiago Lorenzo MD;  Location: [facility] CATH INVASIVE LOCATION;  Service: Cardiovascular;  Laterality: N/A;    CATARACT EXTRACTION Left 08/24/2023    Dr Melendez    CATARACT EXTRACTION Right 09/07/2023    Dr Melendez    CORONARY ARTERY BYPASS GRAFT N/A 5/15/2024    Procedure: CORONARY ARTERY BYPASS GRAFTING;  Surgeon: Jean Hirsch MD;  Location: [facility];  Service: Cardiothoracic;  Laterality: N/A;  CABG X4 (one sequential) using LIMA and left endosaphenous vein; 2 coronary markers implanted.    CYSTOSCOPY W/ LASER LITHOTRIPSY  01/2021    INGUINAL HERNIA REPAIR Left 10/2009    NOSE SURGERY      x2    TRANSESOPHAGEAL ECHOCARDIOGRAM (DIGNA) N/A 5/15/2024    Procedure: TRANSESOPHAGEAL ECHOCARDIOGRAM WITH ANESTHESIA;  Surgeon: Jean Hirsch MD;  Location: [facility];  Service: Cardiothoracic;  Laterality: N/A;    UMBILICAL HERNIA REPAIR  10/2009    Dr. Napier           Current Outpatient Medications:     albuterol sulfate  (90 Base) MCG/ACT inhaler, Inhale 2 puffs Every 4 (Four) Hours As Needed for Wheezing., Disp: 18 g, Rfl: 12    allopurinol (ZYLOPRIM) 100 MG tablet, Take 1 tablet by mouth Daily. For gout, Disp: 90 tablet, Rfl: 3    apixaban (ELIQUIS) 5 MG tablet tablet, Take 1 tablet by mouth Every 12 (Twelve) Hours for 360 days. For blood thinning, Disp: 180 tablet, Rfl: 3    atorvastatin (LIPITOR) 40 MG tablet, Take 1 tablet by mouth Every Night for 360 days. For cholesterol, Disp:  90 tablet, Rfl: 3    bumetanide (BUMEX) 1 MG tablet, Take 1 tablet by mouth Daily for 360 days. For fluid, Disp: 90 tablet, Rfl: 3    cholecalciferol (Vitamin D) 25 MCG (1000 UT) tablet, Take 1 tablet by mouth Daily., Disp: 30 tablet, Rfl: 12    citalopram (CeleXA) 20 MG tablet, Take 1 tablet by mouth Daily. For depression, Disp: 90 tablet, Rfl: 3    clopidogrel (PLAVIX) 75 MG tablet, Take 1 tablet by mouth Daily for 360 days., Disp: 90 tablet, Rfl: 3    Dapagliflozin Propanediol (FARXIGA PO), Take  by mouth., Disp: , Rfl:     ferrous sulfate 325 (65 FE) MG tablet, Take 1 tablet by mouth Daily With Breakfast., Disp: 90 tablet, Rfl: 3    losartan (Cozaar) 50 MG tablet, Take 1 tablet by mouth 2 (Two) Times a Day., Disp: 180 tablet, Rfl: 1    magnesium oxide (MAG-OX) 400 MG tablet, Take 1 tablet by mouth Daily., Disp: , Rfl:     metFORMIN (GLUCOPHAGE) 1000 MG tablet, Take 1 tablet by mouth 2 (Two) Times a Day With Meals. For blood sugar, Disp: 180 tablet, Rfl: 3    metoprolol tartrate (LOPRESSOR) 25 MG tablet, Take 1 tablet by mouth 2 (Two) Times a Day for 180 days. For heart, slows heart rate, Disp: 180 tablet, Rfl: 1    montelukast (SINGULAIR) 10 MG tablet, Take 1 tablet by mouth Daily. For allergies, Disp: 90 tablet, Rfl: 3    Multiple Vitamins-Minerals (MULTIVITAMIN ADULT PO), Take 1 tablet by mouth Daily., Disp: , Rfl:     pioglitazone (ACTOS) 30 MG tablet, Take 1 tablet by mouth Daily. For diabetes, Disp: 90 tablet, Rfl: 3    potassium chloride (MICRO-K) 10 MEQ CR capsule, Take 1 capsule by mouth 2 (Two) Times a Day., Disp: 90 capsule, Rfl: 3    sodium bicarbonate 650 MG tablet, Take 1 tablet by mouth 2 (Two) Times a Day., Disp: 180 tablet, Rfl: 3    tamsulosin (FLOMAX) 0.4 MG capsule 24 hr capsule, Take 1 capsule by mouth Daily. For prostate, Disp: 90 capsule, Rfl: 3      Social History     Socioeconomic History    Marital status: Single   Tobacco Use    Smoking status: Never     Passive exposure: Never     "Smokeless tobacco: Never    Tobacco comments:     Family members smoked   Vaping Use    Vaping status: Never Used   Substance and Sexual Activity    Alcohol use: Never    Drug use: Never    Sexual activity: Not Currently         Review of Systems   Constitutional: Negative for chills and fever.   HENT:  Negative for ear discharge and nosebleeds.    Eyes:  Negative for discharge and redness.   Cardiovascular:  Negative for chest pain, orthopnea, palpitations, paroxysmal nocturnal dyspnea and syncope.   Respiratory:  Negative for cough, shortness of breath and wheezing.    Endocrine: Negative for heat intolerance.   Skin:  Negative for rash.   Musculoskeletal:  Negative for arthritis and myalgias.   Gastrointestinal:  Negative for abdominal pain, melena, nausea and vomiting.   Genitourinary:  Negative for dysuria and hematuria.   Neurological:  Negative for dizziness, light-headedness, numbness and tremors.   Psychiatric/Behavioral:  Negative for depression. The patient is not nervous/anxious.        Procedures      ECG 12 Lead    Date/Time: 9/6/2024 10:15 AM  Performed by: Santiago Lorenzo MD    Authorized by: Santiago Lorenzo MD  Comparison: compared with previous ECG   Similar to previous ECG  Rhythm: sinus rhythm  Other findings: non-specific ST-T wave changes, T wave abnormality and left ventricular hypertrophy    Clinical impression: abnormal EKG          ECG 12 Lead    (Results Pending)           Objective:    BP (!) 197/89 (BP Location: Right arm, Patient Position: Sitting, Cuff Size: Large Adult)   Pulse 57   Resp 16   Ht 167 cm (65.75\")   Wt 88.5 kg (195 lb)   SpO2 95%   BMI 31.72 kg/m²         Constitutional:       Appearance: Well-developed.   Eyes:      General: No scleral icterus.        Right eye: No discharge.   HENT:      Head: Normocephalic and atraumatic.   Neck:      Thyroid: No thyromegaly.      Lymphadenopathy: No cervical adenopathy.   Pulmonary:      Effort: Pulmonary effort is normal. No " respiratory distress.      Breath sounds: Normal breath sounds. No wheezing. No rales.   Cardiovascular:      Normal rate. Regular rhythm.      No gallop.    Edema:     Peripheral edema absent.   Abdominal:      Tenderness: There is no abdominal tenderness.   Skin:     Findings: No erythema or rash.   Neurological:      Mental Status: Alert and oriented to person, place, and time.             Assessment:       Diagnosis Plan   1. Mixed hyperlipidemia  ECG 12 Lead    losartan (Cozaar) 50 MG tablet      2. Hypertension, benign  ECG 12 Lead    losartan (Cozaar) 50 MG tablet      3. Type 2 diabetes mellitus without complication, without long-term current use of insulin  ECG 12 Lead    losartan (Cozaar) 50 MG tablet      4. ASCAD  metoprolol tartrate (LOPRESSOR) 25 MG tablet               Plan:       MDM:    1.  CAD/CABG:    Patient is doing well.  Continue aerobic activity    2.  Hypertension:    Increase losartan 50 mg twice daily.    3.  Paroxysmal atrial fibrillation:    Patient is in sinus rhythm.  Decrease Lopressor and discontinue amiodarone continue Eliquis    4.  Sinus bradycardia/tachybradycardia syndrome:    I would recommend to discontinue amiodarone and decrease Lopressor.  No indication for permanent pacemaker    5.  Congestive heart failure chronic diastolic:    Patient is in euvolemic status continue current dose of diuretics.  Decrease salt and water intake    6.  Mixed hyperlipidemia:    Patient is on Lipitor repeat lipid panel testing.

## 2024-09-06 ENCOUNTER — OFFICE VISIT (OUTPATIENT)
Dept: CARDIOLOGY | Facility: CLINIC | Age: 69
End: 2024-09-06
Payer: MEDICARE

## 2024-09-06 VITALS
BODY MASS INDEX: 31.34 KG/M2 | HEART RATE: 57 BPM | OXYGEN SATURATION: 95 % | RESPIRATION RATE: 16 BRPM | WEIGHT: 195 LBS | HEIGHT: 66 IN | SYSTOLIC BLOOD PRESSURE: 197 MMHG | DIASTOLIC BLOOD PRESSURE: 89 MMHG

## 2024-09-06 DIAGNOSIS — E78.2 MIXED HYPERLIPIDEMIA: Primary | ICD-10-CM

## 2024-09-06 DIAGNOSIS — I10 HYPERTENSION, BENIGN: ICD-10-CM

## 2024-09-06 DIAGNOSIS — I25.10 CAD, MULTIPLE VESSEL: Chronic | ICD-10-CM

## 2024-09-06 DIAGNOSIS — E11.9 TYPE 2 DIABETES MELLITUS WITHOUT COMPLICATION, WITHOUT LONG-TERM CURRENT USE OF INSULIN: ICD-10-CM

## 2024-09-06 RX ORDER — LOSARTAN POTASSIUM 50 MG/1
50 TABLET ORAL 2 TIMES DAILY
Qty: 180 TABLET | Refills: 1 | Status: SHIPPED | OUTPATIENT
Start: 2024-09-06

## 2024-09-06 RX ORDER — METOPROLOL TARTRATE 25 MG/1
25 TABLET, FILM COATED ORAL 2 TIMES DAILY
Qty: 180 TABLET | Refills: 1 | Status: SHIPPED | OUTPATIENT
Start: 2024-09-06 | End: 2025-03-05

## 2024-09-10 ENCOUNTER — OFFICE VISIT (OUTPATIENT)
Dept: FAMILY MEDICINE CLINIC | Facility: CLINIC | Age: 69
End: 2024-09-10
Payer: MEDICARE

## 2024-09-10 DIAGNOSIS — I48.0 PAROXYSMAL ATRIAL FIBRILLATION: ICD-10-CM

## 2024-09-10 DIAGNOSIS — E11.9 TYPE 2 DIABETES MELLITUS WITHOUT COMPLICATION, WITHOUT LONG-TERM CURRENT USE OF INSULIN: ICD-10-CM

## 2024-09-10 DIAGNOSIS — E78.2 MIXED HYPERLIPIDEMIA: ICD-10-CM

## 2024-09-10 DIAGNOSIS — J01.00 SUBACUTE MAXILLARY SINUSITIS: ICD-10-CM

## 2024-09-10 DIAGNOSIS — I10 HYPERTENSION, BENIGN: Primary | ICD-10-CM

## 2024-09-10 DIAGNOSIS — I25.10 CORONARY ARTERY DISEASE INVOLVING NATIVE CORONARY ARTERY OF NATIVE HEART WITHOUT ANGINA PECTORIS: ICD-10-CM

## 2024-09-10 DIAGNOSIS — J30.1 SEASONAL ALLERGIC RHINITIS DUE TO POLLEN: ICD-10-CM

## 2024-09-10 LAB
EXPIRATION DATE: NORMAL
FLUAV AG UPPER RESP QL IA.RAPID: NOT DETECTED
FLUBV AG UPPER RESP QL IA.RAPID: NOT DETECTED
INTERNAL CONTROL: NORMAL
Lab: NORMAL
SARS-COV-2 AG UPPER RESP QL IA.RAPID: NOT DETECTED

## 2024-09-10 PROCEDURE — 3079F DIAST BP 80-89 MM HG: CPT | Performed by: FAMILY MEDICINE

## 2024-09-10 PROCEDURE — 3044F HG A1C LEVEL LT 7.0%: CPT | Performed by: FAMILY MEDICINE

## 2024-09-10 PROCEDURE — 99214 OFFICE O/P EST MOD 30 MIN: CPT | Performed by: FAMILY MEDICINE

## 2024-09-10 PROCEDURE — 1126F AMNT PAIN NOTED NONE PRSNT: CPT | Performed by: FAMILY MEDICINE

## 2024-09-10 PROCEDURE — 95117 IMMUNOTHERAPY INJECTIONS: CPT | Performed by: FAMILY MEDICINE

## 2024-09-10 PROCEDURE — 87428 SARSCOV & INF VIR A&B AG IA: CPT | Performed by: FAMILY MEDICINE

## 2024-09-10 PROCEDURE — 3077F SYST BP >= 140 MM HG: CPT | Performed by: FAMILY MEDICINE

## 2024-09-10 RX ORDER — DOXYCYCLINE 100 MG/1
100 CAPSULE ORAL 2 TIMES DAILY
Qty: 20 CAPSULE | Refills: 0 | Status: SHIPPED | OUTPATIENT
Start: 2024-09-10

## 2024-09-10 RX ORDER — AMLODIPINE BESYLATE 5 MG/1
5 TABLET ORAL DAILY
Qty: 90 TABLET | Refills: 3 | Status: SHIPPED | OUTPATIENT
Start: 2024-09-10

## 2024-09-11 ENCOUNTER — TELEPHONE (OUTPATIENT)
Dept: CARDIOLOGY | Facility: CLINIC | Age: 69
End: 2024-09-11
Payer: MEDICARE

## 2024-09-13 ENCOUNTER — OFFICE VISIT (OUTPATIENT)
Dept: FAMILY MEDICINE CLINIC | Facility: CLINIC | Age: 69
End: 2024-09-13
Payer: MEDICARE

## 2024-09-13 DIAGNOSIS — Z23 NEED FOR VACCINATION: ICD-10-CM

## 2024-09-13 DIAGNOSIS — N18.31 STAGE 3A CHRONIC KIDNEY DISEASE: ICD-10-CM

## 2024-09-13 DIAGNOSIS — I48.0 PAROXYSMAL ATRIAL FIBRILLATION: ICD-10-CM

## 2024-09-13 DIAGNOSIS — E66.09 CLASS 1 OBESITY DUE TO EXCESS CALORIES WITH SERIOUS COMORBIDITY AND BODY MASS INDEX (BMI) OF 31.0 TO 31.9 IN ADULT: ICD-10-CM

## 2024-09-13 DIAGNOSIS — I10 HYPERTENSION, BENIGN: ICD-10-CM

## 2024-09-13 DIAGNOSIS — E11.9 TYPE 2 DIABETES MELLITUS WITHOUT COMPLICATION, WITHOUT LONG-TERM CURRENT USE OF INSULIN: Primary | ICD-10-CM

## 2024-09-13 DIAGNOSIS — I25.10 CORONARY ARTERY DISEASE INVOLVING NATIVE CORONARY ARTERY OF NATIVE HEART WITHOUT ANGINA PECTORIS: ICD-10-CM

## 2024-09-24 ENCOUNTER — CLINICAL SUPPORT (OUTPATIENT)
Dept: FAMILY MEDICINE CLINIC | Facility: CLINIC | Age: 69
End: 2024-09-24
Payer: MEDICARE

## 2024-09-24 DIAGNOSIS — J30.1 SEASONAL ALLERGIC RHINITIS DUE TO POLLEN: Primary | ICD-10-CM

## 2024-09-24 PROCEDURE — 95117 IMMUNOTHERAPY INJECTIONS: CPT | Performed by: FAMILY MEDICINE

## 2024-09-26 VITALS
OXYGEN SATURATION: 97 % | DIASTOLIC BLOOD PRESSURE: 98 MMHG | HEIGHT: 66 IN | HEART RATE: 72 BPM | RESPIRATION RATE: 18 BRPM | TEMPERATURE: 98.4 F | WEIGHT: 206 LBS | SYSTOLIC BLOOD PRESSURE: 160 MMHG | BODY MASS INDEX: 33.11 KG/M2

## 2024-09-29 VITALS
BODY MASS INDEX: 33.2 KG/M2 | WEIGHT: 206.6 LBS | SYSTOLIC BLOOD PRESSURE: 138 MMHG | DIASTOLIC BLOOD PRESSURE: 88 MMHG | OXYGEN SATURATION: 96 % | HEIGHT: 66 IN | HEART RATE: 55 BPM | RESPIRATION RATE: 18 BRPM | TEMPERATURE: 98 F

## 2024-09-30 NOTE — PROGRESS NOTES
Chief Complaint  Diabetes, Hypertension, Chronic Kidney Disease, and Atrial Fibrillation    Subjective     CC  Problem List  Visit Diagnosis   Encounters  Notes  Medications  Labs  Result Review Imaging  Media    Fransico Cuenca presents to [facility] for   Hypertension  This is a chronic problem. The current episode started more than 1 year ago. The problem has been stable since onset. The problem is controlled. Pertinent negatives include no anxiety, blurred vision, chest pain, headaches, malaise/fatigue, neck pain, palpitations, peripheral edema, PND, shortness of breath or sweats. There are no associated agents to hypertension. Risk factors for coronary artery disease include male gender, obesity, diabetes mellitus and dyslipidemia. Past treatments include ACE inhibitors. Current antihypertension treatment includes beta blockers, angiotensin blockers and diuretics. The current treatment provides mild improvement. There are no compliance problems.  Hypertensive end-organ damage includes kidney disease and CAD/MI. There is no history of CVA, heart failure or PVD. There is no history of coarctation of the aorta, hyperaldosteronism, a hypertension causing med, pheochromocytoma or renovascular disease.   Diabetes  He presents for his follow-up diabetic visit. He has type 2 diabetes mellitus. No MedicAlert identification noted. The initial diagnosis of diabetes was made 5 years ago. His disease course has been stable. There are no hypoglycemic associated symptoms. Pertinent negatives for hypoglycemia include no headaches, mood changes, nervousness/anxiousness, sleepiness or sweats. Pertinent negatives for diabetes include no blurred vision, no chest pain, no fatigue, no foot paresthesias, no polydipsia, no polyuria, no visual change, no weakness and no weight loss. There are no hypoglycemic complications. Symptoms are stable. There are no diabetic complications. Pertinent  negatives for diabetic complications include no CVA, nephropathy, peripheral neuropathy or PVD. Risk factors for coronary artery disease include diabetes mellitus, dyslipidemia, hypertension, male sex, obesity and family history. Current diabetic treatment includes oral agent (monotherapy) (Metformin). He is compliant with treatment most of the time. His weight is stable. He is following a generally healthy and diabetic diet. Meal planning includes avoidance of concentrated sweets. He has not had a previous visit with a dietitian. He participates in exercise daily. (Patient reports he does not check blood sugars at home) An ACE inhibitor/angiotensin II receptor blocker is being taken. He does not see a podiatrist.Eye exam is current ([facility] in [place]).   Chronic Kidney Disease  This is a chronic problem. The current episode started more than 1 year ago. The problem occurs constantly. Pertinent negatives include no abdominal pain, anorexia, arthralgias, change in bowel habit, chest pain, chills, fatigue, headaches, joint swelling, nausea, neck pain, numbness, rash, sore throat, swollen glands, vertigo, visual change, vomiting or weakness.   Atrial Fibrillation  Presents for follow-up visit. Symptoms include hypertension. Symptoms are negative for an AICD problem, bradycardia, chest pain, hemodynamic instability, hypotension, palpitations, shortness of breath, syncope, tachycardia and weakness. The symptoms have been stable. Past medical history includes atrial fibrillation and CAD. There are no medication compliance problems.   Coronary Artery Disease  Presents for follow-up visit. Pertinent negatives include no chest pain, chest tightness, palpitations or shortness of breath. Risk factors include hypertension.       Review of Systems   Constitutional:  Negative for chills, fatigue, malaise/fatigue and unexpected weight loss.   HENT:  Negative for sore throat and swollen glands.    Eyes:  Negative for  "blurred vision.   Respiratory:  Negative for chest tightness and shortness of breath.    Cardiovascular:  Negative for chest pain, palpitations, syncope and PND.   Gastrointestinal:  Negative for abdominal pain, anorexia, change in bowel habit, constipation, diarrhea, nausea and vomiting.   Endocrine: Negative for cold intolerance, heat intolerance, polydipsia and polyuria.   Genitourinary:  Negative for dysuria.   Musculoskeletal:  Negative for arthralgias, joint swelling and neck pain.   Skin:  Negative for rash.   Neurological:  Negative for vertigo, weakness and numbness.   Psychiatric/Behavioral:  The patient is not nervous/anxious.         Objective   Vital Signs:   /96 (BP Location: Right arm, Patient Position: Sitting, Cuff Size: Adult)   Pulse 77   Temp 98 °F (36.7 °C) (Temporal)   Resp 18   Ht 167 cm (65.75\")   Wt 95.5 kg (210 lb 9.6 oz)   SpO2 95%   BMI 34.25 kg/m²     Physical Exam  Constitutional:       General: He is not in acute distress.  Neck:      Thyroid: No thyromegaly.      Vascular: No carotid bruit or JVD.   Cardiovascular:      Rate and Rhythm: Normal rate and regular rhythm.      Pulses:           Dorsalis pedis pulses are 2+ on the right side and 2+ on the left side.      Heart sounds: No murmur heard.     Comments: Neg radha's bilaterally   Pulmonary:      Effort: Pulmonary effort is normal.      Breath sounds: Normal breath sounds. No wheezing.   Musculoskeletal:      Cervical back: Neck supple.      Right lower leg: No edema.      Left lower leg: No edema.      Comments: Negative Radha's   Lymphadenopathy:      Cervical: No cervical adenopathy.   Skin:     Findings: No rash.   Neurological:      Mental Status: He is alert.        Result Review :Labs  Result Review  Imaging  Med Tab  Media                 Assessment and Plan CC Problem List  Visit Diagnosis  ROS  Review (Popup)  Health Maintenance  Quality  BestPractice  Medications  SmartSets  SnapShot " Encounters  Media  Problem List Items Addressed This Visit          High    CKD (chronic kidney disease) stage 3a, GFR 30-59 ml/min (Chronic)    Overview     1.07 GFR 74 06/25/2024 resolved after CABG.         Hypertension, benign    Overview     Poor control here and at home add hydralazine and follow up 1 mo.         Current Assessment & Plan     Hypertension is stable and controlled  Continue current treatment regimen.  Blood pressure will be reassessed in 3 months.         Relevant Medications    hydrALAZINE (APRESOLINE) 25 MG tablet    Type 2 diabetes mellitus without complication, without long-term current use of insulin - Primary    Overview     A1c 5.9 10/02/2024 stable.   A1c 5.6 06/26/2024 improved.  he will f.u in 10/2024  A1c 6.2 03/12/2024 improved with lifestyle changes.   A1c 6.8 12.12.2023 worse less exercise.   A1c 6.1 09/11/2023 improved.   A1c 6.3 05/26/2026 stable he reports BS of 150  A1c 6.3 02/24/2023 slightly worse. But controlled.   A1c 5.9 11/08/2022 improved  A1c 7.8 05/03/2022 marginal control Actos begin for insurance  A1c 7.1 01/24/2022 [id number] 7.5  07/13/2021   A1c 7.5 12/21/2020 stable,    A1c 6.2 08/20/2019,   New onset 04/29/2019, A1c 7.2             Current Assessment & Plan     Diabetes is stable.   Continue current treatment regimen.  Diabetes will be reassessed in 3 months         Relevant Orders    POCT urinalysis dipstick, manual (Completed)    POC Glycosylated Hemoglobin (Hb A1C) (Completed)    Coronary artery disease involving native coronary artery of native heart without angina pectoris    Overview     A. Two-vessel LHC 4/23/2024                  I.  20% LM                 Ii.  95% pLAD with 80%mLAD                III.  90% LCx                            1. 60 to 70% proximal ramus  B.  Being worked up for CABG by CV surgery  C. Preserved EF 51 to 55% w/o DD  Bj  No sxs  Risk factor are modified.          Paroxysmal atrial fibrillation    Overview     During  MI and CABG sinus by exam, continue meds and cardiology follow.            Medium    Mixed hyperlipidemia    Overview     Stable, compliant LDL 72,  10/02/2024         Current Assessment & Plan      Lipid abnormalities are stable    Plan:  Continue same medication/s without change.      Discussed medication dosage, use, side effects, and goals of treatment in detail.    Counseled patient on lifestyle modifications to help control hyperlipidemia.     Patient Treatment Goals:   LDL goal is less than 70    Followup in 3 months.         Relevant Orders    Lipid Panel With / Chol / HDL Ratio (Completed)    Basic Metabolic Panel (Completed)       Low    Class 1 obesity due to excess calories with serious comorbidity and body mass index (BMI) of 31.0 to 31.9 in adult    Overview     Diet and organized wt loss encouraged         Relevant Medications    Ascorbic Acid (Vitamin C) 500 MG capsule       Follow Up Instructions Charge Capture  Follow-up Communications  Return in about 3 months (around 1/2/2025).  Patient was given instructions and counseling regarding his condition or for health maintenance advice. Please see specific information pulled into the AVS if appropriate.   Answers submitted by the patient for this visit:  Other (Submitted on 9/25/2024)  Please describe your symptoms.: Follow up  Have you had these symptoms before?: Yes  How long have you been having these symptoms?: 1-4 days  Primary Reason for Visit (Submitted on 9/25/2024)  What is the primary reason for your visit?: Problem Not Listed

## 2024-10-02 ENCOUNTER — OFFICE VISIT (OUTPATIENT)
Dept: FAMILY MEDICINE CLINIC | Facility: CLINIC | Age: 69
End: 2024-10-02
Payer: MEDICARE

## 2024-10-02 VITALS
BODY MASS INDEX: 33.85 KG/M2 | DIASTOLIC BLOOD PRESSURE: 96 MMHG | SYSTOLIC BLOOD PRESSURE: 152 MMHG | OXYGEN SATURATION: 95 % | RESPIRATION RATE: 18 BRPM | HEIGHT: 66 IN | TEMPERATURE: 98 F | WEIGHT: 210.6 LBS | HEART RATE: 77 BPM

## 2024-10-02 DIAGNOSIS — E11.9 TYPE 2 DIABETES MELLITUS WITHOUT COMPLICATION, WITHOUT LONG-TERM CURRENT USE OF INSULIN: Primary | ICD-10-CM

## 2024-10-02 DIAGNOSIS — E66.09 CLASS 1 OBESITY DUE TO EXCESS CALORIES WITH SERIOUS COMORBIDITY AND BODY MASS INDEX (BMI) OF 31.0 TO 31.9 IN ADULT: ICD-10-CM

## 2024-10-02 DIAGNOSIS — I48.0 PAROXYSMAL ATRIAL FIBRILLATION: ICD-10-CM

## 2024-10-02 DIAGNOSIS — E66.811 CLASS 1 OBESITY DUE TO EXCESS CALORIES WITH SERIOUS COMORBIDITY AND BODY MASS INDEX (BMI) OF 31.0 TO 31.9 IN ADULT: ICD-10-CM

## 2024-10-02 DIAGNOSIS — I25.10 CORONARY ARTERY DISEASE INVOLVING NATIVE CORONARY ARTERY OF NATIVE HEART WITHOUT ANGINA PECTORIS: ICD-10-CM

## 2024-10-02 DIAGNOSIS — E78.2 MIXED HYPERLIPIDEMIA: ICD-10-CM

## 2024-10-02 DIAGNOSIS — N18.31 STAGE 3A CHRONIC KIDNEY DISEASE: Chronic | ICD-10-CM

## 2024-10-02 DIAGNOSIS — I10 HYPERTENSION, BENIGN: ICD-10-CM

## 2024-10-02 LAB
BILIRUB BLD-MCNC: NEGATIVE MG/DL
CLARITY, POC: CLEAR
COLOR UR: YELLOW
EXPIRATION DATE: ABNORMAL
GLUCOSE UR STRIP-MCNC: NEGATIVE MG/DL
HBA1C MFR BLD: 5.9 % (ref 4.5–5.7)
KETONES UR QL: NEGATIVE
LEUKOCYTE EST, POC: NEGATIVE
Lab: ABNORMAL
NITRITE UR-MCNC: NEGATIVE MG/ML
PH UR: 5 [PH] (ref 5–8)
PROT UR STRIP-MCNC: NEGATIVE MG/DL
RBC # UR STRIP: NEGATIVE /UL
SP GR UR: 1.01 (ref 1–1.03)
UROBILINOGEN UR QL: NORMAL

## 2024-10-02 PROCEDURE — 1160F RVW MEDS BY RX/DR IN RCRD: CPT | Performed by: FAMILY MEDICINE

## 2024-10-02 PROCEDURE — 99214 OFFICE O/P EST MOD 30 MIN: CPT | Performed by: FAMILY MEDICINE

## 2024-10-02 PROCEDURE — 3044F HG A1C LEVEL LT 7.0%: CPT | Performed by: FAMILY MEDICINE

## 2024-10-02 PROCEDURE — 1126F AMNT PAIN NOTED NONE PRSNT: CPT | Performed by: FAMILY MEDICINE

## 2024-10-02 PROCEDURE — 3080F DIAST BP >= 90 MM HG: CPT | Performed by: FAMILY MEDICINE

## 2024-10-02 PROCEDURE — G2211 COMPLEX E/M VISIT ADD ON: HCPCS | Performed by: FAMILY MEDICINE

## 2024-10-02 PROCEDURE — 83036 HEMOGLOBIN GLYCOSYLATED A1C: CPT | Performed by: FAMILY MEDICINE

## 2024-10-02 PROCEDURE — 81002 URINALYSIS NONAUTO W/O SCOPE: CPT | Performed by: FAMILY MEDICINE

## 2024-10-02 PROCEDURE — 3077F SYST BP >= 140 MM HG: CPT | Performed by: FAMILY MEDICINE

## 2024-10-02 PROCEDURE — 1159F MED LIST DOCD IN RCRD: CPT | Performed by: FAMILY MEDICINE

## 2024-10-02 RX ORDER — AMIODARONE HYDROCHLORIDE 200 MG/1
200 TABLET ORAL DAILY
COMMUNITY
Start: 2024-09-19

## 2024-10-02 RX ORDER — HYDRALAZINE HYDROCHLORIDE 25 MG/1
25 TABLET, FILM COATED ORAL 3 TIMES DAILY
Qty: 60 TABLET | Refills: 12 | Status: SHIPPED | OUTPATIENT
Start: 2024-10-02

## 2024-10-02 RX ORDER — MULTIVIT-MIN/IRON/FOLIC ACID/K 18-600-40
500 CAPSULE ORAL DAILY
Start: 2024-10-02

## 2024-10-02 RX ORDER — MULTIVIT-MIN/IRON/FOLIC ACID/K 18-600-40
500 CAPSULE ORAL DAILY
COMMUNITY
End: 2024-10-02 | Stop reason: SDUPTHER

## 2024-10-03 LAB
BUN SERPL-MCNC: 24 MG/DL (ref 8–27)
BUN/CREAT SERPL: 20 (ref 10–24)
CALCIUM SERPL-MCNC: 9.3 MG/DL (ref 8.6–10.2)
CHLORIDE SERPL-SCNC: 102 MMOL/L (ref 96–106)
CHOLEST SERPL-MCNC: 140 MG/DL (ref 100–199)
CHOLEST/HDLC SERPL: 2.6 RATIO (ref 0–5)
CO2 SERPL-SCNC: 22 MMOL/L (ref 20–29)
CREAT SERPL-MCNC: 1.2 MG/DL (ref 0.76–1.27)
EGFRCR SERPLBLD CKD-EPI 2021: 65 ML/MIN/1.73
GLUCOSE SERPL-MCNC: 77 MG/DL (ref 70–99)
HDLC SERPL-MCNC: 54 MG/DL
LDLC SERPL CALC-MCNC: 72 MG/DL (ref 0–99)
POTASSIUM SERPL-SCNC: 4.7 MMOL/L (ref 3.5–5.2)
SODIUM SERPL-SCNC: 139 MMOL/L (ref 134–144)
TRIGL SERPL-MCNC: 70 MG/DL (ref 0–149)
VLDLC SERPL CALC-MCNC: 14 MG/DL (ref 5–40)

## 2024-10-04 NOTE — PROGRESS NOTES
Called patient about lab   Good morning we have your lab back and per Dr. Salinas   Your kidney function is stable. Your potassium is stable as well as calcium. Your total cholesterol was 140 this has increased from the last time it was checked as it was 137, your triglycerides was at 70 this has decreased from the last time it was checked as it was 92, your HDL (healthy cholesterol) was 54 this has decreased from the last time it was checked as it was 55( we want this to be as high as we can get it and we do this by exercise, exercise), your LDL ( lousy cholesterol) was 72 this has increased from the last time it was checked as it was 65. Your total cholesterol/cardiac ratio was 2.6 this has decreased from the last time as it was 2.7.  Continue to work on diet along with exercise and taking your medications as prescribed

## 2024-10-08 ENCOUNTER — CLINICAL SUPPORT (OUTPATIENT)
Dept: FAMILY MEDICINE CLINIC | Facility: CLINIC | Age: 69
End: 2024-10-08
Payer: MEDICARE

## 2024-10-08 DIAGNOSIS — J30.1 SEASONAL ALLERGIC RHINITIS DUE TO POLLEN: Primary | ICD-10-CM

## 2024-10-08 PROCEDURE — 95117 IMMUNOTHERAPY INJECTIONS: CPT | Performed by: FAMILY MEDICINE

## 2024-10-21 DIAGNOSIS — J30.1 SEASONAL ALLERGIC RHINITIS DUE TO POLLEN: ICD-10-CM

## 2024-10-21 RX ORDER — MONTELUKAST SODIUM 10 MG/1
10 TABLET ORAL DAILY
Qty: 90 TABLET | Refills: 3 | Status: SHIPPED | OUTPATIENT
Start: 2024-10-21

## 2024-10-22 ENCOUNTER — CLINICAL SUPPORT (OUTPATIENT)
Dept: FAMILY MEDICINE CLINIC | Facility: CLINIC | Age: 69
End: 2024-10-22
Payer: MEDICARE

## 2024-10-22 DIAGNOSIS — J30.1 SEASONAL ALLERGIC RHINITIS DUE TO POLLEN: Primary | ICD-10-CM

## 2024-10-22 PROCEDURE — 95117 IMMUNOTHERAPY INJECTIONS: CPT | Performed by: FAMILY MEDICINE

## 2024-10-29 ENCOUNTER — TELEPHONE (OUTPATIENT)
Dept: FAMILY MEDICINE CLINIC | Facility: CLINIC | Age: 69
End: 2024-10-29
Payer: MEDICARE

## 2024-10-29 DIAGNOSIS — I25.10 CAD, MULTIPLE VESSEL: Chronic | ICD-10-CM

## 2024-10-29 DIAGNOSIS — I10 HYPERTENSION, BENIGN: ICD-10-CM

## 2024-10-29 RX ORDER — METOPROLOL TARTRATE 25 MG/1
25 TABLET, FILM COATED ORAL 2 TIMES DAILY
Qty: 180 TABLET | Refills: 1 | Status: SHIPPED | OUTPATIENT
Start: 2024-10-29 | End: 2025-04-27

## 2024-10-29 RX ORDER — AMIODARONE HYDROCHLORIDE 200 MG/1
200 TABLET ORAL DAILY
Qty: 90 TABLET | Refills: 3 | Status: CANCELLED | OUTPATIENT
Start: 2024-10-29

## 2024-10-29 RX ORDER — HYDRALAZINE HYDROCHLORIDE 25 MG/1
25 TABLET, FILM COATED ORAL 3 TIMES DAILY
Qty: 270 TABLET | Refills: 3 | Status: SHIPPED | OUTPATIENT
Start: 2024-10-29

## 2024-10-29 NOTE — TELEPHONE ENCOUNTER
2020       Dereje Parker MD  [address]  [facility] [address]  [facility] [address]  VIA In Basket      Patient: Alec Hollingsworth   YOB: 2010   Date of Visit: 2020       Dear Dr. Parker:    Thank you for referring Alec Hollingsworth to me for evaluation. Below are my notes for this visit with him.    If you have questions, please do not hesitate to call me. I look forward to following your patient along with you.      Sincerely,        Haleigh Montiel PA-C        CC: No Recipients  Haleigh Montiel PA-C  2020 10:09 AM  Sign when Signing Visit  Visit Type:  Initial Consultation: Dereje Parker MD    Chief Complaint   Patient presents with   • UTI     prevoid 143ml  postvoid 12ml     Subjective   Alec is a 9 year old male who presents for an initial consultation for \"recurrent UTI.\" He was seen in urgent care on 20 for urinary frequency, urgency, and dysuria. His urine came back negative for infection, however, his symptoms improved so he completed the abx. He also describes pain at the tip of his penis. The symptoms returned again last week but today he is asymptomatic.    Mom thinks that Alec has had similar symptoms about 6 times in the past year. He has only taken antibiotics one time. Other episodes resolved with hydrocortisone and aquaphor around the glans.      Alec reports that he has BMs up to 3 times daily that are easy to evacuate. Mom thinks that sometimes he is in the bathroom for a long time, but she thinks it's because he is playing on electronics while on the toilet. He only drinks water.     Birth History:   Gestational age: Full-term  Prenatal Ultrasound Findings: Normal   Course: Normal       Past Medical History:   Diagnosis Date   • Obesity 2017   • RAD (reactive airway disease)      Past Surgical History:   Procedure Laterality Date   • Circumcision, primary     • Hand/finger surgery unlisted Left      Current Outpatient Medications   Medication Sig Dispense  Requesting refills of metoprolol 25mg,amiodarone 200mg,hydralazine 25mg to be sent to [facility] [place].   Refill   • PROAIR  (90 Base) MCG/ACT inhaler INHALE 2 PUFFS EVERY 4-6 HOURS AS NEEDED 17 Inhaler 0     No current facility-administered medications for this visit.      No Known Allergies    Family History   Problem Relation Age of Onset   • Patient is unaware of any medical problems Mother        Social History     Tobacco Use   • Smoking status: Never Smoker   • Smokeless tobacco: Never Used   Substance Use Topics   • Alcohol use: Not on file     Patient's medications, allergies, past medical, surgical, social and family histories were reviewed and updated as appropriate.    Review Of Systems:  Constitutional: fever  Allergies: Normal   Neurologic: Normal   Eyes: Normal  ENT: snoring  Respiratory: Normal    Cardiovascular: Normal   Gastrointestinal: Normal   Genitourinary: As per HPI  Endocrine: Normal  Skin: dry skin  Musculoskeletal: ? Tip toes   Hematologic/Lymphatic: Normal   Psychiatric: Normal      Physical Exam  Vitals:    07/22/20 0928 07/22/20 0930   BP: (!) 128/62 (!) 128/62   Pulse: 105    Weight: (!) 76.5 kg (168 lb 10.4 oz)    Height: 5' 3.78\" (1.62 m)      Constitutional: well developed, in no acute distress, overweight   Psychiatric: behavior/orientation age appropriate, mood and affect normal.   HEENT: head normocephalic and atraumatic, ears normal and symmetrical.   Neck: normal.   Respiratory: unlabored respiratory effort.   Cardiovascular: extremities warm and well perfused, no peripheral edema.   Abdomen: no scars, nondistended, no masses/hernias. soft, nontender, no hepatosplenomegaly, no masses.   Skin: no rashes, lesions, ulcers.   Male Genitourinary:  - Penis: normal length, circumcised  - Meatus: visible, orthotopic, normal caliber  - Scrotum: Right: normal Left: normal  - Testicles: Right: well-descended, normal in size and no masses Left: well-descended, normal in size and no masses  - Epididymis: left epididymis normal, right epididymis normal.   - Anus: orthotopic.    Musculoskeletal:      - Spine: normal.   - Sacral Dimple: none   - Muscle Strength/Tone: normal      Results/Data:   Bladder scan:   Results for orders placed or performed in visit on 07/22/20   POCT BLADDER SCAN   Result Value    BLDR Scan mLs 143->12     Labs:  5/17/20  U/A: SG 1.025, pos nitrites, neg leuk est, *orange color* (took OTC med)  Cx: no growth          Assessment / Plan  Diagnoses and all orders for this visit:  Dysuria  -     POCT BLADDER SCAN     -Reassured that bladder emptying today was appropriate and P/E showed no evidence of meatal stenosis or other concern  -Discussed the nonspecific and variable nature of constipation-related symptoms - monitor BM's around the times he is symptomatic  -Consider Miralax 1 capful daily prn  -Increase water intake - goal of 2L daily  -Timed voiding Q 2-3 hours  -Apply a nonmedicated ointment to the urethral meatus as needed for dysuria    Call if recurrence in symptoms or f/u prn    Handouts provided: Miralax    I reviewed the above recommendations with Alec's mother who expressed understanding and agreed with this plan.    Haleigh Montiel PA-C  Pediatric Urology  [facility]

## 2024-10-29 NOTE — TELEPHONE ENCOUNTER
Called Mike- Per Dr. Lorenzo note on 9/6, amiodarone was discontinued- left VM for him to call me back

## 2024-11-01 NOTE — PROGRESS NOTES
Chief Complaint  Hypertension, Coronary Artery Disease, Atrial Fibrillation, Diabetes, and Cough    Subjective     CC  Problem List  Visit Diagnosis   Encounters  Notes  Medications  Labs  Result Review Imaging  Media    Fransico Cuenca presents to [facility] for   Hypertension  This is a chronic problem. The current episode started more than 1 year ago. The problem has been worse since onset. The problem is uncontrolled. Pertinent negatives include no anxiety, chest pain, headaches, malaise/fatigue, neck pain, palpitations, peripheral edema, PND, shortness of breath or sweats. There are no associated agents to hypertension. Risk factors for coronary artery disease include male gender, obesity, diabetes mellitus and dyslipidemia. Past treatments include ACE inhibitors. Current antihypertension treatment includes beta blockers, angiotensin blockers and diuretics. The current treatment provides mild improvement. There are no compliance problems.  Hypertensive end-organ damage includes kidney disease and CAD/MI. There is no history of angina, CVA, heart failure, left ventricular hypertrophy, PVD or retinopathy. There is no history of coarctation of the aorta, hyperaldosteronism, a hypertension causing med, pheochromocytoma or renovascular disease.   Diabetes  He presents for his follow-up diabetic visit. He has type 2 diabetes mellitus. No MedicAlert identification noted. The initial diagnosis of diabetes was made 5 years ago. His disease course has been stable. There are no hypoglycemic associated symptoms. Pertinent negatives for hypoglycemia include no dizziness, headaches, mood changes, nervousness/anxiousness, pallor, seizures, sleepiness or sweats. Pertinent negatives for diabetes include no chest pain, no foot paresthesias, no foot ulcerations, no polydipsia, no polyphagia, no polyuria, no visual change, no weakness and no weight loss. There are no hypoglycemic  complications. Pertinent negatives for hypoglycemia complications include no hospitalization. Symptoms are stable. There are no diabetic complications. Pertinent negatives for diabetic complications include no CVA, heart disease, nephropathy, PVD or retinopathy. Risk factors for coronary artery disease include diabetes mellitus, dyslipidemia, hypertension, male sex, obesity and family history. Current diabetic treatment includes oral agent (monotherapy) (Metformin). He is compliant with treatment some of the time. His weight is stable. He is following a generally unhealthy diet. When asked about meal planning, he reported none. He has not had a previous visit with a dietitian. He participates in exercise daily. (Patient reports he does not check blood sugars at home) An ACE inhibitor/angiotensin II receptor blocker is being taken. He does not see a podiatrist.Eye exam is current ([facility] in [place]).   Chronic Kidney Disease  This is a chronic problem. The current episode started more than 1 year ago. The problem occurs constantly. Associated symptoms include coughing and a sore throat. Pertinent negatives include no abdominal pain, anorexia, chest pain, chills, congestion, headaches, joint swelling, myalgias, nausea, neck pain, numbness, rash, swollen glands, vertigo, visual change, vomiting or weakness. The symptoms are aggravated by drinking and eating.   Atrial Fibrillation  Presents for follow-up visit. Symptoms include hypertension. Symptoms are negative for an AICD problem, bradycardia, chest pain, dizziness, hemodynamic instability, hypotension, pacemaker problem, palpitations, shortness of breath, syncope, tachycardia and weakness. The symptoms have been stable. Past medical history includes atrial fibrillation and CAD. There are no medication compliance problems.   Coronary Artery Disease  Presents for follow-up visit. Pertinent negatives include no chest pain, chest tightness, dizziness, leg  "swelling, palpitations, shortness of breath or weight gain. Risk factors include hypertension. The symptoms have been stable. Compliance with diet is variable. Compliance with exercise is variable. Compliance with medications is variable.   Cough  This is a new problem. The current episode started yesterday. The problem has been unchanged. The cough is Productive of clear sputum. Associated symptoms include nasal congestion, postnasal drip and a sore throat. Pertinent negatives include no chest pain, chills, ear congestion, ear pain, headaches, myalgias, rash, shortness of breath, sweats or weight loss. He has tried nothing for the symptoms. The treatment provided mild relief. His past medical history is significant for environmental allergies.       Review of Systems   Constitutional:  Negative for chills, malaise/fatigue, unexpected weight gain and unexpected weight loss.   HENT:  Positive for postnasal drip and sore throat. Negative for congestion, ear pain and swollen glands.    Respiratory:  Positive for cough. Negative for chest tightness and shortness of breath.    Cardiovascular:  Negative for chest pain, palpitations, leg swelling, syncope and PND.   Gastrointestinal:  Negative for abdominal pain, anorexia, nausea and vomiting.   Endocrine: Negative for polydipsia, polyphagia and polyuria.   Musculoskeletal:  Negative for joint swelling, myalgias and neck pain.   Skin:  Negative for pallor and rash.   Allergic/Immunologic: Positive for environmental allergies.   Neurological:  Negative for dizziness, vertigo, seizures, weakness and numbness.   Psychiatric/Behavioral:  The patient is not nervous/anxious.         Objective   Vital Signs:   /76 (BP Location: Right arm, Patient Position: Sitting, Cuff Size: Adult)   Pulse 65   Temp 97 °F (36.1 °C) (Temporal)   Resp 18   Ht 167 cm (65.75\")   Wt 94.8 kg (209 lb)   SpO2 96%   BMI 33.99 kg/m²     Physical Exam  Constitutional:       General: He is not " in acute distress.  HENT:      Right Ear: Tympanic membrane normal.      Left Ear: Tympanic membrane normal.      Nose:      Right Sinus: Maxillary sinus tenderness present.      Left Sinus: Maxillary sinus tenderness present.      Mouth/Throat:      Pharynx: No oropharyngeal exudate or posterior oropharyngeal erythema (moderate post nasal congestion.).   Neck:      Thyroid: No thyromegaly.      Vascular: No carotid bruit or JVD.   Cardiovascular:      Rate and Rhythm: Normal rate and regular rhythm.      Pulses:           Dorsalis pedis pulses are 2+ on the right side and 2+ on the left side.      Heart sounds: No murmur heard.     Comments: Neg radha's bilaterally   Pulmonary:      Effort: Pulmonary effort is normal.      Breath sounds: Normal breath sounds. No wheezing.   Musculoskeletal:      Cervical back: Neck supple.      Right lower leg: No edema.      Left lower leg: No edema.      Comments: Negative Radha's   Lymphadenopathy:      Cervical: No cervical adenopathy.   Skin:     Findings: No rash.   Neurological:      Mental Status: He is alert.        Result Review :Labs  Result Review  Imaging  Med Tab  Media                 Assessment and Plan CC Problem List  Visit Diagnosis  ROS  Review (Popup)  Health Maintenance  Quality  BestPractice  Medications  SmartSets  SnapShot Encounters  Media  Problem List Items Addressed This Visit          High    Hypertension, benign    Overview     Controlled with addition of hydralazine compliant continue         Current Assessment & Plan     Hypertension is stable and controlled  Continue current treatment regimen.  Blood pressure will be reassessed in 3 months.         Type 2 diabetes mellitus without complication, without long-term current use of insulin - Primary    Overview     A1c 5.9 10/02/2024 stable.  at home  A1c 5.6 06/26/2024 improved.   A1c 6.2 03/12/2024 improved with lifestyle changes.   A1c 6.8 12.12.2023 worse less exercise.   A1c  6.1 09/11/2023 improved.   A1c 6.3 05/26/2026 stable he reports BS of 150  A1c 6.3 02/24/2023 slightly worse. But controlled.   A1c 5.9 11/08/2022 improved  A1c 7.8 05/03/2022 marginal control Actos begin for insurance  A1c 7.1 01/24/2022 [id number] 7.5  07/13/2021   A1c 7.5 12/21/2020 stable,    A1c 6.2 08/20/2019,   New onset 04/29/2019, A1c 7.2             Current Assessment & Plan     Diabetes is stable.   Continue current treatment regimen.  Diabetes will be reassessed in 3 months         Coronary artery disease involving native coronary artery of native heart without angina pectoris    Overview     A. Two-vessel LHC 4/23/2024                  I.  20% LM                 Ii.  95% pLAD with 80%mLAD                III.  90% LCx                            1. 60 to 70% proximal ramus  B.  Being worked up for CABG by CV surgery  C. Preserved EF 51 to 55% w/o DD  Bj  No sxs  Risk factor are modified.          Paroxysmal atrial fibrillation    Overview     During MI and CABG sinus by exam, continue meds and cardiology follow up on anti coagulation.            Medium    Mixed hyperlipidemia    Overview     Stable, compliant LDL 72,  10/02/2024         Allergic rhinitis due to pollen    Overview     Exacerbation. Resume meds continue Immuno Rx.              Low    Anxiety    Overview     Stable on meds which are continued. Pros and cons of Rx doscussed         Relevant Medications    citalopram (CeleXA) 20 MG tablet    Class 1 obesity due to excess calories with serious comorbidity and body mass index (BMI) of 31.0 to 31.9 in adult    Overview     Diet and organized wt loss encouraged         Current Assessment & Plan     Patient's (Body mass index is 33.99 kg/m².) indicates that they are obese (BMI >30) with health conditions that include hypertension, coronary heart disease, diabetes mellitus, and dyslipidemias . Weight is unchanged. BMI  is above average; BMI management plan is completed. We discussed portion  control and increasing exercise.           Other Visit Diagnoses       Subacute maxillary sinusitis        abx fluids saline flushes notify us of no improvement.    Relevant Medications    amoxicillin (AMOXIL) 500 MG tablet            Follow Up Instructions Charge Capture  Follow-up Communications  No follow-ups on file.  Patient was given instructions and counseling regarding his condition or for health maintenance advice. Please see specific information pulled into the AVS if appropriate.

## 2024-11-05 ENCOUNTER — OFFICE VISIT (OUTPATIENT)
Dept: FAMILY MEDICINE CLINIC | Facility: CLINIC | Age: 69
End: 2024-11-05
Payer: MEDICARE

## 2024-11-05 VITALS
WEIGHT: 209 LBS | OXYGEN SATURATION: 96 % | BODY MASS INDEX: 33.59 KG/M2 | HEIGHT: 66 IN | SYSTOLIC BLOOD PRESSURE: 130 MMHG | HEART RATE: 65 BPM | DIASTOLIC BLOOD PRESSURE: 76 MMHG | TEMPERATURE: 97 F | RESPIRATION RATE: 18 BRPM

## 2024-11-05 DIAGNOSIS — J30.1 SEASONAL ALLERGIC RHINITIS DUE TO POLLEN: ICD-10-CM

## 2024-11-05 DIAGNOSIS — E66.09 CLASS 1 OBESITY DUE TO EXCESS CALORIES WITH SERIOUS COMORBIDITY AND BODY MASS INDEX (BMI) OF 31.0 TO 31.9 IN ADULT: ICD-10-CM

## 2024-11-05 DIAGNOSIS — E66.811 CLASS 1 OBESITY DUE TO EXCESS CALORIES WITH SERIOUS COMORBIDITY AND BODY MASS INDEX (BMI) OF 31.0 TO 31.9 IN ADULT: ICD-10-CM

## 2024-11-05 DIAGNOSIS — I25.10 CORONARY ARTERY DISEASE INVOLVING NATIVE CORONARY ARTERY OF NATIVE HEART WITHOUT ANGINA PECTORIS: ICD-10-CM

## 2024-11-05 DIAGNOSIS — E11.9 TYPE 2 DIABETES MELLITUS WITHOUT COMPLICATION, WITHOUT LONG-TERM CURRENT USE OF INSULIN: Primary | ICD-10-CM

## 2024-11-05 DIAGNOSIS — I10 HYPERTENSION, BENIGN: ICD-10-CM

## 2024-11-05 DIAGNOSIS — I48.0 PAROXYSMAL ATRIAL FIBRILLATION: ICD-10-CM

## 2024-11-05 DIAGNOSIS — E78.2 MIXED HYPERLIPIDEMIA: ICD-10-CM

## 2024-11-05 DIAGNOSIS — J01.00 SUBACUTE MAXILLARY SINUSITIS: ICD-10-CM

## 2024-11-05 DIAGNOSIS — F41.9 ANXIETY: ICD-10-CM

## 2024-11-05 PROBLEM — N18.30 CKD (CHRONIC KIDNEY DISEASE) STAGE 3, GFR 30-59 ML/MIN: Chronic | Status: RESOLVED | Noted: 2024-04-29 | Resolved: 2024-11-05

## 2024-11-05 RX ORDER — CITALOPRAM HYDROBROMIDE 20 MG/1
20 TABLET ORAL DAILY
Qty: 90 TABLET | Refills: 3 | Status: CANCELLED | OUTPATIENT
Start: 2024-11-05

## 2024-11-05 RX ORDER — CITALOPRAM HYDROBROMIDE 20 MG/1
20 TABLET ORAL DAILY
Qty: 90 TABLET | Refills: 3 | Status: SHIPPED | OUTPATIENT
Start: 2024-11-05

## 2024-11-05 RX ORDER — AMOXICILLIN 500 MG/1
500 TABLET, FILM COATED ORAL 3 TIMES DAILY
Qty: 30 TABLET | Refills: 0 | Status: SHIPPED | OUTPATIENT
Start: 2024-11-05

## 2024-11-11 ENCOUNTER — TELEPHONE (OUTPATIENT)
Dept: CARDIOLOGY | Facility: CLINIC | Age: 69
End: 2024-11-11
Payer: MEDICARE

## 2024-11-16 NOTE — ASSESSMENT & PLAN NOTE
Patient's (Body mass index is 33.99 kg/m².) indicates that they are obese (BMI >30) with health conditions that include hypertension, coronary heart disease, diabetes mellitus, and dyslipidemias . Weight is unchanged. BMI  is above average; BMI management plan is completed. We discussed portion control and increasing exercise.

## 2024-11-19 ENCOUNTER — CLINICAL SUPPORT (OUTPATIENT)
Dept: FAMILY MEDICINE CLINIC | Facility: CLINIC | Age: 69
End: 2024-11-19
Payer: MEDICARE

## 2024-11-19 DIAGNOSIS — J30.1 SEASONAL ALLERGIC RHINITIS DUE TO POLLEN: Primary | ICD-10-CM

## 2024-11-19 PROCEDURE — 95117 IMMUNOTHERAPY INJECTIONS: CPT | Performed by: FAMILY MEDICINE

## 2024-11-21 ENCOUNTER — TELEPHONE (OUTPATIENT)
Dept: FAMILY MEDICINE CLINIC | Facility: CLINIC | Age: 69
End: 2024-11-21
Payer: MEDICARE

## 2024-11-21 DIAGNOSIS — J01.00 SUBACUTE MAXILLARY SINUSITIS: ICD-10-CM

## 2024-11-21 DIAGNOSIS — E11.9 TYPE 2 DIABETES MELLITUS WITHOUT COMPLICATION, WITHOUT LONG-TERM CURRENT USE OF INSULIN: ICD-10-CM

## 2024-11-21 NOTE — TELEPHONE ENCOUNTER
Patient is requesting refills on metFORMIN (GLUCOPHAGE) 1000 MG tablet, amoxicillin (AMOXIL) 500 MG tablet, magnesium oxide (MAG-OX) 400 MG tablet. Please advise, thank you.

## 2024-11-22 RX ORDER — AMOXICILLIN 500 MG/1
500 TABLET, FILM COATED ORAL 3 TIMES DAILY
Qty: 30 TABLET | Refills: 0 | Status: SHIPPED | OUTPATIENT
Start: 2024-11-22

## 2024-11-22 RX ORDER — MAGNESIUM OXIDE 400 MG/1
400 TABLET ORAL DAILY
Qty: 90 TABLET | Refills: 3 | Status: SHIPPED | OUTPATIENT
Start: 2024-11-22

## 2024-12-10 ENCOUNTER — CLINICAL SUPPORT (OUTPATIENT)
Dept: FAMILY MEDICINE CLINIC | Facility: CLINIC | Age: 69
End: 2024-12-10
Payer: MEDICARE

## 2024-12-10 DIAGNOSIS — J30.1 SEASONAL ALLERGIC RHINITIS DUE TO POLLEN: Primary | ICD-10-CM

## 2024-12-10 PROCEDURE — 95117 IMMUNOTHERAPY INJECTIONS: CPT | Performed by: FAMILY MEDICINE

## 2024-12-17 ENCOUNTER — CLINICAL SUPPORT (OUTPATIENT)
Dept: FAMILY MEDICINE CLINIC | Facility: CLINIC | Age: 69
End: 2024-12-17
Payer: MEDICARE

## 2024-12-17 DIAGNOSIS — J30.1 SEASONAL ALLERGIC RHINITIS DUE TO POLLEN: Primary | ICD-10-CM

## 2024-12-17 PROCEDURE — 95117 IMMUNOTHERAPY INJECTIONS: CPT | Performed by: FAMILY MEDICINE

## 2024-12-17 NOTE — PROGRESS NOTES
Allergy Injection only    Vial A Given: lt 50    Vial B Given: rt 50    Patient tolerated well no reaction.

## 2024-12-18 ENCOUNTER — TELEPHONE (OUTPATIENT)
Dept: CARDIOLOGY | Facility: CLINIC | Age: 69
End: 2024-12-18
Payer: MEDICARE

## 2025-01-02 ENCOUNTER — CLINICAL SUPPORT (OUTPATIENT)
Dept: FAMILY MEDICINE CLINIC | Facility: CLINIC | Age: 70
End: 2025-01-02
Payer: MEDICARE

## 2025-01-02 DIAGNOSIS — J30.1 SEASONAL ALLERGIC RHINITIS DUE TO POLLEN: Primary | ICD-10-CM

## 2025-01-02 PROCEDURE — 95117 IMMUNOTHERAPY INJECTIONS: CPT | Performed by: FAMILY MEDICINE

## 2025-01-14 ENCOUNTER — CLINICAL SUPPORT (OUTPATIENT)
Dept: FAMILY MEDICINE CLINIC | Facility: CLINIC | Age: 70
End: 2025-01-14
Payer: MEDICARE

## 2025-01-14 DIAGNOSIS — J30.1 SEASONAL ALLERGIC RHINITIS DUE TO POLLEN: Primary | ICD-10-CM

## 2025-01-14 PROCEDURE — 95117 IMMUNOTHERAPY INJECTIONS: CPT | Performed by: FAMILY MEDICINE

## 2025-02-12 NOTE — PROGRESS NOTES
Chief Complaint  [facility]    Subjective          Fransico Cuenca presents to [facility] for     History of Present Illness  The patient is a 69-year-old male who presents to establish care. He is a former patient of Dr. Salinas.    He has a history of myocardial infarction and CAD amd is under the care of Dr. Lorenzo, a cardiologist, with whom he has a scheduled appointment next month. He underwent quadruple coronary artery bypass grafting (CABG) and developed atrial fibrillation postoperatively, necessitating anticoagulation therapy. He is currently on Eliquis and amiodarone.    He has been monitoring his blood pressure at home for the past 3 months, as advised by Dr. Lorenzo, with readings typically around 160 systolic in the morning and 150/85 in the afternoon or before bedtime. He also reports occasional systolic readings in the 180s and diastolic readings in the 90s. He has had a few readings below 120, including one earlier today. He attributes these elevated readings to stress, having recently lost several family members. He is uncertain about previous use of hydrochlorothiazide but confirms long-term use of antihypertensive medications. He is compliant with his medication regimen, which includes amlodipine 5 mg daily, losartan 50 mg twice daily, bumetanide, and hydralazine 25 mg three times daily. He has discontinued his potassium supplement.    He reports experiencing anxiety related to his cardiac history, particularly when he develops a cough or dyspnea. He has been feeling anxious recently due to a misunderstanding with a friend and admits to being easily stressed. 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 He reports difficulty relaxing and a fear of making mistakes. He does not endorse any suicidal or homicidal ideation. He has been on citalopram 20 mg daily since 2018 but does not believe it is  "effective.      Objective   Vital Signs:   /62 (BP Location: Right arm, Patient Position: Sitting, Cuff Size: Adult)   Pulse 82   Temp 98.7 °F (37.1 °C) (Temporal)   Resp 18   Ht 167 cm (65.75\")   Wt 98.2 kg (216 lb 6.4 oz)   SpO2 94% Comment: room air  BMI 35.19 kg/m²     Physical Exam  Vitals and nursing note reviewed.   Constitutional:       General: He is not in acute distress.     Appearance: Normal appearance. He is not ill-appearing or diaphoretic.   HENT:      Head: Normocephalic and atraumatic.      Nose: No congestion or rhinorrhea.   Eyes:      Extraocular Movements: Extraocular movements intact.      Pupils: Pupils are equal, round, and reactive to light.   Cardiovascular:      Rate and Rhythm: Normal rate and regular rhythm.      Pulses: Normal pulses.   Pulmonary:      Effort: Pulmonary effort is normal.      Breath sounds: Normal breath sounds.   Musculoskeletal:         General: Normal range of motion.      Cervical back: Normal range of motion.   Skin:     General: Skin is warm and dry.   Neurological:      Mental Status: He is alert and oriented to person, place, and time.   Psychiatric:         Mood and Affect: Mood is anxious.         Behavior: Behavior normal.        Result Review :                 Assessment and Plan    Diagnoses and all orders for this visit:    1. Hypertension, benign (Primary)  Overview:  Regimen: Amlodipine 5 mg qd, losartan 50 mg bid, hydralazine 25 mg tid.    Assessment & Plan:  His blood pressure readings have consistently been elevated, necessitating improved management. His potassium levels have also been slightly elevated. He has discontinued his potassium supplement. He is encouraged to continue monitoring his blood pressure at home. Dietary modifications, including the use of salt substitutes, are recommended. While his blood pressure has been elevated as of late, he has notably been under tremendous stress and has been suffering anxiety in multiple " settings on a daily basis. Notably, he BP is normal in the office today at 114/62. We will first address his anxiety as below and only increase his antihypertensive regimen if BP remains elevated once his anxiety has improved/resolved.      2. Anxiety  Assessment & Plan:  His anxiety appears to be multifactorial, potentially contributing to his hypertension. He has been on citalopram 20 mg daily since 2018, but it does not seem to be effective. He is informed that it may take 4 to 6 weeks for the medication to take full effect and that his symptoms may initially worsen during the first 1 to 2 weeks. He is advised to discontinue citalopram and start Lexapro 10 mg daily. He is instructed to stop taking Lexapro and inform the provider immediately if he experiences severe depression, suicidal ideation, or panic attacks. BuSpar will be added to his regimen, to be taken twice daily, to provide more immediate relief from anxiety symptoms. We will f/u closely in 1 month or sooner if needed.    Orders:  -     escitalopram (Lexapro) 10 MG tablet; Take 1 tablet by mouth Daily.  Dispense: 30 tablet; Refill: 1  -     busPIRone (BUSPAR) 7.5 MG tablet; Take 1 tablet by mouth 2 (Two) Times a Day.  Dispense: 60 tablet; Refill: 1        Follow Up   Return in about 4 weeks (around 3/14/2025).      Daniel Guzmán MD    NOTE: [employer], per Health Information accessibility laws, allows progress notes to be visible to patients. Please note that these notes will include professional medical terminology that may be somewhat confusing without some interpretation from your medical professional team. The intent of progress notes is to communicate information between any medical professionals involved in your case, or to serve as future reference for myself or any other provider when reviewing your case, as well as a reference for the patient viewing the record. Please ask a member of the medical team if you have any questions about  terminology or content of note.    DISCLAIMER: Part of this note may be an electronic transcription/translation of spoken language to printed text using the Dragon Dictation System.    Patient or patient representative verbalized consent for the use of Ambient Listening during the visit with  Daniel Guzmán MD for chart documentation. 2/15/2025

## 2025-02-14 ENCOUNTER — OFFICE VISIT (OUTPATIENT)
Dept: FAMILY MEDICINE CLINIC | Facility: CLINIC | Age: 70
End: 2025-02-14
Payer: MEDICARE

## 2025-02-14 VITALS
SYSTOLIC BLOOD PRESSURE: 114 MMHG | OXYGEN SATURATION: 94 % | HEIGHT: 66 IN | BODY MASS INDEX: 34.78 KG/M2 | HEART RATE: 82 BPM | TEMPERATURE: 98.7 F | RESPIRATION RATE: 18 BRPM | DIASTOLIC BLOOD PRESSURE: 62 MMHG | WEIGHT: 216.4 LBS

## 2025-02-14 DIAGNOSIS — F41.9 ANXIETY: ICD-10-CM

## 2025-02-14 DIAGNOSIS — I10 HYPERTENSION, BENIGN: Primary | ICD-10-CM

## 2025-02-14 RX ORDER — BUSPIRONE HYDROCHLORIDE 7.5 MG/1
7.5 TABLET ORAL 2 TIMES DAILY
Qty: 60 TABLET | Refills: 1 | Status: SHIPPED | OUTPATIENT
Start: 2025-02-14

## 2025-02-14 RX ORDER — ESCITALOPRAM OXALATE 10 MG/1
10 TABLET ORAL DAILY
Qty: 30 TABLET | Refills: 1 | Status: SHIPPED | OUTPATIENT
Start: 2025-02-14

## 2025-02-16 NOTE — ASSESSMENT & PLAN NOTE
07/21/22 0937   IMM Letter   IMM Letter given to Patient/Family/Significant other/Guardian/POA/by: given to pt & daughter at bedside, daughter verbalized Rubin Jackson RN   IMM Letter date given: 07/21/22   IMM Letter time given: [address], RN, BSN,   [phone]  Electronically signed by Ryley Fishman RN on 7/21/2022 at 9:38 AM His blood pressure readings have consistently been elevated, necessitating improved management. His potassium levels have also been slightly elevated. He has discontinued his potassium supplement. He is encouraged to continue monitoring his blood pressure at home. Dietary modifications, including the use of salt substitutes, are recommended. While his blood pressure has been elevated as of late, he has notably been under tremendous stress and has been suffering anxiety in multiple settings on a daily basis. Notably, he BP is normal in the office today at 114/62. We will first address his anxiety as below and only increase his antihypertensive regimen if BP remains elevated once his anxiety has improved/resolved.

## 2025-02-16 NOTE — ASSESSMENT & PLAN NOTE
His anxiety appears to be multifactorial, potentially contributing to his hypertension. He has been on citalopram 20 mg daily since 2018, but it does not seem to be effective. He is informed that it may take 4 to 6 weeks for the medication to take full effect and that his symptoms may initially worsen during the first 1 to 2 weeks. He is advised to discontinue citalopram and start Lexapro 10 mg daily. He is instructed to stop taking Lexapro and inform the provider immediately if he experiences severe depression, suicidal ideation, or panic attacks. BuSpar will be added to his regimen, to be taken twice daily, to provide more immediate relief from anxiety symptoms. We will f/u closely in 1 month or sooner if needed.

## 2025-02-25 ENCOUNTER — TELEPHONE (OUTPATIENT)
Dept: FAMILY MEDICINE CLINIC | Facility: CLINIC | Age: 70
End: 2025-02-25

## 2025-02-25 ENCOUNTER — CLINICAL SUPPORT (OUTPATIENT)
Dept: FAMILY MEDICINE CLINIC | Facility: CLINIC | Age: 70
End: 2025-02-25
Payer: MEDICARE

## 2025-02-25 DIAGNOSIS — J30.1 SEASONAL ALLERGIC RHINITIS DUE TO POLLEN: ICD-10-CM

## 2025-02-25 DIAGNOSIS — I10 HYPERTENSION, BENIGN: Primary | ICD-10-CM

## 2025-02-25 NOTE — TELEPHONE ENCOUNTER
Patient has been called and LMOM and notified per Dr. Lane     We can discuss it at our next visit. I have not seen him for this. He takes it to prevent gout. His uric acid levels remain elevated as of last check, so I would suggest that if he continues taking it it should probably be at a higher dose. If he has not had gout episodes since starting it, I would continue it. I am showing that he has refills available through June.

## 2025-02-25 NOTE — TELEPHONE ENCOUNTER
Patient is requesting to know if he needs to continue to take allopurinol 100mg. He states that he doesn't have a refill for this and wants to confirm if he should be on this medication.Please advise.

## 2025-02-27 DIAGNOSIS — E78.2 MIXED HYPERLIPIDEMIA: ICD-10-CM

## 2025-02-27 DIAGNOSIS — E11.9 TYPE 2 DIABETES MELLITUS WITHOUT COMPLICATION, WITHOUT LONG-TERM CURRENT USE OF INSULIN: ICD-10-CM

## 2025-02-27 DIAGNOSIS — I10 HYPERTENSION, BENIGN: ICD-10-CM

## 2025-02-27 RX ORDER — LOSARTAN POTASSIUM 50 MG/1
50 TABLET ORAL 2 TIMES DAILY
Qty: 180 TABLET | Refills: 3 | Status: SHIPPED | OUTPATIENT
Start: 2025-02-27

## 2025-03-13 ENCOUNTER — CLINICAL SUPPORT (OUTPATIENT)
Dept: FAMILY MEDICINE CLINIC | Facility: CLINIC | Age: 70
End: 2025-03-13
Payer: MEDICARE

## 2025-03-13 DIAGNOSIS — J30.1 SEASONAL ALLERGIC RHINITIS DUE TO POLLEN: Primary | ICD-10-CM

## 2025-03-13 PROCEDURE — 95117 IMMUNOTHERAPY INJECTIONS: CPT | Performed by: FAMILY MEDICINE

## 2025-03-14 NOTE — PROGRESS NOTES
Date of Office Visit: 2025  Encounter Provider: Dr. Santiago Lorenzo  Place of Service: [facility] [place]  Patient Name: Fransico Cuenca  :1955  Daniel Guzmán MD    Chief Complaint   Patient presents with    Hypertension    Hyperlipidemia    Diabetes    Follow-up     History of Present Illness    I am pleased to see Mr. Cuenca in my office today as a follow-up.    As you know, patient is 69 years old white gentleman whose past medical history is significant for hypertension, hyperlipidemia, CAD, CABG, postoperative atrial fibrillation who came today for follow-up.    In May 2024, patient was admitted in the hospital with progressive shortness of breath and chest discomfort.  Patient was found to be in congestive heart failure.  Patient underwent echocardiogram which showed EF of 50 to 55%.  Patient underwent cardiac catheterization which showed three-vessel coronary artery disease.  Patient underwent CABG x 4 by Dr. Hirsch.  Postoperatively patient developed atrial fibrillation and required anticoagulation and amiodarone.  Patient was discharged home on diuretics and Farxiga and Eliquis.    Patient came today for follow-up.  Patient continues to do well.  Patient is not in congestive heart failure.  Patient is maintaining sinus rhythm.  No recurrence of atrial fibrillation.  Patient denies any orthopnea, PND, syncope or presyncope.  No leg edema noted.    EKG showed normal sinus rhythm with first-degree AV block.    Patient is maintaining sinus rhythm.  I would consider stopping Eliquis on next visit.  Blood pressure is elevated I would increase hydralazine to 50 mg twice daily.  Patient is stable from coronary standpoint.  Patient is not in congestive heart failure change Bumex to every other day.      Past Medical History:   Diagnosis Date    Anxiety     Atrial fibrillation     Essential hypertension, benign     Gastroesophageal reflux disease without esophagitis     Kidney stones  05/31/2020    Dr. Kahlil Terrell    Mixed hyperlipidemia     Rotator cuff syndrome     Torn rotator about seven years ago    Seasonal allergic rhinitis due to pollen     Type 2 diabetes mellitus without complication, without long-term current use of insulin          Past Surgical History:   Procedure Laterality Date    CARDIAC CATHETERIZATION N/A 4/23/2024    Procedure: Left Heart Cath;  Surgeon: Santiago Lorenzo MD;  Location: [facility] CATH INVASIVE LOCATION;  Service: Cardiovascular;  Laterality: N/A;    CATARACT EXTRACTION Left 08/24/2023    Dr Melendez    CATARACT EXTRACTION Right 09/07/2023    Dr Melendez    CORONARY ARTERY BYPASS GRAFT N/A 5/15/2024    Procedure: CORONARY ARTERY BYPASS GRAFTING;  Surgeon: Jean Hirsch MD;  Location: [facility];  Service: Cardiothoracic;  Laterality: N/A;  CABG X4 (one sequential) using LIMA and left endosaphenous vein; 2 coronary markers implanted.    CYSTOSCOPY W/ LASER LITHOTRIPSY  01/2021    INGUINAL HERNIA REPAIR Left 10/2009    NOSE SURGERY      x2    TRANSESOPHAGEAL ECHOCARDIOGRAM (DIGNA) N/A 5/15/2024    Procedure: TRANSESOPHAGEAL ECHOCARDIOGRAM WITH ANESTHESIA;  Surgeon: Jean Hirsch MD;  Location: [facility];  Service: Cardiothoracic;  Laterality: N/A;    UMBILICAL HERNIA REPAIR  10/2009    Dr. Napier           Current Outpatient Medications:     albuterol sulfate  (90 Base) MCG/ACT inhaler, Inhale 2 puffs Every 4 (Four) Hours As Needed for Wheezing., Disp: 18 g, Rfl: 12    allopurinol (ZYLOPRIM) 100 MG tablet, Take 1 tablet by mouth Daily. For gout, Disp: 90 tablet, Rfl: 3    amLODIPine (NORVASC) 5 MG tablet, Take 1 tablet by mouth Daily., Disp: 90 tablet, Rfl: 3    apixaban (ELIQUIS) 5 MG tablet tablet, Take 1 tablet by mouth Every 12 (Twelve) Hours for 360 days. For blood thinning, Disp: 180 tablet, Rfl: 3    Ascorbic Acid (Vitamin C) 500 MG capsule, Take 500 mg by mouth Daily., Disp: , Rfl:     atorvastatin (LIPITOR) 40 MG tablet, Take 1 tablet by mouth Every  Night for 360 days. For cholesterol, Disp: 90 tablet, Rfl: 3    bumetanide (BUMEX) 1 MG tablet, Take 1 tablet by mouth Every Other Day for 360 days. For fluid, Disp: , Rfl:     busPIRone (BUSPAR) 7.5 MG tablet, Take 1 tablet by mouth 2 (Two) Times a Day., Disp: 60 tablet, Rfl: 1    cholecalciferol (Vitamin D) 25 MCG (1000 UT) tablet, Take 1 tablet by mouth Daily., Disp: 30 tablet, Rfl: 12    clopidogrel (PLAVIX) 75 MG tablet, Take 1 tablet by mouth Daily for 360 days., Disp: 90 tablet, Rfl: 3    Dapagliflozin Propanediol (FARXIGA PO), Take  by mouth., Disp: , Rfl:     escitalopram (Lexapro) 10 MG tablet, Take 1 tablet by mouth Daily., Disp: 30 tablet, Rfl: 1    ferrous sulfate 325 (65 FE) MG tablet, Take 1 tablet by mouth Daily With Breakfast., Disp: 90 tablet, Rfl: 3    hydrALAZINE (APRESOLINE) 50 MG tablet, Take 1 tablet by mouth Every 12 (Twelve) Hours., Disp: 180 tablet, Rfl: 1    losartan (COZAAR) 50 MG tablet, Take 1 tablet by mouth twice daily, Disp: 180 tablet, Rfl: 3    magnesium oxide (MAG-OX) 400 MG tablet, Take 1 tablet by mouth Daily., Disp: 90 tablet, Rfl: 3    metFORMIN (GLUCOPHAGE) 1000 MG tablet, Take 1 tablet by mouth 2 (Two) Times a Day With Meals. For blood sugar, Disp: 180 tablet, Rfl: 3    metoprolol tartrate (LOPRESSOR) 25 MG tablet, Take 1 tablet by mouth 2 (Two) Times a Day for 180 days. For heart, slows heart rate, Disp: 180 tablet, Rfl: 1    montelukast (SINGULAIR) 10 MG tablet, Take 1 tablet by mouth once daily, Disp: 90 tablet, Rfl: 3    Multiple Vitamins-Minerals (MULTIVITAMIN ADULT PO), Take 1 tablet by mouth Daily., Disp: , Rfl:     pioglitazone (ACTOS) 30 MG tablet, Take 1 tablet by mouth Daily. For diabetes, Disp: 90 tablet, Rfl: 3    tamsulosin (FLOMAX) 0.4 MG capsule 24 hr capsule, Take 1 capsule by mouth Daily. For prostate, Disp: 90 capsule, Rfl: 3      Social History     Socioeconomic History    Marital status: Single   Tobacco Use    Smoking status: Never     Passive exposure:  "Never    Smokeless tobacco: Never    Tobacco comments:     Family members smoked   Vaping Use    Vaping status: Never Used   Substance and Sexual Activity    Alcohol use: Never    Drug use: Never    Sexual activity: Not Currently         Review of Systems   Constitutional: Negative for chills and fever.   HENT:  Negative for ear discharge and nosebleeds.    Eyes:  Negative for discharge and redness.   Cardiovascular:  Negative for chest pain, orthopnea, palpitations, paroxysmal nocturnal dyspnea and syncope.   Respiratory:  Negative for cough, shortness of breath and wheezing.    Endocrine: Negative for heat intolerance.   Skin:  Negative for rash.   Musculoskeletal:  Negative for arthritis and myalgias.   Gastrointestinal:  Negative for abdominal pain, melena, nausea and vomiting.   Genitourinary:  Negative for dysuria and hematuria.   Neurological:  Negative for dizziness, light-headedness, numbness and tremors.   Psychiatric/Behavioral:  Negative for depression. The patient is not nervous/anxious.        Procedures      ECG 12 Lead    Date/Time: 3/17/2025 9:38 AM  Performed by: Santiaog Lorenzo MD    Authorized by: Santiago Lorenzo MD  Comparison: compared with previous ECG   Similar to previous ECG  Rhythm: sinus rhythm    Clinical impression: normal ECG          ECG 12 Lead    (Results Pending)           Objective:    /72 (BP Location: Right arm, Patient Position: Sitting, Cuff Size: Large Adult)   Pulse 69   Resp 18   Ht 167 cm (65.75\")   Wt 97.5 kg (215 lb)   SpO2 97%   BMI 34.97 kg/m²         Constitutional:       Appearance: Well-developed.   Eyes:      General: No scleral icterus.        Right eye: No discharge.   HENT:      Head: Normocephalic and atraumatic.   Neck:      Thyroid: No thyromegaly.      Lymphadenopathy: No cervical adenopathy.   Pulmonary:      Effort: Pulmonary effort is normal. No respiratory distress.      Breath sounds: Normal breath sounds. No wheezing. No rales. "   Cardiovascular:      Normal rate. Regular rhythm.      No gallop.    Edema:     Peripheral edema absent.   Abdominal:      Tenderness: There is no abdominal tenderness.   Skin:     Findings: No erythema or rash.   Neurological:      Mental Status: Alert and oriented to person, place, and time.             Assessment:       Diagnosis Plan   1. Hypertension, benign  ECG 12 Lead    hydrALAZINE (APRESOLINE) 50 MG tablet      2. Mixed hyperlipidemia  ECG 12 Lead      3. Type 2 diabetes mellitus without complication, without long-term current use of insulin        4. Coronary artery disease involving native coronary artery of native heart without angina pectoris        5. S/P CABG x 4 with JOHNSON per Dr. Hirsch on 5/15/2024        6. Paroxysmal atrial fibrillation        7. ASCAD  bumetanide (BUMEX) 1 MG tablet               Plan:       MDM:    1.  CAD/CABG:    At this stage, I would recommend to continue current treatment patient is doing well.    2.  Hypertension:    Increase hydralazine to 50 mg twice daily    3.  Mixed hyperlipidemia:    Patient is on Lipitor repeat lipid panel testing recent LDL is 72 which is desirable    4.  Paroxysmal atrial fibrillation:    Patient is maintaining sinus rhythm I would consider stopping Eliquis in future    5.  Congestive heart failure:    I would recommend to change Bumex to every other day consider stopping it in future

## 2025-03-17 ENCOUNTER — OFFICE VISIT (OUTPATIENT)
Dept: CARDIOLOGY | Facility: CLINIC | Age: 70
End: 2025-03-17
Payer: MEDICARE

## 2025-03-17 VITALS
WEIGHT: 215 LBS | BODY MASS INDEX: 34.55 KG/M2 | HEIGHT: 66 IN | HEART RATE: 69 BPM | OXYGEN SATURATION: 97 % | DIASTOLIC BLOOD PRESSURE: 72 MMHG | RESPIRATION RATE: 18 BRPM | SYSTOLIC BLOOD PRESSURE: 176 MMHG

## 2025-03-17 DIAGNOSIS — I48.0 PAROXYSMAL ATRIAL FIBRILLATION: ICD-10-CM

## 2025-03-17 DIAGNOSIS — I25.10 CORONARY ARTERY DISEASE INVOLVING NATIVE CORONARY ARTERY OF NATIVE HEART WITHOUT ANGINA PECTORIS: ICD-10-CM

## 2025-03-17 DIAGNOSIS — E78.2 MIXED HYPERLIPIDEMIA: ICD-10-CM

## 2025-03-17 DIAGNOSIS — I10 HYPERTENSION, BENIGN: Primary | ICD-10-CM

## 2025-03-17 DIAGNOSIS — Z95.1 S/P CABG X 4: ICD-10-CM

## 2025-03-17 DIAGNOSIS — I25.10 CAD, MULTIPLE VESSEL: Chronic | ICD-10-CM

## 2025-03-17 DIAGNOSIS — E11.9 TYPE 2 DIABETES MELLITUS WITHOUT COMPLICATION, WITHOUT LONG-TERM CURRENT USE OF INSULIN: ICD-10-CM

## 2025-03-17 RX ORDER — BUMETANIDE 1 MG/1
1 TABLET ORAL EVERY OTHER DAY
Status: SHIPPED
Start: 2025-03-17 | End: 2026-03-12

## 2025-03-17 RX ORDER — HYDRALAZINE HYDROCHLORIDE 50 MG/1
50 TABLET, FILM COATED ORAL EVERY 12 HOURS
Qty: 180 TABLET | Refills: 1 | Status: SHIPPED | OUTPATIENT
Start: 2025-03-17

## 2025-03-18 NOTE — PROGRESS NOTES
"Chief Complaint  Diabetes, Anxiety, and Depression    Subjective          Fransico Cuenca presents to [facility] for     HPI    Diabetes  Patient does not check blood sugar at home.   Associated symptoms include None  Per patient current diet is None, Well Balanced, and Variety of foods.  Patient does not avoid concentrated sweets.  Patient does not see podiatry.  Patient see's Nirmala [facility]  for diabetic eye exam and last eye exam was last fall.  Patient reports they are taking medications as prescribed and they are not having side effects.  Last A1c was 5.9 on 10/02/2024.     Anxiety/Depression  Associated symptoms include: difficulty concentrating and anxiety   Severity is described per patient : Mild  Patient reports they are taking medications as prescribed and they are not having side effects.  Side effects include none  On 02/14/2025 citalopram d/c and start Lexapro 10 mg daily, and Buspar added.     Objective   Vital Signs:   /70 (BP Location: Right arm, Patient Position: Sitting, Cuff Size: Adult)   Pulse 80   Temp 97.3 °F (36.3 °C) (Temporal)   Resp 18   Ht 165.1 cm (65\")   Wt 100 kg (221 lb 3.2 oz)   SpO2 98%   BMI 36.81 kg/m²     Physical Exam  Vitals and nursing note reviewed.   Constitutional:       General: He is not in acute distress.     Appearance: Normal appearance. He is not ill-appearing or diaphoretic.   HENT:      Head: Normocephalic and atraumatic.      Nose: No congestion or rhinorrhea.   Eyes:      Extraocular Movements: Extraocular movements intact.      Pupils: Pupils are equal, round, and reactive to light.   Cardiovascular:      Rate and Rhythm: Normal rate and regular rhythm.      Pulses: Normal pulses.   Pulmonary:      Effort: Pulmonary effort is normal.      Breath sounds: Normal breath sounds.   Musculoskeletal:         General: Normal range of motion.      Cervical back: Normal range of motion.   Skin:     General: Skin is warm and " dry.   Neurological:      Mental Status: He is alert and oriented to person, place, and time.   Psychiatric:         Mood and Affect: Mood normal.         Behavior: Behavior normal.        Result Review :                 Assessment and Plan    Diagnoses and all orders for this visit:    1. Type 2 diabetes mellitus without complication, without long-term current use of insulin (Primary)  Overview:  Regimen: Metformin 1000 mg bid, Farxiga 10 mg qd (prescribed by nephrologist).  On ARB and statin.    A1c 5.6 03/21/2025  A1c 5.9 10/02/2024  A1c 5.6 06/26/2024  A1c 6.2 03/12/2024  A1c 6.8 12/12/2023  A1c 6.1 09/11/2023   A1c 6.3 05/26/2023  A1c 6.3 02/24/2023  A1c 5.9 11/08/2022  A1c 7.8 05/03/2022  A1c 7.1 01/24/2022  A1c 7.5 12/21/2020  A1c 6.2 08/20/2019  New onset 04/29/2019 A1c 7.2    Assessment & Plan:  A1c is excellent, could argue it is too aggressively controlled.  We will d/c his Actos at this time given risks associated with it.  We will recheck an A1c in 3 months.    Orders:  -     POC Glycosylated Hemoglobin (Hb A1C)    2. Anxiety  Overview:  Regimen: Lexapro 20 mg qd, Buspar 7.5 mg bid.  Previously failed tx with Celexa.    Assessment & Plan:  Mood and anxiety have improved since starting Lexapro 10 mg and Buspar 7.5 mg bid.  However, there is still some daily worry about small things and breakthrough anxiety.  We will increase his Lexapro to 20 mg qd.  F/u in 3 months or sooner if needed.    Orders:  -     escitalopram (Lexapro) 20 MG tablet; Take 1 tablet by mouth Daily.  Dispense: 90 tablet; Refill: 3         Follow Up   Return in about 3 months (around 6/21/2025).      Daniel Guzmán MD    NOTE: [facility], per Health Information accessibility laws, allows progress notes to be visible to patients. Please note that these notes will include professional medical terminology that may be somewhat confusing without some interpretation from your medical professional team. The intent of progress notes is to  communicate information between any medical professionals involved in your case, or to serve as future reference for myself or any other provider when reviewing your case, as well as a reference for the patient viewing the record. Please ask a member of the medical team if you have any questions about terminology or content of note.    DISCLAIMER: Part of this note may be an electronic transcription/translation of spoken language to printed text using the Dragon Dictation System.

## 2025-03-21 ENCOUNTER — OFFICE VISIT (OUTPATIENT)
Dept: FAMILY MEDICINE CLINIC | Facility: CLINIC | Age: 70
End: 2025-03-21
Payer: MEDICARE

## 2025-03-21 VITALS
SYSTOLIC BLOOD PRESSURE: 140 MMHG | WEIGHT: 221.2 LBS | RESPIRATION RATE: 18 BRPM | HEART RATE: 80 BPM | HEIGHT: 65 IN | DIASTOLIC BLOOD PRESSURE: 70 MMHG | OXYGEN SATURATION: 98 % | TEMPERATURE: 97.3 F | BODY MASS INDEX: 36.85 KG/M2

## 2025-03-21 DIAGNOSIS — E11.9 TYPE 2 DIABETES MELLITUS WITHOUT COMPLICATION, WITHOUT LONG-TERM CURRENT USE OF INSULIN: Primary | ICD-10-CM

## 2025-03-21 DIAGNOSIS — F41.9 ANXIETY: ICD-10-CM

## 2025-03-21 LAB
EXPIRATION DATE: NORMAL
HBA1C MFR BLD: 5.6 % (ref 4.5–5.7)
Lab: NORMAL

## 2025-03-21 RX ORDER — ESCITALOPRAM OXALATE 20 MG/1
20 TABLET ORAL DAILY
Qty: 90 TABLET | Refills: 3 | Status: SHIPPED | OUTPATIENT
Start: 2025-03-21

## 2025-03-21 NOTE — ASSESSMENT & PLAN NOTE
A1c is excellent, could argue it is too aggressively controlled.  We will d/c his Actos at this time given risks associated with it.  We will recheck an A1c in 3 months.

## 2025-03-21 NOTE — ASSESSMENT & PLAN NOTE
Mood and anxiety have improved since starting Lexapro 10 mg and Buspar 7.5 mg bid.  However, there is still some daily worry about small things and breakthrough anxiety.  We will increase his Lexapro to 20 mg qd.  F/u in 3 months or sooner if needed.

## 2025-03-21 NOTE — ASSESSMENT & PLAN NOTE
Patient's (Body mass index is 36.81 kg/m².) indicates that they are morbidly/severely obese (BMI > 40 or > 35 with obesity - related health condition) with health conditions that include hypertension, diabetes mellitus, and dyslipidemias . Weight is worsening. BMI  is above average; BMI management plan is completed. We discussed portion control and increasing exercise.

## 2025-03-22 LAB
ALBUMIN/CREAT UR: 63 MG/G CREAT (ref 0–29)
CREAT UR-MCNC: 115 MG/DL
MICROALBUMIN UR-MCNC: 72.6 UG/ML

## 2025-03-24 ENCOUNTER — TELEPHONE (OUTPATIENT)
Dept: CARDIOLOGY | Facility: CLINIC | Age: 70
End: 2025-03-24
Payer: MEDICARE

## 2025-03-25 ENCOUNTER — CLINICAL SUPPORT (OUTPATIENT)
Dept: FAMILY MEDICINE CLINIC | Facility: CLINIC | Age: 70
End: 2025-03-25
Payer: MEDICARE

## 2025-03-25 DIAGNOSIS — E11.9 TYPE 2 DIABETES MELLITUS WITHOUT COMPLICATION, WITHOUT LONG-TERM CURRENT USE OF INSULIN: Primary | ICD-10-CM

## 2025-04-09 ENCOUNTER — CLINICAL SUPPORT (OUTPATIENT)
Dept: FAMILY MEDICINE CLINIC | Facility: CLINIC | Age: 70
End: 2025-04-09
Payer: MEDICARE

## 2025-04-09 DIAGNOSIS — J30.1 SEASONAL ALLERGIC RHINITIS DUE TO POLLEN: Primary | ICD-10-CM

## 2025-04-09 PROCEDURE — 95117 IMMUNOTHERAPY INJECTIONS: CPT

## 2025-04-19 DIAGNOSIS — E11.9 TYPE 2 DIABETES MELLITUS WITHOUT COMPLICATION, WITHOUT LONG-TERM CURRENT USE OF INSULIN: ICD-10-CM

## 2025-04-21 RX ORDER — PIOGLITAZONE 30 MG/1
30 TABLET ORAL DAILY
Qty: 90 TABLET | Refills: 0 | OUTPATIENT
Start: 2025-04-21

## 2025-04-21 NOTE — TELEPHONE ENCOUNTER
The original prescription was discontinued on 4/30/2024 by Izabela De Souza, PharmD. Renewing this prescription may not be appropriate.

## 2025-04-24 ENCOUNTER — CLINICAL SUPPORT (OUTPATIENT)
Dept: FAMILY MEDICINE CLINIC | Facility: CLINIC | Age: 70
End: 2025-04-24
Payer: MEDICARE

## 2025-04-24 DIAGNOSIS — J30.1 SEASONAL ALLERGIC RHINITIS DUE TO POLLEN: Primary | ICD-10-CM

## 2025-04-24 PROCEDURE — 95117 IMMUNOTHERAPY INJECTIONS: CPT | Performed by: FAMILY MEDICINE

## 2025-05-06 ENCOUNTER — CLINICAL SUPPORT (OUTPATIENT)
Dept: FAMILY MEDICINE CLINIC | Facility: CLINIC | Age: 70
End: 2025-05-06
Payer: MEDICARE

## 2025-05-06 DIAGNOSIS — F41.9 ANXIETY: ICD-10-CM

## 2025-05-06 DIAGNOSIS — E11.9 TYPE 2 DIABETES MELLITUS WITHOUT COMPLICATION, WITHOUT LONG-TERM CURRENT USE OF INSULIN: ICD-10-CM

## 2025-05-06 DIAGNOSIS — J30.1 SEASONAL ALLERGIC RHINITIS DUE TO POLLEN: Primary | ICD-10-CM

## 2025-05-06 PROCEDURE — 95117 IMMUNOTHERAPY INJECTIONS: CPT

## 2025-05-06 RX ORDER — PIOGLITAZONE 30 MG/1
30 TABLET ORAL DAILY
Qty: 90 TABLET | Refills: 3 | OUTPATIENT
Start: 2025-05-06

## 2025-05-06 RX ORDER — BUSPIRONE HYDROCHLORIDE 7.5 MG/1
7.5 TABLET ORAL 2 TIMES DAILY
Qty: 60 TABLET | Refills: 1 | Status: SHIPPED | OUTPATIENT
Start: 2025-05-06

## 2025-05-06 NOTE — TELEPHONE ENCOUNTER
Patient came into office requesting refills of the following medications to be sent to [facility] [place]:    PIOGLITAZONE 30MG  BUSPIRONE 7.5MG

## 2025-05-21 ENCOUNTER — CLINICAL SUPPORT (OUTPATIENT)
Dept: FAMILY MEDICINE CLINIC | Facility: CLINIC | Age: 70
End: 2025-05-21
Payer: MEDICARE

## 2025-05-21 DIAGNOSIS — J30.1 SEASONAL ALLERGIC RHINITIS DUE TO POLLEN: Primary | ICD-10-CM

## 2025-05-21 PROCEDURE — 95117 IMMUNOTHERAPY INJECTIONS: CPT

## 2025-05-29 ENCOUNTER — TELEPHONE (OUTPATIENT)
Dept: CARDIOLOGY | Facility: CLINIC | Age: 70
End: 2025-05-29
Payer: MEDICARE

## 2025-06-04 ENCOUNTER — CLINICAL SUPPORT (OUTPATIENT)
Dept: FAMILY MEDICINE CLINIC | Facility: CLINIC | Age: 70
End: 2025-06-04
Payer: MEDICARE

## 2025-06-04 DIAGNOSIS — J30.1 NON-SEASONAL ALLERGIC RHINITIS DUE TO POLLEN: Primary | ICD-10-CM

## 2025-06-04 PROCEDURE — 95117 IMMUNOTHERAPY INJECTIONS: CPT

## 2025-06-20 NOTE — PROGRESS NOTES
Subjective   The ABCs of the Annual Wellness Visit  Medicare Wellness Visit      Fransico Cuenca is a 70 y.o. patient who presents for a Medicare Wellness Visit.    The following portions of the patient's history were reviewed and   updated as appropriate: allergies, current medications, past family history, past medical history, past social history, past surgical history, and problem list.    Compared to one year ago, the patient's physical   health is better.  Compared to one year ago, the patient's mental   health is better.    Recent Hospitalizations:  He was admitted within the past 365 days at [facility].     Current Medical Providers:  Patient Care Team:  Daniel Guzmán MD as PCP - General (Family Medicine)  Marco A Childress MD as Consulting Physician (Nephrology)  Santiago Lorenzo MD as Consulting Physician (Cardiology)    Outpatient Medications Prior to Visit   Medication Sig Dispense Refill    albuterol sulfate  (90 Base) MCG/ACT inhaler Inhale 2 puffs Every 4 (Four) Hours As Needed for Wheezing. 18 g 12    allopurinol (ZYLOPRIM) 100 MG tablet Take 1 tablet by mouth Daily. For gout 90 tablet 3    amLODIPine (NORVASC) 5 MG tablet Take 1 tablet by mouth Daily. 90 tablet 3    Ascorbic Acid (Vitamin C) 500 MG capsule Take 500 mg by mouth Daily.      bumetanide (BUMEX) 1 MG tablet Take 1 tablet by mouth Every Other Day for 360 days. For fluid      busPIRone (BUSPAR) 7.5 MG tablet Take 1 tablet by mouth 2 (Two) Times a Day. 60 tablet 1    cholecalciferol (Vitamin D) 25 MCG (1000 UT) tablet Take 1 tablet by mouth Daily. 30 tablet 12    Dapagliflozin Propanediol (FARXIGA PO) Take  by mouth.      escitalopram (Lexapro) 20 MG tablet Take 1 tablet by mouth Daily. 90 tablet 3    hydrALAZINE (APRESOLINE) 50 MG tablet Take 1 tablet by mouth Every 12 (Twelve) Hours. 180 tablet 1    losartan (COZAAR) 50 MG tablet Take 1 tablet by mouth twice daily 180 tablet 3    magnesium oxide (MAG-OX) 400  MG tablet Take 1 tablet by mouth Daily. 90 tablet 3    montelukast (SINGULAIR) 10 MG tablet Take 1 tablet by mouth once daily 90 tablet 3    Multiple Vitamins-Minerals (MULTIVITAMIN ADULT PO) Take 1 tablet by mouth Daily.      tamsulosin (FLOMAX) 0.4 MG capsule 24 hr capsule Take 1 capsule by mouth Daily. For prostate 90 capsule 3    ferrous sulfate 325 (65 FE) MG tablet Take 1 tablet by mouth Daily With Breakfast. 90 tablet 3    metFORMIN (GLUCOPHAGE) 1000 MG tablet Take 1 tablet by mouth 2 (Two) Times a Day With Meals. For blood sugar 180 tablet 3    metoprolol tartrate (LOPRESSOR) 25 MG tablet Take 1 tablet by mouth 2 (Two) Times a Day for 180 days. For heart, slows heart rate 180 tablet 1     No facility-administered medications prior to visit.     No opioid medication identified on active medication list. I have reviewed chart for other potential  high risk medication/s and harmful drug interactions in the elderly.      Aspirin is not on active medication list.  Aspirin use is not indicated based on review of current medical condition/s. Risk of harm outweighs potential benefits.  .    Patient Active Problem List   Diagnosis    Anxiety    Hypertension, benign    Mixed hyperlipidemia    Allergic rhinitis due to pollen    Type 2 diabetes mellitus without complication, without long-term current use of insulin    Encounter for prostate cancer screening    Screening for colon cancer    Family history of ischemic heart disease    Class 2 obesity due to excess calories without serious comorbidity with body mass index (BMI) of 36.0 to 36.9 in adult    Obstructive sleep apnea syndrome    Medicare annual wellness visit, subsequent    History of kidney stones    Primary osteoarthritis of left hand    Iron deficiency anemia due to chronic blood loss    History of non-ST elevation myocardial infarction (NSTEMI)    Coronary artery disease involving native coronary artery of native heart without angina pectoris    Metabolic  "syndrome X    Bilateral leg edema    S/P CABG x 4 with [facility] per Dr. Hirsch on 5/15/2024    Idiopathic chronic gout of multiple sites without tophus    Elevated PTHrP level    Paroxysmal atrial fibrillation    Benign prostatic hyperplasia with urinary frequency     Advance Care Planning Advance Directive is on file.  ACP discussion was held with the patient during this visit. Patient has an advance directive in EMR which is still valid.             Objective   Vitals:    25 0900   BP: 138/82   BP Location: Right arm   Patient Position: Sitting   Cuff Size: Adult   Pulse: 68   Resp: 18   Temp: 98.6 °F (37 °C)   TempSrc: Temporal   SpO2: 93%  Comment: room air   Weight: 98 kg (216 lb 2 oz)   Height: 165.1 cm (65\")   PainSc: 0-No pain       Estimated body mass index is 35.97 kg/m² as calculated from the following:    Height as of this encounter: 165.1 cm (65\").    Weight as of this encounter: 98 kg (216 lb 2 oz).                Does the patient have evidence of cognitive impairment? No  Lab Results   Component Value Date    HGBA1C 5.5 2025                                                                                                Health  Risk Assessment    Smoking Status:  Social History     Tobacco Use   Smoking Status Never    Passive exposure: Never   Smokeless Tobacco Never   Tobacco Comments    Family members smoked     Alcohol Consumption:  Social History     Substance and Sexual Activity   Alcohol Use Never       Fall Risk Screen  STEADI Fall Risk Assessment was completed, and patient is at LOW risk for falls.Assessment completed on:2025    Depression Screening   Little interest or pleasure in doing things? Not at all   Feeling down, depressed, or hopeless? Not at all   PHQ-2 Total Score 0      Health Habits and Functional and Cognitive Screenin/23/2025     9:00 AM   Functional & Cognitive Status   Do you have difficulty preparing food and eating? No   Do you have difficulty bathing " yourself, getting dressed or grooming yourself? No   Do you have difficulty using the toilet? No   Do you have difficulty moving around from place to place? No   Do you have trouble with steps or getting out of a bed or a chair? No   Current Diet Well Balanced Diet   Dental Exam Up to date   Eye Exam Up to date   Exercise (times per week) 5 times per week   Current Exercises Include Walking   Do you need help using the phone?  No   Are you deaf or do you have serious difficulty hearing?  No   Do you need help to go to places out of walking distance? No   Do you need help shopping? No   Do you need help preparing meals?  No   Do you need help with housework?  No   Do you need help with laundry? No   Do you need help taking your medications? No   Do you need help managing money? No   Do you ever drive or ride in a car without wearing a seat belt? No   Have you felt unusual stress, anger or loneliness in the last month? No   Who do you live with? Alone   If you need help, do you have trouble finding someone available to you? No   Have you been bothered in the last four weeks by sexual problems? No   Do you have difficulty concentrating, remembering or making decisions? No           Age-appropriate Screening Schedule:  Refer to the list below for future screening recommendations based on patient's age, sex and/or medical conditions. Orders for these recommended tests are listed in the plan section. The patient has been provided with a written plan.    Health Maintenance List  Health Maintenance   Topic Date Due    COVID-19 Vaccine (4 - 2024-25 season) 09/01/2024    ANNUAL WELLNESS VISIT  03/12/2025    DIABETIC FOOT EXAM  03/12/2025    INFLUENZA VACCINE  07/01/2025    DIABETIC EYE EXAM  09/03/2025    HEMOGLOBIN A1C  12/23/2025    LIPID PANEL  02/13/2026    URINE MICROALBUMIN-CREATININE RATIO (uACR)  03/21/2026    COLORECTAL CANCER SCREENING  07/05/2026    TDAP/TD VACCINES (3 - Td or Tdap) 08/03/2030    HEPATITIS C  SCREENING  Completed    Pneumococcal Vaccine 50+  Completed    ZOSTER VACCINE  Completed                                                                                                                                                CMS Preventative Services Quick Reference  Risk Factors Identified During Encounter  Polypharmacy: Medication List reviewed and Medications are appropriate for patient    The above risks/problems have been discussed with the patient.  Pertinent information has been shared with the patient in the After Visit Summary.  An After Visit Summary and PPPS were made available to the patient.    Follow Up:   Next Medicare Wellness visit to be scheduled in 1 year.         Additional E&M Note during same encounter follows:  Patient has additional, significant, and separately identifiable condition(s)/problem(s) that require work above and beyond the Medicare Wellness Visit     Chief Complaint  Medicare Wellness-subsequent and Diabetes    Subjective   HPI  Mike is also being seen today for additional medical problem/s.        Patient presents for annual wellness examination, to discuss health maintenance and disease prevention. Feels well. Activity level: moderate. Diet: well balanced.    Patient  reports no history of alcohol use.     Tobacco Use: Low Risk  (2025)    Patient History     Smoking Tobacco Use: Never     Smokeless Tobacco Use: Never     Passive Exposure: Never     Colorectal cancer screenin2023 cologuard    PSA: 2024    Diabetes  Patient does not check blood sugar at home daily.  Associated symptoms include None  Per patient current diet is Well Balanced.  Patient does avoid concentrated sweets.  Patient does not see podiatry.  Patient see's [facility] for diabetic eye exam and last eye exam was within the last year.  Patient reports they are taking medications as prescribed and they are not having side effects.  Last A1c was 5.6 on 2025.       Objective  "  Vital Signs:  /82 (BP Location: Right arm, Patient Position: Sitting, Cuff Size: Adult)   Pulse 68   Temp 98.6 °F (37 °C) (Temporal)   Resp 18   Ht 165.1 cm (65\")   Wt 98 kg (216 lb 2 oz)   SpO2 93% Comment: room air  BMI 35.97 kg/m²   Physical Exam  Vitals and nursing note reviewed.   Constitutional:       General: He is not in acute distress.     Appearance: Normal appearance. He is not ill-appearing or diaphoretic.   HENT:      Head: Normocephalic and atraumatic.      Right Ear: Tympanic membrane, ear canal and external ear normal.      Left Ear: Tympanic membrane, ear canal and external ear normal.      Nose: No congestion or rhinorrhea.      Mouth/Throat:      Mouth: Mucous membranes are moist.      Pharynx: Oropharynx is clear. No oropharyngeal exudate or posterior oropharyngeal erythema.   Eyes:      Extraocular Movements: Extraocular movements intact.      Pupils: Pupils are equal, round, and reactive to light.   Cardiovascular:      Rate and Rhythm: Normal rate and regular rhythm.      Pulses: Normal pulses.   Pulmonary:      Effort: Pulmonary effort is normal.      Breath sounds: Normal breath sounds.   Musculoskeletal:         General: Normal range of motion.      Cervical back: Normal range of motion and neck supple. No tenderness.   Lymphadenopathy:      Cervical: No cervical adenopathy.   Skin:     General: Skin is warm and dry.      Capillary Refill: Capillary refill takes less than 2 seconds.   Neurological:      Mental Status: He is alert and oriented to person, place, and time.      Cranial Nerves: No cranial nerve deficit.      Sensory: No sensory deficit.      Motor: No weakness.   Psychiatric:         Mood and Affect: Mood normal.         Behavior: Behavior normal.              Assessment and Plan  Diagnoses and all orders for this visit:    1. Medicare annual wellness visit, subsequent (Primary)    2. Encounter for prostate cancer screening  Overview:  Lab Results   Component " Value Date    PSA 1.8 03/12/2024    PSA 1.7 02/24/2023    PSA 1.4 10/22/2021       Orders:  -     PSA Screen    3. Type 2 diabetes mellitus without complication, without long-term current use of insulin  Overview:  Regimen: Metformin 500 mg bid, Farxiga 10 mg qd (prescribed by nephrologist).  On ARB and statin.    A1c 5.5 06/23/2025 - metformin decreased from 1000 mg bid to 500 mg bid  A1c 5.6 03/21/2025 - d/c Actos  A1c 5.9 10/02/2024  A1c 5.6 06/26/2024  A1c 6.2 03/12/2024  A1c 6.8 12/12/2023  A1c 6.1 09/11/2023   A1c 6.3 05/26/2023  A1c 6.3 02/24/2023  A1c 5.9 11/08/2022  A1c 7.8 05/03/2022  A1c 7.1 01/24/2022  A1c 7.5 12/21/2020  A1c 6.2 08/20/2019  New onset 04/29/2019 A1c 7.2    Assessment & Plan:  A1c remains excellent off of the Actos.  He mentions some diarrhea with the metformin.  Given how well controlled his diabetes is, we will decrease his metformin from 1000 mg bid to 500 mg bid.  Repeat A1c in 3 months.    Orders:  -     POC Glycosylated Hemoglobin (Hb A1C)  -     metFORMIN (GLUCOPHAGE) 500 MG tablet; Take 1 tablet by mouth 2 (Two) Times a Day With Meals. For blood sugar  Dispense: 180 tablet; Refill: 3    4. Hypertension, benign  Overview:  Regimen: Amlodipine 5 mg qd, losartan 50 mg bid, hydralazine 25 mg tid.    Assessment & Plan:  BP is 138/82 in the office today.  He states that it is consistently at target at home.  He is to continue monitoring it closely.    Orders:  -     TSH Rfx On Abnormal To Free T4  -     Comprehensive Metabolic Panel  -     CBC (No Diff)    5. Mixed hyperlipidemia  Overview:  Regimen: Atorvastatin 40 mg qd.    Lab Results   Component Value Date    CHOL 137 04/24/2024    CHLPL 140 10/02/2024    TRIG 70 10/02/2024    HDL 54 10/02/2024    LDL 72 10/02/2024     LDL goal <70 given diabetes.    Assessment & Plan:  We will get an updated lipid panel.    Orders:  -     Lipid Panel    Other orders  -     ferrous sulfate 325 (65 FE) MG tablet; Take 1 tablet by mouth Daily With  Breakfast.  Dispense: 90 tablet; Refill: 3           Patient was given instructions and counseling regarding his condition or for health maintenance advice. Please see specific information pulled into the AVS if appropriate.      Daniel Guzmán MD    NOTE: [employer], per Health Information accessibility laws, allows progress notes to be visible to patients. Please note that these notes will include professional medical terminology that may be somewhat confusing without some interpretation from your medical professional team. The intent of progress notes is to communicate information between any medical professionals involved in your case, or to serve as future reference for myself or any other provider when reviewing your case, as well as a reference for the patient viewing the record. Please ask a member of the medical team if you have any questions about terminology or content of note.    DISCLAIMER: Part of this note may be an electronic transcription/translation of spoken language to printed text using the Dragon Dictation System.

## 2025-06-23 ENCOUNTER — OFFICE VISIT (OUTPATIENT)
Dept: FAMILY MEDICINE CLINIC | Facility: CLINIC | Age: 70
End: 2025-06-23
Payer: MEDICARE

## 2025-06-23 VITALS
OXYGEN SATURATION: 93 % | TEMPERATURE: 98.6 F | HEIGHT: 65 IN | HEART RATE: 68 BPM | SYSTOLIC BLOOD PRESSURE: 138 MMHG | BODY MASS INDEX: 36.01 KG/M2 | DIASTOLIC BLOOD PRESSURE: 82 MMHG | WEIGHT: 216.13 LBS | RESPIRATION RATE: 18 BRPM

## 2025-06-23 DIAGNOSIS — E11.9 TYPE 2 DIABETES MELLITUS WITHOUT COMPLICATION, WITHOUT LONG-TERM CURRENT USE OF INSULIN: ICD-10-CM

## 2025-06-23 DIAGNOSIS — Z12.5 ENCOUNTER FOR PROSTATE CANCER SCREENING: ICD-10-CM

## 2025-06-23 DIAGNOSIS — E78.2 MIXED HYPERLIPIDEMIA: ICD-10-CM

## 2025-06-23 DIAGNOSIS — I10 HYPERTENSION, BENIGN: ICD-10-CM

## 2025-06-23 DIAGNOSIS — Z00.00 MEDICARE ANNUAL WELLNESS VISIT, SUBSEQUENT: Primary | ICD-10-CM

## 2025-06-23 LAB
EXPIRATION DATE: NORMAL
HBA1C MFR BLD: 5.5 % (ref 4.5–5.7)
Lab: NORMAL

## 2025-06-23 PROCEDURE — 1170F FXNL STATUS ASSESSED: CPT

## 2025-06-23 PROCEDURE — 3075F SYST BP GE 130 - 139MM HG: CPT

## 2025-06-23 PROCEDURE — 99214 OFFICE O/P EST MOD 30 MIN: CPT

## 2025-06-23 PROCEDURE — 1126F AMNT PAIN NOTED NONE PRSNT: CPT

## 2025-06-23 PROCEDURE — 83036 HEMOGLOBIN GLYCOSYLATED A1C: CPT

## 2025-06-23 PROCEDURE — 3044F HG A1C LEVEL LT 7.0%: CPT

## 2025-06-23 PROCEDURE — 3079F DIAST BP 80-89 MM HG: CPT

## 2025-06-23 PROCEDURE — G0439 PPPS, SUBSEQ VISIT: HCPCS

## 2025-06-23 RX ORDER — DAPAGLIFLOZIN 10 MG/1
10 TABLET, FILM COATED ORAL DAILY
Qty: 90 TABLET | Refills: 1 | Status: CANCELLED | OUTPATIENT
Start: 2025-06-23

## 2025-06-23 RX ORDER — FERROUS SULFATE 325(65) MG
325 TABLET ORAL
Qty: 90 TABLET | Refills: 3 | Status: SHIPPED | OUTPATIENT
Start: 2025-06-23

## 2025-06-23 NOTE — ASSESSMENT & PLAN NOTE
A1c remains excellent off of the Actos.  He mentions some diarrhea with the metformin.  Given how well controlled his diabetes is, we will decrease his metformin from 1000 mg bid to 500 mg bid.  Repeat A1c in 3 months.

## 2025-06-23 NOTE — ASSESSMENT & PLAN NOTE
BP is 138/82 in the office today.  He states that it is consistently at target at home.  He is to continue monitoring it closely.

## 2025-06-24 LAB
ALBUMIN SERPL-MCNC: 4.1 G/DL (ref 3.8–4.8)
ALP SERPL-CCNC: 115 IU/L (ref 44–121)
ALT SERPL-CCNC: 20 IU/L (ref 0–44)
AST SERPL-CCNC: 19 IU/L (ref 0–40)
BILIRUB SERPL-MCNC: 0.3 MG/DL (ref 0–1.2)
BUN SERPL-MCNC: 21 MG/DL (ref 8–27)
BUN/CREAT SERPL: 20 (ref 10–24)
CALCIUM SERPL-MCNC: 9.3 MG/DL (ref 8.6–10.2)
CHLORIDE SERPL-SCNC: 106 MMOL/L (ref 96–106)
CHOLEST SERPL-MCNC: 129 MG/DL (ref 100–199)
CO2 SERPL-SCNC: 20 MMOL/L (ref 20–29)
CREAT SERPL-MCNC: 1.05 MG/DL (ref 0.76–1.27)
EGFRCR SERPLBLD CKD-EPI 2021: 76 ML/MIN/1.73
ERYTHROCYTE [DISTWIDTH] IN BLOOD BY AUTOMATED COUNT: 14 % (ref 11.6–15.4)
GLOBULIN SER CALC-MCNC: 2.6 G/DL (ref 1.5–4.5)
GLUCOSE SERPL-MCNC: 87 MG/DL (ref 70–99)
HCT VFR BLD AUTO: 38.5 % (ref 37.5–51)
HDLC SERPL-MCNC: 46 MG/DL
HGB BLD-MCNC: 12.1 G/DL (ref 13–17.7)
LDLC SERPL CALC-MCNC: 68 MG/DL (ref 0–99)
MCH RBC QN AUTO: 26.4 PG (ref 26.6–33)
MCHC RBC AUTO-ENTMCNC: 31.4 G/DL (ref 31.5–35.7)
MCV RBC AUTO: 84 FL (ref 79–97)
PLATELET # BLD AUTO: 251 X10E3/UL (ref 150–450)
POTASSIUM SERPL-SCNC: 4.3 MMOL/L (ref 3.5–5.2)
PROT SERPL-MCNC: 6.7 G/DL (ref 6–8.5)
PSA SERPL-MCNC: 1.8 NG/ML (ref 0–4)
RBC # BLD AUTO: 4.59 X10E6/UL (ref 4.14–5.8)
SODIUM SERPL-SCNC: 141 MMOL/L (ref 134–144)
TRIGL SERPL-MCNC: 77 MG/DL (ref 0–149)
TSH SERPL DL<=0.005 MIU/L-ACNC: 1.52 UIU/ML (ref 0.45–4.5)
VLDLC SERPL CALC-MCNC: 15 MG/DL (ref 5–40)
WBC # BLD AUTO: 6.4 X10E3/UL (ref 3.4–10.8)

## 2025-07-02 DIAGNOSIS — F41.9 ANXIETY: ICD-10-CM

## 2025-07-02 RX ORDER — BUSPIRONE HYDROCHLORIDE 7.5 MG/1
7.5 TABLET ORAL 2 TIMES DAILY
Qty: 60 TABLET | Refills: 1 | Status: SHIPPED | OUTPATIENT
Start: 2025-07-02

## 2025-07-14 DIAGNOSIS — E78.2 MIXED HYPERLIPIDEMIA: ICD-10-CM

## 2025-07-14 RX ORDER — ATORVASTATIN CALCIUM 40 MG/1
TABLET, FILM COATED ORAL
Qty: 90 TABLET | Refills: 3 | Status: SHIPPED | OUTPATIENT
Start: 2025-07-14

## 2025-07-15 ENCOUNTER — CLINICAL SUPPORT (OUTPATIENT)
Dept: FAMILY MEDICINE CLINIC | Facility: CLINIC | Age: 70
End: 2025-07-15
Payer: MEDICARE

## 2025-07-15 DIAGNOSIS — J30.1 SEASONAL ALLERGIC RHINITIS DUE TO POLLEN: Primary | ICD-10-CM

## 2025-07-15 PROCEDURE — 95117 IMMUNOTHERAPY INJECTIONS: CPT

## 2025-07-29 ENCOUNTER — CLINICAL SUPPORT (OUTPATIENT)
Dept: FAMILY MEDICINE CLINIC | Facility: CLINIC | Age: 70
End: 2025-07-29
Payer: MEDICARE

## 2025-07-29 DIAGNOSIS — J30.1 SEASONAL ALLERGIC RHINITIS DUE TO POLLEN: Primary | ICD-10-CM

## 2025-07-29 PROCEDURE — 95117 IMMUNOTHERAPY INJECTIONS: CPT

## 2025-08-12 ENCOUNTER — CLINICAL SUPPORT (OUTPATIENT)
Dept: FAMILY MEDICINE CLINIC | Facility: CLINIC | Age: 70
End: 2025-08-12
Payer: MEDICARE

## 2025-08-12 DIAGNOSIS — J30.1 SEASONAL ALLERGIC RHINITIS DUE TO POLLEN: Primary | ICD-10-CM

## 2025-08-12 PROCEDURE — 95117 IMMUNOTHERAPY INJECTIONS: CPT

## 2025-08-27 ENCOUNTER — CLINICAL SUPPORT (OUTPATIENT)
Dept: FAMILY MEDICINE CLINIC | Facility: CLINIC | Age: 70
End: 2025-08-27
Payer: MEDICARE

## 2025-08-27 DIAGNOSIS — J30.1 SEASONAL ALLERGIC RHINITIS DUE TO POLLEN: Primary | ICD-10-CM

## 2025-08-27 PROCEDURE — 95117 IMMUNOTHERAPY INJECTIONS: CPT

## (undated) DEVICE — SUP ARMBRD HANDAID ART/LINE 9IN

## (undated) DEVICE — TBG INSUFF MALE L/L W 12MM CON: Brand: MEDLINE INDUSTRIES, INC.

## (undated) DEVICE — Device

## (undated) DEVICE — ST IV BLD W/HANDPUMP YSITE 125IN

## (undated) DEVICE — SUT PROLN 4/0 V7 36IN 8975H

## (undated) DEVICE — HEMOCONCENTRATOR PERFUS LPS06

## (undated) DEVICE — ELECTRD DEFIB M/FUNC PROPADZ RADIOL 2PK

## (undated) DEVICE — PK OPN HEART WHT WRP 50

## (undated) DEVICE — PRESSURE TUBING: Brand: TRUWAVE

## (undated) DEVICE — BG BLD SYS

## (undated) DEVICE — CANN ART EOPA 3D NV W/CONN 20F

## (undated) DEVICE — SUT PROLN 6/0 RB2 D/A 30IN 8711H

## (undated) DEVICE — CATH DIAG IMPULSE FR4 6F 100CM

## (undated) DEVICE — DRSNG SLVR/ANTIBAC PRIMASEAL POST/OP ADHS 3.5X10IN

## (undated) DEVICE — TOWEL,OR,DSP,ST,WHITE,DLX,4/PK,20PK/CS: Brand: MEDLINE

## (undated) DEVICE — CATH TDILUT SWANGANZ VIP 7.5F 110CM

## (undated) DEVICE — ROTATING SURGICAL PUNCHES, 1 PER POUCH: Brand: A&E MEDICAL / ROTATING SURGICAL PUNCHES

## (undated) DEVICE — CABL BIPOL W/ALLGTR CLIP/SM 12FT

## (undated) DEVICE — SUT VIC 2 TP1 54IN J880T

## (undated) DEVICE — 28 FR STRAIGHT – SOFT PVC CATHETER: Brand: PVC THORACIC CATHETERS

## (undated) DEVICE — CVR PROB ULTRASND CIVFLEX GEN/PURP TELESCP/FOLD 5.5X96IN LF

## (undated) DEVICE — PK TRY HEART CATH 50

## (undated) DEVICE — INTENDED FOR TISSUE SEPARATION, AND OTHER PROCEDURES THAT REQUIRE A SHARP SURGICAL BLADE TO PUNCTURE OR CUT.: Brand: BARD-PARKER ® CARBON RIB-BACK BLADES

## (undated) DEVICE — BLOOD TRANSFUSION FILTER: Brand: HAEMONETICS

## (undated) DEVICE — CORONARY ARTERY BYPASS GRAFT MARKERS, STAINLESS STEEL, DISTAL, WITHOUT HOLDER: Brand: ANASTOMARK CORONARY ARTERY BYPASS GRAFT MARKERS, STAINLESS STEEL, DISTAL

## (undated) DEVICE — OASIS DRAIN, SINGLE, INLINE & ATS COMPATIBLE: Brand: OASIS

## (undated) DEVICE — PK ATS CUST W CARDIOTOMY RESEVOIR

## (undated) DEVICE — 3M™ BAIR HUGGER® UNDERBODY BLANKET, FULL ACCESS, 10 PER CASE 63500: Brand: BAIR HUGGER™

## (undated) DEVICE — SUT PROLN 4/0 V5 36IN 8935H

## (undated) DEVICE — SYS PERFUS SEP PLATLT W TIPS CUST

## (undated) DEVICE — ELECTRD DEFIB M/FUNC PROPADZ STRL 2PK

## (undated) DEVICE — SUT PROLENE PP 7/0 BV175-6 24IN

## (undated) DEVICE — BLOWER MISTER CLEARVIEW W/TBG

## (undated) DEVICE — SOL IRR H2O BTL 1000ML STRL

## (undated) DEVICE — BIOPATCH™ ANTIMICROBIAL DRESSING WITH CHLORHEXIDINE GLUCONATE IS A HYDROPHILLIC POLYURETHANE ABSORPTIVE FOAM WITH CHLORHEXIDINE GLUCONATE (CHG) WHICH INHIBITS BACTERIAL GROWTH UNDER THE DRESSING. THE DRESSING IS INTENDED TO BE USED TO ABSORB EXUDATE, COVER A WOUND CAUSED BY VASCULAR AND NONVASCULAR PERCUTANEOUS MEDICAL DEVICES DURING SURGERY, AS WELL AS REDUCE LOCAL INFECTION AND COLONIZATION OF MICROORGANISMS.: Brand: BIOPATCH

## (undated) DEVICE — PK PERFUS CUST W/CARDIOPLEGIA

## (undated) DEVICE — 3M™ TEGADERM™ IV TRANSPARENT FILM DRESSING WITH BORDER 100 BAGS/CARTON 4 CARTONS/CASE 1633: Brand: 3M™ TEGADERM™

## (undated) DEVICE — CATH DIAG IMPULSE PIG .056 6F 110CM

## (undated) DEVICE — SUT PROLN 7/0 BV1 D/A 30IN 8703H

## (undated) DEVICE — CONNECT Y INTERSEPT W/LL 3/8 X 3/8 X 3/8IN

## (undated) DEVICE — GLV SURG BIOGEL LTX PF 7 1/2

## (undated) DEVICE — SUT PROLN 5/0 RB2 D/A 30IN 8710H

## (undated) DEVICE — SUT PDS2 0 CT1 27IN Z340H MF VIL

## (undated) DEVICE — SOL NACL 0.9PCT 1000ML

## (undated) DEVICE — CANN AORT ROOT DLP VNT/8IN 14G 7F

## (undated) DEVICE — PRESSURE MONITORING SET: Brand: TRUWAVE, VAMP PLUS

## (undated) DEVICE — BNDG,ELSTC,MATRIX,STRL,4"X5YD,LF,HOOK&LP: Brand: MEDLINE

## (undated) DEVICE — SENSR CERBRL O2 PK/2

## (undated) DEVICE — SUT SILK 2/0 TIES 18IN A185H

## (undated) DEVICE — PINNACLE INTRODUCER SHEATH: Brand: PINNACLE

## (undated) DEVICE — SUT SILK 2 SUTUPAK TIE 60IN SA8H 2STRAND

## (undated) DEVICE — SUT SILK 4/0 TIES 18IN A183H

## (undated) DEVICE — SPNG GZ AVANT 6PLY 4X4IN STRL PK/2

## (undated) DEVICE — BLD SCLPL BEAVR MINI STR 2BVL 180D LF

## (undated) DEVICE — SOL IRR NACL 0.9PCT BT 1000ML

## (undated) DEVICE — SUT PROLN 3/0 V7 D/A 36IN 8976H

## (undated) DEVICE — KT CATH CV ACC MAC 2L SFTY 9F 4 1/2IN

## (undated) DEVICE — SAFELINER OUTER SHELL SUCTION CANISTER: Brand: DEROYAL

## (undated) DEVICE — THE SAPHENA MEDICAL ONEPASS ENDOSCOPIC VESSEL HARVESTING SYSTEM IS INDICATED FOR USE IN MINIMALLY INVASIVE SURGERY ALLOWING ACCESS FOR VESSEL HARVESTING, AND IS PRIMARILY INDICATED FOR PATIENTS UNDERGOING ENDOSCOPIC SURGERY FOR ARTERIAL BYPASS. IT IS INDICATED FOR CUTTING TISSUE AND CONTROLLING BLEEDING THROUGH COAGULATION, AND FOR PATIENTS REQUIRING BLUNT DISSECTION OF TISSUE INCLUDING DISSECTION OF BLOOD VESSELS, DISSECTION OF BLOOD VESSELS OF THE EXTREMITIES, DISSECTION OF DUCTS AND OTHER STRUCTURES IN THE EXTRA PERITONEALL OR SUBCUTANEOUS EXTREMITY AND THORACIC SPACE. EXTREMITY PROCEDURES INCLUDE TISSUE DISSECTION ALONG THE SAPHENOUS VEIN FOR USE IN CORONARY ARTERY BYPASS GRAFTING AND PERIPHERAL ARTERY BYPASS OR RADIAL ARTERY FOR USE IN CORONARY ARTERY BYPASS GRAFTING. THORASCOPIC PROCEDURES INCLUDE EXPOSURE AND DISSECTION OF STRUCTURES EXTERNAL TOTHE PARIETAL PLEURA, INCLUDING NERVES, BLOOD VESSELS, AND OTHER TISSUES OF THE CHEST WALL.: Brand: VENAPAX

## (undated) DEVICE — SHT AIR TRANSFR COMFRT GLIDE LAT 40X80IN

## (undated) DEVICE — CATH ART RADL 20GA 1 3/4IN LF

## (undated) DEVICE — ELECTRD BLD EZ CLN STD 6.5IN

## (undated) DEVICE — DRAPE SHEET ULTRAGARD: Brand: MEDLINE

## (undated) DEVICE — 500ML,PRESSURE INFUSER W/STOPCOCK: Brand: MEDLINE

## (undated) DEVICE — SUT SILK 2/0 SH CR8 18IN CR8 C012D

## (undated) DEVICE — ANTIBACTERIAL UNDYED BRAIDED (POLYGLACTIN 910), SYNTHETIC ABSORBABLE SUTURE: Brand: COATED VICRYL

## (undated) DEVICE — 3M™ TEGADERM™ I.V. ADVANCED SECUREMENT DRESSING, 1685, 3-1/2 IN X 4-1/2 IN (8.5 CM X 11.5 CM), 50/CT 4CT/CASE: Brand: 3M™ TEGADERM™

## (undated) DEVICE — TUBING, SUCTION, 1/4" X 12', STRAIGHT: Brand: MEDLINE

## (undated) DEVICE — CATH DIAG IMPULSE FL4 6F 100CM

## (undated) DEVICE — BNDG,ELSTC,MATRIX,STRL,6"X5YD,LF,HOOK&LP: Brand: MEDLINE

## (undated) DEVICE — SUT PROLN 5/0 V5 36IN 8934H

## (undated) DEVICE — SUT SILK 0 CT1 CR8 18IN C021D

## (undated) DEVICE — DGW .035 MC J3MM 150CM T H AMP: Brand: EMERALD